# Patient Record
Sex: MALE | Race: WHITE | NOT HISPANIC OR LATINO | Employment: FULL TIME | URBAN - METROPOLITAN AREA
[De-identification: names, ages, dates, MRNs, and addresses within clinical notes are randomized per-mention and may not be internally consistent; named-entity substitution may affect disease eponyms.]

---

## 2017-11-15 ENCOUNTER — GENERIC CONVERSION - ENCOUNTER (OUTPATIENT)
Dept: OTHER | Facility: OTHER | Age: 57
End: 2017-11-15

## 2018-01-12 NOTE — MISCELLANEOUS
Message  REFILL FOR DEPO TESTOS VERBALLY CALLED TO AMAURY Gross [phone], NO REFILL  WILL CONTACT PT RE: PSA LEVEL, NO RECENT RESULTS ON CHART  Active Problems    1  Abdominal pain, left upper quadrant (789 02) (R10 12)   2  Bilateral leg weakness (729 89) (R29 898)   3  Chronic pain syndrome (338 4) (G89 4)   4  Ejaculatory disorder (608 89) (N53 19)   5  Hearing Loss Bilaterally   6  Hypogonadism, male (257 2) (E29 1)   7  Imbalance (781 2) (R26 89)   8  Leg heaviness (729 89) (R29 898)   9  Lumbar radiculopathy (724 4) (M54 16)   10  Lumbar stenosis with neurogenic claudication (724 03) (M48 062)   11  Spinal cord injury (952 9)   12  Status post lumbar spinal fusion (V45 4) (Z98 1)    Current Meds   1  Aspirin 81 MG TABS; Take 1 tablet daily; Therapy: (Recorded:18Mar2016) to Recorded   2  LORazepam 2 MG Oral Tablet (Ativan); TAKE 1 TABLET PRIOR TO MRI PROCEDURE; Therapy: 08Apr2016 to (Evaluate:09Apr2016); Last Rx:08Apr2016 Ordered   3  Testosterone 2 MG/24HR PT24; injection every 2 weeks; Therapy: (Recorded:08Apr2016) to Recorded   4  Testosterone Cypionate 200 MG/ML Intramuscular Solution; INJECT 2 MLS   INTRAMUSCULARLY EVERY 2 WEEKS; Therapy: [id number] to ([phone])  Requested for: [id number]; Last   Rx:13Nov2017 Ordered   5  Viagra TABS; PRN; Therapy: (Recorded:18Mar2016) to Recorded   6  Vicodin ES 7 5-750 MG TABS (Hydrocodone-Acetaminophen); PRN; Therapy: (Recorded:18Mar2016) to Recorded    Allergies    1   No Known Drug Allergies    Signatures   Electronically signed by : Loyda Hull RN; Nov 15 2017  1:58PM EST                       (Author)

## 2018-05-18 ENCOUNTER — OFFICE VISIT (OUTPATIENT)
Dept: UROLOGY | Facility: MEDICAL CENTER | Age: 58
End: 2018-05-18
Payer: COMMERCIAL

## 2018-05-18 VITALS
BODY MASS INDEX: 29.93 KG/M2 | DIASTOLIC BLOOD PRESSURE: 68 MMHG | WEIGHT: 221 LBS | HEIGHT: 72 IN | SYSTOLIC BLOOD PRESSURE: 110 MMHG

## 2018-05-18 DIAGNOSIS — E29.1 TESTICULAR HYPOFUNCTION: ICD-10-CM

## 2018-05-18 DIAGNOSIS — N52.9 ERECTILE DYSFUNCTION, UNSPECIFIED ERECTILE DYSFUNCTION TYPE: ICD-10-CM

## 2018-05-18 DIAGNOSIS — R35.1 BENIGN PROSTATIC HYPERPLASIA WITH NOCTURIA: Primary | ICD-10-CM

## 2018-05-18 DIAGNOSIS — N40.1 BENIGN PROSTATIC HYPERPLASIA WITH NOCTURIA: Primary | ICD-10-CM

## 2018-05-18 LAB
SL AMB  POCT GLUCOSE, UA: NEGATIVE
SL AMB LEUKOCYTE ESTERASE,UA: NEGATIVE
SL AMB POCT BILIRUBIN,UA: NEGATIVE
SL AMB POCT BLOOD,UA: NEGATIVE
SL AMB POCT CLARITY,UA: CLEAR
SL AMB POCT COLOR,UA: NORMAL
SL AMB POCT KETONES,UA: NEGATIVE
SL AMB POCT NITRITE,UA: NEGATIVE
SL AMB POCT PH,UA: 6.5
SL AMB POCT SPECIFIC GRAVITY,UA: 1.01
SL AMB POCT URINE PROTEIN: NEGATIVE
SL AMB POCT UROBILINOGEN: 1

## 2018-05-18 PROCEDURE — 99214 OFFICE O/P EST MOD 30 MIN: CPT | Performed by: UROLOGY

## 2018-05-18 PROCEDURE — 81003 URINALYSIS AUTO W/O SCOPE: CPT | Performed by: UROLOGY

## 2018-05-18 RX ORDER — PREDNISONE 10 MG/1
TABLET ORAL AS NEEDED
Refills: 0 | COMMUNITY
Start: 2018-05-09 | End: 2019-01-23 | Stop reason: ALTCHOICE

## 2018-05-18 RX ORDER — IBUPROFEN 400 MG/1
400 TABLET ORAL AS NEEDED
COMMUNITY
End: 2021-06-10 | Stop reason: HOSPADM

## 2018-05-18 NOTE — LETTER
May 19, 2018     Dmitriy Simeon DO  [address] [facility]    Patient: Erlinda Severin   YOB: 1960   Date of Visit: 5/18/2018       Dear Dr Jasbir David: Thank you for referring Jonny Waters to me for evaluation  Below are my notes for this consultation  If you have questions, please do not hesitate to call me  I look forward to following your patient along with you  Sincerely,        James Bee MD        CC: No Recipients  James Bee MD  5/19/2018 11:08 AM  Sign at close encounter  Assessment/Plan:    Testicular hypofunction  Continue AndroGel    Erectile dysfunction  The patient complains of orgasmic dysfunction  He will seek the advice of an expert in pelvic floor dysfunction at [facility]   One of the urologists there has special training in this regard  Benign prostatic hyperplasia with nocturia  The patient will have a cystoscopy to reassess his voiding complaints  Perhaps his treatment can be improved  Diagnoses and all orders for this visit:    Benign prostatic hyperplasia with nocturia    Testicular hypofunction  -     POCT urine dip auto non-scope  -     sildenafil (REVATIO) 20 mg tablet; 3-5 tablets po q Day prn    Erectile dysfunction, unspecified erectile dysfunction type    Other orders  -     ibuprofen (MOTRIN) 400 mg tablet; Take 400 mg by mouth as needed  -     predniSONE 10 mg tablet; as needed          Subjective:      Patient ID: Erlinda Severin is a 59 y o  male  HPI  BPH:  AUA score remains very high but stable  Quality of life stable, but unfortunately mostly dissatisfied  A cystoscopy in 2011 was normal   A urodynamic evaluation in showed a hyperactive sphincter  The patient has not had a cystoscopy since 2011    He will return in the near future for a cysto to reassess his symptoms and see if the underlying causes may have changed and may now be treatable  Hypogonadism:  The patient has been taking AndroGel since approximately 2012 with good effect  Can get only a month at a time and he is annoyed by this  Will give longer Rx and see if he can get it filled  Erectile dysfunction:  The patient is using sildenafil 100 mg  Has not had an orgasm since August 2017  This distresses pt  Pt reassured that his hydrocele is not the cause  Has numbness in penis, thighs and scrotum since back surgery but had orgasms after surgery  The following portions of the patient's history were reviewed and updated as appropriate: allergies, current medications, past family history, past medical history, past social history, past surgical history and problem list     Review of Systems   Constitutional: Negative for activity change and fatigue  Respiratory: Negative for shortness of breath and wheezing  Cardiovascular: Negative for chest pain  Gastrointestinal: Negative for abdominal pain  Genitourinary: Negative for difficulty urinating, dysuria, frequency, hematuria and urgency  Musculoskeletal: Negative for back pain and gait problem  Skin: Negative  Allergic/Immunologic: Negative  Neurological: Negative  The patient has severe difficulty walking due to extensive back surgery with some residual neuropathy  Psychiatric/Behavioral: Negative  Objective:      /68 (BP Location: Left arm, Patient Position: Sitting, Cuff Size: Standard)   Ht 6' (1 829 m)   Wt 100 kg (221 lb)   BMI 29 97 kg/m²           Physical Exam   Constitutional: He is oriented to person, place, and time  He appears well-developed and well-nourished  HENT:   Head: Normocephalic and atraumatic  Neck: Normal range of motion  Neck supple  Pulmonary/Chest: Effort normal    Genitourinary:   Genitourinary Comments: The prostate is approximately 40 g, firm, smooth, nontender  Musculoskeletal: Normal range of motion     Neurological: He is alert and oriented to person, place, and time  He has normal reflexes  Skin: Skin is warm and dry  Psychiatric: He has a normal mood and affect   His behavior is normal  Judgment and thought content normal

## 2018-05-18 NOTE — PROGRESS NOTES
IPSS Questionnaire (AUA-7): Over the past month    1)  How often have you had a sensation of not emptying your bladder completely after you finish urinating? 0 - Not at all   2)  How often have you had to urinate again less than two hours after you finished urinating? 3 - About half the time   3)  How often have you found you stopped and started again several times when you urinated? 3 - About half the time   4) How difficult have you found it to postpone urination? 5 - Almost always   5) How often have you had a weak urinary stream?  5 - Almost always   6) How often have you had to push or strain to begin urination? 4 - More than half the time   7) How many times did you most typically get up to urinate from the time you went to bed until the time you got up in the morning? 4 - 4 times   Total Score:  24     QOL: Mostly dissatisfied

## 2018-05-18 NOTE — PROGRESS NOTES
Assessment/Plan:    Testicular hypofunction  Continue AndroGel    Erectile dysfunction  The patient complains of orgasmic dysfunction  He will seek the advice of an expert in pelvic floor dysfunction at [facility]   One of the urologists there has special training in this regard  Benign prostatic hyperplasia with nocturia  The patient will have a cystoscopy to reassess his voiding complaints  Perhaps his treatment can be improved  Diagnoses and all orders for this visit:    Benign prostatic hyperplasia with nocturia    Testicular hypofunction  -     POCT urine dip auto non-scope  -     sildenafil (REVATIO) 20 mg tablet; 3-5 tablets po q Day prn    Erectile dysfunction, unspecified erectile dysfunction type    Other orders  -     ibuprofen (MOTRIN) 400 mg tablet; Take 400 mg by mouth as needed  -     predniSONE 10 mg tablet; as needed          Subjective:      Patient ID: Brianna Jimenez is a 59 y o  male  HPI  BPH:  AUA score remains very high but stable  Quality of life stable, but unfortunately mostly dissatisfied  A cystoscopy in 2011 was normal   A urodynamic evaluation in showed a hyperactive sphincter  The patient has not had a cystoscopy since 2011  He will return in the near future for a cysto to reassess his symptoms and see if the underlying causes may have changed and may now be treatable  Hypogonadism:  The patient has been taking AndroGel since approximately 2012 with good effect  Can get only a month at a time and he is annoyed by this  Will give longer Rx and see if he can get it filled  Erectile dysfunction:  The patient is using sildenafil 100 mg  Has not had an orgasm since August 2017  This distresses pt  Pt reassured that his hydrocele is not the cause  Has numbness in penis, thighs and scrotum since back surgery but had orgasms after surgery        The following portions of the patient's history were reviewed and updated as appropriate: allergies, current medications, past family history, past medical history, past social history, past surgical history and problem list     Review of Systems   Constitutional: Negative for activity change and fatigue  Respiratory: Negative for shortness of breath and wheezing  Cardiovascular: Negative for chest pain  Gastrointestinal: Negative for abdominal pain  Genitourinary: Negative for difficulty urinating, dysuria, frequency, hematuria and urgency  Musculoskeletal: Negative for back pain and gait problem  Skin: Negative  Allergic/Immunologic: Negative  Neurological: Negative  The patient has severe difficulty walking due to extensive back surgery with some residual neuropathy  Psychiatric/Behavioral: Negative  Objective:      /68 (BP Location: Left arm, Patient Position: Sitting, Cuff Size: Standard)   Ht 6' (1 829 m)   Wt 100 kg (221 lb)   BMI 29 97 kg/m²          Physical Exam   Constitutional: He is oriented to person, place, and time  He appears well-developed and well-nourished  HENT:   Head: Normocephalic and atraumatic  Neck: Normal range of motion  Neck supple  Pulmonary/Chest: Effort normal    Genitourinary:   Genitourinary Comments: The prostate is approximately 40 g, firm, smooth, nontender  Musculoskeletal: Normal range of motion  Neurological: He is alert and oriented to person, place, and time  He has normal reflexes  Skin: Skin is warm and dry  Psychiatric: He has a normal mood and affect   His behavior is normal  Judgment and thought content normal

## 2018-05-19 PROBLEM — R35.1 BENIGN PROSTATIC HYPERPLASIA WITH NOCTURIA: Status: ACTIVE | Noted: 2018-05-19

## 2018-05-19 PROBLEM — N40.1 BENIGN PROSTATIC HYPERPLASIA WITH NOCTURIA: Status: ACTIVE | Noted: 2018-05-19

## 2018-05-19 PROBLEM — E29.1 TESTICULAR HYPOFUNCTION: Status: ACTIVE | Noted: 2018-05-19

## 2018-05-19 PROBLEM — N52.9 ERECTILE DYSFUNCTION: Status: ACTIVE | Noted: 2018-05-19

## 2018-05-19 RX ORDER — SILDENAFIL CITRATE 20 MG/1
TABLET ORAL
Qty: 90 TABLET | Refills: 3 | Status: SHIPPED | OUTPATIENT
Start: 2018-05-19 | End: 2018-08-21 | Stop reason: SDUPTHER

## 2018-05-19 NOTE — ASSESSMENT & PLAN NOTE
The patient complains of orgasmic dysfunction  He will seek the advice of an expert in pelvic floor dysfunction at [facility]   One of the urologists there has special training in this regard

## 2018-06-01 ENCOUNTER — OFFICE VISIT (OUTPATIENT)
Dept: UROLOGY | Facility: MEDICAL CENTER | Age: 58
End: 2018-06-01
Payer: COMMERCIAL

## 2018-06-01 VITALS
DIASTOLIC BLOOD PRESSURE: 68 MMHG | HEIGHT: 72 IN | SYSTOLIC BLOOD PRESSURE: 114 MMHG | BODY MASS INDEX: 28.99 KG/M2 | WEIGHT: 214 LBS

## 2018-06-01 DIAGNOSIS — R35.1 BPH ASSOCIATED WITH NOCTURIA: Primary | ICD-10-CM

## 2018-06-01 DIAGNOSIS — E29.1 HYPOGONADISM IN MALE: ICD-10-CM

## 2018-06-01 DIAGNOSIS — N40.1 BPH ASSOCIATED WITH NOCTURIA: Primary | ICD-10-CM

## 2018-06-01 LAB
SL AMB  POCT GLUCOSE, UA: NEGATIVE
SL AMB LEUKOCYTE ESTERASE,UA: NEGATIVE
SL AMB POCT BILIRUBIN,UA: NEGATIVE
SL AMB POCT BLOOD,UA: NEGATIVE
SL AMB POCT CLARITY,UA: CLEAR
SL AMB POCT COLOR,UA: YELLOW
SL AMB POCT KETONES,UA: NORMAL
SL AMB POCT NITRITE,UA: NEGATIVE
SL AMB POCT PH,UA: 7
SL AMB POCT SPECIFIC GRAVITY,UA: 1.01
SL AMB POCT URINE PROTEIN: NEGATIVE
SL AMB POCT UROBILINOGEN: 1

## 2018-06-01 PROCEDURE — 52000 CYSTOURETHROSCOPY: CPT | Performed by: UROLOGY

## 2018-06-01 PROCEDURE — 99213 OFFICE O/P EST LOW 20 MIN: CPT | Performed by: UROLOGY

## 2018-06-01 PROCEDURE — 81003 URINALYSIS AUTO W/O SCOPE: CPT | Performed by: UROLOGY

## 2018-06-01 RX ORDER — TESTOSTERONE CYPIONATE 200 MG/ML
200 INJECTION INTRAMUSCULAR
Qty: 6 ML | Refills: 3 | Status: SHIPPED | OUTPATIENT
Start: 2018-06-01 | End: 2019-05-10 | Stop reason: SDUPTHER

## 2018-06-01 NOTE — LETTER
June 1, 2018     Sherry Gerardo,   [address] the [facility]  [address] [facility]    Patient: David Smith   YOB: 1960   Date of Visit: 6/1/2018       Dear Dr Almas Noe: Thank you for referring Sy Gonzalez to me for evaluation  Below are my notes for this consultation  If you have questions, please do not hesitate to call me  I look forward to following your patient along with you  Sincerely,        Alejandra Kaufman MD        CC: No Recipients  Alejandra Kaufman MD  6/1/2018  4:40 PM  Sign at close encounter  Assessment/Plan:    Erectile dysfunction  The patient is using sildenafil successfully  BPH associated with nocturia  The patient is voiding symptoms are not too to enlarged prostate  The patient is going to see a pelvic floor    Hypogonadism in male  The patient is doing well on Depo-Testosterone and will continue  Diagnoses and all orders for this visit:    BPH associated with nocturia  -     POCT urine dip auto non-scope    Hypogonadism in male  -     testosterone cypionate (DEPO-TESTOSTERONE) 200 mg/mL SOLN; Inject 1 mL (200 mg total) into the shoulder, thigh, or buttocks every 14 (fourteen) days for 26 doses 2 mL intramuscular every 2 weeks  Subjective:      Patient ID: David Smith is a 59 y o  male  HPI  Hyperactive sphincter: The cysto findings have not changed since 2011  His bladder shows obstructive changes but his prostate is not significantly enlarged and most men would void normally with this prostate  He has tried tamsulosin without success  Pt will try Kegals  With this will strength in his sphincter and improve his ability to hold urine when he has urgency  The patient thinks Pt will see pelvic  at [address] [place] [address] [address]  Hypogonadism:  The patient continues to feel well on Depakote testosterone 200 milligrams/milliliter, 2 mm intramuscular every 2 weeks    His prescription has been renewed  Between his testosterone and sildenafil, his erectile dysfunction has been managed successfully  The following portions of the patient's history were reviewed and updated as appropriate: allergies, current medications, past family history, past medical history, past social history, past surgical history and problem list     Review of Systems      Objective:      /68 (BP Location: Left arm, Patient Position: Sitting, Cuff Size: Standard)   Ht 6' (1 829 m)   Wt 97 1 kg (214 lb)   BMI 29 02 kg/m²           Physical Exam      Kinza Henderson MD  6/1/2018  1:34 PM  Sign at close encounter  Procedures  MALE FLEXIBLE CYSTOSCOPY    Preoperative diagnosis:  Symptomatic BPH    Postoperative diagnosis:  Trabeculated bladder with minimal BPH    Operation:  Flexible cystoscopy    Surgeon:  Blu Dahl MD, FACS    Anesthesia:  Xylocaine jelly    Procedure:  Patient was brought to the cystoscopy room and positively identified  The patient was prepped and draped in sterile fashion  Ten mL of 1% xylocaine jelly was instilled into the urethra  A penile clamp was applied  The flexible cystoscope was inserted  Findings are as follows:    Penile Urethra:normal  Bulbar Urethra:normal  Prostate:  Minimally enlarged, nonocclusive  Bladder: The bladder neck is normal  Ureteral orifices are in normal  position  The mucosa is normal    There is mild trabeculation  The cystoscope was removed  The patient tolerated the procedure well  Following additional consultation to review the findings with the patient, he was discharged from the office in satisfactory condition  Impression:  As in 2011, the patient's prostate is not particularly large and is not a major cause of his symptoms  A urodynamic evaluation in 2011 showed a hyperactive sphincter and I think this remains his primary problem  That would explain the obstructive signs in his bladder without significant BPH

## 2018-06-01 NOTE — ASSESSMENT & PLAN NOTE
The patient is voiding symptoms are not too to enlarged prostate    The patient is going to see a pelvic floor

## 2018-06-01 NOTE — PROGRESS NOTES
Assessment/Plan:    No problem-specific Assessment & Plan notes found for this encounter  {Assess/PlanSmartLinks:93527}      Subjective:      Patient ID: Simran Dudley is a 59 y o  male      HPI    {Common ambulatory SmartLinks:07088}    Review of Systems      Objective:      /68 (BP Location: Left arm, Patient Position: Sitting, Cuff Size: Standard)   Ht 6' (1 829 m)   Wt 97 1 kg (214 lb)   BMI 29 02 kg/m²          Physical Exam

## 2018-06-01 NOTE — PROGRESS NOTES
Procedures  MALE FLEXIBLE CYSTOSCOPY    Preoperative diagnosis:  Symptomatic BPH    Postoperative diagnosis:  Trabeculated bladder with minimal BPH    Operation:  Flexible cystoscopy    Surgeon:  Willadean Hammans, MD, FACS    Anesthesia:  Xylocaine jelly    Procedure:  Patient was brought to the cystoscopy room and positively identified  The patient was prepped and draped in sterile fashion  Ten mL of 1% xylocaine jelly was instilled into the urethra  A penile clamp was applied  The flexible cystoscope was inserted  Findings are as follows:    Penile Urethra:normal  Bulbar Urethra:normal  Prostate:  Minimally enlarged, nonocclusive  Bladder: The bladder neck is normal  Ureteral orifices are in normal  position  The mucosa is normal    There is mild trabeculation  The cystoscope was removed  The patient tolerated the procedure well  Following additional consultation to review the findings with the patient, he was discharged from the office in satisfactory condition  Impression:  As in 2011, the patient's prostate is not particularly large and is not a major cause of his symptoms  A urodynamic evaluation in 2011 showed a hyperactive sphincter and I think this remains his primary problem  That would explain the obstructive signs in his bladder without significant BPH

## 2018-06-01 NOTE — PROGRESS NOTES
Assessment/Plan:    Erectile dysfunction  The patient is using sildenafil successfully  BPH associated with nocturia  The patient is voiding symptoms are not too to enlarged prostate  The patient is going to see a pelvic floor    Hypogonadism in male  The patient is doing well on Depo-Testosterone and will continue  Diagnoses and all orders for this visit:    BPH associated with nocturia  -     POCT urine dip auto non-scope    Hypogonadism in male  -     testosterone cypionate (DEPO-TESTOSTERONE) 200 mg/mL SOLN; Inject 1 mL (200 mg total) into the shoulder, thigh, or buttocks every 14 (fourteen) days for 26 doses 2 mL intramuscular every 2 weeks  Subjective:      Patient ID: David Smith is a 59 y o  male  HPI  Hyperactive sphincter: The cysto findings have not changed since 2011  His bladder shows obstructive changes but his prostate is not significantly enlarged and most men would void normally with this prostate  He has tried tamsulosin without success  Pt will try Kegals  With this will strength in his sphincter and improve his ability to hold urine when he has urgency  The patient thinks Pt will see pelvic  at [address] [place] [address]  Hypogonadism:  The patient continues to feel well on Depakote testosterone 200 milligrams/milliliter, 2 mm intramuscular every 2 weeks  His prescription has been renewed  Between his testosterone and sildenafil, his erectile dysfunction has been managed successfully        The following portions of the patient's history were reviewed and updated as appropriate: allergies, current medications, past family history, past medical history, past social history, past surgical history and problem list     Review of Systems      Objective:      /68 (BP Location: Left arm, Patient Position: Sitting, Cuff Size: Standard)   Ht 6' (1 829 m)   Wt 97 1 kg (214 lb)   BMI 29 02 kg/m²          Physical Exam

## 2018-08-21 DIAGNOSIS — E29.1 TESTICULAR HYPOFUNCTION: ICD-10-CM

## 2018-08-21 RX ORDER — SILDENAFIL CITRATE 20 MG/1
TABLET ORAL
Qty: 90 TABLET | Refills: 3 | Status: SHIPPED | OUTPATIENT
Start: 2018-08-21 | End: 2019-03-22 | Stop reason: SDUPTHER

## 2018-09-14 ENCOUNTER — TELEPHONE (OUTPATIENT)
Dept: UROLOGY | Facility: MEDICAL CENTER | Age: 58
End: 2018-09-14

## 2018-09-14 DIAGNOSIS — R35.0 FREQUENCY OF URINATION: Primary | ICD-10-CM

## 2018-09-14 NOTE — TELEPHONE ENCOUNTER
Spoke to patient who is experiencing frequency and odor coming from his urine  Pt has no other urinary symptoms  Will send script to have a C&S  Pt to call over the weekend if symptoms get worse

## 2018-09-15 ENCOUNTER — APPOINTMENT (OUTPATIENT)
Dept: LAB | Facility: MEDICAL CENTER | Age: 58
End: 2018-09-15
Attending: UROLOGY
Payer: COMMERCIAL

## 2018-09-15 DIAGNOSIS — R35.0 FREQUENCY OF URINATION: ICD-10-CM

## 2018-09-15 PROCEDURE — 87086 URINE CULTURE/COLONY COUNT: CPT

## 2018-09-15 PROCEDURE — 87077 CULTURE AEROBIC IDENTIFY: CPT

## 2018-09-15 PROCEDURE — 87186 SC STD MICRODIL/AGAR DIL: CPT

## 2018-09-17 LAB — BACTERIA UR CULT: ABNORMAL

## 2018-09-20 DIAGNOSIS — N30.00 ACUTE CYSTITIS WITHOUT HEMATURIA: Primary | ICD-10-CM

## 2018-09-20 RX ORDER — NITROFURANTOIN 25; 75 MG/1; MG/1
100 CAPSULE ORAL 2 TIMES DAILY
Qty: 14 CAPSULE | Refills: 0 | Status: SHIPPED | OUTPATIENT
Start: 2018-09-20 | End: 2019-03-13 | Stop reason: ALTCHOICE

## 2018-12-07 DIAGNOSIS — E29.1 TESTICULAR HYPOGONADISM: Primary | ICD-10-CM

## 2018-12-07 NOTE — TELEPHONE ENCOUNTER
Patient called requesting refill on Testosterone Cypionate 200mg/mL  Request for same, 1 - 10mL vial with 1 refill was queued and forwarded to Dr Fern Escobar for approval   Verbal order called into [facility], spoke with Cristobal Bauer Ph

## 2018-12-10 RX ORDER — TESTOSTERONE CYPIONATE 200 MG/ML
INJECTION INTRAMUSCULAR
Qty: 10 ML | Refills: 1 | OUTPATIENT
Start: 2018-12-10 | End: 2019-01-16 | Stop reason: SDUPTHER

## 2018-12-12 ENCOUNTER — OFFICE VISIT (OUTPATIENT)
Dept: NEUROLOGY | Facility: CLINIC | Age: 58
End: 2018-12-12
Payer: COMMERCIAL

## 2018-12-12 VITALS
SYSTOLIC BLOOD PRESSURE: 130 MMHG | HEIGHT: 72 IN | RESPIRATION RATE: 14 BRPM | DIASTOLIC BLOOD PRESSURE: 90 MMHG | WEIGHT: 218 LBS | BODY MASS INDEX: 29.53 KG/M2

## 2018-12-12 DIAGNOSIS — M48.05 SPINAL STENOSIS OF THORACOLUMBAR REGION: ICD-10-CM

## 2018-12-12 DIAGNOSIS — G83.4 CAUDA EQUINA SYNDROME (HCC): Primary | ICD-10-CM

## 2018-12-12 PROCEDURE — 99215 OFFICE O/P EST HI 40 MIN: CPT | Performed by: PSYCHIATRY & NEUROLOGY

## 2018-12-12 RX ORDER — METHYLPREDNISOLONE 4 MG/1
TABLET ORAL AS NEEDED
Refills: 0 | COMMUNITY
Start: 2018-12-03 | End: 2020-02-13

## 2018-12-12 RX ORDER — ALPRAZOLAM 0.5 MG/1
0.5 TABLET ORAL
Qty: 5 TABLET | Refills: 0 | Status: SHIPPED | OUTPATIENT
Start: 2018-12-12 | End: 2019-03-13 | Stop reason: ALTCHOICE

## 2018-12-12 RX ORDER — BACLOFEN 10 MG/1
10 TABLET ORAL 3 TIMES DAILY
Qty: 90 TABLET | Refills: 1 | Status: SHIPPED | OUTPATIENT
Start: 2018-12-12 | End: 2019-01-16 | Stop reason: ALTCHOICE

## 2018-12-12 NOTE — ASSESSMENT & PLAN NOTE
This is a 13-year-old patient with history of multiple spinal surgeries  He did have surgery in 2016 and has noted progressive numbness in the anterior thighs, numbness in the perineum and saddle anesthesia, urinary urgency and abnormal  sexual dysfunction  I am concerned that he may have cauda equina syndrome  Therefore I have reordered a MRI of the lumbar spine with contrast   In many occasions MRIs of the lumbar spine performed without contrast may not be as informative  We did discuss possible treatments in this could include steroids  I have started him on baclofen 10 mg at bedtime and increase as needed to one tablet 3 times a day  I also provided them with a prescription of that Xanax to be taken for claustrophobia prior to the MRIs

## 2018-12-12 NOTE — ASSESSMENT & PLAN NOTE
His examination today also demonstrated a sensory level at T12 with temperature and pinprick  this is associated with numbness and tingling in the perineum the scrotum, penis as well as perineum  This could suggest either an abnormality in the lower sacral region but given his sensory level I  Am  Concerned about more and more abnormalities more proximal  Spine   This could be a myelopathy, cord compression  He does have a prior history of a bone fragment at L2  He does request to have the MRIs performed at open [facility]  He does have severe claustrophobia and opened his MRIs can be quite helpful  I have asked him to bring a copy of the MRI films to our offices for our review  He will have a B12 folate copper as well as heavy metal screen performed

## 2018-12-12 NOTE — PROGRESS NOTES
Patient ID: Consuelo Correa is a 59 y o  male  Assessment/Plan:    Cauda equina syndrome [facility])  This is a 57-year-old patient with history of multiple spinal surgeries  He did have surgery in 2016 and has noted progressive numbness in the anterior thighs, numbness in the perineum and saddle anesthesia, urinary urgency and abnormal  sexual dysfunction  I am concerned that he may have cauda equina syndrome  Therefore I have reordered a MRI of the lumbar spine with contrast   In many occasions MRIs of the lumbar spine performed without contrast may not be as informative  We did discuss possible treatments in this could include steroids  I have started him on baclofen 10 mg at bedtime and increase as needed to one tablet 3 times a day  I also provided them with a prescription of that Xanax to be taken for claustrophobia prior to the MRIs  Spinal stenosis of thoracolumbar region  His examination today also demonstrated a sensory level at T12 with temperature and pinprick  this is associated with numbness and tingling in the perineum the scrotum, penis as well as perineum  This could suggest either an abnormality in the lower sacral region but given his sensory level I  Am  Concerned about more and more abnormalities more proximal  Spine   This could be a myelopathy, cord compression  He does have a prior history of a bone fragment at L2  He does [facility]  He does have severe claustrophobia and opened his MRIs can be quite helpful  I have asked him to bring a copy of the MRI films to our offices for our review  He will have a B12 folate copper as well as heavy metal screen performed  Diagnoses and all orders for this visit:    Cauda equina syndrome ([facility], [place] [address] )  -     ALPRAZolam (XANAX) 0 5 mg tablet; Take 1 tablet (0 5 mg total) by mouth 30 min pre-procedure  -     baclofen 10 mg tablet;  Take 1 tablet (10 mg total) by mouth 3 (three) times a day  - MRI lumbar spine with and without contrast; Future  -     MRI thoracic spine with and without contrast; Future  -     Comprehensive metabolic panel; Future  -     Vitamin B12; Future  -     Folate; Future  -     Copper Level; Future  -     Heavy metals screen; Future    Spinal stenosis of thoracolumbar region  -     ALPRAZolam (XANAX) 0 5 mg tablet; Take 1 tablet (0 5 mg total) by mouth 30 min pre-procedure  -     baclofen 10 mg tablet; Take 1 tablet (10 mg total) by mouth 3 (three) times a day  -     MRI lumbar spine with and without contrast; Future  -     MRI thoracic spine with and without contrast; Future  -     Comprehensive metabolic panel; Future  -     Vitamin B12; Future  -     Folate; Future  -     Copper Level; Future  -     Heavy metals screen; Future    Other orders  -     Methylprednisolone 4 MG TBPK; as needed       He is to return to our offices in approximately one month after the imaging studies  Subjective: This is a 59  y o rh male who presents to our office  He was last evaluated here in 2016  He did  complainin of stiffness and heaviness in his legswith progressive weakness   MRI did show foraminal stenosis a left L5-S1 as well as impingement on the left L3 L4 region  He was seen by dr Barbara Greenfield who  suggested surgery but he and then sought a 2nd opinion and underwent fusion with placement of  L3-S1 in May of 2016 by Dr Ilana Owens ,  the head of Neurosurgery at [facility]  After the surgery in May of 2016 he noted increasing numbness in his bilateral thighs, in the saddle regions and numbness in the perineum  the numbness also occurs in the lower extremities the calf on the left side He has noted progressive ambulatory dysfunction specially on the right leg  He does report some urinary urgency and abnormalities with sexual dysfunction   The sexual dysfunction has been progressive over the last year    In the past he was on medications for muscle spasms but is no longer taking any  He  Denies  any MRI imaging but he is extremely claustrophobic  He is no longer utilizing narcotic  pain meds and uses prednisone and/or Medrol Dosepak when he has increase in back pain  He did inform the neurosurgeons about these increase in symptoms and was informed this may be due to the surgery itself  But unfortunately the symptoms have been progressive since surgery in May of 2016  He is now continued to be followed by physical therapy  He is followed by   Franc Sarah,  a physical therapist   He continues to exercise with physical therapy , yoga  and aquatic therapy  Her her His last imaging study was in October of 2016 with a ct scan of the lumbar spine the  There was no  evidence suggests disc space infection or arachnoiditis  He had anterior fusion at L4-L5 and debris due to granulation tissue  There was a bony fragment impinging upon the spinal cord at the L2 vertebral body  He has not returned to the neurosurgeons recently  He is a  of an [employer] agency and had had multiple surgeries in the lumbar spine( last one in 2000) and cervical decompression years ago   He does report multiple ankle fusions on the right side      He is now using a cane on a regular basis and does admit that his right leg is weaker  He reports the need to think about moving his legs prior to the move them automatically  He is followed by Urology and a regular basis  He does report urinary urgency but no constipation  Does report abnormalities in ejaculation but no diarrhea  Today I spent 1 hour with the patient  Greater than 50% of the time was spent in obtaining history reviewing her his prior records and formulating a plan  I answered all of his questions to his satisfaction  The following portions of the patient's history were reviewed and updated as appropriate:   He  has a past medical history of BPH (benign prostatic hyperplasia);  Chronic back pain; Erectile dysfunction; Nocturia; OAB (overactive bladder); Testicular hypogonadism; Urinary frequency; and Urinary urgency  He  has a past surgical history that includes Back surgery (05/18/2016)  His family history is not on file  He  reports that he has never smoked  He has never used smokeless tobacco  He reports that he drinks alcohol  He reports that he does not use drugs  Current Outpatient Prescriptions   Medication Sig Dispense Refill    Methylprednisolone 4 MG TBPK as needed  0    sildenafil (REVATIO) 20 mg tablet TAKE 3 TO 5 TABLETS BY MOUTH DAILY AS NEEDED  90 tablet 3    testosterone cypionate (DEPO-TESTOSTERONE) 200 mg/mL SOLN Inject 1 mL (200 mg total) into the shoulder, thigh, or buttocks every 14 (fourteen) days for 26 doses 2 mL intramuscular every 2 weeks  6 mL 3    ALPRAZolam (XANAX) 0 5 mg tablet Take 1 tablet (0 5 mg total) by mouth 30 min pre-procedure 5 tablet 0    baclofen 10 mg tablet Take 1 tablet (10 mg total) by mouth 3 (three) times a day 90 tablet 1    ibuprofen (MOTRIN) 400 mg tablet Take 400 mg by mouth as needed      nitrofurantoin (MACROBID) 100 mg capsule Take 1 capsule (100 mg total) by mouth 2 (two) times a day (Patient not taking: Reported on 12/12/2018 ) 14 capsule 0    predniSONE 10 mg tablet as needed  0    testosterone cypionate (DEPO-TESTOSTERONE) 200 mg/mL SOLN Injection 2 0mL IM once every 2 weeks  10 mL 1     No current facility-administered medications for this visit  He is allergic to morphine and related            Objective:    Blood pressure 130/90, resp  rate 14, height 6' (1 829 m), weight 98 9 kg (218 lb)  Physical Exam   Constitutional: He appears well-developed  Eyes: Pupils are equal, round, and reactive to light  Lids are normal    Neurological:   Reflex Scores:       Tricep reflexes are 1+ on the right side and 1+ on the left side  Bicep reflexes are 1+ on the right side and 1+ on the left side         Patellar reflexes are 3+ on the right side and 2+ on the left side  Achilles reflexes are 2+ on the right side and Tr on the left side  Psychiatric: He has a normal mood and affect  Neurological Exam  Mental Status   Oriented to person, place, time and situation  Recent and remote memory are intact  Language is fluent with no aphasia  Attention and concentration are normal     Cranial Nerves  CN II: Visual acuity is normal  Visual fields full to confrontation  CN III, IV, VI: Extraocular movements intact bilaterally  Normal lids and orbits bilaterally  Pupils equal round and reactive to light bilaterally  CN V: Facial sensation is normal   CN VII: Full and symmetric facial movement  CN VIII: Hearing is normal   CN IX, X: Palate elevates symmetrically  Normal gag reflex  CN XI: Shoulder shrug strength is normal   CN XII: Tongue midline without atrophy or fasciculations  Motor    [place] He has a no evidence of a pronator drift  right df 3/5, left 3/5, right big toe 3/5 , decrease side to sdie movements  He did have weakness in the right iliopsoas and, tensor fascia florencio  Knee flexion and knee extension were 5/5  Adductors  and bulk in the upper extremities  were 5/5  He had normal   Tone  There is no atrophy  He also was noted to have bilateral hammertoes as well as flattened arches  He did attributed to the arches to the prior surgeries  He has had multiple surgeries on his ankles with limited eversion and inversion on the right  ( 1/5) , he had 3/5 strength in eversion and inversion on the left       Sensory  Vibraiton 3 sec in right toe fiber sensation in the left toe was a 3/3 seconds  There was diminished sensation in the anterior thighs bilaterally compared to the medial thighs there is also decreased sensation and temperature and light touch in the at in the perineum as well as to the T12 level both anteriorly and posteriorly to put pinprick  Proprioception normal ,         Reflexes Right                      Left  Biceps                                 1+                         1+  Triceps                                1+                         1+  Patellar                                3+                         2+  Achilles                                2+                         Tr  Plantar                           Downgoing                Downgoing    Right pathological reflexes: Domenica's absent  Crossed adductor present  Left pathological reflexes: Domenica's absent  No ankle clonus was noted on today's  Coordination  Right: Finger-to-nose normal   Left: Finger-to-nose normal   He has limited finger chin and heel-to-shin testing due to weakness in the proximal legs       Gait Unable to rise from chair without using arms  He walks with a cane  He was dragging his right lower extremity with a footdrop on the right  There is no footdrop on left           ROS:    Review of Systems   Constitutional: Negative for appetite change and fever  HENT: Positive for congestion  Negative for hearing loss, tinnitus, trouble swallowing and voice change  Sinus problem     Eyes: Negative  Negative for photophobia and pain  Respiratory: Negative  Negative for shortness of breath  Cardiovascular: Negative  Negative for palpitations  Gastrointestinal: Negative  Negative for nausea and vomiting  Endocrine: Negative  Negative for cold intolerance and heat intolerance  Erection difficulties     Genitourinary: Positive for frequency and urgency  Negative for dysuria  Musculoskeletal: Positive for arthralgias, back pain, gait problem, myalgias and neck pain  Pain while walking  Immobility or loss of function     Skin: Negative  Negative for rash  Neurological: Positive for weakness and numbness  Negative for dizziness, tremors, seizures, syncope, facial asymmetry, speech difficulty, light-headedness and headaches          Snoring  Balance problem  Cramping Hematological: Negative  Does not bruise/bleed easily  Psychiatric/Behavioral: Negative  Negative for confusion, hallucinations and sleep disturbance

## 2018-12-27 ENCOUNTER — TELEPHONE (OUTPATIENT)
Dept: NEUROLOGY | Facility: CLINIC | Age: 58
End: 2018-12-27

## 2018-12-27 NOTE — TELEPHONE ENCOUNTER
Reviewed mri of lumbar spine  And inform him of results     No infection or new disc hernation  or  structural evidence  of cauda equina syndrome     I also ordered mri of the t spine   Geovanna Jimenez Please find out if it was done  And get the report ?      It needs to done prior to his next appointment

## 2018-12-28 NOTE — TELEPHONE ENCOUNTER
AMANDA for pt to return call to make him aware of results  I called [facility] at [phone] and spoke with Glen Flynn  Pt currently having MRI t spine  Results will be faxed

## 2019-01-03 NOTE — TELEPHONE ENCOUNTER
Called and Left a message on pt's answering machine for a call back    I do not see that we did not receive t-spine result  I called [facility] and spoke to [place]  She will fax to alt fax    Will wait fax

## 2019-01-03 NOTE — TELEPHONE ENCOUNTER
Mri t-spine results received and scanned into chart under media dated 1/3  Please review and advise      When pt calls back we can review both results

## 2019-01-04 NOTE — TELEPHONE ENCOUNTER
Pt called and advised of the below  MRI-l/t results reviewed  Pt still c/o numbness in the anterior thighs and in the perineum, started 2 5 years ago  His walking has gotten worse  Can't walk as well as before  Symptoms are not worse but not better  Also issues w/ sexual performance, having trouble getting an orgasm d/t numbness  Pls advice  Follow up appt 1/16/19 @ noon                [phone]

## 2019-01-04 NOTE — TELEPHONE ENCOUNTER
Called and Left a message on pt's answering machine for a call back    Clinical team: when pt calls back, pls let him know that  we're still waiting for Dr Karen Jimenez to review MRI-t results

## 2019-01-04 NOTE — TELEPHONE ENCOUNTER
I spoke to the pt, no need to call him     I reviewed the mri of the t spine with him and explained to him about the herniated  disc      There is herniated disc found at thoracic level six and seven and this could be certainly one of the etiologies of his symptoms  We discussed that he tried baclofen 10 mg at bedtime  and was ineffective  In the past he has tried p o  steroids which have been ineffective  In two weeks he is to return to our offices were we can review the films in detail  If the films or abnormal we may need to refer him to the neurosurgeon and we may need to discuss other options including IVIG and/or the potential of high-dose IV steroids  He can try two tablets the baclofen at bedtime for his symptoms  He continues to have issues with erections  most probably due to numbness in the perineum

## 2019-01-09 ENCOUNTER — APPOINTMENT (OUTPATIENT)
Dept: LAB | Facility: MEDICAL CENTER | Age: 59
End: 2019-01-09
Attending: PSYCHIATRY & NEUROLOGY
Payer: COMMERCIAL

## 2019-01-09 DIAGNOSIS — G83.4 CAUDA EQUINA SYNDROME (HCC): ICD-10-CM

## 2019-01-09 DIAGNOSIS — M48.05 SPINAL STENOSIS OF THORACOLUMBAR REGION: ICD-10-CM

## 2019-01-09 LAB
ALBUMIN SERPL BCP-MCNC: 3.9 G/DL (ref 3.5–5)
ALP SERPL-CCNC: 86 U/L (ref 46–116)
ALT SERPL W P-5'-P-CCNC: 29 U/L (ref 12–78)
ANION GAP SERPL CALCULATED.3IONS-SCNC: 2 MMOL/L (ref 4–13)
AST SERPL W P-5'-P-CCNC: 16 U/L (ref 5–45)
BILIRUB SERPL-MCNC: 1.07 MG/DL (ref 0.2–1)
BUN SERPL-MCNC: 9 MG/DL (ref 5–25)
CALCIUM SERPL-MCNC: 8.9 MG/DL (ref 8.3–10.1)
CHLORIDE SERPL-SCNC: 101 MMOL/L (ref 100–108)
CO2 SERPL-SCNC: 32 MMOL/L (ref 21–32)
CREAT SERPL-MCNC: 0.79 MG/DL (ref 0.6–1.3)
FOLATE SERPL-MCNC: 17.9 NG/ML (ref 3.1–17.5)
GFR SERPL CREATININE-BSD FRML MDRD: 98 ML/MIN/1.73SQ M
GLUCOSE P FAST SERPL-MCNC: 87 MG/DL (ref 65–99)
POTASSIUM SERPL-SCNC: 4.6 MMOL/L (ref 3.5–5.3)
PROT SERPL-MCNC: 7.2 G/DL (ref 6.4–8.2)
SODIUM SERPL-SCNC: 135 MMOL/L (ref 136–145)
VIT B12 SERPL-MCNC: 782 PG/ML (ref 100–900)

## 2019-01-09 PROCEDURE — 82175 ASSAY OF ARSENIC: CPT

## 2019-01-09 PROCEDURE — 82525 ASSAY OF COPPER: CPT

## 2019-01-09 PROCEDURE — 83655 ASSAY OF LEAD: CPT

## 2019-01-09 PROCEDURE — 82746 ASSAY OF FOLIC ACID SERUM: CPT

## 2019-01-09 PROCEDURE — 36415 COLL VENOUS BLD VENIPUNCTURE: CPT

## 2019-01-09 PROCEDURE — 80053 COMPREHEN METABOLIC PANEL: CPT

## 2019-01-09 PROCEDURE — 83825 ASSAY OF MERCURY: CPT

## 2019-01-09 PROCEDURE — 82300 ASSAY OF CADMIUM: CPT

## 2019-01-09 PROCEDURE — 82607 VITAMIN B-12: CPT

## 2019-01-11 LAB — COPPER SERPL-MCNC: 112 UG/DL (ref 72–166)

## 2019-01-12 LAB
ARSENIC BLD-MCNC: 6 UG/L (ref 2–23)
CADMIUM BLD-MCNC: NORMAL UG/L (ref 0–1.2)
LEAD BLD-MCNC: 2 UG/DL (ref 0–4)
MERCURY BLD-MCNC: 2 UG/L (ref 0–14.9)

## 2019-01-16 ENCOUNTER — OFFICE VISIT (OUTPATIENT)
Dept: NEUROLOGY | Facility: CLINIC | Age: 59
End: 2019-01-16
Payer: COMMERCIAL

## 2019-01-16 VITALS
SYSTOLIC BLOOD PRESSURE: 120 MMHG | BODY MASS INDEX: 30.07 KG/M2 | HEART RATE: 78 BPM | HEIGHT: 72 IN | DIASTOLIC BLOOD PRESSURE: 80 MMHG | WEIGHT: 222 LBS | RESPIRATION RATE: 14 BRPM

## 2019-01-16 DIAGNOSIS — M48.05 SPINAL STENOSIS OF THORACOLUMBAR REGION: Primary | ICD-10-CM

## 2019-01-16 DIAGNOSIS — G83.4 CAUDA EQUINA SYNDROME (HCC): ICD-10-CM

## 2019-01-16 PROCEDURE — 99215 OFFICE O/P EST HI 40 MIN: CPT | Performed by: PSYCHIATRY & NEUROLOGY

## 2019-01-16 NOTE — PROGRESS NOTES
Patient ID: Katie Devlin is a 59 y o  male  Assessment/Plan:    Cauda equina syndrome [facility])  This is a 70-year-old patient with history of multiple spinal surgeries  He did have surgery in 2016 and has noted progressive numbness in the anterior thighs, numbness in the perineum and saddle anesthesia, urinary urgency and abnormal  sexual dysfunction  mri of lumbar spine and t psine were perofmred   Mri of  the lumbar spine  failed to reveal any changes   Mri of the t spine  did show  hernaited disc at t6 and 7 but symptoms are not cosnsitent with changes   He tried baclofen and  It was  ineffecitve  He did adry merol dose pack in the past which was ineffective   Mri reviewed personallly  Mri of the lumbar spine  showed inconsistent signal   Therefore the possible of cauda equina syndorme is still a possilbity   will try high dose IV steriods for threee days  We discussed the poetnial common side effects           Spinal stenosis of thoracolumbar region  His examination today is unchanged   No evidence of a sensory level proximally  Will have mri uploaded to [facility]     He will be seeing his  Surgeon who performed his prior surgery in  to discuss other options   Jessica Romano Abnormalities with mri of the t  Spine were reviewed and do not correlate with pts symptoms          f/u in one month      Diagnoses and all orders for this visit:    Spinal stenosis of thoracolumbar region  -     CBC and differential; Future    Cauda equina syndrome (HCC)         Subjective: This is a 59  y o rh male who presented  to our office  6 weeks ago  He was last evaluated here in 2016  He did  complainin of stiffness and heaviness in his legs with progressive weakness   MRI did show foraminal stenosis a left L5-S1 as well as impingement on the left L3 L4 region    He was seen by dr Shahid Damon who  suggested surgery but he and then sought a 2nd opinion and underwent fusion with placement of  L3-S1 in May of 2016 by Dr Roosevelt Apgar , the head of Neurosurgery at [facility]  After the surgery in May of 2016 he noted increasing numbness in his bilateral thighs, in the saddle regions and numbness in the perineum  the numbness also occurs in the lower extremities the calf on the left side He has noted progressive ambulatory dysfunction specially on the right leg  He does report some urinary urgency and abnormalities with sexual dysfunction   The sexual dysfunction has been progressive over the last year  He is unable to have an organsm for the alst year  He did try baclofen and it was ineffective   He is no longer utilizing narcotic  pain meds and uses prednisone and/or Medrol Dosepak when he has increase in back pain  However , recently , there was no change in the effect of steriods   He did inform the neurosurgeons about these increase in symptoms and was informed this may be due to the surgery itself  But unfortunately the symptoms have been progressive since surgery in May of 2016  He is now continued to be followed by physical therapy  He is followed by   Guero Alvarenga,  a physical therapist   He continues to exercise with physical therapy , yoga  and aquatic therapy  Her her His last imaging study was in October of 2016 with a ct scan of the lumbar spine the  There was no  evidence suggests disc space infection or arachnoiditis  He had anterior fusion at L4-L5 and debris due to granulation tissue  There was a bony fragment impinging upon the spinal cord at the L2 vertebral body    He did have mri of the lumbar and thoracic spine after the last visit   Mri of the lumbar spine showed multilevel disc protrusion and DJD , pedical screws were in place   No evidence of arachointits however there was change in signal  Mri of the T spine was abnormal with mid thoracic level disc herniaition at T6 and T7  With multi level shallow disc protrusions    Heavy metal screeen, b12, foalte were normal     He describes continued weakness in the legs and numbness  With urinary retention and bowel urgency     Today , he presents in a follow up visit   He is sometimes using a walker at home     He does have an apointment with his neurosurgeon in February             He is a  of an [employer] agency and had had multiple surgeries in the lumbar spine( last one in 2000) and cervical decompression years ago   He does report multiple ankle fusions on the right side  Daniel Boyer He does have hammer toes        He is now using a cane on a regular basis and does admit that his right leg is weaker  He reports the need to think about moving his legs prior to the move them automatically      He is followed by Urology and a regular basis           Today I spent 40 mins  with the patient  Greater than 50% of the time was spent in obtaining history reviewing her his prior records and formulating a plan  I answered all of his questions to his satisfaction  We reviewed the MRI  Films personnally via the disc he provided                 The following portions of the patient's history were reviewed and updated as appropriate:   He  has a past medical history of BPH (benign prostatic hyperplasia); Chronic back pain; Erectile dysfunction; Nocturia; OAB (overactive bladder); Testicular hypogonadism; Urinary frequency; and Urinary urgency  He  has a past surgical history that includes Back surgery (05/18/2016)  His family history is not on file  He  reports that he has never smoked  He has never used smokeless tobacco  He reports that he drinks alcohol  He reports that he does not use drugs  Current Outpatient Prescriptions   Medication Sig Dispense Refill    ibuprofen (MOTRIN) 400 mg tablet Take 400 mg by mouth as needed      predniSONE 10 mg tablet as needed  0    sildenafil (REVATIO) 20 mg tablet TAKE 3 TO 5 TABLETS BY MOUTH DAILY AS NEEDED   90 tablet 3    testosterone cypionate (DEPO-TESTOSTERONE) 200 mg/mL SOLN Inject 1 mL (200 mg total) into the shoulder, thigh, or buttocks every 14 (fourteen) days for 26 doses 2 mL intramuscular every 2 weeks  6 mL 3    ALPRAZolam (XANAX) 0 5 mg tablet Take 1 tablet (0 5 mg total) by mouth 30 min pre-procedure (Patient not taking: Reported on 1/16/2019 ) 5 tablet 0    Methylprednisolone 4 MG TBPK as needed  0    nitrofurantoin (MACROBID) 100 mg capsule Take 1 capsule (100 mg total) by mouth 2 (two) times a day (Patient not taking: Reported on 12/12/2018 ) 14 capsule 0     No current facility-administered medications for this visit  He is allergic to morphine and related            Objective:    Blood pressure 120/80, pulse 78, resp  rate 14, height 6' (1 829 m), weight 101 kg (222 lb)  Physical Exam   Constitutional: He appears well-developed  HENT:   Head: Normocephalic  Eyes: Pupils are equal, round, and reactive to light  Lids are normal    Cardiovascular: Normal rate  Psychiatric: He has a normal mood and affect  His speech is normal        Neurological Exam  Mental Status   Oriented to person, place, time and situation  Speech is normal  Language is fluent with no aphasia  Cranial Nerves  CN II: Visual acuity is normal  Visual fields full to confrontation  CN III, IV, VI: Extraocular movements intact bilaterally  Normal lids and orbits bilaterally  Pupils equal round and reactive to light bilaterally  CN V: Facial sensation is normal   CN VII: Full and symmetric facial movement  CN VIII: Hearing is normal   CN IX, X: Palate elevates symmetrically  Normal gag reflex  CN XI: Shoulder shrug strength is normal   CN XII: Tongue midline without atrophy or fasciculations  Motor   Strength is 5/5 in all four extremities except as noted  a no evidence of a pronator drift  right df 3/5, left 3/5, right big toe 3/5 , decrease side to sdie movements  He did have weakness in the right iliopsoas and, tensor fascia florencio  Knee flexion and knee extension were 5/5    Adductors  and bulk in the upper extremities  were 5/5  He had normal   Tone  There is no atrophy  He also was noted to have bilateral hammertoes as well as flattened arches  He did attributed to the arches to the prior surgeries  He has had multiple surgeries on his ankles with limited eversion and inversion on the right  ( 1/5) , he had 3/5 strength in eversion and inversion on the left        Sensory  Vibraiton 3 sec in right toe fiber sensation in the left toe was a 3/3 seconds  There was diminished sensation in the anterior thighs bilaterally compared to the medial thighs there is also decreased sensation and temperature and light touch in the at in the perineum as well as to the T12 level both anteriorly and posteriorly to put pinprick  Proprioception normal ,      Sensory  Sensory  Vibraiton 3 sec in the toes  sensation in the left toe was a 3/3 seconds  There was diminished sensation in the anterior thighs bilaterally compared to the medial thighs  Proprioception normal ,   Reflexes    Right pathological reflexes: Domenica's absent  Left pathological reflexes: Domenica's absent  Biceps                                 1+                         1+  Triceps                                1+                         1+  Patellar                                3+                         2+  Achilles                                2+                         Tr  Plantar                           Downgoing                Downgoing          Coordination  Right: Finger-to-nose normal   Left: Finger-to-nose normal   No dysmetria but difficulty in lifting legs due to weakness in legs  Gait Unable to rise from chair without using arms  He walks with a cane  He was dragging his right lower extremity with a footdrop on the right  There is no footdrop on left               ROS:    Review of Systems   Constitutional: Negative for appetite change and fever  HENT: Negative    Negative for hearing loss, tinnitus, trouble swallowing and voice change  Eyes: Negative  Negative for photophobia and pain  Respiratory: Negative  Negative for shortness of breath  Cardiovascular: Negative  Negative for palpitations  Gastrointestinal: Negative  Negative for nausea and vomiting  Endocrine: Negative  Negative for cold intolerance and heat intolerance  Genitourinary: Positive for frequency and urgency  Negative for dysuria  Musculoskeletal: Positive for arthralgias, back pain, gait problem and myalgias  Negative for neck pain  Skin: Negative  Negative for rash  Neurological: Positive for tremors, weakness and numbness  Negative for dizziness, seizures, syncope, facial asymmetry, speech difficulty, light-headedness and headaches  Hematological: Negative  Does not bruise/bleed easily  Psychiatric/Behavioral: Negative  Negative for confusion, hallucinations and sleep disturbance           ROS obtained by the MA was reviewed personally at today's visit

## 2019-01-17 ENCOUNTER — TELEPHONE (OUTPATIENT)
Dept: NEUROLOGY | Facility: CLINIC | Age: 59
End: 2019-01-17

## 2019-01-17 NOTE — ASSESSMENT & PLAN NOTE
This is a 58-year-old patient with history of multiple spinal surgeries  He did have surgery in 2016 and has noted progressive numbness in the anterior thighs, numbness in the perineum and saddle anesthesia, urinary urgency and abnormal  sexual dysfunction  mri of lumbar spine and t psine were perofmred   Mri of  the lumbar spine  failed to reveal any changes   Mri of the t spine  did show  hernaited disc at t6 and 7 but symptoms are not cosnsitent with changes   He tried baclofen and  It was  ineffecitve  He did adry merol dose pack in the past which was ineffective   Mri reviewed personallly  Mri of the lumbar spine  showed inconsistent signal   Therefore the possible of cauda equina syndorme is still a possilbity   will try high dose IV steriods for threee days   We discussed the poetnial common side effects     A cbc was ordered as a baseline

## 2019-01-17 NOTE — ASSESSMENT & PLAN NOTE
His examination today is unchanged   No evidence of a sensory level proximally  Will have mri uploaded to [facility]     He will be seeing his  Surgeon who performed his prior surgery in [place] to discuss other options   Brendon Bloom      Abnormalities with mri of the t  Spine were reviewed and do not correlate with pts symptoms

## 2019-01-21 RX ORDER — SODIUM CHLORIDE 9 MG/ML
20 INJECTION, SOLUTION INTRAVENOUS CONTINUOUS
Status: DISCONTINUED | OUTPATIENT
Start: 2019-01-22 | End: 2019-01-25 | Stop reason: HOSPADM

## 2019-01-22 ENCOUNTER — HOSPITAL ENCOUNTER (OUTPATIENT)
Dept: INFUSION CENTER | Facility: CLINIC | Age: 59
Discharge: HOME | End: 2019-01-22
Payer: COMMERCIAL

## 2019-01-22 VITALS
SYSTOLIC BLOOD PRESSURE: 131 MMHG | RESPIRATION RATE: 18 BRPM | TEMPERATURE: 97.5 F | HEART RATE: 58 BPM | DIASTOLIC BLOOD PRESSURE: 78 MMHG

## 2019-01-22 PROCEDURE — 96365 THER/PROPH/DIAG IV INF INIT: CPT

## 2019-01-22 PROCEDURE — 96366 THER/PROPH/DIAG IV INF ADDON: CPT

## 2019-01-22 RX ORDER — SODIUM CHLORIDE 9 MG/ML
20 INJECTION, SOLUTION INTRAVENOUS CONTINUOUS
Status: DISCONTINUED | OUTPATIENT
Start: 2019-01-23 | End: 2019-01-26 | Stop reason: HOSPADM

## 2019-01-22 RX ADMIN — SODIUM CHLORIDE 1000 MG: 0.9 INJECTION, SOLUTION INTRAVENOUS at 12:13

## 2019-01-22 RX ADMIN — SODIUM CHLORIDE 20 ML/HR: 0.9 INJECTION, SOLUTION INTRAVENOUS at 12:13

## 2019-01-22 NOTE — PROGRESS NOTES
Pt tolerated treatment today without incident  Pt declined AVS but is aware of his appt tomorrow at noon

## 2019-01-22 NOTE — PLAN OF CARE
Problem: Potential for Falls  Goal: Patient will remain free of falls  INTERVENTIONS:  - Assess patient frequently for physical needs  -  Identify cognitive and physical deficits and behaviors that affect risk of falls    -  [place] fall precautions as indicated by assessment   - Educate patient/family on patient safety including physical limitations  - Instruct patient to call for assistance with activity based on assessment  - Modify environment to reduce risk of injury  - Consider OT/PT consult to assist with strengthening/mobility   Outcome: Progressing

## 2019-01-23 ENCOUNTER — HOSPITAL ENCOUNTER (OUTPATIENT)
Dept: INFUSION CENTER | Facility: CLINIC | Age: 59
Discharge: HOME | End: 2019-01-23
Payer: COMMERCIAL

## 2019-01-23 VITALS
SYSTOLIC BLOOD PRESSURE: 120 MMHG | TEMPERATURE: 97.5 F | DIASTOLIC BLOOD PRESSURE: 65 MMHG | HEART RATE: 60 BPM | RESPIRATION RATE: 18 BRPM

## 2019-01-23 PROCEDURE — 96365 THER/PROPH/DIAG IV INF INIT: CPT

## 2019-01-23 RX ORDER — SODIUM CHLORIDE 9 MG/ML
20 INJECTION, SOLUTION INTRAVENOUS CONTINUOUS
Status: DISCONTINUED | OUTPATIENT
Start: 2019-01-24 | End: 2019-01-27 | Stop reason: HOSPADM

## 2019-01-23 RX ADMIN — SODIUM CHLORIDE 20 ML/HR: 0.9 INJECTION, SOLUTION INTRAVENOUS at 12:29

## 2019-01-23 RX ADMIN — SODIUM CHLORIDE 1000 MG: 0.9 INJECTION, SOLUTION INTRAVENOUS at 12:32

## 2019-01-23 NOTE — PLAN OF CARE
Problem: Potential for Falls  Goal: Patient will remain free of falls  INTERVENTIONS:  - Assess patient frequently for physical needs  -  Identify cognitive and physical deficits and behaviors that affect risk of falls    -  [place] fall precautions as indicated by assessment   - Educate patient/family on patient safety including physical limitations  - Instruct patient to call for assistance with activity based on assessment  - Modify environment to reduce risk of injury  - Consider OT/PT consult to assist with strengthening/mobility   Outcome: Progressing

## 2019-01-24 ENCOUNTER — HOSPITAL ENCOUNTER (OUTPATIENT)
Dept: INFUSION CENTER | Facility: CLINIC | Age: 59
Discharge: HOME | End: 2019-01-24
Payer: COMMERCIAL

## 2019-01-24 PROCEDURE — 96365 THER/PROPH/DIAG IV INF INIT: CPT

## 2019-01-24 RX ADMIN — SODIUM CHLORIDE 1000 MG: 0.9 INJECTION, SOLUTION INTRAVENOUS at 12:05

## 2019-01-24 RX ADMIN — SODIUM CHLORIDE 20 ML/HR: 0.9 INJECTION, SOLUTION INTRAVENOUS at 12:05

## 2019-01-24 NOTE — PLAN OF CARE
Problem: Potential for Falls  Goal: Patient will remain free of falls  INTERVENTIONS:  - Assess patient frequently for physical needs  -  Identify cognitive and physical deficits and behaviors that affect risk of falls    -  [place] fall precautions as indicated by assessment   - Educate patient/family on patient safety including physical limitations  - Instruct patient to call for assistance with activity based on assessment  - Modify environment to reduce risk of injury  - Consider OT/PT consult to assist with strengthening/mobility   Outcome: Progressing

## 2019-02-20 ENCOUNTER — TELEPHONE (OUTPATIENT)
Dept: NEUROLOGY | Facility: CLINIC | Age: 59
End: 2019-02-20

## 2019-02-20 ENCOUNTER — OFFICE VISIT (OUTPATIENT)
Dept: NEUROLOGY | Facility: CLINIC | Age: 59
End: 2019-02-20
Payer: COMMERCIAL

## 2019-02-20 VITALS
WEIGHT: 220 LBS | RESPIRATION RATE: 14 BRPM | HEIGHT: 72 IN | SYSTOLIC BLOOD PRESSURE: 126 MMHG | DIASTOLIC BLOOD PRESSURE: 82 MMHG | HEART RATE: 68 BPM | BODY MASS INDEX: 29.8 KG/M2

## 2019-02-20 DIAGNOSIS — M48.05 SPINAL STENOSIS OF THORACOLUMBAR REGION: Primary | ICD-10-CM

## 2019-02-20 PROCEDURE — 99214 OFFICE O/P EST MOD 30 MIN: CPT | Performed by: PSYCHIATRY & NEUROLOGY

## 2019-02-20 NOTE — PROGRESS NOTES
Patient ID: Tanner Madden is a 59 y o  male  Assessment/Plan:    Cauda equina syndrome [facility])  This is a 49-year-old patient who now presents in a follow-up visit  He was last evaluated here approximately one month ago MRI of the thoracic spine and lumbar spine reviewed and he was treated with IV steroids for three days  After the 1st day he had improvement of the numbness but has not noted any further  changes in the numbness  He  denies any noticeable changes his weakness in the interim he was seen by his neurosurgeon performed his operation and he was told that his symptoms are due to scar tissue , a referral to pain management was suggested by his surgeon  1   I have referred him to Dr Home Riley from Pain Management  He wanted to discuss the need for injections and/or stimulator placement  In the past he did try epidural injections which were ineffective  2   We will be trying IV steroids again in  approximately four weeks for three days 1 gram   He may need another taper of oral steriods for three to four weeks depending on the effectiveness  They can also think about of PLL high-dose p  o  steroids with 3 to 4 weeks  We would like to avoid long term use  of  steroids  3 we did have discussions regarding the use of acupuncture inversion tables and/or the graston  technique  This could be pursued after the pain management referral   4   I have not added any additional medications 5  we also discussed the potential side effects of steroids including weight gain irritability insomnia  He did not have any the side effects 6  We may to consider PT eval in future to determine any effectiveness            Diagnoses and all orders for this visit:    Spinal stenosis of thoracolumbar region  -     Ambulatory referral to Pain Management; Future         Subjective: This is a 59  y o rh male who presented  to our office several  weeks ago  He was last evaluated here in 2016   He did  complainin of stiffness and heaviness in his legs with progressive weakness   MRI did show foraminal stenosis a left L5-S1 as well as impingement on the left L3 L4 region  He was seen by dr Lucas Myles who  suggested surgery but he and then sought a 2nd opinion and underwent fusion with placement of  L3-S1 in May of 2016 by Dr Any Saez ,  the head of Neurosurgery at [facility]  After the surgery in May of 2016 he noted increasing numbness in his bilateral thighs, in the saddle regions and numbness in the perineum  This occurred after 1 year   the numbness also occurs in the lower extremities the calf on the left side He has noted progressive ambulatory dysfunction specially on the right leg  He does report some urinary urgency and abnormalities with sexual dysfunction   The sexual dysfunction has been progressive over the last year  He is unable to have an organsm for the last  year  He did try baclofen and it was ineffective   He is no longer utilizing narcotic  pain meds and uses prednisone and/or Medrol Dosepak when he has increase in back pain  Oral steriods for a short period of time was ineffective   He is now continued to be followed by physical therapy  He is followed by   Tamar Btoello,  a physical therapist   He continues to exercise with physical therapy , yoga  and aquatic therapy  His last imaging study was in October of 2016 with a ct scan of the lumbar spine the  There was no  evidence suggests disc space infection or arachnoiditis  He had anterior fusion at L4-L5 and debris due to granulation tissue  There was a bony fragment impinging upon the spinal cord at the L2 vertebral body    He did have mri of the lumbar and thoracic spine after the last visit   Mri of the lumbar spine showed multilevel disc protrusion and DJD , pedical screws were in place    No evidence of arachointits however there was change in signal  Mri of the T spine was abnormal with mid thoracic level disc herniaition at T6 and T7  With multi level shallow disc protrusions   Heavy metal screeen, b12, foalte were normal      He describes continued weakness in the legs and numbness  With urinary retention and bowel urgency   He was treated with three days of iv steriods   After the first infusion, he noted some improvement  In numbness but the numbness returned   He has not noted any change in the strength but is exercising more   Wally Heckler He was evaluated by dr Isadora Servin 2 weeks ago in a follow up visit   He felt that the abnormalities on the mri and symptoms were due to scar tissue fomation   He did recommend pain management referral  For possible injecitons and poosilbity of stimulator placment   He is willing to proceed     Today due to the weather  And risk of falls he was using a walker but is using a cane at home or sometimes using the furniture     Other questions inlcuded accupuncture , graston technique ,and inversion tables     He is currently using an oral steriod pack ( 3 more days )      Today , he presents in a follow up visit           He is a  of an [employer] agency and had had multiple surgeries in the lumbar spine( last one in 2000) and cervical decompression years ago   He does report multiple ankle fusions on the right side                                 The following portions of the patient's history were reviewed and updated as appropriate:   He  has a past medical history of BPH (benign prostatic hyperplasia), Chronic back pain, Erectile dysfunction, Nocturia, OAB (overactive bladder), Testicular hypogonadism, Urinary frequency, and Urinary urgency  He  has a past surgical history that includes Back surgery (05/18/2016)  His family history is not on file  He  reports that he has never smoked  He has never used smokeless tobacco  He reports that he drinks alcohol  He reports that he does not use drugs    Current Outpatient Medications   Medication Sig Dispense Refill    ibuprofen (MOTRIN) 400 mg tablet Take 400 mg by mouth as needed      Methylprednisolone 4 MG TBPK as needed  0    sildenafil (REVATIO) 20 mg tablet TAKE 3 TO 5 TABLETS BY MOUTH DAILY AS NEEDED  90 tablet 3    testosterone cypionate (DEPO-TESTOSTERONE) 200 mg/mL SOLN Inject 1 mL (200 mg total) into the shoulder, thigh, or buttocks every 14 (fourteen) days for 26 doses 2 mL intramuscular every 2 weeks  6 mL 3    ALPRAZolam (XANAX) 0 5 mg tablet Take 1 tablet (0 5 mg total) by mouth 30 min pre-procedure (Patient not taking: Reported on 2/20/2019) 5 tablet 0    nitrofurantoin (MACROBID) 100 mg capsule Take 1 capsule (100 mg total) by mouth 2 (two) times a day (Patient not taking: Reported on 2/20/2019) 14 capsule 0     No current facility-administered medications for this visit  He is allergic to morphine and related            Objective:    Blood pressure 126/82, pulse 68, resp  rate 14, height 6' (1 829 m), weight 99 8 kg (220 lb)  Physical Exam   Neurological:   Reflex Scores:       Tricep reflexes are 1+ on the right side and 1+ on the left side  Bicep reflexes are 1+ on the right side and 1+ on the left side  Brachioradialis reflexes are 1+ on the right side and 1+ on the left side  Patellar reflexes are 3+ on the right side and 2+ on the left side  Achilles reflexes are 2+ on the right side and Tr on the left side  Neurological Exam    Motor    Strength is 5/5 in all four extremities except as noted  a no evidence of a pronator drift  right df 4/5, left 4/5, right big toe 4/5 , decrease side to side  movements on right   He did have weakness in the right iliopsoas and, tensor fascia florencio  Knee flexion and knee extension were 5/5  Adductors  and bulk in the upper extremities  were 5/5  He had normal   Tone/ bulk   There is no atrophy  He also was noted to have bilateral hammertoes as well as flattened arches  He did attributed to the arches to the prior surgeries    He has had multiple surgeries on his ankles with limited eversion and inversion on the right  ( 1/5) , he had 3/5 strength in eversion and inversion on the left             Sensory  Vibraiton 3 sec in the toes   On left , 5 sec on the right ,  sensation in the left toe was a 3/3 seconds  jps was normal , no sensory level         Reflexes                                           Right                      Left  Brachioradialis                    1+                         1+  Biceps                                 1+                         1+  Triceps                                1+                         1+  Patellar                                3+                         2+  Achilles                                2+                         Tr  Plantar                           Downgoing                Downgoing    Coordination  Right: Finger-to-nose normal   Left: Finger-to-nose normal     Gait  He walks with a cane  He was dragging his right lower extremity with a footdrop on the right  There is no footdrop on left              Omar obtained by the medical assistance was reviewed personally at today's appointment     ROS:    Review of Systems   Constitutional: Negative  Negative for appetite change and fever  HENT: Negative  Negative for hearing loss, tinnitus, trouble swallowing and voice change  Eyes: Negative  Negative for photophobia and pain  Dry eyes     Respiratory: Negative  Negative for shortness of breath  Cardiovascular: Negative  Negative for palpitations  Gastrointestinal: Negative  Negative for nausea and vomiting  Endocrine: Negative  Negative for cold intolerance and heat intolerance  Erection difficulty     Genitourinary: Positive for urgency  Negative for dysuria and frequency  Musculoskeletal: Positive for arthralgias, back pain, gait problem and myalgias  Negative for neck pain  Pain while walking  Immobility or loss of function     Skin: Negative  Negative for rash     Neurological: Positive for numbness  Negative for dizziness, tremors, seizures, syncope, facial asymmetry, speech difficulty, weakness, light-headedness and headaches  Difficulty walking  Falls     Hematological: Negative  Does not bruise/bleed easily  Psychiatric/Behavioral: Negative  Negative for confusion, hallucinations and sleep disturbance

## 2019-02-20 NOTE — TELEPHONE ENCOUNTER
Please schedule IV steriods again  For pt, for three days over 90 minutes  At [facility] ,  Week of march 11 or 18 , Dx weakness and  Numbness , cauda equina syndrome     Please contact him date and time

## 2019-02-20 NOTE — ASSESSMENT & PLAN NOTE
This is a 56-year-old patient who now presents in a follow-up visit  He was last evaluated here approximately one month ago MRI of the thoracic spine and lumbar spine reviewed and he was treated with IV steroids for three days  After the 1st day he had improvement of the numbness but has not noted any further  changes in the numbness  He  denies any noticeable changes his weakness in the interim he was seen by his neurosurgeon performed his operation and he was told that his symptoms are due to scar tissue , a referral to pain management was suggested by his surgeon  1   I have referred him to Dr Aminata Krueger from Pain Management  He wanted to discuss the need for injections and/or stimulator placement  In the past he did try epidural injections which were ineffective  2   We will be trying IV steroids again in  approximately four weeks for three days 1 gram   He may need another taper of oral steriods for three to four weeks depending on the effectiveness  They can also think about of PLL high-dose p  o  steroids with 3 to 4 weeks  We would like to avoid long term use  of  steroids  3 we did have discussions regarding the use of acupuncture inversion tables and/or the graston  technique  This could be pursued after the pain management referral   4   I have not added any additional medications 5  we also discussed the potential side effects of steroids including weight gain irritability insomnia  He did not have any the side effects 6   We may to consider PT eval in future to determine any effectiveness

## 2019-02-22 ENCOUNTER — CONSULT (OUTPATIENT)
Dept: PAIN MEDICINE | Facility: MEDICAL CENTER | Age: 59
End: 2019-02-22
Payer: COMMERCIAL

## 2019-02-22 VITALS
WEIGHT: 220 LBS | BODY MASS INDEX: 29.8 KG/M2 | HEART RATE: 59 BPM | HEIGHT: 72 IN | DIASTOLIC BLOOD PRESSURE: 84 MMHG | SYSTOLIC BLOOD PRESSURE: 123 MMHG

## 2019-02-22 DIAGNOSIS — M48.05 SPINAL STENOSIS OF THORACOLUMBAR REGION: ICD-10-CM

## 2019-02-22 DIAGNOSIS — F52.32 ANORGASMIA OF MALE: ICD-10-CM

## 2019-02-22 DIAGNOSIS — G83.4 CAUDA EQUINA SYNDROME (HCC): Primary | ICD-10-CM

## 2019-02-22 PROCEDURE — 99203 OFFICE O/P NEW LOW 30 MIN: CPT | Performed by: PHYSICAL MEDICINE & REHABILITATION

## 2019-02-22 NOTE — PROGRESS NOTES
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  He is currently complaining of numbness in the lower extremities and saddle region as well as an orgasm media  He is able to obtain erection but cannot reach orgasm  I reviewed with him that spinal cord stimulation really will not help with these issues  I did discuss with him other options including further review with a spinal cord injury specialist   He has seen doctors at [facility] in the past and was happy with his experience there  I did also educate him on the physiologic process of an orgasm including the sympathetic and parasympathetic nervous system functions and contributions to this  If his pain worsens in the future and is unable to be managed with conservative measures then a spinal cord stimulator trial could be again considered  He seemed to appreciate my open and darcy discussion and honest appraisal of what I can and cannot offer related to his condition  He is welcome to return at any time  My impressions and treatment recommendations were discussed in detail with the patient who verbalized understanding and had no further questions  Discharge instructions were provided  I personally saw and examined the patient and I agree with the above discussed plan of care  No orders of the defined types were placed in this encounter  No orders of the defined types were placed in this encounter        History of Present Illness:    Karis Patient is a 59 y o  male sent from Dr Jose Colvin for consultation and discussion regarding the possibility of a spinal cord stimulator trial   The patient is experiencing numbness and weakness of the lower extremities as well as soreness in his thighs as a result of spinal surgery  He did undergo L2 through L5 spinal fusion at an outside institution  Unfortunately after this he developed progressive symptoms consistent with cauda equina syndrome  He believes this is related to epidural scarring  He has significant concerns regarding inability to achieve orgasm as well  He states he is able to achieve an erection however with or without the use of pharmacologic assistance  He currently describes only moderate intensity pain rated as a 4/10 which is intermittent in nature and worse in the morning  He characterizes the symptoms as dull, aching, throbbing, numbness, pins and needle sensation as well as weakness in the lower extremities  Aggravating factors include standing, bending, walking  He is unable to determine any significant alleviating factors  Diagnostic studies include MRI of the thoracic and lumbar spine  I was able to review those images today  I have personally reviewed and/or updated the patient's past medical history, past surgical history, family history, social history, current medications, allergies, and vital signs today  Review of Systems:    Review of Systems   Genitourinary: Positive for difficulty urinating  Musculoskeletal: Positive for gait problem and myalgias  Patient Active Problem List   Diagnosis    Erectile dysfunction    Hypogonadism in male    BPH associated with nocturia    Acute cystitis without hematuria    Cauda equina syndrome ([address] )    Spinal stenosis of thoracolumbar region       Past Medical History:   Diagnosis Date    BPH (benign prostatic hyperplasia)     Chronic back pain     Erectile dysfunction     Nocturia     OAB (overactive bladder)     Testicular hypogonadism     Urinary frequency     Urinary urgency        Past Surgical History:   Procedure Laterality Date    BACK SURGERY  05/18/2016       History reviewed  No pertinent family history      Social History     Occupational History    Not on file   Tobacco Use    Smoking status: Never Smoker    Smokeless tobacco: Never Used   Substance and Sexual Activity    Alcohol use: Yes     Comment: Drinks socially   Drug use: No    Sexual activity: Not on file       Current Outpatient Medications on File Prior to Visit   Medication Sig    Methylprednisolone 4 MG TBPK as needed    sildenafil (REVATIO) 20 mg tablet TAKE 3 TO 5 TABLETS BY MOUTH DAILY AS NEEDED   testosterone cypionate (DEPO-TESTOSTERONE) 200 mg/mL SOLN Inject 1 mL (200 mg total) into the shoulder, thigh, or buttocks every 14 (fourteen) days for 26 doses 2 mL intramuscular every 2 weeks   ALPRAZolam (XANAX) 0 5 mg tablet Take 1 tablet (0 5 mg total) by mouth 30 min pre-procedure (Patient not taking: Reported on 2/20/2019)    ibuprofen (MOTRIN) 400 mg tablet Take 400 mg by mouth as needed    nitrofurantoin (MACROBID) 100 mg capsule Take 1 capsule (100 mg total) by mouth 2 (two) times a day (Patient not taking: Reported on 2/20/2019)     No current facility-administered medications on file prior to visit  Allergies   Allergen Reactions    Morphine Other (See Comments)     Addiction hx  Pt wants no narcotics    Morphine And Related Other (See Comments)     Addiction hx  Pt wants no narcotics       Physical Exam:    /84   Pulse 59   Ht 6' (1 829 m)   Wt 99 8 kg (220 lb)   BMI 29 84 kg/m²     Constitutional: normal, well developed, well nourished, alert, in no distress and non-toxic and no overt pain behavior    Eyes: anicteric  HEENT: grossly intact  Neck: trachea midline  Pulmonary:even and unlabored  Cardiovascular:No edema or pitting edema present  Psychiatric:Mood and affect appropriate  Neurologic:Cranial Nerves II-XII grossly intact  Musculoskeletal:normal, except the patient does utilize a can for assistance with ambulation    Imaging

## 2019-02-22 NOTE — LETTER
February 22, 2019     Sanjay Chance DO  [address], [address] [place] [address],[address] [facility]    Patient: Geo Martinez   YOB: 1960   Date of Visit: 2/22/2019       Dear Dr Bruce Torres:    Thank you for referring Alonzo Adame to me for evaluation  Below are my notes for this consultation  If you have questions, please do not hesitate to call me  I look forward to following your patient along with you  Sincerely,        Sandoval Padilla DO        CC: No Recipients  Sandoval Padilla DO  2/22/2019 10:34 AM  Sign at close encounter  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  He is currently complaining of numbness in the lower extremities and saddle region as well as an orgasm media  He is able to obtain erection but cannot reach orgasm  I reviewed with him that spinal cord stimulation really will not help with these issues  I did discuss with him other options including further review with a spinal cord injury specialist   He has seen doctors at [facility] in the past and was happy with his experience there  I did also educate him on the physiologic process of an orgasm including the sympathetic and parasympathetic nervous system functions and contributions to this  If his pain worsens in the future and is unable to be managed with conservative measures then a spinal cord stimulator trial could be again considered  He seemed to appreciate my open and darcy discussion and honest appraisal of what I can and cannot offer related to his condition  He is welcome to return at any time        My impressions and treatment recommendations were discussed in detail with the patient who verbalized understanding and had no further questions  Discharge instructions were provided  I personally saw and examined the patient and I agree with the above discussed plan of care  No orders of the defined types were placed in this encounter  No orders of the defined types were placed in this encounter  History of Present Illness:    Jose Enrique Baird is a 59 y o  male sent from Dr Indu Christy for consultation and discussion regarding the possibility of a spinal cord stimulator trial   The patient is experiencing numbness and weakness of the lower extremities as well as soreness in his thighs as a result of spinal surgery  He did undergo L2 through L5 spinal fusion at an outside institution  Unfortunately after this he developed progressive symptoms consistent with cauda equina syndrome  He believes this is related to epidural scarring  He has significant concerns regarding inability to achieve orgasm as well  He states he is able to achieve an erection however with or without the use of pharmacologic assistance  He currently describes only moderate intensity pain rated as a 4/10 which is intermittent in nature and worse in the morning  He characterizes the symptoms as dull, aching, throbbing, numbness, pins and needle sensation as well as weakness in the lower extremities  Aggravating factors include standing, bending, walking  He is unable to determine any significant alleviating factors  Diagnostic studies include MRI of the thoracic and lumbar spine  I was able to review those images today  I have personally reviewed and/or updated the patient's past medical history, past surgical history, family history, social history, current medications, allergies, and vital signs today  Review of Systems:    Review of Systems   Genitourinary: Positive for difficulty urinating  Musculoskeletal: Positive for gait problem and myalgias         Patient Active Problem List   Diagnosis    Erectile dysfunction    Hypogonadism in male    BPH associated with nocturia    Acute cystitis without hematuria    Cauda equina syndrome (HCC)    Spinal stenosis of thoracolumbar region       Past Medical History:   Diagnosis Date    BPH (benign prostatic hyperplasia)     Chronic back pain     Erectile dysfunction     Nocturia     OAB (overactive bladder)     Testicular hypogonadism     Urinary frequency     Urinary urgency        Past Surgical History:   Procedure Laterality Date    BACK SURGERY  05/18/2016       History reviewed  No pertinent family history  Social History     Occupational History    Not on file   Tobacco Use    Smoking status: Never Smoker    Smokeless tobacco: Never Used   Substance and Sexual Activity    Alcohol use: Yes     Comment: Drinks socially   Drug use: No    Sexual activity: Not on file       Current Outpatient Medications on File Prior to Visit   Medication Sig    Methylprednisolone 4 MG TBPK as needed    sildenafil (REVATIO) 20 mg tablet TAKE 3 TO 5 TABLETS BY MOUTH DAILY AS NEEDED   testosterone cypionate (DEPO-TESTOSTERONE) 200 mg/mL SOLN Inject 1 mL (200 mg total) into the shoulder, thigh, or buttocks every 14 (fourteen) days for 26 doses 2 mL intramuscular every 2 weeks   ALPRAZolam (XANAX) 0 5 mg tablet Take 1 tablet (0 5 mg total) by mouth 30 min pre-procedure (Patient not taking: Reported on 2/20/2019)    ibuprofen (MOTRIN) 400 mg tablet Take 400 mg by mouth as needed    nitrofurantoin (MACROBID) 100 mg capsule Take 1 capsule (100 mg total) by mouth 2 (two) times a day (Patient not taking: Reported on 2/20/2019)     No current facility-administered medications on file prior to visit  Allergies   Allergen Reactions    Morphine Other (See Comments)     Addiction hx  Pt wants no narcotics    Morphine And Related Other (See Comments)     Addiction hx   Pt wants no narcotics       Physical Exam:    /84   Pulse 59   Ht 6' (1 829 m)   Wt 99 8 kg (220 lb) BMI 29 84 kg/m²      Constitutional: normal, well developed, well nourished, alert, in no distress and non-toxic and no overt pain behavior    Eyes: anicteric  HEENT: grossly intact  Neck: trachea midline  Pulmonary:even and unlabored  Cardiovascular:No edema or pitting edema present  Psychiatric:Mood and affect appropriate  Neurologic:Cranial Nerves II-XII grossly intact  Musculoskeletal:normal, except the patient does utilize a can for assistance with ambulation    Imaging

## 2019-02-22 NOTE — TELEPHONE ENCOUNTER
I called pt and he states that he would prefer noon or first thing in the am, 7 or 8 am and wed, thurs, fri at [place]  Ok to leave detailed message with infusion dates if unable to reach pt  I called infusion center and scheduled pt for 3/13, 3/14 and 3/15 at 12pm at [place]  Pt made aware of dates

## 2019-03-12 RX ORDER — SODIUM CHLORIDE 9 MG/ML
20 INJECTION, SOLUTION INTRAVENOUS CONTINUOUS
Status: DISCONTINUED | OUTPATIENT
Start: 2019-03-13 | End: 2019-03-16 | Stop reason: HOSPADM

## 2019-03-13 ENCOUNTER — HOSPITAL ENCOUNTER (OUTPATIENT)
Dept: INFUSION CENTER | Facility: CLINIC | Age: 59
Discharge: HOME | End: 2019-03-13
Payer: COMMERCIAL

## 2019-03-13 VITALS
SYSTOLIC BLOOD PRESSURE: 134 MMHG | RESPIRATION RATE: 18 BRPM | TEMPERATURE: 95.3 F | HEART RATE: 51 BPM | DIASTOLIC BLOOD PRESSURE: 85 MMHG

## 2019-03-13 PROCEDURE — 96365 THER/PROPH/DIAG IV INF INIT: CPT

## 2019-03-13 PROCEDURE — 96366 THER/PROPH/DIAG IV INF ADDON: CPT

## 2019-03-13 RX ORDER — SODIUM CHLORIDE 9 MG/ML
20 INJECTION, SOLUTION INTRAVENOUS CONTINUOUS
Status: DISCONTINUED | OUTPATIENT
Start: 2019-03-14 | End: 2019-03-17 | Stop reason: HOSPADM

## 2019-03-13 RX ADMIN — SODIUM CHLORIDE 20 ML/HR: 0.9 INJECTION, SOLUTION INTRAVENOUS at 12:30

## 2019-03-13 RX ADMIN — SODIUM CHLORIDE 1000 MG: 0.9 INJECTION, SOLUTION INTRAVENOUS at 12:33

## 2019-03-13 NOTE — PLAN OF CARE
Problem: Potential for Falls  Goal: Patient will remain free of falls  Description  INTERVENTIONS:  - Assess patient frequently for physical needs  -  Identify cognitive and physical deficits and behaviors that affect risk of falls    -  [place] fall precautions as indicated by assessment   - Educate patient/family on patient safety including physical limitations  - Instruct patient to call for assistance with activity based on assessment  - Modify environment to reduce risk of injury  - Consider OT/PT consult to assist with strengthening/mobility  Outcome: Progressing

## 2019-03-13 NOTE — PROGRESS NOTES
Pt tolerated IV Methyl infusion today without any adverse reaction, left unit in stable condition without question or concern, pt declines AVS today

## 2019-03-14 ENCOUNTER — HOSPITAL ENCOUNTER (OUTPATIENT)
Dept: INFUSION CENTER | Facility: CLINIC | Age: 59
Discharge: HOME | End: 2019-03-14
Payer: COMMERCIAL

## 2019-03-14 PROCEDURE — 96365 THER/PROPH/DIAG IV INF INIT: CPT

## 2019-03-14 RX ORDER — SODIUM CHLORIDE 9 MG/ML
20 INJECTION, SOLUTION INTRAVENOUS CONTINUOUS
Status: DISCONTINUED | OUTPATIENT
Start: 2019-03-15 | End: 2019-03-18 | Stop reason: HOSPADM

## 2019-03-14 RX ADMIN — SODIUM CHLORIDE 20 ML/HR: 0.9 INJECTION, SOLUTION INTRAVENOUS at 12:00

## 2019-03-14 RX ADMIN — SODIUM CHLORIDE 1000 MG: 0.9 INJECTION, SOLUTION INTRAVENOUS at 12:00

## 2019-03-14 NOTE — PROGRESS NOTES
Patient tolerated infusion well  Denies any discomfort  Pt is aware to return tomorrow for day 3 of treatment   Refused AVS

## 2019-03-14 NOTE — PLAN OF CARE
Problem: Potential for Falls  Goal: Patient will remain free of falls  Description  INTERVENTIONS:  - Assess patient frequently for physical needs  -  Identify cognitive and physical deficits and behaviors that affect risk of falls    -  [place] fall precautions as indicated by assessment   - Educate patient/family on patient safety including physical limitations  - Instruct patient to call for assistance with activity based on assessment  - Modify environment to reduce risk of injury  - Consider OT/PT consult to assist with strengthening/mobility  Outcome: Progressing

## 2019-03-15 ENCOUNTER — HOSPITAL ENCOUNTER (OUTPATIENT)
Dept: INFUSION CENTER | Facility: CLINIC | Age: 59
Discharge: HOME | End: 2019-03-15
Payer: COMMERCIAL

## 2019-03-15 VITALS
DIASTOLIC BLOOD PRESSURE: 80 MMHG | RESPIRATION RATE: 18 BRPM | SYSTOLIC BLOOD PRESSURE: 163 MMHG | TEMPERATURE: 96.8 F | HEART RATE: 53 BPM

## 2019-03-15 PROCEDURE — 96365 THER/PROPH/DIAG IV INF INIT: CPT

## 2019-03-15 RX ADMIN — SODIUM CHLORIDE 20 ML/HR: 0.9 INJECTION, SOLUTION INTRAVENOUS at 11:30

## 2019-03-15 RX ADMIN — SODIUM CHLORIDE 1000 MG: 0.9 INJECTION, SOLUTION INTRAVENOUS at 11:30

## 2019-03-15 NOTE — PLAN OF CARE
Problem: Potential for Falls  Goal: Patient will remain free of falls  Description  INTERVENTIONS:  - Assess patient frequently for physical needs  -  Identify cognitive and physical deficits and behaviors that affect risk of falls    -  [place] fall precautions as indicated by assessment   - Educate patient/family on patient safety including physical limitations  - Instruct patient to call for assistance with activity based on assessment  - Modify environment to reduce risk of injury  - Consider OT/PT consult to assist with strengthening/mobility  Outcome: Progressing

## 2019-03-22 DIAGNOSIS — E29.1 TESTICULAR HYPOFUNCTION: ICD-10-CM

## 2019-03-22 RX ORDER — SILDENAFIL CITRATE 20 MG/1
TABLET ORAL
Qty: 180 TABLET | Refills: 1 | Status: SHIPPED | OUTPATIENT
Start: 2019-03-22 | End: 2019-06-10 | Stop reason: SDUPTHER

## 2019-03-22 NOTE — TELEPHONE ENCOUNTER
An Auto-fax Refill Request for Sildenafil 20mg was received from [facility] mail order pharmacy  Patient was last seen in June, 2018; continuation of the medication was approved at that time  Patient next office visit is scheduled for 6/10/19 with Dr Walker Lunch    Script for same, 180 count supply with 1 refill was queued and forwarded to STACEY Cruz for approval

## 2019-03-27 ENCOUNTER — OFFICE VISIT (OUTPATIENT)
Dept: NEUROLOGY | Facility: CLINIC | Age: 59
End: 2019-03-27
Payer: COMMERCIAL

## 2019-03-27 VITALS
BODY MASS INDEX: 29.93 KG/M2 | WEIGHT: 221 LBS | HEART RATE: 60 BPM | SYSTOLIC BLOOD PRESSURE: 140 MMHG | DIASTOLIC BLOOD PRESSURE: 92 MMHG | HEIGHT: 72 IN | RESPIRATION RATE: 16 BRPM

## 2019-03-27 DIAGNOSIS — G83.4 CAUDA EQUINA SYNDROME (HCC): Primary | ICD-10-CM

## 2019-03-27 PROCEDURE — 99214 OFFICE O/P EST MOD 30 MIN: CPT | Performed by: PSYCHIATRY & NEUROLOGY

## 2019-03-27 NOTE — ASSESSMENT & PLAN NOTE
This is a 55-year-old gentleman status post numerous surgeries  He continues with numbness the anterior thigh as well as difficulty with orgasm and numbness in the scrotal region  MRI imaging was performed and was noted to have a scarring of the  more distal nerves  He has been treated with high-dose steroids in the last two months without any significant and persistent change  He recently was evaluated by pain management who felt that he was not a candidate for a spinal cord stimulator given his symptoms   Now has an appointment with Dr Angel Bah for  a second opinion  to discuss the presence of a spinal cord stimulator as treatment for back pain and numbness     1  He does not require any further steroids  2   He is currently off of all pain medications  He informs me that the use of narcotic  medications in the past did result in a sense of dependency  3   He has continued with exercise and recently  restarted physical therapy  4  today he presented with me with a for, from  [facility]   I   Informed this is a functional capacity form and as neurologist this is not part of my expertise   I have asked him to seek advice of his physical therapist 5     He is to return to our offices in approximately 3 to 4 weeks

## 2019-03-27 NOTE — PROGRESS NOTES
Patient ID: Phil Durant is a 59 y o  male  Assessment/Plan:    Cauda equina syndrome [facility])  This is a 23-year-old gentleman status post numerous surgeries  He continues with numbness the anterior thigh as well as difficulty with orgasm and numbness in the scrotal region  MRI imaging was performed and was noted to have a scarring of the  more distal nerves  He has been treated with high-dose steroids in the last two months without any significant and persistent change  He recently was evaluated by pain management who felt that he was not a candidate for a spinal cord stimulator given his symptoms   Now has an appointment with Dr Snow Ba for  a second opinion  to discuss the presence of a spinal cord stimulator as treatment for back pain and numbness     1  He does not require any further steroids  2   He is currently off of all pain medications  He informs me that the use of narcotic  medications in the past did result in a sense of dependency  3   He has continued with exercise and recently  restarted physical therapy  4  today he presented with me with a for, from  [facility] [place]   I   Informed this is a functional capacity form and as neurologist this is not part of my expertise   I have asked him to seek advice of his physical therapist 5  He is to return to our offices in approximately 3 to 4 weeks       Diagnoses and all orders for this visit:    Cauda equina syndrome ([facility] [address] )         Subjective: This is a 59  y o rh male who presented  to our office   in a follow-up visit  He was last evaluated he had been complaining of urinary retention bowel urgency numbness in his anterior thigh is thighs and difficulty with maintaining orgasm  He had undergone MRI of the thoracic and lumbar spine which demonstrated no evidence of arachnoiditis however there was change in the signal in the lower nerves  He was seen by his neurosurgeon who describes scarring of the distal nerves    He was then evaluated by pain management felt that he was not a candidate for any injections  He continues to be evaluated and treated by his urologist   In the interim he spoke to his friend had while at the gym and he is now interested in potential spinal cord stimulator  He will be evaluated at neurosurgery in [place]  in approximately two weeks  Tiffanie Guevara He describes continued weakness in the legs and numbness  With urinary retention and bowel urgency   He had recently was restarted with physical therapy and he  notes weakness in his lower extremities right greater than left  He was treated with three days of iv steriods   After the first infusion, he noted some improvement  In numbness but the numbness returned   He was recently treated with IV steroids in March  Noted no change in the numbness but felt there is improvement regarding his pain arthritic aches for approximately one week  He continues to utilize p r n  doses of Medrol Dosepak as needed  He is scheduled to see an acupuncturist the next two weeks  He was last evaluated here in 2016  He did  complainin of stiffness and heaviness in his legs with progressive weakness   MRI did show foraminal stenosis a left L5-S1 as well as impingement on the left L3 L4 region  He was seen by dr Marie Lama who  suggested surgery but he and then sought a 2nd opinion and underwent fusion with placement of  L3-S1 in May of 2016 by Dr Holley Healy ,  the head of Neurosurgery at [facility]  After the surgery in May of 2016 he noted increasing numbness in his bilateral thighs, in the saddle regions and numbness in the perineum  This occurred after 1 year   the numbness also occurs in the lower extremities the calf on the left side He has noted progressive ambulatory dysfunction specially on the right leg  He does report some urinary urgency and abnormalities with sexual dysfunction   The sexual dysfunction has been progressive over the last year    He is unable to have an organsm for the last  year  He did try baclofen and it was ineffective   He is no longer utilizing narcotic  pain meds and uses prednisone and/or Medrol Dosepak when he has increase in back pain  Oral steriods for a short period of time was ineffective   He is now continued to be followed by physical therapy  He is followed by   Mindy Dwyer,  a physical therapist   He continues to exercise with physical therapy , yoga  and aquatic therapy  His last imaging study was in October of 2016 with a ct scan of the lumbar spine the  There was no  evidence suggests disc space infection or arachnoiditis  He had anterior fusion at L4-L5 and debris due to granulation tissue  There was a bony fragment impinging upon the spinal cord at the L2 vertebral body    He did have mri of the lumbar and thoracic spine after the last visit   Mri of the lumbar spine showed multilevel disc protrusion and DJD , pedical screws were in place   No evidence of arachointits however there was change in signal  Mri of the T spine was abnormal with mid thoracic level disc herniaition at T6 and T7  With multi level shallow disc protrusions   Heavy metal screeen, b12, foalte were normal              He is a  of an [employer] and had had multiple surgeries in the lumbar spine( last one in 2000) and cervical decompression years ago   He does report multiple ankle fusions on the right side     He had a fall yesterday evening when he was holding things in his hand and attempting to move quickly  He does report some tenderness in his face where he had fallen but no weakness or hematomas    Today he presented with a form to be filled the for [facility]    I reviewed the form this is a functional capacity form                                     The following portions of the patient's history were reviewed and updated as appropriate:   He  has a past medical history of BPH (benign prostatic hyperplasia), Chronic back pain, Erectile dysfunction, Nocturia, OAB (overactive bladder), Testicular hypogonadism, Urinary frequency, and Urinary urgency  He  has a past surgical history that includes Back surgery (05/18/2016)  His family history is not on file  He  reports that he has never smoked  He has never used smokeless tobacco  He reports that he drinks alcohol  He reports that he does not use drugs  Current Outpatient Medications   Medication Sig Dispense Refill    ibuprofen (MOTRIN) 400 mg tablet Take 400 mg by mouth as needed      Methylprednisolone 4 MG TBPK as needed  0    sildenafil (REVATIO) 20 mg tablet Take 3 to 5 tablets by mouth one (1) hour prior to intercourse  180 tablet 1    testosterone cypionate (DEPO-TESTOSTERONE) 200 mg/mL SOLN Inject 1 mL (200 mg total) into the shoulder, thigh, or buttocks every 14 (fourteen) days for 26 doses 2 mL intramuscular every 2 weeks  6 mL 3     No current facility-administered medications for this visit  He is allergic to morphine and morphine and related            Objective:    Blood pressure 140/92, pulse 60, resp  rate 16, height 6' (1 829 m), weight 100 kg (221 lb)  Physical Exam   Constitutional: He appears well-developed  HENT:   Head: Normocephalic  Eyes: Pupils are equal, round, and reactive to light  Lids are normal    Cardiovascular: Normal rate  Psychiatric: His speech is normal    Vitals reviewed  Neurological Exam  Mental Status   Oriented to person, place, time and situation  Speech is normal  Language is fluent with no aphasia  Cranial Nerves  CN II: Visual acuity is normal  Visual fields full to confrontation  CN III, IV, VI: Extraocular movements intact bilaterally  Normal lids and orbits bilaterally  Pupils equal round and reactive to light bilaterally  CN V: Facial sensation is normal   CN VII: Full and symmetric facial movement  CN VIII: Hearing is normal   CN IX, X: Palate elevates symmetrically  Normal gag reflex    CN XI: Shoulder shrug strength is normal   CN XII: Tongue midline without atrophy or fasciculations  Motor    Strength is 5/5 in all four extremities except as noted  no evidence of a pronator drift  right df 4/5, left 4/5, right big toe 4/5 , decrease side to side  movements on right   He did have weakness in the right iliopsoas and, tensor fascia florencio  4/5   Knee flexion and knee extension were 5/5  Adductors  and bulk in the upper extremities  were 5/5  He had normal   Tone/ bulk   There is no atrophy  He has had multiple surgeries on his ankles with limited eversion and inversion on the right  ( 1/5) , he had 3/5 strength in eversion and inversion on the left              Sensory  Vibraiton 3 sec in the toes   On left , 5 sec on the right ,  sensation in the left toe was a 3/3 seconds  jps was normal , no sensory level          Reflexes                                           Right                      Left  Brachioradialis                    1+                         1+  Biceps                                 1+                         1+  Triceps                                1+                         1+  Patellar                                3+                         2+  Achilles                                2+                         Tr  Plantar                           Downgoing                Downgoing     Coordination  Right: Finger-to-nose normal   Left: Finger-to-nose normal      Gait  He walks with a cane  He was dragging his right lower extremity with a footdrop on the right  There is no footdrop on left                 Sensory    Vibraiton 3 sec in the toes   On left , 5 sec on the right ,  sensation in the left toe was a 3/3 seconds   jps was normal , no sensory level         Reflexes  Brachioradialis                    1+                         1+  Biceps                                 1+                         1+  Triceps                                1+                         1+  Patellar 3+                         2+  Achilles                                2+                         Tr  Plantar                           Downgoing                Downgoing    Coordination  Right: Finger-to-nose normal   Left: Finger-to-nose normal     Gait Unable to rise from chair without using arms  He requires assistance of a cane  He has difficulty standing from a sitting position  He has a footdrop on the right  Review of systems was obtained other medical assistant as below reviewed with the patient at today's appointment    ROS:    Review of Systems   Constitutional: Negative  Negative for appetite change and fever  HENT: Negative  Negative for hearing loss, tinnitus, trouble swallowing and voice change  Eyes: Negative  Negative for photophobia and pain  Respiratory: Negative  Negative for shortness of breath  Cardiovascular: Negative  Negative for palpitations  Gastrointestinal: Negative  Negative for nausea and vomiting  Endocrine: Negative  Negative for cold intolerance and heat intolerance  Genitourinary: Positive for frequency and urgency  Negative for dysuria  Musculoskeletal: Positive for arthralgias, back pain, gait problem, myalgias and neck pain  Skin: Negative  Negative for rash  Neurological: Negative for dizziness, tremors, seizures, syncope, facial asymmetry, speech difficulty, weakness, light-headedness, numbness and headaches  Fall    Hematological: Negative  Does not bruise/bleed easily  Psychiatric/Behavioral: Negative  Negative for confusion, hallucinations and sleep disturbance

## 2019-04-17 ENCOUNTER — OFFICE VISIT (OUTPATIENT)
Dept: NEUROLOGY | Facility: CLINIC | Age: 59
End: 2019-04-17
Payer: COMMERCIAL

## 2019-04-17 VITALS
BODY MASS INDEX: 30.34 KG/M2 | RESPIRATION RATE: 12 BRPM | SYSTOLIC BLOOD PRESSURE: 120 MMHG | HEART RATE: 58 BPM | DIASTOLIC BLOOD PRESSURE: 88 MMHG | WEIGHT: 224 LBS | HEIGHT: 72 IN

## 2019-04-17 DIAGNOSIS — M48.05 SPINAL STENOSIS OF THORACOLUMBAR REGION: Primary | ICD-10-CM

## 2019-04-17 PROCEDURE — 99214 OFFICE O/P EST MOD 30 MIN: CPT | Performed by: PSYCHIATRY & NEUROLOGY

## 2019-05-06 ENCOUNTER — TRANSCRIBE ORDERS (OUTPATIENT)
Dept: ADMINISTRATIVE | Facility: HOSPITAL | Age: 59
End: 2019-05-06

## 2019-05-06 DIAGNOSIS — Z01.812 ENCOUNTER FOR PRE-OPERATIVE LABORATORY TESTING: Primary | ICD-10-CM

## 2019-05-10 DIAGNOSIS — E29.1 HYPOGONADISM IN MALE: ICD-10-CM

## 2019-05-10 RX ORDER — TESTOSTERONE CYPIONATE 200 MG/ML
400 INJECTION INTRAMUSCULAR
Qty: 4 ML | Refills: 5 | OUTPATIENT
Start: 2019-05-10 | End: 2019-09-09 | Stop reason: SDUPTHER

## 2019-06-10 ENCOUNTER — OFFICE VISIT (OUTPATIENT)
Dept: UROLOGY | Facility: MEDICAL CENTER | Age: 59
End: 2019-06-10
Payer: COMMERCIAL

## 2019-06-10 VITALS
HEART RATE: 70 BPM | DIASTOLIC BLOOD PRESSURE: 72 MMHG | HEIGHT: 72 IN | BODY MASS INDEX: 29.39 KG/M2 | SYSTOLIC BLOOD PRESSURE: 108 MMHG | WEIGHT: 217 LBS

## 2019-06-10 DIAGNOSIS — E29.1 TESTICULAR HYPOFUNCTION: ICD-10-CM

## 2019-06-10 DIAGNOSIS — R35.1 BPH ASSOCIATED WITH NOCTURIA: Primary | ICD-10-CM

## 2019-06-10 DIAGNOSIS — E29.1 HYPOGONADISM IN MALE: ICD-10-CM

## 2019-06-10 DIAGNOSIS — N40.1 BPH ASSOCIATED WITH NOCTURIA: Primary | ICD-10-CM

## 2019-06-10 DIAGNOSIS — N52.9 ERECTILE DYSFUNCTION, UNSPECIFIED ERECTILE DYSFUNCTION TYPE: ICD-10-CM

## 2019-06-10 LAB
POST-VOID RESIDUAL VOLUME, ML POC: 6 ML
SL AMB  POCT GLUCOSE, UA: NEGATIVE
SL AMB LEUKOCYTE ESTERASE,UA: NEGATIVE
SL AMB POCT BILIRUBIN,UA: NEGATIVE
SL AMB POCT BLOOD,UA: ABNORMAL
SL AMB POCT CLARITY,UA: CLEAR
SL AMB POCT COLOR,UA: YELLOW
SL AMB POCT KETONES,UA: NEGATIVE
SL AMB POCT NITRITE,UA: NEGATIVE
SL AMB POCT PH,UA: 5.5
SL AMB POCT SPECIFIC GRAVITY,UA: 1.02
SL AMB POCT URINE PROTEIN: NEGATIVE
SL AMB POCT UROBILINOGEN: 0.2

## 2019-06-10 PROCEDURE — 99214 OFFICE O/P EST MOD 30 MIN: CPT | Performed by: UROLOGY

## 2019-06-10 PROCEDURE — 51798 US URINE CAPACITY MEASURE: CPT | Performed by: UROLOGY

## 2019-06-10 PROCEDURE — 81003 URINALYSIS AUTO W/O SCOPE: CPT | Performed by: UROLOGY

## 2019-06-10 RX ORDER — OXYBUTYNIN CHLORIDE 5 MG/1
5 TABLET ORAL 3 TIMES DAILY PRN
Qty: 60 TABLET | Refills: 1 | Status: SHIPPED | OUTPATIENT
Start: 2019-06-10 | End: 2019-09-09

## 2019-06-10 RX ORDER — DICYCLOMINE HYDROCHLORIDE 10 MG/1
CAPSULE ORAL 2 TIMES DAILY
Refills: 0 | COMMUNITY
Start: 2019-06-06 | End: 2020-02-13

## 2019-06-10 RX ORDER — LOTEPREDNOL ETABONATE 5 MG/ML
SUSPENSION/ DROPS OPHTHALMIC
Refills: 0 | COMMUNITY
Start: 2019-05-15 | End: 2019-09-09

## 2019-06-10 RX ORDER — DOXYCYCLINE 100 MG/1
100 TABLET ORAL 2 TIMES DAILY
Refills: 0 | COMMUNITY
Start: 2019-06-06 | End: 2019-09-09

## 2019-06-10 RX ORDER — SILDENAFIL CITRATE 20 MG/1
TABLET ORAL
Qty: 180 TABLET | Refills: 1 | Status: SHIPPED | OUTPATIENT
Start: 2019-06-10 | End: 2020-03-23

## 2019-06-10 RX ORDER — GABAPENTIN 300 MG/1
CAPSULE ORAL
Refills: 0 | COMMUNITY
Start: 2019-06-03 | End: 2019-09-09

## 2019-06-10 RX ORDER — TESTOSTERONE CYPIONATE 200 MG/ML
400 INJECTION INTRAMUSCULAR
Qty: 10 ML | Refills: 1 | Status: SHIPPED | OUTPATIENT
Start: 2019-06-10 | End: 2019-10-25 | Stop reason: SDUPTHER

## 2019-06-10 RX ORDER — SODIUM, POTASSIUM,MAG SULFATES 17.5-3.13G
SOLUTION, RECONSTITUTED, ORAL ORAL
Refills: 0 | COMMUNITY
Start: 2019-06-06 | End: 2019-09-09

## 2019-06-12 ENCOUNTER — OFFICE VISIT (OUTPATIENT)
Dept: NEUROLOGY | Facility: CLINIC | Age: 59
End: 2019-06-12
Payer: COMMERCIAL

## 2019-06-12 VITALS
RESPIRATION RATE: 14 BRPM | WEIGHT: 223.6 LBS | HEART RATE: 70 BPM | SYSTOLIC BLOOD PRESSURE: 110 MMHG | HEIGHT: 72 IN | BODY MASS INDEX: 30.28 KG/M2 | DIASTOLIC BLOOD PRESSURE: 86 MMHG

## 2019-06-12 DIAGNOSIS — M48.05 SPINAL STENOSIS OF THORACOLUMBAR REGION: Primary | ICD-10-CM

## 2019-06-12 PROCEDURE — 99214 OFFICE O/P EST MOD 30 MIN: CPT | Performed by: PSYCHIATRY & NEUROLOGY

## 2019-06-12 RX ORDER — METHYLPREDNISOLONE 4 MG/1
TABLET ORAL
Qty: 1 EACH | Refills: 3 | Status: SHIPPED | OUTPATIENT
Start: 2019-06-12 | End: 2020-02-12 | Stop reason: SDUPTHER

## 2019-06-13 ENCOUNTER — TELEPHONE (OUTPATIENT)
Dept: UROLOGY | Facility: MEDICAL CENTER | Age: 59
End: 2019-06-13

## 2019-07-09 ENCOUNTER — HOSPITAL ENCOUNTER (OUTPATIENT)
Dept: NEUROLOGY | Facility: AMBULATORY SURGERY CENTER | Age: 59
Discharge: HOME | End: 2019-07-09
Payer: COMMERCIAL

## 2019-07-09 ENCOUNTER — DOCUMENTATION (OUTPATIENT)
Dept: NEUROLOGY | Facility: CLINIC | Age: 59
End: 2019-07-09

## 2019-07-09 DIAGNOSIS — M48.05 SPINAL STENOSIS OF THORACOLUMBAR REGION: ICD-10-CM

## 2019-07-09 PROCEDURE — 95910 NRV CNDJ TEST 7-8 STUDIES: CPT | Performed by: PSYCHIATRY & NEUROLOGY

## 2019-07-09 PROCEDURE — 95886 MUSC TEST DONE W/N TEST COMP: CPT | Performed by: PSYCHIATRY & NEUROLOGY

## 2019-07-09 NOTE — PROGRESS NOTES
This is a 68-year-old patient with history of prior lumbar surgeries cauda, arachnoiditis treated previously with IV steroids last presented to our offices in June with continued complaints of weakness  He was noted to have reflex asymmetry  He did have a stimulator trial for five days for the 1st day he had significant improvement but then had 50% relief for five days  This  was removed after five days  Due to insurance restrictions   He was placed on gabapentin and the dose has been increased to 300 mg 3 times a day approximately six weeks ago  In the interim he does report some slight increase in weakness in the left leg, some cognitive slowing and some and some difficulty finding words  Today's examination did demonstrate 4/5 strength in the left hip flexors hip extensors he has ongoing weakness on the right side with limited eversion and inversion on the right due to prior foot surgeries    Today's EMG study was performed and it was abnormal   It did demonstrate the presence of bilateral chronic L5-S1 radiculopathies  there is also the presence of a subacute S2-S3 radiculopathies bilaterally due to the presence of denervation changes in the paraspinals this could be associated with a more of a distal etiology such or as arachnoiditis    Plan 1  He should continue on aggressive physical therapy and exercise 2  We did have a long discussion regarding the use of gabapentin  We decided to slowly taper himself off  Cognitively he may improve and this may also affect his sense of weakness / heaviness  on the left leg  3   Given these new abnormalities on today's EMG and if  the left leg weakness does not improve we may consider a repeat MRI imaging study of the lumbar spine with gadolinium this determine if there is any other new  structural etiologies 4  We also discussed in potential additional IV steroids depending on the MRI findings for three days 1 g as an outpatient    He will  call us and notify us regarding proceeding with this  5  He will return to our offices in a August 6  IV steroids should be  prior to the stimulator placement       He is to keep us informed regarding his symptoms    Please schedule follow-up for Mr Seema Fermin  on August 21, 2019 at noon at [facility]

## 2019-08-20 ENCOUNTER — TELEPHONE (OUTPATIENT)
Dept: NEUROLOGY | Facility: CLINIC | Age: 59
End: 2019-08-20

## 2019-08-20 NOTE — TELEPHONE ENCOUNTER
Please see if he can come tomorrow at 2pm at [facility]   Theresa Singh  Or if that is difficult at 12;30     We can discuss these issue at the visit

## 2019-08-20 NOTE — TELEPHONE ENCOUNTER
pt called and states that he received a call to schedule appt on 8/21 but he was on vacation and when he called back this appt was taken and was told that he would get a call back and never received a call     he has not had mri done yet as he is not having stimulator procedure until oct  He though that you wanted him to have steroids regardless of mri results and before he saw you for the follow up  Please advise if you want him to have mri and/or steroids prior to seeing you again and when you would like to see him    He will call and schedule mri today    [phone]

## 2019-08-21 ENCOUNTER — OFFICE VISIT (OUTPATIENT)
Dept: NEUROLOGY | Facility: CLINIC | Age: 59
End: 2019-08-21
Payer: COMMERCIAL

## 2019-08-21 ENCOUNTER — TELEPHONE (OUTPATIENT)
Dept: NEUROLOGY | Facility: CLINIC | Age: 59
End: 2019-08-21

## 2019-08-21 VITALS
SYSTOLIC BLOOD PRESSURE: 118 MMHG | BODY MASS INDEX: 30.75 KG/M2 | DIASTOLIC BLOOD PRESSURE: 70 MMHG | HEART RATE: 70 BPM | WEIGHT: 227 LBS | RESPIRATION RATE: 14 BRPM | HEIGHT: 72 IN

## 2019-08-21 DIAGNOSIS — M48.05 SPINAL STENOSIS OF THORACOLUMBAR REGION: Primary | ICD-10-CM

## 2019-08-21 PROCEDURE — 99214 OFFICE O/P EST MOD 30 MIN: CPT | Performed by: PSYCHIATRY & NEUROLOGY

## 2019-08-21 NOTE — TELEPHONE ENCOUNTER
Clinical team    Please schedule IV steroids for the patient for three days in the [facility] over 90 minutes  This will be scheduled the end of September    diagnosis is cauda equina syndrome

## 2019-08-21 NOTE — PATIENT INSTRUCTIONS
Think about cymbalta after stimulator       I reviewed the two  emg and compared   The studies   They Have showed progression /  deterioration of the involvement of various lumbar and sacral innervated muscles  His examination today fails to reveal any evidence in the upper extremities and therefore is not consistent with motor neuron disease

## 2019-08-21 NOTE — PROGRESS NOTES
Patient ID: Rj Tijerina is a 62 y o  male  Assessment/Plan:    Spinal stenosis of thoracolumbar region  This is a 26-year-old patient with chronic back pain and cauda equina syndrome he has had numerous surgeries and has post-laminectomy syndrome he is for a spinal cord stimulator to be placed in October  His EMG did symptoms did demonstrate presence of chronic L5-S1 radiculopathy as well as chronic abnormalities in the sacral regions  There is no evidence on his exam abnormalities in the arms to suggest a upper motor neuron etiology  Today I did compare his prior EMG study in 2016 that  was performed recently  There is more involvement of the muscles the abnormalities in muscle testing seem to more chronic than subacute  There is also changes in the amplitude of the motor studies  This does suggest a deterioration  or progression of his disease       He is for a repeat MRI of the lumbar spine next week  We discussed the need for IV IV steroids  I have asked him to check with the neurosurgeon's office regarding the timing  however I will order the IV steroids the end of September  This is usually helpful for your several weeks  If this needs to be change we will inform the infusion center according    We also discussed the possibility of Cymbalta  This could be considered after the stimulator this could help some of the neuropathic pain  He is to return to is after the surgery in November     Diagnoses and all orders for this visit:    Spinal stenosis of thoracolumbar region         Subjective: This is a 59  y o rh male who presented  to our office   in a follow-up visit  He has chronic pain, numbness in his legs difficulty ambulating  He was recently evaluated by Dr Sanjuanita Lujan in [place] and had a trial of the spinal cord stimulator placed  He had significant improvement the 1st day of but it was not sustained  He had under than 50% relief    He recently was placed on gabapentin and is slowly increasing the dose to 300 mg 3 times a day  He did undergo an EMG study in July  This demonstrated chronic L5-S1 radiculopathy  We did have a discussion regarding the ongoing use of gabapentin  He felt that this may be causing some of his cognitive issues  He eventually tapered himself off  He has a painful neuropathy and numbness  from post laminectomy syndrome  He continues to have weakness of his lower extremities of which is slight but greater on the right  More predominantly he does have difficulty with ambulating  Does utilize a cane to ambulate and he would like to avoid the use of a walker  He was utilizing a walker at home but now has been attempting utilize a cane                        he had been complaining of urinary retention bowel urgency numbness in his anterior thigh is thighs and difficulty with maintaining orgasm  He had undergone MRI of the thoracic and lumbar spine which demonstrated no evidence of arachnoiditis however there was change in the signal in the lower nerves  It was consistent with arachnoiditis    He was treated with three days of IV steroids twice   He noticed improvement in his whole body but did not notice any  persistent benefit in the neuropathic pain and numbness  He would like to proceed with IV steroids again he is for repeat MRI of the lumbar spine later next week         EMG study was performed and it was abnormal   It did demonstrate the presence of bilateral chronic L5-S1 radiculopathies  there is also the presence of a subacute S2-S3 radiculopathies bilaterally due to the presence of denervation changes in the paraspinals this could be associated with a more of a distal etiology such or as arachnoiditis      Today he brought a copy of the prior EMG performed in 2016    This did demonstrate multiple abnormalities in muscle testing the sensory is were normal F-waves are normal  There is a question of upper motor neuron etiology  He was also longstanding opioids to in the past   This  did this did result in less neuropathic pain,  less burning must numbness  However he had significant side effects including cognition and constipation                                   He continues to proceed with physical therapy, yoga and exercises on a regular basis                 Prior history: He was last evaluated here in 2016  He did  complainin of stiffness and heaviness in his legs with progressive weakness   MRI did show foraminal stenosis a left L5-S1 as well as impingement on the left L3 L4 region  He was seen by dr Jb Connelly who  suggested surgery but he and then sought a 2nd opinion and underwent fusion with placement of  L3-S1 in May of 2016 by Dr Kip Woods ,  the head of Neurosurgery at [facility]  After the surgery in May of 2016 he noted increasing numbness in his bilateral thighs, in the saddle regions and numbness in the perineum  This occurred after 1 year   the numbness also occurs in the lower extremities the calf on the left side He has noted progressive ambulatory dysfunction specially on the right leg  He does report some urinary urgency and abnormalities with sexual dysfunction   The sexual dysfunction has been progressive over the last year  He is unable to have an organsm for the last  year  He did try baclofen and it was ineffective   He is no longer utilizing narcotic  pain meds and uses prednisone and/or Medrol Dosepak when he has increase in back pain  Oral steriods for a short period of time was ineffective   He is now continued to be followed by physical therapy  He is followed by   Alirio Diaz,  a physical therapist   He continues to exercise with physical therapy , yoga  and aquatic therapy  His last imaging study was in October of 2016 with a ct scan of the lumbar spine the  There was no  evidence suggests disc space infection or arachnoiditis    He had anterior fusion at L4-L5 and debris due to granulation tissue  There was a bony fragment impinging upon the spinal cord at the L2 vertebral body    He did have mri of the lumbar and thoracic spine after the last visit   Mri of the lumbar spine showed multilevel disc protrusion and DJD , pedical screws were in place   No evidence of arachointits however there was change in signal  Mri of the T spine was abnormal with mid thoracic level disc herniaition at T6 and T7  With multi level shallow disc protrusions   Heavy metal screeen, b12, foalte were normal              He is a  of an LUBB-TEX and had had multiple surgeries in the lumbar spine( last one in 2000) and cervical decompression years ago   A MRI of the cervical spine was consistent with postop changes as per Dr Roberta Trent                                           The following portions of the patient's history were reviewed and updated as appropriate: He  has a past medical history of BPH (benign prostatic hyperplasia), Chronic back pain, Erectile dysfunction, Nocturia, OAB (overactive bladder), Testicular hypogonadism, Urinary frequency, and Urinary urgency  He  has a past surgical history that includes Back surgery (05/18/2016)  His family history is not on file  He  reports that he has never smoked  He has never used smokeless tobacco  He reports that he drinks alcohol  He reports that he does not use drugs  Current Outpatient Medications   Medication Sig Dispense Refill    dicyclomine (BENTYL) 10 mg capsule 2 (two) times a day  0    doxycycline (ADOXA) 100 MG tablet Take 100 mg by mouth 2 (two) times a day  0    gabapentin (NEURONTIN) 300 mg capsule take 1 capsule by mouth at bedtime for 7 days then tablet 1 capsu   (REFER TO PRESCRIPTION NOTES)    0    ibuprofen (MOTRIN) 400 mg tablet Take 400 mg by mouth as needed      LOTEMAX 0 5 % ophthalmic suspension Administer to the right eye  0    methylPREDNISolone 4 MG tablet therapy pack Use as directed on package 1 each 3    Methylprednisolone 4 MG TBPK as needed  0    oxybutynin (DITROPAN) 5 mg tablet Take 1 tablet (5 mg total) by mouth 3 (three) times a day as needed (urinary frequency and nocturia) 60 tablet 1    sildenafil (REVATIO) 20 mg tablet Take 3 to 5 tablets by mouth one (1) hour prior to intercourse  180 tablet 1    SUPREP BOWEL PREP KIT 17 5-3 13-1 6 GM/177ML SOLN Take as directed by prescriber  0    testosterone cypionate (DEPO-TESTOSTERONE) 200 mg/mL SOLN Inject 2 mL (400 mg total) into a muscle every 14 (fourteen) days 4 mL 5    testosterone cypionate (DEPO-TESTOSTERONE) 200 mg/mL SOLN Inject 2 mL (400 mg total) into a muscle every 14 (fourteen) days 10 mL 1     No current facility-administered medications for this visit  He is allergic to morphine and morphine and related            Objective:    Blood pressure 118/70, pulse 70, resp  rate 14, height 6' (1 829 m), weight 103 kg (227 lb)  Physical Exam   Constitutional: He appears well-developed  HENT:   Head: Normocephalic  Eyes: Pupils are equal, round, and reactive to light  Lids are normal    Neurological:   Reflex Scores:       Tricep reflexes are 1+ on the right side and 1+ on the left side  Bicep reflexes are 1+ on the right side and 1+ on the left side  Brachioradialis reflexes are 1+ on the right side and 1+ on the left side  Patellar reflexes are 1+ on the right side and 1+ on the left side  Achilles reflexes are 2+ on the right side and Tr on the left side  Vitals reviewed  Neurological Exam    Cranial Nerves  CN II: Visual acuity is normal  Visual fields full to confrontation  CN III, IV, VI: Extraocular movements intact bilaterally  Normal lids and orbits bilaterally  Pupils equal round and reactive to light bilaterally  CN V: Facial sensation is normal   CN VII: Full and symmetric facial movement  CN VIII: Hearing is normal   CN IX, X: Palate elevates symmetrically   Normal gag reflex  CN XI: Shoulder shrug strength is normal   CN XII: Tongue midline without atrophy or fasciculations  Motor    Normal muscle bulk throughout  No fasciculations present  No evidence of a pronator drift  right df 4/5, left 4/5, right big toe 4/5 , decrease side to side  movements on right   He did have weakness in the right iliopsoas and, tensor fascia florencio  4/5   Knee flexion and knee extension were 5/5  Adductors  and bulk in the upper extremities  were 5/5  He had normal   Tone/ bulk   There is no atrophy  He has had multiple surgeries on his ankles with limited eversion and inversion on the right  ( 1/5) , he had 3/5 strength in eversion and inversion on the left        He had no evidence of a Tinel sign at the ankle  Sensory  Vibraiton 3 sec in the toes   On left , 5 sec on the right ,  sensation in the left toe was a 3/3 seconds  jps was normal , no sensory level      vibraiton in 5 sec       Reflexes                                           Right                      Left  Brachioradialis                    1+                         1+  Biceps                                 1+                         1+  Triceps                                1+                         1+  Patellar                                1+                         1+  Achilles                                2+                         Tr  Plantar                           Downgoing                Downgoing    Right pathological reflexes: Domenica's absent  Left pathological reflexes: Domenica's absent  Gait  Unable to rise from chair without using arms  He is unable to stand without using his arms he had a antitlagic  gait and required a cane assistance to walk        Review of systems obtained by the medical assistant as below was reviewed with the patient at today's appointment    ROS:    Review of Systems   Constitutional: Negative  Negative for appetite change and fever  HENT: Negative    Negative for hearing loss, tinnitus, trouble swallowing and voice change  Eyes: Negative  Negative for photophobia and pain  Respiratory: Negative  Negative for shortness of breath  Cardiovascular: Negative  Negative for palpitations  Gastrointestinal: Negative  Negative for nausea and vomiting  Endocrine: Negative  Negative for cold intolerance and heat intolerance  Genitourinary: Positive for frequency and urgency  Negative for dysuria  Musculoskeletal: Positive for arthralgias and myalgias  Negative for neck pain  Skin: Negative  Negative for rash  Neurological: Positive for numbness  Negative for dizziness, tremors, seizures, syncope, facial asymmetry, speech difficulty, weakness, light-headedness and headaches  Tingling  Snoring  Difficulty walking  Falls     Hematological: Negative  Does not bruise/bleed easily  Psychiatric/Behavioral: Negative  Negative for confusion, hallucinations and sleep disturbance

## 2019-09-04 RX ORDER — SODIUM CHLORIDE 9 MG/ML
20 INJECTION, SOLUTION INTRAVENOUS ONCE
Status: CANCELLED | OUTPATIENT
Start: 2019-09-09

## 2019-09-04 NOTE — TELEPHONE ENCOUNTER
i called [facility] at [phone] and spoke to selam Banegas [id number], [id number], [id number]   90% covered after Deductible of $1000 is met, this has been met  oop $6500, [id number] has been meet  All codes valid an billable  PA not required      Ref#[id number]

## 2019-09-04 NOTE — TELEPHONE ENCOUNTER
Patient is scheduled for IV steroids @ [facility]:    9/9 @ 1 pm  9/10 @ 12:30 pm  9/11 @ 1 pm      Left detailed message informing patient of infusion dates/times

## 2019-09-04 NOTE — TELEPHONE ENCOUNTER
Patient stated his neurosurgeon advised he have IV steroids 3 weeks prior to surgery  "Ideally would be next week"  Steroid infusion order sent to Dr Tho Ott  Will send to offline nurse also

## 2019-09-09 ENCOUNTER — HOSPITAL ENCOUNTER (OUTPATIENT)
Dept: INFUSION CENTER | Facility: CLINIC | Age: 59
Discharge: HOME | End: 2019-09-09
Payer: COMMERCIAL

## 2019-09-09 VITALS
HEART RATE: 71 BPM | SYSTOLIC BLOOD PRESSURE: 112 MMHG | DIASTOLIC BLOOD PRESSURE: 74 MMHG | TEMPERATURE: 97.6 F | RESPIRATION RATE: 18 BRPM

## 2019-09-09 DIAGNOSIS — G83.4 CAUDA EQUINA SYNDROME (HCC): Primary | ICD-10-CM

## 2019-09-09 PROCEDURE — 96365 THER/PROPH/DIAG IV INF INIT: CPT

## 2019-09-09 RX ORDER — SODIUM CHLORIDE 9 MG/ML
20 INJECTION, SOLUTION INTRAVENOUS ONCE
Status: COMPLETED | OUTPATIENT
Start: 2019-09-09 | End: 2019-09-09

## 2019-09-09 RX ORDER — SODIUM CHLORIDE 9 MG/ML
20 INJECTION, SOLUTION INTRAVENOUS ONCE
Status: CANCELLED | OUTPATIENT
Start: 2019-09-10

## 2019-09-09 RX ADMIN — SODIUM CHLORIDE 1000 MG: 0.9 INJECTION, SOLUTION INTRAVENOUS at 13:15

## 2019-09-09 RX ADMIN — SODIUM CHLORIDE 20 ML/HR: 0.9 INJECTION, SOLUTION INTRAVENOUS at 13:01

## 2019-09-09 NOTE — PROGRESS NOTES
Patient tolerated treatment without complication  AVS declined, aware to return tomorrow, patient left unit in stable condition

## 2019-09-09 NOTE — PLAN OF CARE
Problem: Potential for Falls  Goal: Patient will remain free of falls  Description  INTERVENTIONS:  - Assess patient frequently for physical needs  -  Identify cognitive and physical deficits and behaviors that affect risk of falls    -  [place] fall precautions as indicated by assessment   - Educate patient/family on patient safety including physical limitations  - Instruct patient to call for assistance with activity based on assessment  - Modify environment to reduce risk of injury  - Consider OT/PT consult to assist with strengthening/mobility  Outcome: Progressing

## 2019-09-10 ENCOUNTER — HOSPITAL ENCOUNTER (OUTPATIENT)
Dept: INFUSION CENTER | Facility: CLINIC | Age: 59
Discharge: HOME | End: 2019-09-10
Payer: COMMERCIAL

## 2019-09-10 VITALS
HEART RATE: 74 BPM | DIASTOLIC BLOOD PRESSURE: 82 MMHG | TEMPERATURE: 98.5 F | SYSTOLIC BLOOD PRESSURE: 110 MMHG | RESPIRATION RATE: 18 BRPM

## 2019-09-10 DIAGNOSIS — G83.4 CAUDA EQUINA SYNDROME (HCC): Primary | ICD-10-CM

## 2019-09-10 PROCEDURE — 96365 THER/PROPH/DIAG IV INF INIT: CPT

## 2019-09-10 RX ORDER — SODIUM CHLORIDE 9 MG/ML
20 INJECTION, SOLUTION INTRAVENOUS ONCE
Status: CANCELLED | OUTPATIENT
Start: 2019-09-11

## 2019-09-10 RX ORDER — SODIUM CHLORIDE 9 MG/ML
20 INJECTION, SOLUTION INTRAVENOUS ONCE
Status: COMPLETED | OUTPATIENT
Start: 2019-09-10 | End: 2019-09-10

## 2019-09-10 RX ADMIN — SODIUM CHLORIDE 1000 MG: 0.9 INJECTION, SOLUTION INTRAVENOUS at 12:58

## 2019-09-10 RX ADMIN — SODIUM CHLORIDE 20 ML/HR: 0.9 INJECTION, SOLUTION INTRAVENOUS at 12:50

## 2019-09-10 NOTE — PLAN OF CARE
Problem: Potential for Falls  Goal: Patient will remain free of falls  Description  INTERVENTIONS:  - Assess patient frequently for physical needs  -  Identify cognitive and physical deficits and behaviors that affect risk of falls    -  [place] fall precautions as indicated by assessment   - Educate patient/family on patient safety including physical limitations  - Instruct patient to call for assistance with activity based on assessment  - Modify environment to reduce risk of injury  - Consider OT/PT consult to assist with strengthening/mobility  Outcome: Progressing

## 2019-09-10 NOTE — PROGRESS NOTES
Patient to the unit for Solu-medrol infusion  Tolerated procedure without adverse reactiont  Declined AVS   Aware of his treatment tomorrow  Voices no questions or concerns

## 2019-09-11 ENCOUNTER — HOSPITAL ENCOUNTER (OUTPATIENT)
Dept: INFUSION CENTER | Facility: CLINIC | Age: 59
Discharge: HOME | End: 2019-09-11
Payer: COMMERCIAL

## 2019-09-11 VITALS
HEART RATE: 78 BPM | TEMPERATURE: 97.8 F | DIASTOLIC BLOOD PRESSURE: 85 MMHG | RESPIRATION RATE: 18 BRPM | SYSTOLIC BLOOD PRESSURE: 132 MMHG

## 2019-09-11 DIAGNOSIS — G83.4 CAUDA EQUINA SYNDROME (HCC): Primary | ICD-10-CM

## 2019-09-11 PROCEDURE — 96365 THER/PROPH/DIAG IV INF INIT: CPT

## 2019-09-11 RX ORDER — SODIUM CHLORIDE 9 MG/ML
20 INJECTION, SOLUTION INTRAVENOUS ONCE
Status: CANCELLED | OUTPATIENT
Start: 2019-09-11

## 2019-09-11 RX ORDER — SODIUM CHLORIDE 9 MG/ML
20 INJECTION, SOLUTION INTRAVENOUS ONCE
Status: COMPLETED | OUTPATIENT
Start: 2019-09-11 | End: 2019-09-11

## 2019-09-11 RX ADMIN — SODIUM CHLORIDE 20 ML/HR: 0.9 INJECTION, SOLUTION INTRAVENOUS at 12:45

## 2019-09-11 RX ADMIN — SODIUM CHLORIDE 1000 MG: 0.9 INJECTION, SOLUTION INTRAVENOUS at 12:45

## 2019-09-11 NOTE — PROGRESS NOTES
Patient tolerated day 3 of SoluMedrol infusion well  Offers no complaints  Pt has no future appointments at this time   Refused AVS

## 2019-09-11 NOTE — PLAN OF CARE
Problem: Potential for Falls  Goal: Patient will remain free of falls  Description  INTERVENTIONS:  - Assess patient frequently for physical needs  -  Identify cognitive and physical deficits and behaviors that affect risk of falls    -  [place] fall precautions as indicated by assessment   - Educate patient/family on patient safety including physical limitations  - Instruct patient to call for assistance with activity based on assessment  - Modify environment to reduce risk of injury  - Consider OT/PT consult to assist with strengthening/mobility  9/11/2019 1547 by Willie Ramirez RN  Outcome: Progressing  9/11/2019 1343 by Willie Ramirez, RN  Outcome: Progressing

## 2019-09-11 NOTE — PLAN OF CARE
Problem: Potential for Falls  Goal: Patient will remain free of falls  Description  INTERVENTIONS:  - Assess patient frequently for physical needs  -  Identify cognitive and physical deficits and behaviors that affect risk of falls    -  [place] fall precautions as indicated by assessment   - Educate patient/family on patient safety including physical limitations  - Instruct patient to call for assistance with activity based on assessment  - Modify environment to reduce risk of injury  - Consider OT/PT consult to assist with strengthening/mobility  Outcome: Progressing

## 2019-10-24 DIAGNOSIS — E29.1 HYPOGONADISM IN MALE: ICD-10-CM

## 2019-10-24 NOTE — TELEPHONE ENCOUNTER
Patient left a message on the Medication Refill voice mail line requesting a new prescription for Testosterone Cypionate 200mg/mL, 1 - 10mL multi-dose vial   He does not prefer the individual 1mL vials  Please send to [facility]

## 2019-10-25 RX ORDER — TESTOSTERONE CYPIONATE 200 MG/ML
400 INJECTION INTRAMUSCULAR
Qty: 10 ML | Refills: 1 | Status: SHIPPED | OUTPATIENT
Start: 2019-10-25 | End: 2020-05-04

## 2019-10-25 NOTE — TELEPHONE ENCOUNTER
The patient was last seen on 6/10/19 by Dr Terseo Hartmann in the [facility] location; continuation of the medication was authorized at that time    Request for same, 1 - 10mL multi-dose vial with 1 refill was queued and forwarded to the Advanced Practitioner covering the [facility] location for approval

## 2019-11-20 ENCOUNTER — OFFICE VISIT (OUTPATIENT)
Dept: NEUROLOGY | Facility: CLINIC | Age: 59
End: 2019-11-20
Payer: COMMERCIAL

## 2019-11-20 VITALS
DIASTOLIC BLOOD PRESSURE: 82 MMHG | HEART RATE: 78 BPM | WEIGHT: 222.8 LBS | SYSTOLIC BLOOD PRESSURE: 126 MMHG | BODY MASS INDEX: 30.22 KG/M2

## 2019-11-20 DIAGNOSIS — M48.05 SPINAL STENOSIS OF THORACOLUMBAR REGION: Primary | ICD-10-CM

## 2019-11-20 PROCEDURE — 99214 OFFICE O/P EST MOD 30 MIN: CPT | Performed by: PSYCHIATRY & NEUROLOGY

## 2019-11-20 RX ORDER — MELOXICAM 7.5 MG/1
7.5 TABLET ORAL DAILY
Qty: 30 TABLET | Refills: 1 | Status: SHIPPED | OUTPATIENT
Start: 2019-11-20 | End: 2020-02-12

## 2019-11-20 NOTE — PROGRESS NOTES
Patient ID: Freddie Matos is a 62 y o  male  Assessment/Plan:    Spinal stenosis of thoracolumbar region  Todd Abarca is a 78-year-old gentleman a history of spinal stenosis  He is status post spinal cord stimulator placement approximately one month ago  Since that time he describes no change in the paresthesias but he does describe some slight increase in am  erection  He has not been able to exercise in the last few weeks and has noted some weakness thought to be due to deconditioning he was noted to have two falls over the weekend   They were attributed to and instability of the strength probably secondary to deconditioning  Hopefully with improvement of his strength and core muscles these falls will decrease in his ambulation will improved  He has been utilizing a walker due to his unsteadiness             I we discussed the use of steroids  I also asked him to try to try meloxicam 7 5 for as an anti-inflammatory  This can be increased to two tablets if the one does not work  If this is ineffective this can be discontinued  This can be used for short periods of time if necessary  He has a follow-up appointment for Dr Earnest Pimentel     He is to return to our offices in February if he has any other new questions or problems in the interim he is to call       Diagnoses and all orders for this visit:    Spinal stenosis of thoracolumbar region  -     meloxicam (MOBIC) 7 5 mg tablet; Take 1 tablet (7 5 mg total) by mouth daily     He is to follow up in approximately two  Months     Subjective: This is a 62   y o rh male who presented  to our office   in a follow-up visit  He has chronic pain, numbness in his legs difficulty ambulating  He recently had a permanent spinal cord stimulator placed  This was placed in October    In the past he did a trial spinal cord stimulator placed and he had greater than 50% low-dose of gabapentin was also attempted but this provided no benefit even up to 900 mg a day and he is no longer utilizing it  He is treated with intermittent high-dose steroids for component of cauda equina syndrome  This does provide benefit for several weeks  Since his surgery in October he has had limited exercise  He fell over the weekend twice one time he fell in his own bathroom when attempting to stand up he had buckling of the leg and the 2nd time occurred close to his car when he tripped  Fortunately he had no sustained damage  He does report diffuse weakness in his legs since he has reduced his exercise                                           He has a painful neuropathy and numbness  from post laminectomy syndrome  He continues to have weakness of his lower extremities of which is slight but greater on the right  More predominantly he does have difficulty with ambulating  Due to the weakness in his legs he has been utilizing a walker  There has been adjustments in the stimulator  He denies any pain but and also reports slight improvement of a early morning erection  he had been complaining of urinary retention bowel urgency numbness in his anterior thigh is thighs and difficulty with maintaining orgasm  He had undergone MRI of the thoracic and lumbar spine which demonstrated no evidence of arachnoiditis however there was change in the signal in the lower nerves  It was consistent with arachnoiditis    In the past He was treated with three days of IV steroids twice   He noticed improvement in his whole body but did not notice any  persistent benefit in the neuropathic pain and numbness                     EMG study was performed and it was abnormal   It did demonstrate the presence of bilateral chronic L5-S1 radiculopathies   there is also the presence of a subacute S2-S3 radiculopathies bilaterally due to the presence of denervation changes in the paraspinals this could be associated with a more of a distal etiology such or as arachnoiditis           He was also longstanding opioids to in the past   This  did this did result in less neuropathic pain,  less burning must numbness  He was given  post surgery Tramadolol but is reluctant to take any items did have opiate Derivatives                Prior history: He was last evaluated here in 2016  He did  complainin of stiffness and heaviness in his legs with progressive weakness   MRI did show foraminal stenosis a left L5-S1 as well as impingement on the left L3 L4 region  He was seen by dr Betzy Feng who  suggested surgery but he and then sought a 2nd opinion and underwent fusion with placement of  L3-S1 in May of 2016 by Dr Mitul Rodrigues ,  the head of Neurosurgery at [facility]  After the surgery in May of 2016 he noted increasing numbness in his bilateral thighs, in the saddle regions and numbness in the perineum  This occurred after 1 year   the numbness also occurs in the lower extremities the calf on the left side He has noted progressive ambulatory dysfunction specially on the right leg  He does report some urinary urgency and abnormalities with sexual dysfunction   The sexual dysfunction has been progressive over the last year  He is unable to have an organsm for the last  year  He did try baclofen and it was ineffective   He is no longer utilizing narcotic  pain meds and uses prednisone and/or Medrol Dosepak when he has increase in back pain  Oral steriods for a short period of time was ineffective   He is now continued to be followed by physical therapy  He is followed by   Akosua Vicente,  a physical therapist   He continues to exercise with physical therapy , yoga  and aquatic therapy  His last imaging study was in October of 2016 with a ct scan of the lumbar spine the  There was no  evidence suggests disc space infection or arachnoiditis  He had anterior fusion at L4-L5 and debris due to granulation tissue    There was a bony fragment impinging upon the spinal cord at the L2 vertebral body    He did have mri of the lumbar and thoracic spine after the last visit   Mri of the lumbar spine showed multilevel disc protrusion and DJD , pedical screws were in place   No evidence of arachointits however there was change in signal  Mri of the T spine was abnormal with mid thoracic level disc herniaition at T6 and T7  With multi level shallow disc protrusions   Heavy metal screeen, b12, foalte were normal              He is a  of an [employer] and had had multiple surgeries in the lumbar spine( last one in 2000) and cervical decompression years ago   A MRI of the cervical spine was consistent with postop changes as per Dr Varma Pulling                                                      The following portions of the patient's history were reviewed and updated as appropriate:   He  has a past medical history of BPH (benign prostatic hyperplasia), Chronic back pain, Erectile dysfunction, Nocturia, OAB (overactive bladder), Testicular hypogonadism, Urinary frequency, and Urinary urgency  He  has a past surgical history that includes Back surgery (05/18/2016) and Spinal cord stimulator implant (1010/19)  His family history is not on file  He  reports that he has never smoked  He has never used smokeless tobacco  He reports that he drinks alcohol  He reports that he does not use drugs  Current Outpatient Medications   Medication Sig Dispense Refill    dicyclomine (BENTYL) 10 mg capsule 2 (two) times a day  0    ibuprofen (MOTRIN) 400 mg tablet Take 400 mg by mouth as needed      methylPREDNISolone 4 MG tablet therapy pack Use as directed on package 1 each 3    Methylprednisolone 4 MG TBPK as needed  0    sildenafil (REVATIO) 20 mg tablet Take 3 to 5 tablets by mouth one (1) hour prior to intercourse   180 tablet 1    testosterone cypionate (DEPO-TESTOSTERONE) 200 mg/mL SOLN Inject 2 mL (400 mg total) into a muscle every 14 (fourteen) days 10 mL 1    meloxicam (MOBIC) 7 5 mg tablet Take 1 tablet (7 5 mg total) by mouth daily 30 tablet 1     No current facility-administered medications for this visit  He is allergic to morphine and morphine and related            Objective:    Blood pressure 126/82, pulse 78, weight 101 kg (222 lb 12 8 oz)  Physical Exam   Constitutional: He appears well-developed  Eyes: Pupils are equal, round, and reactive to light  Lids are normal    Neurological:   Reflex Scores:       Tricep reflexes are 2+ on the right side and 1+ on the left side  Bicep reflexes are 2+ on the right side and 1+ on the left side  Patellar reflexes are 2+ on the right side and 2+ on the left side  Achilles reflexes are 1+ on the right side and 3+ on the left side  Psychiatric: His speech is normal    Vitals reviewed  Neurological Exam  Mental Status   Oriented to person, place, time and situation  Speech is normal  Language is fluent with no aphasia  Cranial Nerves  CN II: Visual acuity is normal  Visual fields full to confrontation  CN III, IV, VI: Extraocular movements intact bilaterally  Normal lids and orbits bilaterally  Pupils equal round and reactive to light bilaterally  CN V: Facial sensation is normal   CN VII: Full and symmetric facial movement  CN VIII: Hearing is normal   CN IX, X: Palate elevates symmetrically  Normal gag reflex  CN XI: Shoulder shrug strength is normal   CN XII: Tongue midline without atrophy or fasciculations  Motor    Normal muscle bulk throughout  No fasciculations present  No evidence of a pronator drift  right df 5-5 5, left 4/5, right big toe 4/5 , decrease side to side  movements on right   He did have weakness in the right iliopsoas and, tensor fascia florencio  4-/5   Left /45    Knee flexion and knee extension were 5/5  Adductors  and bulk in the upper extremities  were 5/5  ,He had normal   Tone/ bulk   There is no atrophy    He has had multiple surgeries on his ankles with limited eversion and inversion on the right  ( 1/5) , he had 3/5 strength in eversion and inversion on the left          He had no evidence of a Tinel sign at the ankle       Sensory  Light touch is normal in upper and lower extremities  He had a diminution of temp of light touch in the anterior thighs and in the right foot       Reflexes                                           Right                      Left  Biceps                                 2+                         1+  Triceps                                2+                         1+  Patellar                                2+                         2+  Achilles                                1+                         3+  Plantar                           Downgoing                Downgoing    Coordination  Right: Finger-to-nose normal   Left: Finger-to-nose normal   Limited heel to shin due to  weakness in the proximal legs  Gait  Unable to rise from chair without using arms  He is unable to stand without using his arms   He a walker to ambulate  Review review of systems obtained from the medical assistant as below was reviewed with the patient at today's visit    ROS:    Review of Systems   Constitutional: Negative  Negative for appetite change and fever  HENT: Negative  Negative for hearing loss, tinnitus, trouble swallowing and voice change  Eyes: Negative  Negative for photophobia and pain  Respiratory: Negative  Negative for shortness of breath  Cardiovascular: Negative  Negative for palpitations  Gastrointestinal: Negative  Negative for nausea and vomiting  Endocrine: Negative  Negative for cold intolerance and heat intolerance  Genitourinary: Positive for frequency and urgency  Negative for dysuria  Musculoskeletal: Positive for arthralgias, back pain, gait problem and myalgias  Negative for neck pain  Pain while walking   Skin: Negative  Negative for rash  Neurological: Positive for numbness   Negative for dizziness, tremors, seizures, syncope, facial asymmetry, speech difficulty, weakness, light-headedness and headaches  Tingling  Twitching     Hematological: Negative  Does not bruise/bleed easily  Psychiatric/Behavioral: Negative  Negative for confusion, hallucinations and sleep disturbance

## 2019-11-20 NOTE — ASSESSMENT & PLAN NOTE
Terry Young is a 68-year-old gentleman a history of spinal stenosis  He is status post spinal cord stimulator placement approximately one month ago  Since that time he describes no change in the paresthesias but he does describe some slight increase in am  erection  He has not been able to exercise in the last few weeks and has noted some weakness thought to be due to deconditioning he was noted to have two falls over the weekend   They were attributed to and instability of the strength probably secondary to deconditioning  Hopefully with improvement of his strength and core muscles these falls will decrease in his ambulation will improved  He has been utilizing a walker due to his unsteadiness             I we discussed the use of steroids  I also asked him to try to try meloxicam 7 5 for as an anti-inflammatory  This can be increased to two tablets if the one does not work  If this is ineffective this can be discontinued  This can be used for short periods of time if necessary      He has a follow-up appointment for Dr Dea Esteban     He is to return to our offices in February if he has any other new questions or problems in the interim he is to call

## 2020-02-05 ENCOUNTER — TELEPHONE (OUTPATIENT)
Dept: UROLOGY | Facility: MEDICAL CENTER | Age: 60
End: 2020-02-05

## 2020-02-05 NOTE — TELEPHONE ENCOUNTER
Called patient regarding insurance coverage  States he now has [employer] and even if we are non-par he will then pay

## 2020-02-10 ENCOUNTER — TELEPHONE (OUTPATIENT)
Dept: NEUROLOGY | Facility: CLINIC | Age: 60
End: 2020-02-10

## 2020-02-10 NOTE — TELEPHONE ENCOUNTER
2/10/2020 AT 12:53PM LMOM FOR PT TO CALL BACK- NO INS LISTED IN EPIC-CALLED TO SEE IF HE HAS NEW INS

## 2020-02-12 ENCOUNTER — OFFICE VISIT (OUTPATIENT)
Dept: NEUROLOGY | Facility: CLINIC | Age: 60
End: 2020-02-12
Payer: COMMERCIAL

## 2020-02-12 VITALS
BODY MASS INDEX: 29.16 KG/M2 | SYSTOLIC BLOOD PRESSURE: 120 MMHG | HEART RATE: 80 BPM | DIASTOLIC BLOOD PRESSURE: 90 MMHG | RESPIRATION RATE: 16 BRPM | WEIGHT: 215 LBS

## 2020-02-12 DIAGNOSIS — M48.05 SPINAL STENOSIS OF THORACOLUMBAR REGION: ICD-10-CM

## 2020-02-12 PROBLEM — R07.89 CHEST DISCOMFORT: Status: ACTIVE | Noted: 2020-02-12

## 2020-02-12 PROCEDURE — 99214 OFFICE O/P EST MOD 30 MIN: CPT | Performed by: PSYCHIATRY & NEUROLOGY

## 2020-02-12 RX ORDER — KETOCONAZOLE 20 MG/ML
SHAMPOO TOPICAL
COMMUNITY
Start: 2020-01-06 | End: 2021-06-07 | Stop reason: SDUPTHER

## 2020-02-12 RX ORDER — FLUOCINOLONE ACETONIDE 0.25 MG/G
1 CREAM TOPICAL 2 TIMES DAILY PRN
COMMUNITY
Start: 2019-12-23

## 2020-02-12 RX ORDER — METHYLPREDNISOLONE 4 MG/1
TABLET ORAL
Qty: 1 EACH | Refills: 3 | Status: SHIPPED | OUTPATIENT
Start: 2020-02-12 | End: 2020-02-13

## 2020-02-12 NOTE — ASSESSMENT & PLAN NOTE
He did complain of some chest discomfort which  attributes with periods of panic  I have informed him that this can be due to a panic attack but I have encouraged him to contact his family physician to rule of other cardiac issues  Today's initial blood pressure was 120/90 however when I took it again it was 115/80

## 2020-02-12 NOTE — PROGRESS NOTES
Patient ID: Freddie Matos is a 62 y o  male  Assessment/Plan:    Spinal stenosis of thoracolumbar region  Todd Abarca is a 67-year-old patient with a history of severe spinal stenosis and is status post spinal cord stimulator 3 to 4 months ago  He has had alterations in the stimulation  He is now exercising  Today he does complain of some intermittent muscle spasms  I have asked him to utilize heating pad  I have also encouraged him to inform Dr Earnest Pimentel     He questioned the use of alternative medications without the addictive features of narcotic agents  I suggested that medical marijuana could also provide him some benefit  I have informed him to look  into this process    I did provide him with a prescription for the Medrol Dosepak  I have encouraged him to maintain off of Mobic and I have discontinued the medication      Chest discomfort  He did complain of some chest discomfort which  attributes with periods of panic  I have informed him that this can be due to a panic attack but I have encouraged him to contact his family physician to rule of other cardiac issues  Today's initial blood pressure was 120/90 however when I took it again it was 115/80  He is to return to our offices in several months but call us if he has any other new questions or problems in the interim   Diagnoses and all orders for this visit:    Spinal stenosis of thoracolumbar region  -     methylPREDNISolone 4 MG tablet therapy pack; Use as directed on package    Other orders  -     fluocinolone (SYNALAR) 0 025 % cream  -     ketoconazole (NIZORAL) 2 % shampoo         Subjective: This is a 62   y o rh male who presented  to our office   in a follow-up visit  He has chronic pain, numbness in his legs difficulty ambulating  He recently had a permanent spinal cord stimulator placed  This was placed in October    He has continued to follow-up with Dr Earnest Pimentel  office and has had adjustments in the stimulator  He has not noticed any change in his symptomatology  He does have access to intermittent Medrol Dosepak which does help  In the past high-dose IVsteroids did help for short period of time  At that last visit I did provide him with a prescription for meloxicam   It provided no benefit any stop it after several weeks  Today he reports intermittent muscle spasms which occurred in the low thoracic regions  This occurs when he is sitting in the bed and attempt to stand  This last for two or 3 seconds and then resolves  This began after the stimulator has been placed  It does not occur in the lower back as well in the buttock region  He has now returned to exercise but is still not at his baseline exercise level  He utilizes a cane he is now going to pool therapy at [facility]  This does provide benefit however he reports he is quite tired after each treatment    Other complaints include an episode of a panic followed by chest discomfort and shortness of breath while at rest   This lasted approximately 45 minutes and then self-resolved  He had is smaller episode earlier today                                                                     He has a painful neuropathy and numbness  from post laminectomy syndrome  He continues to have weakness of his lower extremities of which is slight but greater on the right  He has symptoms emptying to use a cane more often                        Prior symptoms include  urinary retention bowel urgency numbness in his anterior thigh is thighs and difficulty with maintaining orgasm  He had undergone MRI of the thoracic and lumbar spine which demonstrated no evidence of arachnoiditis however there was change in the signal in the lower nerves  It was consistent with arachnoiditis    In the past He was treated with three days of IV steroids twice     He noticed improvement in his whole body but did not notice any  persistent benefit in the neuropathic pain and numbness                     EMG study was performed and it was abnormal   It did demonstrate the presence of bilateral chronic L5-S1 radiculopathies  there is also the presence of a subacute S2-S3 radiculopathies bilaterally due to the presence of denervation changes in the paraspinals this could be associated with a more of a distal etiology such or as arachnoiditis           He was also longstanding opioids to in the past   This  did this did result in less neuropathic pain,  less burning must numbness  He was given  post surgery Tramadolol but is reluctant to take any items did have opiate Derivatives   The last time he utilizes opiates was three years ago  He does not is not willing to return to wait but would like to try medication that could help with some of the other benefits in (i e  well-being)            Prior history: He was last evaluated here in 2016  He did  complainin of stiffness and heaviness in his legs with progressive weakness   MRI did show foraminal stenosis a left L5-S1 as well as impingement on the left L3 L4 region  He was seen by dr Jeff Hickey who  suggested surgery but he and then sought a 2nd opinion and underwent fusion with placement of  L3-S1 in May of 2016 by Dr Liz Sellers ,  the head of Neurosurgery at [facility]  After the surgery in May of 2016 he noted increasing numbness in his bilateral thighs, in the saddle regions and numbness in the perineum  This occurred after 1 year   the numbness also occurs in the lower extremities the calf on the left side He has noted progressive ambulatory dysfunction specially on the right leg  He does report some urinary urgency and abnormalities with sexual dysfunction   The sexual dysfunction has been progressive over the last year  He is unable to have an organsm for the last  year  He did try baclofen and it was ineffective      He is no longer utilizing narcotic  pain meds and uses prednisone and/or Medrol Dosepak when he has increase in back pain  Oral steriods for a short period of time was ineffective   He is now continued to be followed by physical therapy  He is followed by   Donovan Quinteros,  a physical therapist   He continues to exercise with physical therapy , yoga  and aquatic therapy  His last imaging study was in October of 2016 with a ct scan of the lumbar spine the  There was no  evidence suggests disc space infection or arachnoiditis  He had anterior fusion at L4-L5 and debris due to granulation tissue  There was a bony fragment impinging upon the spinal cord at the L2 vertebral body    He did have mri of the lumbar and thoracic spine after the last visit   Mri of the lumbar spine showed multilevel disc protrusion and DJD , pedical screws were in place   No evidence of arachointits however there was change in signal  Mri of the T spine was abnormal with mid thoracic level disc herniaition at T6 and T7  With multi level shallow disc protrusions   Heavy metal screeen, b12, foalte were normal              He is a  of an [employer] agency and had had multiple surgeries in the lumbar spine( last one in 2000) and cervical decompression years ago   A MRI of the cervical spine was consistent with postop changes as per Dr Aaron Gardiner                                    ,          The following portions of the patient's history were reviewed and updated as appropriate: He  has a past medical history of BPH (benign prostatic hyperplasia), Chronic back pain, Erectile dysfunction, Nocturia, OAB (overactive bladder), Testicular hypogonadism, Urinary frequency, and Urinary urgency  He  has a past surgical history that includes Back surgery (05/18/2016) and Spinal cord stimulator implant (1010/19)  His family history is not on file  He  reports that he has never smoked  He has never used smokeless tobacco  He reports that he drinks alcohol  He reports that he does not use drugs    Current Outpatient Medications   Medication Sig Dispense Refill    fluocinolone (SYNALAR) 0 025 % cream       ibuprofen (MOTRIN) 400 mg tablet Take 400 mg by mouth as needed      ketoconazole (NIZORAL) 2 % shampoo       methylPREDNISolone 4 MG tablet therapy pack Use as directed on package 1 each 3    Methylprednisolone 4 MG TBPK as needed  0    sildenafil (REVATIO) 20 mg tablet Take 3 to 5 tablets by mouth one (1) hour prior to intercourse  180 tablet 1    testosterone cypionate (DEPO-TESTOSTERONE) 200 mg/mL SOLN Inject 2 mL (400 mg total) into a muscle every 14 (fourteen) days 10 mL 1    dicyclomine (BENTYL) 10 mg capsule 2 (two) times a day  0     No current facility-administered medications for this visit  He is allergic to morphine and morphine and related            Objective:    Blood pressure 120/90, pulse 80, resp  rate 16, weight 97 5 kg (215 lb)  Physical Exam   Constitutional: He appears well-developed  HENT:   Head: Normocephalic  Eyes: Pupils are equal, round, and reactive to light  Lids are normal    Neurological:   Reflex Scores:       Tricep reflexes are 1+ on the right side and 1+ on the left side  Bicep reflexes are 2+ on the right side and 2+ on the left side  Patellar reflexes are 2+ on the right side and 2+ on the left side  Achilles reflexes are 1+ on the right side and 3+ on the left side  Psychiatric: His speech is normal    Vitals reviewed  Neurological Exam  Mental Status   Oriented to person, place, time and situation  Speech is normal  Language is fluent with no aphasia  Cranial Nerves  CN II: Visual acuity is normal  Visual fields full to confrontation  CN III, IV, VI: Extraocular movements intact bilaterally  Normal lids and orbits bilaterally  Pupils equal round and reactive to light bilaterally  CN V: Facial sensation is normal   CN VII: Full and symmetric facial movement  CN VIII: Hearing is normal   CN IX, X: Palate elevates symmetrically   Normal gag reflex  CN XI: Shoulder shrug strength is normal   CN XII: Tongue midline without atrophy or fasciculations  Motor    Normal muscle bulk throughout  No fasciculations present  No evidence of a pronator drift  right df 5-5 5, left 4/5, right big toe 4/5 , decrease side to side  movements on right   He did have weakness in the right iliopsoas and, tensor fascia florencio  4-/5   Left /45    Knee flexion and knee extension were 5/5  Adductors  and bulk in the upper extremities  were 5/5  ,     Sensory  Light touch is normal in upper and lower extremities  He had a diminution of temp of light touch in the anterior thighs and in the right foot       Reflexes                                           Right                      Left  Biceps                                 2+                         2+  Triceps                                1+                         1+  Patellar                                2+                         2+  Achilles                                1+                         3+  Plantar                           Downgoing                Downgoing    Right pathological reflexes: Domenica's absent  Left pathological reflexes: Domenica's absent  Coordination  Right: Finger-to-nose normal   Left: Finger-to-nose normal   Limited heel-to-shin testing due to weakness  Gait  Unable to rise from chair without using arms  He is unable to stand without using his arms   He a walker to ambulate       Review of systems obtained from the medical assistant as below was reviewed with the patient at today's visit      ROS:    Review of Systems   Constitutional: Positive for fatigue  Negative for appetite change and fever  HENT: Positive for congestion  Negative for hearing loss, tinnitus, trouble swallowing and voice change  Sinus problems     Eyes: Negative  Negative for photophobia and pain  Dry eyes     Respiratory: Positive for shortness of breath      Cardiovascular: Positive for chest pain (or pressure)  Negative for palpitations  Gastrointestinal: Negative  Negative for nausea and vomiting  Endocrine: Negative  Negative for cold intolerance and heat intolerance  Erection difficulties     Genitourinary: Positive for frequency and urgency  Negative for dysuria  Musculoskeletal: Positive for arthralgias, back pain, gait problem and myalgias  Negative for neck pain  Immobility or loss of function  Pain while walking     Skin: Negative  Negative for rash  Neurological: Negative for dizziness, tremors, seizures, syncope, facial asymmetry, speech difficulty, weakness, light-headedness, numbness and headaches  Falls   Snoring     Hematological: Negative  Does not bruise/bleed easily  Psychiatric/Behavioral: Negative for confusion, hallucinations and sleep disturbance  The patient is nervous/anxious

## 2020-02-12 NOTE — ASSESSMENT & PLAN NOTE
Donna Hernandez is a 55-year-old patient with a history of severe spinal stenosis and is status post spinal cord stimulator 3 to 4 months ago  He has had alterations in the stimulation  He is now exercising  Today he does complain of some intermittent muscle spasms  I have asked him to utilize heating pad  I have also encouraged him to inform Dr Ziggy Durán     He questioned the use of alternative medications without the addictive features of narcotic agents  I suggested that medical marijuana could also provide him some benefit  I have informed him to look  into this process    I did provide him with a prescription for the Medrol Dosepak    I have encouraged him to maintain off of Mobic and I have discontinued the medication

## 2020-02-13 ENCOUNTER — OFFICE VISIT (OUTPATIENT)
Dept: UROLOGY | Facility: MEDICAL CENTER | Age: 60
End: 2020-02-13
Payer: COMMERCIAL

## 2020-02-13 VITALS
SYSTOLIC BLOOD PRESSURE: 132 MMHG | HEART RATE: 71 BPM | HEIGHT: 72 IN | WEIGHT: 210 LBS | BODY MASS INDEX: 28.44 KG/M2 | DIASTOLIC BLOOD PRESSURE: 84 MMHG

## 2020-02-13 DIAGNOSIS — N40.1 BPH ASSOCIATED WITH NOCTURIA: ICD-10-CM

## 2020-02-13 DIAGNOSIS — R35.1 BPH ASSOCIATED WITH NOCTURIA: ICD-10-CM

## 2020-02-13 DIAGNOSIS — Z71.89 OTHER SPECIFIED COUNSELING: ICD-10-CM

## 2020-02-13 DIAGNOSIS — N43.3 HYDROCELE OF TESTIS: Primary | ICD-10-CM

## 2020-02-13 LAB
SL AMB  POCT GLUCOSE, UA: NEGATIVE
SL AMB LEUKOCYTE ESTERASE,UA: NEGATIVE
SL AMB POCT BILIRUBIN,UA: NEGATIVE
SL AMB POCT BLOOD,UA: NEGATIVE
SL AMB POCT CLARITY,UA: CLEAR
SL AMB POCT COLOR,UA: YELLOW
SL AMB POCT KETONES,UA: NEGATIVE
SL AMB POCT NITRITE,UA: NEGATIVE
SL AMB POCT PH,UA: 6.5
SL AMB POCT SPECIFIC GRAVITY,UA: 1.02
SL AMB POCT URINE PROTEIN: NEGATIVE
SL AMB POCT UROBILINOGEN: 0.2

## 2020-02-13 PROCEDURE — 99215 OFFICE O/P EST HI 40 MIN: CPT | Performed by: UROLOGY

## 2020-02-13 PROCEDURE — 81003 URINALYSIS AUTO W/O SCOPE: CPT | Performed by: UROLOGY

## 2020-02-13 NOTE — PROGRESS NOTES
Assessment/Plan:    Hydrocele of testis  Physical exam is not very impressive  Current level of symptomatology comparable to Dr Alia Garcia note from 11 years ago  Ultrasound ordered  Ultimately, I do not think repair of the small hydrocele is going to help the patient's symptoms at all  I just want to be sure that there is not some source of chronic inflammation or granuloma within the testis itself  The patient is insisting on a hydrocelectomy  I no longer perform surgery  Personally, I would not operate on him  I do not think it is going to help  When the ultrasound report is available, I will confer with my colleagues  BPH associated with nocturia  Symptoms worse  Discussed this briefly  This was not the focus of today's visit, however  Diagnoses and all orders for this visit:    Hydrocele of testis  -     US scrotum and testicles; Future    BPH associated with nocturia  -     POCT urine dip auto non-scope    Other specified counseling          Subjective:      Patient ID: Jazmine Taylor is a 62 y o  male  HPI    Right hydrocele: The patient returns today to discuss his right hydrocele  We have records on this patient going back to 2011 when he saw Dr Solo Mancia  The patient's complaints today are virtually the same as they were in 2011 and have persisted unchanged since then, according to the medical record  The patient is of the opinion that his chronic testicular pain is due to this hydrocele  In addition, he attributes some of his voiding symptoms and ejaculatory problems to the hydrocele  I reassured him that I was unable to link his urinary and sexual symptoms to this hydrocele  On ultrasound many years ago, it was quite small  On physical exam today it remains very small, almost nonpalpable  See details in the physical exam   Scope      BPH:  He notes urinary frequency, nocturia x 5 and interrupted urinary stream   He denies other significant urinary symptoms    He denies gross hematuria, urinary tract infections or incontinence  He is taking neither medications nor supplements for his symptoms  PSA:  2 12 on June 12, 2019  PVR:  43 cc  AUA score up from 29 to 20 during his last visit  Quality of life is stable at [facility]  AUA SYMPTOM SCORE      Most Recent Value   AUA SYMPTOM SCORE   How often have you had a sensation of not emptying your bladder completely after you finished urinating? 3   How often have you had to urinate again less than two hours after you finished urinating? 5   How often have you found you stopped and started again several times when you urinate? 4   How often have you found it difficult to postpone urination? 3   How often have you had a weak urinary stream?  5   How often have you had to push or strain to begin urination? 4   How many times did you most typically get up to urinate from the time you went to bed at night until the time you got up in the morning? 5   Quality of Life: If you were to spend the rest of your life with your urinary condition just the way it is now, how would you feel about that?  4   AUA SYMPTOM SCORE  29          The following portions of the patient's history were reviewed and updated as appropriate: allergies, current medications, past family history, past medical history, past social history, past surgical history and problem list     Review of Systems    Constitutional: Negative for activity change and fatigue  Respiratory: Negative for shortness of breath and wheezing  Cardiovascular: Negative for chest pain  Gastrointestinal: Negative for abdominal pain  Genitourinary: Negative for difficulty urinating, dysuria, frequency, hematuria and urgency  Musculoskeletal: Negative for back pain and gait problem  Skin: Negative  Allergic/Immunologic: Negative  Neurological: Negative           History of extensive back surgery history with motor and sensory neuropathy in his legs and perineum  Psychiatric/Behavioral: Negative  Objective:      /84 (BP Location: Left arm, Patient Position: Sitting, Cuff Size: Standard)   Pulse 71   Ht 6' (1 829 m)   Wt 95 3 kg (210 lb)   BMI 28 48 kg/m²          Physical Exam   Constitutional: He is oriented to person, place, and time  He appears well-developed and well-nourished  HENT:   Head: Normocephalic and atraumatic  Eyes: EOM are normal    Neck: Normal range of motion  Neck supple  Pulmonary/Chest: Effort normal    Genitourinary: Rectum normal    Genitourinary Comments: Not examined  On examination, the scrotum is normal   The left testis and epididymis is palpably normal   Spermatic cord structures are normal   On the right, there is a small amount of fluid around the right testis which the patient considers to be his hydrocele  The testis itself is not tender  The epididymis is not tender  The amount of fluid is rather scan  The total volume of the right scrotal compartment including the testis and hydrocele is approximately 1 5 times larger than the left scrotal compartment with testis bone without hydrocele  This hydrocele really should not be symptomatic at all  If the patient did not already carry the diagnosis of a right hydrocele  demonstrated years ago on ultrasound and complain about it, I would  have considered the right testis, epididymis and right-sided scrotal contents as normal    Musculoskeletal: Normal range of motion  Neurological: He is alert and oriented to person, place, and time  Skin: Skin is warm and dry  Psychiatric: He has a normal mood and affect  His behavior is normal  Judgment and thought content normal          I have spent 45 minutes with Patient and wife today in which greater than 50% of this time was spent in counseling/coordination of care regarding Diagnostic results and Patient and family education

## 2020-02-13 NOTE — ASSESSMENT & PLAN NOTE
Physical exam is not very impressive  Current level of symptomatology comparable to Dr Barak Stephens note from 11 years ago  Ultrasound ordered  Ultimately, I do not think repair of the small hydrocele is going to help the patient's symptoms at all  I just want to be sure that there is not some source of chronic inflammation or granuloma within the testis itself  The patient is insisting on a hydrocelectomy  I no longer perform surgery  Personally, I would not operate on him  I do not think it is going to help  When the ultrasound report is available, I will confer with my colleagues

## 2020-02-25 ENCOUNTER — HOSPITAL ENCOUNTER (OUTPATIENT)
Dept: ULTRASOUND IMAGING | Facility: MEDICAL CENTER | Age: 60
Discharge: HOME | End: 2020-02-25
Attending: UROLOGY
Payer: COMMERCIAL

## 2020-02-25 DIAGNOSIS — N43.3 HYDROCELE OF TESTIS: ICD-10-CM

## 2020-02-25 PROCEDURE — 76870 US EXAM SCROTUM: CPT

## 2020-03-05 ENCOUNTER — OFFICE VISIT (OUTPATIENT)
Dept: UROLOGY | Facility: MEDICAL CENTER | Age: 60
End: 2020-03-05
Payer: COMMERCIAL

## 2020-03-05 VITALS
SYSTOLIC BLOOD PRESSURE: 128 MMHG | HEIGHT: 72 IN | BODY MASS INDEX: 28.71 KG/M2 | DIASTOLIC BLOOD PRESSURE: 84 MMHG | WEIGHT: 212 LBS | HEART RATE: 88 BPM

## 2020-03-05 DIAGNOSIS — N43.3 HYDROCELE OF TESTIS: Primary | ICD-10-CM

## 2020-03-05 PROCEDURE — 99214 OFFICE O/P EST MOD 30 MIN: CPT | Performed by: UROLOGY

## 2020-03-05 NOTE — PROGRESS NOTES
Assessment/Plan:    Hydrocele of testis  Review of my previous notes indicates that the patient's symptoms and physical exam have been stable for more than a decade  I do not think the patient's pain is coming from the small fluid collection and I do not think a hydrocelectomy will give him any relief at all  If I were still doing surgery, I would not operate on this patient  If he wants to pursue surgery, I can have him obtain a 2nd opinion from 1 of my colleagues  Alternatively, he can seek urologic opinion outside the Network  Diagnoses and all orders for this visit:    Hydrocele of testis          Subjective:      Patient ID: Ariana Roth is a 62 y o  male  HPI  Right hydrocele: The patient returns to discuss is to scrotal ultrasound  Although the ultrasound report mentions a large hydrocele, actual examination of the images themselves show fluid collection not much bigger than the testis itself  This is certainly not large enough to account for any of his symptoms  Once again, I recommended no treatment because I think it will be ineffective  The patient is accompanied by his wife  The following portions of the patient's history were reviewed and updated as appropriate: allergies, current medications, past family history, past medical history, past social history, past surgical history and problem list     Review of Systems    Constitutional: Negative for activity change and fatigue  Respiratory: Negative for shortness of breath and wheezing     Cardiovascular: Negative for chest pain  Gastrointestinal: Negative for abdominal pain  Genitourinary: Negative for difficulty urinating, dysuria, frequency, hematuria and urgency  Musculoskeletal: Negative for back pain and gait problem     Skin: Negative     Allergic/Immunologic: Negative     Neurological: Negative          History of extensive back surgery history with motor and sensory neuropathy in his legs and perineum    Psychiatric/Behavioral: Negative     Objective:      /84 (BP Location: Left arm, Patient Position: Sitting, Cuff Size: Standard)   Pulse 88   Ht 6' (1 829 m)   Wt 96 2 kg (212 lb)   BMI 28 75 kg/m²          Physical Exam   Constitutional: He is oriented to person, place, and time  He appears well-developed and well-nourished  HENT:   Head: Normocephalic and atraumatic  Eyes: EOM are normal    Neck: Normal range of motion  Pulmonary/Chest: Effort normal    Musculoskeletal: Normal range of motion  Neurological: He is alert and oriented to person, place, and time  Skin: Skin is warm and dry  Psychiatric: He has a normal mood and affect  His behavior is normal  Judgment and thought content normal          I have spent more than 30 minutes with Patient and wife today in which greater than 50% of this time was spent in counseling/coordination of care regarding Diagnostic results and Risks and benefits of tx options

## 2020-03-18 NOTE — ASSESSMENT & PLAN NOTE
Review of my previous notes indicates that the patient's symptoms and physical exam have been stable for more than a decade  I do not think the patient's pain is coming from the small fluid collection and I do not think a hydrocelectomy will give him any relief at all  If I were still doing surgery, I would not operate on this patient  If he wants to pursue surgery, I can have him obtain a 2nd opinion from 1 of my colleagues  Alternatively, he can seek urologic opinion outside the Network

## 2020-03-19 DIAGNOSIS — E29.1 TESTICULAR HYPOFUNCTION: ICD-10-CM

## 2020-03-23 RX ORDER — SILDENAFIL CITRATE 20 MG/1
TABLET ORAL
Qty: 90 TABLET | Refills: 0 | Status: SHIPPED | OUTPATIENT
Start: 2020-03-23 | End: 2020-05-05 | Stop reason: SDUPTHER

## 2020-03-25 ENCOUNTER — TELEPHONE (OUTPATIENT)
Dept: CARDIOLOGY CLINIC | Facility: CLINIC | Age: 60
End: 2020-03-25

## 2020-03-25 NOTE — TELEPHONE ENCOUNTER
prescreen for travel and made patient aware that no children are allowed he will be bringing one other person with him and they were prescreened as well

## 2020-03-26 ENCOUNTER — OFFICE VISIT (OUTPATIENT)
Dept: CARDIOLOGY CLINIC | Facility: CLINIC | Age: 60
End: 2020-03-26
Payer: COMMERCIAL

## 2020-03-26 VITALS
DIASTOLIC BLOOD PRESSURE: 70 MMHG | WEIGHT: 218.4 LBS | SYSTOLIC BLOOD PRESSURE: 115 MMHG | BODY MASS INDEX: 29.62 KG/M2 | HEART RATE: 80 BPM

## 2020-03-26 DIAGNOSIS — E03.9 ACQUIRED HYPOTHYROIDISM: ICD-10-CM

## 2020-03-26 DIAGNOSIS — G83.4 CAUDA EQUINA SYNDROME (HCC): ICD-10-CM

## 2020-03-26 DIAGNOSIS — E29.1 HYPOGONADISM IN MALE: ICD-10-CM

## 2020-03-26 DIAGNOSIS — R07.89 CHEST TIGHTNESS: Primary | ICD-10-CM

## 2020-03-26 PROCEDURE — 93000 ELECTROCARDIOGRAM COMPLETE: CPT | Performed by: INTERNAL MEDICINE

## 2020-03-26 PROCEDURE — 99243 OFF/OP CNSLTJ NEW/EST LOW 30: CPT | Performed by: INTERNAL MEDICINE

## 2020-03-26 RX ORDER — LEVOTHYROXINE SODIUM 0.03 MG/1
25 TABLET ORAL DAILY
Qty: 30 TABLET | Refills: 5 | Status: SHIPPED | OUTPATIENT
Start: 2020-03-26 | End: 2020-07-30

## 2020-03-26 NOTE — PROGRESS NOTES
Cardiology Consultation     Ever Mg  [id number]  1960  1997 [address] [address]      HPI:  Patient is a 25-year-old male who presents with symptoms of chest tightness which has occurred on 3 occasions  One episode happened while he was driving a car  It lasted for 20 minutes without associated symptoms  He was severely fatigued and went home and laid down  The other 2 episodes occurred when he was going to bed  They occurred just after he laid down  There were no associated symptoms  The episodes lasted approximately 15-20 minutes  He has not had any episodes with physical exertion but his activity level is very limited  He has cauda equina syndrome since back surgery with numbness and weakness in his legs  He ambulates with difficulty using a walker  He complains of feeling cold particularly his hands and feet  He has no energy and severe fatigue  His TSH level at 4 45 suggest mild hypothyroidism  His lipid values are unknown  He has them checked at the [facility]  1  Chest tightness  POCT ECG    NM myocardial perfusion spect (rx stress and/or rest)    Echo complete with contrast if indicated   2  Acquired hypothyroidism  levothyroxine 25 mcg tablet    TSH, 3rd generation   3  Hypogonadism in male     4   Cauda equina syndrome [facility])       Patient Active Problem List   Diagnosis    Erectile dysfunction    Hypogonadism in male    BPH associated with nocturia    Acute cystitis without hematuria    Cauda equina syndrome ([address] )    Spinal stenosis of thoracolumbar region    Chest discomfort    Hydrocele of testis     Past Medical History:   Diagnosis Date    BPH (benign prostatic hyperplasia)     Chronic back pain     Erectile dysfunction     Nocturia     OAB (overactive bladder)     Testicular hypogonadism     Urinary frequency     Urinary urgency      Social History Socioeconomic History    Marital status: /Civil Union     Spouse name: Not on file    Number of children: Not on file    Years of education: Not on file    Highest education level: Not on file   Occupational History    Not on file   Social Needs    Financial resource strain: Not on file    Food insecurity:     Worry: Not on file     Inability: Not on file    Transportation needs:     Medical: Not on file     Non-medical: Not on file   Tobacco Use    Smoking status: Never Smoker    Smokeless tobacco: Never Used   Substance and Sexual Activity    Alcohol use: Yes     Comment: Drinks socially   Drug use: No    Sexual activity: Not on file   Lifestyle    Physical activity:     Days per week: Not on file     Minutes per session: Not on file    Stress: Not on file   Relationships    Social connections:     Talks on phone: Not on file     Gets together: Not on file     Attends Anab[employer]ist service: Not on file     Active member of club or organization: Not on file     Attends meetings of clubs or organizations: Not on file     Relationship status: Not on file    Intimate partner violence:     Fear of current or ex partner: Not on file     Emotionally abused: Not on file     Physically abused: Not on file     Forced sexual activity: Not on file   Other Topics Concern    Not on file   Social History Narrative    Not on file      History reviewed  No pertinent family history    Past Surgical History:   Procedure Laterality Date    BACK SURGERY  05/18/2016    SPINAL CORD STIMULATOR IMPLANT  10/10/2019       Current Outpatient Medications:     fluocinolone (SYNALAR) 0 025 % cream, , Disp: , Rfl:     ibuprofen (MOTRIN) 400 mg tablet, Take 400 mg by mouth as needed, Disp: , Rfl:     ketoconazole (NIZORAL) 2 % shampoo, , Disp: , Rfl:     sildenafil (REVATIO) 20 mg tablet, TAKE 3 TO 5 TABLETS BY MOUTH ONE (1) HOUR PRIOR TO INTERCOURSE , Disp: 90 tablet, Rfl: 0    testosterone cypionate (DEPO-TESTOSTERONE) 200 mg/mL SOLN, Inject 2 mL (400 mg total) into a muscle every 14 (fourteen) days, Disp: 10 mL, Rfl: 1    levothyroxine 25 mcg tablet, Take 1 tablet (25 mcg total) by mouth daily, Disp: 30 tablet, Rfl: 5  Allergies   Allergen Reactions    Morphine Other (See Comments)     Addiction hx  Pt wants no narcotics    Morphine And Related Other (See Comments)     Addiction hx  Pt wants no narcotics     Vitals:    03/26/20 0841   BP: 115/70   BP Location: Right arm   Patient Position: Sitting   Cuff Size: Large   Pulse: 80   Weight: 99 1 kg (218 lb 6 4 oz)           Review of Systems:  Review of Systems   Constitutional: Positive for fatigue  HENT: Negative  Eyes: Negative  Respiratory: Negative for chest tightness and shortness of breath  Cardiovascular: Negative for chest pain, palpitations and leg swelling  Gastrointestinal: Negative  Endocrine: Negative  Musculoskeletal: Positive for gait problem  Skin: Negative  Allergic/Immunologic: Negative  Neurological:        Weakness and numbness in both legs   Hematological: Negative  Psychiatric/Behavioral: Negative  Physical Exam:  Physical Exam   Constitutional: He is oriented to person, place, and time  He appears well-developed and well-nourished  HENT:   Head: Normocephalic and atraumatic  Neck: Normal range of motion  Neck supple  No JVD present  No tracheal deviation present  Cardiovascular: Normal rate, regular rhythm, normal heart sounds and intact distal pulses  Pulmonary/Chest: Effort normal and breath sounds normal  No respiratory distress  He has no wheezes  He has no rales  Abdominal: Soft  Bowel sounds are normal    Musculoskeletal: He exhibits no edema  Right lower leg: He exhibits no edema  Left lower leg: He exhibits no edema  Weakness in both legs  Patient ambulates with difficulty using a walker  Neurological: He is alert and oriented to person, place, and time     Skin: Skin is warm and dry  Psychiatric: He has a normal mood and affect  His behavior is normal        Discussion/Summary:  1  Begin levothyroxine 25 mcg daily  2  Obtain a TSH level in 3 months  3  Echocardiogram  4  Pharmacologic nuclear stress test   Patient ambulates with great difficulty using a walker due to his cauda equina syndrome  A treadmill test would be impossible  5  Return in 3 months, patient to bring along his [place] labs with lipid profile results  6   Call patient with the results of the stress test and echocardiogram

## 2020-03-31 ENCOUNTER — HOSPITAL ENCOUNTER (OUTPATIENT)
Dept: NON INVASIVE DIAGNOSTICS | Facility: CLINIC | Age: 60
Discharge: HOME | End: 2020-03-31
Payer: COMMERCIAL

## 2020-03-31 DIAGNOSIS — R07.89 CHEST TIGHTNESS: ICD-10-CM

## 2020-03-31 PROCEDURE — 78452 HT MUSCLE IMAGE SPECT MULT: CPT | Performed by: INTERNAL MEDICINE

## 2020-03-31 PROCEDURE — A9502 TC99M TETROFOSMIN: HCPCS

## 2020-03-31 PROCEDURE — 93016 CV STRESS TEST SUPVJ ONLY: CPT | Performed by: INTERNAL MEDICINE

## 2020-03-31 PROCEDURE — 93018 CV STRESS TEST I&R ONLY: CPT | Performed by: INTERNAL MEDICINE

## 2020-03-31 PROCEDURE — 93017 CV STRESS TEST TRACING ONLY: CPT

## 2020-03-31 PROCEDURE — 78452 HT MUSCLE IMAGE SPECT MULT: CPT

## 2020-03-31 RX ADMIN — REGADENOSON 0.4 MG: 0.08 INJECTION, SOLUTION INTRAVENOUS at 10:20

## 2020-04-06 ENCOUNTER — TELEPHONE (OUTPATIENT)
Dept: CARDIOLOGY CLINIC | Facility: CLINIC | Age: 60
End: 2020-04-06

## 2020-04-09 ENCOUNTER — TELEMEDICINE (OUTPATIENT)
Dept: CARDIOLOGY CLINIC | Facility: CLINIC | Age: 60
End: 2020-04-09
Payer: COMMERCIAL

## 2020-04-09 ENCOUNTER — TELEPHONE (OUTPATIENT)
Dept: CARDIOLOGY CLINIC | Facility: CLINIC | Age: 60
End: 2020-04-09

## 2020-04-09 VITALS — HEIGHT: 72 IN | WEIGHT: 221 LBS | BODY MASS INDEX: 29.93 KG/M2

## 2020-04-09 DIAGNOSIS — I73.9 CLAUDICATION IN PERIPHERAL VASCULAR DISEASE (HCC): ICD-10-CM

## 2020-04-09 DIAGNOSIS — R07.89 CHEST DISCOMFORT: Primary | ICD-10-CM

## 2020-04-09 PROCEDURE — 99214 OFFICE O/P EST MOD 30 MIN: CPT

## 2020-04-17 ENCOUNTER — HOSPITAL ENCOUNTER (OUTPATIENT)
Dept: NON INVASIVE DIAGNOSTICS | Facility: HOSPITAL | Age: 60
Discharge: HOME | End: 2020-04-17
Payer: COMMERCIAL

## 2020-04-17 DIAGNOSIS — R07.89 CHEST TIGHTNESS: ICD-10-CM

## 2020-04-17 PROCEDURE — 93306 TTE W/DOPPLER COMPLETE: CPT

## 2020-04-17 PROCEDURE — 93306 TTE W/DOPPLER COMPLETE: CPT | Performed by: INTERNAL MEDICINE

## 2020-04-27 ENCOUNTER — TELEMEDICINE (OUTPATIENT)
Dept: CARDIOLOGY CLINIC | Facility: CLINIC | Age: 60
End: 2020-04-27
Payer: COMMERCIAL

## 2020-04-27 VITALS — BODY MASS INDEX: 30.07 KG/M2 | HEIGHT: 72 IN | WEIGHT: 222 LBS

## 2020-04-27 DIAGNOSIS — R07.89 CHEST DISCOMFORT: Primary | ICD-10-CM

## 2020-04-27 PROCEDURE — 99213 OFFICE O/P EST LOW 20 MIN: CPT

## 2020-04-27 RX ORDER — OXYMETAZOLINE HYDROCHLORIDE 0.05 G/100ML
2 SPRAY NASAL 2 TIMES DAILY
COMMUNITY

## 2020-04-27 RX ORDER — ASPIRIN 81 MG/1
81 TABLET ORAL DAILY
COMMUNITY
End: 2021-06-10 | Stop reason: HOSPADM

## 2020-05-02 DIAGNOSIS — E29.1 HYPOGONADISM IN MALE: ICD-10-CM

## 2020-05-04 RX ORDER — TESTOSTERONE CYPIONATE 200 MG/ML
INJECTION INTRAMUSCULAR
Qty: 10 ML | Refills: 3 | Status: SHIPPED | OUTPATIENT
Start: 2020-05-04 | End: 2020-12-07

## 2020-05-05 DIAGNOSIS — E29.1 TESTICULAR HYPOFUNCTION: ICD-10-CM

## 2020-05-05 RX ORDER — SILDENAFIL CITRATE 20 MG/1
TABLET ORAL
Qty: 90 TABLET | Refills: 5 | Status: SHIPPED | OUTPATIENT
Start: 2020-05-05

## 2020-05-06 ENCOUNTER — HOSPITAL ENCOUNTER (OUTPATIENT)
Dept: NON INVASIVE DIAGNOSTICS | Facility: HOSPITAL | Age: 60
Discharge: HOME | End: 2020-05-06
Payer: COMMERCIAL

## 2020-05-06 DIAGNOSIS — I73.9 CLAUDICATION IN PERIPHERAL VASCULAR DISEASE (HCC): ICD-10-CM

## 2020-05-06 PROCEDURE — 93922 UPR/L XTREMITY ART 2 LEVELS: CPT | Performed by: SURGERY

## 2020-05-06 PROCEDURE — 93925 LOWER EXTREMITY STUDY: CPT | Performed by: SURGERY

## 2020-05-06 PROCEDURE — 93923 UPR/LXTR ART STDY 3+ LVLS: CPT

## 2020-05-06 PROCEDURE — 93925 LOWER EXTREMITY STUDY: CPT

## 2020-06-16 ENCOUNTER — TELEPHONE (OUTPATIENT)
Dept: NEUROLOGY | Facility: CLINIC | Age: 60
End: 2020-06-16

## 2020-06-17 ENCOUNTER — TELEPHONE (OUTPATIENT)
Dept: NEUROLOGY | Facility: CLINIC | Age: 60
End: 2020-06-17

## 2020-06-17 ENCOUNTER — OFFICE VISIT (OUTPATIENT)
Dept: NEUROLOGY | Facility: CLINIC | Age: 60
End: 2020-06-17
Payer: COMMERCIAL

## 2020-06-17 VITALS
WEIGHT: 230.4 LBS | SYSTOLIC BLOOD PRESSURE: 126 MMHG | DIASTOLIC BLOOD PRESSURE: 74 MMHG | BODY MASS INDEX: 31.25 KG/M2 | TEMPERATURE: 98.1 F

## 2020-06-17 DIAGNOSIS — G83.4 CAUDA EQUINA SYNDROME (HCC): ICD-10-CM

## 2020-06-17 DIAGNOSIS — M48.05 SPINAL STENOSIS OF THORACOLUMBAR REGION: Primary | ICD-10-CM

## 2020-06-17 PROCEDURE — 99214 OFFICE O/P EST MOD 30 MIN: CPT | Performed by: PSYCHIATRY & NEUROLOGY

## 2020-06-17 RX ORDER — METHYLPREDNISOLONE 4 MG/1
TABLET ORAL
Qty: 1 TABLET | Refills: 2 | Status: SHIPPED | OUTPATIENT
Start: 2020-06-17 | End: 2020-10-21

## 2020-06-18 RX ORDER — SODIUM CHLORIDE 9 MG/ML
20 INJECTION, SOLUTION INTRAVENOUS ONCE
Status: CANCELLED | OUTPATIENT
Start: 2020-06-30

## 2020-06-30 ENCOUNTER — HOSPITAL ENCOUNTER (OUTPATIENT)
Dept: INFUSION CENTER | Facility: CLINIC | Age: 60
Discharge: HOME | End: 2020-06-30
Payer: COMMERCIAL

## 2020-06-30 VITALS
DIASTOLIC BLOOD PRESSURE: 83 MMHG | RESPIRATION RATE: 18 BRPM | HEART RATE: 64 BPM | SYSTOLIC BLOOD PRESSURE: 126 MMHG | TEMPERATURE: 97.4 F

## 2020-06-30 DIAGNOSIS — G83.4 CAUDA EQUINA SYNDROME (HCC): Primary | ICD-10-CM

## 2020-06-30 PROCEDURE — 96365 THER/PROPH/DIAG IV INF INIT: CPT

## 2020-06-30 RX ORDER — SODIUM CHLORIDE 9 MG/ML
20 INJECTION, SOLUTION INTRAVENOUS ONCE
Status: COMPLETED | OUTPATIENT
Start: 2020-06-30 | End: 2020-06-30

## 2020-06-30 RX ORDER — SODIUM CHLORIDE 9 MG/ML
20 INJECTION, SOLUTION INTRAVENOUS ONCE
Status: CANCELLED | OUTPATIENT
Start: 2020-07-01

## 2020-06-30 RX ADMIN — SODIUM CHLORIDE 20 ML/HR: 0.9 INJECTION, SOLUTION INTRAVENOUS at 12:50

## 2020-06-30 RX ADMIN — SODIUM CHLORIDE 1000 MG: 0.9 INJECTION, SOLUTION INTRAVENOUS at 12:50

## 2020-06-30 NOTE — PROGRESS NOTES
Patient tolerated infusion well  Pt is aware to return tomorrow for day 2 of treatment   Refused AVS

## 2020-06-30 NOTE — PLAN OF CARE
Problem: Potential for Falls  Goal: Patient will remain free of falls  Description  INTERVENTIONS:  - Assess patient frequently for physical needs  -  Identify cognitive and physical deficits and behaviors that affect risk of falls    -  [place] fall precautions as indicated by assessment   - Educate patient/family on patient safety including physical limitations  - Instruct patient to call for assistance with activity based on assessment  - Modify environment to reduce risk of injury  - Consider OT/PT consult to assist with strengthening/mobility  Outcome: Progressing

## 2020-07-01 ENCOUNTER — HOSPITAL ENCOUNTER (OUTPATIENT)
Dept: INFUSION CENTER | Facility: CLINIC | Age: 60
Discharge: HOME | End: 2020-07-01
Payer: COMMERCIAL

## 2020-07-01 VITALS
SYSTOLIC BLOOD PRESSURE: 124 MMHG | TEMPERATURE: 96.6 F | RESPIRATION RATE: 18 BRPM | HEART RATE: 93 BPM | DIASTOLIC BLOOD PRESSURE: 88 MMHG

## 2020-07-01 DIAGNOSIS — G83.4 CAUDA EQUINA SYNDROME (HCC): Primary | ICD-10-CM

## 2020-07-01 PROCEDURE — 96365 THER/PROPH/DIAG IV INF INIT: CPT

## 2020-07-01 RX ORDER — SODIUM CHLORIDE 9 MG/ML
20 INJECTION, SOLUTION INTRAVENOUS ONCE
Status: COMPLETED | OUTPATIENT
Start: 2020-07-01 | End: 2020-07-01

## 2020-07-01 RX ORDER — SODIUM CHLORIDE 9 MG/ML
20 INJECTION, SOLUTION INTRAVENOUS ONCE
Status: CANCELLED | OUTPATIENT
Start: 2020-07-02

## 2020-07-01 RX ADMIN — SODIUM CHLORIDE 1000 MG: 0.9 INJECTION, SOLUTION INTRAVENOUS at 12:50

## 2020-07-01 RX ADMIN — SODIUM CHLORIDE 20 ML/HR: 0.9 INJECTION, SOLUTION INTRAVENOUS at 12:50

## 2020-07-01 NOTE — PLAN OF CARE
Problem: Potential for Falls  Goal: Patient will remain free of falls  Description  INTERVENTIONS:  - Assess patient frequently for physical needs  -  Identify cognitive and physical deficits and behaviors that affect risk of falls    -  [place] fall precautions as indicated by assessment   - Educate patient/family on patient safety including physical limitations  - Instruct patient to call for assistance with activity based on assessment  - Modify environment to reduce risk of injury  - Consider OT/PT consult to assist with strengthening/mobility  Outcome: Progressing

## 2020-07-01 NOTE — PROGRESS NOTES
Patient tolerated infusion well  Offers no complaints  Pt is aware to return tomorrow for day 3 of treatment

## 2020-07-02 ENCOUNTER — HOSPITAL ENCOUNTER (OUTPATIENT)
Dept: INFUSION CENTER | Facility: CLINIC | Age: 60
Discharge: HOME | End: 2020-07-02
Payer: COMMERCIAL

## 2020-07-02 VITALS
HEART RATE: 62 BPM | TEMPERATURE: 96 F | SYSTOLIC BLOOD PRESSURE: 129 MMHG | DIASTOLIC BLOOD PRESSURE: 88 MMHG | RESPIRATION RATE: 18 BRPM

## 2020-07-02 DIAGNOSIS — G83.4 CAUDA EQUINA SYNDROME (HCC): Primary | ICD-10-CM

## 2020-07-02 PROCEDURE — 96365 THER/PROPH/DIAG IV INF INIT: CPT

## 2020-07-02 RX ORDER — SODIUM CHLORIDE 9 MG/ML
20 INJECTION, SOLUTION INTRAVENOUS ONCE
Status: COMPLETED | OUTPATIENT
Start: 2020-07-02 | End: 2020-07-02

## 2020-07-02 RX ORDER — SODIUM CHLORIDE 9 MG/ML
20 INJECTION, SOLUTION INTRAVENOUS ONCE
Status: CANCELLED | OUTPATIENT
Start: 2020-07-02

## 2020-07-02 RX ADMIN — SODIUM CHLORIDE 1000 MG: 0.9 INJECTION, SOLUTION INTRAVENOUS at 11:55

## 2020-07-02 RX ADMIN — SODIUM CHLORIDE 20 ML/HR: 0.9 INJECTION, SOLUTION INTRAVENOUS at 11:55

## 2020-07-02 NOTE — PROGRESS NOTES
Patient tolerated infusion well  Offers no complaints  Pt discharged ambulatory accompanied by his caregiver

## 2020-07-02 NOTE — PLAN OF CARE
Problem: Potential for Falls  Goal: Patient will remain free of falls  Description  INTERVENTIONS:  - Assess patient frequently for physical needs  -  Identify cognitive and physical deficits and behaviors that affect risk of falls    -  [place] fall precautions as indicated by assessment   - Educate patient/family on patient safety including physical limitations  - Instruct patient to call for assistance with activity based on assessment  - Modify environment to reduce risk of injury  - Consider OT/PT consult to assist with strengthening/mobility  Outcome: Progressing

## 2020-07-30 ENCOUNTER — OFFICE VISIT (OUTPATIENT)
Dept: CARDIOLOGY CLINIC | Facility: CLINIC | Age: 60
End: 2020-07-30
Payer: COMMERCIAL

## 2020-07-30 VITALS
HEART RATE: 80 BPM | TEMPERATURE: 98.7 F | HEIGHT: 72 IN | WEIGHT: 229.2 LBS | BODY MASS INDEX: 31.04 KG/M2 | DIASTOLIC BLOOD PRESSURE: 70 MMHG | SYSTOLIC BLOOD PRESSURE: 110 MMHG

## 2020-07-30 DIAGNOSIS — R07.89 CHEST DISCOMFORT: Primary | ICD-10-CM

## 2020-07-30 DIAGNOSIS — M48.05 SPINAL STENOSIS OF THORACOLUMBAR REGION: ICD-10-CM

## 2020-07-30 DIAGNOSIS — G83.4 CAUDA EQUINA SYNDROME (HCC): ICD-10-CM

## 2020-07-30 PROCEDURE — 99214 OFFICE O/P EST MOD 30 MIN: CPT

## 2020-07-30 NOTE — PROGRESS NOTES
Cardiology   MD Ruthie Tang MD Kelsey Churches, DO, [facility]  MD Kevyn Craig DO, Lula Sanders DO, [facility]  -------------------------------------------------------------------  [facility]  [address], [place] [address]  [phone]  [phone]  07/30/20  Elina Resendiz  YOB: 1960   MRN: [id number]      Referring Physian: No referring provider defined for this encounter  HPI: Elina Resendiz is a 62 y o  male With cauda equina syndrome who was recently evaluated by my partner Dr Elaine Carrasco for symptoms of chest pain and tightness that were new and have started about 2 weeks ago  [facility] that time he stated 1 episode happened while he was driving a car and it lasted for 20 minutes   He also noted some severe fatigue associated with that   The other 2 episodes of chest pain occurred when he was going to bed and a lasted about 15-20 minutes  [place] has not had any episodes with physical exertion but is activity level is very limited secondary to his cauda equina syndrome   He has cauda equina syndrome and has had back surgery in the implantation of a spinal cord stimulator in the past   He ambulates with difficulty using a walker   He was ordered for a pharmacologic nuclear stress test as well as a echocardiogram   The stress test was positive for a reduced ejection fraction 48% with global hypokinesis and an area of decreased photon uptake in the inferior wall, cannot rule out ischemia      I subsequently sent him for an echocardiogram which showed an ejection fraction of 55% with no regional wall motion abnormalities  Left atrium was mildly dilated there was trace mitral regurgitation, trace aortic regurgitation, trace tricuspid regurgitation and the aortic root exhibited mild dilatation  He states he is doing well  Denies any further chest pain shortness of breath dyspnea on exertion      Review of Systems Constitutional: Negative for chills and fever  HENT: Negative for facial swelling and sore throat  Eyes: Negative for visual disturbance  Respiratory: Negative for cough, chest tightness, shortness of breath and wheezing  Cardiovascular: Negative for chest pain, palpitations and leg swelling  Gastrointestinal: Negative for abdominal pain, blood in stool, constipation, diarrhea, nausea and vomiting  Endocrine: Negative for cold intolerance and heat intolerance  Genitourinary: Negative for decreased urine volume, difficulty urinating, dysuria and hematuria  Musculoskeletal: Negative for arthralgias, back pain and myalgias  Skin: Negative for rash  Neurological: Negative for dizziness, syncope, weakness and numbness  Psychiatric/Behavioral: Negative for agitation, behavioral problems and confusion  The patient is not nervous/anxious  OBJECTIVE  Vitals:    07/30/20 0807   BP: 110/70   Pulse:    Temp:        Physical Exam   General appearance: alert and oriented, in no acute distress  Head: Normocephalic, without obvious abnormality, atraumatic  Eyes: conjunctivae/corneas clear  Anicteric  Neck: no adenopathy, no carotid bruit, no JVD, supple, symmetrical, trachea midline and thyroid not enlarged, no tenderness  Lungs: clear to auscultation bilaterally  Heart: regular rate and rhythm, S1, S2 normal, no murmur, no click, rub or gallop  Abdomen: soft, non-tender; bowel sounds normal; no masses,  no organomegaly  Extremities:  Bilateral lower extremity weakness  He walks with a walker  Skin: Skin color, texture, turgor normal  No rashes or lesions     EKG:  No results found for this visit on 07/30/20  IMPRESSION:  1  Fatigue  2  Abnormal nuclear stress test with moderate size perfusion defect in the inferior wall with reduced ejection fraction 48% and mild global left ventricular hypokinesis  3  Normal echocardiogram   EF 55%, no regional wall motion abnormalities    No significant valvular disease  4  Ambulatory dysfunction  5  Symptoms concerning for lower extremity claudication, Dopplers were ordered however not performed yet  6  Cauda equina syndrome  7  Erectile dysfunction    DISCUSSION/RECOMMENDATIONS:   From a cardiac standpoint he is stable   No further episodes chest pain   He is on aspirin 81 mg    Needs to get lipid panel in the future, can be done through primary care doctor   Lower extremity Dopplers were negative for significant occlusive disease   Echo with normal wall motion, EF 55%   Would continue current medications   May follow up with me in the next 6-12 months      Chuy Do DO, [facility]  --------------------------------------------------------------------------------  TREADMILL STRESS  No results found for this or any previous visit    ----------------------------------------------------------------------------------------------  NUCLEAR STRESS TEST: No results found for this or any previous visit  Results for orders placed during the hospital encounter of 20   NM myocardial perfusion spect (rx stress and/or rest)    Narrative [facility]  [facility], [address] [facility]  [phone]    Stress Gated SPECT Myocardial Perfusion Imaging after Regadenoson    Patient: Felipe Rogel  MR number: [id number]  Account number: [de-identified]  : 1960  Age: 62 years  Gender: Male  Status: Outpatient  Location: [address] [place] [address] [facility] [place]  Height: 72 in  Weight: 218 lb  BP: 112/ 80 mmHg    Allergies: MORPHINE, MORPHINE AND RELATED    Diagnosis: R07 9 - Chest pain, unspecified    Interpreting Physician:  Joey Armas MD  Referring Physician:  Karyle Lime, MD  Primary Physician:  Chuy Montalvo DO  RN:  Mayra Neely RN  Group:  [facility]  Report Prepared by[de-identified]  Mayra Neely RN  RN:  Rebecca Chapa RN    INDICATIONS: Detection of Coronary artery disease      HISTORY: The patient is a 62year old  male  Chest pain status: recent onset chest pain  Coronary artery disease risk factors: family history of premature coronary artery disease  Cardiovascular history: none significant  Medications: thyroid medications  PHYSICAL EXAM: Baseline physical exam screening: normal and no wheezes audible  REST ECG: Normal baseline ECG  PROCEDURE: The study was performed in the the [address] [facility]  A regadenoson infusion pharmacologic stress test was performed  Gated SPECT myocardial perfusion imaging was performed during stress  Systolic blood pressure  was 112 mmHg, at the start of the study  Diastolic blood pressure was 80 mmHg, at the start of the study  The heart rate was 82 bpm, at the start of the study  IV double checked  Regadenoson protocol:  HR bpm SBP mmHg DBP mmHg Symptoms  Baseline 82 112 80 none  Immediate 100 102 62 mild dyspnea, flushing  1 min 87 116 70 none  No medications or fluids given  STRESS SUMMARY: Duration of pharmacologic stress was 3 min and 0 sec  Maximal heart rate during stress was 100 bpm  The rate-pressure product for the peak heart rate and blood pressure was 12428  There was no chest pain during stress  The  stress test was terminated due to protocol completion  Pre oxygen saturation: 98 %  Peak oxygen saturation: 100 %  There were no stress arrhythmias or conduction abnormalities  ISOTOPE ADMINISTRATION:  Resting isotope administration Stress isotope administration  Agent Tetrofosmin Tetrofosmin  Dose 10 49 mCi 30 6 mCi  Date 03/31/2020 03/31/2020  Injection time 08:20 10:20  Imaging time 09:09 11:50  Injection-image interval 49 min 50 min    MYOCARDIAL PERFUSION IMAGING:  The image quality was poor  PERFUSION DEFECTS:  -  There was a moderate-sized, moderately severe myocardial perfusion defect of the inferior wall  GATED SPECT:  The calculated left ventricular ejection fraction was 48 %   Left ventricular ejection fraction was mildly decreased by visual estimate  There was mild global left ventricular hypokinesis  There was no left ventricular regional abnormality  RESTING BLOOD POOL IMAGING RESULTS:  Left ventricular ejection fraction was 48 % at rest     BLOOD POOL IMAGING CONCLUSIONS  Moderate global left ventricular dysfunction was noted, consistent with a cardiomyopathic process  SUMMARY:  -  Stress results: There was no chest pain during stress  -  Perfusion imaging: There was a moderate-sized, moderately severe myocardial perfusion defect of the inferior wall  -  Gated SPECT: The calculated left ventricular ejection fraction was 48 %  Left ventricular ejection fraction was mildly decreased by visual estimate  There was mild global left ventricular hypokinesis  There was no left ventricular  regional abnormality   -  Blood pool imaging: Moderate global left ventricular dysfunction was noted, consistent with a cardiomyopathic process  -  Summary: inferio defect of 11%compatable with ischemia or diaphragmatic attenuation  unable to place patient in prone position  lv fucntion depressed,but not segmentally  sum_ unable to rule out ischemia  requiers clinical correlation    IMPRESSIONS: Abnormal study      Prepared and signed by    Graham Lynch MD  Signed 2020 15:25:13         --------------------------------------------------------------------------------  CATH:  No results found for this or any previous visit   --------------------------------------------------------------------------------  ECHO:   Results for orders placed during the hospital encounter of 20   Echo complete with contrast if indicated    Narrative [address]    Transthoracic Echocardiogram  2D, M-mode, Doppler, and Color Doppler    Study date:  2020    Patient: Shavonne Alfaro  MR number: [id number]  Account number: [de-identified]  : 1960  Age: 62 years  Gender: Male  Status: Outpatient  Location: [facility]  Height: 72 in  Weight: 221 lb  BP: 115/ 70 mmHg    Indications: Chest pain  Diagnoses: R07 9 - Chest pain, unspecified    Sonographer:  Lydia Dowd RDCS  Referring Physician:  Itzel Olmstead MD  Group:  [facility]  Interpreting Physician:  Mouna Wagner MD    SUMMARY    LEFT VENTRICLE:  Size was at the upper limits of normal   Systolic function was normal  Ejection fraction was estimated to be 55 %  There were no regional wall motion abnormalities  LEFT ATRIUM:  The atrium was mildly dilated  MITRAL VALVE:  There was trace regurgitation  AORTIC VALVE:  There was trace regurgitation  TRICUSPID VALVE:  There was trace regurgitation  AORTA:  The root exhibited mild dilatation  HISTORY: PRIOR HISTORY: Chest pain  PROCEDURE: The study was performed in the [facility]  This was a routine study  The transthoracic approach was used  The study included complete 2D imaging, M-mode, complete spectral Doppler, and color Doppler  The heart rate was 64  bpm, at the start of the study  Images were obtained from the parasternal, apical, subcostal, and suprasternal notch acoustic windows  Image quality was adequate  LEFT VENTRICLE: Size was at the upper limits of normal  Systolic function was normal  Ejection fraction was estimated to be 55 %  There were no regional wall motion abnormalities  Wall thickness was normal  DOPPLER: The ratio of early  ventricular filling to atrial contraction velocities was within the normal range  The deceleration time of the early transmitral flow velocity was increased  RIGHT VENTRICLE: The size was normal  Systolic function was normal  Wall thickness was normal     LEFT ATRIUM: The atrium was mildly dilated  RIGHT ATRIUM: Size was at the upper limits of normal     MITRAL VALVE: Valve structure was normal  There was normal leaflet separation   DOPPLER: The transmitral velocity was within the normal range  There was no evidence for stenosis  There was trace regurgitation  AORTIC VALVE: The valve was trileaflet  Leaflets exhibited normal thickness and normal cuspal separation  DOPPLER: Transaortic velocity was within the normal range  There was no evidence for stenosis  There was trace regurgitation  TRICUSPID VALVE: The valve structure was normal  There was normal leaflet separation  DOPPLER: The transtricuspid velocity was within the normal range  There was no evidence for stenosis  There was trace regurgitation  The tricuspid jet  envelope definition was inadequate for estimation of RV systolic pressure  There are no indirect findings (abnormal RV volume or geometry, altered pulmonary flow velocity profile, or leftward septal displacement) which would suggest  moderate or severe pulmonary hypertension  PULMONIC VALVE: Leaflets exhibited normal thickness, no calcification, and normal cuspal separation  DOPPLER: The transpulmonic velocity was within the normal range  There was no regurgitation  PERICARDIUM: There was no pericardial effusion  The pericardium was normal in appearance  AORTA: The root exhibited mild dilatation  SYSTEMIC VEINS: IVC: The inferior vena cava was normal in size and course  Respirophasic changes were normal     SYSTEM MEASUREMENT TABLES    2D  Ao Diam: 3 8 cm  IVSd: 0 9 cm  LA Diam: 4 2 cm  LVIDd: 5 7 cm  LVIDs: 3 8 cm  LVPWd: 0 9 cm    PW  E': 0 08 m/s  E/E': 9 17  MV A Dae: 0 56 m/s  MV Dec [place]: 3 45 m/s2  MV DecT: 208 72 ms  MV E Dae: 0 72 m/s  MV E/A Ratio: 1 29    [facility] Accredited Echocardiography Laboratory    Prepared and electronically signed by    Makenna Flores MD  Signed 17-Apr-2020 12:15:56       No results found for this or any previous visit   --------------------------------------------------------------------------------  HOLTER  No results found for this or any previous visit    No results found for this or any previous visit   --------------------------------------------------------------------------------  CAROTIDS  No results found for this or any previous visit    --------------------------------------------------------------------------------  Diagnoses and all orders for this visit:    Chest discomfort    Spinal stenosis of thoracolumbar region    Cauda equina syndrome (HCC)       ======================================================    Past Medical History:   Diagnosis Date    BPH (benign prostatic hyperplasia)     Chronic back pain     Erectile dysfunction     Nocturia     OAB (overactive bladder)     Testicular hypogonadism     Urinary frequency     Urinary urgency      Past Surgical History:   Procedure Laterality Date    BACK SURGERY  05/18/2016    SPINAL CORD STIMULATOR IMPLANT  10/10/2019         Medications  Current Outpatient Medications   Medication Sig Dispense Refill    aspirin (ECOTRIN LOW STRENGTH) 81 mg EC tablet Take 81 mg by mouth daily      fluocinolone (SYNALAR) 0 025 % cream       ibuprofen (MOTRIN) 400 mg tablet Take 400 mg by mouth as needed      ketoconazole (NIZORAL) 2 % shampoo       methylPREDNISolone 4 MG tablet therapy pack Use as directed on package 1 tablet 2    sildenafil (REVATIO) 20 mg tablet Take 3 to 5 tablets by mouth one (1) hour prior to intercourse on an empty stomach  Limit to 3 encounters per week  90 tablet 5    testosterone cypionate (DEPO-TESTOSTERONE) 200 mg/mL SOLN inject 2 milliliters intramuscularly every 14 days 10 mL 3    oxymetazoline (AFRIN) 0 05 % nasal spray 2 sprays by Each Nare route 2 (two) times a day       No current facility-administered medications for this visit  Allergies   Allergen Reactions    Morphine Other (See Comments)     Addiction hx  Pt wants no narcotics    Morphine And Related Other (See Comments)     Addiction hx   Pt wants no narcotics       Social History     Socioeconomic History    Marital status: /Civil [employer]     Spouse name: Not on file    Number of children: Not on file    Years of education: Not on file    Highest education level: Not on file   Occupational History    Not on file   Social Needs    Financial resource strain: Not on file    Food insecurity:     Worry: Not on file     Inability: Not on file    Transportation needs:     Medical: Not on file     Non-medical: Not on file   Tobacco Use    Smoking status: Never Smoker    Smokeless tobacco: Never Used   Substance and Sexual Activity    Alcohol use: Yes     Comment: Drinks socially   Drug use: No    Sexual activity: Not on file   Lifestyle    Physical activity:     Days per week: Not on file     Minutes per session: Not on file    Stress: Not on file   Relationships    Social connections:     Talks on phone: Not on file     Gets together: Not on file     Attends [employer] service: Not on file     Active member of club or organization: Not on file     Attends meetings of clubs or organizations: Not on file     Relationship status: Not on file    Intimate partner violence:     Fear of current or ex partner: Not on file     Emotionally abused: Not on file     Physically abused: Not on file     Forced sexual activity: Not on file   Other Topics Concern    Not on file   Social History Narrative    Not on file        No family history on file      No results found for: WBC, HGB, HCT, MCV, PLT   Lab Results   Component Value Date    SODIUM 135 (L) 01/09/2019    K 4 6 01/09/2019     01/09/2019    CO2 32 01/09/2019    BUN 9 01/09/2019    CREATININE 0 79 01/09/2019    CALCIUM 8 9 01/09/2019      No results found for: HGBA1C   No results found for: CHOL  No results found for: HDL  No results found for: LDLCALC  No results found for: TRIG  No results found for: CHOLHDL   No results found for: INR, PROTIME       Patient Active Problem List    Diagnosis Date Noted    [facility] 12/12/2018     Priority: Low    Hydrocele of testis 02/13/2020    Chest discomfort 02/12/2020    Spinal stenosis of thoracolumbar region 12/12/2018    Acute cystitis without hematuria 09/20/2018    Erectile dysfunction 05/19/2018    Hypogonadism in male 05/19/2018    BPH associated with nocturia 05/19/2018       Portions of the record may have been created with voice recognition software  Occasional wrong word or "sound a like" substitutions may have occurred due to the inherent limitations of voice recognition software  Read the chart carefully and recognize, using context, where substitutions have occurred          Justin Mckinney DO, [facility]  7/30/2020 8:36 AM

## 2020-10-12 ENCOUNTER — TELEPHONE (OUTPATIENT)
Dept: NEUROLOGY | Facility: CLINIC | Age: 60
End: 2020-10-12

## 2020-10-13 ENCOUNTER — TELEPHONE (OUTPATIENT)
Dept: NEUROLOGY | Facility: CLINIC | Age: 60
End: 2020-10-13

## 2020-10-14 ENCOUNTER — TELEPHONE (OUTPATIENT)
Dept: NEUROLOGY | Facility: CLINIC | Age: 60
End: 2020-10-14

## 2020-10-14 ENCOUNTER — OFFICE VISIT (OUTPATIENT)
Dept: NEUROLOGY | Facility: CLINIC | Age: 60
End: 2020-10-14
Payer: COMMERCIAL

## 2020-10-14 VITALS
HEART RATE: 81 BPM | SYSTOLIC BLOOD PRESSURE: 112 MMHG | HEIGHT: 72 IN | BODY MASS INDEX: 31.09 KG/M2 | DIASTOLIC BLOOD PRESSURE: 74 MMHG

## 2020-10-14 DIAGNOSIS — M48.05 SPINAL STENOSIS OF THORACOLUMBAR REGION: Primary | ICD-10-CM

## 2020-10-14 PROCEDURE — 99214 OFFICE O/P EST MOD 30 MIN: CPT | Performed by: PSYCHIATRY & NEUROLOGY

## 2020-10-14 RX ORDER — SILDENAFIL 100 MG/1
100 TABLET, FILM COATED ORAL
COMMUNITY
Start: 2020-09-11

## 2020-10-14 RX ORDER — KETOCONAZOLE 20 MG/G
1 CREAM TOPICAL 2 TIMES DAILY PRN
COMMUNITY
Start: 2020-08-31

## 2020-10-15 RX ORDER — SODIUM CHLORIDE 9 MG/ML
20 INJECTION, SOLUTION INTRAVENOUS ONCE
Status: CANCELLED | OUTPATIENT
Start: 2020-10-19

## 2020-10-16 RX ORDER — SODIUM CHLORIDE 9 MG/ML
20 INJECTION, SOLUTION INTRAVENOUS ONCE
Status: CANCELLED | OUTPATIENT
Start: 2020-10-21

## 2020-10-20 ENCOUNTER — TELEPHONE (OUTPATIENT)
Dept: NEUROLOGY | Facility: CLINIC | Age: 60
End: 2020-10-20

## 2020-10-20 ENCOUNTER — TELEPHONE (OUTPATIENT)
Dept: INFUSION CENTER | Facility: CLINIC | Age: 60
End: 2020-10-20

## 2020-10-21 ENCOUNTER — HOSPITAL ENCOUNTER (OUTPATIENT)
Dept: INFUSION CENTER | Facility: CLINIC | Age: 60
Discharge: HOME | End: 2020-10-21
Payer: COMMERCIAL

## 2020-10-21 VITALS
DIASTOLIC BLOOD PRESSURE: 92 MMHG | TEMPERATURE: 97.2 F | RESPIRATION RATE: 18 BRPM | SYSTOLIC BLOOD PRESSURE: 121 MMHG | HEART RATE: 59 BPM

## 2020-10-21 DIAGNOSIS — M48.05 SPINAL STENOSIS OF THORACOLUMBAR REGION: ICD-10-CM

## 2020-10-21 DIAGNOSIS — G83.4 CAUDA EQUINA SYNDROME (HCC): Primary | ICD-10-CM

## 2020-10-21 PROCEDURE — 96365 THER/PROPH/DIAG IV INF INIT: CPT

## 2020-10-21 RX ORDER — SODIUM CHLORIDE 9 MG/ML
20 INJECTION, SOLUTION INTRAVENOUS ONCE
Status: CANCELLED | OUTPATIENT
Start: 2020-10-22

## 2020-10-21 RX ORDER — METHYLPREDNISOLONE 4 MG/1
TABLET ORAL
Qty: 21 TABLET | Refills: 1 | Status: SHIPPED | OUTPATIENT
Start: 2020-10-21 | End: 2021-10-13 | Stop reason: ALTCHOICE

## 2020-10-21 RX ORDER — SODIUM CHLORIDE 9 MG/ML
20 INJECTION, SOLUTION INTRAVENOUS ONCE
Status: COMPLETED | OUTPATIENT
Start: 2020-10-21 | End: 2020-10-21

## 2020-10-21 RX ADMIN — SODIUM CHLORIDE 1000 MG: 0.9 INJECTION, SOLUTION INTRAVENOUS at 13:50

## 2020-10-21 RX ADMIN — SODIUM CHLORIDE 20 ML/HR: 0.9 INJECTION, SOLUTION INTRAVENOUS at 13:50

## 2020-10-22 ENCOUNTER — HOSPITAL ENCOUNTER (OUTPATIENT)
Dept: INFUSION CENTER | Facility: CLINIC | Age: 60
Discharge: HOME | End: 2020-10-22
Payer: COMMERCIAL

## 2020-10-22 VITALS
SYSTOLIC BLOOD PRESSURE: 115 MMHG | TEMPERATURE: 98 F | RESPIRATION RATE: 18 BRPM | DIASTOLIC BLOOD PRESSURE: 82 MMHG | HEART RATE: 97 BPM

## 2020-10-22 DIAGNOSIS — G83.4 CAUDA EQUINA SYNDROME (HCC): Primary | ICD-10-CM

## 2020-10-22 PROCEDURE — 96365 THER/PROPH/DIAG IV INF INIT: CPT

## 2020-10-22 RX ORDER — SODIUM CHLORIDE 9 MG/ML
20 INJECTION, SOLUTION INTRAVENOUS ONCE
Status: CANCELLED | OUTPATIENT
Start: 2020-10-23

## 2020-10-22 RX ORDER — SODIUM CHLORIDE 9 MG/ML
20 INJECTION, SOLUTION INTRAVENOUS ONCE
Status: COMPLETED | OUTPATIENT
Start: 2020-10-22 | End: 2020-10-22

## 2020-10-22 RX ADMIN — SODIUM CHLORIDE 20 ML/HR: 0.9 INJECTION, SOLUTION INTRAVENOUS at 11:50

## 2020-10-22 RX ADMIN — SODIUM CHLORIDE 1000 MG: 0.9 INJECTION, SOLUTION INTRAVENOUS at 11:50

## 2020-10-23 ENCOUNTER — HOSPITAL ENCOUNTER (OUTPATIENT)
Dept: INFUSION CENTER | Facility: CLINIC | Age: 60
Discharge: HOME | End: 2020-10-23
Payer: COMMERCIAL

## 2020-10-23 VITALS
HEART RATE: 52 BPM | DIASTOLIC BLOOD PRESSURE: 80 MMHG | TEMPERATURE: 96.7 F | RESPIRATION RATE: 18 BRPM | SYSTOLIC BLOOD PRESSURE: 134 MMHG

## 2020-10-23 DIAGNOSIS — G83.4 CAUDA EQUINA SYNDROME (HCC): Primary | ICD-10-CM

## 2020-10-23 PROCEDURE — 96365 THER/PROPH/DIAG IV INF INIT: CPT

## 2020-10-23 RX ORDER — SODIUM CHLORIDE 9 MG/ML
20 INJECTION, SOLUTION INTRAVENOUS ONCE
Status: COMPLETED | OUTPATIENT
Start: 2020-10-23 | End: 2020-10-23

## 2020-10-23 RX ORDER — SODIUM CHLORIDE 9 MG/ML
20 INJECTION, SOLUTION INTRAVENOUS ONCE
Status: CANCELLED | OUTPATIENT
Start: 2020-10-23

## 2020-10-23 RX ADMIN — SODIUM CHLORIDE 20 ML/HR: 0.9 INJECTION, SOLUTION INTRAVENOUS at 11:00

## 2020-10-23 RX ADMIN — SODIUM CHLORIDE 1000 MG: 0.9 INJECTION, SOLUTION INTRAVENOUS at 11:00

## 2020-12-05 DIAGNOSIS — E29.1 HYPOGONADISM IN MALE: ICD-10-CM

## 2020-12-07 RX ORDER — TESTOSTERONE CYPIONATE 200 MG/ML
INJECTION INTRAMUSCULAR
Qty: 10 ML | Refills: 0 | Status: SHIPPED | OUTPATIENT
Start: 2020-12-07 | End: 2021-06-20

## 2021-01-22 DIAGNOSIS — M48.05 SPINAL STENOSIS OF THORACOLUMBAR REGION: ICD-10-CM

## 2021-01-22 NOTE — TELEPHONE ENCOUNTER
I received a request for a Medrol Dosepak  Please call the patient and see if he does require one     if not we can discuss more the next appointment

## 2021-01-25 RX ORDER — METHYLPREDNISOLONE 4 MG/1
TABLET ORAL
Qty: 21 TABLET | Refills: 1 | OUTPATIENT
Start: 2021-01-25

## 2021-01-26 ENCOUNTER — TELEPHONE (OUTPATIENT)
Dept: NEUROLOGY | Facility: CLINIC | Age: 61
End: 2021-01-26

## 2021-02-11 ENCOUNTER — TELEPHONE (OUTPATIENT)
Dept: NEUROLOGY | Facility: CLINIC | Age: 61
End: 2021-02-11

## 2021-02-11 NOTE — TELEPHONE ENCOUNTER
Lmom re: appt date and time, as per patient  If appt doesn't work for him he'll call us back to reschedule

## 2021-02-11 NOTE — TELEPHONE ENCOUNTER
Pt can't make appt for tomorrow and wanted to apologize to you  He asks that we reschedule this appt to after 2/21/21  I see you have holds on some slots coming up, can you let me know when I can reschedule

## 2021-02-23 ENCOUNTER — TELEPHONE (OUTPATIENT)
Dept: NEUROLOGY | Facility: CLINIC | Age: 61
End: 2021-02-23

## 2021-02-23 NOTE — TELEPHONE ENCOUNTER
Spoke with patient and Registration completed 2/24/2021 12PM Dr Leyla Rdz at [facility]  Covid-screening questions completed  Aware of all policies - mask, visitor and no show fee

## 2021-02-24 ENCOUNTER — OFFICE VISIT (OUTPATIENT)
Dept: NEUROLOGY | Facility: CLINIC | Age: 61
End: 2021-02-24
Payer: COMMERCIAL

## 2021-02-24 VITALS
SYSTOLIC BLOOD PRESSURE: 108 MMHG | HEART RATE: 63 BPM | HEIGHT: 72 IN | BODY MASS INDEX: 30 KG/M2 | WEIGHT: 221.5 LBS | DIASTOLIC BLOOD PRESSURE: 78 MMHG

## 2021-02-24 DIAGNOSIS — M48.05 SPINAL STENOSIS OF THORACOLUMBAR REGION: ICD-10-CM

## 2021-02-24 DIAGNOSIS — G83.4 CAUDA EQUINA SYNDROME (HCC): Primary | ICD-10-CM

## 2021-02-24 PROCEDURE — 99214 OFFICE O/P EST MOD 30 MIN: CPT | Performed by: PSYCHIATRY & NEUROLOGY

## 2021-02-24 RX ORDER — PREDNISONE 1 MG/1
10 TABLET ORAL DAILY
Qty: 60 TABLET | Refills: 0 | Status: SHIPPED | OUTPATIENT
Start: 2021-02-24 | End: 2021-04-14 | Stop reason: SDUPTHER

## 2021-02-24 NOTE — ASSESSMENT & PLAN NOTE
This is a  68-year-old gentleman with a long history of spinal, ambulatory dysfunction,  weakness  He has undergone multiple spinal fusions and more recently spinal cord stimulator  He did notice weakness in his lower extremities as he was not exercising  He has now returned to exercise but I have informed him that this benefit may take months  We did we did discuss regarding the long-term prognosis  I have informed him that despite exercise his prognosis for full recovery is poor  He will require assistive devices in approximately 3 to 4 years and may also require 24 hour assistance/ caregiver   for his ADLs  This will require a changes in his home environment with structural and mobility changes to maintain his current activities of daily living  In the interim we did start him on prednisone  He has been taking a Medrol Dosepak which will be finished soon  Ten days afterwards he will start prednisone 10 mg a day for seven days and then to decrease to 5 mg  If this is ineffective needs to call our offices  in the past IV steroids did provide him benefit for cauda equina syndrome    This may be also considered in the future

## 2021-02-24 NOTE — PROGRESS NOTES
Patient ID: Betsey Young is a 56 y o  male  Assessment/Plan:    Spinal stenosis of thoracolumbar region  This is a  78-year-old gentleman with a long history of spinal, ambulatory dysfunction,  weakness  He has undergone multiple spinal fusions and more recently spinal cord stimulator  He did notice weakness in his lower extremities as he was not exercising  He has now returned to exercise but I have informed him that this benefit may take months  We did we did discuss regarding the long-term prognosis  I have informed him that despite exercise his prognosis for full recovery is poor  He will require assistive devices in approximately 3 to 4 years and may also require 24 hour assistance/ caregiver   for his ADLs  This will require a changes in his home environment with structural and mobility changes to maintain his current activities of daily living  In the interim we did start him on prednisone  He has been taking a Medrol Dosepak which will be finished soon  Ten days afterwards he will start prednisone 10 mg a day for seven days and then to decrease to 5 mg  If this is ineffective needs to call our offices  in the past IV steroids did provide him benefit for cauda equina syndrome  This may be also considered in the future       he is to return to our offices in 3 to 4 months but call us if there is any other new questions or problems in the interim    We did discuss the potential side effects of steroids   Diagnoses and all orders for this visit:    Cauda equina syndrome (HCC)  -     predniSONE 5 mg tablet; Take 2 tablets (10 mg total) by mouth daily    Spinal stenosis of thoracolumbar region         Subjective: This is a 56  y o rh male who presented  to our office   in a follow-up visit  He has chronic pain, numbness in his legs difficulty ambulating  He had a permanent spinal cord stimulator      He is still having adjustments of the stimulator but he admits to having no significant change in his symptomatology he does utilize a stimulator at times with intermittent tonic stimulation with benefit  This was placed in October Of 2019   He was placed on Lyrica and the seen by Dr Lucila Mortimer a neurologist at [facility] who suggested physical therapy  Lyrica proved to be ineffective and caused some cognitive issues and subsequently this was discontinued In the interim the patient has started physical therapy  He also of has therapy through [employer] with water therapy  He has noted weakness of his hip flexion right greater the left  Since last visit he has lost some weight  He did receive IV steroids in October which provided about two weeks of benefit  He began a Medrol Dosepak several days ago and he will be taking the last few days doses soon  He reports benefit in his strengthening and exercise program when he is utilizing steroids         He was treated with IV steroids in July which only helped for approximately two weeks               He has a painful neuropathy and numbness  from post laminectomy syndrome  He continues to have weakness of his lower extremities of which is slight but greater on the right                              Prior symptoms include  urinary retention bowel urgency numbness in his anterior thigh is thighs and difficulty with maintaining orgasm  He had undergone MRI of the thoracic and lumbar spine which demonstrated no evidence of arachnoiditis however there was change in the signal in the lower nerves  It was consistent with arachnoiditis           EMG study was performed and it was abnormal   It did demonstrate the presence of bilateral chronic L5-S1 radiculopathies   there is also the presence of a subacute S2-S3 radiculopathies bilaterally due to the presence of denervation changes in the paraspinals this could be associated with a more of a distal etiology such or as arachnoiditis           He was also longstanding opioids to in the past   This  did this did result in less neuropathic pain,  less burning must numbness             Prior history:  He did  complainin of stiffness and heaviness in his legs with progressive weakness   MRI did show foraminal stenosis a left L5-S1 as well as impingement on the left L3 L4 region  He was seen by dr Jennifer Meredith who  suggested surgery but he and then sought a 2nd opinion and underwent fusion with placement of  L3-S1 in May of 2016 by Dr Yvon Mena ,  the head of Neurosurgery at [facility]  After the surgery in May of 2016 he noted increasing numbness in his bilateral thighs, in the saddle regions and numbness in the perineum  This occurred after 1 year   the numbness also occurs in the lower extremities the calf on the left side He has noted progressive ambulatory dysfunction specially on the right leg  He does report some urinary urgency and abnormalities with sexual dysfunction   The sexual dysfunction has been progressive over the last year  He is unable to have an organsm for the last  year  He did try baclofen and it was ineffective   He is no longer utilizing narcotic  pain meds and uses prednisone and/or Medrol Dosepak when he has increase in back pain  Oral steriods for a short period of time was ineffective   He is now continued to be followed by physical therapy  He had anterior fusion at L4-L5 and debris due to granulation tissue  There was a bony fragment impinging upon the spinal cord at the L2 vertebral body    He did have mri of the lumbar and thoracic spine after the last visit   Mri of the lumbar spine showed multilevel disc protrusion and DJD , pedical screws were in place   No evidence of arachointits however there was change in signal  Mri of the T spine was abnormal with mid thoracic level disc herniaition at T6 and T7  With multi level shallow disc protrusions    Heavy metal screeen, b12, foalte were normal                       CT scan of the lumbar spine performed on 10/02           1  Postsurgical changes  2  Lucency about the fixation screws at L2 and S1  Loosening at one or more  sites cannot be excluded  3  Spondylosis  4  Minimal central stenosis at L1-L2   5  Foraminal stenosis bilaterally at multiple levels  See above      CT scan this scan of the cervical spine  IMPRESSION:  1  Postsurgical changes  2  Spondylosis  3  Spinal cord atrophy  4  No central stenosis  5  Bilateral foraminal stenosis at multiple levels  See above         CT scan of the thoracic spine     IMPRESSION:  1  Spondylosis  2  Spinal electrode as above  3  No spinal cord compression                             The following portions of the patient's history were reviewed and updated as appropriate:   He  has a past medical history of BPH (benign prostatic hyperplasia), Chronic back pain, Erectile dysfunction, Nocturia, OAB (overactive bladder), Testicular hypogonadism, Urinary frequency, and Urinary urgency  He  has a past surgical history that includes Back surgery (05/18/2016); Spinal cord stimulator implant (10/10/2019); Colonoscopy (2004); Colonoscopy (2007); Colonoscopy (2013); and Colonoscopy (06/2019)  His family history is not on file  He  reports that he has never smoked  He has never used smokeless tobacco  He reports current alcohol use  He reports that he does not use drugs  Current Outpatient Medications   Medication Sig Dispense Refill    aspirin (ECOTRIN LOW STRENGTH) 81 mg EC tablet Take 81 mg by mouth daily      ibuprofen (MOTRIN) 400 mg tablet Take 400 mg by mouth as needed      ketoconazole (NIZORAL) 2 % cream apply A THIN LAYER to affected area twice a day      ketoconazole (NIZORAL) 2 % shampoo       methylPREDNISolone 4 MG tablet therapy pack use as directed FOLLOW DIRECTIONS ON BACK OF FOIL PACK 21 tablet 1    sildenafil (REVATIO) 20 mg tablet Take 3 to 5 tablets by mouth one (1) hour prior to intercourse on an empty stomach  Limit to 3 encounters per week  90 tablet 5    testosterone cypionate (DEPO-TESTOSTERONE) 200 mg/mL SOLN inject 2 milliliters intramuscularly every 14 days 10 mL 0    fluocinolone (SYNALAR) 0 025 % cream       oxymetazoline (AFRIN) 0 05 % nasal spray 2 sprays by Each Nare route 2 (two) times a day      predniSONE 5 mg tablet Take 2 tablets (10 mg total) by mouth daily 60 tablet 0    sildenafil (VIAGRA) 100 mg tablet Take 100 mg by mouth As directed       No current facility-administered medications for this visit  He is allergic to morphine and morphine and related            Objective:    Blood pressure 108/78, pulse 63, height 6' (1 829 m), weight 100 kg (221 lb 8 oz)  Physical Exam  Eyes:      General: Lids are normal       Extraocular Movements: Extraocular movements intact  Pupils: Pupils are equal, round, and reactive to light  Neurological:      Deep Tendon Reflexes:      Reflex Scores:       Tricep reflexes are 1+ on the right side and 1+ on the left side  Bicep reflexes are 1+ on the right side and 1+ on the left side  Patellar reflexes are 2+ on the right side and 2+ on the left side  Achilles reflexes are 1+ on the right side and 1+ on the left side  Neurological Exam  Mental Status  Awake, alert and oriented to person, place and time  Language is fluent with no aphasia  Cranial Nerves  CN II: Visual acuity is normal  Visual fields full to confrontation  CN III, IV, VI: Extraocular movements intact bilaterally  Normal lids and orbits bilaterally  Pupils equal round and reactive to light bilaterally  CN V: Facial sensation is normal   CN VII: Full and symmetric facial movement  CN VIII: Hearing is normal   CN IX, X: Palate elevates symmetrically  Normal gag reflex  CN XI: Shoulder shrug strength is normal   CN XII: Tongue midline without atrophy or fasciculations      Motor    The patient had normal strength in the upper extremities in the lower extremity the right hip flexion was a two on the left hip flexion it was a 3+ knee flexion was a 3+ knee extension was a three on the left and two on the right  Dorsiflexion was a 5-  There is atrophy of the left iliopsoas the adduction bilaterally was a 5- but they Abduction on the right was  4/5         Sensory  Light touch is normal in upper and lower extremities  He had a diminution of temp of light touch in the anterior thighs and in the right foot           Reflexes                                           Right                      Left  Biceps                                 1+                         1+  Triceps                                1+                         1+  Patellar                                2+                         2+  Achilles                                1+                         1+  Plantar                           Downgoing                Downgoing    Coordination  Right: Finger-to-nose normal   Left: Finger-to-nose normal     Gait  Unable to rise from chair without using arms  He was utilizing a walker  He was unable to tandem gait  Thedann Morales He has an antilagic gait   review of systems obtained from the medical assistant as below was reviewed with the patient at today's visit    ROS:    Review of Systems   Constitutional: Negative  Negative for appetite change and fever  HENT: Negative  Negative for hearing loss, tinnitus, trouble swallowing and voice change  Eyes: Negative  Negative for photophobia and pain  Respiratory: Negative  Negative for shortness of breath  Cardiovascular: Negative  Negative for palpitations  Gastrointestinal: Negative  Negative for nausea and vomiting  Endocrine: Negative  Negative for cold intolerance  Genitourinary: Negative  Negative for dysuria, frequency and urgency  Musculoskeletal: Positive for gait problem (difficulty walking, pain when walking ), myalgias and neck pain  Pain waist down    Skin: Negative  Negative for rash     Neurological: Positive for tremors (left leg more then right / when stretching his arms , but controllable ), weakness (waist down ) and numbness (waist down)  Negative for dizziness, seizures, syncope, facial asymmetry, speech difficulty, light-headedness and headaches  Has noticed his right leg has become much less responsive, is dragging it    Hematological: Negative  Does not bruise/bleed easily  Psychiatric/Behavioral: Negative  Negative for confusion, hallucinations and sleep disturbance

## 2021-03-10 ENCOUNTER — TELEPHONE (OUTPATIENT)
Dept: UROLOGY | Facility: CLINIC | Age: 61
End: 2021-03-10

## 2021-03-10 NOTE — TELEPHONE ENCOUNTER
Patient was scheduled to see Dr Glory Davis today 03/10/2021 but due to patient testing positive for COVID last Tuesday he cancelled the appointment  Please call patient to get rescheduled  Thank you!

## 2021-04-08 ENCOUNTER — TELEPHONE (OUTPATIENT)
Dept: UROLOGY | Facility: AMBULATORY SURGERY CENTER | Age: 61
End: 2021-04-08

## 2021-04-08 NOTE — TELEPHONE ENCOUNTER
Patient managed by Dr Lorene Cuevas  Patient is looking to switch to Dr Rommel Knight  He said that he has hydrocele on his scrotum that is getting bigger and bigger  He said Dr Reginald Sacks do anything about it  Patient said he is a disabled  and its becoming harder and harder because he said he now getting "numb down there"  Patient is willing to go to [facility] or [facility]  I said the next follow up spot with Dr Rommel Knight is in July  Patient said he please he can't wait that long  Please advise patient

## 2021-04-08 NOTE — TELEPHONE ENCOUNTER
Call placed to patient  LMOM for him to contact the office to schedule appointment  Number provided for call back

## 2021-04-12 ENCOUNTER — TRANSCRIBE ORDERS (OUTPATIENT)
Dept: ADMINISTRATIVE | Facility: HOSPITAL | Age: 61
End: 2021-04-12

## 2021-04-12 DIAGNOSIS — N43.0 ENCYSTED HYDROCELE: Primary | ICD-10-CM

## 2021-04-14 DIAGNOSIS — G83.4 CAUDA EQUINA SYNDROME (HCC): ICD-10-CM

## 2021-04-14 RX ORDER — PREDNISONE 1 MG/1
10 TABLET ORAL DAILY
Qty: 180 TABLET | Refills: 0 | Status: SHIPPED | OUTPATIENT
Start: 2021-04-14 | End: 2021-06-09 | Stop reason: SDUPTHER

## 2021-04-14 NOTE — TELEPHONE ENCOUNTER
I called and spoke to Cedric Acosta regarding his appointment with Dr Bruce Torres on May 19th at 12 noon to let him know that this appointment has been changed to 06/09/21 due to provider being out of the office  Cedric Acosta voiced understanding and confirmed that he will make this appointment  He also wanted me to let Dr Bruce Torres know that he needed a refill on his Prednisone

## 2021-04-15 NOTE — TELEPHONE ENCOUNTER
Called and spoke with patient and he only wants [place]ervin  He is aware nothing till July  Scheduled patient for July   And added wait list as well

## 2021-04-16 ENCOUNTER — HOSPITAL ENCOUNTER (OUTPATIENT)
Dept: ULTRASOUND IMAGING | Facility: MEDICAL CENTER | Age: 61
Discharge: HOME | End: 2021-04-16
Payer: COMMERCIAL

## 2021-04-16 DIAGNOSIS — N43.0 ENCYSTED HYDROCELE: ICD-10-CM

## 2021-04-16 PROCEDURE — 76870 US EXAM SCROTUM: CPT

## 2021-04-22 NOTE — TELEPHONE ENCOUNTER
Please call Patient and offer follow up with Dr Rommel Knight on 5/3 at 3:30 pm in [address] [facility]  Patient normally goes to [facility] but no time available at this time  Originally scheduled for July but Patient requested earlier appointment  Thank you

## 2021-04-22 NOTE — TELEPHONE ENCOUNTER
Called and spoke to patient  Patient unable to do 5/3 because he will be out of town from May 1st to May 5th  Patient very appreciative of the call and would like a call again if there is anything else available

## 2021-04-26 ENCOUNTER — TELEPHONE (OUTPATIENT)
Dept: CARDIOLOGY CLINIC | Facility: CLINIC | Age: 61
End: 2021-04-26

## 2021-04-26 ENCOUNTER — TELEPHONE (OUTPATIENT)
Dept: OTHER | Facility: OTHER | Age: 61
End: 2021-04-26

## 2021-04-26 NOTE — TELEPHONE ENCOUNTER
Patient called to cancel appointment schedule today 4/26/2021  8:00 AM Elba Muse, DO in [place] [address] due he has a family conflict to attend  Patient would like a call back to reschedule

## 2021-05-10 NOTE — TELEPHONE ENCOUNTER
LMOM, offering Patient appointment for tomorrow,  At 11:15 am with Dr Rommel Knight in [place]  Encouraged to call back and confirm if would like/can make appointment  If Patient returns call, can offer above appointment

## 2021-05-10 NOTE — TELEPHONE ENCOUNTER
LMOM, offering Patient appointment for tomorrow,  At 11:15 am with Dr Star Mejia in [facility]  Encouraged to call back and confirm if would like/can make appointment  Advised that appointment is to be opened to all and could possibly be gone when he calls       If Patient returns call, can offer above appointment

## 2021-05-14 NOTE — TELEPHONE ENCOUNTER
LMOM to offer 5/19 /  Chantal Mejia as per [facility]  Explained to Patient that that date is available now but may not be depending when he returns the call  Please schedule any spot May 19 in PM if still available

## 2021-05-19 NOTE — TELEPHONE ENCOUNTER
Please call and offer Patient earlier appointment on 5/27/2021 at 7:30 am [place] [facility]  Thank you

## 2021-06-03 ENCOUNTER — TELEPHONE (OUTPATIENT)
Dept: NEUROLOGY | Facility: CLINIC | Age: 61
End: 2021-06-03

## 2021-06-03 NOTE — TELEPHONE ENCOUNTER
I called and left a voicemail message about patients upcoming appointment with Tyler Caballero on 6/9/21 @ 12 noon  I requested a call back to our office if the patient has any issues or concerns or cannot keep this appointment

## 2021-06-07 ENCOUNTER — ANESTHESIA EVENT (OUTPATIENT)
Dept: PERIOP | Facility: HOSPITAL | Age: 61
End: 2021-06-07
Payer: COMMERCIAL

## 2021-06-07 NOTE — PRE-PROCEDURE INSTRUCTIONS
Pre-Surgery Instructions:   Medication Instructions    predniSONE 5 mg tablet Instructed patient per Anesthesia Guidelines  take am of sx    testosterone cypionate (DEPO-TESTOSTERONE) 200 mg/mL SOLN Instructed patient per Anesthesia Guidelines  NA    You will receive a phone call from hospital for arrival time  Please call surgeons office if any changes in your condition  Wear easy on/off clothing; consider type of surgery;  Valuables, jewelry, piercing's please keep at home  **COVID-19  education/surgical guidelines      Please: No contact lenses or eye make up, artificial eyelashes    Please secure transportation     Follow pre surgery showering or cleaning instructions as  Reviewed by nurse or surgeons office      Questions answered and concerns addressed

## 2021-06-09 ENCOUNTER — OFFICE VISIT (OUTPATIENT)
Dept: NEUROLOGY | Facility: CLINIC | Age: 61
End: 2021-06-09
Payer: COMMERCIAL

## 2021-06-09 VITALS
HEIGHT: 72 IN | HEART RATE: 91 BPM | SYSTOLIC BLOOD PRESSURE: 120 MMHG | DIASTOLIC BLOOD PRESSURE: 80 MMHG | BODY MASS INDEX: 30.04 KG/M2

## 2021-06-09 DIAGNOSIS — G82.20 PARAPARESIS (HCC): ICD-10-CM

## 2021-06-09 DIAGNOSIS — M48.05 SPINAL STENOSIS OF THORACOLUMBAR REGION: Primary | ICD-10-CM

## 2021-06-09 DIAGNOSIS — G83.4 CAUDA EQUINA SYNDROME (HCC): ICD-10-CM

## 2021-06-09 PROCEDURE — 99214 OFFICE O/P EST MOD 30 MIN: CPT | Performed by: PSYCHIATRY & NEUROLOGY

## 2021-06-09 RX ORDER — CEPHALEXIN 500 MG/1
500 CAPSULE ORAL 4 TIMES DAILY
COMMUNITY
Start: 2021-06-04 | End: 2021-09-03 | Stop reason: ALTCHOICE

## 2021-06-09 RX ORDER — PREDNISONE 1 MG/1
5 TABLET ORAL DAILY
Qty: 90 TABLET | Refills: 1 | Status: SHIPPED | OUTPATIENT
Start: 2021-06-09 | End: 2022-01-10 | Stop reason: SDUPTHER

## 2021-06-09 RX ORDER — OXYCODONE AND ACETAMINOPHEN 10; 325 MG/1; MG/1
TABLET ORAL
COMMUNITY
Start: 2021-06-04

## 2021-06-09 NOTE — PROGRESS NOTES
Patient ID: Mitul Buckley is a 56 y o  male  Assessment/Plan:    Spinal stenosis of thoracolumbar region  Amee Monae is a 60-y patient who was a progressive decline in his ambulatory status  The symptoms have increased after two years after her spinal surgery  He did undergo a spinal cord stimulator but is notice no significant persist long-term relief  He has had some slight improvement of his strength in the legs as he  Is now exercising more diligently    He had a question if the hardware may be contributing to his symptomatology  I have informed her that this is outside my realm of expertise and he does need nerve a referral to a neurosurgeon to discuss this more in detail  I have informed him med as that he does need a referral to a neurosurgeon to discuss this more in detail  We will check the current [facility]surgery if they would be able to provide him any additional information  We discussed that many things can contribute to his gait dysfunction including of lack of exercise and his potential upcoming surgery  I informed him that he should continue to exercise on a regular basis    He admits that the prednisone 5 mg has helped and this will be continued  We discussed potential long-term side effects are prednisone  This has helped his joint pain  he is scheduled a formal follow-up in several months but he is certainly call if he has any other questions or problems  Diagnoses and all orders for this visit:    Spinal stenosis of thoracolumbar region    Cauda equina syndrome (HCC)  -     predniSONE 5 mg tablet; Take 1 tablet (5 mg total) by mouth daily    Paraparesis (HCC)    Other orders  -     cephalexin (KEFLEX) 500 mg capsule; Take 500 mg by mouth 4 (four) times a day  -     oxyCODONE-acetaminophen (PERCOCET)  mg per tablet; take 1 tablet by mouth every 4 hours if needed for SEVERE pain         Subjective:     This is a 56  y o rh male who presented  to our office   in a follow-up visit  He has chronic pain, numbness in his legs difficulty ambulating  He had a permanent spinal cord stimulator   He is still having adjustments of the stimulator but he admits to having no significant change in his symptomatology  he does utilize a stimulator at times with intermittent tonic stimulation with benefit  This was placed in October Of 2019   He was placed on Lyrica and the seen by Dr Graeme Lugo a neurologist at [facility] who suggested physical therapy  Lyrica proved to be ineffective and caused some cognitive issues and subsequently this was discontinued  At the last evaluation he was noted to have weakness in his lower extremities which was stemming from a lack of activity  Due to COVID he had been unable to participate in an exercise program or water therapy  He in the interim he has increase his exercise  He has admits  to me that more recently this is been intermittent and he has noted some  Decrease in strength  He is for a urological surgery tomorrow  He does report episodes of muscle spasms active occur from the supine to the sitting this position  This occurs on a regular basis  He is now utilizing prednisone 5 mg a day and has noted some slight benefit  His question includes if the removal of the hardware would be helpful  He did have surgery with placement of hardware in 2016  Approximately two years later he noted increasing weakness and difficulty his and sexual dysfunction  He has had progressive decline in his ambulation is well as muscle spasms  Prior history:  He did  complainin of stiffness and heaviness in his legs with progressive weakness   MRI did show foraminal stenosis a left L5-S1 as well as impingement on the left L3 L4 region    He was seen by dr Vanita Sellers who  suggested surgery but he and then sought a 2nd opinion and underwent fusion with placement of  L3-S1 in May of 2016 by Dr Isadora Servin ,  the head of Neurosurgery at [facility]  After the surgery in May of 2016 he noted increasing numbness in his bilateral thighs, in the saddle regions and numbness in the perineum  This occurred after 1 year   the numbness also occurs in the lower extremities the calf on the left side He has noted progressive ambulatory dysfunction specially on the right leg  He does report some urinary urgency and abnormalities with sexual dysfunction   The sexual dysfunction has been progressive over the last year  He is unable to have an organsm for the last  year  He did try baclofen and it was ineffective   He is no longer utilizing narcotic  pain meds and uses prednisone and/or Medrol Dosepak when he has increase in back pain  Oral steriods for a short period of time was ineffective   He is now continued to be followed by physical therapy  He had anterior fusion at L4-L5 and debris due to granulation tissue  There was a bony fragment impinging upon the spinal cord at the L2 vertebral body    He did have mri of the lumbar and thoracic spine after the last visit   Mri of the lumbar spine showed multilevel disc protrusion and DJD , pedical screws were in place   No evidence of arachointits however there was change in signal  Mri of the T spine was abnormal with mid thoracic level disc herniaition at T6 and T7  With multi level shallow disc protrusions   Heavy metal screeen, b12, foalte were normal                   He has a painful neuropathy and numbness  from post laminectomy syndrome  He continues to have weakness of his lower extremities of which is slight but greater on the right                              Prior symptoms include  urinary retention bowel urgency numbness in his anterior thigh is thighs and difficulty with maintaining orgasm  He had undergone MRI of the thoracic and lumbar spine which demonstrated no evidence of arachnoiditis however there was change in the signal in the lower nerves    It was consistent with arachnoiditis           EMG study was performed and it was abnormal   It did demonstrate the presence of bilateral chronic L5-S1 radiculopathies  there is also the presence of a subacute S2-S3 radiculopathies bilaterally due to the presence of denervation changes in the paraspinals this could be associated with a more of a distal etiology such or as arachnoiditis           He was also longstanding opioids to in the past   This  did this did result in less neuropathic pain,  less burning must numbness  Did tried muscle relaxants in the past which does which only resulted in excessive drowsiness             He also reports some problems involving his right foot  He did fall on his right foot and recently received a cortisone injection        The following portions of the patient's history were reviewed and updated as appropriate:   He  has a past medical history of Balance problems, BPH (benign prostatic hyperplasia), Chronic back pain, Chronic pain disorder, COVID-19, Erectile dysfunction, Nocturia, OAB (overactive bladder), Testicular hypogonadism, Urinary frequency, and Urinary urgency  He  has a past surgical history that includes Spinal cord stimulator implant (10/10/2019); Colonoscopy (2004); Colonoscopy (2007); Colonoscopy (2013); Colonoscopy (06/2019); and Back surgery (05/18/2016)  His family history is not on file  He  reports that he has never smoked  He has never used smokeless tobacco  He reports current alcohol use  He reports that he does not use drugs    Current Outpatient Medications   Medication Sig Dispense Refill    aspirin (ECOTRIN LOW STRENGTH) 81 mg EC tablet Take 81 mg by mouth daily      cephalexin (KEFLEX) 500 mg capsule Take 500 mg by mouth 4 (four) times a day      fluocinolone (SYNALAR) 0 025 % cream       ibuprofen (MOTRIN) 400 mg tablet Take 400 mg by mouth as needed      ketoconazole (NIZORAL) 2 % cream apply A THIN LAYER to affected area twice a day      methylPREDNISolone 4 MG tablet therapy pack use as directed FOLLOW DIRECTIONS ON BACK OF FOIL PACK 21 tablet 1    oxyCODONE-acetaminophen (PERCOCET)  mg per tablet take 1 tablet by mouth every 4 hours if needed for SEVERE pain      oxymetazoline (AFRIN) 0 05 % nasal spray 2 sprays by Each Nare route 2 (two) times a day      predniSONE 5 mg tablet Take 1 tablet (5 mg total) by mouth daily 90 tablet 1    sildenafil (REVATIO) 20 mg tablet Take 3 to 5 tablets by mouth one (1) hour prior to intercourse on an empty stomach  Limit to 3 encounters per week  90 tablet 5    sildenafil (VIAGRA) 100 mg tablet Take 100 mg by mouth As directed      testosterone cypionate (DEPO-TESTOSTERONE) 200 mg/mL SOLN inject 2 milliliters intramuscularly every 14 days 10 mL 0     No current facility-administered medications for this visit  He is allergic to morphine            Objective:    Blood pressure 120/80, pulse 91, height 6' (1 829 m)  Physical Exam  Neurological:      Deep Tendon Reflexes:      Reflex Scores:       Tricep reflexes are 1+ on the right side and 1+ on the left side  Bicep reflexes are 1+ on the right side and 1+ on the left side  Patellar reflexes are 2+ on the right side and 2+ on the left side  Achilles reflexes are 1+ on the right side and 1+ on the left side  Neurological Exam    Motor    The patient had normal strength in the upper extremities in the lower extremity the right hip flexion was a two on the left hip flexion it was a 3+ knee flexion was a 3+ knee extension was a three on the left and two on the right  Dorsiflexion was a 5-  There is atrophy of the left iliopsoas the adduction bilaterally was a 5- but they Abduction on the right was  4/5   Hip flexion on the left was  4/5, on right 3/5   Sensory  Light touch is normal in upper and lower extremities  He had a diminution of temp of light touch in the anterior thighs and in the right foot  [place]       Reflexes                                           Right                      Left  Biceps                                 1+                         1+  Triceps                                1+                         1+  Patellar                                2+                         2+  Achilles                                1+                         1+  Plantar                           Downgoing                Downgoing    Right pathological reflexes: Domenica's absent  Left pathological reflexes: Domenica's absent  Coordination  Right: Finger-to-nose normal   Left: Finger-to-nose normal     Gait Unable to rise from chair without using arms  A presented in a wheelchair  He does have an antilagic  gait  He requires assistance to stand and ambulate  review of systems obtained from the medical assistant as below was reviewed with the patient at today's visit    ROS:    Review of Systems   Constitutional: Negative  Negative for appetite change and fever  HENT: Negative  Negative for hearing loss, tinnitus, trouble swallowing and voice change  Eyes: Negative  Negative for photophobia and pain  Respiratory: Negative  Negative for shortness of breath  Cardiovascular: Negative  Negative for palpitations  Gastrointestinal: Negative  Negative for nausea and vomiting  Endocrine: Negative  Negative for cold intolerance  Genitourinary: Positive for frequency and urgency  Negative for dysuria  Musculoskeletal: Negative  Negative for myalgias and neck pain  Skin: Negative  Negative for rash  Neurological: Positive for numbness  Negative for dizziness, tremors, seizures, syncope, facial asymmetry, speech difficulty, weakness, light-headedness and headaches  Hematological: Negative  Does not bruise/bleed easily  Psychiatric/Behavioral: Negative  Negative for confusion, hallucinations and sleep disturbance

## 2021-06-09 NOTE — ASSESSMENT & PLAN NOTE
Maria A Macedo is a 60-y patient who was a progressive decline in his ambulatory status  The symptoms have increased after two years after her spinal surgery  He did undergo a spinal cord stimulator but is notice no significant persist long-term relief  He has had some slight improvement of his strength in the legs as he  Is now exercising more diligently    He had a question if the hardware may be contributing to his symptomatology  I have informed her that this is outside my realm of expertise and he does need nerve a referral to a neurosurgeon to discuss this more in detail  I have informed him med as that he does need a referral to a neurosurgeon to discuss this more in detail  We will check the current [facility] Neurosurgery if they would be able to provide him any additional information  We discussed that many things can contribute to his gait dysfunction including of lack of exercise and his potential upcoming surgery  I informed him that he should continue to exercise on a regular basis    He admits that the prednisone 5 mg has helped and this will be continued  We discussed potential long-term side effects are prednisone  This has helped his joint pain

## 2021-06-10 ENCOUNTER — HOSPITAL ENCOUNTER (OUTPATIENT)
Facility: HOSPITAL | Age: 61
Setting detail: OUTPATIENT SURGERY
Discharge: HOME | End: 2021-06-10
Attending: UROLOGY | Admitting: UROLOGY
Payer: COMMERCIAL

## 2021-06-10 ENCOUNTER — ANESTHESIA (OUTPATIENT)
Dept: PERIOP | Facility: HOSPITAL | Age: 61
End: 2021-06-10
Payer: COMMERCIAL

## 2021-06-10 ENCOUNTER — TELEPHONE (OUTPATIENT)
Dept: NEUROLOGY | Facility: CLINIC | Age: 61
End: 2021-06-10

## 2021-06-10 VITALS
HEART RATE: 74 BPM | DIASTOLIC BLOOD PRESSURE: 53 MMHG | BODY MASS INDEX: 29.93 KG/M2 | OXYGEN SATURATION: 95 % | TEMPERATURE: 97.4 F | RESPIRATION RATE: 15 BRPM | SYSTOLIC BLOOD PRESSURE: 115 MMHG | WEIGHT: 221 LBS | HEIGHT: 72 IN

## 2021-06-10 DIAGNOSIS — N43.0 ENCYSTED HYDROCELE: ICD-10-CM

## 2021-06-10 DIAGNOSIS — M48.05 SPINAL STENOSIS OF THORACOLUMBAR REGION: Primary | ICD-10-CM

## 2021-06-10 PROBLEM — Z96.89 S/P INSERTION OF SPINAL CORD STIMULATOR: Status: ACTIVE | Noted: 2021-06-10

## 2021-06-10 LAB
ATRIAL RATE: 83 BPM
P AXIS: 6 DEGREES
PR INTERVAL: 176 MS
QRS AXIS: -9 DEGREES
QRSD INTERVAL: 96 MS
QT INTERVAL: 372 MS
QTC INTERVAL: 437 MS
T WAVE AXIS: -9 DEGREES
VENTRICULAR RATE: 83 BPM

## 2021-06-10 PROCEDURE — 93005 ELECTROCARDIOGRAM TRACING: CPT

## 2021-06-10 PROCEDURE — 88302 TISSUE EXAM BY PATHOLOGIST: CPT | Performed by: PATHOLOGY

## 2021-06-10 PROCEDURE — 93010 ELECTROCARDIOGRAM REPORT: CPT | Performed by: INTERNAL MEDICINE

## 2021-06-10 RX ORDER — EPHEDRINE SULFATE 50 MG/ML
INJECTION INTRAVENOUS AS NEEDED
Status: DISCONTINUED | OUTPATIENT
Start: 2021-06-10 | End: 2021-06-10

## 2021-06-10 RX ORDER — SODIUM CHLORIDE 9 MG/ML
125 INJECTION, SOLUTION INTRAVENOUS CONTINUOUS
Status: DISCONTINUED | OUTPATIENT
Start: 2021-06-10 | End: 2021-06-10 | Stop reason: HOSPADM

## 2021-06-10 RX ORDER — SODIUM CHLORIDE, SODIUM LACTATE, POTASSIUM CHLORIDE, CALCIUM CHLORIDE 600; 310; 30; 20 MG/100ML; MG/100ML; MG/100ML; MG/100ML
50 INJECTION, SOLUTION INTRAVENOUS CONTINUOUS
Status: DISCONTINUED | OUTPATIENT
Start: 2021-06-10 | End: 2021-06-10 | Stop reason: HOSPADM

## 2021-06-10 RX ORDER — MIDAZOLAM HYDROCHLORIDE 2 MG/2ML
INJECTION, SOLUTION INTRAMUSCULAR; INTRAVENOUS AS NEEDED
Status: DISCONTINUED | OUTPATIENT
Start: 2021-06-10 | End: 2021-06-10

## 2021-06-10 RX ORDER — ONDANSETRON 2 MG/ML
INJECTION INTRAMUSCULAR; INTRAVENOUS AS NEEDED
Status: DISCONTINUED | OUTPATIENT
Start: 2021-06-10 | End: 2021-06-10

## 2021-06-10 RX ORDER — PROPOFOL 10 MG/ML
INJECTION, EMULSION INTRAVENOUS AS NEEDED
Status: DISCONTINUED | OUTPATIENT
Start: 2021-06-10 | End: 2021-06-10

## 2021-06-10 RX ORDER — LIDOCAINE HYDROCHLORIDE 10 MG/ML
0.5 INJECTION, SOLUTION EPIDURAL; INFILTRATION; INTRACAUDAL; PERINEURAL ONCE AS NEEDED
Status: DISCONTINUED | OUTPATIENT
Start: 2021-06-10 | End: 2021-06-10 | Stop reason: HOSPADM

## 2021-06-10 RX ORDER — FENTANYL CITRATE/PF 50 MCG/ML
25 SYRINGE (ML) INJECTION
Status: DISCONTINUED | OUTPATIENT
Start: 2021-06-10 | End: 2021-06-10 | Stop reason: HOSPADM

## 2021-06-10 RX ORDER — ACETAMINOPHEN 325 MG/1
650 TABLET ORAL EVERY 6 HOURS PRN
Status: DISCONTINUED | OUTPATIENT
Start: 2021-06-10 | End: 2021-06-10

## 2021-06-10 RX ORDER — CEFAZOLIN SODIUM 2 G/50ML
2000 SOLUTION INTRAVENOUS ONCE
Status: COMPLETED | OUTPATIENT
Start: 2021-06-10 | End: 2021-06-10

## 2021-06-10 RX ORDER — GINSENG 100 MG
CAPSULE ORAL AS NEEDED
Status: DISCONTINUED | OUTPATIENT
Start: 2021-06-10 | End: 2021-06-10 | Stop reason: HOSPADM

## 2021-06-10 RX ORDER — FENTANYL CITRATE 50 UG/ML
INJECTION, SOLUTION INTRAMUSCULAR; INTRAVENOUS AS NEEDED
Status: DISCONTINUED | OUTPATIENT
Start: 2021-06-10 | End: 2021-06-10

## 2021-06-10 RX ORDER — OXYCODONE HYDROCHLORIDE AND ACETAMINOPHEN 5; 325 MG/1; MG/1
1 TABLET ORAL EVERY 4 HOURS PRN
Status: DISCONTINUED | OUTPATIENT
Start: 2021-06-10 | End: 2021-06-10 | Stop reason: HOSPADM

## 2021-06-10 RX ORDER — ALBUTEROL SULFATE 2.5 MG/3ML
2.5 SOLUTION RESPIRATORY (INHALATION) ONCE AS NEEDED
Status: DISCONTINUED | OUTPATIENT
Start: 2021-06-10 | End: 2021-06-10 | Stop reason: HOSPADM

## 2021-06-10 RX ORDER — SODIUM CHLORIDE 9 MG/ML
125 INJECTION, SOLUTION INTRAVENOUS ONCE
Status: DISCONTINUED | OUTPATIENT
Start: 2021-06-10 | End: 2021-06-10 | Stop reason: HOSPADM

## 2021-06-10 RX ORDER — TAMSULOSIN HYDROCHLORIDE 0.4 MG/1
0.4 CAPSULE ORAL ONCE
Status: COMPLETED | OUTPATIENT
Start: 2021-06-10 | End: 2021-06-10

## 2021-06-10 RX ORDER — ONDANSETRON 2 MG/ML
4 INJECTION INTRAMUSCULAR; INTRAVENOUS EVERY 6 HOURS PRN
Status: DISCONTINUED | OUTPATIENT
Start: 2021-06-10 | End: 2021-06-10 | Stop reason: HOSPADM

## 2021-06-10 RX ADMIN — MIDAZOLAM 2 MG: 1 INJECTION INTRAMUSCULAR; INTRAVENOUS at 13:04

## 2021-06-10 RX ADMIN — LIDOCAINE HYDROCHLORIDE 100 MG: 20 INJECTION, SOLUTION INTRAVENOUS at 13:06

## 2021-06-10 RX ADMIN — SODIUM CHLORIDE: 0.9 INJECTION, SOLUTION INTRAVENOUS at 13:30

## 2021-06-10 RX ADMIN — SODIUM CHLORIDE 125 ML/HR: 0.9 INJECTION, SOLUTION INTRAVENOUS at 15:48

## 2021-06-10 RX ADMIN — TAMSULOSIN HYDROCHLORIDE 0.4 MG: 0.4 CAPSULE ORAL at 15:58

## 2021-06-10 RX ADMIN — OXYCODONE HYDROCHLORIDE AND ACETAMINOPHEN 1 TABLET: 5; 325 TABLET ORAL at 15:57

## 2021-06-10 RX ADMIN — PROPOFOL 200 MG: 10 INJECTION, EMULSION INTRAVENOUS at 13:06

## 2021-06-10 RX ADMIN — ONDANSETRON 4 MG: 2 INJECTION INTRAMUSCULAR; INTRAVENOUS at 13:06

## 2021-06-10 RX ADMIN — EPHEDRINE SULFATE 5 MG: 50 INJECTION, SOLUTION INTRAVENOUS at 13:12

## 2021-06-10 RX ADMIN — FENTANYL CITRATE 50 MCG: 50 INJECTION INTRAMUSCULAR; INTRAVENOUS at 13:18

## 2021-06-10 RX ADMIN — SODIUM CHLORIDE 125 ML/HR: 0.9 INJECTION, SOLUTION INTRAVENOUS at 12:20

## 2021-06-10 RX ADMIN — CEFAZOLIN SODIUM 2000 MG: 2 SOLUTION INTRAVENOUS at 12:59

## 2021-06-10 NOTE — TELEPHONE ENCOUNTER
Darrick Tang  is a 56 year patient who I saw yesterday in the office  He was wondering if removal of the hardware would help and for his  Longstanding back pain and persistent weakness  I did send a message to  Neurosurgery  And  the neurosurgical office informed me that Dr Hamida Rankin   Would be willing to evaluate him  They did   Suggest obtaining a more current imaging study of his lumbar spine   Today he is scheduled for did undergo urological surgery  The recovery may take several weeks  Please inform  Darrick Tang that  imaging study of a lumbar spine is being suggested prior to the visit      will place an order for an MRI of the lumbar spine as well as a consult by Neurosurgery  Please let her know that the office will call him to schedule the MRI  will be at least 2 to 3 weeks   I was suggest recovery surgery today for several weeks prior to the MRI  being performed    Subsequently a neurologic neurosurgical could then be  scheduled

## 2021-06-10 NOTE — DISCHARGE INSTRUCTIONS
Hydrocele   WHAT YOU NEED TO KNOW:   A hydrocele is a collection of fluid inside the scrotum  The scrotum holds the testicles  Hydroceles are simple or communicating  A simple hydrocele stays the same size  A communicating hydrocele gets bigger and smaller as fluid flows into and out of the scrotum  DISCHARGE INSTRUCTIONS:   Medicines:   · Pain medicine: You may need medicine to take away or decrease pain  ? Learn how to take your medicine  Ask what medicine and how much you should take  Be sure you know how, when, and how often to take it  ? Do not wait until the pain is severe before you take your medicine  Tell your healthcare provider if your pain does not decrease  ? Pain medicine can make you dizzy or sleepy  Prevent falls by calling someone when you get out of bed or if you need help  Take your medicine as directed  Contact your healthcare provider if you think your medicine is not helping or if you have side effects  Tell him or her if you are allergic to any medicine  Keep a list of the medicines, vitamins, and herbs you take  Include the amounts, and when and why you take them  Bring the list or the pill bottles to follow-up visits  Carry your medicine list with you in case of an emergency  Follow up with your healthcare provider or urologist as directed:  Write down your questions so you remember to ask them during your visits  Support: You may need to wear a fabric support device similar to a jock strap to decrease swelling  Wound care:  Ask your healthcare provider how to care for your incision after surgery  Contact your healthcare provider or urologist if:   · The swelling gets worse or does not go away  · Your scrotum is swollen and you have a fever  · Your surgery wound is swollen, red, or it is leaking green or yellow fluid  · You have questions or concerns about your condition or care      Seek care immediately or call 911 if:   · You have severe pain in your scrotum  · You see blood on your bandages and the amount is increasing  © Copyright [address] Information is for End User's use only and may not be sold, redistributed or otherwise used for commercial purposes  All illustrations and images included in CareNotes® are the copyrighted property of A D A M , Inc  or Garima Saez   The above information is an  only  It is not intended as medical advice for individual conditions or treatments  Talk to your doctor, nurse or pharmacist before following any medical regimen to see if it is safe and effective for you  Hydrocele   WHAT YOU NEED TO KNOW:   What is a hydrocele? A hydrocele is a collection of fluid inside the scrotum  The scrotum holds the testicles  Hydroceles can occur in one or both sides of the scrotum and usually grow slowly  They are common in newborns  They also occur in children and adults  There are 2 kinds of hydroceles:  · Simple hydrocele:  A simple hydrocele can form at any age  This kind of hydrocele does not get bigger or smaller  · Communicating hydrocele:  A communicating hydrocele can form at any age but is more common in infants and children  This kind of hydrocele gets bigger and smaller  Size changes are caused by fluid flowing through a tube from the abdomen into the scrotum  This occurs when the tube does not close as it should  The hydrocele may grow larger when you are active  It may shrink when you are at rest  A hydrocele may occur with a hernia  A hernia is when part of the intestine goes through a hole in the lining of the abdomen  What causes a hydrocele? Hydroceles usually do not have a specific cause  An injury to the scrotum may cause a hydrocele to form  The injury may be a blow to the scrotum during sports activity or a car crash  Hydroceles may also form after surgery or infection in the scrotum  What are the signs and symptoms of a hydrocele?   A painless, swollen scrotum is the most common sign of a hydrocele  Your scrotum may feel sore and heavy from the swelling  How is a hydrocele diagnosed? Your healthcare provider will ask about your swollen scrotum and examine you  Tell your healthcare provider about any injury to your scrotum  He will apply gentle pressure to your scrotum to check whether the hydrocele shrinks  Your healthcare provider may order these or other tests:  · Transillumination:  Transillumination is when your healthcare provider shines a bright light on your scrotum  This helps him see the fluid inside your scrotum  Transillumination can help identify other problems, such as a hernia  · Ultrasound: An ultrasound uses sound waves to show pictures of your scrotum on a monitor  An ultrasound can help confirm the presence of a hydrocele and identify any other problems with the scrotum  How is a hydrocele treated? Your hydrocele will usually go away on its own  Your child's hydrocele will usually go away by the time he is 3years old  The hydrocele will need to be removed if it does not go away, or gets very large     · Support of scrotum:  You may need to wear a fabric support device similar to a jock strap to decrease swelling  · Hydrocelectomy:  Hydrocelectomy is surgery to remove your hydrocele  Healthcare providers make an incision in your scrotum or groin  During surgery for a simple hydrocele, a small incision is made, and the fluid is removed  During surgery for a communicating hydrocele, healthcare providers use stitches to close the tube  The stitches stop the flow of fluid from the hydrocele to the abdomen  · Needle aspiration:  Healthcare providers put a needle through your scrotum and into your hydrocele  The fluid is drained from your scrotum through the needle  What are the risks of a hydrocele? · Your hydrocele may not go away on its own  It may get bigger and cause pain or a heavy feeling   You may also have a hernia if you have a communicating hydrocele  If a hernia is not treated, it may cause pain and damage to your organs  · You may get an infection after surgery  You may have fertility problems after surgery  Surgery to remove the hydrocele may cause another simple hydrocele to form  After surgery or aspiration, the hydrocele may return  When should I contact my healthcare provider? · Your scrotum is swollen  · The swelling gets bigger or does not go away  · You have questions or concerns about your condition or care  When should I seek immediate care? · You have severe pain and swelling after an injury to the scrotum  CARE AGREEMENT:   You have the right to help plan your care  Learn about your health condition and how it may be treated  Discuss treatment options with your healthcare providers to decide what care you want to receive  You always have the right to refuse treatment  The above information is an  only  It is not intended as medical advice for individual conditions or treatments  Talk to your doctor, nurse or pharmacist before following any medical regimen to see if it is safe and effective for you  © Copyright [address] Information is for End User's use only and may not be sold, redistributed or otherwise used for commercial purposes   All illustrations and images included in CareNotes® are the copyrighted property of [email or web address]  or [address] [place] [address]

## 2021-06-10 NOTE — PROGRESS NOTES
Pt reporting pain 5/10  Pt with no order for pain medication  Questioned pt about listed allergy to morphine  Pt reports is not allergy, but will take narcotic after surgery  Dr Ivonne Benito contacted and made aware and gave verbal order for percocet

## 2021-06-10 NOTE — INTERVAL H&P NOTE
H&P reviewed  After examining the patient I find no changes in the patients condition since the H&P had been written      Vitals:    06/10/21 1211   BP: 115/66   Pulse: 78   Resp: 16   Temp: 98 °F (36 7 °C)   SpO2: 96%

## 2021-06-10 NOTE — ANESTHESIA PREPROCEDURE EVALUATION
Procedure:  HYDROCELECTOMY (Right Scrotum)    Relevant Problems   Other   (+) BPH associated with nocturia   (+) Cauda equina syndrome (HCC)   (+) Hydrocele of testis   (+) S/P insertion of spinal cord stimulator   (+) Spinal stenosis of thoracolumbar region        Physical Exam    Airway    Mallampati score: III  TM Distance: >3 FB  Neck ROM: full     Dental   No notable dental hx     Cardiovascular  Rhythm: regular, Rate: normal, Cardiovascular exam normal    Pulmonary  Pulmonary exam normal Breath sounds clear to auscultation,     Other Findings        Anesthesia Plan  ASA Score- 2     Anesthesia Type- general with ASA Monitors  Additional Monitors:   Airway Plan:     Comment: [facility]  Many orthopedic procedures  No cardiac or pulmonary history          Plan Factors-    Chart reviewed  EKG reviewed  Existing labs reviewed  Patient summary reviewed  Patient is not a current smoker  Patient instructed to abstain from smoking on day of procedure  Patient did not smoke on day of surgery  Induction- intravenous  Postoperative Plan-     Informed Consent- Anesthetic plan and risks discussed with patient and spouse

## 2021-06-10 NOTE — TELEPHONE ENCOUNTER
Spoke with Lai Callejas, he states that it will not take long for his recovery from the hydrocele  He will schedule MRI,  But states he will need valium or something because he is claustrophobic  I advised to call us back once he has the test scheduled so we know when the date is so we can provide him with something for the claustrophobia       Dr Elizabeth Major, [facility]

## 2021-06-10 NOTE — PROGRESS NOTES
Upon arrival to [facility], pt made aware of criteria to be d/c home and he would need to void  PT initially verbalized understanding  Shortly after, pt states that if he can't void, he is going to leave  "I'll sign the paper or whatever but I will leave " Pt stating he will still stay in [facility] at this time and would not give a time frame on how long he would want to stay  Pt also refusing to attempt to ambulate to bathroom or even sit on the edge of the bed to attempt to void reporting he has difficulty ambulating  1640, at this time pt stating he is no longer going to wait to void to be d/c home and wants to leave at this time  Explained to pt reason why it is required to void to be d/c, and pt continuing to refused to stay, stating "I can pee at home"  Dr Octavio Antonio called and made aware pt refusing to stay and refusing to void prior to d/c  Per Dr Octavio Antonio, inform pt to return to ER if unable to void from [id number]  Pt made aware and states, "I'll be able to pee"  Pt initally refusing to attempt to ambulate but then agreeable to ambulate a few steps with the walker (that he uses at baseline) to the wheelchair  Pt requesting d/c home

## 2021-06-19 DIAGNOSIS — E29.1 HYPOGONADISM IN MALE: ICD-10-CM

## 2021-06-20 RX ORDER — TESTOSTERONE CYPIONATE 200 MG/ML
INJECTION INTRAMUSCULAR
Qty: 10 ML | Refills: 1 | Status: SHIPPED | OUTPATIENT
Start: 2021-06-20 | End: 2022-01-03

## 2021-07-22 ENCOUNTER — HOSPITAL ENCOUNTER (OUTPATIENT)
Dept: RADIOLOGY | Facility: HOSPITAL | Age: 61
Discharge: HOME | End: 2021-07-22
Payer: COMMERCIAL

## 2021-07-22 DIAGNOSIS — M48.05 SPINAL STENOSIS OF THORACOLUMBAR REGION: ICD-10-CM

## 2021-07-22 PROCEDURE — 72148 MRI LUMBAR SPINE W/O DYE: CPT

## 2021-07-22 PROCEDURE — G1004 CDSM NDSC: HCPCS

## 2021-07-30 ENCOUNTER — TELEPHONE (OUTPATIENT)
Dept: NEUROLOGY | Facility: CLINIC | Age: 61
End: 2021-07-30

## 2021-07-30 NOTE — TELEPHONE ENCOUNTER
----- Message from Zoë Rasheed DO sent at 7/29/2021  4:33 PM EDT -----   The patient know that the MRI showed chronic stable prior fusions  There is no critical stenosis noted in any levels    He can now schedule a appointment with Neurosurgery

## 2021-09-03 ENCOUNTER — OFFICE VISIT (OUTPATIENT)
Dept: NEUROSURGERY | Facility: CLINIC | Age: 61
End: 2021-09-03
Payer: COMMERCIAL

## 2021-09-03 VITALS
BODY MASS INDEX: 29.93 KG/M2 | HEART RATE: 81 BPM | TEMPERATURE: 97.2 F | HEIGHT: 72 IN | DIASTOLIC BLOOD PRESSURE: 84 MMHG | WEIGHT: 221 LBS | RESPIRATION RATE: 16 BRPM | SYSTOLIC BLOOD PRESSURE: 122 MMHG

## 2021-09-03 DIAGNOSIS — Z96.89 S/P INSERTION OF SPINAL CORD STIMULATOR: ICD-10-CM

## 2021-09-03 DIAGNOSIS — Z98.1 STATUS POST LUMBAR SPINAL FUSION: Primary | ICD-10-CM

## 2021-09-03 DIAGNOSIS — M48.05 SPINAL STENOSIS OF THORACOLUMBAR REGION: ICD-10-CM

## 2021-09-03 PROCEDURE — 99243 OFF/OP CNSLTJ NEW/EST LOW 30: CPT | Performed by: NEUROLOGICAL SURGERY

## 2021-09-03 NOTE — PROGRESS NOTES
Neurosurgery Office Note  David Clamp 59 y o  male MRN: [id number]      Assessment/Plan     Status post lumbar spinal fusion  · Presents for consultation for increased ambulatory dysfunction  · Hx of Lumbar stenosis s/p Lumbar fusion L2-S1 in 2016 by Dr Lilia Sharpe  · Hx of Thoracic Abott SCS insertion in 2019  · Imaging reviewed personally and by attending  Final results below discussed with the patient  MRI Lumbar spine wo 7/22/21: Markedly limited exam due to hardware  There is no critical stenosis at any level  Posterior fusion L2-S1 noted  Plan  · Pain control with OTC as needed  · Recommend continuation of conservative measures including physical therapy as tolerated  · Recommend consideration of cutting down on your alcohol drinking  · Recommend weight management  · Consider trial of vitamin B complex supplementation  · Dr Pedro Luis Yo discussed imaging findings with patient in detail and discussed with pt that at this time, no neurosurgical intervention is anticipated  · Further follow up with neurosurgery will be on a PRN basis  · Discussed plan of care with patient who showed understanding  · Patient made aware to contact neurosurgery with any questions or concerns  Diagnoses and all orders for this visit:    Status post lumbar spinal fusion    Spinal stenosis of thoracolumbar region  -     Ambulatory referral to Neurosurgery    S/P insertion of spinal cord stimulator              CHIEF COMPLAINT    Chief Complaint   Patient presents with    Consult       HISTORY    History of Present Illness     59y o  year old male     Patient is a 59year old male with PMHx of Lumbar stenosis s/p Lumbar fusion L2-S1 in April 2016 by Dr Lilia Sharpe, hx of Thoracic Abott SCS insertion in 2019 by Dr Roldan Haas, chronic numbness in BLE, Erectile dysfunction, BPH, Hydrocele of testis presents to the neurosurgery office for consultation for increased ambulatory dysfunction   Pt reports since the fusion in his back, he had numbness from his abdomen and below as well as muscle spasms  At this time, pt would to find out if he has any hardware malfunction or if there is any additional intervention he can have  Pt reports that he had had decreased mobility within the last 2 years  He reports that 2 years ago he was walking with a cane but now uses a walker  He reports his balance has worsened  He also reports the Covid pandemic had an effect on his mobility since the gyms were closed  Pt reports that he does therapy at [facility] doing pool exercises  He reports it's difficulty to go to the gym now and does not go as much as he did before  He also reports he gets massages  At this time pt reports back pain that he rates as 3/10 on the pain scale  Pt reports the SCS initially helped but recently it has not been helping  Pt denies radiation of pain down his legs but reports stiffness in his legs  Pt reports he has numbness from his abdomen and below  Pt reports weakness in his legs right greater than left  Pt denies bowel or bladder incontinence  He reports urinary urgency  Pt reports he lives at home alone  He reports that he has been drinking a lot more since the Covid pandemic  Pt accompanied by his sister to this visit  REVIEW OF SYSTEMS    Review of Systems   Constitutional: Negative  HENT: Negative  Eyes: Negative  Respiratory: Negative  Cardiovascular: Negative  Gastrointestinal: Positive for constipation  Endocrine: Negative  Genitourinary: Positive for urgency  Musculoskeletal: Positive for back pain (slightly above the waste, non-radiating  bi/legs pain) and myalgias (lower back spasms)  Hx of lumbar fusion,  Abbott SCS implant   Skin: Negative  Allergic/Immunologic: Negative  Neurological: Positive for weakness (bi/leg, right is worse) and numbness (numbness from bellly button after lumbar fusion)  Hematological: Negative      Psychiatric/Behavioral: Negative  Meds/Allergies     Current Outpatient Medications   Medication Sig Dispense Refill    fluocinolone (SYNALAR) 0 025 % cream Apply 1 application topically 2 (two) times a day as needed       ketoconazole (NIZORAL) 2 % cream Apply 1 application topically 2 (two) times a day as needed       methylPREDNISolone 4 MG tablet therapy pack use as directed FOLLOW DIRECTIONS ON BACK OF FOIL PACK (Patient taking differently: Take 5 mg by mouth daily use as directed FOLLOW DIRECTIONS ON BACK OF FOIL PACK) 21 tablet 1    oxymetazoline (AFRIN) 0 05 % nasal spray 2 sprays by Each Nare route 2 (two) times a day      predniSONE 5 mg tablet Take 1 tablet (5 mg total) by mouth daily 90 tablet 1    testosterone cypionate (DEPO-TESTOSTERONE) 200 mg/mL SOLN inject 2 milliliters intramuscularly every 14 days 10 mL 1    ALPRAZolam (XANAX) 0 5 mg tablet Take 1 tablet (0 5 mg total) by mouth 30 min pre-procedure Can repeat in 2 hours if needed (Patient not taking: Reported on 9/3/2021) 5 tablet 0    oxyCODONE-acetaminophen (PERCOCET)  mg per tablet take 1 tablet by mouth every 4 hours if needed for SEVERE pain (Patient not taking: Reported on 9/3/2021)      sildenafil (REVATIO) 20 mg tablet Take 3 to 5 tablets by mouth one (1) hour prior to intercourse on an empty stomach  Limit to 3 encounters per week  90 tablet 5    sildenafil (VIAGRA) 100 mg tablet Take 100 mg by mouth As directed       No current facility-administered medications for this visit  Allergies   Allergen Reactions    Morphine Other (See Comments)     Addiction hx   Pt wants no narcotics  Except surgical interventions       PAST HISTORY    Past Medical History:   Diagnosis Date    Balance problems     BPH (benign prostatic hyperplasia)     Chronic back pain     Chronic pain disorder     COVID-19     2/2021-asymptomatic     Erectile dysfunction     Nocturia     OAB (overactive bladder)     Testicular hypogonadism     Urinary frequency     Urinary urgency        Past Surgical History:   Procedure Laterality Date    BACK SURGERY  05/18/2016    with hardware    COLONOSCOPY  2004    Dr Geovanni Garsia  tubular adenoma polyp removed    COLONOSCOPY  2007    Dr Demetria Armenta  Normal    COLONOSCOPY  2013    Dr Demetria Armenta  Normal      COLONOSCOPY  06/2019    Dr Demetria Armenta  Hemorrhoids, normal      NECK SURGERY      decompression of neck     RI REMOVAL OF HYDROCELE,TUNICA,UNILAT Right 6/10/2021    Procedure: HYDROCELECTOMY;  Surgeon: Ramos Guzman DO;  Location: [facility];  Service: Urology   [address] IMPLANT  10/10/2019       Social History     Tobacco Use    Smoking status: Never Smoker    Smokeless tobacco: Never Used   Vaping Use    Vaping Use: Never used   Substance Use Topics    Alcohol use: Yes     Comment: Drinks socially   Drug use: No       No family history on file  Above history personally reviewed  EXAM    Vitals:Blood pressure 122/84, pulse 81, temperature (!) 97 2 °F (36 2 °C), temperature source Tympanic, resp  rate 16, height 6' (1 829 m), weight 100 kg (221 lb)  ,Body mass index is 29 97 kg/m²  Physical Exam  Constitutional:       Appearance: He is well-developed  Comments: Pt in wheelchair  HENT:      Head: Normocephalic and atraumatic  Eyes:      General: No scleral icterus  Conjunctiva/sclera: Conjunctivae normal       Pupils: Pupils are equal, round, and reactive to light  Neck:      Trachea: No tracheal deviation  Cardiovascular:      Rate and Rhythm: Normal rate  Pulmonary:      Effort: Pulmonary effort is normal    Abdominal:      Palpations: Abdomen is soft  Tenderness: There is no abdominal tenderness  There is no guarding  Musculoskeletal:      Cervical back: Neck supple  Skin:     General: Skin is warm and dry  Coloration: Skin is not pale  Findings: No rash  Comments: Thoracic and lumbar incision are well healed      Neurological:      Mental Status: He is alert and oriented to person, place, and time  Comments: GCS 15, Awake, Alert, Oriented x 3    Motor: WAITE, strength 5/5 BUE except IO: 4+/5  BLE: HF, KF 4/5, KE: 4-/5, DF/PF: 4+/5  Sensation:  intact to LT/PP X 4     Reflexes: 2+ RUE/BLE, 1+ LUE, no michaels's or clonus     Coordination: no drift bilateral upper extremities  Psychiatric:         Behavior: Behavior normal          Neurologic Exam     Mental Status   Oriented to person, place, and time  Cranial Nerves     CN III, IV, VI   Pupils are equal, round, and reactive to light  MEDICAL DECISION MAKING    Imaging Studies:     I have personally reviewed pertinent reports     and I have personally reviewed pertinent films in PACS

## 2021-09-03 NOTE — PATIENT INSTRUCTIONS
Neurosurgery Instructions  · At this time, we do not anticipate any neurosurgical intervention  Further follow up with us will be on a PRN basis  · We recommend that you continue conservative management with pain management and continue physical therapy  · Consider trial of Vitamin B complex supplements  · Continue follow up with your primary care provider for general health and we recommend healthy choices  · Please contact us with any questions or concerns

## 2021-09-03 NOTE — ASSESSMENT & PLAN NOTE
· Presents for consultation for increased ambulatory dysfunction  · Hx of Lumbar stenosis s/p Lumbar fusion L2-S1 in 2016 by Dr Patricia Pichardo  · Hx of Thoracic Abott SCS insertion in 2019  · Imaging reviewed personally and by attending  Final results below discussed with the patient  MRI Lumbar spine wo 7/22/21: Markedly limited exam due to hardware  There is no critical stenosis at any level  Posterior fusion L2-S1 noted  Plan  · Pain control with OTC as needed  · Recommend continuation of conservative measures including physical therapy as tolerated  · Recommend consideration of cutting down on your alcohol drinking  · Recommend weight management  · Consider trial of vitamin B complex supplementation  · Dr Arden Monzon discussed imaging findings with patient in detail and discussed with pt that at this time, no neurosurgical intervention is anticipated  · Further follow up with neurosurgery will be on a PRN basis  · Discussed plan of care with patient who showed understanding  · Patient made aware to contact neurosurgery with any questions or concerns

## 2021-09-21 ENCOUNTER — TELEPHONE (OUTPATIENT)
Dept: CARDIOLOGY CLINIC | Facility: CLINIC | Age: 61
End: 2021-09-21

## 2021-09-21 NOTE — TELEPHONE ENCOUNTER
Phone call from patient  Patient of Dr Devon Amador  Last ov 7/30/20  Complaints   of having no energy (more than usual)  Legs feel heavy progressively worse since June/July  Chest Tightness  Light headedness sitting x4 since July  Feet a little blue    Saw family doctor week ago with same complaints  EKG normal   Blood work ordered which he had done today [facility]  BP avg 110/70-80    Appt sched with Dr Devon Amador 10/15/21    Patient anxious, hoping to see somone sooner  Or ED, what would they do there  Please advise

## 2021-09-21 NOTE — TELEPHONE ENCOUNTER
Phone call to patient per Dr Thane Griffes, DO Vernetta Runner: Unspecified (Today, 10:34 AM)  In May 2020, patient only had minimal atherosclerosis of the lower extremities   It is unlikely for a to a progress substantially in the past year  [facility] does have cauda equina syndrome   If he is having significant chest discomfort, he does have abnormal stress test   In that case, reasonable to be seen in the emergency department   ED Evaluation is the proper answer if he remains symptomatic   Can move up appointment with Dr Deborah Ivy if possible as well  Patient informs me he is in the ED at this moment  Told patient to keep 10/15/21 appt with Dr Deborah Ivy

## 2021-10-13 ENCOUNTER — OFFICE VISIT (OUTPATIENT)
Dept: NEUROLOGY | Facility: CLINIC | Age: 61
End: 2021-10-13
Payer: COMMERCIAL

## 2021-10-13 VITALS
HEIGHT: 72 IN | DIASTOLIC BLOOD PRESSURE: 75 MMHG | SYSTOLIC BLOOD PRESSURE: 101 MMHG | BODY MASS INDEX: 31.29 KG/M2 | WEIGHT: 231 LBS | HEART RATE: 80 BPM | TEMPERATURE: 96.8 F | RESPIRATION RATE: 18 BRPM

## 2021-10-13 DIAGNOSIS — M48.05 SPINAL STENOSIS OF THORACOLUMBAR REGION: Primary | ICD-10-CM

## 2021-10-13 DIAGNOSIS — G83.4 CAUDA EQUINA SYNDROME (HCC): ICD-10-CM

## 2021-10-13 PROCEDURE — 99214 OFFICE O/P EST MOD 30 MIN: CPT | Performed by: PSYCHIATRY & NEUROLOGY

## 2021-10-13 RX ORDER — ALPRAZOLAM 0.25 MG
0.25 TABLET ORAL DAILY PRN
COMMUNITY
Start: 2021-09-30

## 2021-10-13 RX ORDER — PREDNISONE 1 MG/1
TABLET ORAL
Qty: 120 TABLET | Refills: 2 | Status: SHIPPED | OUTPATIENT
Start: 2021-10-13

## 2021-10-15 ENCOUNTER — OFFICE VISIT (OUTPATIENT)
Dept: CARDIOLOGY CLINIC | Facility: CLINIC | Age: 61
End: 2021-10-15
Payer: COMMERCIAL

## 2021-10-15 VITALS
HEART RATE: 96 BPM | HEIGHT: 72 IN | SYSTOLIC BLOOD PRESSURE: 108 MMHG | RESPIRATION RATE: 16 BRPM | BODY MASS INDEX: 31.29 KG/M2 | WEIGHT: 231 LBS | DIASTOLIC BLOOD PRESSURE: 78 MMHG

## 2021-10-15 DIAGNOSIS — E29.1 HYPOGONADISM IN MALE: ICD-10-CM

## 2021-10-15 DIAGNOSIS — M48.05 SPINAL STENOSIS OF THORACOLUMBAR REGION: ICD-10-CM

## 2021-10-15 DIAGNOSIS — N52.9 ERECTILE DYSFUNCTION, UNSPECIFIED ERECTILE DYSFUNCTION TYPE: ICD-10-CM

## 2021-10-15 DIAGNOSIS — Z96.89 S/P INSERTION OF SPINAL CORD STIMULATOR: ICD-10-CM

## 2021-10-15 DIAGNOSIS — R07.89 CHEST DISCOMFORT: Primary | ICD-10-CM

## 2021-10-15 DIAGNOSIS — Z98.1 STATUS POST LUMBAR SPINAL FUSION: ICD-10-CM

## 2021-10-15 DIAGNOSIS — G83.4 CAUDA EQUINA SYNDROME (HCC): ICD-10-CM

## 2021-10-15 PROCEDURE — 99214 OFFICE O/P EST MOD 30 MIN: CPT

## 2021-12-28 ENCOUNTER — TELEPHONE (OUTPATIENT)
Dept: NEUROLOGY | Facility: CLINIC | Age: 61
End: 2021-12-28

## 2022-01-03 DIAGNOSIS — E29.1 HYPOGONADISM IN MALE: ICD-10-CM

## 2022-01-03 RX ORDER — TESTOSTERONE CYPIONATE 200 MG/ML
INJECTION INTRAMUSCULAR
Qty: 10 ML | Refills: 0 | Status: SHIPPED | OUTPATIENT
Start: 2022-01-03 | End: 2022-03-17

## 2022-01-10 ENCOUNTER — TELEMEDICINE (OUTPATIENT)
Dept: NEUROLOGY | Facility: CLINIC | Age: 62
End: 2022-01-10
Payer: COMMERCIAL

## 2022-01-10 VITALS — HEIGHT: 72 IN | BODY MASS INDEX: 30.48 KG/M2 | WEIGHT: 225 LBS

## 2022-01-10 DIAGNOSIS — M48.05 SPINAL STENOSIS OF THORACOLUMBAR REGION: ICD-10-CM

## 2022-01-10 DIAGNOSIS — G83.4 CAUDA EQUINA SYNDROME (HCC): ICD-10-CM

## 2022-01-10 PROCEDURE — 99214 OFFICE O/P EST MOD 30 MIN: CPT | Performed by: PSYCHIATRY & NEUROLOGY

## 2022-01-10 RX ORDER — TRIAMCINOLONE ACETONIDE 1 MG/G
CREAM TOPICAL
COMMUNITY
Start: 2021-12-01

## 2022-01-10 RX ORDER — PREDNISONE 1 MG/1
5 TABLET ORAL DAILY
Qty: 90 TABLET | Refills: 1 | Status: SHIPPED | OUTPATIENT
Start: 2022-01-10 | End: 2022-06-22 | Stop reason: SDUPTHER

## 2022-01-10 NOTE — ASSESSMENT & PLAN NOTE
This is a 42-year-old patient has a known history of severe lumbar stenosis  He has had multiple surgeries in even a spinal cord stimulator   During that time he received aggressive physical therapy,  water therapy,  stretching techniques  He lost weight as the meals were limited  Since that time he has been able to improve his ambulation  he does utilize crutches and  cane  over the last two weeks he has been less active due to numerous reasons  He will be restarting his physical therapy today  He did undergo extensive laboratory studies which are stabilized  During this time he did taper himself off the prednisone but over the last two weeks he did notice return the symptoms and he has been taking 5 mg a day  He  has been utilizing the diclofenac cream with benefit as well as with stretching  For the paraspinal pain     I did inform him that a lack of exercise causes more atrophy and  can subsequently his balance  And therefore he will then return to exercising on a regular basis  He is to return to our offices in a few months  We did discuss that the long-term use of the NSAID cream as well as prednisone can increase the risk of high blood pressure as well a GI side which he does understand    He will attempt to taper himself off the prednisone as he improves

## 2022-01-10 NOTE — PROGRESS NOTES
Virtual Regular Visit    Verification of patient location:    Patient is located in the following state in which I hold an active license [place]      Assessment/Plan:    Problem List Items Addressed This Visit        Nervous and Auditory    Cauda equina syndrome (HCC)    Relevant Medications    predniSONE 5 mg tablet       Other    Spinal stenosis of thoracolumbar region     This is a 68-year-old patient has a known history of severe lumbar stenosis  He has had multiple surgeries in even a spinal cord stimulator   During that time he received aggressive physical therapy,  water therapy,  stretching techniques  He lost weight as the meals were limited  Since that time he has been able to improve his ambulation  he does utilize crutches and  cane  over the last two weeks he has been less active due to numerous reasons  He will be restarting his physical therapy today  He did undergo extensive laboratory studies which are stabilized  During this time he did taper himself off the prednisone but over the last two weeks he did notice return the symptoms and he has been taking 5 mg a day  He  has been utilizing the diclofenac cream with benefit as well as with stretching  For the paraspinal pain     I did inform him that a lack of exercise causes more atrophy and  can subsequently his balance  And therefore he will then return to exercising on a regular basis  He is to return to our offices in a few months  We did discuss that the long-term use of the NSAID cream as well as prednisone can increase the risk of high blood pressure as well a GI side which he does understand    He will attempt to taper himself off the prednisone as he improves         Relevant Medications    Diclofenac Sodium (Voltaren) 1 %               Reason for visit is   Chief Complaint   Patient presents with    Virtual Regular Visit        Encounter provider Victor M Moody DO    Provider located at [facility] [address] [place] [id number]      Recent Visits  No visits were found meeting these conditions  Showing recent visits within past 7 days and meeting all other requirements  Today's Visits  Date Type Provider Dept   01/10/22 Telemedicine Felicity Alarcon DO [facility] [place]   Showing today's visits and meeting all other requirements  Future Appointments  No visits were found meeting these conditions  Showing future appointments within next 150 days and meeting all other requirements       The patient was identified by name and date of birth  Alvaro Wallace was informed that this is a telemedicine visit and that the visit is being conducted through [facility] and patient was informed this is a secure, HIPAA-complaint platform  He agrees to proceed     My office door was closed  No one else was in the room  He acknowledged consent and understanding of privacy and security of the video platform  The patient has agreed to participate and understands they can discontinue the visit at any time  Patient is aware this is a billable service  Subjective  Alvaro Wallace is a 59 y o  male       HPI       This is a 59  y o rh male who presented  to our office   in a follow-up visit  [facility] [place] [facility] he underwent aggressive physical therapy, aqua therapy and informs me that it was quite regimen today  Lost weight with the change in diet  He was able to crutches and a cane  He then returned to [place] he has noted in two more achiness  Does admit to being less  He will be restarting his physical today  During that time he did taper himself the prednisone but was using the cream with some benefit  He reports the most beneficial of his paraspinals was stretching techniques  At the last visit he had questions regarding the hardware  He was thinking that this may be causing and contributing to his weakness  Subsequently he underwent MRI imaging study after outpatient surgery which demonstrated chronic lumbar  degenerativeHe then was evaluated by Dr Collette Edwards who informed him that there was no other type  Surgical intervention above this weakness may have been due to deconditioning and the chronic sequelae of injuries  He did suggest water therapy as well as [facility] therapy         Other complaints include episodes of dizziness lightheadedness  This occurred  In the context of excessive heat  This was then followed by anxiety  This occurred after eating an outside restaurant and eating in in a warm environment at a hockey game  After lying down supine and in air-conditioned environment his symptoms resolved  he has had no further     He is currently on prednisone 5 milligrams a day  he did decrease his dose and tapered himself off prednisone  Then restarted approximately one week ago  He was given diclofenac cream   This did help in addition to stretching exercises in the paraspinals     He has chronic pain, numbness in his legs difficulty ambulating  He had a permanent spinal cord stimulator   He is still having adjustments of the stimulator but he admits to having no significant change in his symptomatology  he does utilize a stimulator at times with intermittent tonic stimulation with benefit  This was placed in October Of 2019   He was placed on Lyrica and the seen by Dr Bijan Lu a neurologist at [facility] who suggested physical therapy  Lyrica proved to be ineffective and caused some cognitive issues and subsequently this was discontinued          He has received a new crutches as well as a nerve stimulator unit        Prior history:  He did  complainin of stiffness and heaviness in his legs with progressive weakness   MRI did show foraminal stenosis a left L5-S1 as well as impingement on the left L3 L4 region    He was seen by dr Collette Edwards who  suggested surgery but he and then sought a 2nd opinion and underwent fusion with placement of  L3-S1 in May of 2016 by Dr Veronica Champagne ,  the head of Neurosurgery at [facility]  After the surgery in May of 2016 he noted increasing numbness in his bilateral thighs, in the saddle regions and numbness in the perineum  This occurred after 1 year   the numbness also occurs in the lower extremities the calf on the left side He has noted progressive ambulatory dysfunction specially on the right leg  He does report some urinary urgency and abnormalities with sexual dysfunction   The sexual dysfunction has been progressive over the last year  He is unable to have an organsm for the last  year  He did try baclofen and it was ineffective   He is no longer utilizing narcotic  pain meds and uses prednisone and/or Medrol Dosepak when he has increase in back pain  Oral steriods for a short period of time was ineffective   He is now continued to be followed by physical therapy  He had anterior fusion at L4-L5 and debris due to granulation tissue  There was a bony fragment impinging upon the spinal cord at the L2 vertebral body    He did have mri of the lumbar and thoracic spine after the last visit   Mri of the lumbar spine showed multilevel disc protrusion and DJD , pedical screws were in place   No evidence of arachointits however there was change in signal  Mri of the T spine was abnormal with mid thoracic level disc herniaition at T6 and T7  With multi level shallow disc protrusions   Heavy metal screeen, b12, foalte were normal                                             Prior symptoms include  urinary retention bowel urgency numbness in his anterior thigh is thighs and difficulty with maintaining orgasm  He had undergone MRI of the thoracic and lumbar spine which demonstrated no evidence of arachnoiditis however there was change in the signal in the lower nerves    It was consistent with arachnoiditis           EMG study was performed and it was abnormal   It did demonstrate the presence of bilateral chronic L5-S1 radiculopathies  there is also the presence of a subacute S2-S3 radiculopathies bilaterally due to the presence of denervation changes in the paraspinals this could be associated with a more of a distal etiology such or as arachnoiditis           He was also  On  longstanding opioids to in the past   This  did this did result in less neuropathic pain,  less burning must numbness  No longer utilizes opioates   Did tried muscle relaxants in the past which does which only resulted in excessive drowsiness  Lyrica was ineffective                Past Medical History:   Diagnosis Date    Balance problems     BPH (benign prostatic hyperplasia)     Chronic back pain     Chronic pain disorder     COVID-19     2/2021-asymptomatic     Erectile dysfunction     Nocturia     OAB (overactive bladder)     Testicular hypogonadism     Urinary frequency     Urinary urgency        Past Surgical History:   Procedure Laterality Date    BACK SURGERY  05/18/2016    with hardware    COLONOSCOPY  2004    Dr Sammy Shafer  tubular adenoma polyp removed    COLONOSCOPY  2007    Dr Shanda Cummings  Normal    COLONOSCOPY  2013    Dr Shanda Cummings  Normal      COLONOSCOPY  06/2019    Dr Shanda Cummings   Hemorrhoids, normal      NECK SURGERY      decompression of neck     NM REMOVAL OF HYDROCELE,TUNICA,UNILAT Right 6/10/2021    Procedure: HYDROCELECTOMY;  Surgeon: Bev Mccurdy DO;  Location: [facility];  Service: Urology    SPINAL CORD STIMULATOR IMPLANT  10/10/2019       Current Outpatient Medications   Medication Sig Dispense Refill    Diclofenac Sodium (Voltaren) 1 % Apply 2 g topically 2 (two) times a day 100 g 5    fluocinolone (SYNALAR) 0 025 % cream Apply 1 application topically 2 (two) times a day as needed       oxymetazoline (AFRIN) 0 05 % nasal spray 2 sprays by Each Nare route 2 (two) times a day      predniSONE 1 mg tablet Take 4  Tablets daily 120 tablet 2    sildenafil (REVATIO) 20 mg tablet Take 3 to 5 tablets by mouth one (1) hour prior to intercourse on an empty stomach  Limit to 3 encounters per week  90 tablet 5    sildenafil (VIAGRA) 100 mg tablet Take 100 mg by mouth As directed      testosterone cypionate (DEPO-TESTOSTERONE) 200 mg/mL SOLN inject 2 milliliters intramuscularly every 14 days 10 mL 0    Xanax 0 25 MG tablet Take 0 25 mg by mouth daily as needed      ALPRAZolam (XANAX) 0 5 mg tablet Take 1 tablet (0 5 mg total) by mouth 30 min pre-procedure Can repeat in 2 hours if needed (Patient not taking: Reported on 9/3/2021) 5 tablet 0    ketoconazole (NIZORAL) 2 % cream Apply 1 application topically 2 (two) times a day as needed  (Patient not taking: Reported on 10/13/2021)      mupirocin (BACTROBAN) 2 % ointment       oxyCODONE-acetaminophen (PERCOCET)  mg per tablet take 1 tablet by mouth every 4 hours if needed for SEVERE pain (Patient not taking: Reported on 9/3/2021)      predniSONE 5 mg tablet Take 1 tablet (5 mg total) by mouth daily 90 tablet 1    triamcinolone (KENALOG) 0 1 % cream        No current facility-administered medications for this visit  Allergies   Allergen Reactions    Morphine Other (See Comments)     Addiction hx  Pt wants no narcotics  Except surgical interventions       Review of Systems   Constitutional: Positive for fatigue  Negative for appetite change and fever  HENT: Negative  Negative for hearing loss, tinnitus, trouble swallowing and voice change  Eyes: Negative  Negative for photophobia and pain  Respiratory: Negative  Negative for shortness of breath  Cardiovascular: Negative  Negative for palpitations  Gastrointestinal: Positive for constipation (at times)  Negative for nausea and vomiting  Endocrine: Negative  Negative for cold intolerance  Genitourinary: Positive for frequency and urgency  Negative for dysuria     Musculoskeletal: Positive for back pain, gait problem (balance issues), joint swelling (joint pain) and neck stiffness  Negative for myalgias and neck pain  Skin: Negative  Negative for rash  Allergic/Immunologic: Negative  Neurological: Positive for numbness (from the waist down to toes)  Negative for dizziness, tremors, seizures, syncope, facial asymmetry, speech difficulty, weakness, light-headedness and headaches  Hematological: Negative  Does not bruise/bleed easily  Psychiatric/Behavioral: Negative  Negative for confusion, hallucinations and sleep disturbance  Video Exam    Vitals:    01/10/22 1144   Weight: 102 kg (225 lb)   Height: 6' (1 829 m)       Physical Exam     He was alert and talkative  He is able to provide an accurate history action are can muscles were intact  He was sitting in chair required assistance to the table to stand  He was able to lift up his arms  Had a antilagic  type gait  I spent 20 minutes directly with the patient during this visit    [address] [place] [address] verbally agrees to participate in [employer]  Pt is aware that [employer] could be limited without vital signs or the ability to perform a full hands-on physical exam  Jos Hawk understands he or the provider may request at any time to terminate the video visit and request the patient to seek care or treatment in person

## 2022-03-16 ENCOUNTER — TELEPHONE (OUTPATIENT)
Dept: NEUROLOGY | Facility: CLINIC | Age: 62
End: 2022-03-16

## 2022-03-16 NOTE — TELEPHONE ENCOUNTER
Called and spoke to patient  Patient confirmed upcoming apt with Dr Jose Eduardo Zhou on 03/30/22 @ 12 noon  Patient has no issues or concerns at this time

## 2022-03-17 DIAGNOSIS — E29.1 HYPOGONADISM IN MALE: ICD-10-CM

## 2022-03-17 RX ORDER — TESTOSTERONE CYPIONATE 200 MG/ML
INJECTION INTRAMUSCULAR
Qty: 10 ML | Refills: 2 | Status: SHIPPED | OUTPATIENT
Start: 2022-03-17

## 2022-03-30 ENCOUNTER — TELEPHONE (OUTPATIENT)
Dept: NEUROLOGY | Facility: CLINIC | Age: 62
End: 2022-03-30

## 2022-03-30 NOTE — TELEPHONE ENCOUNTER
Patient cancelled appointment for 3/30/2022 due to having an emergency  Patient would like to reschedule    Please assist

## 2022-03-31 NOTE — TELEPHONE ENCOUNTER
I called patient and left a v/m message about the appointment  For 4/4/22 and awaiting a call back  I left my name and number

## 2022-04-14 ENCOUNTER — TELEPHONE (OUTPATIENT)
Dept: OTHER | Facility: OTHER | Age: 62
End: 2022-04-14

## 2022-04-14 NOTE — TELEPHONE ENCOUNTER
Patient called to cancel his 0800 appointment for today due to last minute lack of transportation  He would like a call back to reschedule

## 2022-04-16 NOTE — ASSESSMENT & PLAN NOTE
The patient will have a cystoscopy to reassess his voiding complaints  Perhaps his treatment can be improved  Ambulatory

## 2022-06-08 ENCOUNTER — TELEPHONE (OUTPATIENT)
Dept: NEUROLOGY | Facility: CLINIC | Age: 62
End: 2022-06-08

## 2022-06-08 NOTE — TELEPHONE ENCOUNTER
I called and left a voicemail message about patients upcoming appointment with Dr Jaylene Munson on 6/22/22 @ 10 am  I requested a call back to our office to confirm the appointment or if the patient has any issues or concerns or cannot keep this appointment

## 2022-06-22 ENCOUNTER — OFFICE VISIT (OUTPATIENT)
Dept: NEUROLOGY | Facility: CLINIC | Age: 62
End: 2022-06-22
Payer: COMMERCIAL

## 2022-06-22 ENCOUNTER — TELEPHONE (OUTPATIENT)
Dept: NEUROLOGY | Facility: CLINIC | Age: 62
End: 2022-06-22

## 2022-06-22 VITALS
SYSTOLIC BLOOD PRESSURE: 101 MMHG | RESPIRATION RATE: 18 BRPM | TEMPERATURE: 97.9 F | WEIGHT: 219 LBS | BODY MASS INDEX: 29.66 KG/M2 | HEIGHT: 72 IN | DIASTOLIC BLOOD PRESSURE: 66 MMHG | HEART RATE: 84 BPM | OXYGEN SATURATION: 98 %

## 2022-06-22 DIAGNOSIS — G83.4 CAUDA EQUINA SYNDROME (HCC): ICD-10-CM

## 2022-06-22 DIAGNOSIS — M48.05 SPINAL STENOSIS OF THORACOLUMBAR REGION: ICD-10-CM

## 2022-06-22 PROCEDURE — 99214 OFFICE O/P EST MOD 30 MIN: CPT | Performed by: PSYCHIATRY & NEUROLOGY

## 2022-06-22 RX ORDER — PREDNISONE 1 MG/1
5 TABLET ORAL DAILY
Qty: 90 TABLET | Refills: 1 | Status: SHIPPED | OUTPATIENT
Start: 2022-06-22

## 2022-06-22 RX ORDER — TRAMADOL HYDROCHLORIDE 50 MG/1
TABLET ORAL
COMMUNITY
Start: 2022-06-15

## 2022-06-22 NOTE — ASSESSMENT & PLAN NOTE
Diallo Rodríguez is a 77-year-old patient with spinal stenosis  He recently underwent surgery with removal of scar tissue on the  Right side of his neck as well as in his lower back  This is performed at the clinic in [place]  He does report improvement of his neck on the right better range of motion  He is questioning if there are other Orthopedics physician 2nd potentially removed scar tissue and or the hardware  I informed Diallo Rodríguez that I  am not familiar with the Orthopedics Department but we will attempt to check for him    He has been utilizing a Medrol Dosepak recently and has two days available  I have informed him that that steroids do affect the body to recover as quickly and I have recommended that he avoid steroids immediately after surgery  After he finishes dose pack in two days he should avoid any further oral  oral prednisone for approximately one to two weeks     We Had a discussion that long-term use of  steroids even the low dose can result in a higher risk of osteoporosis  He is at risk of falling due to his ongoing lower back iss have suggested that he only use steroids for shorter periods of time with periods of severe inflammation  And he does understand this and were proceed accordingly     However the strength in his legs has significantly improved    He is now exercising on a regular basis except for the recent recovery from his spinal surgery    The records from his hospitalization we will request

## 2022-06-22 NOTE — PROGRESS NOTES
Patient ID: Elke Callahan is a 59 y o  male  Assessment/Plan:    Spinal stenosis of thoracolumbar region  Terry Young is a 75-year-old patient with spinal stenosis  He recently underwent surgery with removal of scar tissue on the  Right side of his neck as well as in his lower back  This is performed at the clinic in [place]  He does report improvement of his neck on the right better range of motion  He is questioning if there are other Orthopedics physician 2nd potentially removed scar tissue and or the hardware  I informed Terry Young that I  am not familiar with the Orthopedics Department but we will attempt to check for him    He has been utilizing a Medrol Dosepak recently and has two days available  I have informed him that that steroids do affect the body to recover as quickly and I have recommended that he avoid steroids immediately after surgery  After he finishes dose pack in two days he should avoid any further oral  oral prednisone for approximately one to two weeks     We Had a discussion that long-term use of  steroids even the low dose can result in a higher risk of osteoporosis  He is at risk of falling due to his ongoing lower back iss have suggested that he only use steroids for shorter periods of time with periods of severe inflammation  And he does understand this and were proceed accordingly     However the strength in his legs has significantly improved  He is now exercising on a regular basis except for the recent recovery from his spinal surgery    The records from his hospitalization we will request       Diagnoses and all orders for this visit:    Spinal stenosis of thoracolumbar region  -     Diclofenac Sodium (Voltaren) 1 %; Apply 2 g topically 2 (two) times a day    Cauda equina syndrome (HCC)  -     predniSONE 5 mg tablet;  Take 1 tablet (5 mg total) by mouth daily    Other orders  -     traMADol (ULTRAM) 50 mg tablet; TAKE 1 TABLET BY MOUTH EVERY 4-6 HOURS AS NEEDED FOR PAIN     He can follow-up in our offices in several months  Subjective: This is a 59  y o rh male who presented  to our office   in a follow-up visit  He is undergo a telemedicine visit in January  Since this time he was seen in [place] and underwent two surgeries to remove hardware in his lower back and remove scar tissue  The scar tissue removal was performed in the neck and this was pr successful  Unfortunately there were only able to move one port of hardware in his lower back  The surgery did occur last week and he has now returned  He did start on a Medrol Dosepak or earlier this week and he has two more days left  He reports that this does provide some benefit of inflammation         he spent approximately on month at the [address] hour he underwent aggressive physical therapy, aqua therapy and informs me that it was quite extensive which change  He did lose weight and did have improvement of his strength  Since his recent return from [place] he has spent time recovering and is eating much better  He does request another prescription for prednisone      He does utilize a diclofenac cream and a over-the-counter tiger can cream with benefit     He did have questions regarding hardware  This is last visit he was evaluated in [place] but no records were available today  He did have spinal surgery on the right which removal of scar tissue and removal of one bolt in his back   He was seen by Dr Juan Watts several months ago , neuro surgery was suggested more exercise and avoiding alcohol                He has chronic pain, numbness in his legs difficulty ambulating   He had a permanent spinal cord stimulator   Erik Benitez is still having adjustments of the stimulator but he admits to having no significant change in his symptomatology  Alia Kirk does utilize a stimulator at times with intermittent tonic stimulation with benefit   This was placed in October Of 2019   Erik Benitez was placed on Lyrica and the seen by Dr Alisha Hall a neurologist at [facility] who suggested physical therapy  Jarochoa Stanley proved to be ineffective and caused some cognitive issues and subsequently this was discontinued      Reports increase in numbness involving the right lower extremity after his recent surgery      Exercises at least 3 times a week and water therapy twice a week he had recently stopped after his trip to [place] for spinal surgery     Prior history:  He did  complainin of stiffness and heaviness in his legs with progressive weakness    MRI did show foraminal stenosis a left L5-S1 as well as impingement on the left L3 L4 region   He was seen by dr Robb Armstrong who  suggested surgery but he and then sought a 2nd opinion and underwent fusion with placement of  L3-S1 in May of 2016 by Dr Cecilia Ingram ,  the head of Neurosurgery at [address] [place] [address] the surgery in May of 2016 he noted increasing numbness in his bilateral thighs, in the saddle regions and numbness in the perineum  This occurred after 1 year     the numbness also occurs in the lower extremities the calf on the left side He has noted progressive ambulatory dysfunction specially on the right leg   He does report some urinary urgency and abnormalities with sexual dysfunction    The sexual dysfunction has been progressive over the last year  David Posadas is unable to have an organsm for the last  year  He did try baclofen and it was ineffective     He is no longer utilizing narcotic  pain meds and uses prednisone and/or Medrol Dosepak when he has increase in back pain  Oral steriods for a short period of time was ineffective   He is now continued to be followed by physical therapy  David Posadas had anterior fusion at L4-L5 and debris due to granulation tissue   There was a bony fragment impinging upon the spinal cord at the L2 vertebral body    He did have mri of the lumbar and thoracic spine after the last visit    Mri of the lumbar spine showed multilevel disc protrusion and DJD , pedical screws were in place   No evidence of arachointits however there was change in signal  Mri of the T spine was abnormal with mid thoracic level disc herniaition at T6 and T7  With multi level shallow disc protrusions   Heavy metal screeen, b12, foalte were normal           The following portions of the patient's history were reviewed and updated as appropriate:   He  has a past medical history of Balance problems, BPH (benign prostatic hyperplasia), Chronic back pain, Chronic pain disorder, COVID-19, Erectile dysfunction, Nocturia, OAB (overactive bladder), Testicular hypogonadism, Urinary frequency, and Urinary urgency  He  has a past surgical history that includes Spinal cord stimulator implant (10/10/2019); Colonoscopy (2004); Colonoscopy (2007); Colonoscopy (2013); Colonoscopy (06/2019); Back surgery (05/18/2016); Neck surgery; pr removal of hydrocele,tunica,unilat (Right, 06/10/2021); Neck surgery (Right); and Back surgery  His family history is not on file  He  reports that he has never smoked  He has never used smokeless tobacco  He reports current alcohol use  He reports that he does not use drugs  Current Outpatient Medications   Medication Sig Dispense Refill    Diclofenac Sodium (Voltaren) 1 % Apply 2 g topically 2 (two) times a day 100 g 5    fluocinolone (SYNALAR) 0 025 % cream Apply 1 application topically 2 (two) times a day as needed       mupirocin (BACTROBAN) 2 % ointment as needed      oxymetazoline (AFRIN) 0 05 % nasal spray 2 sprays by Each Nare route 2 (two) times a day      predniSONE 5 mg tablet Take 1 tablet (5 mg total) by mouth daily 90 tablet 1    sildenafil (REVATIO) 20 mg tablet Take 3 to 5 tablets by mouth one (1) hour prior to intercourse on an empty stomach  Limit to 3 encounters per week   90 tablet 5    sildenafil (VIAGRA) 100 mg tablet Take 100 mg by mouth As directed      testosterone cypionate (DEPO-TESTOSTERONE) 200 mg/mL SOLN inject 2 milliliters intramuscularly every 14 days 10 mL 2    traMADol (ULTRAM) 50 mg tablet TAKE 1 TABLET BY MOUTH EVERY 4-6 HOURS AS NEEDED FOR PAIN      Xanax 0 25 MG tablet Take 0 25 mg by mouth daily as needed      ALPRAZolam (XANAX) 0 5 mg tablet Take 1 tablet (0 5 mg total) by mouth 30 min pre-procedure Can repeat in 2 hours if needed (Patient not taking: No sig reported) 5 tablet 0    ketoconazole (NIZORAL) 2 % cream Apply 1 application topically 2 (two) times a day as needed  (Patient not taking: No sig reported)      oxyCODONE-acetaminophen (PERCOCET)  mg per tablet take 1 tablet by mouth every 4 hours if needed for SEVERE pain (Patient not taking: No sig reported)      predniSONE 1 mg tablet Take 4  Tablets daily (Patient not taking: Reported on 6/22/2022) 120 tablet 2    triamcinolone (KENALOG) 0 1 % cream        No current facility-administered medications for this visit  He is allergic to morphine            Objective:    Blood pressure 101/66, pulse 84, temperature 97 9 °F (36 6 °C), temperature source Tympanic, resp  rate 18, height 6' (1 829 m), weight 99 3 kg (219 lb), SpO2 98 %  Physical Exam  Eyes:      General: Lids are normal       Extraocular Movements: Extraocular movements intact  Pupils: Pupils are equal, round, and reactive to light  Neurological:      Deep Tendon Reflexes:      Reflex Scores:       Tricep reflexes are 1+ on the right side and 1+ on the left side  Bicep reflexes are 2+ on the right side and 2+ on the left side  Patellar reflexes are 2+ on the right side and 2+ on the left side  Achilles reflexes are 1+ on the right side and 1+ on the left side  Neurological Exam  Mental Status  Awake, alert and oriented to person, place and time  Oriented to person, place and time  Language is fluent with no aphasia  Cranial Nerves  CN II: Visual acuity is normal  Visual fields full to confrontation    CN III, IV, VI: Extraocular movements intact bilaterally  Normal lids and orbits bilaterally  Pupils equal round and reactive to light bilaterally  CN V: Facial sensation is normal   CN VII: Full and symmetric facial movement  CN VIII: Hearing is normal   CN IX, X: Palate elevates symmetrically  Normal gag reflex  CN XI: Shoulder shrug strength is normal   CN XII: Tongue midline without atrophy or fasciculations  Motor    The patient had normal strength in the upper extremities in the lower extremity the right hip flexion was a 4 on the left hip flexion it was a 4+ knee flexion was a 4+ knee extension was a 4+ on the left and 4  on the right  Dorsiflexion was a 5-  There is no atrophy of the left iliopsoas the adduction bilaterally was a 5- but they Abduction on the right was  4/5         Sensory  Diminution of sensation involving the right leg proximally  Reflexes                                            Right                      Left  Biceps                                 2+                         2+  Triceps                                1+                         1+  Patellar                                2+                         2+  Achilles                                1+                         1+  Plantar                           Downgoing                Downgoing    Right pathological reflexes: Domenica's absent  Left pathological reflexes: Domenica's absent  Upgogina dadnad    Coordination  Right: Finger-to-nose normal Left: Finger-to-nose normal     Gait   Unable to rise from chair without using arms  Presented in a wheelchair  Review of systems obtained from the medical assistant as below was reviewed with the patient at today's visit    ROS:    Review of Systems   Constitutional: Positive for fatigue (due to procedures)  Negative for appetite change and fever  HENT: Negative  Negative for hearing loss, tinnitus, trouble swallowing and voice change  Eyes: Negative  Negative for photophobia and pain  Respiratory: Negative  Negative for shortness of breath  Cardiovascular: Negative  Negative for palpitations  Gastrointestinal: Negative  Negative for nausea and vomiting  Endocrine: Negative  Negative for cold intolerance  Genitourinary: Positive for frequency and urgency  Negative for dysuria  Musculoskeletal: Positive for back pain (due to surgery) and gait problem (uses walker to ambulate)  Negative for myalgias and neck pain  Skin: Negative  Negative for rash  Allergic/Immunologic: Negative  Neurological: Positive for tremors (right arm at times) and numbness (waist down-2016)  Negative for dizziness, seizures, syncope, facial asymmetry, speech difficulty, weakness, light-headedness and headaches  Hematological: Negative  Does not bruise/bleed easily  Psychiatric/Behavioral: Negative  Negative for confusion, hallucinations and sleep disturbance

## 2022-06-30 ENCOUNTER — TELEPHONE (OUTPATIENT)
Dept: OTHER | Facility: HOSPITAL | Age: 62
End: 2022-06-30

## 2022-06-30 DIAGNOSIS — M48.05 SPINAL STENOSIS OF THORACOLUMBAR REGION: Primary | ICD-10-CM

## 2022-06-30 NOTE — TELEPHONE ENCOUNTER
Please let the patient know that we did contact ortho and they would like to review the chart and subsequently decide if they would be able to accept Carolynn Hurst as a patient for possible hardware removal   I will place an order for the spinal specialist     Let him know that I do not have access to [facility]  the best way if he has questions regarding Orthopedics at [facility] to call himself

## 2022-07-06 ENCOUNTER — TELEPHONE (OUTPATIENT)
Dept: OBGYN CLINIC | Facility: HOSPITAL | Age: 62
End: 2022-07-06

## 2022-07-06 NOTE — TELEPHONE ENCOUNTER
I called patient from referral  Patient would like to know if Dr Cecilia Correa does hardware removal & information is in [address] from Dr Milagro Multani  Please call patient to let him know at [phone]

## 2022-07-07 NOTE — TELEPHONE ENCOUNTER
call to this patient no answer left a message to relay Tatyana PITT's note and gave call back # to Schedule consult with Dr Kailey Loza

## 2022-07-15 NOTE — TELEPHONE ENCOUNTER
Second attempt to reach patient   Left message for return call to office     Letter mailed to address on file

## 2022-08-18 PROBLEM — Z98.890 HX OF COLONOSCOPY: Status: ACTIVE | Noted: 2019-06-18

## 2022-08-22 ENCOUNTER — OFFICE VISIT (OUTPATIENT)
Dept: OBGYN CLINIC | Facility: CLINIC | Age: 62
End: 2022-08-22
Payer: COMMERCIAL

## 2022-08-22 ENCOUNTER — APPOINTMENT (OUTPATIENT)
Dept: RADIOLOGY | Facility: CLINIC | Age: 62
End: 2022-08-22
Payer: COMMERCIAL

## 2022-08-22 VITALS — HEIGHT: 72 IN | BODY MASS INDEX: 28.48 KG/M2

## 2022-08-22 DIAGNOSIS — M48.05 SPINAL STENOSIS OF THORACOLUMBAR REGION: ICD-10-CM

## 2022-08-22 DIAGNOSIS — M54.16 LUMBAR RADICULOPATHY: Primary | ICD-10-CM

## 2022-08-22 DIAGNOSIS — M54.6 THORACIC SPINE PAIN: ICD-10-CM

## 2022-08-22 DIAGNOSIS — R29.898 LEG WEAKNESS, BILATERAL: ICD-10-CM

## 2022-08-22 DIAGNOSIS — M54.16 LUMBAR RADICULOPATHY: ICD-10-CM

## 2022-08-22 PROCEDURE — 72110 X-RAY EXAM L-2 SPINE 4/>VWS: CPT

## 2022-08-22 PROCEDURE — 99204 OFFICE O/P NEW MOD 45 MIN: CPT | Performed by: ORTHOPAEDIC SURGERY

## 2022-08-22 NOTE — PROGRESS NOTES
[address] MD  [address] [address] [facility] [address]  [phone]    HISTORY OF PRESENT ILLNESS:    Katie Devlin is a 57 y o   male who presents with low back pain  Patient has a history of spinal fusion at L2-L3 L4-5 L5-S1 with pain stimulator  Patient reports today in a wheelchair, he utilizes a walker to ambulate  Patients caregiver is present today  Patient would like to have all of the hardware and scar tissue removed  4/2016 the hardware was put in by Dr Ree Gonzalez Neurosugeon in [place], [place]  Patient is constnatley in pain, has numbness from the waist down, urinary incontinence, sexual issues, trouble using his legs  [address] in [place] attempted to remove the hardware in June of 2022 and they were only able to remove 1 of the screws, which he has with him here today  Patient goes to [place] twice a week and goes to the gym with a  3 times per week  Pain is managed with ibuprofen PRN  Patient has a spine stimulator  Retired paratrooper  ALLERGIES:   Allergies   Allergen Reactions    Morphine Other (See Comments)     Addiction hx   Pt wants no narcotics  Except surgical interventions       MEDICATIONS:    Current Outpatient Medications:     predniSONE 5 mg tablet, Take 1 tablet (5 mg total) by mouth daily, Disp: 90 tablet, Rfl: 1    sildenafil (VIAGRA) 100 mg tablet, Take 100 mg by mouth As directed, Disp: , Rfl:     testosterone cypionate (DEPO-TESTOSTERONE) 200 mg/mL SOLN, inject 2 milliliters intramuscularly every 14 days, Disp: 10 mL, Rfl: 2    ALPRAZolam (XANAX) 0 5 mg tablet, Take 1 tablet (0 5 mg total) by mouth 30 min pre-procedure Can repeat in 2 hours if needed (Patient not taking: No sig reported), Disp: 5 tablet, Rfl: 0    Diclofenac Sodium (Voltaren) 1 %, Apply 2 g topically 2 (two) times a day (Patient not taking: No sig reported), Disp: 100 g, Rfl: 5    fluocinolone (SYNALAR) 0 025 % cream, Apply 1 application topically 2 (two) times a day as needed  (Patient not taking: No sig reported), Disp: , Rfl:     ketoconazole (NIZORAL) 2 % cream, Apply 1 application topically 2 (two) times a day as needed  (Patient not taking: No sig reported), Disp: , Rfl:     mupirocin (BACTROBAN) 2 % ointment, as needed (Patient not taking: No sig reported), Disp: , Rfl:     oxyCODONE-acetaminophen (PERCOCET)  mg per tablet, take 1 tablet by mouth every 4 hours if needed for SEVERE pain (Patient not taking: No sig reported), Disp: , Rfl:     oxymetazoline (AFRIN) 0 05 % nasal spray, 2 sprays by Each Nare route 2 (two) times a day (Patient not taking: Reported on 8/22/2022), Disp: , Rfl:     predniSONE 1 mg tablet, Take 4  Tablets daily (Patient not taking: No sig reported), Disp: 120 tablet, Rfl: 2    sildenafil (REVATIO) 20 mg tablet, Take 3 to 5 tablets by mouth one (1) hour prior to intercourse on an empty stomach  Limit to 3 encounters per week  (Patient not taking: No sig reported), Disp: 90 tablet, Rfl: 5    traMADol (ULTRAM) 50 mg tablet, TAKE 1 TABLET BY MOUTH EVERY 4-6 HOURS AS NEEDED FOR PAIN (Patient not taking: No sig reported), Disp: , Rfl:     triamcinolone (KENALOG) 0 1 % cream, , Disp: , Rfl:     Xanax 0 25 MG tablet, Take 0 25 mg by mouth daily as needed (Patient not taking: No sig reported), Disp: , Rfl:      PAST MEDICAL HISTORY:   Past Medical History:   Diagnosis Date    Balance problems     BPH (benign prostatic hyperplasia)     Chronic back pain     Chronic pain disorder     COVID-19     2/2021-asymptomatic     Erectile dysfunction     Nocturia     OAB (overactive bladder)     Testicular hypogonadism     Urinary frequency     Urinary urgency        PAST SURGICAL HISTORY:  Past Surgical History:   Procedure Laterality Date    BACK SURGERY  05/18/2016    with hardware    BACK SURGERY      2 back surgery unable to remove hardware    COLONOSCOPY  2004    Dr Nathan Tate   tubular adenoma polyp removed    COLONOSCOPY  2007    Dr Elvi Townsend  Normal    COLONOSCOPY  2013    Dr Elvi Townsend  Normal      COLONOSCOPY  06/2019    Dr Elvi Townsend  Hemorrhoids, normal      NECK SURGERY      decompression of neck     NECK SURGERY Right     scar tissues removed    FL REMOVAL OF HYDROCELE,TUNICA,UNILAT Right 06/10/2021    Procedure: HYDROCELECTOMY;  Surgeon: Yony Grijalva DO;  Location: [facility];  Service: Urology   [facility] IMPLANT  10/10/2019       SOCIAL HISTORY:  Social History     Tobacco Use    Smoking status: Never Smoker    Smokeless tobacco: Never Used   Vaping Use    Vaping Use: Never used   Substance Use Topics    Alcohol use: Yes     Comment: Drinks socially   Drug use: No       ROS:  Review of Systems   Constitutional: Negative for appetite change and unexpected weight change  HENT: Negative for congestion and trouble swallowing  Eyes: Negative for visual disturbance  Respiratory: Negative for cough and shortness of breath  Cardiovascular: Negative for chest pain and palpitations  Gastrointestinal: Negative for nausea and vomiting  Endocrine: Negative for cold intolerance and heat intolerance  Musculoskeletal: Positive for back pain  Negative for joint swelling and myalgias  Skin: Negative for rash  Neurological: Negative for numbness  PHYSICAL EXAM:  57 y o  male sitting comfortably on exam chair in no acute distress  No TTP over cervical, thoracic, or lumbar spine  Symmetric deep tendon reflexes bilateral upper extremities   Symmetric deep tendon reflexes bilateral lower extremities    John's sign negative         RADIOGRAPHIC STUDIES:  1  MRI, T-spine, 12/26/2018:   2  MRI, L-spine, 12/28/2018:    3  CT scan, abdomen, 10/2/2022:    4  MRI, L-spine, 7/22/21:    5  X-ray, C-spine, 6/06/2022:    6  X-ray, L-spine, 6/6/2022:    7  MRI, C-spine, 6/6/2022:    8  MRI, L-spine, 6/6/2022:   9  X-ray, L-spine, 8/22/2022:    10   EMG study, lower limbs, 7/9/2019:         ASSESSMENT:  Problem List Items Addressed This Visit        Other    Spinal stenosis of thoracolumbar region      Other Visit Diagnoses     Lumbar radiculopathy    -  Primary    Thoracic spine pain        Leg weakness, bilateral                PLAN:  Karis Patient is a 57 y o   male who presents with lumbar radiculopathy, bilateral leg weakness  Many image studies were reviewed including MRIs, x-ray, EMG study, CT scan  Image studies were reviewed at length with the patient  The studies revealed there is no significant stenosis to cause his symptoms  Patient was advised that hardware removal would not help his symptoms  His neck is the issue due to spinal chord damage which is impacting his lower extremity  Patient was informed there is no cleaning out of scar tissue as other providers have informed him  I advise against surgical intervention and anesthesia due to spinal chord condition  Pain stimulator should help his nerve sensations  The last time his stimulator was programmed was over a year ago  It was advised he call to have his stimulator re-programmed  EMG nerve conduction should be ordered by neurologist   The last EMG study was performed in 2019, a repeat study should be performed to see if bilateral leg weakness is coming from the lower extremities  Patient was advised to continue with aqua therapy twice per week  I will see the patient back on an as-needed basis  I did review numerous diagnostic studies and discussed the findings with the patient  He has had prior surgery for spinal cord compression and myelopathy  Most recent MRI study does show significant spinal cord changes which is most likely the cause of his ambulatory dysfunction  Also reviewed diagnostic studies lumbar spine where there was only mild to moderate stenosis adjacent to prior fusion  Clear indication than that at the has been attempt at removal hardware  Where the rods were actually cut  One of the screws were removed and that screw was brought in by the patient  I did review this findings on the screw  The screw was clearly stripped  Based on review of the x-rays, it appears that similar procedure was performed on the right side at L3 and L4  The right screw at L2 was removed and appears to be the 1 brought in by the patient  Given the prior attempt at surgical removed, it is most likely nearing the end possible to remove the stripped screws  Removal of the hardware will require very long incision and most likely significant operative time and blood loss and given the claimant's history including significant myelopathy, I would recommend against the procedure         Scribe Attestation    I,:  Nanci Flynn am acting as a scribe while in the presence of the attending physician :       I,:  Crystal Paul MD personally performed the services described in this documentation    as scribed in my presence :

## 2022-09-21 ENCOUNTER — HOSPITAL ENCOUNTER (INPATIENT)
Facility: HOSPITAL | Age: 62
LOS: 2 days | DRG: 093 | End: 2022-09-23
Attending: EMERGENCY MEDICINE
Payer: COMMERCIAL

## 2022-09-21 ENCOUNTER — APPOINTMENT (EMERGENCY)
Dept: CT IMAGING | Facility: HOSPITAL | Age: 62
DRG: 093 | End: 2022-09-21
Payer: COMMERCIAL

## 2022-09-21 DIAGNOSIS — R26.2 AMBULATORY DYSFUNCTION: Primary | ICD-10-CM

## 2022-09-21 DIAGNOSIS — R53.1 WEAKNESS: ICD-10-CM

## 2022-09-21 DIAGNOSIS — K59.01 SLOW TRANSIT CONSTIPATION: ICD-10-CM

## 2022-09-21 DIAGNOSIS — Z98.1 STATUS POST LUMBAR SPINAL FUSION: ICD-10-CM

## 2022-09-21 PROBLEM — R29.898 WEAKNESS OF RIGHT LOWER EXTREMITY: Status: ACTIVE | Noted: 2022-09-21
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
## 2022-09-21 PROCEDURE — 97167 OT EVAL HIGH COMPLEX 60 MIN: CPT

## 2022-09-21 PROCEDURE — 99223 1ST HOSP IP/OBS HIGH 75: CPT | Performed by: STUDENT IN AN ORGANIZED HEALTH CARE EDUCATION/TRAINING PROGRAM

## 2022-09-21 PROCEDURE — 72125 CT NECK SPINE W/O DYE: CPT

## 2022-09-21 PROCEDURE — 97163 PT EVAL HIGH COMPLEX 45 MIN: CPT

## 2022-09-21 PROCEDURE — G1004 CDSM NDSC: HCPCS

## 2022-09-21 PROCEDURE — 99284 EMERGENCY DEPT VISIT MOD MDM: CPT | Performed by: EMERGENCY MEDICINE

## 2022-09-21 PROCEDURE — 99285 EMERGENCY DEPT VISIT HI MDM: CPT

## 2022-09-21 PROCEDURE — 70450 CT HEAD/BRAIN W/O DYE: CPT

## 2022-09-21 RX ORDER — ACETAMINOPHEN 325 MG/1
650 TABLET ORAL EVERY 6 HOURS PRN
Status: DISCONTINUED | OUTPATIENT
Start: 2022-09-21 | End: 2022-09-23 | Stop reason: HOSPADM

## 2022-09-21 RX ORDER — LANOLIN ALCOHOL/MO/W.PET/CERES
3 CREAM (GRAM) TOPICAL
Status: DISCONTINUED | OUTPATIENT
Start: 2022-09-21 | End: 2022-09-23 | Stop reason: HOSPADM

## 2022-09-21 RX ORDER — HEPARIN SODIUM 5000 [USP'U]/ML
5000 INJECTION, SOLUTION INTRAVENOUS; SUBCUTANEOUS EVERY 8 HOURS SCHEDULED
Status: DISCONTINUED | OUTPATIENT
Start: 2022-09-21 | End: 2022-09-23 | Stop reason: HOSPADM

## 2022-09-21 RX ADMIN — Medication 3 MG: at 20:00

## 2022-09-21 RX ADMIN — HEPARIN SODIUM 5000 UNITS: 5000 INJECTION INTRAVENOUS; SUBCUTANEOUS at 18:09

## 2022-09-21 RX ADMIN — ACETAMINOPHEN 650 MG: 325 TABLET ORAL at 20:00

## 2022-09-21 NOTE — ED PROVIDER NOTES
History  Chief Complaint   Patient presents with    Fall     Fall 1 week ago, fell backward & hit head on marble wall on Vacation in [facility] (did not get treatment there), NO LOC but couldn't move x 1 5 minutes, No blood thinners, hx of falls  Would like to be admitted to [facility] for PT if no new problem  Increased numbness from waste down, but getting better  58-year-old male with a past medical history of spinal stenosis status post multiple surgeries including a fusion and spinal stimulator placement presents with weakness  Patient was on vacation in [facility] [facility] and fell 1 week ago  He states that he sometimes gets sudden pains in his right foot secondary to all the back problems that he has had  Patient got 1 of these pains while he was walking and he lost his balance and fell  Patient reports no dizziness, lightheadedness, chest pain, or palpitations prior to the fall  Patient fell backwards and hit his head against a marble wall  He then fell to the ground and landed on his back  Patient did not lose consciousness  However, he notes that he had a full body paralysis for a minute after this  Patient then states that he gained some strength back, but still felt very weak  He was able to crawl to the bed where he stayed for the next few days until he was able to fly home  Patient states that he has not been able to walk since this fall  Every day, he would do physical therapy exercises that he learned in PT in his bed  He had help getting to a standing position a few times and was able to do some exercises standing  He states that the weakness is improving, but it is still worse than his baseline  Patient notes that he normally only has right lower extremity weakness, but now he feels weak in both legs, right worse than the left  He states some weakness in his upper extremity, but it is not as bad as the lower extremity  Patient also notes numbness worse than his baseline    He states that the numbness is worse in his left upper extremity compared to the right  Prior to Admission Medications   Prescriptions Last Dose Informant Patient Reported? Taking? ALPRAZolam (XANAX) 0 5 mg tablet   No No   Sig: Take 1 tablet (0 5 mg total) by mouth 30 min pre-procedure Can repeat in 2 hours if needed   Patient not taking: No sig reported   Diclofenac Sodium (Voltaren) 1 %   No No   Sig: Apply 2 g topically 2 (two) times a day   Patient not taking: No sig reported   Xanax 0 25 MG tablet   Yes No   Sig: Take 0 25 mg by mouth daily as needed   Patient not taking: No sig reported   fluocinolone (SYNALAR) 0 025 % cream   Yes No   Sig: Apply 1 application topically 2 (two) times a day as needed    Patient not taking: No sig reported   ketoconazole (NIZORAL) 2 % cream   Yes No   Sig: Apply 1 application topically 2 (two) times a day as needed    Patient not taking: No sig reported   mupirocin (BACTROBAN) 2 % ointment   Yes No   Sig: as needed   Patient not taking: No sig reported   oxyCODONE-acetaminophen (PERCOCET)  mg per tablet   Yes No   Sig: take 1 tablet by mouth every 4 hours if needed for SEVERE pain   Patient not taking: No sig reported   oxymetazoline (AFRIN) 0 05 % nasal spray  Self Yes No   Si sprays by Each Nare route 2 (two) times a day   Patient not taking: Reported on 2022   predniSONE 1 mg tablet   No No   Sig: Take 4  Tablets daily   Patient not taking: No sig reported   predniSONE 5 mg tablet Not Taking at Unknown time  No No   Sig: Take 1 tablet (5 mg total) by mouth daily   Patient not taking: Reported on 2022   sildenafil (REVATIO) 20 mg tablet   No No   Sig: Take 3 to 5 tablets by mouth one (1) hour prior to intercourse on an empty stomach  Limit to 3 encounters per week     Patient not taking: No sig reported   sildenafil (VIAGRA) 100 mg tablet  Self Yes No   Sig: Take 100 mg by mouth As directed   testosterone cypionate (DEPO-TESTOSTERONE) 200 mg/mL SOLN   No No   Sig: inject 2 milliliters intramuscularly every 14 days   traMADol (ULTRAM) 50 mg tablet   Yes No   Sig: TAKE 1 TABLET BY MOUTH EVERY 4-6 HOURS AS NEEDED FOR PAIN   Patient not taking: No sig reported   triamcinolone (KENALOG) 0 1 % cream   Yes No   Patient not taking: No sig reported      Facility-Administered Medications: None       Past Medical History:   Diagnosis Date    Balance problems     BPH (benign prostatic hyperplasia)     Chronic back pain     Chronic pain disorder     COVID-19     2/2021-asymptomatic     Erectile dysfunction     Nocturia     OAB (overactive bladder)     Testicular hypogonadism     Urinary frequency     Urinary urgency        Past Surgical History:   Procedure Laterality Date    BACK SURGERY  05/18/2016    with hardware    BACK SURGERY      2 back surgery unable to remove hardware    COLONOSCOPY  2004    Dr Josh Rose  tubular adenoma polyp removed    COLONOSCOPY  2007    Dr Pura Cano  Normal    COLONOSCOPY  2013    Dr Pura Cano  Normal      COLONOSCOPY  06/2019    Dr Pura Cano  Hemorrhoids, normal      NECK SURGERY      decompression of neck     NECK SURGERY Right     scar tissues removed    [place] REMOVAL OF HYDROCELE,TUNICA,UNILAT Right 06/10/2021    Procedure: HYDROCELECTOMY;  Surgeon: Trista Anguiano DO;  Location: [facility];  Service: Urology   [address] IMPLANT  10/10/2019       History reviewed  No pertinent family history  I have reviewed and agree with the history as documented  E-Cigarette/Vaping    E-Cigarette Use Never User      E-Cigarette/Vaping Substances    Nicotine No     THC No     CBD No     Flavoring No     Other No     Unknown No      Social History     Tobacco Use    Smoking status: Never Smoker    Smokeless tobacco: Never Used   Vaping Use    Vaping Use: Never used   Substance Use Topics    Alcohol use: Yes     Comment: Drinks socially      Drug use: No        Review of Systems   Constitutional: Negative for appetite change, chills, fatigue and fever  HENT: Negative  Eyes: Negative  Respiratory: Negative for cough, chest tightness and shortness of breath  Cardiovascular: Negative for chest pain and palpitations  Gastrointestinal: Negative for abdominal pain, diarrhea, nausea and vomiting  Endocrine: Negative  Genitourinary: Negative for difficulty urinating and hematuria  Musculoskeletal: Negative for arthralgias and myalgias  Skin: Negative for pallor and rash  Allergic/Immunologic: Negative  Neurological: Positive for weakness and numbness  Negative for dizziness, light-headedness and headaches  Hematological: Negative  Physical Exam  ED Triage Vitals [09/21/22 1016]   Temperature Pulse Respirations Blood Pressure SpO2   98 °F (36 7 °C) 67 16 127/74 97 %      Temp Source Heart Rate Source Patient Position - Orthostatic VS BP Location FiO2 (%)   Oral Monitor Lying Left arm --      Pain Score       7             Orthostatic Vital Signs  Vitals:    09/22/22 0711 09/22/22 1553 09/22/22 2331 09/23/22 0735   BP: 120/73 129/70 109/59 133/70   Pulse:       Patient Position - Orthostatic VS:           Physical Exam  Vitals and nursing note reviewed  Constitutional:       General: He is not in acute distress  Appearance: Normal appearance  He is not ill-appearing  HENT:      Head: Normocephalic and atraumatic  Nose: Nose normal    Eyes:      Conjunctiva/sclera: Conjunctivae normal    Cardiovascular:      Rate and Rhythm: Normal rate and regular rhythm  Heart sounds: No murmur heard  Pulmonary:      Effort: Pulmonary effort is normal  No respiratory distress  Breath sounds: Normal breath sounds  No wheezing, rhonchi or rales  Abdominal:      General: Abdomen is flat  Palpations: Abdomen is soft  Musculoskeletal:         General: Normal range of motion  Cervical back: Normal range of motion and neck supple        Comments: No midline tenderness to palpation of the cervical, thoracic, or lumbar spine   Skin:     General: Skin is warm and dry  Neurological:      General: No focal deficit present  Mental Status: He is alert and oriented to person, place, and time  Comments: Distal right lower extremity weakness  Patient has some proximal lower extremity weakness in both legs  Patient has mild weakness in the bilateral upper extremities  Patient reports decreased sensation to palpation in the left upper extremity  ED Medications  Medications   acetaminophen (TYLENOL) tablet 650 mg (650 mg Oral Given 9/23/22 0541)   heparin (porcine) subcutaneous injection 5,000 Units (5,000 Units Subcutaneous Given 9/23/22 0538)   melatonin tablet 3 mg (3 mg Oral Given 9/22/22 1927)   ibuprofen (MOTRIN) tablet 400 mg (400 mg Oral Given 9/23/22 0541)   bisacodyl (DULCOLAX) rectal suppository 10 mg (10 mg Rectal Given 9/23/22 1056)       Diagnostic Studies  Results Reviewed     Procedure Component Value Units Date/Time    Platelet count [[id number]]     Lab Status: No result Specimen: Blood                  CT head without contrast   Final Result by Galdino Hinson MD (09/21 1155)      No acute intracranial hemorrhage seen   No mass effect or midline shift seen                  Workstation performed: [id number]         CT spine cervical without contrast   Final Result by Galdino Hinson MD (09/21 1203)      No acute compression collapse of the vertebra  Multilevel degenerative disc diseasewith foraminal narrowing             Workstation performed: [id number]               Procedures  Procedures      ED Course  ED Course as of 09/23/22 1401   Wed Sep 21, 2022   1215 Reached out to case management for guidance   Pt wants inpatient rehab at [facility] and claims they have a bed for him tomorrow, but he would like to go home and then show up there tomorrow   1226 PT/OT consult added                             SBIRT 20yo+    Flowsheet Row Most Recent Value   SBIRT (25 yo +) In order to provide better care to our patients, we are screening all of our patients for alcohol and drug use  Would it be okay to ask you these screening questions? Yes Filed at: 09/21/2022 1045   Initial Alcohol Screen: US AUDIT-C     1  How often do you have a drink containing alcohol? 4 Filed at: 09/21/2022 1045   2  How many drinks containing alcohol do you have on a typical day you are drinking? 0 Filed at: 09/21/2022 1045   3a  Male UNDER 65: How often do you have five or more drinks on one occasion? 0 Filed at: 09/21/2022 1045   3b  FEMALE Any Age, or MALE 65+: How often do you have 4 or more drinks on one occassion? 0 Filed at: 09/21/2022 1045   Audit-C Score 4 Filed at: 09/21/2022 1045   RACHEL: How many times in the past year have you    Used an illegal drug or used a prescription medication for non-medical reasons? Never Filed at: 09/21/2022 1045                MDM  Number of Diagnoses or Management Options  Ambulatory dysfunction: established and worsening  Weakness: established and improving  Diagnosis management comments: 68-year-old male with baseline weakness and numbness presents with worsening of his symptoms after a fall where he hit his head  Will get a CT of the head to evaluate for trauma  Will get a CT of the C-spine to evaluate for trauma  If there is new trauma, patient will likely need an MRI  His symptoms have been improving over the past week, making new spinal cord lesion unlikely  Patient reports to speaking tube in [place] about going there for acute rehab  He claims that they have an open bed for him tomorrow; he just needs medical clearance         Amount and/or Complexity of Data Reviewed  Tests in the radiology section of CPT®: ordered and reviewed  Review and summarize past medical records: yes  Discuss the patient with other providers: yes    Risk of Complications, Morbidity, and/or Mortality  Presenting problems: moderate  Diagnostic procedures: moderate  Management options: moderate    Patient Progress  Patient progress: stable    Patient was admitted to [facility] for placement in rehab    Disposition  Final diagnoses:   Ambulatory dysfunction   Weakness     Time reflects when diagnosis was documented in both MDM as applicable and the Disposition within this note     Time User Action Codes Description Comment    9/21/2022  4:29 PM Aleksey Shay Add [R26 2] Ambulatory dysfunction     9/21/2022  4:29 PM Jose Anderson Add [R53 1] Weakness     9/22/2022 10:14 AM Sabas Amador Add [Z98 1] Status post lumbar spinal fusion     9/23/2022 10:52 AM Sabas Amador Add [K59 01] Slow transit constipation       ED Disposition     ED Disposition   Admit    Condition   Stable    Date/Time   Wed Sep 21, 2022  4:29 PM    Comment   Case was discussed with Dr Roderick Guerrero and the patient's admission status was agreed to be Admission Status: inpatient status to the service of Dr Roderick Guerrero MD Documentation    [address], [address] [facility]    (Name & Tel number) Karine Clink [phone]   Transported by (Company and Unit #) [facility]      RN Documentation    [address] Name, [address] [facility]   Bed Assignment 266O    (Name & Tel number) Karine Clink [phone]   Transport Mode Ambulance   Transported by (Company and Unit #) [facility]   Level of Care Basic life support   Transfer Date 09/23/22   Transfer Time 1500      Follow-up Information     Follow up With Specialties Details Why Contact Dariela Smith DO Internal Medicine Schedule an appointment as soon as possible for a visit in 1 week(s)  [address] [place] [address]  [phone]            Current Discharge Medication List      START taking these medications    Details   acetaminophen (TYLENOL) 325 mg tablet Take 2 tablets (650 mg total) by mouth every 6 (six) hours as needed for mild pain, headaches or fever  Refills: 0    Associated Diagnoses: Status post lumbar spinal fusion      docusate sodium (COLACE) 100 mg capsule Take 1 capsule (100 mg total) by mouth 2 (two) times a day  Refills: 0    Associated Diagnoses: Slow transit constipation      ibuprofen (MOTRIN) 400 mg tablet Take 1 tablet (400 mg total) by mouth every 6 (six) hours as needed for mild pain  Refills: 0    Associated Diagnoses: Status post lumbar spinal fusion      polyethylene glycol (MIRALAX) 17 g packet Take 17 g by mouth daily as needed (constipation)  Refills: 0    Associated Diagnoses: Slow transit constipation         CONTINUE these medications which have NOT CHANGED    Details   sildenafil (VIAGRA) 100 mg tablet Take 100 mg by mouth As directed      testosterone cypionate (DEPO-TESTOSTERONE) 200 mg/mL SOLN inject 2 milliliters intramuscularly every 14 days  Qty: 10 mL, Refills: 2    Comments: CORRECTED SCRIPT as requested  Please process accordingly  Thank you!   Associated Diagnoses: Hypogonadism in male         STOP taking these medications       ALPRAZolam (XANAX) 0 5 mg tablet Comments:   Reason for Stopping:         Diclofenac Sodium (Voltaren) 1 % Comments:   Reason for Stopping:         fluocinolone (SYNALAR) 0 025 % cream Comments:   Reason for Stopping:         ketoconazole (NIZORAL) 2 % cream Comments:   Reason for Stopping:         mupirocin (BACTROBAN) 2 % ointment Comments:   Reason for Stopping:         oxyCODONE-acetaminophen (PERCOCET)  mg per tablet Comments:   Reason for Stopping:         oxymetazoline (AFRIN) 0 05 % nasal spray Comments:   Reason for Stopping:         predniSONE 1 mg tablet Comments:   Reason for Stopping:         predniSONE 5 mg tablet Comments:   Reason for Stopping:         sildenafil (REVATIO) 20 mg tablet Comments:   Reason for Stopping:         traMADol (ULTRAM) 50 mg tablet Comments:   Reason for Stopping:         triamcinolone (KENALOG) 0 1 % cream Comments: Reason for Stopping:         Xanax 0 25 MG tablet Comments:   Reason for Stopping:             Outpatient Discharge Orders   Discharge Diet     Discharge Condition:  Improving     Patient Aware of Diagnosis: Yes     Free of Communicable Disease:   Yes     Physical Therapy Eval And Treat     Occupational Therapy Eval and Treat     Activity:  Per Rehab Recommendations     Future Lab Orders at SNF       PDMP Review       Value Time User    PDMP Reviewed  Yes 7/2/2021  7:57 AM Judith Trammell DO           ED Provider  Attending physically available and evaluated Consuelo Correa  I managed the patient along with the ED Attending      Electronically Signed by         Santana Ayala DO  09/23/22 7787

## 2022-09-21 NOTE — ASSESSMENT & PLAN NOTE
Patient presents with worsening weakness for the past week since having a fall while on vacation in [facility] 1 week ago  Patient reports that at the time he fell backwards and hit his head on marble wall, no LOC, no prior history of falls, not on blood thinners  Patient did not seek care after fall    · CT head, C-spine without acute findings  · VSS in ED, no underlying medical concerns  · PT/OT evaluated in ED, recommending acute rehab placement, hoping for placement to [facility] tomorrow  · Case management consult for discharge planning  · Fall precautions

## 2022-09-21 NOTE — OCCUPATIONAL THERAPY NOTE
Occupational Therapy Evaluation(time=6798-1011)     Patient Name: Phil Durant  TXSCG'I Date: 9/21/2022  Problem List  Active Problems:    * No active hospital problems  *    Past Medical History  Past Medical History:   Diagnosis Date    Balance problems     BPH (benign prostatic hyperplasia)     Chronic back pain     Chronic pain disorder     COVID-19     2/2021-asymptomatic     Erectile dysfunction     Nocturia     OAB (overactive bladder)     Testicular hypogonadism     Urinary frequency     Urinary urgency      Past Surgical History  Past Surgical History:   Procedure Laterality Date    BACK SURGERY  05/18/2016    with hardware    BACK SURGERY      2 back surgery unable to remove hardware    COLONOSCOPY  2004    Dr Marina Marquez  tubular adenoma polyp removed    COLONOSCOPY  2007    Dr Scott Carranza  Normal    COLONOSCOPY  2013    Dr Scott Carranza  Normal      COLONOSCOPY  06/2019    Dr Scott Carranza  Hemorrhoids, normal      NECK SURGERY      decompression of neck     NECK SURGERY Right     scar tissues removed    CT REMOVAL OF HYDROCELE,TUNICA,UNILAT Right 06/10/2021    Procedure: HYDROCELECTOMY;  Surgeon: Ryne Stover DO;  Location: [facility];  Service: Urology    SPINAL CORD STIMULATOR IMPLANT  10/10/2019         09/21/22 8118   Note Type   Note type Evaluation   Restrictions/Precautions   Weight Bearing Precautions Per Order No   Pain Assessment   Pain Assessment Tool 0-10   Pain Score 7   Pain Location/Orientation Orientation: Lower; Location: Back; Location: Leg;Orientation: Bilateral   Home Living   Type of [address] [place] [address]; Able to live on main level with bedroom/bathroom   Bathroom Shower/Tub Walk-in shower   Bathroom Toilet Raised   Bathroom Equipment Grab bars in shower;Grab bars around toilet; Shower chair   Home Equipment Cane;Walker   Prior Function   Lives With [address] [place] [address] in the last 6 months 1 to 4   Comments PTA pt states independence with his ADLs, transfers, ambulation--with RW; recent limited OOB since fall 1 wk ago; +, +falls=4   Lifestyle   Autonomy PTA pt states independence with his ADLs, transfers, ambulation--with RW; recent limited OOB since fall 1 wk ago; +, +falls=4   Reciprocal Relationships 3 children   Service to Others states  of multiple [employer]--currently employed   Intrinsic Gratification "working out"   Psychosocial   Psychosocial (WDL) X   Patient Behaviors/Mood Cooperative   Subjective   Subjective "I usually can do better than this "   ADL   Where Assessed Edge of bed   Eating Assistance 6  Modified independent   UB Bathing Assistance 5  Supervision/Setup   LB Bathing Assistance 2  Maximal Assistance   UB Dressing Assistance 4  Minimal Assistance   LB Dressing Assistance 2  Maximal Assistance   Bed Mobility   Rolling R 4  Minimal assistance   Additional items Assist x 1; Increased time required;Verbal cues;LE management   Rolling L 4  Minimal assistance   Additional items Assist x 1; Increased time required;Verbal cues   Supine to Sit 3  Moderate assistance   Additional items Assist x 1; Increased time required;Verbal cues; Other   Sit to Supine 3  Moderate assistance   Additional items Assist x 1; Increased time required;Verbal cues;LE management   Transfers   Sit to Stand 2  Maximal assistance   Additional items Assist x 2; Increased time required;Verbal cues   Stand to Sit 3  Moderate assistance   Additional items Assist x 2; Increased time required;Bed elevated   Additional Comments bp's=111/68   Functional Mobility   Functional Mobility 2  Maximal assistance   Additional Comments x2   Additional items Rolling walker   Balance   Static Sitting Fair   Dynamic Sitting Poor   Static Standing Poor   Dynamic Standing Poor   Activity Tolerance   Activity Tolerance Treatment limited secondary to medical complications (Comment)   Medical Staff Made Aware APPLE taylor    RUE Assessment   RUE Assessment X  (limited shr AROM(i e flex=*);elbow-distal=WFLs)   RUE Strength   RUE Overall Strength Within Functional Limits - able to perform ADL tasks with strength  (shr=3+/5, elbow-distal=4/5)   LUE Assessment   LUE Assessment X  (limited shr AROM(i e flex=80-90**);elbow-distal=WFLs)   LUE Strength   LUE Overall Strength Within Functional Limits - able to perform ADL tasks with strength  (shr=3+/5, elbow-distal=4/5)   Hand Function   Gross Motor Coordination Functional   Fine Motor Coordination Functional   Sensation   Light Touch No apparent deficits   Proprioception   Proprioception No apparent deficits   Vision-Basic Assessment   Current Vision   (glasses)   Vision - Complex Assessment   Acuity Able to read clock/calendar on wall without difficulty   Perception   Inattention/Neglect Appears intact   Cognition   Overall Cognitive Status WFL   Arousal/Participation Alert   Attention Within functional limits   Orientation Level Oriented X4   Memory Within functional limits   Following Commands Follows one step commands with increased time or repetition   Assessment   Limitation Decreased ADL status; Decreased UE ROM; Decreased UE strength;Decreased Safe judgement during ADL;Decreased endurance;Decreased high-level ADLs   Prognosis Fair   Assessment Pt is a 61y/o male admitted to the hospital 2* symptoms of weakness, decreased ambulation since a fall 1 wk ago while on vacation in [facility]; CT(C-spine)=neg acute compression, DDD  Pt with PMH spinal stenosis status post multiple surgeries including a fusion and spinal stimulator placement, balance problems, chronic pain, BPH  PTA pt states independence with his ADLs, transfers, ambulation--with RW; recent limited OOB since fall 1 wk ago; +, +falls=4  During initial eval, pt demonstrated deficits with his functional balance, functional mobility, ADL status, transfer safety, b/l UE strength, and activity tolerance(currently fair=15-20mins)   Pt would benefit from continued OT tx for the above deficits  3-5xwk/1-2wks  Goals   Patient Goals "to get better"   STG Time Frame   (1-7 days)   Short Term Goal #1 Pt will demonstrate improved activity tolerance to good(20-30mins) and standing tolerance to 3-5mins to assist with ADLs  Short Term Goal #2 Pt will demonstrate min A with his bed mobility and sit-stand transfer to assist with ADLs  Short Term Goal  Pt will independently demonstrate knowledge and application of proper body mechanics/back safety 100% of the time  LTG Time Frame   (7-14 days)   Long Term Goal #1 Pt will demonstrate proper walker/transfer safety 100% of the time  Long Term Goal #2 Pt will demonstrate g/g- balance with all functional activities  Long Term Goal Pt will demonstrate mod I with their UE and LE bathing/dresssing  Plan   Treatment Interventions ADL retraining;Functional transfer training;UE strengthening/ROM; Endurance training;Patient/family training;Equipment evaluation/education; Compensatory technique education   Goal Expiration Date 10/05/22   OT Treatment Day 0   OT Frequency 3-5x/wk   Recommendation   OT Discharge Recommendation Post acute rehabilitation services   AM-PAC Daily Activity Inpatient   Lower Body Dressing 2   Bathing 2   Toileting 2   Upper Body Dressing 3   Grooming 4   Eating 4   Daily Activity Raw Score 17   Daily Activity Standardized Score (Calc for Raw Score >=11) 37 26   AM-PAC Applied Cognition Inpatient   Following a Speech/Presentation 4   Understanding Ordinary Conversation 4   Taking Medications 4   Remembering Where Things Are Placed or Put Away 4   Remembering List of 4-5 Errands 4   Taking Care of Complicated Tasks 4   Applied Cognition Raw Score 24   Applied Cognition Standardized Score 62 21   Roman Lowery OT

## 2022-09-21 NOTE — ASSESSMENT & PLAN NOTE
· History of multiple spinal fusions, follows with Dr Hadley Skiff  · Recently had chest procedure in June for removal of scar tissue and hardware

## 2022-09-21 NOTE — PLAN OF CARE
Problem: OCCUPATIONAL THERAPY ADULT  Goal: Performs self-care activities at highest level of function for planned discharge setting  See evaluation for individualized goals  Description: Treatment Interventions: ADL retraining, Functional transfer training, UE strengthening/ROM, Endurance training, Patient/family training, Equipment evaluation/education, Compensatory technique education          See flowsheet documentation for full assessment, interventions and recommendations  Note: Limitation: Decreased ADL status, Decreased UE ROM, Decreased UE strength, Decreased Safe judgement during ADL, Decreased endurance, Decreased high-level ADLs  Prognosis: Fair  Assessment: Pt is a 61y/o male admitted to the hospital 2* symptoms of weakness, decreased ambulation since a fall 1 wk ago while on vacation in [facility]; CT(C-spine)=neg acute compression, DDD  Pt with PMH spinal stenosis status post multiple surgeries including a fusion and spinal stimulator placement, balance problems, chronic pain, BPH  PTA pt states independence with his ADLs, transfers, ambulation--with RW; recent limited OOB since fall 1 wk ago; +, +falls=4  During initial eval, pt demonstrated deficits with his functional balance, functional mobility, ADL status, transfer safety, b/l UE strength, and activity tolerance(currently fair=15-20mins)  Pt would benefit from continued OT tx for the above deficits  3-5xwk/1-2wks       OT Discharge Recommendation: Post acute rehabilitation services

## 2022-09-21 NOTE — CASE MANAGEMENT
Case Management ED Progress Note    Patient name Karis Patient  Location ED 14/ED 14 MRN [id number]  : 1960 Date 2022        OBJECTIVE:  Predictive Model Details         1% Factor Value    Risk of Hospital Admission or ED Visit Model Is in Relationship Yes     Has PCP Yes            Chief Complaint: Ambulatory dysfunction   Patient Class: Inpatient  Preferred Pharmacy:   [address] [place] [address] #[id number] - Ranjan Caraballo, [address] [place] [id number]  Phone: [phone] Fax: [phone]    [address] [facility] [address]  Phone: [phone] Fax: [phone]    [place], [address]   [address] [facility]  Phone: [phone] Fax: [phone]    Primary Care Provider: Haider Smith DO    Primary Insurance: BLUE CROSS  Secondary Insurance:     ED Progress Note:    Patient referred by attending   Patient experiencing difficulties ambulating and back pain  Met patient in order to discussed discharge/ and transfer process to a rehab facility  Patient is requesting admission to [facility] only and agrees to cooperate with process  Referral to [facility] completed via [place] was contact and follow up with this case

## 2022-09-21 NOTE — ED NOTES
Brayan Haji RN  09/21/22 [address] [facility] Arian Hubbard RN  09/21/22 5915

## 2022-09-21 NOTE — PHYSICAL THERAPY NOTE
PHYSICAL THERAPY EVALUATION          Patient Name: Tanner Madden  IGVQQ'W Date: 9/21/2022  PT EVALUATION    57 y o     [id number]    Head pain [R51 9]    Past Medical History:   Diagnosis Date    Balance problems     BPH (benign prostatic hyperplasia)     Chronic back pain     Chronic pain disorder     COVID-19     2/2021-asymptomatic     Erectile dysfunction     Nocturia     OAB (overactive bladder)     Testicular hypogonadism     Urinary frequency     Urinary urgency          Past Surgical History:   Procedure Laterality Date    BACK SURGERY  05/18/2016    with hardware    BACK SURGERY      2 back surgery unable to remove hardware    COLONOSCOPY  2004    Dr Sherrie Clement  tubular adenoma polyp removed    COLONOSCOPY  2007    Dr Rodger Whitlock  Normal    COLONOSCOPY  2013    Dr Rodger Whitlock  Normal      COLONOSCOPY  06/2019    Dr Rodger Whitlock  Hemorrhoids, normal      NECK SURGERY      decompression of neck     NECK SURGERY Right     scar tissues removed    KS REMOVAL OF HYDROCELE,TUNICA,UNILAT Right 06/10/2021    Procedure: HYDROCELECTOMY;  Surgeon: Elma Zamora DO;  [facility];  Service: Urology    SPINAL CORD STIMULATOR IMPLANT  10/10/2019        09/21/22 1515   PT Last Visit   PT Visit Date 09/21/22   Note Type   Note type Evaluation   Pain Assessment   Pain Assessment Tool 0-10   Pain Score No Pain   Restrictions/Precautions   Other Precautions Chair Alarm; Bed Alarm; Fall Risk   Home Living   Type of [place] [address] to live on main level with bedroom/bathroom; Performs ADLs on one level   Bathroom Shower/Tub Walk-in shower   Bathroom Toilet Raised   Bathroom Equipment Grab bars in shower; Shower chair;Grab bars around toilet  (bed pan)   Home Equipment Walker;Cane;Wheelchair-manual   Additional Comments 0 steps   Prior Function   Level of [place] Independent with ADLs and functional mobility; Needs assistance with IADLs   Lives With Alone   Receives Help From Personal care attendant  (Mon-Fri, a few hours per day  has been getting help on the weekends since most recent fall)   ADL Assistance Independent   IADLs Needs assistance   Falls in the last 6 months 1 to 4   Vocational Self employed   Comments per patient, indep at baseline for ADLs and ambulation w RW  states being able to walk bouts of 30-40yds  goes to [address] [place] [address] x2/wk and the gym x3/wk  states being bed bound/ non ambulatory since [place], only standing for 15 min bouts   General   Additional Pertinent History pt admitted 9/21/22  PMHx significant for multiple L/s fusions w pain stimulator, balance problems, chronic pain, covid   Family/Caregiver Present Yes  (caregiver)   Cognition   Overall Cognitive Status WFL   Arousal/Participation Cooperative   Orientation Level Oriented X4   Memory Within functional limits   Following Commands Follows one step commands with increased time or repetition   Comments particular  limited insight/ judgement noted   Subjective   Subjective pt states he would like to go to  rehab   RLE Assessment   RLE Assessment X  (foot drop, does not wear brace at baseline  distal weakness > proximal)   LLE Assessment   LLE Assessment X  (distal weakness>proximal)   Coordination   Sensation X  (inc in severity since fall  reports baseline numbness ble)   Bed Mobility   Supine to Sit 3  Moderate assistance   Additional items Assist x 1; Increased time required;Verbal cues; Other  (trunk support)   Sit to Supine 3  Moderate assistance   Additional items Increased time required;Verbal cues;LE management   Additional Comments bed flat   Transfers   Sit to Stand 2  Maximal assistance   Additional items Assist x 2; Increased time required;Verbal cues; Other  (RW)   Stand to Sit 3  Moderate assistance   Additional items Assist x 2; Increased time required;Verbal cues; Other  (RW)   Additional Comments require multiple attempts   Ambulation/Elevation   Gait pattern R Hemiparesis;L Hemiparesis; Short stride;Decreased foot clearance; Improper Weight shift;R Foot drag   Gait Assistance 4  Minimal assist   Additional items Assist x 2;Verbal cues   Assistive Device Rolling walker   Distance backward steps  unable to drag RLE to take side steps   Balance   Static Sitting Fair -   Dynamic Sitting Poor +   Static Standing Poor +   Dynamic Standing Poor   Ambulatory Poor   Endurance Deficit   Endurance Deficit No  (BP /68)   Activity Tolerance   Activity Tolerance Treatment limited secondary to medical complications (Comment)  (weakness)   Medical Staff Made Aware OT; CM   Nurse Made Aware cleared for therapy   Assessment   Prognosis Fair   Problem List Decreased strength; Impaired balance;Decreased mobility; Impaired judgement;Decreased safety awareness; Impaired sensation   Assessment Yuval Desai is a 57 y o  male admitted to [facility] on 9/21/2022 for <principal problem not specified>   PT was consulted and pt was seen on 9/21/2022 for mobility assessment and d/c planning  Pt presents w high fall risk  At baseline is indep for ADLs and ambulation w RW  Reports since fall x1 wk ago he has been primarily bed bound/ non ambulatory, getting increased assistance for ADLs due to progressive weakness  Pt is currently functioning at a moderate assistance x1 level for bed mobility, mod-max Ax2 level for transfers and min Ax2 for attempted ambulation  Pt require cuing for safety and technique  Require multiple attempts to stand fully upright and maintain balance  Once standing pt able to clear LLE to take steps in place however unable to clear RLE (due to baseline foot drop)  Unable to side step but was able to take steps backward to reposition in preparation for stand>sit transf  Does not demonstrate ability to transf with Ax1 caregiver only, ambulate household distances or sustain necessary dynamic balance for mobility and ADLs   Pt will benefit from continued skilled IP PT to address the above mentioned impairments  in order to maximize recovery and increase functional independence when completing mobility and ADLs  Currently PT recommendations for DME include quick move OOB  At this time PT recommendations for d/c are STR given social barriers and risk for recurrent falls  Barriers to Discharge Decreased caregiver support; Other (Comment)  (fall risk)   Goals   Patient Goals return to PLOF   STG Expiration Date 10/05/22   Short Term Goal #1 1)  Pt will perform bed mobility with S demonstrating appropriate technique 100% of the time in order to improve function  2)  Perform all transfers with S demonstrating safe and appropriate technique 100% of the time in order to improve ability to negotiate safely in home environment  3) Amb with least restrictive AD > 50'x1 with min A in order to demonstrate ability to negotiate in home environment  4)  Improve overall strength and balance 1/2 grade in order to optimize ability to perform functional tasks and reduce fall risk  5) Increase activity tolerance to 45 minutes in order to improve endurance to functional tasks  6)  Wc mobility >50' at S level to negotiate household distances  7) PT for ongoing patient and family/caregiver education, DME needs and d/c planning in order to promote highest level of function in least restrictive environment  Plan   Treatment/Interventions Functional transfer training;LE strengthening/ROM; Therapeutic exercise;Equipment eval/education;Patient/family training;Bed mobility;Gait training; Compensatory technique education;Continued evaluation;Spoke to nursing;Spoke to case management;OT   PT Frequency 3-5x/wk   Recommendation   PT Discharge Recommendation Post acute rehabilitation services   Equipment Recommended   (quick move OOB)   Additional Comments The patient's AM-PAC Basic Mobility Inpatient Short Form Raw Score is 8  A Raw score of less than or equal to 16 suggests the patient may benefit from discharge to post-acute rehabilitation services   Please also refer to the recommendation of the Physical Therapist for safe discharge planning     AM-PAC Basic Mobility Inpatient   Turning in Bed Without Bedrails 2   Lying on Back to Sitting on Edge of Flat Bed 2   Moving Bed to Chair 1   Standing Up From Chair 1   Walk in Room 1   Climb 3-5 Stairs 1   Basic Mobility Inpatient Raw Score 8   Turning Head Towards Sound 4   Follow Simple Instructions 4   Low Function Basic Mobility Raw Score 16   Low Function Basic Mobility Standardized Score 25 72   Highest Level Of Mobility   -HL Goal 3: Sit at edge of bed   -HLM Achieved 5: Stand (1 or more minutes)   History: co - morbidities, social background, all risk, use of assistive device, assist for iadl's, judgement  Exam: impairments in systems including musculoskeletal (strength), neuromuscular (balance, gait, transfers, motor function and sensation), am-pac, cardiopulmonary, cognition  Clinical: unstable/unpredictable  Complexity:high      Barbara Riddle, PT

## 2022-09-21 NOTE — PLAN OF CARE
Problem: PHYSICAL THERAPY ADULT  Goal: Performs mobility at highest level of function for planned discharge setting  See evaluation for individualized goals  Description: Treatment/Interventions: Functional transfer training, LE strengthening/ROM, Therapeutic exercise, Equipment eval/education, Patient/family training, Bed mobility, Gait training, Compensatory technique education, Continued evaluation, Spoke to nursing, Spoke to case management, OT  Equipment Recommended:  (quick move OOB)       See flowsheet documentation for full assessment, interventions and recommendations  9/21/2022 1539 by Cuca Shirley PT  Note: Prognosis: Fair  Problem List: Decreased strength, Impaired balance, Decreased mobility, Impaired judgement, Decreased safety awareness, Impaired sensation  Assessment: Nicholas Santillan is a 57 y o  male admitted to [email or web address] on 9/21/2022 for <principal problem not specified>   PT was consulted and pt was seen on 9/21/2022 for mobility assessment and d/c planning  Pt presents w high fall risk  At baseline is indep for ADLs and ambulation w RW  Reports since fall x1 wk ago he has been primarily bed bound/ non ambulatory, getting increased assistance for ADLs due to progressive weakness  Pt is currently functioning at a moderate assistance x1 level for bed mobility, mod-max Ax2 level for transfers and min Ax2 for attempted ambulation  Pt require cuing for safety and technique  Require multiple attempts to stand fully upright and maintain balance  Once standing pt able to clear LLE to take steps in place however unable to clear RLE (due to baseline foot drop)  Unable to side step but was able to take steps backward to reposition in preparation for stand>sit transf  Does not demonstrate ability to transf with Ax1 caregiver only, ambulate household distances or sustain necessary dynamic balance for mobility and ADLs   Pt will benefit from continued skilled IP PT to address the above mentioned impairments  in order to maximize recovery and increase functional independence when completing mobility and ADLs  Currently PT recommendations for DME include quick move OOB  At this time PT recommendations for d/c are STR given social barriers and risk for recurrent falls  Barriers to Discharge: Decreased caregiver support, Other (Comment) (fall risk)     PT Discharge Recommendation: Post acute rehabilitation services    See flowsheet documentation for full assessment  9/21/2022 1538 by Salvador Bennett, PT  Note: Prognosis: Fair  Problem List: Decreased strength, Impaired balance, Decreased mobility, Impaired judgement, Decreased safety awareness, Impaired sensation  Assessment: Sherrell  is a 57 y o  male admitted to [facility] on 9/21/2022 for <principal problem not specified>   PT was consulted and pt was seen on 9/21/2022 for mobility assessment and d/c planning  Pt presents w high fall risk  At baseline is indep for ADLs and ambulation w RW  Reports since fall x1 wk ago he has been primarily bed bound/ non ambulatory, getting increased assistance for ADLs due to progressive weakness  Pt is currently functioning at a moderate assistance x1 level for bed mobility, mod-max Ax2 level for transfers and min Ax2 for attempted ambulation  Pt require cuing for safety and technique  Require multiple attempts to stand fully upright and maintain balance  Once standing pt able to clear LLE to take steps in place however unable to clear RLE (due to baseline foot drop)  Unable to side step but was able to take steps backward to reposition in preparation for stand>sit transf  Does not demonstrate ability to transf with Ax1 caregiver only, ambulate household distances or sustain necessary dynamic balance for mobility and ADLs  Pt will benefit from continued skilled IP PT to address the above mentioned impairments  in order to maximize recovery and increase functional independence when completing mobility and ADLs  Currently PT recommendations for DME include quick move OOB  At this time PT recommendations for d/c are STR given social barriers and risk for recurrent falls  Barriers to Discharge: Decreased caregiver support, Other (Comment) (fall risk)     PT Discharge Recommendation: Post acute rehabilitation services    See flowsheet documentation for full assessment

## 2022-09-21 NOTE — H&P
[address] 1960, 57 y o  male MRN: [id number]  Unit/Bed#: ED 14 Encounter: [id number]  Primary Care Provider: Abraham Villela DO   Date and time admitted to hospital: 9/21/2022 10:14 AM    * Ambulatory dysfunction  Assessment & Plan  Patient presents with worsening weakness for the past week since having a fall while on vacation in [facility] 1 week ago  Patient reports that at the time he fell backwards and hit his head on marble wall, no LOC, no prior history of falls, not on blood thinners  Patient did not seek care after fall  · CT head, C-spine without acute findings  · VSS in ED, no underlying medical concerns  · PT/OT evaluated in ED, recommending acute rehab placement, hoping for placement to [facility] tomorrow  · Case management consult for discharge planning  · Fall precautions    Weakness of right lower extremity  Assessment & Plan  · Chronic RLE weakness/numbness due to multiple back surgeries    Status post lumbar spinal fusion  Assessment & Plan  · History of multiple spinal fusions, follows with Dr Pedro Luis Chin  · Recently had chest procedure in June for removal of scar tissue and hardware    S/P insertion of spinal cord stimulator  Assessment & Plan  · History of spinal cord stimulator placement years ago, patient reports MRI compatible    VTE Pharmacologic Prophylaxis: VTE Score: 3 Moderate Risk (Score 3-4) - Pharmacological DVT Prophylaxis Ordered: heparin  Code Status: Level 1 - Full Code   Discussion with family: Updated  (caretaker) at bedside  Anticipated Length of Stay: Patient will be admitted on an inpatient basis with an anticipated length of stay of greater than 2 midnights secondary to Acute rehab placement  Total Time for Visit, including Counseling / Coordination of Care: 45 minutes Greater than 50% of this total time spent on direct patient counseling and coordination of care      Chief Complaint:  Persistent weakness status post fall    History of Present Illness:  Karis Patient is a 57 y o  male with a PMH of multiple spinal fusion procedures, chronic RLE weakness/numbness, s/p spinal cord stimulator, who presents with persistent bilateral lower extremity weakness after fall 1 week ago  Patient reports that he while on vacation in [facility] contrast 1 week ago, he had low BG oncology episode where his right leg had became weak, resulting in fall backwards onto marble wall with head strike, denies LOC although did report feeling paralyzed from neck down for 1 minute that had resolved  Patient not on blood thinners, did not seek care after fall  Patient reports that he had similar fall 1 month prior, although weakness in lower extremities resolved on its own  Patient denies any major surgeries, most recent surgery in June for removing scar tissue and hardware in his back, follows with Dr Melissa Nix  Review of Systems:  Review of Systems   Constitutional: Negative for activity change, appetite change, chills, diaphoresis, fatigue and unexpected weight change  HENT: Negative for trouble swallowing  Eyes: Negative for visual disturbance  Respiratory: Negative for cough, chest tightness and shortness of breath  Cardiovascular: Negative for chest pain, palpitations and leg swelling  Gastrointestinal: Negative for abdominal pain, constipation, diarrhea, nausea and vomiting  Genitourinary: Negative for decreased urine volume, difficulty urinating, dysuria, frequency, hematuria and urgency  Musculoskeletal: Positive for back pain, gait problem and myalgias  Negative for arthralgias, joint swelling, neck pain and neck stiffness  Skin: Negative for wound  Neurological: Positive for weakness and numbness  Negative for dizziness, syncope, light-headedness and headaches  Psychiatric/Behavioral: Negative for confusion         Past Medical and Surgical History:   Past Medical History:   Diagnosis Date    Balance problems     BPH (benign prostatic hyperplasia)     Chronic back pain     Chronic pain disorder     COVID-19     2/2021-asymptomatic     Erectile dysfunction     Nocturia     OAB (overactive bladder)     Testicular hypogonadism     Urinary frequency     Urinary urgency        Past Surgical History:   Procedure Laterality Date    BACK SURGERY  05/18/2016    with hardware    BACK SURGERY      2 back surgery unable to remove hardware    COLONOSCOPY  2004    Dr Addison Matthew  tubular adenoma polyp removed    COLONOSCOPY  2007    Dr Marci Marina  Normal    COLONOSCOPY  2013    Dr Marci Marina  Normal      COLONOSCOPY  06/2019    Dr Marci Marina  Hemorrhoids, normal      NECK SURGERY      decompression of neck     NECK SURGERY Right     scar tissues removed    CO REMOVAL OF HYDROCELE,TUNICA,UNILAT Right 06/10/2021    Procedure: HYDROCELECTOMY;  Surgeon: Ebbie Boxer, DO;  Location: [facility];  Service: Urology    SPINAL CORD STIMULATOR IMPLANT  10/10/2019       Meds/Allergies:  Prior to Admission medications    Medication Sig Start Date End Date Taking?  Authorizing Provider   ALPRAZolam Robert Faulkner) 0 5 mg tablet Take 1 tablet (0 5 mg total) by mouth 30 min pre-procedure Can repeat in 2 hours if needed  Patient not taking: No sig reported 7/2/21   Debra Leyva DO   Diclofenac Sodium (Voltaren) 1 % Apply 2 g topically 2 (two) times a day  Patient not taking: No sig reported 6/22/22   Debra Leyva DO   fluocinolone (SYNALAR) 0 025 % cream Apply 1 application topically 2 (two) times a day as needed   Patient not taking: No sig reported 12/23/19   Historical Provider, MD   ketoconazole (NIZORAL) 2 % cream Apply 1 application topically 2 (two) times a day as needed   Patient not taking: No sig reported 8/31/20   Historical Provider, MD   mupirocin (BACTROBAN) 2 % ointment as needed  Patient not taking: No sig reported 12/1/21   Historical Provider, MD   oxyCODONE-acetaminophen (PERCOCET)  mg per tablet take 1 tablet by mouth every 4 hours if needed for SEVERE pain  Patient not taking: No sig reported 6/4/21   Historical Provider, MD   oxymetazoline (AFRIN) 0 05 % nasal spray 2 sprays by Each Nare route 2 (two) times a day  Patient not taking: Reported on 8/22/2022    Historical Provider, MD   predniSONE 1 mg tablet Take 4  Tablets daily  Patient not taking: No sig reported 10/13/21   Debra Leyva DO   predniSONE 5 mg tablet Take 1 tablet (5 mg total) by mouth daily  Patient not taking: Reported on 9/21/2022 6/22/22   Debra Leyva DO   sildenafil (REVATIO) 20 mg tablet Take 3 to 5 tablets by mouth one (1) hour prior to intercourse on an empty stomach  Limit to 3 encounters per week  Patient not taking: No sig reported 5/5/20   STACEY Vang   sildenafil (VIAGRA) 100 mg tablet Take 100 mg by mouth As directed 9/11/20   Historical Provider, MD   testosterone cypionate (DEPO-TESTOSTERONE) 200 mg/mL SOLN inject 2 milliliters intramuscularly every 14 days 3/17/22   STACEY Vang   traMADol (ULTRAM) 50 mg tablet TAKE 1 TABLET BY MOUTH EVERY 4-6 HOURS AS NEEDED FOR PAIN  Patient not taking: No sig reported 6/15/22   Historical Provider, MD   triamcinolone (KENALOG) 0 1 % cream  12/1/21   Historical Provider, MD   Xanax 0 25 MG tablet Take 0 25 mg by mouth daily as needed  Patient not taking: No sig reported 9/30/21   Historical Provider, MD     I have reviewed home medications with patient personally  Allergies: Allergies   Allergen Reactions    Morphine Other (See Comments)     Addiction hx   Pt wants no narcotics  Except surgical interventions       Social History:  Marital Status: /Civil Union   Occupation:  multiple companies  Patient Pre-hospital Living Situation: Home, Alone  Patient Pre-hospital Level of Mobility: walks with walker  Patient Pre-hospital Diet Restrictions:  None  Substance Use History:   Social History     Substance and Sexual Activity   Alcohol Use Yes    Comment: Drinks socially  Social History     Tobacco Use   Smoking Status Never Smoker   Smokeless Tobacco Never Used     Social History     Substance and Sexual Activity   Drug Use No       Family History:  History reviewed  No pertinent family history  Physical Exam:     Vitals:   Blood Pressure: 111/61 (09/21/22 1715)  Pulse: 97 (09/21/22 1715)  Temperature: 98 °F (36 7 °C) (09/21/22 1016)  Temp Source: Oral (09/21/22 1715)  Respirations: 16 (09/21/22 1330)  Weight - Scale: 96 kg (211 lb 10 3 oz) (09/21/22 1016)  SpO2: 97 % (09/21/22 1715)    Physical Exam  Constitutional:       General: He is not in acute distress  Appearance: He is not ill-appearing, toxic-appearing or diaphoretic  Cardiovascular:      Rate and Rhythm: Normal rate and regular rhythm  Pulses: Normal pulses  Heart sounds: Normal heart sounds  Pulmonary:      Effort: Pulmonary effort is normal  No respiratory distress  Breath sounds: Normal breath sounds  Abdominal:      General: Bowel sounds are normal  There is no distension  Palpations: Abdomen is soft  Tenderness: There is no abdominal tenderness  Musculoskeletal:         General: No swelling or tenderness  Comments: Multiple scars present on thoracic and lumbar spine  No bony or paraspinal tenderness to palpation  Skin:     General: Skin is warm  Neurological:      General: No focal deficit present  Mental Status: He is alert and oriented to person, place, and time  Cranial Nerves: No cranial nerve deficit  Sensory: Sensory deficit present  Motor: Weakness present  Comments: 4-5 strength on RLE, slightly decreased sensation on RLE compared to LLE     Psychiatric:         Mood and Affect: Mood normal          Behavior: Behavior normal          Additional Data:     Lab Results:                            Imaging: Reviewed radiology reports from this admission including: CT head and CT C-spine  CT head without contrast   Final Result by Rodger Whitlock MD (09/21 1155)      No acute intracranial hemorrhage seen   No mass effect or midline shift seen                  Workstation performed: [id number]         CT spine cervical without contrast   Final Result by Rodger Whitlock MD (09/21 1203)      No acute compression collapse of the vertebra  Multilevel degenerative disc diseasewith foraminal narrowing             Workstation performed: [id number]             EKG and Other Studies Reviewed on Admission:   · EKG: No EKG obtained  ** Please Note: This note has been constructed using a voice recognition system   **

## 2022-09-22 LAB
FLUAV RNA RESP QL NAA+PROBE: NEGATIVE
FLUBV RNA RESP QL NAA+PROBE: NEGATIVE
RSV RNA RESP QL NAA+PROBE: NEGATIVE
SARS-COV-2 RNA RESP QL NAA+PROBE: NEGATIVE

## 2022-09-22 PROCEDURE — 99232 SBSQ HOSP IP/OBS MODERATE 35: CPT | Performed by: NURSE PRACTITIONER

## 2022-09-22 PROCEDURE — 0241U HB NFCT DS VIR RESP RNA 4 TRGT: CPT | Performed by: NURSE PRACTITIONER

## 2022-09-22 RX ORDER — IBUPROFEN 400 MG/1
400 TABLET ORAL EVERY 6 HOURS PRN
Status: DISCONTINUED | OUTPATIENT
Start: 2022-09-22 | End: 2022-09-23 | Stop reason: HOSPADM

## 2022-09-22 RX ORDER — IBUPROFEN 400 MG/1
400 TABLET ORAL EVERY 6 HOURS PRN
Refills: 0 | Status: SHIPPED
Start: 2022-09-22

## 2022-09-22 RX ORDER — ACETAMINOPHEN 325 MG/1
650 TABLET ORAL EVERY 6 HOURS PRN
Refills: 0 | Status: SHIPPED
Start: 2022-09-22

## 2022-09-22 RX ADMIN — IBUPROFEN 400 MG: 400 TABLET, FILM COATED ORAL at 12:11

## 2022-09-22 RX ADMIN — IBUPROFEN 400 MG: 400 TABLET, FILM COATED ORAL at 19:27

## 2022-09-22 RX ADMIN — HEPARIN SODIUM 5000 UNITS: 5000 INJECTION INTRAVENOUS; SUBCUTANEOUS at 06:40

## 2022-09-22 RX ADMIN — ACETAMINOPHEN 650 MG: 325 TABLET ORAL at 19:27

## 2022-09-22 RX ADMIN — ACETAMINOPHEN 650 MG: 325 TABLET ORAL at 10:05

## 2022-09-22 RX ADMIN — Medication 3 MG: at 19:27

## 2022-09-22 RX ADMIN — HEPARIN SODIUM 5000 UNITS: 5000 INJECTION INTRAVENOUS; SUBCUTANEOUS at 13:49

## 2022-09-22 RX ADMIN — HEPARIN SODIUM 5000 UNITS: 5000 INJECTION INTRAVENOUS; SUBCUTANEOUS at 21:16

## 2022-09-22 NOTE — PLAN OF CARE
Problem: Potential for Falls  Goal: Patient will remain free of falls  Description: INTERVENTIONS:  - Educate patient/family on patient safety including physical limitations  - Instruct patient to call for assistance with activity   - Consult OT/PT to assist with strengthening/mobility   - Keep Call bell within reach  - Keep bed low and locked with side rails adjusted as appropriate  - Keep care items and personal belongings within reach  - Initiate and maintain comfort rounds  - Make Fall Risk Sign visible to staff  - Offer Toileting every 2 Hours, in advance of need  - Initiate/Maintain bed alarm  - Obtain necessary fall risk management equipment: call bell   - Apply yellow socks and bracelet for high fall risk patients  - Consider moving patient to room near nurses station  Outcome: Progressing     Problem: MOBILITY - ADULT  Goal: Maintain or return to baseline ADL function  Description: INTERVENTIONS:  -  Assess patient's ability to carry out ADLs; assess patient's baseline for ADL function and identify physical deficits which impact ability to perform ADLs (bathing, care of mouth/teeth, toileting, grooming, dressing, etc )  - Assess/evaluate cause of self-care deficits   - Assess range of motion  - Assess patient's mobility; develop plan if impaired  - Assess patient's need for assistive devices and provide as appropriate  - Encourage maximum independence but intervene and supervise when necessary  - Involve family in performance of ADLs  - Assess for home care needs following discharge   - Consider OT consult to assist with ADL evaluation and planning for discharge  - Provide patient education as appropriate  Outcome: Progressing  Goal: Maintains/Returns to pre admission functional level  Description: INTERVENTIONS:  - Perform BMAT or MOVE assessment daily    - Set and communicate daily mobility goal to care team and patient/family/caregiver     - Collaborate with rehabilitation services on mobility goals if consulted  - Perform Range of Motion 4 times a day  - Reposition patient every 2 hours    - Dangle patient 3 times a day  - Stand patient 3 times a day  - Ambulate patient 3 times a day  - Out of bed to chair 3 times a day   - Out of bed for meals 3 times a day  - Out of bed for toileting  - Record patient progress and toleration of activity level   Outcome: Progressing     Problem: Prexisting or High Potential for Compromised Skin Integrity  Goal: Skin integrity is maintained or improved  Description: INTERVENTIONS:  - Identify patients at risk for skin breakdown  - Assess and monitor skin integrity  - Assess and monitor nutrition and hydration status  - Monitor labs   - Assess for incontinence   - Turn and reposition patient  - Assist with mobility/ambulation  - Relieve pressure over bony prominences  - Avoid friction and shearing  - Provide appropriate hygiene as needed including keeping skin clean and dry  - Evaluate need for skin moisturizer/barrier cream  - Collaborate with interdisciplinary team   - Patient/family teaching  - Consider wound care consult   Outcome: Progressing

## 2022-09-22 NOTE — UTILIZATION REVIEW
Initial Clinical Review    Admission: Date/Time/Statement:   Admission Orders (From admission, onward)     Ordered        09/21/22 1629  INPATIENT ADMISSION  Once                      Orders Placed This Encounter   Procedures    INPATIENT ADMISSION     Standing Status:   Standing     Number of Occurrences:   1     Order Specific Question:   Level of Care     Answer:   Med Surg [16]     Order Specific Question:   Estimated length of stay     Answer:   More than 2 Midnights     Order Specific Question:   Certification     Answer:   I certify that inpatient services are medically necessary for this patient for a duration of greater than two midnights  See H&P and MD Progress Notes for additional information about the patient's course of treatment  ED Arrival Information     Expected   -    Arrival   9/21/2022 10:14    Acuity   Urgent            Means of arrival   Ambulance    Escorted by   [facility]    Admission type   Urgent            Arrival complaint   Fall           Chief Complaint   Patient presents with   Wash Caller Fall     Fall 1 week ago, fell backward & hit head on marble wall on Vacation in [facility] (did not get treatment there), NO LOC but couldn't move x 1 5 minutes, No blood thinners, hx of falls  [facility] if no new problem  Increased numbness from waste down, but getting better  Initial Presentation: 57 y o  male to ED presents with persistent bilateral lower extremity weakness s/p Fall 1 wk ago  He was vacationing in [facility] 1 wk ago, has low bld sugar episode with weak right leg and fell onto marble wall with + head strike  Denies LOC  Pt reports feeling paralyzed from neck down for 1 minute that had resolved but did not seek medical attention  He has similar fall 1 month prior, although weakness in lower extremities resolved on its own   PMH for Multiple spinal fusion procedures, chronic RLE weakness/numbness, S/p spinal cord stimulator  Admit Inpatient level of care for Ambulatory dysfunction, Weakness of RLE  Chronic RLE weakness/ numbness due to multiple back surgeries  PT/OT eval      Date: 9/22 Day 2:  [facility]  Awaiting insurance auth and Covid swab  ED Triage Vitals [09/21/22 1016]   Temperature Pulse Respirations Blood Pressure SpO2   98 °F (36 7 °C) 67 16 127/74 97 %      Temp Source Heart Rate Source Patient Position - Orthostatic VS BP Location FiO2 (%)   Oral Monitor Lying Left arm --      Pain Score       7          Wt Readings from Last 1 Encounters:   09/21/22 96 kg (211 lb 10 3 oz)     Additional Vital Signs:   09/22/22 07:11:51 -- -- -- 120/73 89 -- -- --   09/22/22 0017 98 6 °F (37 °C) 58 12 102/56 78 96 % None (Room air) Lying   09/21/22 17:44:53 98 4 °F (36 9 °C) -- -- 124/74 91 -- -- --   09/21/22 1715 -- 97 -- 111/61 -- 97 % -- Lying   09/21/22 1330 -- 62 16 111/55 -- 98 %       Pertinent Labs/Diagnostic Test Results:   CT head without contrast   Final Result by Kathrine Hutchins MD (09/21 1155)      No acute intracranial hemorrhage seen   No mass effect or midline shift seen            CT spine cervical without contrast   Final Result by Kathrine Hutchins MD (09/21 1203)      No acute compression collapse of the vertebra       Multilevel degenerative disc diseasewith foraminal narrowing                ED Treatment:   Medication Administration from 09/21/2022 1014 to 09/21/2022 1742     None        Past Medical History:   Diagnosis Date    Balance problems     BPH (benign prostatic hyperplasia)     Chronic back pain     Chronic pain disorder     COVID-19     2/2021-asymptomatic     Erectile dysfunction     Nocturia     OAB (overactive bladder)     Testicular hypogonadism     Urinary frequency     Urinary urgency      Present on Admission:  **None**      Admitting Diagnosis: Head pain [R51 9]  Weakness [R53 1]  Ambulatory dysfunction [R26 2]  Age/Sex: 57 y o  male Admission Orders:  Scheduled Medications:  heparin (porcine), 5,000 Units, Subcutaneous, Q8H [facility]  melatonin, 3 mg, Oral, HS      Continuous IV Infusions: None     PRN Meds:  acetaminophen, 650 mg, Oral, Q6H PRN 9/21 x1, 9/22 x1  ibuprofen, 400 mg, Oral, Q6H PRN        IP CONSULT TO CASE MANAGEMENT    Network Utilization Review Department  ATTENTION: Please call with any questions or concerns to [phone] and carefully listen to the prompts so that you are directed to the right person  All voicemails are confidential   Lynette Kussmaul all requests for admission clinical reviews, approved or denied determinations and any other requests to dedicated fax number below belonging to the campus where the patient is receiving treatment   List of dedicated fax numbers for the Facilities:  [address] DENIALS (Administrative/Medical Necessity) [phone]   [address] (Maternity/NICU/Pediatrics) [phone]   [address] [place] [address]  [address] [facility] [place] [address] [address] [place] [address] [address] [facility] [facility] [phone]

## 2022-09-22 NOTE — ASSESSMENT & PLAN NOTE
Patient presents with worsening weakness for the past week since having a fall while on vacation in [facility] 1 week ago  Patient reports that at the time he fell backwards and hit his head on marble wall, no LOC, no prior history of falls, not on blood thinners  Patient did not seek care after fall    · CT head, C-spine without acute findings  · VSS in ED, no underlying medical concerns  · PT/OT evaluated in ED, recommending acute rehab placement, d/c to [facility] acute rehab today  · Fall precautions

## 2022-09-22 NOTE — UTILIZATION REVIEW
Inpatient Admission Authorization Request   NOTIFICATION OF INPATIENT ADMISSION/INPATIENT AUTHORIZATION REQUEST   SERVICING FACILITY:   [address] [place] [address], [facility], [facility] [address] [facility]  Tax ID: [id number]  NPI: [id number]  Place of Service: Inpatient [address] [facility]  [address]  Place of Service Code: 24     ATTENDING PROVIDER:  Attending Name and NPI#: Phoenix Ansari Md [[id number]]  Address: [address] [place] [address], [facility], [facility] [address] [facility]  Phone: [phone]     UTILIZATION REVIEW CONTACT:  Shawn Stahl Utilization   Network Utilization Review Department  Phone: [phone]  Fax: [phone]  Email: [email or web address]     PHYSICIAN ADVISORY SERVICES:  FOR [id number] REVIEW - MEDICAL NECESSITY DENIAL  Phone: [phone]  Fax: [phone]  Email: [email or web address]     TYPE OF REQUEST:  Inpatient Status     ADMISSION INFORMATION:  ADMISSION DATE/TIME: 9/21/22  4:30 PM  PATIENT DIAGNOSIS CODE/DESCRIPTION:  Head pain [R51 9]  Weakness [R53 1]  Ambulatory dysfunction [R26 2]  DISCHARGE DATE/TIME: No discharge date for patient encounter  IMPORTANT INFORMATION:  Please contact Shawn Stahl directly with any questions or concerns regarding this request  Department voicemails are confidential     Send requests for admission clinical reviews, concurrent reviews, approvals, and administrative denials due to lack of clinical to fax [phone]

## 2022-09-22 NOTE — DISCHARGE SUMMARY
[address] [facility]  Discharge- Lorine Skiff 1960, 57 y o  male MRN: [id number]  Unit/Bed#: E5 -01 Encounter: [id number]  Primary Care Provider: Hamlet Santoyo DO   Date and time admitted to hospital: 9/21/2022 10:14 AM    * Ambulatory dysfunction  Assessment & Plan  Patient presents with worsening weakness for the past week since having a fall while on vacation in [facility] 1 week ago  Patient reports that at the time he fell backwards and hit his head on marble wall, no LOC, no prior history of falls, not on blood thinners  Patient did not seek care after fall  · CT head, C-spine without acute findings  · VSS in ED, no underlying medical concerns  · PT/OT evaluated in ED, recommending acute rehab placement, d/c to [facility] acute rehab today  · Fall precautions    Weakness of right lower extremity  Assessment & Plan  · Chronic RLE weakness/numbness due to multiple back surgeries    Status post lumbar spinal fusion  Assessment & Plan  · History of multiple spinal fusions, follows with Dr Remberto Gillespie  · Recently had chest procedure in June for removal of scar tissue and hardware    S/P insertion of spinal cord stimulator  Assessment & Plan  · History of spinal cord stimulator placement years ago, patient reports MRI compatible        Medical Problems             Resolved Problems  Date Reviewed: 9/22/2022   None               Discharging Physician / Practitioner: STACEY Dailey  PCP: Hamlet Santoyo DO  Admission Date:   Admission Orders (From admission, onward)     Ordered        09/21/22 [address]  Once                      Discharge Date: 09/22/22    Consultations During Hospital Stay:  · PT/OT     Procedures Performed:   · CT cervical spine 09/21/2022-no acute compression collapse of the vertebrae  Multilevel degenerative disc disease but the foraminal narrowing  · CT head 09/21/2022-no acute intracranial hemorrhage seen    No mass effect or midline shift seen     Significant Findings / Test Results:   · See above    Incidental Findings:   · None     Test Results Pending at Discharge (will require follow up): · None     Outpatient Tests Requested:  · BMP and CBC    Complications:  None    Reason for Admission: Persistent weakness status post fall    Hospital Course: Cr Macias is a 57 y o  male patient with a PMH of multiple spinal fusion procedures, chronic RLE weakness/numbness, s/p spinal cord stimulator who originally presented to the hospital on 9/21/2022 due to persistent weakness after a fall  The patient was on vacation in [facility] a week ago when his right leg became weak resulting in a fall backwards onto a marble wall with a head strike  The patient reported that he felt paralyzed from his neck down for 1 minute but this then resolved  Patient did not seek care after the fall and returned home on an airplane and came to emergency room due to persistent weakness  The patient had a CT cervical spine and head with no acute abnormalities and chronic changes noted  The patient was seen by PT OT and recommended for acute rehab  [facility] accepted the patient he will be discharged today  Please see above list of diagnoses and related plan for additional information  Condition at Discharge: good    Discharge Day Visit / Exam:   Subjective:  Pt reports he is still feeling weak  Requesting motrin for pain, pt reports chronic back pain  Denies any other complaints, no loss of bowel or bladder control  Vitals: Blood Pressure: 120/73 (09/22/22 0711)  Pulse: 58 (09/22/22 0017)  Temperature: 98 6 °F (37 °C) (09/22/22 0017)  Temp Source: Oral (09/22/22 0017)  Respirations: 12 (09/22/22 0017)  Weight - Scale: 96 kg (211 lb 10 3 oz) (09/21/22 1016)  SpO2: 96 % (09/22/22 0017)  Exam:   Physical Exam  Constitutional:       General: He is not in acute distress  Appearance: He is not ill-appearing     Cardiovascular:      Rate and Rhythm: Normal rate and regular rhythm  Pulses: Normal pulses  Heart sounds: Normal heart sounds  No murmur heard  Pulmonary:      Effort: No respiratory distress  Breath sounds: Normal breath sounds  No wheezing or rales  Abdominal:      General: Bowel sounds are normal  There is no distension  Palpations: Abdomen is soft  Tenderness: There is no abdominal tenderness  Musculoskeletal:         General: No swelling or tenderness  Skin:     General: Skin is warm and dry  Findings: No erythema or rash  Neurological:      General: No focal deficit present  Mental Status: He is alert  Mental status is at baseline  Psychiatric:         Attention and Perception: Attention normal          Mood and Affect: Mood normal           Discussion with Family: Patient declined call to   Discharge instructions/Information to patient and family:   See after visit summary for information provided to patient and family  Provisions for Follow-Up Care:  See after visit summary for information related to follow-up care and any pertinent home health orders  Disposition:   Acute Rehab at [facility]    Planned Readmission: no     Discharge Statement:  I spent 45 minutes discharging the patient  This time was spent on the day of discharge  I had direct contact with the patient on the day of discharge  Greater than 50% of the total time was spent examining patient, answering all patient questions, arranging and discussing plan of care with patient as well as directly providing post-discharge instructions  Additional time then spent on discharge activities  Discharge Medications:  See after visit summary for reconciled discharge medications provided to patient and/or family        **Please Note: This note may have been constructed using a voice recognition system**

## 2022-09-22 NOTE — PROGRESS NOTES
[facility]  Progress Note - Shelley Patel 1960, 57 y o  male MRN: [id number]  Unit/Bed#: E5 -01 Encounter: [id number]  Primary Care Provider: Erlinda Chiang DO   Date and time admitted to hospital: 9/21/2022 10:14 AM    * Ambulatory dysfunction  Assessment & Plan  Patient presents with worsening weakness for the past week since having a fall while on vacation in [facility] 1 week ago  Patient reports that at the time he fell backwards and hit his head on marble wall, no LOC, no prior history of falls, not on blood thinners  Patient did not seek care after fall  · CT head, C-spine without acute findings  · VSS in ED, no underlying medical concerns  · PT/OT evaluated in ED, recommending acute rehab placement, d/c to [facility] acute rehab when auth obtained   · Fall precautions    Assessment & Plan  Patient presents with worsening weakness for the past week since having a fall while on vacation in [facility] 1 week ago  Patient reports that at the time he fell backwards and hit his head on marble wall, no LOC, no prior history of falls, not on blood thinners  Patient did not seek care after fall    · CT head, C-spine without acute findings  · VSS in ED, no underlying medical concerns  · PT/OT evaluated in ED, recommending acute rehab placement, d/c to [facility] acute rehab today  · Fall precautions    Weakness of right lower extremity  Assessment & Plan  · Chronic RLE weakness/numbness due to multiple back surgeries    Assessment & Plan  · Chronic RLE weakness/numbness due to multiple back surgeries    Status post lumbar spinal fusion  Assessment & Plan  · History of multiple spinal fusions, follows with Dr Kalie Shelley  · Recently had chest procedure in June for removal of scar tissue and hardware    Assessment & Plan  · History of multiple spinal fusions, follows with Dr Kalie Shelley  · Recently had chest procedure in June for removal of scar tissue and hardware    S/P insertion of spinal cord stimulator  Assessment & Plan  · History of spinal cord stimulator placement years ago, patient reports MRI compatible    Assessment & Plan  · History of spinal cord stimulator placement years ago, patient reports MRI compatible        VTE Pharmacologic Prophylaxis: VTE Score: 3 Moderate Risk (Score 3-4) - Pharmacological DVT Prophylaxis Ordered: heparin  Patient Centered Rounds: I performed bedside rounds with nursing staff today  Discussions with Specialists or Other Care Team Provider: d/w RN     Education and Discussions with Family / Patient: Patient declined call to   Time Spent for Care: 30 minutes  More than 50% of total time spent on counseling and coordination of care as described above  Current Length of Stay: 1 day(s)  Current Patient Status: Inpatient   Certification Statement: The patient will continue to require additional inpatient hospital stay due to pending insurance auth   Discharge Plan: Anticipate discharge tomorrow to rehab facility  Code Status: Level 1 - Full Code    Subjective:   Pt reports he is still feeling weak  Requesting motrin for pain, pt reports chronic back pain  Denies any other complaints, no loss of bowel or bladder control  Objective:     Vitals:   Temp (24hrs), Av 5 °F (36 9 °C), Min:98 4 °F (36 9 °C), Max:98 6 °F (37 °C)    Temp:  [98 4 °F (36 9 °C)-98 6 °F (37 °C)] 98 6 °F (37 °C)  HR:  [58-97] 58  Resp:  [12] 12  BP: (102-124)/(56-74) 120/73  SpO2:  [96 %-97 %] 96 %  Body mass index is 28 7 kg/m²  Input and Output Summary (last 24 hours): Intake/Output Summary (Last 24 hours) at 2022 1552  Last data filed at 2022 0901  Gross per 24 hour   Intake --   Output 700 ml   Net -700 ml       Physical Exam:   Physical Exam  Vitals and nursing note reviewed  Constitutional:       General: He is not in acute distress  Appearance: He is well-developed     Cardiovascular:      Rate and Rhythm: Normal rate and regular rhythm  Heart sounds: No murmur heard  Pulmonary:      Effort: Pulmonary effort is normal  No respiratory distress  Breath sounds: Normal breath sounds  Abdominal:      General: Bowel sounds are normal  There is no distension  Palpations: Abdomen is soft  Tenderness: There is no abdominal tenderness  Musculoskeletal:         General: No swelling  Skin:     General: Skin is warm and dry  Neurological:      Mental Status: He is alert  Mental status is at baseline  Motor: Weakness (lower extremities b/l) present  Psychiatric:         Mood and Affect: Mood normal           Additional Data:     Labs:                            Lines/Drains:  Invasive Devices  Report    None                       Imaging: Reviewed radiology reports from this admission including: CT head and CT cervical spine    Recent Cultures (last 7 days):         Last 24 Hours Medication List:   Current Facility-Administered Medications   Medication Dose Route Frequency Provider Last Rate    acetaminophen  650 mg Oral Q6H PRN Chasidy Swain PA-C      heparin (porcine)  5,000 Units Subcutaneous Q8H [address] Chasidy Swain PA-C      ibuprofen  400 mg Oral Q6H PRN STACEY Gordillo      melatonin  3 mg Oral HS Chasidy Swain PA-C          Today, Patient Was Seen By: STACEY Gordillo    **Please Note: This note may have been constructed using a voice recognition system  **

## 2022-09-22 NOTE — ASSESSMENT & PLAN NOTE
· History of multiple spinal fusions, follows with Dr Kb Luna  · Recently had chest procedure in June for removal of scar tissue and hardware

## 2022-09-22 NOTE — CASE MANAGEMENT
Case Management Discharge Planning Note    Patient name Juliano Larkin  [place] [address] [id number] [place] [address] Rd-* MRN [id number]  : 1960 Date 2022       Current Admission Date: 2022  Current Admission Diagnosis:Ambulatory dysfunction   Patient Active Problem List    Diagnosis Date Noted    Weakness of right lower extremity 2022    Ambulatory dysfunction 2022    Status post lumbar spinal fusion 2021    S/P insertion of spinal cord stimulator 06/10/2021    Hydrocele of testis 2020    Chest discomfort 2020    Hx of colonoscopy 2019    Cauda equina syndrome ([facility] [address] ) 2018    Spinal stenosis of thoracolumbar region 2018    Acute cystitis without hematuria 2018    Erectile dysfunction 2018    Hypogonadism in male 2018    BPH associated with nocturia 2018      LOS (days): 1  Geometric Mean LOS (GMLOS) (days):   Days to GMLOS:     OBJECTIVE:  Risk of Unplanned Readmission Score: 7 33         Current admission status: Inpatient   Preferred Pharmacy:   [address] [place] [address] [id number] Lillie Ganser, [address] [place] [address]  Phone: [phone] Fax: [phone]    [address]  [address] [place] [address]  Phone: [phone] Fax: [phone]    [place], [address] [facility]  Phone: [phone] Fax: [phone]    Primary Care Provider: Eduardo Valdovinos DO    Primary Insurance: BLUE CROSS  Secondary Insurance:     DISCHARGE DETAILS:    Patient is accepted to [facility] acute rehab  The liaison Obdulia Fagan has submitted for insurance authorization and is asking for a covid swab  CM made SLIM aware  Update:    Patient's covid swab is negative  Patient's insurance Tanika Resendez is still pending   Patient states he can pay out of pocket in case insurance denies  Patient is working with [employer] [facility] [employer] [facility] currently  [facility] states financials would not be finalized in time for discharge today and Crys Riley is still pending  Rm Ramey wants discharge tomorrow

## 2022-09-22 NOTE — ASSESSMENT & PLAN NOTE
Patient presents with worsening weakness for the past week since having a fall while on vacation in [facility] 1 week ago  Patient reports that at the time he fell backwards and hit his head on marble wall, no LOC, no prior history of falls, not on blood thinners  Patient did not seek care after fall    · CT head, C-spine without acute findings  · VSS in ED, no underlying medical concerns  · PT/OT evaluated in ED, recommending acute rehab placement, d/c to [facility] acute rehab when auth obtained   · Fall precautions

## 2022-09-22 NOTE — ASSESSMENT & PLAN NOTE
· History of multiple spinal fusions, follows with Dr Nadira Carter  · Recently had chest procedure in June for removal of scar tissue and hardware

## 2022-09-23 VITALS
DIASTOLIC BLOOD PRESSURE: 70 MMHG | RESPIRATION RATE: 18 BRPM | OXYGEN SATURATION: 96 % | BODY MASS INDEX: 28.7 KG/M2 | WEIGHT: 211.64 LBS | HEART RATE: 58 BPM | TEMPERATURE: 97.6 F | SYSTOLIC BLOOD PRESSURE: 133 MMHG

## 2022-09-23 PROBLEM — K59.01 SLOW TRANSIT CONSTIPATION: Status: ACTIVE | Noted: 2022-09-23

## 2022-09-23 PROCEDURE — 99239 HOSP IP/OBS DSCHRG MGMT >30: CPT | Performed by: NURSE PRACTITIONER

## 2022-09-23 RX ORDER — DOCUSATE SODIUM 100 MG/1
100 CAPSULE, LIQUID FILLED ORAL 2 TIMES DAILY
Refills: 0 | Status: SHIPPED
Start: 2022-09-23

## 2022-09-23 RX ORDER — BISACODYL 10 MG
10 SUPPOSITORY, RECTAL RECTAL DAILY
Status: DISCONTINUED | OUTPATIENT
Start: 2022-09-23 | End: 2022-09-23 | Stop reason: HOSPADM

## 2022-09-23 RX ORDER — POLYETHYLENE GLYCOL 3350 17 G/17G
17 POWDER, FOR SOLUTION ORAL DAILY PRN
Refills: 0 | Status: SHIPPED
Start: 2022-09-23

## 2022-09-23 RX ADMIN — ACETAMINOPHEN 650 MG: 325 TABLET ORAL at 05:41

## 2022-09-23 RX ADMIN — IBUPROFEN 400 MG: 400 TABLET, FILM COATED ORAL at 05:41

## 2022-09-23 RX ADMIN — HEPARIN SODIUM 5000 UNITS: 5000 INJECTION INTRAVENOUS; SUBCUTANEOUS at 05:38

## 2022-09-23 RX ADMIN — BISACODYL 10 MG: 10 SUPPOSITORY RECTAL at 10:56

## 2022-09-23 NOTE — DISCHARGE SUMMARY
[address] [facility]  Discharge- Jose Enrique Baird 1960, 57 y o  male MRN: [id number]  Unit/Bed#: E5 -01 Encounter: [id number]  Primary Care Provider: Lee Ann Lemon DO   Date and time admitted to hospital: 9/21/2022 10:14 AM    * Ambulatory dysfunction  Assessment & Plan  Patient presents with worsening weakness for the past week since having a fall while on vacation in [facility] 1 week ago  Patient reports that at the time he fell backwards and hit his head on marble wall, no LOC, no prior history of falls, not on blood thinners  Patient did not seek care after fall    · CT head, C-spine without acute findings  · VSS in ED, no underlying medical concerns  · PT/OT evaluated in ED, recommending acute rehab placement, d/c to [facility] acute rehab today   · Fall precautions    Slow transit constipation  Assessment & Plan  Pt requesting suppository- will order     Weakness of right lower extremity  Assessment & Plan  · Chronic RLE weakness/numbness due to multiple back surgeries    Status post lumbar spinal fusion  Assessment & Plan  · History of multiple spinal fusions, follows with Dr Meredith Chaparro  · Recently had chest procedure in June for removal of scar tissue and hardware    S/P insertion of spinal cord stimulator  Assessment & Plan  · History of spinal cord stimulator placement years ago, patient reports MRI compatible      Medical Problems             Resolved Problems  Date Reviewed: 9/23/2022   None               Discharging Physician / Practitioner: STACEY Bagley  PCP: Lee Ann Lemon DO  Admission Date:   Admission Orders (From admission, onward)     Ordered        09/21/22 [facility]  Once                      Discharge Date: 09/23/22    Discharge Date: 09/22/22     Consultations During Hospital Stay:  · PT/OT      Procedures Performed:   · CT cervical spine 09/21/2022-no acute compression collapse of the vertebrae   Multilevel degenerative disc disease but the foraminal narrowing  · CT head 09/21/2022-no acute intracranial hemorrhage seen   No mass effect or midline shift seen      Significant Findings / Test Results:   · See above     Incidental Findings:   · None      Test Results Pending at Discharge (will require follow up):   · None     Outpatient Tests Requested:  · BMP and CBC     Complications:  None     Reason for Admission: Persistent weakness status post fall  [facility] a 57 y  o  male patient with a PMH of multiple spinal fusion procedures, chronic RLE weakness/numbness, s/p spinal cord stimulator who originally presented to the hospital on 9/21/2022 due to persistent weakness after a fall   The patient was on vacation in [facility] a week ago when his right leg became weak resulting in a fall backwards onto a marble wall with a head strike   The patient reported that he felt paralyzed from his neck down for 1 minute but this then resolved   Patient did not seek care after the fall and returned home on an airplane and came to emergency room due to persistent weakness   The patient had a CT cervical spine and head with no acute abnormalities and chronic changes noted   The patient was seen by PT OT and recommended for acute rehab  [facility] the patient he will be discharged today      Please see above list of diagnoses and related plan for additional information       Condition at Discharge: good    Discharge Day Visit / Exam:   Subjective:  Pt reports he feels he is getting weaker not being at rehab  He reports constipation  Denies any other complaints     Vitals: Blood Pressure: 133/70 (09/23/22 0735)  Pulse: 58 (09/22/22 0017)  Temperature: 97 6 °F (36 4 °C) (09/23/22 0735)  Temp Source: Oral (09/22/22 0017)  Respirations: 18 (09/22/22 1553)  Weight - Scale: 96 kg (211 lb 10 3 oz) (09/21/22 1016)  SpO2: 96 % (09/22/22 0017)  Exam:   Physical Exam  Constitutional:       General: He is not in acute distress  Appearance: He is not ill-appearing  Cardiovascular:      Rate and Rhythm: Normal rate and regular rhythm  Pulses: Normal pulses  Heart sounds: Normal heart sounds  No murmur heard  Pulmonary:      Effort: No respiratory distress  Breath sounds: Normal breath sounds  No wheezing or rales  Abdominal:      General: Bowel sounds are normal  There is no distension  Palpations: Abdomen is soft  Tenderness: There is no abdominal tenderness  Musculoskeletal:         General: No swelling or tenderness  Skin:     General: Skin is warm and dry  Findings: No erythema or rash  Neurological:      Mental Status: He is alert and oriented to person, place, and time  Motor: Weakness (lower extremities ) present  Psychiatric:         Attention and Perception: Attention normal          Mood and Affect: Mood normal           Discussion with Family: Patient declined call to   Discharge instructions/Information to patient and family:   See after visit summary for information provided to patient and family  Provisions for Follow-Up Care:  See after visit summary for information related to follow-up care and any pertinent home health orders  Disposition:   Acute Rehab at [facility]    Planned Readmission: no     Discharge Statement:  I spent 45 minutes discharging the patient  This time was spent on the day of discharge  I had direct contact with the patient on the day of discharge  Greater than 50% of the total time was spent examining patient, answering all patient questions, arranging and discussing plan of care with patient as well as directly providing post-discharge instructions  Additional time then spent on discharge activities  Discharge Medications:  See after visit summary for reconciled discharge medications provided to patient and/or family        **Please Note: This note may have been constructed using a voice recognition system**

## 2022-09-23 NOTE — ASSESSMENT & PLAN NOTE
Patient presents with worsening weakness for the past week since having a fall while on vacation in [facility] 1 week ago  Patient reports that at the time he fell backwards and hit his head on marble wall, no LOC, no prior history of falls, not on blood thinners  Patient did not seek care after fall    · CT head, C-spine without acute findings  · VSS in ED, no underlying medical concerns  · PT/OT evaluated in ED, recommending acute rehab placement, d/c to [facility] acute rehab today   · Fall precautions

## 2022-09-23 NOTE — PLAN OF CARE
Problem: Potential for Falls  Goal: Patient will remain free of falls  Description: INTERVENTIONS:  - Educate patient/family on patient safety including physical limitations  - Instruct patient to call for assistance with activity   - Consult OT/PT to assist with strengthening/mobility   - Keep Call bell within reach  - Keep bed low and locked with side rails adjusted as appropriate  - Keep care items and personal belongings within reach  - Initiate and maintain comfort rounds  - Make Fall Risk Sign visible to staff  - Offer Toileting every 2 Hours, in advance of need  - Initiate/Maintain BED alarm  - Obtain necessary fall risk management equipment: ALARM  - Apply yellow socks and bracelet for high fall risk patients  - Consider moving patient to room near nurses station  Outcome: Progressing     Problem: MOBILITY - ADULT  Goal: Maintain or return to baseline ADL function  Description: INTERVENTIONS:  -  Assess patient's ability to carry out ADLs; assess patient's baseline for ADL function and identify physical deficits which impact ability to perform ADLs (bathing, care of mouth/teeth, toileting, grooming, dressing, etc )  - Assess/evaluate cause of self-care deficits   - Assess range of motion  - Assess patient's mobility; develop plan if impaired  - Assess patient's need for assistive devices and provide as appropriate  - Encourage maximum independence but intervene and supervise when necessary  - Involve family in performance of ADLs  - Assess for home care needs following discharge   - Consider OT consult to assist with ADL evaluation and planning for discharge  - Provide patient education as appropriate  Outcome: Progressing  Goal: Maintains/Returns to pre admission functional level  Description: INTERVENTIONS:  - Perform BMAT or MOVE assessment daily    - Set and communicate daily mobility goal to care team and patient/family/caregiver     - Collaborate with rehabilitation services on mobility goals if consulted  - Perform Range of Motion 3 times a day  - Reposition patient every 2 hours    - Dangle patient 3 times a day  - Stand patient 3 times a day  - Ambulate patient 3 times a day  - Out of bed to chair 3 times a day   - Out of bed for meals 3 times a day  - Out of bed for toileting  - Record patient progress and toleration of activity level   Outcome: Progressing     Problem: Prexisting or High Potential for Compromised Skin Integrity  Goal: Skin integrity is maintained or improved  Description: INTERVENTIONS:  - Identify patients at risk for skin breakdown  - Assess and monitor skin integrity  - Assess and monitor nutrition and hydration status  - Monitor labs   - Assess for incontinence   - Turn and reposition patient  - Assist with mobility/ambulation  - Relieve pressure over bony prominences  - Avoid friction and shearing  - Provide appropriate hygiene as needed including keeping skin clean and dry  - Evaluate need for skin moisturizer/barrier cream  - Collaborate with interdisciplinary team   - Patient/family teaching  - Consider wound care consult   Outcome: Progressing

## 2022-09-23 NOTE — NURSING NOTE
Report called to receiving facility  Pt left unit with transport team, he has no questions or concerns  Belongings sent with friend who was at the bedside

## 2022-09-23 NOTE — CASE MANAGEMENT
Case Management Discharge Planning Note    Patient name Yuval Desai  Location East 5 [address] [facility] [id number] [place] [address] Rd-* MRN [id number]  : 1960 Date 2022       Current Admission Date: 2022  Current Admission Diagnosis:Ambulatory dysfunction   Patient Active Problem List    Diagnosis Date Noted    Weakness of right lower extremity 2022    Ambulatory dysfunction 2022    Status post lumbar spinal fusion 2021    S/P insertion of spinal cord stimulator 06/10/2021    Hydrocele of testis 2020    Chest discomfort 2020    Hx of colonoscopy 2019    Cauda equina syndrome ([address] ) 2018    Spinal stenosis of thoracolumbar region 2018    Acute cystitis without hematuria 2018    Erectile dysfunction 2018    Hypogonadism in male 2018    BPH associated with nocturia 2018      LOS (days): 2  Geometric Mean LOS (GMLOS) (days): 2 20  Days to GMLOS:0 5     OBJECTIVE:  Risk of Unplanned Readmission Score: 5 54         Current admission status: Inpatient   Preferred Pharmacy:   [address] [place] [address] [id number] [place] [address] [address] [place] [address]  Phone: [phone] Fax: [phone]    [address] [facility] [address] Kiki Garza  [address] [address]  Phone: [phone] Fax: [phone]    Ezequiel Corbett   [address] [facility]  Phone: [phone] Fax: [phone]    Primary Care Provider: Shabbir Beckwith DO    Primary Insurance: BLUE CROSS  Secondary Insurance:     DISCHARGE DETAILS:    Discharge planning discussed with[de-identified] patient  Freedom of Choice: Yes  Comments - Freedom of Choice: Patient wants [facility]  CM contacted family/caregiver?: No- see comments  Were Treatment Team discharge recommendations reviewed with patient/caregiver?: Yes  Did patient/caregiver verbalize understanding of patient care needs?: N/A- going to facility  Were patient/caregiver advised of the risks associated with not following Treatment Team discharge recommendations?: Yes    Other Referral/Resources/Interventions Provided:  Interventions: Acute Rehab    Treatment Team Recommendation: Acute Rehab  Discharge Destination Plan[de-identified] Acute Rehab  Transport at Discharge : [facility]  Dispatcher Contacted: Yes  Number/Name of Dispatcher: Abbe Barry [id number]  Transported by Christiano Perez and Unit #): [facility]  ETA of Transport (Date): 09/23/22  ETA of Transport (Time): 1500     Transfer Mode: [facility] Name, [address] : [facility]  Receiving Facility/Agency Phone Number: [phone] and the fax # is [phone]  Facility/Agency Fax Number: [phone] and the fax # is [phone]

## 2022-09-23 NOTE — ASSESSMENT & PLAN NOTE
· History of multiple spinal fusions, follows with Dr Cloud Call  · Recently had chest procedure in June for removal of scar tissue and hardware

## 2022-09-29 NOTE — UTILIZATION REVIEW
Notification of Discharge   This is a Notification of Discharge from our facility [address]  Please be advised that this patient has been discharge from our facility  Below you will find the admission and discharge date and time including the patients disposition  UTILIZATION REVIEW CONTACT:  Gwenette Ormond  Utilization   Network Utilization Review Department  Phone: [phone] x carefully listen to the prompts  All voicemails are confidential   Email: [email or web address]     PHYSICIAN ADVISORY SERVICES:  FOR CHXV-YK-FJEU REVIEW - MEDICAL NECESSITY DENIAL  Phone: [phone]  Fax: [phone]  Email: [email or web address]     PRESENTATION DATE: 9/21/2022 10:14 AM    INPATIENT ADMISSION DATE: 9/21/22  4:30 PM   DISCHARGE DATE: 9/23/2022  3:45 PM  DISPOSITION: Non [facility] Non [facility]      IMPORTANT INFORMATION:  Send all requests for admission clinical reviews, approved or denied determinations and any other requests to dedicated fax number below belonging to the campus where the patient is receiving treatment   List of dedicated fax numbers:  [address] DENIALS (Administrative/Medical Necessity) [phone]   [address] (Maternity/NICU/Pediatrics) [phone]   [facility] [phone]   130 [facility] [phone]   [address] [place] [address] [phone]   [address] [phone]   [facility]  [phone]   2205 [facility]  2401 [facility] 1000 [facility] [phone]

## 2022-10-03 NOTE — ED ATTENDING ATTESTATION
9/21/2022  INav MD, saw and evaluated the patient  I have discussed the patient with the resident/non-physician practitioner and agree with the resident's/non-physician practitioner's findings, Plan of Care, and MDM as documented in the resident's/non-physician practitioner's note, except where noted  All available labs and Radiology studies were reviewed  I was present for key portions of any procedure(s) performed by the resident/non-physician practitioner and I was immediately available to provide assistance  At this point I agree with the current assessment done in the Emergency Department  I have conducted an independent evaluation of this patient a history and physical is as follows:    ED Course     40-year-old male with multiple medical problems including chronic ambulatory dysfunction presents for evaluation of back pain and trouble ambulating after a fall while on vacation in [facility]  Patient does report that overall his symptoms have been improving over the past week  He is interested in rehab in order to improve his strength and mobility  On exam patient has normal range of motion and relatively normal strength in all four extremities  No focal deficits are noted        Critical Care Time  Procedures

## 2022-10-13 ENCOUNTER — TELEPHONE (OUTPATIENT)
Dept: NEUROLOGY | Facility: CLINIC | Age: 62
End: 2022-10-13

## 2022-10-14 DIAGNOSIS — M79.2 NEURALGIA AND NEURITIS: Primary | ICD-10-CM

## 2022-10-14 RX ORDER — GABAPENTIN 300 MG/1
CAPSULE ORAL
Qty: 90 CAPSULE | Refills: 1 | Status: SHIPPED | OUTPATIENT
Start: 2022-10-14

## 2022-10-14 NOTE — PROGRESS NOTES
Spoke to [place] on his cell  [phone]     58 y/o with known spinal disease , fell backwards and hit his head  on marble wall   Admitted to [address] 09/21/22 to 09/23? 22  Ct head , ct spine no acute findings   Developed numbness and tingling in arms and legs which is slowly improving   He had diffuse weakness and is now able to use a walker at home but with increase in weakness on right leg Admitted to [address] [place] [address]  For 2 weeks and now receiving PT / OT outpatient      Dx with spinal cord bruising     He was previously on Lyrica  But it resulted in cognitive issues     We discussed potential mri of lumbar spine , cervical spine  and EMG studies   Will discuss this more in detail next week     He would like to try gabapentin   Anjelica Cuadra will start 300mg qhs and increase to tid    D/w side effects of lethargy

## 2022-10-18 NOTE — TELEPHONE ENCOUNTER
----- Message from Victor M Moody DO sent at 1/16/2019  1:00 PM EST -----  Please schedule iv steriods 1 gram for three days over 90 mins in [facility] , pt will pay if insurance does not pay    asap
I called pt's insurance at [phone] and spoke to a representative, clau  Pt is active and effective as of 12/1/17  I provided codes 51 812 89 45, 47208 and [id number] for IV steroid infusion  Codes are eligible and covered at 90% after deductible has been met  Pt has a 1,000 deductible/ $409 70 of deductible met and a 6,500 OOP max/ $479 57 of OOP met  Prior authorization is not required  [facility][id number]  I called and spoke to pt and he would like to go to [facility] either first thing in the morning, or over lunch or starting at 2pm  I called [facility] and scheduled pt for 1/22, 1/23, and 1/24 @12pm each day  Called and left a message for pt to call the office back to make him aware of appt details  Orders written, do you want me to e-mail them to you? Or will you be coming to [place] before Monday 12pm to sign?
Order signed, located in media as forms 1/18/19
Pt aware/agreeable  I will scan orders to [facility] now
Thank you for such effiecent  service      please send to Doy Fine    I can sign them tomorrow when I am in [place]
show

## 2022-10-19 ENCOUNTER — OFFICE VISIT (OUTPATIENT)
Dept: NEUROLOGY | Facility: CLINIC | Age: 62
End: 2022-10-19
Payer: COMMERCIAL

## 2022-10-19 VITALS
OXYGEN SATURATION: 98 % | HEIGHT: 74 IN | HEART RATE: 74 BPM | BODY MASS INDEX: 27.08 KG/M2 | DIASTOLIC BLOOD PRESSURE: 72 MMHG | WEIGHT: 211 LBS | TEMPERATURE: 96.4 F | RESPIRATION RATE: 18 BRPM | SYSTOLIC BLOOD PRESSURE: 114 MMHG

## 2022-10-19 DIAGNOSIS — G82.50 QUADRIPLEGIA (HCC): Primary | ICD-10-CM

## 2022-10-19 PROCEDURE — 99214 OFFICE O/P EST MOD 30 MIN: CPT | Performed by: PSYCHIATRY & NEUROLOGY

## 2022-10-19 RX ORDER — PHENOL 1.4 %
10 AEROSOL, SPRAY (ML) MUCOUS MEMBRANE AS NEEDED
COMMUNITY

## 2022-10-19 RX ORDER — ALPRAZOLAM 0.5 MG/1
0.5 TABLET ORAL
Qty: 3 TABLET | Refills: 0 | Status: SHIPPED | OUTPATIENT
Start: 2022-10-19

## 2022-10-19 NOTE — ASSESSMENT & PLAN NOTE
Rivas Hayward is a 64-year-old patient with a known history of chronic spinal disease status post spinal cord stimulator who was in [facility] is several months ago and had a fall  During that time he developed numbness and tingling and weakness in his upper and lower extremities  He then returned to [place] where he was admitted to [facility] for several days  CT scan of head was normal and CT scan of the cervical spine showed no significant change  He then was transferred to [facility] where he has been receiving now outpatient physical therapy  He has noted improvement slightly of his upper extremities but does continue to report weakness of his distal upper arms  He also has noted increasing weakness in his lower extremities  Recently he was started on gabapentin which he utilizes 300 mg at bedtime without any side effects  Today's examination did demonstrate weakness in the distal upper extremities weakness in the lower extremities and a sensory level at T8 to pinprick  Given his sudden fall  I am concerned that he had myelopathic features causing the sudden onset of quadaparesis  I would like him to have an urgent MRI imaging study of the cervical spine and thoracic spine    At this point he does not require nerve conduction studies as this would more assess more his peripheral function Depending on what these imaging studies do show further recommendations can be pursued

## 2022-10-19 NOTE — PROGRESS NOTES
Patient ID: Mary Kate Jones is a 57 y o  male  Assessment/Plan:    Quadriparesis [facility])  Kieran Harvey is a 66-year-old patient with a known history of chronic spinal disease status post spinal cord stimulator who was in [facility] is several months ago and had a fall  During that time he developed numbness and tingling and weakness in his upper and lower extremities  He then returned to [place] where he was admitted to [facility] for several days  CT scan of head was normal and CT scan of the cervical spine showed no significant change  He then was transferred to [facility] where he has been receiving now outpatient physical therapy  He has noted improvement slightly of his upper extremities but does continue to report weakness of his distal upper arms  He also has noted increasing weakness in his lower extremities  Recently he was started on gabapentin which he utilizes 300 mg at bedtime without any side effects  Today's examination did demonstrate weakness in the distal upper extremities weakness in the lower extremities and a sensory level at T8 to pinprick  Given his sudden fall  I am concerned that he had myelopathic features causing the sudden onset of quadaparesis  I would like him to have an urgent MRI imaging study of the cervical spine and thoracic spine  At this point he does not require nerve conduction studies as this would more assess more his peripheral function Depending on what these imaging studies do show further recommendations can be pursued        Diagnoses and all orders for this visit:    Quadriplegia [facility])  -     MRI cervical spine wo contrast; Future  -     MRI thoracic spine without contrast; Future  -     ALPRAZolam (XANAX) 0 5 mg tablet; Take 1 tablet (0 5 mg total) by mouth 30 min pre-procedure Can repeat if needed    Other orders  -     Melatonin 10 MG TABS; Take 10 mg by mouth if needed         Subjective:       This is a 57  y o rh male who presented  to our office   in a follow-up visit  He was last evaluated here several months ago  Since his last appointment he was seen by Orthopedics for potential removal of his stimulator  It was felt that his lower extremity weakness may be due to cervical spinal issue  A EMG study was suggested  In the interim he did travel to [facility]  While in [facility] he fell and hit the back of his head the by tingling and numbness in his arms and leg and weakness  Tahir Manifold He then returned to [place] quickly and was admitted as a trauma to [facility]  CT scan of head was normal the CT scan of the cervical spine showed no significant changes  He was then transferred to [facility] where he has been undergoing physical therapy  Initially he underwent inpatient physical therapy but now he is receiving physical therapy twice a week  He has noted some improvement  He does have a movement of his upper proximal muscles in his arms but still continues to have weakness distally in his hands  In his lower extremities he has increasing weakness in his legs but today did have some slight movement in his feet  He has no bladder bowel incontinence but does report some urgency  He does have a diminution of sensation in his abdomen and spine  In the past he did try Lyrica which caused some cognitive issues            Prior history:  He did  complainin of stiffness and heaviness in his legs with progressive weakness    MRI did show foraminal stenosis a left L5-S1 as well as impingement on the left L3 L4 region   He was seen by dr Miller President who  suggested surgery but he and then sought a 2nd opinion and underwent fusion with placement of  L3-S1 in May of 2016 by Dr Israel Foss ,  the head of Neurosurgery at [address] [place] [address] the surgery in May of 2016 he noted increasing numbness in his bilateral thighs, in the saddle regions and numbness in the perineum   This occurred after 1 year     the numbness also occurs in the lower extremities the calf on the left side He has noted progressive ambulatory dysfunction specially on the right leg   He does report some urinary urgency and abnormalities with sexual dysfunction    The sexual dysfunction has been progressive over the last year  [facility] is unable to have an organsm for the last  year  He did try baclofen and it was ineffective     He is no longer utilizing narcotic  pain meds and uses prednisone and/or Medrol Dosepak when he has increase in back pain  Oral steriods for a short period of time was ineffective   He is now continued to be followed by physical therapy  [facility] had anterior fusion at L4-L5 and debris due to granulation tissue   There was a bony fragment impinging upon the spinal cord at the L2 vertebral body    He did have mri of the lumbar and thoracic spine after the last visit   Mri of the lumbar spine showed multilevel disc protrusion and DJD , pedical screws were in place   No evidence of arachointits however there was change in signal  Mri of the T spine was abnormal with mid thoracic level disc herniaition at T6 and T7  With multi level shallow disc protrusions   Heavy metal screeen, b12, foalte were normal          Does report more numbness in the left upper extremity but were weakness on the right                     The following portions of the patient's history were reviewed and updated as appropriate:   He  has a past medical history of Balance problems, BPH (benign prostatic hyperplasia), Chronic back pain, Chronic pain disorder, COVID-19, Erectile dysfunction, Nocturia, OAB (overactive bladder), Testicular hypogonadism, Urinary frequency, and Urinary urgency  He  has a past surgical history that includes Spinal cord stimulator implant (10/10/2019); Colonoscopy (2004); Colonoscopy (2007); Colonoscopy (2013); Colonoscopy (06/2019); Back surgery (05/18/2016); Neck surgery; pr removal of hydrocele,tunica,unilat (Right, 06/10/2021); Neck surgery (Right); and Back surgery    His family history is not on file  He  reports that he has never smoked  He has never used smokeless tobacco  He reports current alcohol use  He reports that he does not use drugs  Current Outpatient Medications   Medication Sig Dispense Refill   • acetaminophen (TYLENOL) 325 mg tablet Take 2 tablets (650 mg total) by mouth every 6 (six) hours as needed for mild pain, headaches or fever  0   • ALPRAZolam (XANAX) 0 5 mg tablet Take 1 tablet (0 5 mg total) by mouth 30 min pre-procedure Can repeat if needed 3 tablet 0   • docusate sodium (COLACE) 100 mg capsule Take 1 capsule (100 mg total) by mouth 2 (two) times a day (Patient taking differently: Take 100 mg by mouth if needed)  0   • gabapentin (Neurontin) 300 mg capsule 1 po qhs for 1 week then 1 po tid (Patient taking differently: Take 300 mg by mouth daily 1 po qhs for 1 week then 1 po tid) 90 capsule 1   • ibuprofen (MOTRIN) 400 mg tablet Take 1 tablet (400 mg total) by mouth every 6 (six) hours as needed for mild pain  0   • Melatonin 10 MG TABS Take 10 mg by mouth if needed     • polyethylene glycol (MIRALAX) 17 g packet Take 17 g by mouth daily as needed (constipation)  0   • sildenafil (VIAGRA) 100 mg tablet Take 100 mg by mouth As directed     • testosterone cypionate (DEPO-TESTOSTERONE) 200 mg/mL SOLN inject 2 milliliters intramuscularly every 14 days 10 mL 2     No current facility-administered medications for this visit  He is allergic to morphine            Objective:    Blood pressure 114/72, pulse 74, temperature (!) 96 4 °F (35 8 °C), temperature source Tympanic, resp  rate 18, height 6' 2" (1 88 m), weight 95 7 kg (211 lb), SpO2 98 %  Physical Exam  Eyes:      General: Lids are normal       Extraocular Movements: Extraocular movements intact  Pupils: Pupils are equal, round, and reactive to light  Neurological:      Deep Tendon Reflexes:      Reflex Scores:       Tricep reflexes are 2+ on the right side and 1+ on the left side         Bicep reflexes are 2+ on the right side and 1+ on the left side  Patellar reflexes are 2+ on the right side and 1+ on the left side  Achilles reflexes are 2+ on the right side and 1+ on the left side  Neurological Exam  Mental Status  Awake, alert and oriented to person, place and time  Oriented to person, place and time  Language is fluent with no aphasia  Cranial Nerves  CN II: Visual acuity is normal  Visual fields full to confrontation  CN III, IV, VI: Extraocular movements intact bilaterally  Normal lids and orbits bilaterally  Pupils equal round and reactive to light bilaterally  CN V: Facial sensation is normal   CN VII: Full and symmetric facial movement  CN VIII: Hearing is normal   CN IX, X: Palate elevates symmetrically  Normal gag reflex  CN XI: Shoulder shrug strength is normal   CN XII: Tongue midline without atrophy or fasciculations  Motor   Strength is 5/5 in all four extremities except as noted  His upper extremities he had 5- out of five strength proximally  In his lower extremities he had intrinsic atrophy with atrophy of the FDI  A strength was a 3/5  Wrist flexion and wrist extension were 3/5  In his lower extremities the right hip flexion was a 2/5 left hip flexion was a 3/5  Knee flexion and knee extension were 3/5  Ankle dorsiflexion was a three plantar flexion was 3+       Sensory  A diminution sensation on the right lower extremity but he also had a sensory level at T8  Anjelica Cruel Reflexes                                            Right                      Left  Biceps                                 2+                         1+  Triceps                                2+                         1+  Patellar                                2+                         1+  Achilles                                2+                         1+  Plantar                           Upgoing                Upgoing    Right pathological reflexes: Domenica's absent    Left pathological reflexes: Domenica's absent  Coordination  Right: Finger-to-nose normal Left: Finger-to-nose normal     Gait   Unable to rise from chair without using arms  He presented in a wheelchair  He required two to stand       Review of systems obtained from the medical assistant as below was reviewed with the patient at today's visit    ROS:    Review of Systems   Constitutional: Positive for fatigue  Negative for appetite change and fever  HENT: Negative  Negative for hearing loss, tinnitus, trouble swallowing and voice change  Eyes: Negative  Negative for photophobia, pain and visual disturbance  Respiratory: Negative  Negative for shortness of breath  Cardiovascular: Negative  Negative for palpitations  Gastrointestinal: Negative  Negative for nausea and vomiting  Endocrine: Negative  Negative for cold intolerance  Genitourinary: Positive for frequency and urgency  Negative for dysuria  Musculoskeletal: Positive for back pain (low back pain ) and gait problem (walks short distances with a walker- balance issues- fell while on vacation and hit back of his head and was takent to the local ER)  Negative for myalgias and neck pain  Skin: Negative  Negative for rash  Allergic/Immunologic: Negative  Neurological: Positive for numbness (bilateral arms and bilateral legs and pelvic area)  Negative for dizziness, tremors, seizures, syncope, facial asymmetry, speech difficulty, weakness, light-headedness and headaches  Hematological: Bruises/bleeds easily  Psychiatric/Behavioral: Positive for sleep disturbance  Negative for confusion and hallucinations  The patient is nervous/anxious           Anxiety

## 2022-10-27 ENCOUNTER — HOSPITAL ENCOUNTER (OUTPATIENT)
Dept: RADIOLOGY | Facility: HOSPITAL | Age: 62
Discharge: HOME | End: 2022-10-27
Payer: COMMERCIAL

## 2022-10-27 DIAGNOSIS — G82.50 QUADRIPLEGIA (HCC): ICD-10-CM

## 2022-10-27 PROCEDURE — G1004 CDSM NDSC: HCPCS

## 2022-10-27 PROCEDURE — 72141 MRI NECK SPINE W/O DYE: CPT

## 2022-10-27 PROCEDURE — 72146 MRI CHEST SPINE W/O DYE: CPT

## 2022-10-28 ENCOUNTER — TELEPHONE (OUTPATIENT)
Dept: NEUROLOGY | Facility: CLINIC | Age: 62
End: 2022-10-28

## 2022-10-28 NOTE — TELEPHONE ENCOUNTER
Spoke to [facility]   Informed him about mri results , no acute inflammation so this indicates that the prognosis is good for recovery but may take several weeks to months   He does report lhermitte sign which can be consistent with myelomalacia at 4 ( which is known )   We can also consider steroids if symptoms are not improving   Asked him to try Tid dosing of Gabapentin and to decrease to qhs is there is cognitive side effects    F/u in 2 weeks , as scheduled ,     No need to call him , just [facility]

## 2022-10-28 NOTE — TELEPHONE ENCOUNTER
Left patient a detailed VM ( okay per VM message) with MRI results and that results will be further discussed at visit in 2 weeks with Dr Concepcion Langston    Left # for call back for any further questions or concerns

## 2022-10-28 NOTE — TELEPHONE ENCOUNTER
Received  transcription:    Calling in re: the MRI I got done yesterday  I believe it's all good news  The one question I have if Dr Ilene Mancia could find out and answer me today please, is does the MRI show whether it's a bruise or not? Well, whatever it is that's causing the numbness and the weakness  Please call me back [phone]  I am literally on pins and needles waiting to hear back  Thank you

## 2022-10-28 NOTE — TELEPHONE ENCOUNTER
----- Message from Marty Bryan DO sent at 10/28/2022 11:58 AM EDT -----  Please let Bill know   Omar Chakraborty of t spine , no significant abnormalites, Mri of c spine  no change c/w June of 2022  Still with the changes noted after prior surgery  Keep on progressing with PT  Will discuss more at the next visit  in 2 weeks

## 2022-11-04 ENCOUNTER — TELEPHONE (OUTPATIENT)
Dept: NEUROLOGY | Facility: CLINIC | Age: 62
End: 2022-11-04

## 2022-11-04 NOTE — TELEPHONE ENCOUNTER
I called and left a voicemail message about patients upcoming appointment with Preet Barfield on 11/9/22 @ 2:30 pm  I requested a call back to our office to confirm the appointment or if the patient has any issues or concerns or cannot keep this appointment

## 2022-11-09 ENCOUNTER — TELEPHONE (OUTPATIENT)
Dept: NEUROLOGY | Facility: CLINIC | Age: 62
End: 2022-11-09

## 2022-11-09 ENCOUNTER — OFFICE VISIT (OUTPATIENT)
Dept: NEUROLOGY | Facility: CLINIC | Age: 62
End: 2022-11-09

## 2022-11-09 VITALS
RESPIRATION RATE: 18 BRPM | SYSTOLIC BLOOD PRESSURE: 108 MMHG | OXYGEN SATURATION: 97 % | BODY MASS INDEX: 28.44 KG/M2 | HEIGHT: 72 IN | HEART RATE: 82 BPM | DIASTOLIC BLOOD PRESSURE: 59 MMHG | WEIGHT: 210 LBS | TEMPERATURE: 97.2 F

## 2022-11-09 DIAGNOSIS — G82.50 QUADRIPARESIS (HCC): Primary | ICD-10-CM

## 2022-11-09 DIAGNOSIS — R29.898 WEAKNESS OF RIGHT LOWER EXTREMITY: ICD-10-CM

## 2022-11-09 PROBLEM — G95.89 MYELOMALACIA OF CERVICAL CORD (HCC): Status: ACTIVE | Noted: 2022-11-09

## 2022-11-09 RX ORDER — PREDNISONE 1 MG/1
5 TABLET ORAL DAILY
Qty: 90 TABLET | Refills: 1 | Status: SHIPPED | OUTPATIENT
Start: 2022-11-09

## 2022-11-09 NOTE — TELEPHONE ENCOUNTER
Please schedule IV steroids 1 g over 90 minutes for three days ,  diagnosis is demyelination, myelomalacia    Please schedule it at [facility]

## 2022-11-09 NOTE — PROGRESS NOTES
Patient ID: Yasir Dunaway is a 57 y o  male  Assessment/Plan:    Myelomalacia of cervical cord [facility])  This is a 69-year-old patient with a known history of chronic spinal disease who had a cervical spinal injury in September of 2022  Since then he has undergone aggressive physical therapy  This occurred on September 13, 2022  He recently had MRI imaging of the cervical spine which showed ongoing myelomalacia at C3-C4  He was prescribed gabapentin 300 and he did take this at the same time that the trazodone but this resulted in increase in stiffness  I have informed him that the stiffness may be secondary to increased tone from thethe lesion occurring acutely superimposed on a chronic demyelination    Plan I would like him to hold off on utilizing trazodone and gabapentin at the same time  He can restart the gabapentin at 300 mg at bedtime  I have asked him to utilize stretching techniques would avoid spasms  We discussed the use of baclofen but he would like to avoid additional medications  He has been taking prednisone 5 mg a day  He does have a Lhermitte sign on his examination  I have ordered IV steroids for approximately three days  He has utilize prednisone 5 mg a day prior to the IV steroids and then after the IV steroids 10 mg for one week and then 5 mg  Daily for 1 week  This may cause irritability, difficulty sleeping at night  There is a form today that he presented to the office for Disability   I informed him that this is a functional capacity form which we do not fill out  Diagnoses and all orders for this visit:    Quadriparesis ([address] )  -     predniSONE 5 mg tablet; Take 1 tablet (5 mg total) by mouth daily    Weakness of right lower extremity  -     predniSONE 5 mg tablet; Take 1 tablet (5 mg total) by mouth daily         Subjective: This is a 57  y o rh male who presented  to our office   in a follow-up visit  He was last evaluated here  2 weeks ago   he was seen by Orthopedics for potential removal of his stimulator  It was felt that his lower extremity weakness may be due to cervical spinal issue  A EMG study was suggested  In the interim he did travel to [facility]  While in [facility] [place] [facility] he fell and hit the back of his head the by tingling and numbness in his arms and leg and weakness  Vuri Mariya He then returned to [address] [facility] and was admitted as a trauma to [facility]  CT scan of head was normal the CT scan of the cervical spine showed no significant changes  He was then transferred to [facility] where he underwent inpatient physical and occupational therapy he then was discharged home and has been undergoing aggressive physical therapy  He also has a dysesthesias in his arms and legs  At the last visit he had difficulty even lifting up his wrist   In the interim he has continued to have aggressive therapy any does report some slight improvement  He did undergo MRI imaging of the thoracic spine and cervical spine  The thoracic spine showed all removal of a spinal cord stimulator  Cervical spine did show mild malacia at C3-C4 and residual of prior  laminectomies of C3 through C6           In the past he did try Lyrica which caused some cognitive issues  He was given gabapentin 300 mg at bedtime and he did take this with trazodone  This resulted in some cognitive slowing he also noted some stiffness in the right leg which he contributed to the gabapentin any stopped both medications  This does continue to occur  He reports difficulty and relaxing the muscles            Prior history:   He did  complainin of stiffness and heaviness in his legs with progressive weakness    MRI did show foraminal stenosis a left L5-S1 as well as impingement on the left L3 L4 region   He was seen by dr Robert Fernandez who  suggested surgery but he and then sought a 2nd opinion and underwent fusion with placement of  L3-S1 in May of 2016 by Dr Minerva Connelly ,  the head of Neurosurgery at [facility] the surgery in May of 2016 he noted increasing numbness in his bilateral thighs, in the saddle regions and numbness in the perineum  This occurred after 1 year     the numbness also occurs in the lower extremities the calf on the left side He has noted progressive ambulatory dysfunction specially on the right leg   He does report some urinary urgency and abnormalities with sexual dysfunction    The sexual dysfunction has been progressive over the last year  Dominique Greer is unable to have an organsm for the last  year  He did try baclofen and it was ineffective                                         The following portions of the patient's history were reviewed and updated as appropriate:   He  has a past medical history of Balance problems, BPH (benign prostatic hyperplasia), Chronic back pain, Chronic pain disorder, COVID-19, Erectile dysfunction, Nocturia, OAB (overactive bladder), Testicular hypogonadism, Urinary frequency, and Urinary urgency  He  has a past surgical history that includes Spinal cord stimulator implant (10/10/2019); Colonoscopy (2004); Colonoscopy (2007); Colonoscopy (2013); Colonoscopy (06/2019); Back surgery (05/18/2016); Neck surgery; pr removal of hydrocele,tunica,unilat (Right, 06/10/2021); Neck surgery (Right); and Back surgery  His family history is not on file  He  reports that he has never smoked  He has never used smokeless tobacco  He reports current alcohol use  He reports that he does not use drugs    Current Outpatient Medications   Medication Sig Dispense Refill   • acetaminophen (TYLENOL) 325 mg tablet Take 2 tablets (650 mg total) by mouth every 6 (six) hours as needed for mild pain, headaches or fever  0   • ibuprofen (MOTRIN) 400 mg tablet Take 1 tablet (400 mg total) by mouth every 6 (six) hours as needed for mild pain  0   • Melatonin 10 MG TABS Take 10 mg by mouth if needed     • predniSONE 5 mg tablet Take 1 tablet (5 mg total) by mouth daily 90 tablet 1   • sildenafil (VIAGRA) 100 mg tablet Take 100 mg by mouth As directed     • testosterone cypionate (DEPO-TESTOSTERONE) 200 mg/mL SOLN inject 2 milliliters intramuscularly every 14 days 10 mL 2   • ALPRAZolam (XANAX) 0 5 mg tablet Take 1 tablet (0 5 mg total) by mouth 30 min pre-procedure Can repeat if needed (Patient not taking: Reported on 11/9/2022) 3 tablet 0   • docusate sodium (COLACE) 100 mg capsule Take 1 capsule (100 mg total) by mouth 2 (two) times a day (Patient taking differently: Take 100 mg by mouth if needed)  0   • gabapentin (Neurontin) 300 mg capsule 1 po qhs for 1 week then 1 po tid (Patient taking differently: Take 300 mg by mouth daily 1 po qhs for 1 week then 1 po tid) 90 capsule 1   • polyethylene glycol (MIRALAX) 17 g packet Take 17 g by mouth daily as needed (constipation)  0     No current facility-administered medications for this visit  He is allergic to morphine            Objective:    Blood pressure 108/59, pulse 82, temperature (!) 97 2 °F (36 2 °C), temperature source Tympanic, resp  rate 18, height 6' (1 829 m), weight 95 3 kg (210 lb), SpO2 97 %  Physical Exam  Eyes:      General: Lids are normal       Extraocular Movements: Extraocular movements intact  Pupils: Pupils are equal, round, and reactive to light  Neurological:      Deep Tendon Reflexes:      Reflex Scores:       Tricep reflexes are 2+ on the right side and 1+ on the left side  Bicep reflexes are 2+ on the right side and 1+ on the left side  Patellar reflexes are 2+ on the right side and 2+ on the left side  Achilles reflexes are 2+ on the right side and 1+ on the left side  Neurological Exam  Mental Status  Awake, alert and oriented to person, place and time  Oriented to person, place and time  Language is fluent with no aphasia  Cranial Nerves  CN II: Visual acuity is normal  Visual fields full to confrontation    CN III, IV, VI: Extraocular movements intact bilaterally  Normal lids and orbits bilaterally  Pupils equal round and reactive to light bilaterally  CN V: Facial sensation is normal   CN VII: Full and symmetric facial movement  CN VIII: Hearing is normal   CN IX, X: Palate elevates symmetrically  Normal gag reflex  CN XI: Shoulder shrug strength is normal   CN XII: Tongue midline without atrophy or fasciculations  Motor    Strength is 5/5 in all four extremities except as noted  His upper extremities he had 5- out of five strength proximally  Extension on the right was a 4+ out of five on the left was 5- out of five  A atrophy in the intrinsics and FDI had improved  The lower extremity hip flexion was a three left hip flexion was a 3+  Ankle dorsiflexion and plantar flexion were four  Knee flexion and knee extension were 4+   Sensory  Light touch is normal in upper and lower extremities  Temperature is normal in upper and lower extremities  Normal sensation in the upper extremities but did he he did have dysesthesias in the legs  Reflexes                                            Right                      Left  Biceps                                 2+                         1+  Triceps                                2+                         1+  Patellar                                2+                         2+  Achilles                                2+                         1+    Left pathological reflexes: Domenica's absent  A positive Lhermitte sign   Coordination  Right: Finger-to-nose normal Left: Finger-to-nose normal     Gait   Unable to rise from chair without using arms  He is  unable to stand without a walker       Review of systems obtained the medical assistant as below was reviewed with the patient at today's visit    ROS:    Review of Systems   Constitutional: Negative  Negative for appetite change and fever  HENT: Negative  Negative for hearing loss, tinnitus, trouble swallowing and voice change      Eyes: Negative  Negative for photophobia, pain and visual disturbance  Respiratory: Negative  Negative for shortness of breath  Cardiovascular: Negative  Negative for palpitations  Gastrointestinal: Negative  Negative for nausea and vomiting  Endocrine: Negative  Negative for cold intolerance  Genitourinary: Positive for urgency  Negative for dysuria and frequency  Musculoskeletal: Positive for gait problem (non weightbearing)  Negative for myalgias and neck pain  Skin: Negative  Negative for rash  Allergic/Immunologic: Negative  Neurological: Positive for weakness (bilateral legs and arms), light-headedness and numbness (bilateral legs and arm)  Negative for dizziness, tremors, seizures, syncope, facial asymmetry, speech difficulty and headaches  Hematological: Negative  Does not bruise/bleed easily  Psychiatric/Behavioral: Positive for sleep disturbance  Negative for confusion and hallucinations           He is to return to our offices in 6 to 8 weeks

## 2022-11-09 NOTE — ASSESSMENT & PLAN NOTE
This is a 68-year-old patient with a known history of chronic spinal disease who had a cervical spinal injury in September of 2022  Since then he has undergone aggressive physical therapy  This occurred on September 13, 2022  He recently had MRI imaging of the cervical spine which showed ongoing myelomalacia at C3-C4  He was prescribed gabapentin 300 and he did take this at the same time that the trazodone but this resulted in increase in stiffness  I have informed him that the stiffness may be secondary to increased tone from thethe lesion occurring acutely superimposed on a chronic demyelination    Plan I would like him to hold off on utilizing trazodone and gabapentin at the same time  He can restart the gabapentin at 300 mg at bedtime  I have asked him to utilize stretching techniques would avoid spasms  We discussed the use of baclofen but he would like to avoid additional medications  He has been taking prednisone 5 mg a day  He does have a Lhermitte sign on his examination  I have ordered IV steroids for approximately three days  He has utilize prednisone 5 mg a day prior to the IV steroids and then after the IV steroids 10 mg for one week and then 5 mg  Daily for 1 week  This may cause irritability, difficulty sleeping at night  There is a form today that he presented to the office for Disability   I informed him that this is a functional capacity form which we do not fill out    I have asked him to check with his physical therapist

## 2022-11-10 DIAGNOSIS — G95.89 MYELOMALACIA OF CERVICAL CORD (HCC): Primary | ICD-10-CM

## 2022-11-10 RX ORDER — SODIUM CHLORIDE 9 MG/ML
20 INJECTION, SOLUTION INTRAVENOUS ONCE
Status: CANCELLED | OUTPATIENT
Start: 2022-11-14

## 2022-11-10 NOTE — TELEPHONE ENCOUNTER
[facility]-    Per [employer] does not require preauthorization for the service requested based on Product, Group, Benefit, Diagnosis, Place of Service or Prior Auth requirements "     No PA required for solumedrol infusion    Entered therapy plan for solumedrol  Please review and sign if agreeable   Once signed, I will contact infusion center to see when their next availability is

## 2022-11-10 NOTE — TELEPHONE ENCOUNTER
Called infusion center, can offer patient: 11/14 @ 9:30 AM  11/15 @ 8:30 AM  11/16 @ 8:30 AM    Called patient, he is agreeable to these times     Infusion center to enter appointment times

## 2022-11-13 DIAGNOSIS — E29.1 HYPOGONADISM IN MALE: ICD-10-CM

## 2022-11-14 ENCOUNTER — HOSPITAL ENCOUNTER (OUTPATIENT)
Dept: INFUSION CENTER | Facility: CLINIC | Age: 62
Discharge: HOME | End: 2022-11-14

## 2022-11-14 VITALS
DIASTOLIC BLOOD PRESSURE: 79 MMHG | RESPIRATION RATE: 18 BRPM | SYSTOLIC BLOOD PRESSURE: 116 MMHG | TEMPERATURE: 97.6 F | HEART RATE: 58 BPM

## 2022-11-14 DIAGNOSIS — G95.89 MYELOMALACIA OF CERVICAL CORD (HCC): Primary | ICD-10-CM

## 2022-11-14 RX ORDER — SODIUM CHLORIDE 9 MG/ML
20 INJECTION, SOLUTION INTRAVENOUS ONCE
Status: COMPLETED | OUTPATIENT
Start: 2022-11-14 | End: 2022-11-14

## 2022-11-14 RX ORDER — SODIUM CHLORIDE 9 MG/ML
20 INJECTION, SOLUTION INTRAVENOUS ONCE
Status: CANCELLED | OUTPATIENT
Start: 2022-11-15

## 2022-11-14 RX ORDER — TESTOSTERONE CYPIONATE 200 MG/ML
INJECTION INTRAMUSCULAR
Qty: 10 ML | Refills: 1 | Status: SHIPPED | OUTPATIENT
Start: 2022-11-14

## 2022-11-14 RX ADMIN — SODIUM CHLORIDE 1000 MG: 0.9 INJECTION, SOLUTION INTRAVENOUS at 09:57

## 2022-11-14 RX ADMIN — SODIUM CHLORIDE 20 ML/HR: 0.9 INJECTION, SOLUTION INTRAVENOUS at 09:54

## 2022-11-14 NOTE — PROGRESS NOTES
Patient tolerated his solumedrol infusion well without adverse reaction  His first iv did infiltrate and was replaced  Patient is aware of his infusion tomorrow

## 2022-11-15 ENCOUNTER — HOSPITAL ENCOUNTER (OUTPATIENT)
Dept: INFUSION CENTER | Facility: CLINIC | Age: 62
Discharge: HOME | End: 2022-11-15

## 2022-11-15 VITALS
DIASTOLIC BLOOD PRESSURE: 74 MMHG | HEART RATE: 74 BPM | TEMPERATURE: 97.8 F | RESPIRATION RATE: 18 BRPM | SYSTOLIC BLOOD PRESSURE: 110 MMHG

## 2022-11-15 DIAGNOSIS — G95.89 MYELOMALACIA OF CERVICAL CORD (HCC): Primary | ICD-10-CM

## 2022-11-15 RX ORDER — SODIUM CHLORIDE 9 MG/ML
20 INJECTION, SOLUTION INTRAVENOUS ONCE
Status: COMPLETED | OUTPATIENT
Start: 2022-11-15 | End: 2022-11-15

## 2022-11-15 RX ORDER — SODIUM CHLORIDE 9 MG/ML
20 INJECTION, SOLUTION INTRAVENOUS ONCE
Status: CANCELLED | OUTPATIENT
Start: 2022-11-16

## 2022-11-15 RX ADMIN — SODIUM CHLORIDE 20 ML/HR: 0.9 INJECTION, SOLUTION INTRAVENOUS at 09:15

## 2022-11-15 RX ADMIN — SODIUM CHLORIDE 1000 MG: 0.9 INJECTION, SOLUTION INTRAVENOUS at 09:14

## 2022-11-15 NOTE — PROGRESS NOTES
Patient arrived to unit without complaint  Patient tolerated Solumedrol infusion without incident  AVS declined and patient aware of appointment tomorrow  Patient left in stable condition

## 2022-11-16 ENCOUNTER — HOSPITAL ENCOUNTER (OUTPATIENT)
Dept: INFUSION CENTER | Facility: CLINIC | Age: 62
Discharge: HOME | End: 2022-11-16

## 2022-11-16 VITALS
SYSTOLIC BLOOD PRESSURE: 110 MMHG | HEART RATE: 56 BPM | RESPIRATION RATE: 18 BRPM | TEMPERATURE: 98.3 F | DIASTOLIC BLOOD PRESSURE: 71 MMHG

## 2022-11-16 DIAGNOSIS — G95.89 MYELOMALACIA OF CERVICAL CORD (HCC): Primary | ICD-10-CM

## 2022-11-16 RX ORDER — SODIUM CHLORIDE 9 MG/ML
20 INJECTION, SOLUTION INTRAVENOUS ONCE
Status: COMPLETED | OUTPATIENT
Start: 2022-11-16 | End: 2022-11-16

## 2022-11-16 RX ORDER — SODIUM CHLORIDE 9 MG/ML
20 INJECTION, SOLUTION INTRAVENOUS ONCE
Status: CANCELLED | OUTPATIENT
Start: 2022-11-16

## 2022-11-16 RX ADMIN — SODIUM CHLORIDE 20 ML/HR: 0.9 INJECTION, SOLUTION INTRAVENOUS at 08:39

## 2022-11-16 RX ADMIN — SODIUM CHLORIDE 1000 MG: 0.9 INJECTION, SOLUTION INTRAVENOUS at 08:39

## 2022-11-16 NOTE — PROGRESS NOTES
Patient arrived to appointment without concerns  Tolerated treatment without any incidents  Denies need for AVS, aware of upcoming appointments

## 2022-11-21 ENCOUNTER — TELEPHONE (OUTPATIENT)
Dept: NEUROLOGY | Facility: CLINIC | Age: 62
End: 2022-11-21

## 2022-11-21 NOTE — TELEPHONE ENCOUNTER
Received VM transcription from [phone]:    Yes, this is Dona Borges  I'm Dr Jocelyne Turner patient  I did a steroid infusion last Monday, Tuesday and Wednesday  I felt great on Wednesday and everything but since then, I've had this feeling of fatigue and lethargy  And I'm wondering if there's a problem  They did have to stab me like 6 times or 7 times to do the injection  So I'm wondering if they might've caused an infection  Please have Dr Pradeep Kraft call me or someone from the staff ASAP  The last time I left a message for you, no one ever called me back  So please adhere to this and please take care of it  This is very, very, very important and urgent

## 2022-11-21 NOTE — TELEPHONE ENCOUNTER
Spoke with patient  He is s/p x 3 days of solumedrol  Infusion therapy  11/14, 11/15, 11/16/22    Patient states her felt great last Wednesday on 11/16 and then started feeling a little more fatigued on Thursday (11/17)  By Friday had even more fatigue and no energy, continued through the weekend  Today he is feeling a little better  Patient notes that he also was doing more than usual in Physical Therapy and walked more than usual on Thursday ( the day the fatigue set in)    Patient states he was also stuck several times last week due to staff not being able to find a vein    Has a cut on his wrist  Denies any fever, or inflammation, redness, warmth at IV sites or site on his wrist     Please advise or patient said feel free to call him  # [phone]

## 2023-01-18 ENCOUNTER — OFFICE VISIT (OUTPATIENT)
Dept: NEUROLOGY | Facility: CLINIC | Age: 63
End: 2023-01-18

## 2023-01-18 ENCOUNTER — TELEPHONE (OUTPATIENT)
Dept: NEUROLOGY | Facility: CLINIC | Age: 63
End: 2023-01-18

## 2023-01-18 VITALS
WEIGHT: 210 LBS | RESPIRATION RATE: 18 BRPM | HEIGHT: 72 IN | BODY MASS INDEX: 28.44 KG/M2 | TEMPERATURE: 97.5 F | HEART RATE: 80 BPM | SYSTOLIC BLOOD PRESSURE: 109 MMHG | DIASTOLIC BLOOD PRESSURE: 64 MMHG

## 2023-01-18 DIAGNOSIS — G95.89 MYELOMALACIA OF CERVICAL CORD (HCC): Primary | ICD-10-CM

## 2023-01-18 DIAGNOSIS — G82.50 QUADRIPARESIS (HCC): ICD-10-CM

## 2023-01-18 DIAGNOSIS — R29.898 WEAKNESS OF RIGHT LOWER EXTREMITY: ICD-10-CM

## 2023-01-18 RX ORDER — PREDNISONE 1 MG/1
TABLET ORAL
Qty: 100 TABLET | Refills: 1 | Status: SHIPPED | OUTPATIENT
Start: 2023-01-18

## 2023-01-18 NOTE — PROGRESS NOTES
Patient ID: Celeste Rg is a 57 y o  male  Assessment/Plan:    Myelomalacia of cervical cord [facility])  This is a 77-year-old patient with chronic lumbar degenerative disease and a sudden cervical spinal injury in September 2022  He was noted to have myomalacia on MRI imaging and did undergo IV steroids with some relief  The symptoms did improve for approximately 10 days and he has been utilizing prednisone 5 mg a day  On occasion he will utilize extra doses  Overall he does report improvement but he is concerned about the ongoing inflammation  I have reordered IV steroids for approximately 3 days  He should also have a repeat MRI imaging study of the cervical spine  After the IV steroids we have started him on a taper of steroids again for 20 mg for 3 days then 15 for 3 days and 10 for 3 days then 5 mg  We discussed the potential side effects of oral long-term steroids  He is aware and would like to repeat the IV steroid infusion  He should continue with aggressive physical therapy and water therapy  We he does have intermittent spasms of his legs and he would like to avoid muscle relaxants  I have started him on calcium and magnesium as well as encouraged hydration  Diagnoses and all orders for this visit:    Myelomalacia of cervical cord ([facility] [address] )  -     MRI cervical spine with and without contrast; Future  -     predniSONE 5 mg tablet; Take 20 mg for 3 days then 15 mg for 3 days then 10 mg for 3 days then 5 mg daily  -     Comprehensive metabolic panel; Future    Quadriparesis ([facility] [address] )  -     MRI cervical spine with and without contrast; Future  -     predniSONE 5 mg tablet; Take 20 mg for 3 days then 15 mg for 3 days then 10 mg for 3 days then 5 mg daily  -     Comprehensive metabolic panel; Future    Weakness of right lower extremity  -     MRI cervical spine with and without contrast; Future  -     predniSONE 5 mg tablet;  Take 20 mg for 3 days then 15 mg for 3 days then 10 mg for 3 days then 5 mg daily  -     Comprehensive metabolic panel; Future         Subjective: This is a 57  y o rh male who presented  to our office   in a follow-up visit  Last evaluated here in November  Since that time he has undergone IV steroid infusion for 3 days followed by steroid taper  He does utilize prednisone 5 mg a day and sometimes will increase the dose  He has now continued with aggressive physical therapy after his weakness of his arms and legs  His MRI imaging study did demonstrate myomalacia at C3-C4          In the interim he did travel to [facility]  While in [facility]  In sept 2022 he fell and hit the back of his head the by tingling and numbness in his arms and leg and weakness  Ana Dunn He then returned to [place] quickly and was admitted as a trauma to [facility]  CT scan of head was normal the CT scan of the cervical spine showed no significant changes  He was then transferred to [facility] where he underwent inpatient physical and occupational therapy he then was discharged home and has been undergoing aggressive physical therapy  He also has a dysesthesias in his arms and legs  At the last visit he had difficulty even lifting up his wrist   In the interim he has continued to have aggressive therapy any does report some slight improvement        He did undergo MRI imaging of the thoracic spine and cervical spine  The thoracic spine showed all removal of a spinal cord stimulator  Cervical spine did show mild malacia at C3-C4 and residual of prior  laminectomies of C3 through C6            In the past he did try Lyrica which caused some cognitive issues  He was given gabapentin but has no longer taking it  He is no longer taking trazodone  Both are resulting in stiffness in his legs  He does describe spasms in his legs  This can occur at night              Prior history:   He did  complainin of stiffness and heaviness in his legs with progressive weakness    MRI did show foraminal stenosis a left L5-S1 as well as impingement on the left L3 L4 region  Riki Rhodes was seen by dr Christophe Barr who  suggested surgery but he and then sought a 2nd opinion and underwent fusion with placement of  L3-S1 in May of 2016 by Dr Brock Lopez ,  the head of Neurosurgery at [address] [place] [address] the surgery in May of 2016 he noted increasing numbness in his bilateral thighs, in the saddle regions and numbness in the perineum  This occurred after 1 year     the numbness also occurs in the lower extremities the calf on the left side He has noted progressive ambulatory dysfunction specially on the right leg   He does report some urinary urgency and abnormalities with sexual dysfunction    The sexual dysfunction has been progressive over the last year  Riki Rhodes is unable to have an organsm for the last  year  He did try baclofen and it was ineffective             MRI  of the cervical spine on 10/27/2022    1  Advanced cord atrophy with myelomalacia at the C4 level is similar to the prior study, correlating to the history of quadriplegia  2   Decompressive laminectomies C3-C6 redemonstrated  4   Scattered spondylotic changes are stable                         The following portions of the patient's history were reviewed and updated as appropriate:   He  has a past medical history of Balance problems, BPH (benign prostatic hyperplasia), Chronic back pain, Chronic pain disorder, COVID-19, Erectile dysfunction, Nocturia, OAB (overactive bladder), Testicular hypogonadism, Urinary frequency, and Urinary urgency  He  has a past surgical history that includes Spinal cord stimulator implant (10/10/2019); Colonoscopy (2004); Colonoscopy (2007); Colonoscopy (2013); Colonoscopy (06/2019); Back surgery (05/18/2016); Neck surgery; pr excision hydrocele unilateral (Right, 06/10/2021); Neck surgery (Right); and Back surgery  His family history is not on file  He  reports that he has never smoked   He has never used smokeless tobacco  He reports current alcohol use  He reports that he does not use drugs  Current Outpatient Medications   Medication Sig Dispense Refill   • acetaminophen (TYLENOL) 325 mg tablet Take 2 tablets (650 mg total) by mouth every 6 (six) hours as needed for mild pain, headaches or fever  0   • ALPRAZolam (XANAX) 0 5 mg tablet Take 1 tablet (0 5 mg total) by mouth 30 min pre-procedure Can repeat if needed 3 tablet 0   • ibuprofen (MOTRIN) 400 mg tablet Take 1 tablet (400 mg total) by mouth every 6 (six) hours as needed for mild pain  0   • predniSONE 5 mg tablet Take 20 mg for 3 days then 15 mg for 3 days then 10 mg for 3 days then 5 mg daily 100 tablet 1   • sildenafil (VIAGRA) 100 mg tablet Take 100 mg by mouth As directed     • testosterone cypionate (DEPO-TESTOSTERONE) 200 mg/mL SOLN inject 2 milliliters intramuscularly every 14 days 10 mL 1   • docusate sodium (COLACE) 100 mg capsule Take 1 capsule (100 mg total) by mouth 2 (two) times a day (Patient taking differently: Take 100 mg by mouth if needed)  0   • gabapentin (NEURONTIN) 300 mg capsule Take 1 CAPSULE BY MOUTH 3 TIMES A DAY (Patient not taking: Reported on 1/18/2023) 90 capsule 1   • Melatonin 10 MG TABS Take 10 mg by mouth if needed (Patient not taking: Reported on 1/18/2023)     • polyethylene glycol (MIRALAX) 17 g packet Take 17 g by mouth daily as needed (constipation)  0     No current facility-administered medications for this visit  He is allergic to morphine            Objective:    Blood pressure 109/64, pulse 80, temperature 97 5 °F (36 4 °C), temperature source Tympanic, resp  rate 18, height 6' (1 829 m), weight 95 3 kg (210 lb)  Physical Exam  Eyes:      General: Lids are normal       Extraocular Movements: Extraocular movements intact  Pupils: Pupils are equal, round, and reactive to light  Neurological:      Deep Tendon Reflexes:      Reflex Scores:       Tricep reflexes are 2+ on the right side         Bicep reflexes are 2+ on the right side and 2+ on the left side  Patellar reflexes are 2+ on the right side and 2+ on the left side  Achilles reflexes are 1+ on the right side and 1+ on the left side  Neurological Exam  Mental Status  Awake, alert and oriented to person, place and time  Oriented to person, place and time  Language is fluent with no aphasia  Cranial Nerves  CN II: Visual acuity is normal  Visual fields full to confrontation  CN III, IV, VI: Extraocular movements intact bilaterally  Normal lids and orbits bilaterally  Pupils equal round and reactive to light bilaterally  CN V: Facial sensation is normal   CN VII: Full and symmetric facial movement  CN VIII: Hearing is normal   CN IX, X: Palate elevates symmetrically  Normal gag reflex  CN XI: Shoulder shrug strength is normal   CN XII: Tongue midline without atrophy or fasciculations  Motor    Strength is 5/5 in all four extremities except as noted  His upper extremities he had 5- out of five strength proximally  In his lower extremities he had intrinsic atrophy with atrophy of the FDI  A strength was a 3/5  Wrist flexion and wrist extension were 3/5  In his lower extremities the right hip flexion was a 3/5 left hip flexion was a 4 /5  Knee flexion and knee extension were 5-/5   Ankle dorsiflexion was a three plantar flexion was 3+  Atrophy of the of the intrinsics in his hands        Sensory  Had a sensory level to temperature at T12       Reflexes                                            Right                      Left  Biceps                                 2+                         2+  Triceps                               2+                          Patellar                                2+                         2+  Achilles                                1+                         1+    Right pathological reflexes: Domenica's absent  Left pathological reflexes: Domenica's absent  There was a Lhermtite sign today       Coordination  Right: Finger-to-nose normal Left: Finger-to-nose normal     Gait   Unable to rise from chair without using arms  Presented in a wheelchair  He did require any walker to ambulate  Review of systems obtained from the medical assistant as below was reviewed with the patient at today's visit    ROS:    Review of Systems   Constitutional: Negative for chills and fever  HENT: Negative for ear pain and sore throat  Eyes: Negative for pain and visual disturbance  Respiratory: Negative for cough and shortness of breath  Cardiovascular: Negative for chest pain and palpitations  Gastrointestinal: Negative for abdominal pain and vomiting  Genitourinary: Negative for dysuria and hematuria  Musculoskeletal: Negative for arthralgias and back pain  Skin: Negative for color change and rash  Neurological: Positive for tremors (right hand at times), weakness (bilateral arms and legs) and numbness  Negative for seizures and syncope  Psychiatric/Behavioral: Positive for sleep disturbance (does not sleep well)  All other systems reviewed and are negative

## 2023-01-18 NOTE — PATIENT INSTRUCTIONS
No gabapentin     No trazadone     Calcium  500 or 600  / mag  400 tne  a day drink more water    Will order iv steriods   after a taper of 20 mg  for 3 days then 15 mg for 3 days then 10 gm for 3 days then 5 mg     Will repeat mri of c spine  2 to 3 weeks alter

## 2023-01-18 NOTE — ASSESSMENT & PLAN NOTE
This is a 25-year-old patient with chronic lumbar degenerative disease and a sudden cervical spinal injury in September 2022  He was noted to have myomalacia on MRI imaging and did undergo IV steroids with some relief  The symptoms did improve for approximately 10 days and he has been utilizing prednisone 5 mg a day  On occasion he will utilize extra doses  Overall he does report improvement but he is concerned about the ongoing inflammation  I have reordered IV steroids for approximately 3 days  He should also have a repeat MRI imaging study of the cervical spine  After the IV steroids we have started him on a taper of steroids again for 20 mg for 3 days then 15 for 3 days and 10 for 3 days then 5 mg  We discussed the potential side effects of oral long-term steroids  He is aware and would like to repeat the IV steroid infusion  He should continue with aggressive physical therapy and water therapy  We he does have intermittent spasms of his legs and he would like to avoid muscle relaxants  I have started him on calcium and magnesium as well as encouraged hydration

## 2023-01-18 NOTE — TELEPHONE ENCOUNTER
Clinical Team     Please schedule this patient for IV steroids over 1-1/2 hours 1 g for 3 days as soon as possible    Dx  is cervical myelomalacia

## 2023-01-20 DIAGNOSIS — G95.89 MYELOMALACIA OF CERVICAL CORD (HCC): Primary | ICD-10-CM

## 2023-01-20 RX ORDER — SODIUM CHLORIDE 9 MG/ML
20 INJECTION, SOLUTION INTRAVENOUS ONCE
Status: CANCELLED | OUTPATIENT
Start: 2023-01-23

## 2023-01-20 NOTE — TELEPHONE ENCOUNTER
Called infusion center, they can start patient as soon as Monday 1/23 - 1/25    Called patient, left detailed message to confirm if those dates are okay  Patient is to return call to office with that information     Forwarded plan to provider for signature   Once signed please let me know so I can schedule when/if patient returns the call

## 2023-01-20 NOTE — TELEPHONE ENCOUNTER
[employer] Primary -     Per Availity: [email or web address] does not require preauthorization for the service requested based on Product, Group, Benefit, Diagnosis, Place of Service or Prior Auth requirements "    Called patient to confirm which infusion center location he prefers so I can contact them and schedule his steroids   Left message for return call to office

## 2023-01-23 DIAGNOSIS — G95.89 MYELOMALACIA OF CERVICAL CORD (HCC): Primary | ICD-10-CM

## 2023-01-23 RX ORDER — SODIUM CHLORIDE 9 MG/ML
20 INJECTION, SOLUTION INTRAVENOUS ONCE
Status: CANCELLED | OUTPATIENT
Start: 2023-01-31

## 2023-01-23 NOTE — TELEPHONE ENCOUNTER
Called patient  Patient needs early AM appointments since he has PT almost daily     Unable to accommodate patient for this week  Patient scheduled for 1/31 - 2/2 8 AM    Called patient, made aware of appointment dates above     Updated therapy plan dates, please sign  Thank you!

## 2023-01-24 DIAGNOSIS — G95.89 MYELOMALACIA OF CERVICAL CORD (HCC): Primary | ICD-10-CM

## 2023-01-26 ENCOUNTER — TELEPHONE (OUTPATIENT)
Dept: NEUROLOGY | Facility: CLINIC | Age: 63
End: 2023-01-26

## 2023-01-26 DIAGNOSIS — G82.50 QUADRIPLEGIA (HCC): ICD-10-CM

## 2023-01-26 RX ORDER — ALPRAZOLAM 0.5 MG/1
0.5 TABLET ORAL
Qty: 3 TABLET | Refills: 0 | Status: SHIPPED | OUTPATIENT
Start: 2023-01-26 | End: 2023-08-04 | Stop reason: CLARIF

## 2023-01-26 NOTE — TELEPHONE ENCOUNTER
Pt left vm stating that he is having an MRI the week after next and that dr Eloisa Ivy usually orders a few valium because he is claustrophobic  [phone]    [phone]    I do not see that we have ever prescribed valium for pt but I do see that xanax has been prescribed for procedures       Please enter order as appropraite    Left message for pt making him aware that I received vm and would forward to dr Eloisa Ivy

## 2023-01-31 ENCOUNTER — HOSPITAL ENCOUNTER (OUTPATIENT)
Dept: INFUSION CENTER | Facility: CLINIC | Age: 63
Discharge: HOME | End: 2023-01-31

## 2023-01-31 VITALS
HEART RATE: 57 BPM | SYSTOLIC BLOOD PRESSURE: 114 MMHG | TEMPERATURE: 97.9 F | RESPIRATION RATE: 18 BRPM | DIASTOLIC BLOOD PRESSURE: 74 MMHG

## 2023-01-31 DIAGNOSIS — G95.89 MYELOMALACIA OF CERVICAL CORD (HCC): Primary | ICD-10-CM

## 2023-01-31 RX ORDER — SODIUM CHLORIDE 9 MG/ML
20 INJECTION, SOLUTION INTRAVENOUS ONCE
Status: COMPLETED | OUTPATIENT
Start: 2023-01-31 | End: 2023-01-31

## 2023-01-31 RX ORDER — SODIUM CHLORIDE 9 MG/ML
20 INJECTION, SOLUTION INTRAVENOUS ONCE
Status: CANCELLED | OUTPATIENT
Start: 2023-02-01

## 2023-01-31 RX ADMIN — SODIUM CHLORIDE 20 ML/HR: 0.9 INJECTION, SOLUTION INTRAVENOUS at 08:30

## 2023-01-31 RX ADMIN — SODIUM CHLORIDE 1000 MG: 0.9 INJECTION, SOLUTION INTRAVENOUS at 08:32

## 2023-02-01 ENCOUNTER — HOSPITAL ENCOUNTER (OUTPATIENT)
Dept: INFUSION CENTER | Facility: CLINIC | Age: 63
Discharge: HOME | End: 2023-02-01

## 2023-02-01 DIAGNOSIS — G95.89 MYELOMALACIA OF CERVICAL CORD (HCC): Primary | ICD-10-CM

## 2023-02-01 RX ORDER — SODIUM CHLORIDE 9 MG/ML
20 INJECTION, SOLUTION INTRAVENOUS ONCE
Status: CANCELLED | OUTPATIENT
Start: 2023-02-02

## 2023-02-01 RX ORDER — BACLOFEN 10 MG/1
10 TABLET ORAL
COMMUNITY
Start: 2023-01-23

## 2023-02-01 RX ORDER — SODIUM CHLORIDE 9 MG/ML
20 INJECTION, SOLUTION INTRAVENOUS ONCE
Status: COMPLETED | OUTPATIENT
Start: 2023-02-01 | End: 2023-02-01

## 2023-02-01 RX ADMIN — SODIUM CHLORIDE 20 ML/HR: 0.9 INJECTION, SOLUTION INTRAVENOUS at 08:22

## 2023-02-01 RX ADMIN — SODIUM CHLORIDE 1000 MG: 0.9 INJECTION, SOLUTION INTRAVENOUS at 08:23

## 2023-02-01 NOTE — PROGRESS NOTES
Patient arrived to unit without complaint  Patient tolerated Solumedrol infusion without incident  AVS declined and patient aware of next appointment  Patient left in stable condition

## 2023-02-02 ENCOUNTER — HOSPITAL ENCOUNTER (OUTPATIENT)
Dept: INFUSION CENTER | Facility: CLINIC | Age: 63
Discharge: HOME | End: 2023-02-02

## 2023-02-02 VITALS
SYSTOLIC BLOOD PRESSURE: 146 MMHG | RESPIRATION RATE: 18 BRPM | HEART RATE: 67 BPM | DIASTOLIC BLOOD PRESSURE: 78 MMHG | TEMPERATURE: 97.3 F

## 2023-02-02 DIAGNOSIS — G95.89 MYELOMALACIA OF CERVICAL CORD (HCC): Primary | ICD-10-CM

## 2023-02-02 RX ORDER — SODIUM CHLORIDE 9 MG/ML
20 INJECTION, SOLUTION INTRAVENOUS ONCE
Status: COMPLETED | OUTPATIENT
Start: 2023-02-02 | End: 2023-02-02

## 2023-02-02 RX ORDER — SODIUM CHLORIDE 9 MG/ML
20 INJECTION, SOLUTION INTRAVENOUS ONCE
Status: CANCELLED | OUTPATIENT
Start: 2023-02-02

## 2023-02-02 RX ADMIN — SODIUM CHLORIDE 20 ML/HR: 0.9 INJECTION, SOLUTION INTRAVENOUS at 08:30

## 2023-02-02 RX ADMIN — SODIUM CHLORIDE 1000 MG: 0.9 INJECTION, SOLUTION INTRAVENOUS at 08:30

## 2023-02-02 NOTE — PROGRESS NOTES
Patient tolerated IV Methylprednisolone without incident  Pt is done with infusions at this time    Declined AVS

## 2023-02-07 ENCOUNTER — HOSPITAL ENCOUNTER (OUTPATIENT)
Dept: RADIOLOGY | Facility: HOSPITAL | Age: 63
Discharge: HOME | End: 2023-02-07

## 2023-02-07 DIAGNOSIS — G82.50 QUADRIPARESIS (HCC): ICD-10-CM

## 2023-02-07 DIAGNOSIS — G95.89 MYELOMALACIA OF CERVICAL CORD (HCC): ICD-10-CM

## 2023-02-07 DIAGNOSIS — R29.898 WEAKNESS OF RIGHT LOWER EXTREMITY: ICD-10-CM

## 2023-02-07 RX ADMIN — GADOBUTROL 10 ML: 604.72 INJECTION INTRAVENOUS at 12:44

## 2023-02-14 ENCOUNTER — TELEPHONE (OUTPATIENT)
Dept: NEUROLOGY | Facility: CLINIC | Age: 63
End: 2023-02-14

## 2023-02-14 NOTE — TELEPHONE ENCOUNTER
Neurology     Talk to Elva Lee He discussed a let down from the steroids   He is now on 5 mg daily     He is exercising and doing better     We discussed his recent MRI results    Will d/w more in detail at his upcoming visit     He should continue to exercise

## 2023-02-23 NOTE — TELEPHONE ENCOUNTER
Called and spoke to patient   Patient confirmed upcoming apt with Dr Saumya Romo on 3/8/23 @ 2:30 pm

## 2023-02-27 ENCOUNTER — APPOINTMENT (OUTPATIENT)
Dept: LAB | Facility: CLINIC | Age: 63
End: 2023-02-27

## 2023-02-27 DIAGNOSIS — R29.898 WEAKNESS OF RIGHT LOWER EXTREMITY: ICD-10-CM

## 2023-02-27 DIAGNOSIS — G82.50 QUADRIPARESIS (HCC): ICD-10-CM

## 2023-02-27 DIAGNOSIS — G95.89 MYELOMALACIA OF CERVICAL CORD (HCC): ICD-10-CM

## 2023-02-27 LAB
ALBUMIN SERPL BCP-MCNC: 3.5 G/DL (ref 3.5–5)
ALP SERPL-CCNC: 74 U/L (ref 46–116)
ALT SERPL W P-5'-P-CCNC: 28 U/L (ref 12–78)
ANION GAP SERPL CALCULATED.3IONS-SCNC: 4 MMOL/L (ref 4–13)
AST SERPL W P-5'-P-CCNC: 14 U/L (ref 5–45)
BILIRUB SERPL-MCNC: 0.97 MG/DL (ref 0.2–1)
BUN SERPL-MCNC: 16 MG/DL (ref 5–25)
CALCIUM SERPL-MCNC: 9.3 MG/DL (ref 8.3–10.1)
CHLORIDE SERPL-SCNC: 101 MMOL/L (ref 96–108)
CO2 SERPL-SCNC: 32 MMOL/L (ref 21–32)
CREAT SERPL-MCNC: 0.76 MG/DL (ref 0.6–1.3)
GFR SERPL CREATININE-BSD FRML MDRD: 96 ML/MIN/1.73SQ M
GLUCOSE SERPL-MCNC: 96 MG/DL (ref 65–140)
POTASSIUM SERPL-SCNC: 4.1 MMOL/L (ref 3.5–5.3)
PROT SERPL-MCNC: 6.3 G/DL (ref 6.4–8.4)
SODIUM SERPL-SCNC: 137 MMOL/L (ref 135–147)

## 2023-03-06 ENCOUNTER — TELEPHONE (OUTPATIENT)
Dept: NEUROLOGY | Facility: CLINIC | Age: 63
End: 2023-03-06

## 2023-03-06 NOTE — TELEPHONE ENCOUNTER
Spoke with [employer]  He feels what his therapist has concerned about isn't consistent in regard to color change and temperature changes  States his B/L feet are always cold  When he is sitting his legs get purple in color but then as soon as he stands up and moves around-- color returns to normal pink color and warm  As far as how he is feeling states he feels like he has plateaued in PT  Since end of December he feels like he has not made strides in his progress wit his walking  However has made progress in his ADLS such as getting OOB, in and out of the shower  Patient was just returning from [employer] and stated today and did 35 feet sit to stand without the use of a walker  Yesterday he walked 550 feet    Patient just feels like there should be more progress  Patient did confirm Wednesday 3/8 appointment with Dr Sahil Guido  Would like for you to review his [place] reports MRI scans  They can be found under 8/22/22 visit with Dr Debbie Lizarraga ( Ortho)    Patient will bring Disks on Wednesday as they were not properly scanned into Patient's chart

## 2023-03-06 NOTE — TELEPHONE ENCOUNTER
Hello [facility] patients  Physical Therapist has called to inform you that in the past month or so patient has declined, also has noticed coldness and color change on his legs  And would like to know if you have any further recommendations for him? Call Back # [phone]        Thank you,     Douglas Mead

## 2023-03-08 ENCOUNTER — OFFICE VISIT (OUTPATIENT)
Dept: NEUROLOGY | Facility: CLINIC | Age: 63
End: 2023-03-08

## 2023-03-08 VITALS
TEMPERATURE: 97 F | BODY MASS INDEX: 29.53 KG/M2 | OXYGEN SATURATION: 98 % | SYSTOLIC BLOOD PRESSURE: 92 MMHG | WEIGHT: 218 LBS | HEART RATE: 84 BPM | RESPIRATION RATE: 18 BRPM | HEIGHT: 72 IN | DIASTOLIC BLOOD PRESSURE: 60 MMHG

## 2023-03-08 DIAGNOSIS — G95.89 MYELOMALACIA OF CERVICAL CORD (HCC): ICD-10-CM

## 2023-03-08 DIAGNOSIS — R29.898 WEAKNESS OF RIGHT LOWER EXTREMITY: ICD-10-CM

## 2023-03-08 DIAGNOSIS — L81.9 DISCOLORATION OF SKIN OF LOWER LEG: Primary | ICD-10-CM

## 2023-03-08 DIAGNOSIS — G82.50 QUADRIPARESIS (HCC): ICD-10-CM

## 2023-03-08 RX ORDER — PREDNISONE 10 MG/1
10 TABLET ORAL DAILY
Qty: 90 TABLET | Refills: 1 | Status: SHIPPED | OUTPATIENT
Start: 2023-03-08

## 2023-03-08 NOTE — PROGRESS NOTES
Patient ID: Vidhi Ceballos is a 57 y o  male  Assessment/Plan:    Myelomalacia of cervical cord ([facility] )  This  a 78-year-old patient with a known spinal disease who had a fall and developed weakness and numbness in his arms and legs  He did undergo MRI imaging of the the cervical spine which showed myelomalacia  In the last 6 months he has had significant improvement with improvement of the strength in the upper and lower extremities  He is now able to ambulate with assistance and able to eat out of bed himself  He does require some assistance with dressing  He has noted improvement of the numbness and tingling but continues to have difficulty with fine movements in his hands     he did have improvement in January he has noted some slight decline in his strength over the last few weeks  He does have discoloration of his legs when sitting but less in the standing or supine position  He does admit to decrease in fluid intake and low blood pressures  Encouraged him to continue to be as active, increase his fluid intake utilize compression stockings head of bed upper greater than 30 degrees  We discussed the use of steroids  He did receive IV steroids which only provide some benefit from 1 week  He has been utilizing 10 mg tablets of prednisone and informs me that this does help his joint pain  We discussed the side effects of long-term use of prednisone and I would like him to reduce the dose to 5 mg when he is able to in the next few weeks  He did seem to have discoloration of his feet and decreased pulses  I have ordered an arterial Doppler study to determine any type of arterial disease       Diagnoses and all orders for this visit:    Discoloration of skin of lower leg  -     VAS lower limb arterial duplex, complete bilateral; Future    Quadriparesis (HCC)  -     predniSONE 10 mg tablet;  Take 1 tablet (10 mg total) by mouth daily    Weakness of right lower extremity  -     predniSONE 10 mg tablet; Take 1 tablet (10 mg total) by mouth daily    Myelomalacia of cervical cord (HCC)  -     predniSONE 10 mg tablet; Take 1 tablet (10 mg total) by mouth daily         Subjective: This is a 57  y o rh male who presented  to our office   in a follow-up visit  He was last evaluated here several months ago  He has been treated with 2 course of IV steroids followed by steroid taper  This only provides brief benefit  He is now utilizing steroids 10 mg a day  We discussed the potential side effects of long-term use of steroids  Overall he noticed improvement in January  Unfortunately in the last few weeks he has noted increasing fatigue and discoloration of his feet when sitting and inability to walk longer distance  He did have a repeat MRI of the cervical spine which showed myelomalacia consistent with his quadriparesis  However clinically he has significant improvement of his weakness and dysesthesias and numbness  He was given a prescription for baclofen 10 mg but this resulted in excessive drowsiness decreased tone  He had difficulty moving his legs  This did help his tone at night  He will be receiving ankle braces to help with ambulation      While in [facility]  In sept 2022 he fell and hit the back of his head the by tingling and numbness in his arms and leg and weakness  [place] [place] He then returned to [place] quickly and was admitted as a trauma to [facility]  CT scan of head was normal the CT scan of the cervical spine showed no significant changes  He was then transferred to [facility] where he underwent inpatient physical and occupational therapy he then was discharged home and has been undergoing aggressive physical therapy  He also has a dysesthesias in his arms and legs  He had difficulty lifting up his wrist but recently has noted improvement of this    He does describe some difficulty with his fine movements more predominantly on the left than the right       He did undergo MRI imaging of the thoracic spine and cervical spine  The thoracic spine showed all removal of a spinal cord stimulator  Cervical spine did show mild malacia at C3-C4 and residual of prior  laminectomies of C3 through C6              In the past he did try Lyrica which caused some cognitive issues      He was given gabapentin but has no longer taking it  He is no longer taking trazodone  Both are resulting in stiffness in his legs  He does describe spasms in his legs  This can occur at night                  Prior history:  He did  complainin of stiffness and heaviness in his legs with progressive weakness    MRI did show foraminal stenosis a left L5-S1 as well as impingement on the left L3 L4 region   He was seen by dr Dom Kent who  suggested surgery but he and then sought a 2nd opinion and underwent fusion with placement of  L3-S1 in May of 2016 by Dr Klarissa Cantor ,  the head of Neurosurgery at [address] [place] [address] the surgery in May of 2016 he noted increasing numbness in his bilateral thighs, in the saddle regions and numbness in the perineum  This occurred after 1 year   Mri of c spine 02/07/2023     Redemonstrated moderate cord volume loss and myelomalacia at the C3-4 level, consistent with the patient's history of quadriplegia      Decompressive laminectomies from C3 to C6      Multilevel cervical spondylosis with variable degrees of foraminal stenosis  Endplate degenerative changes have progressed in the lower cervical spine       MRI  of the cervical spine on 10/27/2022     1  Advanced cord atrophy with myelomalacia at the C4 level is similar to the prior study, correlating to the history of quadriplegia  2   Decompressive laminectomies C3-C6 redemonstrated    4   Scattered spondylotic changes are stable                           The following portions of the patient's history were reviewed and updated as appropriate:   He  has a past medical history of Balance problems, BPH (benign prostatic hyperplasia), Chronic back pain, Chronic pain disorder, COVID-19, Erectile dysfunction, Nocturia, OAB (overactive bladder), Testicular hypogonadism, Urinary frequency, and Urinary urgency  He  has a past surgical history that includes Spinal cord stimulator implant (10/10/2019); Colonoscopy (2004); Colonoscopy (2007); Colonoscopy (2013); Colonoscopy (06/2019); Back surgery (05/18/2016); Neck surgery; pr excision hydrocele unilateral (Right, 06/10/2021); Neck surgery (Right); and Back surgery  His family history is not on file  He  reports that he has never smoked  He has never used smokeless tobacco  He reports current alcohol use  He reports that he does not use drugs    Current Outpatient Medications   Medication Sig Dispense Refill   • ibuprofen (MOTRIN) 400 mg tablet Take 1 tablet (400 mg total) by mouth every 6 (six) hours as needed for mild pain  0   • predniSONE 10 mg tablet Take 1 tablet (10 mg total) by mouth daily 90 tablet 1   • sildenafil (VIAGRA) 100 mg tablet Take 100 mg by mouth As directed     • testosterone cypionate (DEPO-TESTOSTERONE) 200 mg/mL SOLN inject 2 milliliters intramuscularly every 14 days 10 mL 1   • acetaminophen (TYLENOL) 325 mg tablet Take 2 tablets (650 mg total) by mouth every 6 (six) hours as needed for mild pain, headaches or fever  0   • ALPRAZolam (XANAX) 0 5 mg tablet Take 1 tablet (0 5 mg total) by mouth 30 min pre-procedure Can repeat if needed (Patient not taking: Reported on 3/8/2023) 3 tablet 0   • baclofen 10 mg tablet Take 10 mg by mouth daily at bedtime (Patient not taking: Reported on 3/8/2023)     • docusate sodium (COLACE) 100 mg capsule Take 1 capsule (100 mg total) by mouth 2 (two) times a day (Patient taking differently: Take 100 mg by mouth if needed)  0   • gabapentin (NEURONTIN) 300 mg capsule Take 1 CAPSULE BY MOUTH 3 TIMES A DAY (Patient not taking: Reported on 1/18/2023) 90 capsule 1   • Melatonin 10 MG TABS Take 10 mg by mouth if needed (Patient not taking: Reported on 1/18/2023)     • polyethylene glycol (MIRALAX) 17 g packet Take 17 g by mouth daily as needed (constipation)  0     No current facility-administered medications for this visit  He is allergic to morphine            Objective:    Blood pressure 92/60, pulse 84, temperature (!) 97 °F (36 1 °C), temperature source Tympanic, resp  rate 18, height 6' (1 829 m), weight 98 9 kg (218 lb), SpO2 98 %  Physical Exam  Eyes:      General: Lids are normal       Extraocular Movements: Extraocular movements intact  Pupils: Pupils are equal, round, and reactive to light  Neurological:      Deep Tendon Reflexes:      Reflex Scores:       Tricep reflexes are 1+ on the right side and 1+ on the left side  Bicep reflexes are 2+ on the right side and 2+ on the left side  Patellar reflexes are 2+ on the right side and 2+ on the left side  Achilles reflexes are 1+ on the right side and 1+ on the left side  Neurological Exam  Mental Status  Awake, alert and oriented to person, place and time  Oriented to person, place and time  Language is fluent with no aphasia  Cranial Nerves  CN II: Visual acuity is normal  Visual fields full to confrontation  CN III, IV, VI: Extraocular movements intact bilaterally  Normal lids and orbits bilaterally  Pupils equal round and reactive to light bilaterally  CN V: Facial sensation is normal   CN VII: Full and symmetric facial movement  CN VIII: Hearing is normal   CN IX, X: Palate elevates symmetrically  Normal gag reflex  CN XI: Shoulder shrug strength is normal   CN XII: Tongue midline without atrophy or fasciculations  Motor             His upper extremities he had 5- out of five strength proximally  In his lower extremities he had intrinsic atrophy with atrophy of the FDI  Hand   strength was a 4/5  Wrist flexion and wrist extension were   4/5   In his lower extremities the right hip flexion was a 4 /5 left hip flexion was a 4 +/5  Knee flexion and knee extension were 5-/5   Ankle dorsiflexion was a three plantar flexion was 3+  Atrophy of the of the intrinsics in his hands, foot  drop 3/5 and   Sensory  And the minimization of sensation from the knees down and decreased temperature in the toes  There is decreased pulses and discoloration  In the upper extremity he had a stimulation sensation in the hands  This was more predominantly on the left   Reflexes                                            Right                      Left  Biceps                                 2+                         2+  Triceps                                1+                         1+  Patellar                                2+                         2+  Achilles                                1+                         1+    Coordination  Right: Finger-to-nose normal Left: Finger-to-nose normal     Gait   Unable to rise from chair without using arms  He presented in a wheel chair   Review of systems obtained from the medical assistant because was reviewed with the patient at today's visit  ROS:    Review of Systems   Constitutional: Negative  Negative for appetite change and fever  HENT: Negative  Negative for hearing loss, tinnitus, trouble swallowing and voice change  Eyes: Negative  Negative for photophobia, pain and visual disturbance  Respiratory: Negative  Negative for shortness of breath  Cardiovascular: Negative  Negative for palpitations  Gastrointestinal: Negative  Negative for nausea and vomiting  Endocrine: Negative  Negative for cold intolerance  Genitourinary: Positive for frequency and urgency  Negative for dysuria  Musculoskeletal: Positive for back pain (whole back ) and gait problem (gait dysfunction- balance issues)  Negative for myalgias and neck pain  Skin: Negative  Negative for rash  Allergic/Immunologic: Negative      Neurological: Positive for tremors (when he turns on his right side), weakness (bilateral legs and arms), light-headedness (getting out of bed then goes away ) and numbness (bilateral legs and bilaqteral hands- left is worse)  Negative for dizziness, seizures, syncope, facial asymmetry, speech difficulty and headaches  Hematological: Negative  Does not bruise/bleed easily  Psychiatric/Behavioral: Positive for sleep disturbance  Negative for confusion and hallucinations  The patient is nervous/anxious  Depression, anxiety and mood swings         I spent 45 minutes with the patient discussing his symptoms reviewing his MRI imaging study discussing his impression and plan  I answered all of his questions to his satisfaction

## 2023-03-08 NOTE — ASSESSMENT & PLAN NOTE
This  a 77-year-old patient with a known spinal disease who had a fall and developed weakness and numbness in his arms and legs  He did undergo MRI imaging of the the cervical spine which showed myelomalacia  In the last 6 months he has had significant improvement with improvement of the strength in the upper and lower extremities  He is now able to ambulate with assistance and able to eat out of bed himself  He does require some assistance with dressing  He has noted improvement of the numbness and tingling but continues to have difficulty with fine movements in his hands     he did have improvement in January he has noted some slight decline in his strength over the last few weeks  He does have discoloration of his legs when sitting but less in the standing or supine position  He does admit to decrease in fluid intake and low blood pressures  Encouraged him to continue to be as active, increase his fluid intake utilize compression stockings head of bed upper greater than 30 degrees  We discussed the use of steroids  He did receive IV steroids which only provide some benefit from 1 week  He has been utilizing 10 mg tablets of prednisone and informs me that this does help his joint pain  We discussed the side effects of long-term use of prednisone and I would like him to reduce the dose to 5 mg when he is able to in the next few weeks  He did seem to have discoloration of his feet and decreased pulses    I have ordered an arterial Doppler study to determine any type of arterial disease

## 2023-03-10 ENCOUNTER — TELEPHONE (OUTPATIENT)
Dept: NEUROLOGY | Facility: CLINIC | Age: 63
End: 2023-03-10

## 2023-03-10 NOTE — TELEPHONE ENCOUNTER
Team    Please call Elva Ritchie 44 and let him know that I did review his last 3 MRIs of the cervical spine  (Reports and films)  there are degenerative changes that are occurring in all 3 MRIs   I personally looked at the films today and compared all 3  There is some  progression of the degenerative and arthritic changes from June 2022 to the most recent MRI in February however the changes are  not dramatic  It does not explain his current symptoms      If if he has further questions regarding degenerative changes and arthritis arthritic abnormalities I would suggest reevaluation by orthopedic spinal physician

## 2023-04-22 DIAGNOSIS — E29.1 HYPOGONADISM IN MALE: ICD-10-CM

## 2023-04-24 RX ORDER — TESTOSTERONE CYPIONATE 200 MG/ML
INJECTION, SOLUTION INTRAMUSCULAR
Qty: 10 ML | Refills: 0 | Status: SHIPPED | OUTPATIENT
Start: 2023-04-24

## 2023-05-05 ENCOUNTER — HOSPITAL ENCOUNTER (OUTPATIENT)
Dept: NON INVASIVE DIAGNOSTICS | Facility: CLINIC | Age: 63
Discharge: HOME | End: 2023-05-05

## 2023-05-05 DIAGNOSIS — L81.9 DISCOLORATION OF SKIN OF LOWER LEG: ICD-10-CM

## 2023-05-11 ENCOUNTER — TELEPHONE (OUTPATIENT)
Dept: NEUROLOGY | Facility: CLINIC | Age: 63
End: 2023-05-11

## 2023-05-11 NOTE — TELEPHONE ENCOUNTER
Called and spoke to patient   Patient confirmed upcoming apt with Dr Maria Luz Cunningham on 5/24/23 @ 2:30 pm

## 2023-05-24 ENCOUNTER — OFFICE VISIT (OUTPATIENT)
Dept: NEUROLOGY | Facility: CLINIC | Age: 63
End: 2023-05-24

## 2023-05-24 VITALS
WEIGHT: 218 LBS | DIASTOLIC BLOOD PRESSURE: 61 MMHG | TEMPERATURE: 97.7 F | HEART RATE: 86 BPM | BODY MASS INDEX: 29.53 KG/M2 | SYSTOLIC BLOOD PRESSURE: 98 MMHG | OXYGEN SATURATION: 96 % | RESPIRATION RATE: 18 BRPM | HEIGHT: 72 IN

## 2023-05-24 DIAGNOSIS — G95.89 MYELOMALACIA OF CERVICAL CORD (HCC): Primary | ICD-10-CM

## 2023-05-24 RX ORDER — CLOBETASOL PROPIONATE 0.46 MG/ML
SOLUTION TOPICAL
COMMUNITY
Start: 2023-05-10

## 2023-05-24 RX ORDER — NYSTATIN 100000 [USP'U]/G
POWDER TOPICAL
COMMUNITY
Start: 2023-05-10

## 2023-05-24 RX ORDER — KETOCONAZOLE 20 MG/ML
SHAMPOO TOPICAL
COMMUNITY
Start: 2023-05-10

## 2023-05-24 RX ORDER — BETAMETHASONE DIPROPIONATE 0.05 %
OINTMENT (GRAM) TOPICAL
COMMUNITY
Start: 2023-05-11

## 2023-05-24 NOTE — ASSESSMENT & PLAN NOTE
Is a 27-year-old patient with known spinal disease who had a fall and developed numbness and weakness in his arms and legs in September 2022  He did undergo MRI imaging which showed myelomalacia  In the last few months he has undergone an extensive work-up as well as physical therapy program   Initially was noted to have atrophy of the hand muscles with weakness in all 4 muscle groups  Since his last visit he has had improvement  He is now walking with assistance and with physical therapy  He has had improvement in strength in the upper extremities and today's examination  does demonstrate improvement of the bulk in his hand muscles  He will be receiving off of braces in the next few weeks  We had a long discussion regarding his spinal disease  Given the length of time for regeneration,  regeneration may take several years  He is had a remarkable improvement in just several months  He is able to transfer himself and perform all of his ADLs  We had a discussion regarding his use of prednisone he is utilizing prednisone 10 mg a day and I discussed with him my concerns regarding long-term steroids  He informs me that his knee pain does increase after he reduces the dose and this can certainly happen   I have asked him to slowly reduce the dose by 1 or 2 pills every week after the awful braces are being utilized  We also had a discussion regarding potential TENS unit  This may cause increasing weakness in his legs and this can be used in his back  He does have a extensive exercise program     Does develop arthritic symptoms in his knee and orthopedic evaluation could be helpful

## 2023-05-24 NOTE — PATIENT INSTRUCTIONS
Start decreasing the prednisone to by 1 to 2 1/2 tablets every week to 5 mg daily , start 1 to 2 week after using AFO braces     Continue with exercise routine     Use the AFO braces    Heat can make the symptoms worse

## 2023-05-24 NOTE — PROGRESS NOTES
Patient ID: Kvng Huerta is a 57 y o  male  Assessment/Plan:    Myelomalacia of cervical cord [facility])  Is a 68-year-old patient with known spinal disease who had a fall and developed numbness and weakness in his arms and legs in September 2022  He did undergo MRI imaging which showed myelomalacia  In the last few months he has undergone an extensive work-up as well as physical therapy program   Initially was noted to have atrophy of the hand muscles with weakness in all 4 muscle groups  Since his last visit he has had improvement  He is now walking with assistance and with physical therapy  He has had improvement in strength in the upper extremities and today's examination  does demonstrate improvement of the bulk in his hand muscles  He will be receiving off of braces in the next few weeks  We had a long discussion regarding his spinal disease  Given the length of time for regeneration,  regeneration may take several years  He is had a remarkable improvement in just several months  He is able to transfer himself and perform all of his ADLs  We had a discussion regarding his use of prednisone he is utilizing prednisone 10 mg a day and I discussed with him my concerns regarding long-term steroids  He informs me that his knee pain does increase after he reduces the dose and this can certainly happen   I have asked him to slowly reduce the dose by 1 or 2 pills every week after the awful braces are being utilized  We also had a discussion regarding potential TENS unit  This may cause increasing weakness in his legs and this can be used in his back  He does have a extensive exercise program     Does develop arthritic symptoms in his knee and orthopedic evaluation could be helpful  There are no diagnoses linked to this encounter  Subjective: This is a 57  y o rh male who presented  to our office   in a follow-up visit  He was last evaluated here several months ago    He has known spinal disease but in September 2022 he fell and developed superimposed myelomalacia  He has undergone extensive work-up  He was given to courses of IV steroids followed by steroid taper and has been utilizing steroids prednisone 10 mg a day  We did try lower dose of the 5 mg but he had increasing symptomatology now utilizing 10  He reports lower doses does result in pain involving his knee  He continues to pursue a extensive exercise program   He exercises in the pool on Monday Wednesdays and Fridays  On Tuesdays and Thursdays he is at [facility] for physical therapy and on Monday Wednesdays and Fridays he also is followed by his own  at home  He is now ambulating with assistance but does have difficulty with ankle movement  Recently he showed me a picture of ambulating up steps  This was possible but he did have weakness of the hand and his foot muscles  He is now utilizing a cane at home with assistance    Last visit he noted discoloration in his feet  He did undergo arterial Doppler studies that were normal           He was given a prescription for baclofen 10 mg but this resulted in excessive drowsiness decreased tone  He had difficulty moving his legs  This did help his tone at night  He will be receiving ankle braces to help with ambulation  We also discussed gabapentin  Today he denies any nerve dysesthesia but does complain of numbness worse on the left than the right arm  Lyrica did cause cognitive  side effects       prior history: While in [facility]  In sept 2022 he fell and hit the back of his head the by tingling and numbness in his arms and leg and weakness  [place] He then returned to [place] quickly and was admitted as a trauma to [facility]  CT scan of head was normal the CT scan of the cervical spine showed no significant changes    He was then transferred to [facility] where he underwent inpatient physical and occupational therapy he then was discharged home and has been undergoing aggressive physical therapy  He also has a dysesthesias in his arms and legs  He had difficulty lifting up his wrist but recently has noted improvement of this  He does describe some difficulty with his fine movements more predominantly on the left than the right             He was given gabapentin but has no longer taking it  He is no longer taking trazodone  Both are resulting in stiffness in his legs  He does describe spasms in his legs  This can occur at night    He does report 18 out of the 30 days in the month he has good days with improved strength , and energy  However several days in the month he does have fatigability which worsens when he is sitting  Today is one of the less active days                   The following portions of the patient's history were reviewed and updated as appropriate:   He  has a past medical history of Balance problems, BPH (benign prostatic hyperplasia), Chronic back pain, Chronic pain disorder, COVID-19, Erectile dysfunction, Nocturia, OAB (overactive bladder), Testicular hypogonadism, Urinary frequency, and Urinary urgency  He  has a past surgical history that includes Spinal cord stimulator implant (10/10/2019); Colonoscopy (2004); Colonoscopy (2007); Colonoscopy (2013); Colonoscopy (06/2019); Back surgery (05/18/2016); Neck surgery; pr excision hydrocele unilateral (Right, 06/10/2021); Neck surgery (Right); and Back surgery  His family history is not on file  He  reports that he has never smoked  He has never used smokeless tobacco  He reports current alcohol use  He reports that he does not use drugs    Current Outpatient Medications   Medication Sig Dispense Refill   • betamethasone dipropionate (DIPROSONE) 0 05 % ointment      • clobetasol (TEMOVATE) 0 05 % external solution      • fluocinonide (LIDEX) 0 05 % cream      • ibuprofen (MOTRIN) 400 mg tablet Take 1 tablet (400 mg total) by mouth every 6 (six) hours as needed for mild pain  0   • ketoconazole (NIZORAL) 2 % shampoo      • nystatin powder      • predniSONE 10 mg tablet Take 1 tablet (10 mg total) by mouth daily 90 tablet 1   • sildenafil (VIAGRA) 100 mg tablet Take 100 mg by mouth As directed     • testosterone cypionate (DEPO-TESTOSTERONE) 200 mg/mL SOLN inject 2 milliliters intramuscularly every 14 days 10 mL 0   • acetaminophen (TYLENOL) 325 mg tablet Take 2 tablets (650 mg total) by mouth every 6 (six) hours as needed for mild pain, headaches or fever  0   • ALPRAZolam (XANAX) 0 5 mg tablet Take 1 tablet (0 5 mg total) by mouth 30 min pre-procedure Can repeat if needed (Patient not taking: Reported on 3/8/2023) 3 tablet 0   • baclofen 10 mg tablet Take 10 mg by mouth daily at bedtime (Patient not taking: Reported on 3/8/2023)     • docusate sodium (COLACE) 100 mg capsule Take 1 capsule (100 mg total) by mouth 2 (two) times a day (Patient taking differently: Take 100 mg by mouth if needed)  0   • gabapentin (NEURONTIN) 300 mg capsule Take 1 CAPSULE BY MOUTH 3 TIMES A DAY (Patient not taking: Reported on 1/18/2023) 90 capsule 1   • Melatonin 10 MG TABS Take 10 mg by mouth if needed (Patient not taking: Reported on 1/18/2023)     • polyethylene glycol (MIRALAX) 17 g packet Take 17 g by mouth daily as needed (constipation)  0     No current facility-administered medications for this visit  He is allergic to morphine            Objective:    Blood pressure 98/61, pulse 86, temperature 97 7 °F (36 5 °C), temperature source Tympanic, resp  rate 18, height 6' (1 829 m), weight 98 9 kg (218 lb), SpO2 96 %  Physical Exam  Eyes:      General: Lids are normal       Extraocular Movements: Extraocular movements intact  Pupils: Pupils are equal, round, and reactive to light  Neurological:      Deep Tendon Reflexes:      Reflex Scores:       Tricep reflexes are 1+ on the right side and 1+ on the left side  Bicep reflexes are 2+ on the right side and 2+ on the left side         Patellar reflexes are 2+ on the right side and 2+ on the left side  Achilles reflexes are 1+ on the right side and 1+ on the left side  Neurological Exam  Mental Status   Oriented to person, place and time  Language is fluent with no aphasia  Cranial Nerves  CN II: Visual acuity is normal  Visual fields full to confrontation  CN III, IV, VI: Extraocular movements intact bilaterally  Normal lids and orbits bilaterally  Pupils equal round and reactive to light bilaterally  CN V: Facial sensation is normal   CN VII: Full and symmetric facial movement  CN VIII: Hearing is normal   CN IX, X: Palate elevates symmetrically  Normal gag reflex  CN XI: Shoulder shrug strength is normal   CN XII: Tongue midline without atrophy or fasciculations  Motor    His upper extremities he had 5- out of five strength proximally  he had intrinsic mild atrophy of the FDI  Hand   strength was a 4/5  Wrist flexion and wrist extension were  5-/5   In his lower extremities the right hip flexion was a 3+ /5 left hip flexion was a 4 +/5  Knee flexion and knee extension were 5-/5   Ankle dorsiflexion was a three plantar flexion was 4               Sensory  Attenuation sensation in the left arm  He also had attenuation sensation in his feet  Reflexes                                            Right                      Left  Biceps                                 2+                         2+  Triceps                                1+                         1+  Patellar                                2+                         2+  Achilles                                1+                         1+  Right Plantar: downgoing  Left Plantar: downgoing    Coordination  Right: Finger-to-nose normal Left: Finger-to-nose normal     Gait   Unable to rise from chair without using arms  Presented in a wheelchair and did right require some assistance to stand       Review of systems obtained from the medical assistant as below was reviewed with the patient at today's visit  ROS:    Review of Systems   Constitutional: Positive for fatigue  Negative for appetite change and fever  HENT: Negative  Negative for hearing loss, tinnitus, trouble swallowing and voice change  Eyes: Negative  Negative for photophobia, pain and visual disturbance  Respiratory: Negative  Negative for shortness of breath  Cardiovascular: Negative  Negative for palpitations  Gastrointestinal: Negative  Negative for nausea and vomiting  Endocrine: Negative  Negative for cold intolerance  Genitourinary: Positive for frequency and urgency  Negative for dysuria  Musculoskeletal: Positive for back pain and gait problem (walks with a walker- gait dysturbance- balance issues)  Negative for myalgias and neck pain  Skin: Negative  Negative for rash  Allergic/Immunologic: Negative  Neurological: Positive for tremors (hands when straining at times), light-headedness (sitting up in bed in the morning- lasts for 15-20 secons) and numbness (left arm  right hand, belly button to toes)  Negative for dizziness, seizures, syncope, facial asymmetry, speech difficulty, weakness and headaches  Hematological: Bruises/bleeds easily  Psychiatric/Behavioral: Positive for sleep disturbance  Negative for confusion and hallucinations  The patient is nervous/anxious (anxious)          Can return to our offices in several months

## 2023-07-10 ENCOUNTER — TELEPHONE (OUTPATIENT)
Dept: NEUROLOGY | Facility: CLINIC | Age: 63
End: 2023-07-10

## 2023-07-10 NOTE — TELEPHONE ENCOUNTER
Received VM Transcription: This is Elton Juan, [birth year]. I'd like to have Dr. Coles don't order some MRIs of my cervical. I seem to be having more problems last month or 2. And I feel like something else could be wrong and I want to have them compared to the previous. Last OV 5-24-23  Next OV 9-20-23      Dr Marquez Anthony, please advise. Thank you!

## 2023-07-11 NOTE — TELEPHONE ENCOUNTER
Left Bill a message to CB and further triage his symptoms. Also advised he can send a My chart message with a description of his symptoms.

## 2023-07-12 DIAGNOSIS — G82.50 QUADRIPARESIS (HCC): ICD-10-CM

## 2023-07-12 DIAGNOSIS — G95.89 MYELOMALACIA OF CERVICAL CORD (HCC): Primary | ICD-10-CM

## 2023-07-12 RX ORDER — ALPRAZOLAM 0.5 MG/1
0.5 TABLET ORAL
Qty: 3 TABLET | Refills: 0 | Status: SHIPPED | OUTPATIENT
Start: 2023-07-12

## 2023-07-12 NOTE — TELEPHONE ENCOUNTER
Recd vm 7/11 taken off 7/12  This is Joey fieldmody august 10th. 1960. I left the call yesterday is for Dr. Gabe Bailey. I want her to order another MRI for my cervical. I'm having some issues and I am very concerned. [phone]. I left the phone message yesterday and no one has returned the call. this is really unusual for [facility].

## 2023-07-12 NOTE — TELEPHONE ENCOUNTER
Recd    alina anna marie august 10th. 1960. Returning your call from [place] returning your call from me. The symptoms that I'm having now is I'm having trouble walking and I'm getting stiffness in my right leg. And in my back. I did take a fall on father's day, but I did not get hurt at all. I didn't land on my back or anything like that. So I don't know if I did any more damage. I don't believe I did. But I feel like I have been declining in the last 3 months or so. And I just want to see if anything's changed by getting an MRI. . [phone]. Please give me a call back and see if Dr. Washington Bullard can schedule me for an MRI. I will need her to call in a xanax or  whatever it is that I take for the MRI.

## 2023-07-12 NOTE — PROGRESS NOTES
Team    Please schedule the patient for an MRI of the cervical spine with and without gadolinium. I also ordered a CMP which he  needs to be performed approximately 3 to 4 weeks prior to the MRI. I will be sending a prescription to his pharmacy forXanax.

## 2023-07-12 NOTE — TELEPHONE ENCOUNTER
Spoke with [employer]. States since September 2022 he felt like he was making real progress up until April of this year. Since April he feels like he has declined with his walking and energy levels. Patient had a fall on Father's Day, but states he did not hit anything like his head or back. But felt like he had a period of numbness in his legs. Has been having ongoing Rib cage stiffness and B/L leg stiffness. He was prescribed Muscle relaxers by another MD at [facility]. But he feels like relaxers aren't doing much at all for him. Patient said it has been discussed about possible Botox for his stiffness in his back with his MD at [facility]. Patient denies any bowel or bladder issues. Other than urinary frequency. But patient states he is aware as he drinks a lot of fluids daily. But has never lost control of bladder or bowel. Patient is asking if Dr. Bhavesh Rodríguez would be willing to order another MRI? If so, patient states he would need a calming medication for imaging like Xanax.    Last MRI C/S was 2/07/2023  Last MRI Thoracic was 10/27/22    # [phone]

## 2023-07-12 NOTE — TELEPHONE ENCOUNTER
The Mary, I reached his VM. I left a detailed VM letting him know MRI C/S w and wo Contrast is ordered  And left ph# to Central Scheduling on VM. I also let him know that Xanax is being called into his Pharmacy to get him through his imaging study. I asked for a CB from [address] to confirm receipt of our messages to him.

## 2023-07-13 ENCOUNTER — TELEPHONE (OUTPATIENT)
Dept: OTHER | Facility: HOSPITAL | Age: 63
End: 2023-07-13

## 2023-07-13 NOTE — TELEPHONE ENCOUNTER
Neurology    Devaughn Hernadez is a 58-year-old patient who had a sudden cervical spinal injury in September 2022. He was noted to have myelomalacia on MRI imaging. He was last evaluated in our offices in May. He was doing well and we had discussed gradually reducing the dose of prednisone. In the interim he does admit to increased tone in the lower back and legs. He was treated with muscle relaxants baclofen and TENS Inadine. Unfortunately both at the  did result in excessive drowsiness and weakness. He has noted in the last few weeks worsening of his ability to ambulate. During the week of 4 July he was on vacation and does admit to minimally exercising. He did have an episode of severe spasms on July 4 which was preceded by several days of dehydration. The spasm lasted approximately 2 hours improved improving his hydration. He has had minimal symptoms since. He did fall on June 17. He is also noted weakness of the right leg. He is currently utilizing prednisone 10 mg every other day. 1.  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 Sudden cervical spinal injury in September 2022 with myelomalacia. 10 mg every other day. I have asked him to increase the dose to 10 mg a day  3. I asked him to consider the use of IV steroids 1 g daily for 3 days which he did agree. After the IV steroids we will start him on a taper of steroids for , 40 mg for three days , 20 mg for 3 days and 15 for 3 days then 10 mg.    4. I have asked him to avoid the use of muscle relaxants as it does affect his tone and his ability ambulate. 5.  I encouraged him to keep hydrated as the heat does bother him.     After the EMG I will then contact him regarding further treatment or options

## 2023-07-14 NOTE — TELEPHONE ENCOUNTER
[facility] [phone] to inquire about PA for Steroid Infusion   , [id number], [id number]    On hold with [facility] for extended period of time. Chin brady will try again. Per [employer] no PA required for [id number], [id number], [id number]    Per Dr. Laura Meadows note:  3. I asked him to consider the use of IV steroids 1 g daily for 3 days which he did agree. After the IV steroids we will start him on a taper of steroids for , 40 mg for three days , 20 mg for 3 days and 15 for 3 days then 10 mg. Therapy plan entered and sent for signature to Dr. Rukhsana Morales. Will call Alonso [facility] on Monday to schedule infusions.

## 2023-07-14 NOTE — PROGRESS NOTES
Spoke with patient, tried scheduling for MRI C-Spine. Since patient has a stimulator, procedure scheduling will reach out to him to schedule MRI.

## 2023-07-17 DIAGNOSIS — G82.50 QUADRIPARESIS (HCC): ICD-10-CM

## 2023-07-17 DIAGNOSIS — G95.89 MYELOMALACIA OF CERVICAL CORD (HCC): ICD-10-CM

## 2023-07-17 DIAGNOSIS — G95.89 MYELOMALACIA OF CERVICAL CORD (HCC): Primary | ICD-10-CM

## 2023-07-17 DIAGNOSIS — R29.898 WEAKNESS OF RIGHT LOWER EXTREMITY: ICD-10-CM

## 2023-07-17 RX ORDER — SODIUM CHLORIDE 9 MG/ML
20 INJECTION, SOLUTION INTRAVENOUS ONCE
Status: CANCELLED | OUTPATIENT
Start: 2023-07-25

## 2023-07-17 RX ORDER — PREDNISONE 10 MG/1
TABLET ORAL
Qty: 50 TABLET | Refills: 0 | Status: SHIPPED | OUTPATIENT
Start: 2023-07-17 | End: 2023-07-31 | Stop reason: SDUPTHER

## 2023-07-17 NOTE — TELEPHONE ENCOUNTER
Pt called in returning a call from [facility]. I gave the Pt CC number to make appt for MRI. Please call back to relay any other info that pt needs.

## 2023-07-17 NOTE — TELEPHONE ENCOUNTER
"Louise Aldrich 21 minutes ago (10:47 AM)     Pt called in returning a call from [facility]. I gave the Pt CC number to make appt for MRI. Please call back to relay any other info that pt needs."        Attempted to call patient back, went to . Left message for return call.  Please see previous message with information to relay to patient

## 2023-07-17 NOTE — TELEPHONE ENCOUNTER
Confirmed through availity that no prior authorization is required for solumedrol     Attempted to contact infusion center, was advised that they will forward my name to infusion center and will have someone reach out     Awaiting to hear from infusion center to schedule

## 2023-07-17 NOTE — TELEPHONE ENCOUNTER
Infusion center can accommodate patient for:     7/19 @ 12:30 PM  7/20 @ 9 AM  7/21 @ 2 PM    Attempted to contact patient, left message for return call to office. If patient returns call, please let him know of appointment times/dates above for Solu-medrol and see if he is agreeable.  If he is agreeable, patient is to follow Dr. Barbi Colin taper instructions for prednisone AFTER solumedrol infusion:     "After the IV steroids we will start him on a taper of steroids for 40 mg for three days , 20 mg for 3 days and 15 for 3 days then 10 mg."    Dr. Avelino Henson will send taper to  Pharmacy

## 2023-07-18 DIAGNOSIS — G95.89 MYELOMALACIA OF CERVICAL CORD (HCC): Primary | ICD-10-CM

## 2023-07-18 NOTE — TELEPHONE ENCOUNTER
7/17 4:19    Pt left a VM returning Debby's call. Transcribed VM:   Hi. This is Génesis Tk. August 10th, 1960. Returning [address] [place] [address] phone call. [phone].

## 2023-07-18 NOTE — TELEPHONE ENCOUNTER
Called patient, he is unable to do this week. Prefers next week, available any days Monday - Thursday, also prefers late morning if possible     Called infusion center, they can schedule patient for:   7/25 @ 12 PM  7/26 @ 11 AM  7/27 @ 12 PM    Called patient, made him aware of above times/dates. Patient verbalized understanding.  Also reviewed prednisone taper with patient, he verbalized understanding and will begin taper AFTER IV steroids     Gayle Brewer

## 2023-07-19 ENCOUNTER — HOSPITAL ENCOUNTER (OUTPATIENT)
Dept: INFUSION CENTER | Facility: CLINIC | Age: 63
End: 2023-07-19

## 2023-07-25 ENCOUNTER — HOSPITAL ENCOUNTER (OUTPATIENT)
Dept: INFUSION CENTER | Facility: CLINIC | Age: 63
Discharge: HOME | End: 2023-07-25
Payer: COMMERCIAL

## 2023-07-25 VITALS
DIASTOLIC BLOOD PRESSURE: 71 MMHG | TEMPERATURE: 96.5 F | HEART RATE: 49 BPM | SYSTOLIC BLOOD PRESSURE: 136 MMHG | RESPIRATION RATE: 18 BRPM

## 2023-07-25 DIAGNOSIS — G95.89 MYELOMALACIA OF CERVICAL CORD (HCC): Primary | ICD-10-CM

## 2023-07-25 PROCEDURE — 96365 THER/PROPH/DIAG IV INF INIT: CPT

## 2023-07-25 RX ORDER — SODIUM CHLORIDE 9 MG/ML
20 INJECTION, SOLUTION INTRAVENOUS ONCE
Status: CANCELLED | OUTPATIENT
Start: 2023-07-26

## 2023-07-25 RX ORDER — SODIUM CHLORIDE 9 MG/ML
20 INJECTION, SOLUTION INTRAVENOUS ONCE
Status: COMPLETED | OUTPATIENT
Start: 2023-07-25 | End: 2023-07-25

## 2023-07-25 RX ADMIN — SODIUM CHLORIDE 20 ML/HR: 0.9 INJECTION, SOLUTION INTRAVENOUS at 12:20

## 2023-07-25 RX ADMIN — SODIUM CHLORIDE 1000 MG: 0.9 INJECTION, SOLUTION INTRAVENOUS at 12:18

## 2023-07-26 ENCOUNTER — HOSPITAL ENCOUNTER (OUTPATIENT)
Dept: INFUSION CENTER | Facility: CLINIC | Age: 63
Discharge: HOME | End: 2023-07-26
Payer: COMMERCIAL

## 2023-07-26 DIAGNOSIS — G95.89 MYELOMALACIA OF CERVICAL CORD (HCC): Primary | ICD-10-CM

## 2023-07-26 PROCEDURE — 96365 THER/PROPH/DIAG IV INF INIT: CPT

## 2023-07-26 RX ORDER — SODIUM CHLORIDE 9 MG/ML
20 INJECTION, SOLUTION INTRAVENOUS ONCE
Status: CANCELLED | OUTPATIENT
Start: 2023-07-27

## 2023-07-26 RX ORDER — SODIUM CHLORIDE 9 MG/ML
20 INJECTION, SOLUTION INTRAVENOUS ONCE
Status: COMPLETED | OUTPATIENT
Start: 2023-07-26 | End: 2023-07-26

## 2023-07-26 RX ADMIN — SODIUM CHLORIDE 20 ML/HR: 0.9 INJECTION, SOLUTION INTRAVENOUS at 11:13

## 2023-07-26 RX ADMIN — SODIUM CHLORIDE 1000 MG: 0.9 INJECTION, SOLUTION INTRAVENOUS at 11:13

## 2023-07-27 ENCOUNTER — HOSPITAL ENCOUNTER (OUTPATIENT)
Dept: INFUSION CENTER | Facility: CLINIC | Age: 63
Discharge: HOME | End: 2023-07-27
Payer: COMMERCIAL

## 2023-07-27 VITALS
HEART RATE: 51 BPM | DIASTOLIC BLOOD PRESSURE: 76 MMHG | SYSTOLIC BLOOD PRESSURE: 143 MMHG | TEMPERATURE: 97.4 F | RESPIRATION RATE: 16 BRPM

## 2023-07-27 DIAGNOSIS — G95.89 MYELOMALACIA OF CERVICAL CORD (HCC): Primary | ICD-10-CM

## 2023-07-27 PROCEDURE — 96365 THER/PROPH/DIAG IV INF INIT: CPT

## 2023-07-27 RX ORDER — SODIUM CHLORIDE 9 MG/ML
20 INJECTION, SOLUTION INTRAVENOUS ONCE
Status: CANCELLED | OUTPATIENT
Start: 2023-07-27

## 2023-07-27 RX ORDER — SODIUM CHLORIDE 9 MG/ML
20 INJECTION, SOLUTION INTRAVENOUS ONCE
Status: COMPLETED | OUTPATIENT
Start: 2023-07-27 | End: 2023-07-27

## 2023-07-27 RX ADMIN — SODIUM CHLORIDE 20 ML/HR: 0.9 INJECTION, SOLUTION INTRAVENOUS at 11:53

## 2023-07-27 RX ADMIN — SODIUM CHLORIDE 1000 MG: 0.9 INJECTION, SOLUTION INTRAVENOUS at 12:06

## 2023-07-31 ENCOUNTER — DOCUMENTATION (OUTPATIENT)
Dept: NEUROLOGY | Facility: CLINIC | Age: 63
End: 2023-07-31

## 2023-07-31 DIAGNOSIS — N39.0 UTI (URINARY TRACT INFECTION): Primary | ICD-10-CM

## 2023-07-31 DIAGNOSIS — G82.50 QUADRIPARESIS (HCC): ICD-10-CM

## 2023-07-31 DIAGNOSIS — G95.89 MYELOMALACIA OF CERVICAL CORD (HCC): ICD-10-CM

## 2023-07-31 DIAGNOSIS — R29.898 WEAKNESS OF RIGHT LOWER EXTREMITY: ICD-10-CM

## 2023-07-31 RX ORDER — PREDNISONE 10 MG/1
TABLET ORAL
Qty: 90 TABLET | Refills: 0 | Status: SHIPPED | OUTPATIENT
Start: 2023-07-31 | End: 2023-08-15

## 2023-07-31 NOTE — PROGRESS NOTES
Santos Jimenez received IV steroids. 1 gram July 25, 26, and 27th. The symptoms worsened on July 26. .. The taper will begin today at 50 mg and every 5 days the dose will be decreased by 10 mg tablets.   The new prescription was sent to the pharmacy

## 2023-08-01 ENCOUNTER — APPOINTMENT (OUTPATIENT)
Dept: LAB | Facility: MEDICAL CENTER | Age: 63
End: 2023-08-01
Attending: PSYCHIATRY & NEUROLOGY
Payer: COMMERCIAL

## 2023-08-01 DIAGNOSIS — N39.0 UTI (URINARY TRACT INFECTION): ICD-10-CM

## 2023-08-01 DIAGNOSIS — G95.89 MYELOMALACIA OF CERVICAL CORD (HCC): ICD-10-CM

## 2023-08-01 DIAGNOSIS — N39.0 UTI (URINARY TRACT INFECTION): Primary | ICD-10-CM

## 2023-08-01 LAB
ALBUMIN SERPL BCP-MCNC: 3 G/DL (ref 3.5–5)
ALP SERPL-CCNC: 84 U/L (ref 46–116)
ALT SERPL W P-5'-P-CCNC: 44 U/L (ref 12–78)
ANION GAP SERPL CALCULATED.3IONS-SCNC: 6 MMOL/L
AST SERPL W P-5'-P-CCNC: 15 U/L (ref 5–45)
BACTERIA UR QL AUTO: ABNORMAL /HPF
BILIRUB SERPL-MCNC: 1.15 MG/DL (ref 0.2–1)
BILIRUB UR QL STRIP: NEGATIVE
BUDDING YEAST: PRESENT
BUN SERPL-MCNC: 22 MG/DL (ref 5–25)
CALCIUM ALBUM COR SERPL-MCNC: 9.9 MG/DL (ref 8.3–10.1)
CALCIUM SERPL-MCNC: 9.1 MG/DL (ref 8.3–10.1)
CHLORIDE SERPL-SCNC: 99 MMOL/L (ref 96–108)
CLARITY UR: ABNORMAL
CO2 SERPL-SCNC: 26 MMOL/L (ref 21–32)
COLOR UR: YELLOW
CREAT SERPL-MCNC: 0.66 MG/DL (ref 0.6–1.3)
CRP SERPL QL: 23.2 MG/L
ERYTHROCYTE [DISTWIDTH] IN BLOOD BY AUTOMATED COUNT: 13.2 % (ref 11.6–15.1)
GFR SERPL CREATININE-BSD FRML MDRD: 102 ML/MIN/1.73SQ M
GLUCOSE P FAST SERPL-MCNC: 107 MG/DL (ref 65–99)
GLUCOSE UR STRIP-MCNC: NEGATIVE MG/DL
HCT VFR BLD AUTO: 54.1 % (ref 36.5–49.3)
HGB BLD-MCNC: 18.5 G/DL (ref 12–17)
HGB UR QL STRIP.AUTO: NEGATIVE
KETONES UR STRIP-MCNC: NEGATIVE MG/DL
LEUKOCYTE ESTERASE UR QL STRIP: ABNORMAL
MCH RBC QN AUTO: 31.7 PG (ref 26.8–34.3)
MCHC RBC AUTO-ENTMCNC: 34.2 G/DL (ref 31.4–37.4)
MCV RBC AUTO: 93 FL (ref 82–98)
NITRITE UR QL STRIP: NEGATIVE
NON-SQ EPI CELLS URNS QL MICRO: ABNORMAL /HPF
PH UR STRIP.AUTO: 7 [PH]
PLATELET # BLD AUTO: 161 THOUSANDS/UL (ref 149–390)
PMV BLD AUTO: 10.9 FL (ref 8.9–12.7)
POTASSIUM SERPL-SCNC: 4.6 MMOL/L (ref 3.5–5.3)
PROT SERPL-MCNC: 6.5 G/DL (ref 6.4–8.4)
PROT UR STRIP-MCNC: ABNORMAL MG/DL
RBC # BLD AUTO: 5.83 MILLION/UL (ref 3.88–5.62)
RBC #/AREA URNS AUTO: ABNORMAL /HPF
SODIUM SERPL-SCNC: 131 MMOL/L (ref 135–147)
SP GR UR STRIP.AUTO: 1.01 (ref 1–1.03)
UROBILINOGEN UR STRIP-ACNC: 2 MG/DL
WBC # BLD AUTO: 18.01 THOUSAND/UL (ref 4.31–10.16)
WBC #/AREA URNS AUTO: ABNORMAL /HPF
WBC CLUMPS # UR AUTO: PRESENT /UL

## 2023-08-01 PROCEDURE — 87086 URINE CULTURE/COLONY COUNT: CPT | Performed by: PSYCHIATRY & NEUROLOGY

## 2023-08-01 PROCEDURE — 36415 COLL VENOUS BLD VENIPUNCTURE: CPT

## 2023-08-01 PROCEDURE — 87186 SC STD MICRODIL/AGAR DIL: CPT | Performed by: PSYCHIATRY & NEUROLOGY

## 2023-08-01 PROCEDURE — 81001 URINALYSIS AUTO W/SCOPE: CPT | Performed by: PSYCHIATRY & NEUROLOGY

## 2023-08-01 PROCEDURE — 80053 COMPREHEN METABOLIC PANEL: CPT

## 2023-08-01 PROCEDURE — 86140 C-REACTIVE PROTEIN: CPT

## 2023-08-01 PROCEDURE — 87077 CULTURE AEROBIC IDENTIFY: CPT | Performed by: PSYCHIATRY & NEUROLOGY

## 2023-08-01 PROCEDURE — 85027 COMPLETE CBC AUTOMATED: CPT

## 2023-08-01 RX ORDER — CIPROFLOXACIN 500 MG/1
500 TABLET, FILM COATED ORAL EVERY 12 HOURS SCHEDULED
Qty: 20 TABLET | Refills: 0 | Status: SHIPPED | OUTPATIENT
Start: 2023-08-01 | End: 2023-08-04 | Stop reason: CLARIF

## 2023-08-01 NOTE — PROGRESS NOTES
Neurology    This is a 75-year-old patient with known history of  cervical myelomalacia. He reports worsening of his symptomatology since February 2023. He did develop a rash in his inner thigh and has had urinary frequency since that time. He does admit to urinary frequency dysuria which is been increased over the last few weeks. He also admits to lower extremity weakness and numbness and tingling in the right arm. A MRI of the cervical spine is scheduled for 8/  3. In the interim he did have a urinalysis performed today. Was abnormal and did demonstrate leukocytes, bacteria, and WBCs. The CBC showed an elevated WBC of 18 however he is on high-dose steroids. The CMP did demonstrate a sodium of 131 and a low albumin    I have asked him to continue with the steroid taper. In the interim we will start him on Cipro 500 mg twice a day for 10 days. I have encouraged him to seek an appointment by urology as soon as possible due to these urinary symptoms and the potential causes     Informed him that as neurologist we do not normally treat infectious etiologies. He will call his urologist  and/or his PCP as soon as possible. Depending on the urine culture we may need to alter the antibiotic regimen.   He does understand this

## 2023-08-02 ENCOUNTER — TELEPHONE (OUTPATIENT)
Dept: NEUROLOGY | Facility: CLINIC | Age: 63
End: 2023-08-02

## 2023-08-02 NOTE — TELEPHONE ENCOUNTER
Sudhakar Huerta, DO  to Delbert Milton RN        8/2/23  9:09 AM   Please see documentation  from aug 1 , already done   ---------------------------------------------------    Nothing further at this time.

## 2023-08-02 NOTE — TELEPHONE ENCOUNTER
08-1-2023, 1:56:47 pm EDT   Taken off 8/2/2023, 6:54am    Patient left voicemail requesting call back regarding labs recently resulted in [facility] Barton,    [phone]

## 2023-08-02 NOTE — TELEPHONE ENCOUNTER
Yaneth Parada  to Me        8/2/23 10:37 AM  Good morning Purnima,     I called patient [phone] and left a very detailed message in regards to the denial and let him know that we have exhausted all options for this to be reconsidered so he will have to appeal it on his own unfortunately. Because it was denied twice by [employer], the third party for his insurance, they would not even allow a physician to physician review to be done by the provider. I left him my direct number to call me back if he has any questions. I also left him the number to call to start the appeals process.     Thank you so much.   Yaneth Parada

## 2023-08-02 NOTE — TELEPHONE ENCOUNTER
Yolanda  8/1 taken off 8/2   it's alina thrasher august 10th, 1960. waiting to hear back from Dr. Rony Cerrato or one of the clinical staff, as per my test results, which I saw on my chart. So he looks, it appears i have an infection. I am not doing well I need a call back.  [phone]        Dr. Damaso Kearney resulted 8/1 (urine culutre in process) please review and provide input, thank you.

## 2023-08-02 NOTE — TELEPHONE ENCOUNTER
Team     Please call  the lab and see if they can do sensitivities on the urinalysis that was performed yesterday on 8 1    I did see the culture report but no sensitivitiies

## 2023-08-02 NOTE — TELEPHONE ENCOUNTER
Referral Notes  Number of Notes: 8  .  Type Date User Summary Attachment   PEC Review Complete 08/02/2023  8:20 AM Gela Pelaez PATIENT WAS DENIED, SPOKE WITH PATIENT HE WAS ALREADY AWARE. PATIENT STATES HE WAS STILL COMING IN FOR THE MRI, I ADVISED I CAN MAKE HIM SELF PAY. HE WAS UPSET THAT THE AUTH WAS DENIED AND ASKED ME TO REACH OUT TO NEURO. I WILL REACH OUT TO NEURO TO SEE I -   Note:    PATIENT WAS DENIED, SPOKE WITH PATIENT HE WAS ALREADY AWARE. PATIENT STATES HE WAS STILL COMING IN FOR THE MRI, I ADVISED I CAN MAKE HIM SELF PAY. HE WAS UPSET THAT THE AUTH WAS DENIED AND ASKED ME TO REACH OUT TO NEURO. I WILL REACH OUT TO NEURO TO SEE IF THEY CAN REACH OUT TO THIS PATIENT. SENT TO [employer] FOR REVIEW   .  Type Date User Summary Attachment   General 08/01/2023  3:56 PM Mela Gale - -   Note:    Called and detailed LMOM for Bill with all the details of the denial.     I advised him to contact the office if he had any questions.     Sulema Gale  Pre Cert Dept.  .  Type Date User Summary Attachment   General 08/01/2023  3:51 PM STACEY Hopkins -   Note:    STACEY Bautista   I tried to call for a peer to peer but I was told by the representative that the case was already reconsidered and denied and therefore would need to go through appeal process- [phone].   Thanks,   Darnell

## 2023-08-02 NOTE — TELEPHONE ENCOUNTER
Received VM transcription:    Yes, Hi. It's Jos Hawk. August 10, 1960. Just got a call from [facility] saying that my MR. I was denied because they didn't get enough information from Dr Leyva. I also received a letter yesterday saying that. I told them I would self pay, but obviously I don't want to. So could you please have Dr. Leyva fill out the appropriate information so I don't have to pay for this MRI that I'm getting tomorrow? Thank you very much. [phone]. Very important. Thank you.

## 2023-08-03 ENCOUNTER — TELEPHONE (OUTPATIENT)
Dept: NEUROLOGY | Facility: CLINIC | Age: 63
End: 2023-08-03

## 2023-08-03 ENCOUNTER — HOSPITAL ENCOUNTER (OUTPATIENT)
Dept: RADIOLOGY | Facility: HOSPITAL | Age: 63
Discharge: HOME | End: 2023-08-03

## 2023-08-03 ENCOUNTER — HOSPITAL ENCOUNTER (OUTPATIENT)
Dept: RADIOLOGY | Facility: HOSPITAL | Age: 63
Discharge: HOME | End: 2023-08-03
Attending: PSYCHIATRY & NEUROLOGY

## 2023-08-03 DIAGNOSIS — G95.89 MYELOMALACIA OF CERVICAL CORD (HCC): ICD-10-CM

## 2023-08-03 DIAGNOSIS — G82.50 QUADRIPARESIS (HCC): ICD-10-CM

## 2023-08-03 DIAGNOSIS — Z96.82 S/P PLACEMENT OF NERVE STIMULATOR: ICD-10-CM

## 2023-08-03 DIAGNOSIS — N39.0 UTI (URINARY TRACT INFECTION): Primary | ICD-10-CM

## 2023-08-03 LAB — BACTERIA UR CULT: ABNORMAL

## 2023-08-03 PROCEDURE — A9585 GADOBUTROL INJECTION: HCPCS | Performed by: PSYCHIATRY & NEUROLOGY

## 2023-08-03 PROCEDURE — 72156 MRI NECK SPINE W/O & W/DYE: CPT

## 2023-08-03 PROCEDURE — G1004 CDSM NDSC: HCPCS

## 2023-08-03 RX ORDER — SULFAMETHOXAZOLE AND TRIMETHOPRIM 800; 160 MG/1; MG/1
1 TABLET ORAL EVERY 12 HOURS SCHEDULED
Qty: 14 TABLET | Refills: 0 | Status: ON HOLD | OUTPATIENT
Start: 2023-08-03 | End: 2023-08-10

## 2023-08-03 RX ADMIN — GADOBUTROL 10 ML: 604.72 INJECTION INTRAVENOUS at 11:57

## 2023-08-03 NOTE — TELEPHONE ENCOUNTER
Neurology    I did speak to Uma today. He has been utilizing Cipro 500 twice a day. The urinalysis did return with abnormal  culture that was positive for Staph aureus that is susceptible to Bactrim. I have placed a new prescription for Bactrim DS 1 tablet twice a day for 7 days. He will discontinue the Cipro upon starting the Bactrim. He has symptoms of lower urinary tract infection. We also discussed the MRI of the cervical spine which was read today and it showed no new abnormalities.   We will discuss more at his upcoming visit tomorrow on 8 4

## 2023-08-03 NOTE — TELEPHONE ENCOUNTER
Is this what you needed, Dr. Marquez Anthony? Urine Culture >100,000 cfu/ml Staphylococcus aureus Abnormal             Susceptibility     Staphylococcus aureus     NIKOLE (Preliminary)     Ampicillin ($$) >8.00 ug/ml Resistant     Cefazolin ($) <=4.00 ug/ml Susceptible     Gentamicin ($$) <=1 ug/ml Susceptible     Nitrofurantoin <=32 ug/ml Susceptible     Oxacillin 0.50 ug/ml Susceptible     Tetracycline <=2 ug/ml Susceptible     Trimethoprim + Sulfamethoxazole ($$$) <=0.5/9.5 u...  Susceptible     Vancomycin ($) 1.00 ug/ml Susceptible

## 2023-08-04 ENCOUNTER — APPOINTMENT (EMERGENCY)
Dept: CT IMAGING | Facility: HOSPITAL | Age: 63
DRG: 500 | End: 2023-08-04
Payer: COMMERCIAL

## 2023-08-04 ENCOUNTER — OFFICE VISIT (OUTPATIENT)
Dept: NEUROLOGY | Facility: CLINIC | Age: 63
End: 2023-08-04
Payer: COMMERCIAL

## 2023-08-04 ENCOUNTER — HOSPITAL ENCOUNTER (INPATIENT)
Facility: HOSPITAL | Age: 63
LOS: 11 days | DRG: 500 | End: 2023-08-15
Attending: EMERGENCY MEDICINE | Admitting: INTERNAL MEDICINE
Payer: COMMERCIAL

## 2023-08-04 VITALS
OXYGEN SATURATION: 96 % | BODY MASS INDEX: 29.8 KG/M2 | TEMPERATURE: 97.2 F | SYSTOLIC BLOOD PRESSURE: 116 MMHG | WEIGHT: 220 LBS | DIASTOLIC BLOOD PRESSURE: 58 MMHG | HEIGHT: 72 IN | RESPIRATION RATE: 18 BRPM | HEART RATE: 88 BPM

## 2023-08-04 DIAGNOSIS — R33.9 URINARY RETENTION: ICD-10-CM

## 2023-08-04 DIAGNOSIS — L03.818 CELLULITIS OF OTHER SPECIFIED SITE: ICD-10-CM

## 2023-08-04 DIAGNOSIS — L03.113 CELLULITIS OF RIGHT ARM: ICD-10-CM

## 2023-08-04 DIAGNOSIS — N30.00 ACUTE CYSTITIS WITHOUT HEMATURIA: Primary | ICD-10-CM

## 2023-08-04 DIAGNOSIS — M71.9 BURSITIS: Primary | ICD-10-CM

## 2023-08-04 DIAGNOSIS — N30.00 ACUTE CYSTITIS WITHOUT HEMATURIA: ICD-10-CM

## 2023-08-04 DIAGNOSIS — K59.01 SLOW TRANSIT CONSTIPATION: ICD-10-CM

## 2023-08-04 DIAGNOSIS — N39.0 UTI (URINARY TRACT INFECTION): ICD-10-CM

## 2023-08-04 PROBLEM — M79.89 ARM SWELLING: Status: ACTIVE | Noted: 2023-08-04

## 2023-08-04 PROBLEM — L03.90 CELLULITIS: Status: ACTIVE | Noted: 2023-08-04

## 2023-08-04 PROBLEM — E87.1 HYPONATREMIA: Status: ACTIVE | Noted: 2023-08-04

## 2023-08-04 LAB
ALBUMIN SERPL BCP-MCNC: 3.4 G/DL (ref 3.5–5)
ALP SERPL-CCNC: 67 U/L (ref 34–104)
ALT SERPL W P-5'-P-CCNC: 21 U/L (ref 7–52)
ANION GAP SERPL CALCULATED.3IONS-SCNC: 7 MMOL/L
AST SERPL W P-5'-P-CCNC: 16 U/L (ref 13–39)
BACTERIA UR QL AUTO: ABNORMAL /HPF
BASOPHILS # BLD MANUAL: 0 THOUSAND/UL (ref 0–0.1)
BASOPHILS NFR MAR MANUAL: 0 % (ref 0–1)
BILIRUB SERPL-MCNC: 0.76 MG/DL (ref 0.2–1)
BILIRUB UR QL STRIP: NEGATIVE
BUN SERPL-MCNC: 19 MG/DL (ref 5–25)
CALCIUM ALBUM COR SERPL-MCNC: 9.1 MG/DL (ref 8.3–10.1)
CALCIUM SERPL-MCNC: 8.6 MG/DL (ref 8.4–10.2)
CHLORIDE SERPL-SCNC: 95 MMOL/L (ref 96–108)
CLARITY UR: CLEAR
CO2 SERPL-SCNC: 27 MMOL/L (ref 21–32)
COLOR UR: YELLOW
CREAT SERPL-MCNC: 0.69 MG/DL (ref 0.6–1.3)
EOSINOPHIL # BLD MANUAL: 0.15 THOUSAND/UL (ref 0–0.4)
EOSINOPHIL NFR BLD MANUAL: 1 % (ref 0–6)
ERYTHROCYTE [DISTWIDTH] IN BLOOD BY AUTOMATED COUNT: 13.4 % (ref 11.6–15.1)
GFR SERPL CREATININE-BSD FRML MDRD: 100 ML/MIN/1.73SQ M
GLUCOSE SERPL-MCNC: 117 MG/DL (ref 65–140)
GLUCOSE UR STRIP-MCNC: NEGATIVE MG/DL
HCT VFR BLD AUTO: 57.8 % (ref 36.5–49.3)
HGB BLD-MCNC: 18.5 G/DL (ref 12–17)
HGB UR QL STRIP.AUTO: NEGATIVE
HIV 1+2 AB+HIV1 P24 AG SERPL QL IA: NORMAL
HIV1 P24 AG SER QL: NORMAL
KETONES UR STRIP-MCNC: NEGATIVE MG/DL
LEUKOCYTE ESTERASE UR QL STRIP: ABNORMAL
LYMPHOCYTES # BLD AUTO: 0.44 THOUSAND/UL (ref 0.6–4.47)
LYMPHOCYTES # BLD AUTO: 3 % (ref 14–44)
MCH RBC QN AUTO: 31.6 PG (ref 26.8–34.3)
MCHC RBC AUTO-ENTMCNC: 32 G/DL (ref 31.4–37.4)
MCV RBC AUTO: 99 FL (ref 82–98)
MONOCYTES # BLD AUTO: 0.15 THOUSAND/UL (ref 0–1.22)
MONOCYTES NFR BLD: 1 % (ref 4–12)
MUCOUS THREADS UR QL AUTO: ABNORMAL
MYELOCYTES NFR BLD MANUAL: 1 % (ref 0–1)
NEUTROPHILS # BLD MANUAL: 13.64 THOUSAND/UL (ref 1.85–7.62)
NEUTS BAND NFR BLD MANUAL: 1 % (ref 0–8)
NEUTS SEG NFR BLD AUTO: 93 % (ref 43–75)
NITRITE UR QL STRIP: NEGATIVE
NON-SQ EPI CELLS URNS QL MICRO: ABNORMAL /HPF
OSMOLALITY UR/SERPL-RTO: 294 MMOL/KG (ref 282–298)
OSMOLALITY UR: 763 MMOL/KG
PH UR STRIP.AUTO: 6.5 [PH]
PLATELET # BLD AUTO: 158 THOUSANDS/UL (ref 149–390)
PLATELET BLD QL SMEAR: ADEQUATE
PMV BLD AUTO: 10.7 FL (ref 8.9–12.7)
POTASSIUM SERPL-SCNC: 4.2 MMOL/L (ref 3.5–5.3)
PROT SERPL-MCNC: 6.3 G/DL (ref 6.4–8.4)
PROT UR STRIP-MCNC: ABNORMAL MG/DL
RBC # BLD AUTO: 5.86 MILLION/UL (ref 3.88–5.62)
RBC #/AREA URNS AUTO: ABNORMAL /HPF
RBC MORPH BLD: NORMAL
SODIUM 24H UR-SCNC: 124 MOL/L
SODIUM SERPL-SCNC: 129 MMOL/L (ref 135–147)
SP GR UR STRIP.AUTO: 1.02 (ref 1–1.03)
URATE SERPL-MCNC: 3.6 MG/DL (ref 3.5–8.5)
UROBILINOGEN UR STRIP-ACNC: 2 MG/DL
WBC # BLD AUTO: 14.51 THOUSAND/UL (ref 4.31–10.16)
WBC #/AREA URNS AUTO: ABNORMAL /HPF

## 2023-08-04 PROCEDURE — 83930 ASSAY OF BLOOD OSMOLALITY: CPT | Performed by: INTERNAL MEDICINE

## 2023-08-04 PROCEDURE — 99284 EMERGENCY DEPT VISIT MOD MDM: CPT

## 2023-08-04 PROCEDURE — 87591 N.GONORRHOEAE DNA AMP PROB: CPT

## 2023-08-04 PROCEDURE — 80053 COMPREHEN METABOLIC PANEL: CPT

## 2023-08-04 PROCEDURE — 99223 1ST HOSP IP/OBS HIGH 75: CPT | Performed by: INTERNAL MEDICINE

## 2023-08-04 PROCEDURE — 36415 COLL VENOUS BLD VENIPUNCTURE: CPT

## 2023-08-04 PROCEDURE — 99285 EMERGENCY DEPT VISIT HI MDM: CPT | Performed by: EMERGENCY MEDICINE

## 2023-08-04 PROCEDURE — 85027 COMPLETE CBC AUTOMATED: CPT

## 2023-08-04 PROCEDURE — 81001 URINALYSIS AUTO W/SCOPE: CPT

## 2023-08-04 PROCEDURE — 0T9B70Z DRAINAGE OF BLADDER WITH DRAINAGE DEVICE, VIA NATURAL OR ARTIFICIAL OPENING: ICD-10-PCS | Performed by: EMERGENCY MEDICINE

## 2023-08-04 PROCEDURE — 83935 ASSAY OF URINE OSMOLALITY: CPT | Performed by: INTERNAL MEDICINE

## 2023-08-04 PROCEDURE — 99214 OFFICE O/P EST MOD 30 MIN: CPT | Performed by: PSYCHIATRY & NEUROLOGY

## 2023-08-04 PROCEDURE — 87806 HIV AG W/HIV1&2 ANTB W/OPTIC: CPT

## 2023-08-04 PROCEDURE — G1004 CDSM NDSC: HCPCS

## 2023-08-04 PROCEDURE — 87040 BLOOD CULTURE FOR BACTERIA: CPT

## 2023-08-04 PROCEDURE — 87147 CULTURE TYPE IMMUNOLOGIC: CPT

## 2023-08-04 PROCEDURE — 85007 BL SMEAR W/DIFF WBC COUNT: CPT

## 2023-08-04 PROCEDURE — 96374 THER/PROPH/DIAG INJ IV PUSH: CPT

## 2023-08-04 PROCEDURE — 84550 ASSAY OF BLOOD/URIC ACID: CPT | Performed by: INTERNAL MEDICINE

## 2023-08-04 PROCEDURE — 73201 CT UPPER EXTREMITY W/DYE: CPT

## 2023-08-04 PROCEDURE — 87077 CULTURE AEROBIC IDENTIFY: CPT

## 2023-08-04 PROCEDURE — 96376 TX/PRO/DX INJ SAME DRUG ADON: CPT

## 2023-08-04 PROCEDURE — 87086 URINE CULTURE/COLONY COUNT: CPT

## 2023-08-04 PROCEDURE — 87491 CHLMYD TRACH DNA AMP PROBE: CPT

## 2023-08-04 PROCEDURE — 84300 ASSAY OF URINE SODIUM: CPT | Performed by: INTERNAL MEDICINE

## 2023-08-04 PROCEDURE — 87186 SC STD MICRODIL/AGAR DIL: CPT

## 2023-08-04 RX ORDER — CEFTRIAXONE 2 G/50ML
2000 INJECTION, SOLUTION INTRAVENOUS EVERY 24 HOURS
Status: DISCONTINUED | OUTPATIENT
Start: 2023-08-04 | End: 2023-08-06

## 2023-08-04 RX ORDER — ENOXAPARIN SODIUM 100 MG/ML
40 INJECTION SUBCUTANEOUS DAILY
Status: DISCONTINUED | OUTPATIENT
Start: 2023-08-05 | End: 2023-08-15 | Stop reason: HOSPADM

## 2023-08-04 RX ORDER — PREDNISONE 5 MG/1
5 TABLET ORAL DAILY
Status: DISPENSED | OUTPATIENT
Start: 2023-08-10 | End: 2023-08-12

## 2023-08-04 RX ORDER — ACETAMINOPHEN 325 MG/1
650 TABLET ORAL EVERY 6 HOURS PRN
Status: DISCONTINUED | OUTPATIENT
Start: 2023-08-04 | End: 2023-08-15 | Stop reason: HOSPADM

## 2023-08-04 RX ORDER — OXYCODONE HYDROCHLORIDE 5 MG/1
5 TABLET ORAL EVERY 6 HOURS PRN
Status: DISCONTINUED | OUTPATIENT
Start: 2023-08-04 | End: 2023-08-15 | Stop reason: HOSPADM

## 2023-08-04 RX ORDER — FENTANYL CITRATE 50 UG/ML
50 INJECTION, SOLUTION INTRAMUSCULAR; INTRAVENOUS ONCE
Status: COMPLETED | OUTPATIENT
Start: 2023-08-04 | End: 2023-08-04

## 2023-08-04 RX ORDER — LANOLIN ALCOHOL/MO/W.PET/CERES
3 CREAM (GRAM) TOPICAL
Status: DISCONTINUED | OUTPATIENT
Start: 2023-08-04 | End: 2023-08-15 | Stop reason: HOSPADM

## 2023-08-04 RX ORDER — SODIUM CHLORIDE 9 MG/ML
75 INJECTION, SOLUTION INTRAVENOUS CONTINUOUS
Status: DISCONTINUED | OUTPATIENT
Start: 2023-08-04 | End: 2023-08-06

## 2023-08-04 RX ORDER — PREDNISONE 20 MG/1
20 TABLET ORAL DAILY
Status: COMPLETED | OUTPATIENT
Start: 2023-08-06 | End: 2023-08-07

## 2023-08-04 RX ORDER — OXYCODONE HYDROCHLORIDE 10 MG/1
10 TABLET ORAL EVERY 6 HOURS PRN
Status: DISCONTINUED | OUTPATIENT
Start: 2023-08-04 | End: 2023-08-15 | Stop reason: HOSPADM

## 2023-08-04 RX ORDER — MAGNESIUM HYDROXIDE/ALUMINUM HYDROXICE/SIMETHICONE 120; 1200; 1200 MG/30ML; MG/30ML; MG/30ML
30 SUSPENSION ORAL EVERY 6 HOURS PRN
Status: DISCONTINUED | OUTPATIENT
Start: 2023-08-04 | End: 2023-08-15 | Stop reason: HOSPADM

## 2023-08-04 RX ORDER — CEFAZOLIN SODIUM 2 G/50ML
2000 SOLUTION INTRAVENOUS EVERY 8 HOURS
Status: DISCONTINUED | OUTPATIENT
Start: 2023-08-04 | End: 2023-08-04

## 2023-08-04 RX ORDER — ONDANSETRON 2 MG/ML
4 INJECTION INTRAMUSCULAR; INTRAVENOUS EVERY 6 HOURS PRN
Status: DISCONTINUED | OUTPATIENT
Start: 2023-08-04 | End: 2023-08-15 | Stop reason: HOSPADM

## 2023-08-04 RX ORDER — CEFTRIAXONE 1 G/50ML
1000 INJECTION, SOLUTION INTRAVENOUS EVERY 24 HOURS
Status: DISCONTINUED | OUTPATIENT
Start: 2023-08-04 | End: 2023-08-04

## 2023-08-04 RX ORDER — PREDNISONE 10 MG/1
10 TABLET ORAL DAILY
Status: COMPLETED | OUTPATIENT
Start: 2023-08-08 | End: 2023-08-09

## 2023-08-04 RX ADMIN — CEFTRIAXONE 2000 MG: 2 INJECTION, SOLUTION INTRAVENOUS at 20:23

## 2023-08-04 RX ADMIN — VANCOMYCIN HYDROCHLORIDE 2000 MG: 1 INJECTION, POWDER, LYOPHILIZED, FOR SOLUTION INTRAVENOUS at 17:18

## 2023-08-04 RX ADMIN — Medication 3 MG: at 20:22

## 2023-08-04 RX ADMIN — IOHEXOL 100 ML: 350 INJECTION, SOLUTION INTRAVENOUS at 14:55

## 2023-08-04 RX ADMIN — FENTANYL CITRATE 50 MCG: 50 INJECTION INTRAMUSCULAR; INTRAVENOUS at 16:00

## 2023-08-04 RX ADMIN — SODIUM CHLORIDE 75 ML/HR: 0.9 INJECTION, SOLUTION INTRAVENOUS at 19:48

## 2023-08-04 RX ADMIN — OXYCODONE HYDROCHLORIDE 10 MG: 10 TABLET ORAL at 19:33

## 2023-08-04 RX ADMIN — FENTANYL CITRATE 50 MCG: 50 INJECTION INTRAMUSCULAR; INTRAVENOUS at 13:55

## 2023-08-04 NOTE — ASSESSMENT & PLAN NOTE
[place] to chronic neurologic issue  Patient reports progressive weakness is now likely worsened in setting of acute infection  PT/OT

## 2023-08-04 NOTE — ED ATTENDING ATTESTATION
8/4/2023  I, Mansi Tamayo DO, saw and evaluated the patient. I have discussed the patient with the resident/non-physician practitioner and agree with the resident's/non-physician practitioner's findings, Plan of Care, and MDM as documented in the resident's/non-physician practitioner's note, except where noted. All available labs and Radiology studies were reviewed. I was present for key portions of any procedure(s) performed by the resident/non-physician practitioner and I was immediately available to provide assistance. At this point I agree with the current assessment done in the Emergency Department. I have conducted an independent evaluation of this patient a history and physical is as follows: 54y M w/ R arm pain, redness and swelling x3 days, gradually worsening. No f/c/s, +fatigue. No hx of recurrent infection, dvt, no dm. Was on cipro for UTI and switched to bactrim due to culture result. Has had two doses of oral bactrim. Reports pain over the elbow w/ swelling, redness, increased warmth, no active drainage. No  Exam WNWD nad, mmm, neck supple, resp non-labored, cv regular rate, abd nd, ext RUE swelling noted to elbow/forearm w/ erythema over volar surface of elbow and faint erythema to the forearm. Swelling over the bursa appreciated w/ tenderness and increased warmth. FPROM at elbow. No streaking. skin dry, neuro at baseline, normal mood. A/P RUE pain/swelling, elbow pain / swelling. No evidence of septic arthritis, but may have infected bursitis / cellulitis at this time. DVT less likely given no risk factors / no hx .   Will get labs, CT w/ IV contrast of UE to r/o deeper infection/abscess      ED Course     Labs Reviewed   CBC AND DIFFERENTIAL - Abnormal       Result Value Ref Range Status    WBC 14.51 (*) 4.31 - 10.16 Thousand/uL Final    RBC 5.86 (*) 3.88 - 5.62 Million/uL Final    Hemoglobin 18.5 (*) 12.0 - 17.0 g/dL Final    Hematocrit 57.8 (*) 36.5 - 49.3 % Final    MCV 99 (*) 82 - 98 fL Final    MCH 31.6  26.8 - 34.3 pg Final    MCHC 32.0  31.4 - 37.4 g/dL Final    RDW 13.4  11.6 - 15.1 % Final    MPV 10.7  8.9 - 12.7 fL Final    Platelets 344  041 - 390 Thousands/uL Final   COMPREHENSIVE METABOLIC PANEL - Abnormal    Sodium 129 (*) 135 - 147 mmol/L Final    Potassium 4.2  3.5 - 5.3 mmol/L Final    Chloride 95 (*) 96 - 108 mmol/L Final    CO2 27  21 - 32 mmol/L Final    ANION GAP 7  mmol/L Final    BUN 19  5 - 25 mg/dL Final    Creatinine 0.69  0.60 - 1.30 mg/dL Final    Comment: Standardized to IDMS reference method    Glucose 117  65 - 140 mg/dL Final    Comment: If the patient is fasting, the ADA then defines impaired fasting glucose as > 100 mg/dL and diabetes as > or equal to 123 mg/dL. Calcium 8.6  8.4 - 10.2 mg/dL Final    Corrected Calcium 9.1  8.3 - 10.1 mg/dL Final    AST 16  13 - 39 U/L Final    ALT 21  7 - 52 U/L Final    Comment: Specimen collection should occur prior to Sulfasalazine administration due to the potential for falsely depressed results. Alkaline Phosphatase 67  34 - 104 U/L Final    Total Protein 6.3 (*) 6.4 - 8.4 g/dL Final    Albumin 3.4 (*) 3.5 - 5.0 g/dL Final    Total Bilirubin 0.76  0.20 - 1.00 mg/dL Final    Comment: Use of this assay is not recommended for patients undergoing treatment with eltrombopag due to the potential for falsely elevated results. N-acetyl-p-benzoquinone imine (metabolite of Acetaminophen) will generate erroneously low results in samples for patients that have taken an overdose of Acetaminophen.     eGFR 100  ml/min/1.73sq m Final    Narrative:     [place] guidelines for Chronic Kidney Disease (CKD):   •  Stage 1 with normal or high GFR (GFR > 90 mL/min/1.73 square meters)  •  Stage 2 Mild CKD (GFR = 60-89 mL/min/1.73 square meters)  •  Stage 3A Moderate CKD (GFR = 45-59 mL/min/1.73 square meters)  •  Stage 3B Moderate CKD (GFR = 30-44 mL/min/1.73 square meters)  •  Stage 4 Severe CKD (GFR = 15-29 mL/min/1.73 square meters)  •  Stage 5 End Stage CKD (GFR <15 mL/min/1.73 square meters)  Note: GFR calculation is accurate only with a steady state creatinine   UA W REFLEX TO MICROSCOPIC WITH REFLEX TO CULTURE - Abnormal    Color, UA Yellow   Final    Clarity, UA Clear   Final    Specific Gravity, UA 1.023  1.003 - 1.030 Final    pH, UA 6.5  4.5, 5.0, 5.5, 6.0, 6.5, 7.0, 7.5, 8.0 Final    Leukocytes, UA Moderate (*) Negative Final    Nitrite, UA Negative  Negative Final    Protein, UA Trace (*) Negative mg/dl Final    Glucose, UA Negative  Negative mg/dl Final    Ketones, UA Negative  Negative mg/dl Final    Urobilinogen, UA 2.0 (*) <2.0 mg/dl mg/dl Final    Bilirubin, UA Negative  Negative Final    Occult Blood, UA Negative  Negative Final   URINE MICROSCOPIC - Abnormal    RBC, UA 4-10 (*) None Seen, 1-2 /hpf Final    WBC, UA Innumerable (*) None Seen, 1-2 /hpf Final    Epithelial Cells None Seen  None Seen, Occasional /hpf Final    Bacteria, UA Occasional  None Seen, Occasional /hpf Final    MUCUS THREADS Moderate (*) None Seen Final   MANUAL DIFFERENTIAL(PHLEBS DO NOT ORDER) - Abnormal    Segmented % 93 (*) 43 - 75 % Final    Bands % 1  0 - 8 % Final    Lymphocytes % 3 (*) 14 - 44 % Final    Monocytes % 1 (*) 4 - 12 % Final    Eosinophils, % 1  0 - 6 % Final    Basophils % 0  0 - 1 % Final    Myelocytes % 1  0 - 1 % Final    Absolute Neutrophils 13.64 (*) 1.85 - 7.62 Thousand/uL Final    Lymphocytes Absolute 0.44 (*) 0.60 - 4.47 Thousand/uL Final    Monocytes Absolute 0.15  0.00 - 1.22 Thousand/uL Final    Eosinophils Absolute 0.15  0.00 - 0.40 Thousand/uL Final    Basophils Absolute 0.00  0.00 - 0.10 Thousand/uL Final    Total Counted     Final    RBC Morphology Normal   Final    Platelet Estimate Adequate  Adequate Final   RAPID HIV 1/2 AB-AG COMBO FOR 12 YR OLD AND ABOVE - Normal    Rapid HIV 1 AND 2 Non-Reactive  Non-Reactive Final    HIV-1 P24 Ag Screen Non-Reactive  Non-Reactive Final    Narrative: Negative for HIV-1 p24 Antigen. Negative for HIV-1 and/or HIV-2 Antibody. CHLAMYDIA /GC AMPLIFIED DNA   URINE CULTURE   BLOOD CULTURE   BLOOD CULTURE     CT upper extremity w contrast right   Final Result      Olecranon bursitis and adjacent edema/cellulitis, possibly infectious or inflammatory (consider gout). No acute osseous abnormality. Workstation performed: [id number]               Critical Care Time  Procedures    1. Bursitis  Consult to orthopedics    Consult to orthopedics      2. Cellulitis of other specified site      elbow        Time reflects when diagnosis was documented in both MDM as applicable and the Disposition within this note     Time User Action Codes Description Comment    8/4/2023  4:50 PM Genevive Barker Add [M71.9] Bursitis     8/4/2023  4:55 PM Genevive Barker Add [F95.690] Cellulitis of other specified site     8/4/2023  4:55 PM Edwin [address] [T76.896] Cellulitis of other specified site elbow      ED Disposition     ED Disposition   Admit    Condition   Stable    Date/Time   Fri Aug 4, 2023  4:55 PM    Comment   Case was discussed with Dr. Layla Vitale and the patient's admission status was agreed to be Admission Status: inpatient status to the service of Dr. Layla Vitale .            Follow-up Information    None

## 2023-08-04 NOTE — ASSESSMENT & PLAN NOTE
Patient presents with sodium 129  Patient does use NSAIDs at home which could be a component  May be poor solute intake or SIADH in setting of acute illness  We will provide gentle IV fluids overnight  We will check urine studies

## 2023-08-04 NOTE — ASSESSMENT & PLAN NOTE
Erythema and swelling of right upper extremity involving elbow  CT shows bursitis  Concern for septic arthritis given warm erythematous and swollen elbow joint  History of immunosuppression  It is odd that patient would have Staph aureus UTI, frequency could have just been related to known BPH  We will culture results return we will treat with IV vancomycin and Rocephin

## 2023-08-04 NOTE — ASSESSMENT & PLAN NOTE
He describes a 3 to 4-day history of right arm swelling redness and pain. The size of his arm is increased by 2 or 3 sizes. He has difficulty moving his arm. I explained to him that this is not from cervical myelomalacia but more from a peripheral etiology such as cellulitis and or a DVT. I have recommended that he seek immediate medical attention. He did agree. He did agree to be seen at [facility]

## 2023-08-04 NOTE — PROGRESS NOTES
Patient ID: Sonia Flores is a 57 y.o. male. Assessment/Plan:    Myelomalacia of cervical cord [facility])  57year-old patient with known cervical myelomalacia. He describes in the last month worsening of his gait. More recently in the last week he has noted swelling of the left arm with redness. He has difficulty moving his arm. He did have MRI imaging of the cervical spine which showed no change. He has been on steroids and we are quickly tapering him off. He does not utilize gabapentin. He is currently on prednisone 30 mg a day. This can be quickly decreased every 2 days until off. Cellulitis  He describes a 3 to 4-day history of right arm swelling redness and pain. The size of his arm is increased by 2 or 3 sizes. He has difficulty moving his arm. I explained to him that this is not from cervical myelomalacia but more from a peripheral etiology such as cellulitis and or a DVT. I have recommended that he seek immediate medical attention. He did agree. He did agree to be seen at [address] [place] [address] Acute cystitis without hematuria  Is had urinary frequency for 1 month. This is much more severe over the last few weeks. He did have a UA and C&S which showed Staph aureus. Initially he was placed on Cipro before the sensitivities returned. The Staph aureus was diagnosed and this was changed to Bactrim on 8 3. He denies any significant changes but he just started the dose as of yesterday. The Staph aureus is resistant to ampicillin. This is probably resulting in overall weakness and exacerbating his underlying status. He was seen by urology Dr. Yohannes Lindsay who agreed with the antibiotics but the sensitivities did return yesterday. We will continue on Bactrim DS for now unless there is significant changes. I have suggested that he follow-up with his urologist or PCP soon as possible.        Diagnoses and all orders for this visit:    Acute cystitis without hematuria  -     Transfer to other facility    Cellulitis of right arm  -     Transfer to other facility         Subjective: This is a 70-year-old gentleman with a history of lumbar disc disease. In September 2022 while on vacation in [place] after a fall he developed weakness in his upper and lower extremities and was noted to have quadriparesis. His MRI imaging did demonstrate cervical myelomalacia at C4 and he has been undergoing aggressive physical therapy. He was doing well but has noticed worsening of his symptomatology in 3 to 4 months more predominantly in the last month. He has noted weakness in his lower extremities numbness and tingling in the upper extremities. He also describes urinary frequency that has increased in frequency with urgency. UA and CNS were performed which was positive for Staph aureus. He was placed on Cipro 500 twice a day prophylactically but this was changed to  to Bactrim DS with the sensitivities. He was seen by urology, dr Nya Lemus  who did agree with the use of Bactrim. I also contacted urology CRNP who also agreed with the use of Bactrim. He has noted no significant change in the last 12 hours . Other symptoms include rash in the right groin. In the last week he has noted right arm swelling and redness. He has some swelling in the right elbow and has noticed his right arm is 3-4 side time size of the left arm with redness including the hands. He has difficulty moving the right arm. Due to his weakness in his lower extremities he was treated with IV steroids last week. He noted increasing weakness in his legs and urinary frequency. Subsequently he was given a steroid taper and he is now utilizing 30 mg of prednisone. MRI of the c psine that was performed on 8/  3 demonstrated no significant changes of cervical myelomalacia.           The following portions of the patient's history were reviewed and updated as appropriate:   He  has a past medical history of Balance problems, BPH (benign prostatic hyperplasia), Chronic back pain, Chronic pain disorder, COVID-19, Erectile dysfunction, Nocturia, OAB (overactive bladder), Testicular hypogonadism, Urinary frequency, and Urinary urgency. He  has a past surgical history that includes Spinal cord stimulator implant (10/10/2019); Colonoscopy (2004); Colonoscopy (2007); Colonoscopy (2013); Colonoscopy (06/2019); Back surgery (05/18/2016); Neck surgery; pr excision hydrocele unilateral (Right, 06/10/2021); Neck surgery (Right); and Back surgery. His family history is not on file. He  reports that he has never smoked. He has never used smokeless tobacco. He reports current alcohol use. He reports that he does not use drugs.   Current Outpatient Medications   Medication Sig Dispense Refill   • clobetasol (TEMOVATE) 0.05 % external solution      • fluocinonide (LIDEX) 0.05 % cream      • ibuprofen (MOTRIN) 400 mg tablet Take 1 tablet (400 mg total) by mouth every 6 (six) hours as needed for mild pain  0   • ketoconazole (NIZORAL) 2 % shampoo      • nystatin powder      • predniSONE 10 mg tablet Take 50 mg 5 pills daily for 5 days then 40 g or 4 pills daily for 5 days then 30 mg or 3 pills daily for 5 days then 20 mg or 2 pills daily for 5 days then 10 mg (Patient taking differently: Take 40 mg by mouth daily  40 g or 4 pills daily for 5 days then 30 mg or 3 pills daily for 5 days then 20 mg or 2 pills daily for 5 days then 10 mg) 90 tablet 0   • sildenafil (VIAGRA) 100 mg tablet Take 100 mg by mouth As directed     • sulfamethoxazole-trimethoprim (BACTRIM DS) 800-160 mg per tablet Take 1 tablet by mouth every 12 (twelve) hours for 14 doses 14 tablet 0   • testosterone cypionate (DEPO-TESTOSTERONE) 200 mg/mL SOLN inject 2 milliliters intramuscularly every 14 days 10 mL 0   • acetaminophen (TYLENOL) 325 mg tablet Take 2 tablets (650 mg total) by mouth every 6 (six) hours as needed for mild pain, headaches or fever  0   • ALPRAZolam (XANAX) 0.5 mg tablet Take 1 tablet (0.5 mg total) by mouth 30 min pre-procedure Can repeat if needed (Patient not taking: Reported on 3/8/2023) 3 tablet 0   • ALPRAZolam (XANAX) 0.5 mg tablet Take 1 tablet (0.5 mg total) by mouth 30 min pre-procedure May repeat in 2 hours if necessary (Patient not taking: Reported on 8/4/2023) 3 tablet 0   • baclofen 10 mg tablet Take 10 mg by mouth daily at bedtime (Patient not taking: Reported on 3/8/2023)     • betamethasone dipropionate (DIPROSONE) 0.05 % ointment  (Patient not taking: Reported on 8/4/2023)     • ciprofloxacin (CIPRO) 500 mg tablet Take 1 tablet (500 mg total) by mouth every 12 (twelve) hours for 10 days (Patient not taking: Reported on 8/4/2023) 20 tablet 0   • docusate sodium (COLACE) 100 mg capsule Take 1 capsule (100 mg total) by mouth 2 (two) times a day (Patient taking differently: Take 100 mg by mouth if needed)  0   • gabapentin (NEURONTIN) 300 mg capsule Take 1 CAPSULE BY MOUTH 3 TIMES A DAY (Patient not taking: Reported on 1/18/2023) 90 capsule 1   • Melatonin 10 MG TABS Take 10 mg by mouth if needed (Patient not taking: Reported on 1/18/2023)     • polyethylene glycol (MIRALAX) 17 g packet Take 17 g by mouth daily as needed (constipation)  0     No current facility-administered medications for this visit. He is allergic to morphine. .         Objective:    Blood pressure 116/58, pulse 88, temperature (!) 97.2 °F (36.2 °C), temperature source Tympanic, resp. rate 18, height 6' (1.829 m), weight 99.8 kg (220 lb), SpO2 96 %. Physical Exam  Constitutional:       Appearance: He is ill-appearing. Eyes:      Pupils: Pupils are equal, round, and reactive to light. Cardiovascular:      Rate and Rhythm: Normal rate. Pulses: Normal pulses. Musculoskeletal:         General: Swelling present. Cervical back: Neck supple. Skin:     General: Skin is warm. Findings: Rash present. Neurological:      Mental Status: He is alert.          Neurological Exam  Mental Status  Alert. Oriented to person, place and time. Cranial Nerves  CN III, IV, VI: Pupils equal round and reactive to light bilaterally. Motor  Normal muscle bulk throughout. No fasciculations present. The left upper extremity was a 4+. Handgrip was a 4. The right upper extremity was a 2 due to the swelling. Proximally was a 3. The lower extremity on the right it was a 3 in the hip flexors on the left it was a 2. Knee flexion and knee extension was a 3. He was unable to stand independently. .    Sensory  He had attenuation of sensation in the left upper extremity to the forearm. In the lower extremities he had attenuation sensation in the lower extremities to the knees. .    Coordination  Left: Finger-to-nose normal.  He was unable to perform finger-to-nose testing on the right due to the swelling. .    Gait    Presented in a wheelchair. He is unable to stand up with assistance due to pain and weakness. The right upper extremity is swollen and red. There is swelling surrounding his right elbow with a Band-Aid. He has difficulty moving his right upper extremity due to the swelling. Pulses were intact but it is warm to touch. The lower extremities are red and warm to touch. ROS:    Review of Systems   Constitutional: Positive for appetite change (increase) and fatigue. Negative for fever. HENT: Negative. Negative for hearing loss, tinnitus, trouble swallowing and voice change. Eyes: Negative. Negative for photophobia, pain and visual disturbance. Respiratory: Negative. Negative for shortness of breath. Cardiovascular: Negative. Negative for palpitations. Gastrointestinal: Negative. Negative for nausea and vomiting. Endocrine: Negative. Negative for cold intolerance. Genitourinary: Positive for frequency and urgency. Negative for dysuria. Musculoskeletal: Positive for back pain (whole back) and gait problem (gait dysturbance - balance issues).  Negative for myalgias and neck pain. Skin: Negative. Negative for rash. Allergic/Immunologic: Negative. Neurological: Positive for tremors (right hand), weakness (bilateral legs), light-headedness (at times with standing) and numbness (bilateral legs and left arm). Negative for dizziness, seizures, syncope, facial asymmetry, speech difficulty and headaches. Hematological: Bruises/bleeds easily. Psychiatric/Behavioral: Positive for sleep disturbance. Negative for confusion and hallucinations. The patient is nervous/anxious. I explained to Hussein Nam that his neurological status may be worsened due to underlying  infections.    his neurological status may return to his baseline with the treatment of infections or otherwise medical issues

## 2023-08-04 NOTE — ASSESSMENT & PLAN NOTE
52-year-old patient with known cervical myelomalacia. He describes in the last month worsening of his gait. More recently in the last week he has noted swelling of the left arm with redness. He has difficulty moving his arm. He did have MRI imaging of the cervical spine which showed no change. He has been on steroids and we are quickly tapering him off. He does not utilize gabapentin. He is currently on prednisone 30 mg a day. This can be quickly decreased every 2 days until off.

## 2023-08-04 NOTE — ED PROVIDER NOTES
History  Chief Complaint   Patient presents with   • Arm Swelling     Rt arm swelling/redness & pain since Wednesday, had problem with Rt elbow the past weekend. Also dx with UTI this week & started antibiotic Cipro changed to Bactrim DS last night. Patient is a 77-year-old male, extensive neurological history with complications of quadriplegia and recent diagnosis of UTI presenting to the ED for evaluation of arm swelling. Patient states that he has a chronic sore on his arm from leaning on his right arm for support, due to his neurological condition. Patient states that he always has some pain associated with the sore, however over the past 3 days-pain has become much worse, and patient has had associated swelling and redness of his right forearm. Pain is described as a sharp, stabbing, cramping pain in his right elbow primarily, but with radiation to his forearm. Patient denies any changes of sensation, or motor strength in his extremity. Patient denies any fever, systemic symptoms, or prior history of soft tissue infections. Patient denies history of diabetes, or prediabetes. Patient does endorse some increased shortness of breath with mild exertion, but denies any hemoptysis, cough, recent surgery, increased immobility, cancer, or prior history of blood clots. Patient has diagnosis of UTI on Tuesday in the setting of increased urinary frequency, urgency. Patient started on ciprofloxacin for UTI on Tuesday, was switched to Bactrim yesterday when sensitivities returned. Patient has been rehabilitating following spine injury last September, was making excellent progress until he plateaued 4-5 months ago which was later followed by increased weakness gradually over the past 2-3 months. Patient followed closely by neurologist for this and had a repeat MRI yesterday. Patient has no new neurological changes this week.            Prior to Admission Medications   Prescriptions Last Dose Informant Patient Reported? Taking? clobetasol (TEMOVATE) 0.05 % external solution   Yes Yes   fluocinonide (LIDEX) 0.05 % cream   Yes Yes   ibuprofen (MOTRIN) 400 mg tablet   No Yes   Sig: Take 1 tablet (400 mg total) by mouth every 6 (six) hours as needed for mild pain   ketoconazole (NIZORAL) 2 % shampoo   Yes Yes   nystatin powder   Yes Yes   predniSONE 10 mg tablet   No Yes   Sig: Take 50 mg 5 pills daily for 5 days then 40 g or 4 pills daily for 5 days then 30 mg or 3 pills daily for 5 days then 20 mg or 2 pills daily for 5 days then 10 mg   Patient taking differently: Take 40 mg by mouth daily  40 g or 4 pills daily for 5 days then 30 mg or 3 pills daily for 5 days then 20 mg or 2 pills daily for 5 days then 10 mg. P   sildenafil (VIAGRA) 100 mg tablet  Self Yes Yes   Sig: Take 100 mg by mouth As directed   sulfamethoxazole-trimethoprim (BACTRIM DS) 800-160 mg per tablet   No Yes   Sig: Take 1 tablet by mouth every 12 (twelve) hours for 14 doses      Facility-Administered Medications: None       Past Medical History:   Diagnosis Date   • Balance problems    • BPH (benign prostatic hyperplasia)    • Chronic back pain    • Chronic pain disorder    • COVID-19     2/2021-asymptomatic    • Erectile dysfunction    • Nocturia    • OAB (overactive bladder)    • Testicular hypogonadism    • Urinary frequency    • Urinary urgency        Past Surgical History:   Procedure Laterality Date   • BACK SURGERY  05/18/2016    with hardware   • BACK SURGERY      2 back surgery unable to remove hardware   • COLONOSCOPY  2004    Dr Brayden Nicholson. tubular adenoma polyp removed   • COLONOSCOPY  2007    Dr Win Mcbride. Normal   • COLONOSCOPY  2013    Dr Win Mcbride. Normal.    • COLONOSCOPY  06/2019    Dr Win Mcbride.  Hemorrhoids, normal.    • NECK SURGERY      decompression of neck    • NECK SURGERY Right     scar tissues removed   • MI EXCISION HYDROCELE UNILATERAL Right 06/10/2021    Procedure: HYDROCELECTOMY;  Surgeon: Stasia Gaucher, DO;  Location: [facility]; Service: Urology   • SPINAL CORD STIMULATOR IMPLANT  10/10/2019       History reviewed. No pertinent family history. I have reviewed and agree with the history as documented. E-Cigarette/Vaping   • E-Cigarette Use Never User      E-Cigarette/Vaping Substances   • Nicotine No    • THC No    • CBD No    • Flavoring No    • Other No    • Unknown No      Social History     Tobacco Use   • Smoking status: Never   • Smokeless tobacco: Never   Vaping Use   • Vaping Use: Never used   Substance Use Topics   • Alcohol use: Yes     Comment: Drinks socially. • Drug use: No        Review of Systems   Constitutional: Negative for chills, fatigue and fever. HENT: Negative for congestion. Respiratory: Positive for shortness of breath. Negative for cough and chest tightness. Cardiovascular: Negative for chest pain and leg swelling. R. Arm swelling   Gastrointestinal: Negative for abdominal pain, diarrhea, nausea and vomiting. Genitourinary: Positive for frequency and urgency. Negative for decreased urine volume, difficulty urinating, dysuria, flank pain, hematuria, penile discharge and penile swelling. Musculoskeletal: Negative for arthralgias and back pain. Skin: Positive for color change and wound. Neurological: Negative for dizziness, syncope, weakness, numbness and headaches.        Physical Exam  ED Triage Vitals   Temperature Pulse Respirations Blood Pressure SpO2   08/04/23 1240 08/04/23 1240 08/04/23 1240 08/04/23 1240 08/04/23 1240   98 °F (36.7 °C) 69 18 112/95 99 %      Temp Source Heart Rate Source Patient Position - Orthostatic VS BP Location FiO2 (%)   08/04/23 1812 08/04/23 1534 08/04/23 1534 08/04/23 1534 --   Temporal Monitor Lying Left arm       Pain Score       08/04/23 1240       8             Orthostatic Vital Signs  Vitals:    08/04/23 1240 08/04/23 1534 08/04/23 1755 08/04/23 1812   BP: 112/95 113/60 117/64 136/80   Pulse: 69 71 77 63   Patient Position - Orthostatic VS:  Lying Lying Lying       Physical Exam  Vitals and nursing note reviewed. Constitutional:       General: He is not in acute distress. Appearance: Normal appearance. He is not ill-appearing or toxic-appearing. HENT:      Head: Normocephalic and atraumatic. Eyes:      General: No scleral icterus. Extraocular Movements: Extraocular movements intact. Cardiovascular:      Rate and Rhythm: Normal rate and regular rhythm. Pulses: Normal pulses. Heart sounds: Normal heart sounds. No murmur heard. Pulmonary:      Effort: Pulmonary effort is normal. No respiratory distress. Breath sounds: Normal breath sounds. No wheezing or rhonchi. Abdominal:      General: Abdomen is flat. There is no distension. Palpations: Abdomen is soft. Tenderness: There is no abdominal tenderness. Musculoskeletal:         General: Normal range of motion. Right elbow: Swelling and effusion present. No deformity or lacerations. Normal range of motion. Tenderness present in olecranon process. Left elbow: Normal.      Right forearm: Swelling (cool to touch, comparable to temperature of the rest of patient's skin, but with erythema) and edema (Non-pitting) present. No deformity, lacerations, tenderness or bony tenderness. Cervical back: Normal range of motion. Skin:     General: Skin is cool. Capillary Refill: Capillary refill takes less than 2 seconds. Coloration: Skin is not cyanotic, mottled or pale. Findings: Lesion (10cm patch of skin over the right elbow with blistering and underlying fluctuance, erythema, tenderness, warm to touch) present. Neurological:      General: No focal deficit present. Mental Status: He is alert. Cranial Nerves: No cranial nerve deficit. Sensory: No sensory deficit. Motor: No weakness.       Gait: Gait normal.   Psychiatric:         Mood and Affect: Mood normal.         Behavior: Behavior normal.         ED Medications  Medications vancomycin (VANCOCIN) 2,000 mg in sodium chloride 0.9 % 500 mL IVPB (2,000 mg Intravenous New Bag 8/4/23 1718)   predniSONE tablet 30 mg (has no administration in time range)     Followed by   predniSONE tablet 20 mg (has no administration in time range)     Followed by   predniSONE tablet 10 mg (has no administration in time range)     Followed by   predniSONE tablet 5 mg (has no administration in time range)   fentanyl citrate (PF) 100 MCG/2ML 50 mcg (50 mcg Intravenous Given 8/4/23 1355)   iohexol (OMNIPAQUE) 350 MG/ML injection (SINGLE-DOSE) 100 mL (100 mL Intravenous Given 8/4/23 1455)   fentanyl citrate (PF) 100 MCG/2ML 50 mcg (50 mcg Intravenous Given 8/4/23 1600)       Diagnostic Studies  Results Reviewed     Procedure Component Value Units Date/Time    Blood culture #1 [[id number]] Collected: 08/04/23 1406    Lab Status: In process Specimen: Blood from Arm, Left Updated: 08/04/23 1648    Blood culture #2 [[id number]] Collected: 08/04/23 1645    Lab Status:  In process Specimen: Blood from Hand, Left Updated: 08/04/23 1648    Manual Differential(PHLEBS Do Not Order) [[id number]]  (Abnormal) Collected: 08/04/23 1406    Lab Status: Final result Specimen: Blood from Arm, Left Updated: 08/04/23 1619     Segmented % 93 %      Bands % 1 %      Lymphocytes % 3 %      Monocytes % 1 %      Eosinophils, % 1 %      Basophils % 0 %      Myelocytes % 1 %      Absolute Neutrophils 13.64 Thousand/uL      Lymphocytes Absolute 0.44 Thousand/uL      Monocytes Absolute 0.15 Thousand/uL      Eosinophils Absolute 0.15 Thousand/uL      Basophils Absolute 0.00 Thousand/uL      Total Counted --     RBC Morphology Normal     Platelet Estimate Adequate    RAPID HIV 1/2 AB-AG COMBO for 15years old and above [[id number]]  (Normal) Collected: 08/04/23 1406    Lab Status: Final result Specimen: Blood from Arm, Left Updated: 08/04/23 1525     Rapid HIV 1 AND 2 Non-Reactive     HIV-1 P24 Ag Screen Non-Reactive    Narrative:      Negative for HIV-1 p24 Antigen. Negative for HIV-1 and/or HIV-2 Antibody. Urine Microscopic [[id number]]  (Abnormal) Collected: 08/04/23 1406    Lab Status: Final result Specimen: Urine, Clean Catch Updated: 08/04/23 1510     RBC, UA 4-10 /hpf      WBC, UA Innumerable /hpf      Epithelial Cells None Seen /hpf      Bacteria, UA Occasional /hpf      MUCUS THREADS Moderate    Urine culture [[id number]] Collected: 08/04/23 1406    Lab Status:  In process Specimen: Urine, Clean Catch Updated: 08/04/23 1510    UA w Reflex to Microscopic w Reflex to Culture [[id number]]  (Abnormal) Collected: 08/04/23 1406    Lab Status: Final result Specimen: Urine, Clean Catch Updated: 08/04/23 1503     Color, UA Yellow     Clarity, UA Clear     Specific Gravity, UA 1.023     pH, UA 6.5     Leukocytes, UA Moderate     Nitrite, UA Negative     Protein, UA Trace mg/dl      Glucose, UA Negative mg/dl      Ketones, UA Negative mg/dl      Urobilinogen, UA 2.0 mg/dl      Bilirubin, UA Negative     Occult Blood, UA Negative    Comprehensive metabolic panel [[id number]]  (Abnormal) Collected: 08/04/23 1406    Lab Status: Final result Specimen: Blood from Arm, Left Updated: 08/04/23 1436     Sodium 129 mmol/L      Potassium 4.2 mmol/L      Chloride 95 mmol/L      CO2 27 mmol/L      ANION GAP 7 mmol/L      BUN 19 mg/dL      Creatinine 0.69 mg/dL      Glucose 117 mg/dL      Calcium 8.6 mg/dL      Corrected Calcium 9.1 mg/dL      AST 16 U/L      ALT 21 U/L      Alkaline Phosphatase 67 U/L      Total Protein 6.3 g/dL      Albumin 3.4 g/dL      Total Bilirubin 0.76 mg/dL      eGFR 100 ml/min/1.73sq m     Narrative:      [place] guidelines for Chronic Kidney Disease (CKD):   •  Stage 1 with normal or high GFR (GFR > 90 mL/min/1.73 square meters)  •  Stage 2 Mild CKD (GFR = 60-89 mL/min/1.73 square meters)  •  Stage 3A Moderate CKD (GFR = 45-59 mL/min/1.73 square meters)  •  Stage 3B Moderate CKD (GFR = 30-44 mL/min/1.73 square meters)  • evaluate for both with a CT of the right upper extremity. Given patient's UTI and neurological history, concern for urinary retention, will assess with a post-void residual bladder scan. If elevated, benefits of indwelling urinary catheter outweigh risks. DDx: Cellulitis, infectious bursitis, necrotizing fasciitis, upper extremity DVT, cystitis, neurogenic urinary retention    Update: PVR 241mL, rodriguez ordered. CT scan showing evidence of bursitis +/- infection and overlying cellulitis. Admitted for IV abx as failed 2 PO medications and urinary retention. Ortho consulted. Amount and/or Complexity of Data Reviewed  Labs: ordered. Decision-making details documented in ED Course. Radiology: ordered. Decision-making details documented in ED Course. Risk  Prescription drug management. Decision regarding hospitalization. Disposition  Final diagnoses:   Bursitis   Cellulitis of other specified site - elbow   Urinary retention   UTI (urinary tract infection)     Time reflects when diagnosis was documented in both MDM as applicable and the Disposition within this note     Time User Action Codes Description Comment    8/4/2023  4:50 PM Sable Julia Add [M71.9] Bursitis     8/4/2023  4:55 PM Sable Julia Add [C90.529] Cellulitis of other specified site     8/4/2023  4:55 PM Sable Julia Modify [X80.787] Cellulitis of other specified site elbow    8/4/2023  6:14 PM Ksenia Saldañas [R33.9] Urinary retention     8/4/2023  6:14 PM Sable Julia Add [N39.0] UTI (urinary tract infection)       ED Disposition     ED Disposition   Admit    Condition   Stable    Date/Time   Fri Aug 4, 2023  4:55 PM    Comment   Case was discussed with Dr. Dayne Robins and the patient's admission status was agreed to be Admission Status: inpatient status to the service of Dr. Dayne Robins .            Follow-up Information    None         Current Discharge Medication List      CONTINUE these medications which have NOT CHANGED    Details   clobetasol (TEMOVATE) 0.05 % external solution       fluocinonide (LIDEX) 0.05 % cream       ibuprofen (MOTRIN) 400 mg tablet Take 1 tablet (400 mg total) by mouth every 6 (six) hours as needed for mild pain  Refills: 0    Associated Diagnoses: Status post lumbar spinal fusion      ketoconazole (NIZORAL) 2 % shampoo       nystatin powder       predniSONE 10 mg tablet Take 50 mg 5 pills daily for 5 days then 40 g or 4 pills daily for 5 days then 30 mg or 3 pills daily for 5 days then 20 mg or 2 pills daily for 5 days then 10 mg  Qty: 90 tablet, Refills: 0    Associated Diagnoses: Myelomalacia of cervical cord ([address]); Quadriparesis ([address]); Weakness of right lower extremity      sildenafil (VIAGRA) 100 mg tablet Take 100 mg by mouth As directed      sulfamethoxazole-trimethoprim (BACTRIM DS) 800-160 mg per tablet Take 1 tablet by mouth every 12 (twelve) hours for 14 doses  Qty: 14 tablet, Refills: 0    Associated Diagnoses: UTI (urinary tract infection)           No discharge procedures on file. PDMP Review       Value Time User    PDMP Reviewed  Yes 7/12/2023  1:14 PM [facility],            ED Provider  Attending physically available and evaluated Jessica Fuentes. I managed the patient along with the ED Attending.     Electronically Signed by         Tabatha Orona MD  08/04/23 2823

## 2023-08-04 NOTE — ASSESSMENT & PLAN NOTE
Beard catheter placed on admission due to urinary retention  Consider removal in the next 24 to 48 hours

## 2023-08-04 NOTE — ASSESSMENT & PLAN NOTE
Is had urinary frequency for 1 month. This is much more severe over the last few weeks. He did have a UA and C&S which showed Staph aureus. Initially he was placed on Cipro before the sensitivities returned. The Staph aureus was diagnosed and this was changed to Bactrim on 8 3. He denies any significant changes but he just started the dose as of yesterday. The Staph aureus is resistant to ampicillin. This is probably resulting in overall weakness and exacerbating his underlying status. He was seen by urology Dr. Harshad Whitman who agreed with the antibiotics but the sensitivities did return yesterday. We will continue on Bactrim DS for now unless there is significant changes. I have suggested that he follow-up with his urologist or PCP soon as possible.

## 2023-08-04 NOTE — H&P
[facility]  H&P  Name: Heber Walker 57 y.o. male I MRN: [id number]  Unit/Bed#: ED-25 I Date of Admission: 8/4/2023   Date of Service: 8/4/2023 I Hospital Day: 0      Assessment/Plan   * Cellulitis  Assessment & Plan  Erythema and swelling of right upper extremity involving elbow  CT shows bursitis  Concern for septic arthritis given warm erythematous and swollen elbow joint  History of immunosuppression  It is odd that patient would have Staph aureus UTI, frequency could have just been related to known BPH  We will culture results return we will treat with IV vancomycin and Rocephin    Hyponatremia  Assessment & Plan  Patient presents with sodium 129  Patient does use NSAIDs at home which could be a component  May be poor solute intake or SIADH in setting of acute illness  We will provide gentle IV fluids overnight  We will check urine studies    Myelomalacia of cervical cord [facility])  Assessment & Plan  Follows with neurology as an outpatient  Uses wheelchair mostly for ambulation  PT/OT eval  Outpatient follow-up with neurology  Continue steroid taper as documented in outpatient neurology notes    Ambulatory dysfunction  [address] to chronic neurologic issue  Patient reports progressive weakness is now likely worsened in setting of acute infection  PT/OT    Acute cystitis without hematuria  Assessment & Plan  Treatment with IV vancomycin and Rocephin      BPH associated with nocturia  Assessment & Plan  Beard catheter placed on admission due to urinary retention  Consider removal in the next 24 to 48 hours       VTE Prophylaxis: Lovenox  Code Status: Level 1 full code     Anticipated Length of Stay:  Patient will be admitted on an Inpatient basis with an anticipated length of stay of greater than 2 midnights.    Justification for Hospital Stay: Need for IV antibiotics and possible inpatient procedure     Total Time for Visit, including Counseling / Coordination of Care: I have spent a total time of 50 minutes on 08/04/23 in caring for this patient including Diagnostic results, Instructions for management, Counseling / Coordination of care, Documenting in the medical record, Reviewing / ordering tests, medicine, procedures  , Obtaining or reviewing history   and Communicating with other healthcare professionals . Chief Complaint:  Right elbow Swelling    History of Present Illness:    Glen Walters is a 57 y.o. male With past medical history of myelomalacia of cervical cord, ambulatory dysfunction who presents of swelling and pain of right elbow with erythema. Patient was recently diagnosed with urinary tract infection after expressing urinary frequency. Culture and sensitivity showed Staph aureus and patient was started on Cipro then changed to Bactrim once sensitivities returned. Patient is currently following with neurology and is on chronic steroids prednisone 30 mg a day. He uses that elbow a lot for supporting himself and to lean on to urinate. The skin has been thin and worn for some time. Review of Systems:    Review of Systems   Constitutional: Negative for chills and fever. HENT: Negative for trouble swallowing. Eyes: Negative for visual disturbance. Respiratory: Negative for shortness of breath and wheezing. Cardiovascular: Negative for chest pain and palpitations. Gastrointestinal: Positive for constipation. Negative for diarrhea, nausea and vomiting. Genitourinary: Positive for difficulty urinating and frequency. Negative for dysuria. Musculoskeletal: Positive for gait problem. Negative for arthralgias and myalgias. Skin: Positive for rash. Negative for wound. Neurological: Negative for dizziness, light-headedness and headaches.        Past Medical and Surgical History:     Past Medical History:   Diagnosis Date   • Balance problems    • BPH (benign prostatic hyperplasia)    • Chronic back pain    • Chronic pain disorder    • COVID-19 2/2021-asymptomatic    • Erectile dysfunction    • Nocturia    • OAB (overactive bladder)    • Testicular hypogonadism    • Urinary frequency    • Urinary urgency        Past Surgical History:   Procedure Laterality Date   • BACK SURGERY  05/18/2016    with hardware   • BACK SURGERY      2 back surgery unable to remove hardware   • COLONOSCOPY  2004    Dr Ed Calderon. tubular adenoma polyp removed   • COLONOSCOPY  2007    Dr Jerardo Nicholson. Normal   • COLONOSCOPY  2013    Dr Jerardo Nicholson. Normal.    • COLONOSCOPY  06/2019    Dr Jerardo Nicholson. Hemorrhoids, normal.    • NECK SURGERY      decompression of neck    • NECK SURGERY Right     scar tissues removed   • KS EXCISION HYDROCELE UNILATERAL Right 06/10/2021    Procedure: HYDROCELECTOMY;  Surgeon: Cristopher Mason DO;  Location: [facility];  Service: Urology   • SPINAL CORD STIMULATOR IMPLANT  10/10/2019       Meds/Allergies:    Prior to Admission medications    Medication Sig Start Date End Date Taking?  Authorizing Provider   clobetasol (TEMOVATE) 0.05 % external solution  5/10/23  Yes Historical Provider, MD   fluocinonide (LIDEX) 0.05 % cream  5/10/23  Yes Historical Provider, MD   ibuprofen (MOTRIN) 400 mg tablet Take 1 tablet (400 mg total) by mouth every 6 (six) hours as needed for mild pain 9/22/22  Yes STACEY Garnett   ketoconazole (NIZORAL) 2 % shampoo  5/10/23  Yes Historical Provider, MD   nystatin powder  5/10/23  Yes Historical Provider, MD   predniSONE 10 mg tablet Take 50 mg 5 pills daily for 5 days then 40 g or 4 pills daily for 5 days then 30 mg or 3 pills daily for 5 days then 20 mg or 2 pills daily for 5 days then 10 mg  Patient taking differently: Take 40 mg by mouth daily  40 g or 4 pills daily for 5 days then 30 mg or 3 pills daily for 5 days then 20 mg or 2 pills daily for 5 days then 10 mg. P 7/31/23  Yes Debra Ramos DO   sildenafil (VIAGRA) 100 mg tablet Take 100 mg by mouth As directed 9/11/20  Yes Historical Provider, MD sulfamethoxazole-trimethoprim (BACTRIM DS) 800-160 mg per tablet Take 1 tablet by mouth every 12 (twelve) hours for 14 doses 8/3/23 8/10/23 Yes Debra Ramos DO   acetaminophen (TYLENOL) 325 mg tablet Take 2 tablets (650 mg total) by mouth every 6 (six) hours as needed for mild pain, headaches or fever 9/22/22 8/4/23  STACEY Hermosillo   ALPRAZolam Mildred Sella) 0.5 mg tablet Take 1 tablet (0.5 mg total) by mouth 30 min pre-procedure Can repeat if needed  Patient not taking: Reported on 3/8/2023 1/26/23 8/4/23  Keegan Leyva DO   ALPRAZolam Mildred Sella) 0.5 mg tablet Take 1 tablet (0.5 mg total) by mouth 30 min pre-procedure May repeat in 2 hours if necessary  Patient not taking: Reported on 8/4/2023 7/12/23 8/4/23  Keegan Leyva DO   baclofen 10 mg tablet Take 10 mg by mouth daily at bedtime  Patient not taking: Reported on 3/8/2023 1/23/23 8/4/23  Historical Provider, MD   betamethasone dipropionate (DIPROSONE) 0.05 % ointment  5/11/23 8/4/23  Historical Provider, MD   ciprofloxacin (CIPRO) 500 mg tablet Take 1 tablet (500 mg total) by mouth every 12 (twelve) hours for 10 days  Patient not taking: Reported on 8/4/2023 8/1/23 8/4/23  Keegan Leyva DO   docusate sodium (COLACE) 100 mg capsule Take 1 capsule (100 mg total) by mouth 2 (two) times a day  Patient taking differently: Take 100 mg by mouth if needed 9/23/22 8/4/23  STACEY Hermosillo   gabapentin (NEURONTIN) 300 mg capsule Take 1 CAPSULE BY MOUTH 3 TIMES A DAY  Patient not taking: Reported on 1/18/2023 12/8/22 8/4/23  Keegan Leyva DO   Melatonin 10 MG TABS Take 10 mg by mouth if needed  Patient not taking: Reported on 1/18/2023 8/4/23  Historical Provider, MD   polyethylene glycol (MIRALAX) 17 g packet Take 17 g by mouth daily as needed (constipation) 9/23/22 8/4/23  STACEY Hermosillo   testosterone cypionate (DEPO-TESTOSTERONE) 200 mg/mL SOLN inject 2 milliliters intramuscularly every 14 days  Patient not taking: Reported on 8/4/2023 4/24/23 8/4/23  STACEY Donald       Allergies: Allergies   Allergen Reactions   • Morphine Other (See Comments)     Addiction hx. Pt wants no narcotics  Except surgical interventions       Social History:     Marital Status: /Civil Union   Substance Use History:   Social History     Substance and Sexual Activity   Alcohol Use Yes    Comment: Drinks socially. Social History     Tobacco Use   Smoking Status Never   Smokeless Tobacco Never     Social History     Substance and Sexual Activity   Drug Use No       Family History:    Pertinent family history reviewed    Physical Exam:     Vitals:   Blood Pressure: 113/60 (08/04/23 1534)  Pulse: 71 (08/04/23 1534)  Temperature: 98 °F (36.7 °C) (08/04/23 1240)  Respirations: 18 (08/04/23 1534)  Height: 6' (182.9 cm) (08/04/23 1240)  Weight - Scale: 99.8 kg (220 lb 0.3 oz) (08/04/23 1240)  SpO2: 98 % (08/04/23 1534)    Physical Exam  Vitals reviewed. Constitutional:       General: He is not in acute distress. Appearance: He is well-developed. He is not ill-appearing, toxic-appearing or diaphoretic. HENT:      Head: Normocephalic and atraumatic. Mouth/Throat:      Mouth: Mucous membranes are moist.   Eyes:      Extraocular Movements: Extraocular movements intact. Conjunctiva/sclera: Conjunctivae normal.   Cardiovascular:      Rate and Rhythm: Normal rate and regular rhythm. Heart sounds: Normal heart sounds. Pulmonary:      Effort: Pulmonary effort is normal. No respiratory distress. Breath sounds: Normal breath sounds. No wheezing or rales. Abdominal:      General: There is no distension. Palpations: Abdomen is soft. Tenderness: There is no abdominal tenderness. There is no guarding or rebound. Musculoskeletal:         General: No swelling, tenderness or deformity.       Comments: Erythema and swelling of right upper extremity and elbow    Trace lower extremity edema   Skin: General: Skin is warm and dry. Neurological:      Mental Status: He is alert and oriented to person, place, and time. Psychiatric:         Mood and Affect: Mood normal.         Behavior: Behavior normal.         Thought Content: Thought content normal.         Judgment: Judgment normal.          Additional Data:     Lab Results: I have reviewed pertinent results     Results from last 7 days   Lab Units 08/04/23  1406   WBC Thousand/uL 14.51*   HEMOGLOBIN g/dL 18.5*   HEMATOCRIT % 57.8*   PLATELETS Thousands/uL 158   BANDS PCT % 1   LYMPHO PCT % 3*   MONO PCT % 1*   EOS PCT % 1     Results from last 7 days   Lab Units 08/04/23  1406   SODIUM mmol/L 129*   POTASSIUM mmol/L 4.2   CHLORIDE mmol/L 95*   CO2 mmol/L 27   BUN mg/dL 19   CREATININE mg/dL 0.69   ANION GAP mmol/L 7   CALCIUM mg/dL 8.6   ALBUMIN g/dL 3.4*   TOTAL BILIRUBIN mg/dL 0.76   ALK PHOS U/L 67   ALT U/L 21   AST U/L 16   GLUCOSE RANDOM mg/dL 117                       Imaging: I have reviewed pertinent imaging     CT upper extremity w contrast right   Final Result by Hardeep Patel MD (08/04 1298)      Olecranon bursitis and adjacent edema/cellulitis, possibly infectious or inflammatory (consider gout). No acute osseous abnormality. Workstation performed: [id number]             [facility] / [facility] Records Reviewed    ** Please Note: This note has been constructed using a voice recognition system.  **

## 2023-08-04 NOTE — PROGRESS NOTES
Celi Connelly is a 57 y.o. male who is currently ordered Vancomycin IV with management by the Pharmacy Consult service. Relevant clinical data and objective / subjective history reviewed. Vancomycin Assessment:  Indication and Goal AUC/Trough: Bone/joint infection (goal -600, trough >10)  Clinical Status: New  Micro:     Renal Function:  SCr: 0.69 mg/dL  CrCl: 132.3 mL/min  Renal replacement: Not on dialysis  Days of Therapy: 1  Current Dose: 2000mg IV once  Vancomycin Plan:  New Dosinmg IV q12h  Estimated AUC: 461 mcg*hr/mL  Estimated Trough: 14.1 mcg/mL  Next Level: check level 8/5 with am labs  Renal Function Monitoring: Daily BMP and [place] will continue to follow closely for s/sx of nephrotoxicity, infusion reactions and appropriateness of therapy. BMP and CBC will be ordered per protocol. We will continue to follow the patient’s culture results and clinical progress daily.     Tahira Jang, Pharmacist

## 2023-08-05 PROBLEM — E87.1 HYPONATREMIA: Status: RESOLVED | Noted: 2023-08-04 | Resolved: 2023-08-05

## 2023-08-05 LAB
ANION GAP SERPL CALCULATED.3IONS-SCNC: 4 MMOL/L
BASOPHILS # BLD AUTO: 0.06 THOUSANDS/ÂΜL (ref 0–0.1)
BASOPHILS NFR BLD AUTO: 1 % (ref 0–1)
BUN SERPL-MCNC: 21 MG/DL (ref 5–25)
CALCIUM SERPL-MCNC: 8.3 MG/DL (ref 8.4–10.2)
CHLORIDE SERPL-SCNC: 101 MMOL/L (ref 96–108)
CO2 SERPL-SCNC: 31 MMOL/L (ref 21–32)
CREAT SERPL-MCNC: 0.65 MG/DL (ref 0.6–1.3)
EOSINOPHIL # BLD AUTO: 0.02 THOUSAND/ÂΜL (ref 0–0.61)
EOSINOPHIL NFR BLD AUTO: 0 % (ref 0–6)
ERYTHROCYTE [DISTWIDTH] IN BLOOD BY AUTOMATED COUNT: 13.4 % (ref 11.6–15.1)
GFR SERPL CREATININE-BSD FRML MDRD: 102 ML/MIN/1.73SQ M
GLUCOSE SERPL-MCNC: 79 MG/DL (ref 65–140)
HCT VFR BLD AUTO: 49.9 % (ref 36.5–49.3)
HGB BLD-MCNC: 15.8 G/DL (ref 12–17)
IMM GRANULOCYTES # BLD AUTO: 0.49 THOUSAND/UL (ref 0–0.2)
IMM GRANULOCYTES NFR BLD AUTO: 4 % (ref 0–2)
LYMPHOCYTES # BLD AUTO: 1.12 THOUSANDS/ÂΜL (ref 0.6–4.47)
LYMPHOCYTES NFR BLD AUTO: 10 % (ref 14–44)
MCH RBC QN AUTO: 31 PG (ref 26.8–34.3)
MCHC RBC AUTO-ENTMCNC: 31.7 G/DL (ref 31.4–37.4)
MCV RBC AUTO: 98 FL (ref 82–98)
MONOCYTES # BLD AUTO: 0.98 THOUSAND/ÂΜL (ref 0.17–1.22)
MONOCYTES NFR BLD AUTO: 8 % (ref 4–12)
NEUTROPHILS # BLD AUTO: 8.97 THOUSANDS/ÂΜL (ref 1.85–7.62)
NEUTS SEG NFR BLD AUTO: 77 % (ref 43–75)
NRBC BLD AUTO-RTO: 0 /100 WBCS
PLATELET # BLD AUTO: 145 THOUSANDS/UL (ref 149–390)
PMV BLD AUTO: 10.1 FL (ref 8.9–12.7)
POTASSIUM SERPL-SCNC: 4.6 MMOL/L (ref 3.5–5.3)
RBC # BLD AUTO: 5.1 MILLION/UL (ref 3.88–5.62)
SODIUM SERPL-SCNC: 136 MMOL/L (ref 135–147)
TSH SERPL DL<=0.05 MIU/L-ACNC: 3.15 UIU/ML (ref 0.45–4.5)
WBC # BLD AUTO: 11.64 THOUSAND/UL (ref 4.31–10.16)

## 2023-08-05 PROCEDURE — 80048 BASIC METABOLIC PNL TOTAL CA: CPT | Performed by: INTERNAL MEDICINE

## 2023-08-05 PROCEDURE — 99232 SBSQ HOSP IP/OBS MODERATE 35: CPT | Performed by: PHYSICIAN ASSISTANT

## 2023-08-05 PROCEDURE — 84443 ASSAY THYROID STIM HORMONE: CPT | Performed by: INTERNAL MEDICINE

## 2023-08-05 PROCEDURE — 99254 IP/OBS CNSLTJ NEW/EST MOD 60: CPT | Performed by: ORTHOPAEDIC SURGERY

## 2023-08-05 PROCEDURE — 85027 COMPLETE CBC AUTOMATED: CPT | Performed by: INTERNAL MEDICINE

## 2023-08-05 RX ORDER — BISACODYL 10 MG
10 SUPPOSITORY, RECTAL RECTAL DAILY PRN
Status: DISCONTINUED | OUTPATIENT
Start: 2023-08-05 | End: 2023-08-15 | Stop reason: HOSPADM

## 2023-08-05 RX ADMIN — BISACODYL 10 MG: 10 SUPPOSITORY RECTAL at 14:38

## 2023-08-05 RX ADMIN — CEFTRIAXONE 2000 MG: 2 INJECTION, SOLUTION INTRAVENOUS at 21:55

## 2023-08-05 RX ADMIN — PREDNISONE 30 MG: 20 TABLET ORAL at 08:37

## 2023-08-05 RX ADMIN — VANCOMYCIN HYDROCHLORIDE 1250 MG: 5 INJECTION, POWDER, LYOPHILIZED, FOR SOLUTION INTRAVENOUS at 19:59

## 2023-08-05 RX ADMIN — OXYCODONE HYDROCHLORIDE 10 MG: 10 TABLET ORAL at 23:24

## 2023-08-05 RX ADMIN — VANCOMYCIN HYDROCHLORIDE 1250 MG: 5 INJECTION, POWDER, LYOPHILIZED, FOR SOLUTION INTRAVENOUS at 08:35

## 2023-08-05 RX ADMIN — Medication 3 MG: at 19:59

## 2023-08-05 RX ADMIN — ENOXAPARIN SODIUM 40 MG: 100 INJECTION SUBCUTANEOUS at 08:37

## 2023-08-05 RX ADMIN — OXYCODONE HYDROCHLORIDE 10 MG: 10 TABLET ORAL at 01:45

## 2023-08-05 RX ADMIN — OXYCODONE HYDROCHLORIDE 10 MG: 10 TABLET ORAL at 17:16

## 2023-08-05 NOTE — PLAN OF CARE
Problem: Potential for Falls  Goal: Patient will remain free of falls  Description: INTERVENTIONS:  - Educate patient/family on patient safety including physical limitations  - Instruct patient to call for assistance with activity   - Consult OT/PT to assist with strengthening/mobility   - Keep Call bell within reach  - Keep bed low and locked with side rails adjusted as appropriate  - Keep care items and personal belongings within reach  - Initiate and maintain comfort rounds  - Make Fall Risk Sign visible to staff  - Offer Toileting every 2 Hours, in advance of need  - Initiate/Maintain bed alarm  - Obtain necessary fall risk management equipment:   - Apply yellow socks and bracelet for high fall risk patients  - Consider moving patient to room near nurses station  Outcome: Progressing     Problem: MOBILITY - ADULT  Goal: Maintain or return to baseline ADL function  Description: INTERVENTIONS:  -  Assess patient's ability to carry out ADLs; assess patient's baseline for ADL function and identify physical deficits which impact ability to perform ADLs (bathing, care of mouth/teeth, toileting, grooming, dressing, etc.)  - Assess/evaluate cause of self-care deficits   - Assess range of motion  - Assess patient's mobility; develop plan if impaired  - Assess patient's need for assistive devices and provide as appropriate  - Encourage maximum independence but intervene and supervise when necessary  - Involve family in performance of ADLs  - Assess for home care needs following discharge   - Consider OT consult to assist with ADL evaluation and planning for discharge  - Provide patient education as appropriate  Outcome: Progressing  Goal: Maintains/Returns to pre admission functional level  Description: INTERVENTIONS:  - Perform BMAT or MOVE assessment daily.   - Set and communicate daily mobility goal to care team and patient/family/caregiver.    - Collaborate with rehabilitation services on mobility goals if consulted  - Perform Range of Motion 3 times a day. - Reposition patient every 2 hours.   - Dangle patient 3 times a day  - Stand patient 3 times a day  - Ambulate patient 3 times a day  - Out of bed to chair 3 times a day   - Out of bed for meals 3 times a day  - Out of bed for toileting  - Record patient progress and toleration of activity level   Outcome: Progressing

## 2023-08-05 NOTE — ASSESSMENT & PLAN NOTE
Secondary to chronic neurologic issue  · Patient reports progressive weakness is now likely worsened in setting of acute infection  · PT/OT

## 2023-08-05 NOTE — PROGRESS NOTES
[facility]  Progress Note  Name: Sammi Barbosa  MRN: [id number]  Unit/Bed#: E2 -01 I Date of Admission: 8/4/2023   Date of Service: 8/5/2023 I Hospital Day: 1    Assessment/Plan   * Cellulitis  Assessment & Plan  Erythema and swelling of right upper extremity involving elbow  · CT shows bursitis  · Concern for septic arthritis given warm erythematous and swollen elbow joint  · History of immunosuppression  · It is odd that patient would have Staph aureus UTI, frequency could have just been related to known BPH  · Await blood culture results    Myelomalacia of cervical cord [facility])  Assessment & Plan  Follows with neurology as an outpatient  · Uses wheelchair mostly for ambulation  · PT/OT eval  · Outpatient follow-up with neurology  · Continue steroid taper as documented in outpatient neurology notes    Ambulatory dysfunction  Assessment & Plan  Secondary to chronic neurologic issue  · Patient reports progressive weakness is now likely worsened in setting of acute infection  · PT/OT    Acute cystitis without hematuria  Assessment & Plan  Treatment with IV vancomycin and Rocephin      BPH associated with nocturia  Assessment & Plan  Beard catheter placed on admission due to urinary retention  · Consider removal in the next 24 to 48 hours    Hyponatremia-resolved as of 8/5/2023  Assessment & Plan  Patient presents with sodium 129  · Patient does use NSAIDs at home which could be a component  · May be poor solute intake or SIADH in setting of acute illness  · We will provide gentle IV fluids overnight  · We will check urine studies  · Resolved           VTE Pharmacologic Prophylaxis: VTE Score: 4 Moderate Risk (Score 3-4) - Pharmacological DVT Prophylaxis Ordered: enoxaparin (Lovenox). Patient Centered Rounds: I performed bedside rounds with nursing staff today.   Discussions with Specialists or Other Care Team Provider: none    Education and Discussions with Family / Patient: Patient declined call to . Total Time Spent on Date of Encounter in care of patient: 35 minutes This time was spent on one or more of the following: performing physical exam; counseling and coordination of care; obtaining or reviewing history; documenting in the medical record; reviewing/ordering tests, medications or procedures; communicating with other healthcare professionals and discussing with patient's family/caregivers. Current Length of Stay: 1 day(s)  Current Patient Status: Inpatient   Certification Statement: The patient will continue to require additional inpatient hospital stay due to IV antibiotics, blood cultures, possible bedside I&D  Discharge Plan: Anticipate discharge in 48 hrs to discharge location to be determined pending rehab evaluations. Code Status: Level 1 - Full Code    Subjective:   Patient seen resting comfortably in bed. He states he is feeling better today than he was yesterday however reports continued right elbow pain. Denies any fevers or chills overnight, is feeling well overall, currently has a Beard catheter in place which was placed in the emergency department yesterday secondary to retention. Objective:     Vitals:   Temp (24hrs), Av.8 °F (36.6 °C), Min:97 °F (36.1 °C), Max:98.3 °F (36.8 °C)    Temp:  [97 °F (36.1 °C)-98.3 °F (36.8 °C)] 97 °F (36.1 °C)  HR:  [57-77] 57  Resp:  [18-20] 20  BP: (108-136)/(57-95) 117/57  SpO2:  [95 %-99 %] 95 %  Body mass index is 29.84 kg/m². Input and Output Summary (last 24 hours): Intake/Output Summary (Last 24 hours) at 2023 1122  Last data filed at 2023 0451  Gross per 24 hour   Intake 1240 ml   Output 1475 ml   Net -235 ml       Physical Exam:   Physical Exam  Vitals and nursing note reviewed. Constitutional:       General: He is not in acute distress. Appearance: Normal appearance. He is obese. He is not ill-appearing, toxic-appearing or diaphoretic.    HENT:      Head: Normocephalic and atraumatic. Cardiovascular:      Rate and Rhythm: Normal rate and regular rhythm. Heart sounds: No murmur heard. No friction rub. No gallop. Pulmonary:      Effort: Pulmonary effort is normal. No respiratory distress. Breath sounds: Normal breath sounds. No wheezing, rhonchi or rales. Abdominal:      General: Abdomen is flat. Bowel sounds are normal. There is no distension. Palpations: Abdomen is soft. Tenderness: There is no abdominal tenderness. Genitourinary:     Comments: Beard catheter in place, urine is clear and yellow  Musculoskeletal:      Right lower leg: No edema. Left lower leg: No edema. Comments: Right olecranon bursitis, warmth and erythema noted to entire right arm. Associated swelling into right hand. Skin:     General: Skin is warm and dry. Coloration: Skin is not jaundiced or pale. Neurological:      General: No focal deficit present. Mental Status: He is alert. Mental status is at baseline.           Additional Data:     Labs:  Results from last 7 days   Lab Units 08/05/23  0447 08/04/23  1406   WBC Thousand/uL 11.64* 14.51*   HEMOGLOBIN g/dL 15.8 18.5*   HEMATOCRIT % 49.9* 57.8*   PLATELETS Thousands/uL 145* 158   BANDS PCT %  --  1   NEUTROS PCT % 77*  --    LYMPHS PCT % 10*  --    LYMPHO PCT %  --  3*   MONOS PCT % 8  --    MONO PCT %  --  1*   EOS PCT % 0 1     Results from last 7 days   Lab Units 08/05/23  0447 08/04/23  1406   SODIUM mmol/L 136 129*   POTASSIUM mmol/L 4.6 4.2   CHLORIDE mmol/L 101 95*   CO2 mmol/L 31 27   BUN mg/dL 21 19   CREATININE mg/dL 0.65 0.69   ANION GAP mmol/L 4 7   CALCIUM mg/dL 8.3* 8.6   ALBUMIN g/dL  --  3.4*   TOTAL BILIRUBIN mg/dL  --  0.76   ALK PHOS U/L  --  67   ALT U/L  --  21   AST U/L  --  16   GLUCOSE RANDOM mg/dL 79 117                       Lines/Drains:  Invasive Devices     Peripheral Intravenous Line  Duration           Peripheral IV 08/04/23 Left;Ventral (anterior) Forearm <1 day Drain  Duration           Urethral Catheter Latex 16 Fr. <1 day              Urinary Catheter:  Goal for removal: Remove after 48 hrs of I/O monitoring               Imaging: No pertinent imaging reviewed. Recent Cultures (last 7 days):   Results from last 7 days   Lab Units 08/04/23  1645 08/04/23  1406 08/01/23  0938   BLOOD CULTURE  Received in Microbiology Lab. Culture in Progress. Received in Microbiology Lab. Culture in Progress. --    URINE CULTURE   --   --  >100,000 cfu/ml Staphylococcus aureus*       Last 24 Hours Medication List:   Current Facility-Administered Medications   Medication Dose Route Frequency Provider Last Rate   • acetaminophen  650 mg Oral Q6H PRN Amye Conradi, DO     • aluminum-magnesium hydroxide-simethicone  30 mL Oral Q6H PRN Amye Conradi, DO     • cefTRIAXone  2,000 mg Intravenous Q24H Amye Conradi, DO 2,000 mg (08/04/23 2023)   • enoxaparin  40 mg Subcutaneous Daily Amye Conradi, DO     • melatonin  3 mg Oral HS Orquidea Evans PA-C     • ondansetron  4 mg Intravenous Q6H PRN Amye Conradi, DO     • oxyCODONE  5 mg Oral Q6H PRN Amye Conradi, DO      Or   • oxyCODONE  10 mg Oral Q6H PRN Amye Conradi, DO     • [START ON 8/6/2023] predniSONE  20 mg Oral Daily Amye Conradi, DO      Followed by   • [START ON 8/8/2023] predniSONE  10 mg Oral Daily Amye Conradi, DO      Followed by   • [START ON 8/10/2023] predniSONE  5 mg Oral Daily Amye Conradi, DO     • sodium chloride  75 mL/hr Intravenous Continuous Amye Conradi, DO 75 mL/hr (08/04/23 1948)   • vancomycin  1,250 mg Intravenous Q12H Amye Conradi, DO 1,250 mg (08/05/23 0810)        Today, Patient Was Seen By: Margot Pinedo PA-C    **Please Note: This note may have been constructed using a voice recognition system. **

## 2023-08-05 NOTE — PROGRESS NOTES
Sonia Flores is a 57 y.o. male who is currently ordered Vancomycin IV with management by the Pharmacy Consult service. Relevant clinical data and objective / subjective history reviewed. Vancomycin Assessment:  Indication and Goal AUC/Trough: Bone/joint infection (goal -600, trough >10)  Clinical Status: stable  Micro:     Renal Function:  SCr: 0.65 mg/dL  CrCl: 140.5 mL/min  Renal replacement: Not on dialysis  Days of Therapy: 2  Current Dose: 1250 mg IV q12h  Vancomycin Plan:  New Dosinmg IV q12h  Estimated AUC: 461 mcg*hr/mL  Estimated Trough: 14.1 mcg/mL  Next Level: 8/6 with AM labs  Renal Function Monitoring: Daily BMP and [place] will continue to follow closely for s/sx of nephrotoxicity, infusion reactions and appropriateness of therapy. BMP and CBC will be ordered per protocol. We will continue to follow the patient’s culture results and clinical progress daily.     Inga Monroy, Pharmacist

## 2023-08-05 NOTE — CONSULTS
Consultation - Almas Hawk 57 y.o. male MRN: [id number]  Unit/Bed#: E2 -01 Encounter: [id number]      Assessment/Plan     Assessment:  57year old Male with Right septic olecranon bursitis with right upper extremity cellulitis, no concern for septic elbow at this time     Plan:  -No acute orthopedic surgical intervention at this time, we will continue to monitor right septic olecranon bursitis on IV Abx. Patient made NPO at midnight, if no significant improvement or worsening on AM exam tomorrow, we will determine if bedside I&D or formal I&D and washout is appropriate. No concern for septic elbow at this time, patient is able to tolerate active ROM of the elbow without pain within the elbow joint. -WBAT RUE  -Pain control PRN  -Medical management per SLIM  -Continue IV Abx per SLIM, patient is currently receiving IV Vanco and Rocephin   - Leukocytosis is trending down from 14.51 to 11.64, we will continue to monitor   -Ortho will continue to follow     History of Present Illness   Physician Requesting Consult: Sofi Abbasi *  Reason for Consult / Principal Problem: Right elbow bursitis   HPI: Juan Mcgill is a 57y.o. year old male who presented to [facility] ER with right arm swelling and redness. Orthopedics was consulted regarding right elbow septic olecranon bursitis and cellulitis of the right upper extremity. Patient was seen and examined at bedside. Patient reports that he has been experiencing extensive ambulatory dysfunction over the past year and has been performing aggressive physical therapy in order to help improve his dysfunction. Patient reports that due to his ambulatory dysfunction he does place a lot of pressure and weight on his right elbow. Patient states about 3 to 4 days ago he did hit the tip of the right elbow on his marble countertop 3-4 times and did develop pain and some swelling to that area.   Patient also notes that he did have a small white dot to this area he believed was due to trauma. Patient denies the skin becoming open or draining. Patient states that this swelling became so significant as well as his pain that he came to the ER. Patient is being treated for a UTI in which she was initially placed on Cipro that has now been changed to Bactrim antibiotics. Patient denies any significant change in the area of swelling or redness to the right elbow and upper extremity overnight. He does report that previously he was unable to tolerate even a sheet resting on the right elbow and now he is able to tolerate pressure to the area. Patient states that the pain is only concentrated over the lateral aspect of the elbow. Patient states that he is able to perform range of motion of the arm without having pain within the actual elbow joint. Patient denies have any numbness or tingling in the right upper extremity. Inpatient consult to Orthopedic Surgery  Consult performed by: Raz Corrales PA-C  Consult ordered by: Lorene Small DO          Review of Systems   10 point review of systems was reviewed with the patient and all were found to be negative except those stated above. Historical Information   Past Medical History:   Diagnosis Date   • Balance problems    • BPH (benign prostatic hyperplasia)    • Chronic back pain    • Chronic pain disorder    • COVID-19     2/2021-asymptomatic    • Erectile dysfunction    • Nocturia    • OAB (overactive bladder)    • Testicular hypogonadism    • Urinary frequency    • Urinary urgency      Past Surgical History:   Procedure Laterality Date   • BACK SURGERY  05/18/2016    with hardware   • BACK SURGERY      2 back surgery unable to remove hardware   • COLONOSCOPY  2004    Dr Nimisha Mercedes. tubular adenoma polyp removed   • COLONOSCOPY  2007    Dr Barbara Blevins. Normal   • COLONOSCOPY  2013    Dr Barbara Blevins. Normal.    • COLONOSCOPY  06/2019    Dr Barbara Blevins.  Hemorrhoids, normal.    • NECK SURGERY      decompression of neck    • NECK SURGERY Right     scar tissues removed   • LA EXCISION HYDROCELE UNILATERAL Right 06/10/2021    Procedure: HYDROCELECTOMY;  Surgeon: Stasia Gaucher, DO;  Location: [facility];  Service: Urology   • SPINAL CORD STIMULATOR IMPLANT  10/10/2019     Social History   Social History     Substance and Sexual Activity   Alcohol Use Yes    Comment: Drinks socially. Social History     Substance and Sexual Activity   Drug Use No     Social History     Tobacco Use   Smoking Status Never   Smokeless Tobacco Never     Family History: non-contributory    Meds/Allergies   all current active meds have been reviewed  Allergies   Allergen Reactions   • Morphine Other (See Comments)     Addiction hx. Pt wants no narcotics  Except surgical interventions       Objective   Vitals: Blood pressure 117/57, pulse 57, temperature (!) 97 °F (36.1 °C), temperature source Temporal, resp. rate 20, height 6' (1.829 m), weight 99.8 kg (220 lb 0.3 oz), SpO2 95 %. ,Body mass index is 29.84 kg/m². Intake/Output Summary (Last 24 hours) at 8/5/2023 1039  Last data filed at 8/5/2023 0451  Gross per 24 hour   Intake 1240 ml   Output 1475 ml   Net -235 ml     I/O last 24 hours: In: 1240 [P.O.:240; I.V.:1000]  Out: 1475 [Urine:1475]    Invasive Devices     Peripheral Intravenous Line  Duration           Peripheral IV 08/04/23 Left;Ventral (anterior) Forearm <1 day          Drain  Duration           Urethral Catheter Latex 16 Fr. <1 day                Physical Exam  Vitals reviewed. Constitutional:       Appearance: Normal appearance. HENT:      Head: Normocephalic and atraumatic. Right Ear: External ear normal.      Left Ear: External ear normal.      Nose: Nose normal.      Mouth/Throat:      Mouth: Mucous membranes are moist.   Eyes:      Extraocular Movements: Extraocular movements intact. Conjunctiva/sclera: Conjunctivae normal.      Pupils: Pupils are equal, round, and reactive to light. Cardiovascular:      Comments: No apparent distress  Pulmonary:      Effort: Pulmonary effort is normal.   Musculoskeletal:      Cervical back: Normal range of motion and neck supple. Skin:     General: Skin is warm and dry. Capillary Refill: Capillary refill takes less than 2 seconds. Neurological:      General: No focal deficit present. Mental Status: He is alert. Psychiatric:         Mood and Affect: Mood normal.         Behavior: Behavior normal.       Ortho Exam   Right Upper Extremity Exam  Inspection: Significant swelling present from the digits and dorsum of the hand extending up to the shoulder. There is erythema consistent with cellulitis present circumferentially about the forearm that extends proximally up to the posterior aspect of the upper arm. Olecranon bursa is swollen with erythema. Skin is intact  Palpation: Tenderness to palpation about the olecranon bursa, bursa is boggy with fluid collection, compartments of the upper and lower arm are soft and compressible  ROM:   active without pain in the elbow joint, pain over the bursa with moiton  Strength:  Able to actively flex & extend elbow. Able to actively dorsiflex & volarflex wrist. Able to actively flex, extend, abduct, & adduct fingers. Neurovascular:  Sensation intact in Ax/R/M/U nerve distributions. Palpable radial pulse. Lab Results:   I have personally reviewed pertinent lab results.   CBC:   Lab Results   Component Value Date    WBC 11.64 (H) 08/05/2023    HGB 15.8 08/05/2023    HCT 49.9 (H) 08/05/2023    MCV 98 08/05/2023     (L) 08/05/2023    RBC 5.10 08/05/2023    MCH 31.0 08/05/2023    MCHC 31.7 08/05/2023    RDW 13.4 08/05/2023    MPV 10.1 08/05/2023    NRBC 0 08/05/2023     CMP:   Lab Results   Component Value Date    SODIUM 136 08/05/2023     08/05/2023    CO2 31 08/05/2023    BUN 21 08/05/2023    CREATININE 0.65 08/05/2023    CALCIUM 8.3 (L) 08/05/2023    AST 16 08/04/2023    ALT 21 08/04/2023    ALKPHOS 67 08/04/2023    EGFR 102 08/05/2023     Imaging Studies: I have personally reviewed pertinent films in PACS and my interpretation is as follows CT scan of the right upper extremity with contrast was reviewed and demonstrates no acute fracture or osseous abnormality. There is fluid present posteriorly to the olecranon consistent with olecranon bursitis. I have reviewed the radiologist report and agree with their impression.     Carrol Castañeda

## 2023-08-05 NOTE — PLAN OF CARE
Problem: Potential for Falls  Goal: Patient will remain free of falls  Description: INTERVENTIONS:  - Educate patient/family on patient safety including physical limitations  - Instruct patient to call for assistance with activity   - Consult OT/PT to assist with strengthening/mobility   - Keep Call bell within reach  - Keep bed low and locked with side rails adjusted as appropriate  - Keep care items and personal belongings within reach  - Initiate and maintain comfort rounds  - Make Fall Risk Sign visible to staff  - Apply yellow socks and bracelet for high fall risk patients  - Consider moving patient to room near nurses station  Outcome: Progressing     Problem: MOBILITY - ADULT  Goal: Maintain or return to baseline ADL function  Description: INTERVENTIONS:  -  Assess patient's ability to carry out ADLs; assess patient's baseline for ADL function and identify physical deficits which impact ability to perform ADLs (bathing, care of mouth/teeth, toileting, grooming, dressing, etc.)  - Assess/evaluate cause of self-care deficits   - Assess range of motion  - Assess patient's mobility; develop plan if impaired  - Assess patient's need for assistive devices and provide as appropriate  - Encourage maximum independence but intervene and supervise when necessary  - Involve family in performance of ADLs  - Assess for home care needs following discharge   - Consider OT consult to assist with ADL evaluation and planning for discharge  - Provide patient education as appropriate  Outcome: Progressing  Goal: Maintains/Returns to pre admission functional level  Description: INTERVENTIONS:  - Perform BMAT or MOVE assessment daily.   - Set and communicate daily mobility goal to care team and patient/family/caregiver.    - Collaborate with rehabilitation services on mobility goals if consulted  - Out of bed for toileting  - Record patient progress and toleration of activity level   Outcome: Progressing     Problem: PAIN - ADULT  Goal: Verbalizes/displays adequate comfort level or baseline comfort level  Description: Interventions:  - Encourage patient to monitor pain and request assistance  - Assess pain using appropriate pain scale  - Administer analgesics based on type and severity of pain and evaluate response  - Implement non-pharmacological measures as appropriate and evaluate response  - Consider cultural and social influences on pain and pain management  - Notify physician/advanced practitioner if interventions unsuccessful or patient reports new pain  Outcome: Progressing     Problem: INFECTION - ADULT  Goal: Absence or prevention of progression during hospitalization  Description: INTERVENTIONS:  - Assess and monitor for signs and symptoms of infection  - Monitor lab/diagnostic results  - Monitor all insertion sites, i.e. indwelling lines, tubes, and drains  - Monitor endotracheal if appropriate and nasal secretions for changes in amount and color  - [place] appropriate cooling/warming therapies per order  - Administer medications as ordered  - Instruct and encourage patient and family to use good hand hygiene technique  - Identify and instruct in appropriate isolation precautions for identified infection/condition  Outcome: Progressing  Goal: Absence of fever/infection during neutropenic period  Description: INTERVENTIONS:  - Monitor WBC    Outcome: Progressing     Problem: SAFETY ADULT  Goal: Patient will remain free of falls  Description: INTERVENTIONS:  - Educate patient/family on patient safety including physical limitations  - Instruct patient to call for assistance with activity   - Consult OT/PT to assist with strengthening/mobility   - Keep Call bell within reach  - Keep bed low and locked with side rails adjusted as appropriate  - Keep care items and personal belongings within reach  - Initiate and maintain comfort rounds  - Make Fall Risk Sign visible to staff  - Apply yellow socks and bracelet for high fall risk patients  - Consider moving patient to room near nurses station  Outcome: Progressing  Goal: Maintain or return to baseline ADL function  Description: INTERVENTIONS:  -  Assess patient's ability to carry out ADLs; assess patient's baseline for ADL function and identify physical deficits which impact ability to perform ADLs (bathing, care of mouth/teeth, toileting, grooming, dressing, etc.)  - Assess/evaluate cause of self-care deficits   - Assess range of motion  - Assess patient's mobility; develop plan if impaired  - Assess patient's need for assistive devices and provide as appropriate  - Encourage maximum independence but intervene and supervise when necessary  - Involve family in performance of ADLs  - Assess for home care needs following discharge   - Consider OT consult to assist with ADL evaluation and planning for discharge  - Provide patient education as appropriate  Outcome: Progressing  Goal: Maintains/Returns to pre admission functional level  Description: INTERVENTIONS:  - Perform BMAT or MOVE assessment daily.   - Set and communicate daily mobility goal to care team and patient/family/caregiver.    - Collaborate with rehabilitation services on mobility goals if consulted  - Out of bed for toileting  - Record patient progress and toleration of activity level   Outcome: Progressing     Problem: DISCHARGE PLANNING  Goal: Discharge to home or other facility with appropriate resources  Description: INTERVENTIONS:  - Identify barriers to discharge w/patient and caregiver  - Arrange for needed discharge resources and transportation as appropriate  - Identify discharge learning needs (meds, wound care, etc.)  - Arrange for interpretive services to assist at discharge as needed  - Refer to Case Management Department for coordinating discharge planning if the patient needs post-hospital services based on physician/advanced practitioner order or complex needs related to functional status, cognitive ability, or social support system  Outcome: Progressing     Problem: Knowledge Deficit  Goal: Patient/family/caregiver demonstrates understanding of disease process, treatment plan, medications, and discharge instructions  Description: Complete learning assessment and assess knowledge base.   Interventions:  - Provide teaching at level of understanding  - Provide teaching via preferred learning methods  Outcome: Progressing

## 2023-08-05 NOTE — ASSESSMENT & PLAN NOTE
Erythema and swelling of right upper extremity involving elbow  · CT shows bursitis  · Concern for septic arthritis given warm erythematous and swollen elbow joint  · History of immunosuppression  · It is odd that patient would have Staph aureus UTI, frequency could have just been related to known BPH  · Await blood culture results

## 2023-08-05 NOTE — ASSESSMENT & PLAN NOTE
Follows with neurology as an outpatient  · Uses wheelchair mostly for ambulation  · PT/OT eval  · Outpatient follow-up with neurology  · Continue steroid taper as documented in outpatient neurology notes

## 2023-08-05 NOTE — ASSESSMENT & PLAN NOTE
Beard catheter placed on admission due to urinary retention  · Consider removal in the next 24 to 48 hours

## 2023-08-05 NOTE — ASSESSMENT & PLAN NOTE
Patient presents with sodium 129  · Patient does use NSAIDs at home which could be a component  · May be poor solute intake or SIADH in setting of acute illness  · We will provide gentle IV fluids overnight  · We will check urine studies  · Resolved

## 2023-08-06 LAB
ANION GAP SERPL CALCULATED.3IONS-SCNC: 3 MMOL/L
BACTERIA UR CULT: ABNORMAL
BUN SERPL-MCNC: 15 MG/DL (ref 5–25)
CALCIUM SERPL-MCNC: 8.3 MG/DL (ref 8.4–10.2)
CHLORIDE SERPL-SCNC: 100 MMOL/L (ref 96–108)
CO2 SERPL-SCNC: 32 MMOL/L (ref 21–32)
CREAT SERPL-MCNC: 0.69 MG/DL (ref 0.6–1.3)
ERYTHROCYTE [DISTWIDTH] IN BLOOD BY AUTOMATED COUNT: 13.2 % (ref 11.6–15.1)
GFR SERPL CREATININE-BSD FRML MDRD: 100 ML/MIN/1.73SQ M
GLUCOSE SERPL-MCNC: 80 MG/DL (ref 65–140)
HCT VFR BLD AUTO: 48.6 % (ref 36.5–49.3)
HGB BLD-MCNC: 15.9 G/DL (ref 12–17)
MCH RBC QN AUTO: 31.7 PG (ref 26.8–34.3)
MCHC RBC AUTO-ENTMCNC: 32.7 G/DL (ref 31.4–37.4)
MCV RBC AUTO: 97 FL (ref 82–98)
PLATELET # BLD AUTO: 150 THOUSANDS/UL (ref 149–390)
PMV BLD AUTO: 10 FL (ref 8.9–12.7)
POTASSIUM SERPL-SCNC: 4.4 MMOL/L (ref 3.5–5.3)
RBC # BLD AUTO: 5.02 MILLION/UL (ref 3.88–5.62)
SODIUM SERPL-SCNC: 135 MMOL/L (ref 135–147)
VANCOMYCIN SERPL-MCNC: 8.8 UG/ML (ref 10–20)
WBC # BLD AUTO: 9.58 THOUSAND/UL (ref 4.31–10.16)

## 2023-08-06 PROCEDURE — 99232 SBSQ HOSP IP/OBS MODERATE 35: CPT | Performed by: PHYSICIAN ASSISTANT

## 2023-08-06 PROCEDURE — 80048 BASIC METABOLIC PNL TOTAL CA: CPT | Performed by: PHYSICIAN ASSISTANT

## 2023-08-06 PROCEDURE — 97163 PT EVAL HIGH COMPLEX 45 MIN: CPT | Performed by: PHYSICAL THERAPIST

## 2023-08-06 PROCEDURE — 85027 COMPLETE CBC AUTOMATED: CPT | Performed by: PHYSICIAN ASSISTANT

## 2023-08-06 PROCEDURE — 97167 OT EVAL HIGH COMPLEX 60 MIN: CPT

## 2023-08-06 PROCEDURE — 99255 IP/OBS CONSLTJ NEW/EST HI 80: CPT | Performed by: STUDENT IN AN ORGANIZED HEALTH CARE EDUCATION/TRAINING PROGRAM

## 2023-08-06 PROCEDURE — NC001 PR NO CHARGE: Performed by: PHYSICIAN ASSISTANT

## 2023-08-06 PROCEDURE — 80202 ASSAY OF VANCOMYCIN: CPT | Performed by: INTERNAL MEDICINE

## 2023-08-06 RX ORDER — CEFAZOLIN SODIUM 2 G/50ML
2000 SOLUTION INTRAVENOUS EVERY 8 HOURS
Status: DISCONTINUED | OUTPATIENT
Start: 2023-08-06 | End: 2023-08-15 | Stop reason: HOSPADM

## 2023-08-06 RX ADMIN — Medication 3 MG: at 20:37

## 2023-08-06 RX ADMIN — OXYCODONE HYDROCHLORIDE 10 MG: 10 TABLET ORAL at 22:21

## 2023-08-06 RX ADMIN — OXYCODONE HYDROCHLORIDE 10 MG: 10 TABLET ORAL at 05:38

## 2023-08-06 RX ADMIN — CEFAZOLIN SODIUM 2000 MG: 2 SOLUTION INTRAVENOUS at 20:37

## 2023-08-06 RX ADMIN — VANCOMYCIN HYDROCHLORIDE 1500 MG: 1 INJECTION, POWDER, LYOPHILIZED, FOR SOLUTION INTRAVENOUS at 08:58

## 2023-08-06 RX ADMIN — PREDNISONE 20 MG: 20 TABLET ORAL at 08:58

## 2023-08-06 RX ADMIN — BISACODYL 10 MG: 10 SUPPOSITORY RECTAL at 19:30

## 2023-08-06 NOTE — CASE MANAGEMENT
Case Management Assessment & Discharge Planning Note    Patient name Jennifer Kaur  Location [facility] /E2 -* MRN [id number]  : 1960 Date 2023       Current Admission Date: 2023  Current Admission Diagnosis:Cellulitis   Patient Active Problem List    Diagnosis Date Noted   • Cellulitis 2023   • Arm swelling 2023   • Myelomalacia of cervical cord ([address]) 2022   • Quadriparesis ([address]) 10/19/2022   • Slow transit constipation 2022   • Weakness of right lower extremity 2022   • Ambulatory dysfunction 2022   • Status post lumbar spinal fusion 2021   • S/P insertion of spinal cord stimulator 06/10/2021   • Hydrocele of testis 2020   • Chest discomfort 2020   • Hx of colonoscopy 2019   • Cauda equina syndrome ([address]) 2018   • Spinal stenosis of thoracolumbar region 2018   • Acute cystitis without hematuria 2018   • Erectile dysfunction 2018   • Hypogonadism in male 2018   • BPH associated with nocturia 2018      LOS (days): 2  Geometric Mean LOS (GMLOS) (days):   Days to GMLOS:     OBJECTIVE:    Risk of Unplanned Readmission Score: 6.65         Current admission status: Inpatient       Preferred Pharmacy:   [address] [place] [address] [id number] Cephus Fothergill, [facility] [address]  [address] [place] [address]  Phone: [phone] Fax: [phone]    [place], [place] -  [address] Claudeen Friendly. [address] [facility] [address]  Phone: [phone] Fax: [phone]    [address] [place] [address]  Phone: [phone] Fax: [phone]    Primary Care Provider: Consuelo Landis DO    Primary Insurance: BLUE CROSS  Secondary Insurance:     ASSESSMENT:  [place]kshat Proxies    There are no active Health Care Proxies on file.        Readmission Root Cause  30 Day Readmission: No    Patient Information  Admitted from[de-identified] Home  Mental Status: Alert  During Assessment patient was accompanied by:  (significant other)  Assessment information provided by[de-identified] Patient  Primary Caregiver: Other (Comment) (Significant Other)  Caregiver's Relationship to Patient[de-identified] Significant Other  County of Residence: [address] [place] [address] do you live in?: [address] entry access options. Select all that apply.: No steps to enter home  Type of Current Residence: 2 story home  Upon entering residence, is there a bedroom on the main floor (no further steps)?: Yes  Upon entering residence, is there a bathroom on the main floor (no further steps)?: Yes  In the last 12 months, was there a time when you were not able to pay the mortgage or rent on time?: No  In the last 12 months, how many places have you lived?: 1  In the last 12 months, was there a time when you did not have a steady place to sleep or slept in a shelter (including now)?: No  Homeless/housing insecurity resource given?: N/A  Living Arrangements: Lives w/ Spouse/significant other  Is patient a ?: Yes ([id number])  Is patient active with [facility] (Ant Oliva)?: Yes Sonny Roach)  Is patient service connected?: Yes (100% disabled)    Activities of Daily Living Prior to Admission  Functional Status: Assistance  Completes ADLs independently?: No  Level of ADL dependence: Assistance  Ambulates independently?: No  Level of ambulatory dependence: Assistance  Does patient use assisted devices?: Yes  Assisted Devices (DME) used: Whitney Cottrell  Does patient currently own DME?: Yes  What DME does the patient currently own?: Chanda Marti  Does patient have a history of Outpatient Therapy (PT/OT)?: Yes ([facility])  Does the patient have a history of Short-Term Rehab?: No  Does patient have a history of HHC?: No  Does patient currently have [facility]?: No    Patient Information Continued  Income Source:  Other (Comment) (100% disabled [employer])  Does patient have prescription coverage?: Yes  Within the past 12 months, you worried that your food would run out before you got the money to buy more.: Never true  Within the past 12 months, the food you bought just didn't last and you didn't have money to get more.: Never true  Food insecurity resource given?: N/A  Does patient receive dialysis treatments?: No     Means of Transportation  Means of Transport to Appts[de-identified] Other (Comment) (Private )  In the past 12 months, has lack of transportation kept you from medical appointments or from getting medications?: No  In the past 12 months, has lack of transportation kept you from meetings, work, or from getting things needed for daily living?: No  Was application for public transport provided?: N/A        DISCHARGE DETAILS:    Discharge planning discussed with[de-identified] Patient  Freedom of Choice: No  Comments - Freedom of Choice: Pt prefers to discahrge to [facility] for acute rehab. Were Treatment Team discharge recommendations reviewed with patient/caregiver?: Yes  Did patient/caregiver verbalize understanding of patient care needs?: Yes  Were patient/caregiver advised of the risks associated with not following Treatment Team discharge recommendations?: Yes    Contacts  Patient Contacts: Rosalina Barker (Son) [phone] (M)  Relationship to Patient[de-identified] Family  Contact Method: Phone  Reason/Outcome: Emergency [address]         Is the patient interested in [address] [facility] at discharge?: No  DME Referral Provided  Referral made for DME?: No    Treatment Team Recommendation: Acute Rehab  Transport at Discharge : [address], [facility] Ambulance    Additional Comments: CM Consult for Post Acute Placement. CM met w/ Pt to discuss dcp. As per Pt, therapy is recommending acute rehab. Pt states he has received rehab at [facility] before and prefers their program to others. Pt may or may not discharge w/ IV Abx. CM sent referral to [facility].  Pt states his [employer] will cover the cost of his rehab. Pt is a 100% disabled , however he is not renrolled in their medical insurance. Pt not yet stable to discharge- CM continues to follow. CM Consult closed.

## 2023-08-06 NOTE — PLAN OF CARE
Problem: OCCUPATIONAL THERAPY ADULT  Goal: Performs self-care activities at highest level of function for planned discharge setting. See evaluation for individualized goals. Description:   Problem: OCCUPATIONAL THERAPY ADULT  Goal: Performs self-care activities at highest level of function for planned discharge setting. See evaluation for individualized goals. Description: Treatment Interventions: ADL retraining, Functional transfer training, UE strengthening/ROM, Endurance training, Patient/family training, Equipment evaluation/education, Compensatory technique education          See flowsheet documentation for full assessment, interventions and recommendations. 8/6/2023 1211 by Ann Ravi, OT  Note: Limitation: Decreased ADL status, Decreased UE ROM, Decreased UE strength, Decreased Safe judgement during ADL, Decreased endurance, Decreased high-level ADLs  Prognosis: Fair  Assessment: Pt is a 63y/o male admitted to the hospital 2* R elbow pain, swelling. Pt noted with cellulitis, acute cystitis; MRI(C-spine)=severe cord athrophy. Pt with PMH myelomalacia of C-spine, BPH, balance, problems, chronic back pain, spinal cord stimulator implant, back/neck sx. PTA pt states that he has assistance with his ADLs; states recent need for assistance with transfers; ambulates with RW, assist x 1--states able to walk 300-600ft; neg home alone--has caregivers, +fall=1; uses w/c. During initial eval, pt demonstrated deficits with his functional balance, functional mobility, ADL status, transfer safety, b/l UE strength, and activity tolerance(currently fair=15-20mins). Functional mobility assessment limited 2* pt's trunk control and poor LE strength. Pt would benefit from continued OT assessment and tx for the above deficits. 3-5xwk/1-2wks. The patient's raw score on the AM-PAC Daily Activity Inpatient Short Form is 11.  A raw score of less than 19 suggests the patient may benefit from discharge to post-acute rehabilitation services. Please refer to the recommendation of the Occupational Therapist for safe discharge planning. OT Discharge Recommendation: Post acute rehabilitation services       8/6/2023 1210 by Ev Bunn OT  Note: Limitation: Decreased ADL status, Decreased UE ROM, Decreased UE strength, Decreased Safe judgement during ADL, Decreased endurance, Decreased high-level ADLs  Prognosis: Fair  Assessment: Pt is a 61y/o male admitted to the hospital 2* R elbow pain, swelling. Pt noted with cellulitis, acute cystitis. Pt with PMH myelomalacia of C-spine, BPH, balance, problems, chronic back pain, spinal cord stimulator implant, back/neck sx. PTA pt states that he has assistance with his ADLs; states recent need for assistance with transfers; ambulates with RW, assist x 1--states able to walk 300-600ft; neg home alone--has caregivers, +fall=1; uses w/c. During initial eval, pt demonstrated deficits with his functional balance, functional mobility, ADL status, transfer safety, b/l UE strength, and activity tolerance(currently fair=15-20mins). Functional mobility assessment limited 2* pt's trunk control and poor LE strength. Pt would benefit from continued OT assessment and tx for the above deficits. 3-5xwk/1-2wks. The patient's raw score on the AM-PAC Daily Activity Inpatient Short Form is 11. A raw score of less than 19 suggests the patient may benefit from discharge to post-acute rehabilitation services. Please refer to the recommendation of the Occupational Therapist for safe discharge planning. OT Discharge Recommendation: Post acute rehabilitation services        Treatment Interventions: ADL retraining, Functional transfer training, UE strengthening/ROM, Endurance training, Patient/family training, Equipment evaluation/education, Compensatory technique education          See flowsheet documentation for full assessment, interventions and recommendations.    Note: Limitation: Decreased ADL status, Decreased UE ROM, Decreased UE strength, Decreased Safe judgement during ADL, Decreased endurance, Decreased high-level ADLs  Prognosis: Fair  Assessment: Pt is a 63y/o male admitted to the hospital 2* R elbow pain, swelling. Pt noted with cellulitis, acute cystitis. Pt with PMH myelomalacia of C-spine, BPH, balance, problems, chronic back pain, spinal cord stimulator implant, back/neck sx. PTA pt states that he has assistance with his ADLs; states recent need for assistance with transfers; ambulates with RW, assist x 1--states able to walk 300-600ft; neg home alone--has caregivers, +fall=1; uses w/c. During initial eval, pt demonstrated deficits with his functional balance, functional mobility, ADL status, transfer safety, b/l UE strength, and activity tolerance(currently fair=15-20mins). Functional mobility assessment limited 2* pt's trunk control and poor LE strength. Pt would benefit from continued OT assessment and tx for the above deficits. 3-5xwk/1-2wks. The patient's raw score on the AM-PAC Daily Activity Inpatient Short Form is 11. A raw score of less than 19 suggests the patient may benefit from discharge to post-acute rehabilitation services. Please refer to the recommendation of the Occupational Therapist for safe discharge planning.      OT Discharge Recommendation: Post acute rehabilitation services

## 2023-08-06 NOTE — CONSULTS
Consultation - Infectious Disease   Della Crews 57 y.o. male MRN: [id number]  Unit/Bed#: E2 -01 Encounter: [id number]      IMPRESSION & RECOMMENDATIONS:     1. Right elbow septic olecranon bursitis and cellulitis. There are no obvious wounds on the right elbow, but the patient frequently has irritation of the elbow since he supports his weight with it, so suspect infection due to repeated microtrauma causing translocation of skin bacteria into the bursa. No evidence of septic arthritis at the time. The patient has no history of MRSA and recent urine cultures show growth of MSSA but interestingly blood cultures show no growth   -Stop IV ceftriaxone and vancomycin and start IV cefazolin 2 g every 8 hours   -Follow-up blood cultures   -Appreciate orthopedic surgery follow-up   -Please send wound cultures if I&D is performed   -Monitor clinical exam    2. Leukocytosis, POA. White blood cell count 14.51 on admission. The patient is afebrile and hemodynamically stable. Due to #1 above. Blood cultures show no growth to date despite growth of Staph aureus in urine culture.   -Antibiotics as above   -Monitor exam   -Monitor CBC with differential, fever curve   -Follow-up blood cultures    3. BPH, urinary retention. Beard catheter placed on admission. Recommend urology follow-up    4. Pyuria, bacteriuria. The patient reports urinary frequency and weak urinary stream.  I suspect his symptoms are more due to BPH than infection. Urine culture recently grew MSSA; when staph aureus is seen in the urine this is often related to bacteremia, but blood cultures thus far are negative   -Urinary retention protocol   -Outpatient urology follow-up    5. Quadriparesis, ambulatory dysfunction due to cervical myelomalacia.   Follows with neurology outpatient and is on a steroid taper   -Steroid taper per primary team   -Recommend PT/OT evaluation to help the patient reduce trauma to the right elbow in the future    I have discussed the above management plan in detail with the primary service, patient and his family. ID will follow. I have performed an extensive review of the medical records in Epic including review of the notes, radiographs, and laboratory results     HISTORY OF PRESENT ILLNESS:  Reason for Consult: Right elbow olecranon bursitis and cellulitis  HPI: Emiliana Jack is a 57y.o. year old male with quadriparesis and ambulatory dysfunction due to cervical myelomalacia. The patient reports increasing weakness and decline over the last several weeks; he is on chronic steroids which are currently being tapered by his neurologist.  The patient reports that he has also been experiencing urinary frequency and symptoms of BPH; his neurologist checked a urinalysis and culture on 8/1/2023 and he was prescribed ciprofloxacin. Urine culture then grew over 100,000 MSSA so he was started on Bactrim on 8/3/2023. However the patient then presented to the hospital on 8/4/2023 with several days of worsening right elbow pain, swelling, erythema. The patient frequently puts weight on the elbow to support himself and often has skin tears due to thin skin, but it has never been this red or swollen before. On presentation the patient was afebrile with a white blood cell count of 14.51. Blood cultures were collected which show no growth to date, urine culture is again showing growth of Staph aureus. CT of the right upper extremity showed olecranon bursitis with adjacent cellulitis, no osseous abnormality. He was started on IV vancomycin and ceftriaxone. Orthopedic surgery is following, monitoring on antibiotics in case I&D is needed. REVIEW OF SYSTEMS:  A complete review of systems is negative other than that noted in the HPI.     PAST MEDICAL HISTORY:  Past Medical History:   Diagnosis Date   • Balance problems    • BPH (benign prostatic hyperplasia)    • Chronic back pain    • Chronic pain disorder    • COVID-19     2021-asymptomatic    • Erectile dysfunction    • Nocturia    • OAB (overactive bladder)    • Testicular hypogonadism    • Urinary frequency    • Urinary urgency      Past Surgical History:   Procedure Laterality Date   • BACK SURGERY  2016    with hardware   • BACK SURGERY      2 back surgery unable to remove hardware   • COLONOSCOPY      Dr Solange Charlton. tubular adenoma polyp removed   • COLONOSCOPY      Dr Vidhi Carpenter. Normal   • COLONOSCOPY      Dr Vidhi Carpenter. Normal.    • COLONOSCOPY  2019    Dr Vidhi Carpenter. Hemorrhoids, normal.    • NECK SURGERY      decompression of neck    • NECK SURGERY Right     scar tissues removed   • [place] EXCISION HYDROCELE UNILATERAL Right 06/10/2021    Procedure: HYDROCELECTOMY;  Surgeon: Ana Maria Coffey DO;  Location: [facility];  Service: Urology   • SPINAL CORD STIMULATOR IMPLANT  10/10/2019       FAMILY HISTORY:  Non-contributory    SOCIAL HISTORY:  Social History   Social History     Substance and Sexual Activity   Alcohol Use Yes    Comment: Drinks socially. Social History     Substance and Sexual Activity   Drug Use No     Social History     Tobacco Use   Smoking Status Never   Smokeless Tobacco Never       ALLERGIES:  Allergies   Allergen Reactions   • Morphine Other (See Comments)     Addiction hx. Pt wants no narcotics  Except surgical interventions       MEDICATIONS:  All current active medications have been reviewed.     PHYSICAL EXAM:  Temp:  [97.4 °F (36.3 °C)-98 °F (36.7 °C)] 97.4 °F (36.3 °C)  HR:  [50-68] 50  Resp:  [18] 18  BP: (123-133)/(59-71) 125/71  SpO2:  [93 %-100 %] 100 %  Temp (24hrs), Av.6 °F (36.4 °C), Min:97.4 °F (36.3 °C), Max:98 °F (36.7 °C)  Current: Temperature: (!) 97.4 °F (36.3 °C)    Intake/Output Summary (Last 24 hours) at 2023 1150  Last data filed at 2023 2330  Gross per 24 hour   Intake --   Output 4550 ml   Net -4550 ml       General Appearance:  Appearing well, nontoxic, and in no distress   Head:  Normocephalic, without obvious abnormality, atraumatic   Eyes:  Conjunctiva pink and sclera anicteric, both eyes   Nose: Nares normal, mucosa normal, no drainage   Throat: Oropharynx moist without lesions   Neck: Supple, symmetrical, no adenopathy, no tenderness/mass/nodules   Back:   Symmetric, no curvature, ROM normal, no CVA tenderness   Lungs:   Clear to auscultation bilaterally, respirations unlabored   Chest Wall:  No tenderness or deformity   Heart:  RRR; no murmur, rub or gallop   Abdomen:   Soft, non-tender, non-distended, positive bowel sounds    Extremities:  Right elbow olecranon bursa erythema, fluctuance. No swelling of the elbow joint itself. Mild swelling of the arm   Skin:  Right elbow erythema and swelling   Lymph nodes: Cervical, supraclavicular nodes normal   Neurologic: Alert and oriented times 3, lower extremity weakness       LABS, IMAGING, & OTHER STUDIES:  Lab Results:  I have personally reviewed pertinent labs. Results from last 7 days   Lab Units 08/06/23  0540 08/05/23  0447 08/04/23  1406   WBC Thousand/uL 9.58 11.64* 14.51*   HEMOGLOBIN g/dL 15.9 15.8 18.5*   PLATELETS Thousands/uL 150 145* 158     Results from last 7 days   Lab Units 08/06/23  0540 08/05/23  0447 08/04/23  1406 08/01/23  0938   SODIUM mmol/L 135 136 129* 131*   POTASSIUM mmol/L 4.4 4.6 4.2 4.6   CHLORIDE mmol/L 100 101 95* 99   CO2 mmol/L 32 31 27 26   BUN mg/dL 15 21 19 22   CREATININE mg/dL 0.69 0.65 0.69 0.66   EGFR ml/min/1.73sq m 100 102 100 102   CALCIUM mg/dL 8.3* 8.3* 8.6 9.1   AST U/L  --   --  16 15   ALT U/L  --   --  21 44   ALK PHOS U/L  --   --  67 84     Results from last 7 days   Lab Units 08/04/23  1645 08/04/23  1406 08/01/23  0938   BLOOD CULTURE  No Growth at 24 hrs.  No Growth at 24 hrs.  --    URINE CULTURE   --  60,000-69,000 cfu/ml Staphylococcus aureus* >100,000 cfu/ml Staphylococcus aureus*         Results from last 7 days   Lab Units 08/01/23  0938   CRP mg/L 23.2*               Imaging Studies:   I have personally reviewed pertinent imaging study reports and images in PACS.     CT right arm: Olecranon bursitis and adjacent edema/cellulitis

## 2023-08-06 NOTE — PHYSICAL THERAPY NOTE
Physical Therapy Evaluation    Performed at least 2 patient identifiers during session:  Patient Active Problem List   Diagnosis    Erectile dysfunction    Hypogonadism in male    BPH associated with nocturia    Acute cystitis without hematuria    Cauda equina syndrome ([address])    Spinal stenosis of thoracolumbar region    Chest discomfort    Hydrocele of testis    S/P insertion of spinal cord stimulator    Status post lumbar spinal fusion    Hx of colonoscopy    Weakness of right lower extremity    Ambulatory dysfunction    Slow transit constipation    Quadriparesis ([address])    Myelomalacia of cervical cord (HCC)    Cellulitis    Arm swelling       Past Medical History:   Diagnosis Date    Balance problems     BPH (benign prostatic hyperplasia)     Chronic back pain     Chronic pain disorder     COVID-19     2/2021-asymptomatic     Erectile dysfunction     Nocturia     OAB (overactive bladder)     Testicular hypogonadism     Urinary frequency     Urinary urgency        Past Surgical History:   Procedure Laterality Date    BACK SURGERY  05/18/2016    with hardware    BACK SURGERY      2 back surgery unable to remove hardware    COLONOSCOPY  2004    Dr Cindi Wallis. tubular adenoma polyp removed    COLONOSCOPY  2007    Dr Johnnie Perez. Normal    COLONOSCOPY  2013    Dr Johnnie Perez. Normal.     COLONOSCOPY  06/2019    Dr Johnnie Perez. Hemorrhoids, normal.     NECK SURGERY      decompression of neck     NECK SURGERY Right     scar tissues removed    UT EXCISION HYDROCELE UNILATERAL Right 06/10/2021    Procedure: HYDROCELECTOMY;  Surgeon: Melo Nunez DO;  Location: [facility];  Service: Urology    SPINAL CORD STIMULATOR IMPLANT  10/10/2019          08/06/23 1115   PT Last Visit   PT Visit Date 08/06/23   Note Type   Note type Evaluation   Pain Assessment   Pain Assessment Tool 0-10   Pain Score 8   Pain Location/Orientation Location: Generalized   Hospital Pain Intervention(s) Repositioned; Ambulation/increased activity; Elevated; Emotional support  (resistent to assist for repositioning)   Restrictions/Precautions   Weight Bearing Precautions Per Order No   Other Precautions Chair Alarm; Bed Alarm;Multiple lines; Fall Risk;Pain   Home Living   Type of Home House   Home Layout Multi-level; Able to live on main level with bedroom/bathroom  (no stairs to enter)   [address] chair   Home Equipment Walker;Cane  (wheel chair)   Prior Function   Level of [place] Needs assistance with ADLs; Needs assistance with functional mobility; Needs assistance with IADLS   Lives With [address] From Home health;Personal care attendant; Outpatient therapy  ([facility] aquatic, land PT, OT, masseuse, home )   IADLs Family/Friend/Other provides transportation; Family/Friend/Other provides meals; Family/Friend/Other provides medication management   Falls in the last 6 months 1 to 4  (1)   Comments pt lives alone but has daily caregivers, getting OPPT, home exercies and massage. pt uses a rw for 300-600' distance until recently declining for about 4 months. pt reports normally able to stand without assist but has chair follow for his walking with rw. repots now having increased extensor tone in ble. reports being indep with bed mobility PTA baseline pain level per pt is 5.5/10   General   Family/Caregiver Present No   Cognition   Overall Cognitive Status WFL   Orientation Level Oriented X4   Comments impatient   Subjective   Subjective couldn't stand up yesterday, does not want to wear any clothing.  discarded sheet covering lower body   RUE Assessment   RUE Assessment X  (see OT eval, noted edema rue, redness olecranon)   LUE Assessment   LUE Assessment X  (see OT eval)   RLE Assessment   RLE Assessment X  (unable to motor test due to pain, noted extensor tone during bed mobility)   LLE Assessment   LLE Assessment X  (unable to motor test due to pain, noted extensor tone during bed mobility)   Coordination   Movements are Fluid and Coordinated 0   Coordination and Movement Description exensor tone ble   Sensation X   Light Touch   RLE Light Touch   (hypersensitivity)   Bed Mobility   Rolling R 2  Maximal assistance   Additional items Assist x 2; Increased time required; Impulsive;Verbal cues;LE management   Rolling L 2  Maximal assistance   Additional items Assist x 2; Increased time required; Impulsive;Verbal cues;LE management   Supine to Sit 2  Maximal assistance   Additional items Assist x 2;Bedrails; Increased time required; Impulsive;Verbal cues;LE management   Sit to Supine 2  Maximal assistance   Additional items Assist x 2;Bedrails; Increased time required; Impulsive;Verbal cues;LE management   Transfers   Sit to Stand 1  Dependent   Additional items Assist x 2;Verbal cues; Bedrails; Increased time required  (no contribution from ble, blue pad sling)   Stand to Sit 1  Dependent   Additional items Assist x 2; Increased time required;Verbal cues; Bedrails   Ambulation/Elevation   Gait pattern Not tested   Balance   Static Sitting Fair -  (lean to left but complaining that is is fearful of falling to right)   Dynamic Sitting Poor +   Static Standing Zero   Endurance Deficit   Endurance Deficit Yes   Endurance Deficit Description pain   Activity Tolerance   Activity Tolerance Patient limited by pain;Treatment limited secondary to medical complications (Comment)   Medical Staff Made Aware OT   Nurse Made Aware yes   Assessment   Prognosis Fair   Problem List Decreased strength;Decreased range of motion;Decreased endurance; Impaired balance;Decreased mobility; Decreased safety awareness; Impaired sensation; Impaired tone;Obesity;Pain   Assessment pt admitted with complaint of R elbow pain and swelling. pt dx with cullulitis and arm swelling. pt referred to PT. pt has history of cervical spinal cord malacia, quadriparesis, cauda equinal syndrome and spinal cord stimulator. pt referred to PT. pt was living in multi level home on first floor.  pt was receiving O[facility]T [facility] at [facility] for PT, OT, aquatic therapy and home massage, . pt was using rw for distance amb 300-600' with chair follow. pt reports gradual decline over last month, now unable to stand. pt relies on caregivers for all other functions. pt  demonstrated a dependent functional level. pt has limjitations in bue strength and rom, ble strength and tone, pain in generalized fashion and was unable to stand or amb. pt noted to have increased extensor tone ble. pt did needed max assist of 2 for rolling, supine to and from sitting, attempt at standing. pt reported 8/10 pain generalized. pt has current deficits in rom, strength, tone, balance, locomotion, pain control, sensation, activity tolerance. pt will need skilled PT and rehab. Barriers to Discharge None   Goals   Patient Goals less pain and be able to pull himself up   STG Expiration Date 08/20/23   Short Term Goal #1 bed mobility with mod assist of 1, sitting balance F+ to G_. activity tolerance 45 minutes. improve strength by 1-2 grades ble, lateral scoot on bed, transition to sliding board with mod  assist of 2, demonstrate good safety awareness   Plan   Treatment/Interventions Functional transfer training;LE strengthening/ROM; Therapeutic exercise; Endurance training;Patient/family training;Equipment eval/education; Bed mobility;OT  (sliding board when able to lateral scoot in bed.)   PT Frequency 4-6x/wk   Recommendation   PT Discharge Recommendation Post acute rehabilitation services   AM-PAC Basic Mobility Inpatient   Turning in Flat Bed Without Bedrails 1   Lying on Back to Sitting on Edge of Flat Bed Without Bedrails 1   Moving Bed to Chair 1   Standing Up From Chair Using Arms 1   Walk in Room 1   Climb 3-5 Stairs With Railing 1   Basic Mobility Inpatient Raw Score 6   Turning Head Towards Sound 4   Follow Simple Instructions 3   Low Function Basic Mobility Raw Score  13   Low Function Basic Mobility Standardized Score  20.14   Highest Level Of Mobility   JH-HLM Goal 2: Bed activities/Dependent transfer   JH-HLM Achieved 3: Sit at edge of bed     An AM-PAC Basic Mobility standardized score less than 42.9 suggests the patient may benefit from discharge to post-acute rehab services.     History: co - morbidities, fall risk, use of assistive device, assist for adl's,  multiple lines  Exam: impairments in locomotion, musculoskeletal, balance,, joint integrity(spine), tone, sensation, Clinical: unstable/unpredictable- quadriparesis  Complexity:high  Ann Townsend, PT

## 2023-08-06 NOTE — PLAN OF CARE
Problem: Potential for Falls  Goal: Patient will remain free of falls  Description: INTERVENTIONS:  - Educate patient/family on patient safety including physical limitations  - Instruct patient to call for assistance with activity   - Consult OT/PT to assist with strengthening/mobility   - Keep Call bell within reach  - Keep bed low and locked with side rails adjusted as appropriate  - Keep care items and personal belongings within reach  - Initiate and maintain comfort rounds  - Make Fall Risk Sign visible to staff  - Offer Toileting every 2 Hours, in advance of need  - Initiate/Maintain bed alarm  - Obtain necessary fall risk management equipment  - Apply yellow socks and bracelet for high fall risk patients  - Consider moving patient to room near nurses station  Outcome: Progressing     Problem: MOBILITY - ADULT  Goal: Maintain or return to baseline ADL function  Description: INTERVENTIONS:  -  Assess patient's ability to carry out ADLs; assess patient's baseline for ADL function and identify physical deficits which impact ability to perform ADLs (bathing, care of mouth/teeth, toileting, grooming, dressing, etc.)  - Assess/evaluate cause of self-care deficits   - Assess range of motion  - Assess patient's mobility; develop plan if impaired  - Assess patient's need for assistive devices and provide as appropriate  - Encourage maximum independence but intervene and supervise when necessary  - Involve family in performance of ADLs  - Assess for home care needs following discharge   - Consider OT consult to assist with ADL evaluation and planning for discharge  - Provide patient education as appropriate  Outcome: Progressing  Goal: Maintains/Returns to pre admission functional level  Description: INTERVENTIONS:  - Perform BMAT or MOVE assessment daily.   - Set and communicate daily mobility goal to care team and patient/family/caregiver.    - Collaborate with rehabilitation services on mobility goals if consulted  - Perform Range of Motion 2 times a day. - Reposition patient every 2 hours.   - Dangle patient 2 times a day  - Stand patient 2 times a day  - Ambulate patient 2 times a day  - Out of bed to chair 2 times a day   - Out of bed for meals 2 times a day  - Out of bed for toileting  - Record patient progress and toleration of activity level   Outcome: Progressing     Problem: PAIN - ADULT  Goal: Verbalizes/displays adequate comfort level or baseline comfort level  Description: Interventions:  - Encourage patient to monitor pain and request assistance  - Assess pain using appropriate pain scale  - Administer analgesics based on type and severity of pain and evaluate response  - Implement non-pharmacological measures as appropriate and evaluate response  - Consider cultural and social influences on pain and pain management  - Notify physician/advanced practitioner if interventions unsuccessful or patient reports new pain  Outcome: Progressing     Problem: INFECTION - ADULT  Goal: Absence or prevention of progression during hospitalization  Description: INTERVENTIONS:  - Assess and monitor for signs and symptoms of infection  - Monitor lab/diagnostic results  - Monitor all insertion sites, i.e. indwelling lines, tubes, and drains  - Monitor endotracheal if appropriate and nasal secretions for changes in amount and color  - [place] appropriate cooling/warming therapies per order  - Administer medications as ordered  - Instruct and encourage patient and family to use good hand hygiene technique  - Identify and instruct in appropriate isolation precautions for identified infection/condition  Outcome: Progressing  Goal: Absence of fever/infection during neutropenic period  Description: INTERVENTIONS:  - Monitor WBC    Outcome: Progressing     Problem: SAFETY ADULT  Goal: Patient will remain free of falls  Description: INTERVENTIONS:  - Educate patient/family on patient safety including physical limitations  - Instruct patient to call for assistance with activity   - Consult OT/PT to assist with strengthening/mobility   - Keep Call bell within reach  - Keep bed low and locked with side rails adjusted as appropriate  - Keep care items and personal belongings within reach  - Initiate and maintain comfort rounds  - Make Fall Risk Sign visible to staff  - Offer Toileting every 2 Hours, in advance of need  - Initiate/Maintain bed alarm  - Obtain necessary fall risk management equipment  - Apply yellow socks and bracelet for high fall risk patients  - Consider moving patient to room near nurses station  Outcome: Progressing  Goal: Maintain or return to baseline ADL function  Description: INTERVENTIONS:  -  Assess patient's ability to carry out ADLs; assess patient's baseline for ADL function and identify physical deficits which impact ability to perform ADLs (bathing, care of mouth/teeth, toileting, grooming, dressing, etc.)  - Assess/evaluate cause of self-care deficits   - Assess range of motion  - Assess patient's mobility; develop plan if impaired  - Assess patient's need for assistive devices and provide as appropriate  - Encourage maximum independence but intervene and supervise when necessary  - Involve family in performance of ADLs  - Assess for home care needs following discharge   - Consider OT consult to assist with ADL evaluation and planning for discharge  - Provide patient education as appropriate  Outcome: Progressing  Goal: Maintains/Returns to pre admission functional level  Description: INTERVENTIONS:  - Perform BMAT or MOVE assessment daily.   - Set and communicate daily mobility goal to care team and patient/family/caregiver. - Collaborate with rehabilitation services on mobility goals if consulted  - Perform Range of Motion 2 times a day. - Reposition patient every 2 hours.   - Dangle patient 2 times a day  - Stand patient 2 times a day  - Ambulate patient 2 times a day  - Out of bed to chair 2 times a day   - Out of bed for meals 2 times a day  - Out of bed for toileting  - Record patient progress and toleration of activity level   Outcome: Progressing     Problem: DISCHARGE PLANNING  Goal: Discharge to home or other facility with appropriate resources  Description: INTERVENTIONS:  - Identify barriers to discharge w/patient and caregiver  - Arrange for needed discharge resources and transportation as appropriate  - Identify discharge learning needs (meds, wound care, etc.)  - Arrange for interpretive services to assist at discharge as needed  - Refer to Case Management Department for coordinating discharge planning if the patient needs post-hospital services based on physician/advanced practitioner order or complex needs related to functional status, cognitive ability, or social support system  Outcome: Progressing     Problem: Knowledge Deficit  Goal: Patient/family/caregiver demonstrates understanding of disease process, treatment plan, medications, and discharge instructions  Description: Complete learning assessment and assess knowledge base.   Interventions:  - Provide teaching at level of understanding  - Provide teaching via preferred learning methods  Outcome: Progressing

## 2023-08-06 NOTE — PROGRESS NOTES
[address] [facility]  Progress Note  Name: Airam Eason  MRN: [id number]  Unit/Bed#: E2 -01 I Date of Admission: 8/4/2023   Date of Service: 8/6/2023 I Hospital Day: 2    Assessment/Plan   * Cellulitis  Assessment & Plan  Erythema and swelling of right upper extremity involving elbow  · CT shows bursitis  · Concern for septic arthritis given warm erythematous and swollen elbow joint  · History of immunosuppression  · Blood cultures negative at 24 hours  · Orthopedics evaluated patient  · Recommending possible bedside I&D versus I&D in the OR  · Patient is currently n.p.o. and awaiting reevaluation by orthopedics today    Myelomalacia of cervical cord [facility])  Assessment & Plan  Follows with neurology as an outpatient  · Uses wheelchair mostly for ambulation  · PT/OT eval  · Outpatient follow-up with neurology  · Continue steroid taper as documented in outpatient neurology notes    Ambulatory dysfunction  Assessment & Plan  Secondary to chronic neurologic issue  · Patient reports progressive weakness is now likely worsened in setting of acute infection  · PT/OT    Acute cystitis without hematuria  Assessment & Plan  Outpatient urine culture growing greater than 100,000 CFU's of Staph aureus  · Repeat urine culture growing 60,000 CFU Staph aureus  · We will continue IV vancomycin at this time      BPH associated with nocturia  Assessment & Plan  Beard catheter placed on admission due to urinary retention  · Consider removal in the next 24 to 48 hours           VTE Pharmacologic Prophylaxis: VTE Score: 4 Moderate Risk (Score 3-4) - Pharmacological DVT Prophylaxis Ordered: enoxaparin (Lovenox). Patient Centered Rounds: I performed bedside rounds with nursing staff today. Discussions with Specialists or Other Care Team Provider: none    Education and Discussions with Family / Patient: Patient declined call to .      Total Time Spent on Date of Encounter in care of patient: 28 minutes This time was spent on one or more of the following: performing physical exam; counseling and coordination of care; obtaining or reviewing history; documenting in the medical record; reviewing/ordering tests, medications or procedures; communicating with other healthcare professionals and discussing with patient's family/caregivers. Current Length of Stay: 2 day(s)  Current Patient Status: Inpatient   Certification Statement: The patient will continue to require additional inpatient hospital stay due to possible or intervention, iv abx  Discharge Plan: Anticipate discharge in 48 hrs to discharge location to be determined pending rehab evaluations. Code Status: Level 1 - Full Code    Subjective:   Patient seen resting comfortably in bed. He reports continued erythema and redness/pain to his right arm. He denies any fevers or chills overnight, denies any chest pain or shortness of breath. We are awaiting determination from orthopedics regarding OR intervention versus bedside I&D. Objective:     Vitals:   Temp (24hrs), Av.6 °F (36.4 °C), Min:97.4 °F (36.3 °C), Max:98 °F (36.7 °C)    Temp:  [97.4 °F (36.3 °C)-98 °F (36.7 °C)] 97.4 °F (36.3 °C)  HR:  [50-68] 50  Resp:  [18] 18  BP: (123-133)/(59-71) 125/71  SpO2:  [93 %-100 %] 100 %  Body mass index is 29.84 kg/m². Input and Output Summary (last 24 hours): Intake/Output Summary (Last 24 hours) at 2023 1035  Last data filed at 2023 2330  Gross per 24 hour   Intake --   Output 4550 ml   Net -4550 ml       Physical Exam:   Physical Exam  Vitals and nursing note reviewed. Constitutional:       General: He is not in acute distress. Appearance: Normal appearance. He is not ill-appearing, toxic-appearing or diaphoretic. HENT:      Head: Normocephalic and atraumatic. Cardiovascular:      Rate and Rhythm: Normal rate and regular rhythm. Heart sounds: No murmur heard. No friction rub. No gallop.    Pulmonary:      Effort: Pulmonary effort is normal. No respiratory distress. Breath sounds: Normal breath sounds. No wheezing, rhonchi or rales. Abdominal:      General: Abdomen is flat. Bowel sounds are normal. There is no distension. Palpations: Abdomen is soft. Tenderness: There is no abdominal tenderness. Genitourinary:     Comments: Beard catheter in place  Musculoskeletal:      Right lower leg: No edema. Left lower leg: No edema. Comments: With significant swelling, erythema, and warmth to right upper extremity. Right elbow tender to palpation   Skin:     General: Skin is warm and dry. Coloration: Skin is not jaundiced or pale. Neurological:      General: No focal deficit present. Mental Status: He is alert. Mental status is at baseline. Additional Data:     Labs:  Results from last 7 days   Lab Units 08/06/23 0540 08/05/23 0447 08/04/23  1406   WBC Thousand/uL 9.58 11.64* 14.51*   HEMOGLOBIN g/dL 15.9 15.8 18.5*   HEMATOCRIT % 48.6 49.9* 57.8*   PLATELETS Thousands/uL 150 145* 158   BANDS PCT %  --   --  1   NEUTROS PCT %  --  77*  --    LYMPHS PCT %  --  10*  --    LYMPHO PCT %  --   --  3*   MONOS PCT %  --  8  --    MONO PCT %  --   --  1*   EOS PCT %  --  0 1     Results from last 7 days   Lab Units 08/06/23 0540 08/05/23 0447 08/04/23  1406   SODIUM mmol/L 135   < > 129*   POTASSIUM mmol/L 4.4   < > 4.2   CHLORIDE mmol/L 100   < > 95*   CO2 mmol/L 32   < > 27   BUN mg/dL 15   < > 19   CREATININE mg/dL 0.69   < > 0.69   ANION GAP mmol/L 3   < > 7   CALCIUM mg/dL 8.3*   < > 8.6   ALBUMIN g/dL  --   --  3.4*   TOTAL BILIRUBIN mg/dL  --   --  0.76   ALK PHOS U/L  --   --  67   ALT U/L  --   --  21   AST U/L  --   --  16   GLUCOSE RANDOM mg/dL 80   < > 117    < > = values in this interval not displayed.                        Lines/Drains:  Invasive Devices     Peripheral Intravenous Line  Duration           Peripheral IV 08/04/23 Left;Ventral (anterior) Forearm 1 day Drain  Duration           Urethral Catheter Latex 16 Fr. 1 day              Urinary Catheter:  Goal for removal: Remove after 48 hrs of I/O monitoring               Imaging: No pertinent imaging reviewed. Recent Cultures (last 7 days):   Results from last 7 days   Lab Units 08/04/23  1645 08/04/23  1406 08/01/23  0938   BLOOD CULTURE  No Growth at 24 hrs. No Growth at 24 hrs.  --    URINE CULTURE   --  60,000-69,000 cfu/ml Staphylococcus aureus* >100,000 cfu/ml Staphylococcus aureus*       Last 24 Hours Medication List:   Current Facility-Administered Medications   Medication Dose Route Frequency Provider Last Rate   • acetaminophen  650 mg Oral Q6H PRN Clayton Bilberry, DO     • aluminum-magnesium hydroxide-simethicone  30 mL Oral Q6H PRN Michele Bilberry, DO     • bisacodyl  10 mg Rectal Daily PRN Dee Jimenes PA-C     • cefTRIAXone  2,000 mg Intravenous Q24H Michele Bilberry, DO 2,000 mg (08/05/23 2155)   • enoxaparin  40 mg Subcutaneous Daily Clayton Bilberry, DO     • melatonin  3 mg Oral DEBBI Landrum PA-C     • ondansetron  4 mg Intravenous Q6H PRN Michele Bilberry, DO     • oxyCODONE  5 mg Oral Q6H PRN Clayton Bilberry, DO      Or   • oxyCODONE  10 mg Oral Q6H PRN Clayton Bilberry, DO     • predniSONE  20 mg Oral Daily Michele Bilberry, DO      Followed by   • [START ON 8/8/2023] predniSONE  10 mg Oral Daily Clayton Bilberry, DO      Followed by   • [START ON 8/10/2023] predniSONE  5 mg Oral Daily Michele Bilberry, DO     • sodium chloride  75 mL/hr Intravenous Continuous Clayton Bilberry, DO 75 mL/hr (08/04/23 1948)   • vancomycin  1,500 mg Intravenous Q12H Antelmo Bourne MD          Today, Patient Was Seen By: Dee Jimenes PA-C    **Please Note: This note may have been constructed using a voice recognition system. **

## 2023-08-06 NOTE — PROGRESS NOTES
Progress Note - Orthopedics   Nahum Oliveira 57 y.o. male MRN: [id number]  Unit/Bed#: E2 -01 Encounter: [id number]    Assessment:  57year old Male with Right septic olecranon bursitis with resolving right upper extremity cellulitis, no concern for septic elbow at this time     Plan:  -No acute orthopedic surgical intervention at this time, we will continue to monitor right septic olecranon bursitis given significant improvement on IV Abx. Patient made NPO at midnight, if no significant improvement or worsening on AM exam tomorrow, we will determine if bedside I&D or formal I&D and washout is appropriate. No concern for septic elbow at this time, patient is able to tolerate active ROM of the elbow without pain within the elbow joint. -WBAT RUE  -Advised patient to not continue to lean and put continuous pressure on the right elbow/arm  -Strict elevation to the right arm to help alleviate swelling  -Pain control PRN  -Medical management per SLIM  -Continue IV Abx per SLIM/ID, patient is currently receiving IV Cefazolin  - Leukocytosis resolved, WBC 9.58, we will continue to monitor   -Ortho will continue to follow     Subjective: 67-year-old male with a right septic olecranon bursitis with right upper extremity cellulitis was seen and examined at bedside. Patient reports improvement of the right elbow. He states that he has less pain within his right elbow compared to yesterday. Patient states that he believes he has more swelling within his right hand. He denies any fevers chills or sweats. Vitals: Blood pressure 125/71, pulse (!) 50, temperature (!) 97.4 °F (36.3 °C), temperature source Temporal, resp. rate 18, height 6' (1.829 m), weight 99.8 kg (220 lb 0.3 oz), SpO2 100 %. ,Body mass index is 29.84 kg/m².       Intake/Output Summary (Last 24 hours) at 8/6/2023 7334  Last data filed at 8/5/2023 2330  Gross per 24 hour   Intake --   Output 1550 ml   Net -1550 ml       Invasive Devices     Peripheral Intravenous Line  Duration           Peripheral IV 08/04/23 Left;Ventral (anterior) Forearm 2 days          Drain  Duration           Urethral Catheter Latex 16 Fr. 2 days                Physical Exam: General appearance: alert and oriented, in no acute distress  Head: Normocephalic, without obvious abnormality, atraumatic  Eyes: conjunctivae/corneas clear. PERRL, EOM's intact. Lungs: No stridor or wheezing  Heart: No apparent distress    Ortho Exam:   Right Upper Extremity Exam  Inspection:  Swelling about the digit, hand and forearm is present but decreased compared to yesterday. Distribution of erythema consistent with cellulitis has also significantly decreased compared to yesterday as well. Patient no longer has erythema about the forearm circumferentially and what appears to be improving cellulitis of the posterior aspect of the upper arm. Erythema is more localized directly to the elbow and olecranon bursa compared to yesterday with continued olecranon bursa swelling. Skin continues to remain intact  Palpation: Tenderness to palpation about the olecranon bursa, bursa is boggy without a pocketed area of fluid accumulation. compartments of the upper and lower arm are soft and compressible  ROM:   active without pain in the elbow joint, pain over the bursa with moiton  Strength:  Able to actively flex & extend elbow. Able to actively dorsiflex & volarflex wrist. Able to actively flex, extend, abduct, & adduct fingers. Neurovascular:  Sensation intact in Ax/R/M/U nerve distributions. Palpable radial pulse. Lab, Imaging and other studies:   I have personally reviewed pertinent lab results.   CBC:   Lab Results   Component Value Date    WBC 9.58 08/06/2023    HGB 15.9 08/06/2023    HCT 48.6 08/06/2023    MCV 97 08/06/2023     08/06/2023    RBC 5.02 08/06/2023    MCH 31.7 08/06/2023    MCHC 32.7 08/06/2023    RDW 13.2 08/06/2023    MPV 10.0 08/06/2023     CMP:   Lab Results   Component Value Date    SODIUM 135 08/06/2023     08/06/2023    CO2 32 08/06/2023    BUN 15 08/06/2023    CREATININE 0.69 08/06/2023    CALCIUM 8.3 (L) 08/06/2023    EGFR 100 08/06/2023     [place], [place]

## 2023-08-06 NOTE — ASSESSMENT & PLAN NOTE
Erythema and swelling of right upper extremity involving elbow  · CT shows bursitis  · Concern for septic arthritis given warm erythematous and swollen elbow joint  · History of immunosuppression  · Blood cultures negative at 24 hours  · Orthopedics evaluated patient  · Recommending possible bedside I&D versus I&D in the OR  · Patient is currently n.p.o. and awaiting reevaluation by orthopedics today

## 2023-08-06 NOTE — PLAN OF CARE
Problem: Potential for Falls  Goal: Patient will remain free of falls  Description: INTERVENTIONS:  - Educate patient/family on patient safety including physical limitations  - Instruct patient to call for assistance with activity   - Consult OT/PT to assist with strengthening/mobility   - Keep Call bell within reach  - Keep bed low and locked with side rails adjusted as appropriate  - Keep care items and personal belongings within reach  - Initiate and maintain comfort rounds  - Make Fall Risk Sign visible to staff  - Offer Toileting every 2 Hours, in advance of need  - Initiate/Maintain Bed alarm  - Apply yellow socks and bracelet for high fall risk patients  - Consider moving patient to room near nurses station  Outcome: Progressing     Problem: MOBILITY - ADULT  Goal: Maintain or return to baseline ADL function  Description: INTERVENTIONS:  -  Assess patient's ability to carry out ADLs; assess patient's baseline for ADL function and identify physical deficits which impact ability to perform ADLs (bathing, care of mouth/teeth, toileting, grooming, dressing, etc.)  - Assess/evaluate cause of self-care deficits   - Assess range of motion  - Assess patient's mobility; develop plan if impaired  - Assess patient's need for assistive devices and provide as appropriate  - Encourage maximum independence but intervene and supervise when necessary  - Involve family in performance of ADLs  - Assess for home care needs following discharge   - Consider OT consult to assist with ADL evaluation and planning for discharge  - Provide patient education as appropriate  Outcome: Progressing  Goal: Maintains/Returns to pre admission functional level  Description: INTERVENTIONS:  - Perform BMAT or MOVE assessment daily.   - Set and communicate daily mobility goal to care team and patient/family/caregiver. - Collaborate with rehabilitation services on mobility goals if consulted  - Perform Range of Motion 3 times a day.   - Reposition patient every 2 hours.   - Dangle patient 3 times a day  - Stand patient 3 times a day  - Ambulate patient 3 times a day  - Out of bed to chair 3 times a day   - Out of bed for meals 3 times a day  - Out of bed for toileting  - Record patient progress and toleration of activity level   Outcome: Progressing     Problem: PAIN - ADULT  Goal: Verbalizes/displays adequate comfort level or baseline comfort level  Description: Interventions:  - Encourage patient to monitor pain and request assistance  - Assess pain using appropriate pain scale  - Administer analgesics based on type and severity of pain and evaluate response  - Implement non-pharmacological measures as appropriate and evaluate response  - Consider cultural and social influences on pain and pain management  - Notify physician/advanced practitioner if interventions unsuccessful or patient reports new pain  Outcome: Progressing     Problem: INFECTION - ADULT  Goal: Absence or prevention of progression during hospitalization  Description: INTERVENTIONS:  - Assess and monitor for signs and symptoms of infection  - Monitor lab/diagnostic results  - Monitor all insertion sites, i.e. indwelling lines, tubes, and drains  - Monitor endotracheal if appropriate and nasal secretions for changes in amount and color  - [place] appropriate cooling/warming therapies per order  - Administer medications as ordered  - Instruct and encourage patient and family to use good hand hygiene technique  - Identify and instruct in appropriate isolation precautions for identified infection/condition  Outcome: Progressing  Goal: Absence of fever/infection during neutropenic period  Description: INTERVENTIONS:  - Monitor WBC    Outcome: Progressing

## 2023-08-06 NOTE — PROGRESS NOTES
Vancomycin therapy has been discontinued. Pharmacy will sign off. Thank you for this consult. Please do not hesitate to call us with questions or re-consult us if the need arises.

## 2023-08-06 NOTE — UTILIZATION REVIEW
Initial Clinical Review    Admission: Date/Time/Statement:   Admission Orders (From admission, onward)     Ordered        08/04/23 1656  INPATIENT ADMISSION  Once                      Orders Placed This Encounter   Procedures   • INPATIENT ADMISSION     Standing Status:   Standing     Number of Occurrences:   1     Order Specific Question:   Level of Care     Answer:   Med Surg [16]     Order Specific Question:   Estimated length of stay     Answer:   More than 2 Midnights     Order Specific Question:   Certification     Answer:   I certify that inpatient services are medically necessary for this patient for a duration of greater than two midnights. See H&P and MD Progress Notes for additional information about the patient's course of treatment. ED Arrival Information     Expected   8/4/2023     Arrival   8/4/2023 12:30    Acuity   Urgent            Means of arrival   Wheelchair    Escorted by   Family Member    Service   Hospitalist    Admission type   Emergency            Arrival complaint   Acute cystitis without hematuria           Chief Complaint   Patient presents with   • Arm Swelling     Rt arm swelling/redness & pain since Wednesday, had problem with Rt elbow the past weekend. Also dx with UTI this week & started antibiotic Cipro changed to Bactrim DS last night. Initial Presentation: 57 y.o. male presents to ED from home due to pain and swelling of right elbow with erythema, Recent dx of staph aureus UTI, treated with Cipro, then Bactrim based on sensitivity results. [place] January with neurology as OP for myelomalacia of cervical cord, ambulatory dysfunction. Pt uses R elbow a lot to support himself, lean on when urinating, skin has been thin and worn for some time. WBC elevated 14.51 Na 129 CT RUE shows olecranon bursitis and edema/cellulitis  Exam notes erythema and swelling RUE. Trace edema BLE .  Admitted as inpatient to med surg for cellulitis, hyponatremia Concern for septic arthritis given warm, erythematous, swollen elbow joint, history of immunosuppression Plan treat with IV vanco and Rocephin, gentle IV fluids overnight, check urine studies, rodriguez placed on admission due to urinary retention, consider removal in next 24-48 h. Date:  8/5   Day 2:  Reports continued right elbow pain. Right olecranon bursitis, warmth and erythema noted to entire right arm , swelling into right hand  Rodriguez in place urine clear, yellow     Intake/Output Summary (Last 24 hours) at 8/5/2023 1122  Last data filed at 8/5/2023 0451      Gross per 24 hour   Intake 1240 ml   Output 1475 ml   Net -235 ml     Ortho consult 8/5 No acute orthopedic surgical intervention at this time, we will continue to monitor right septic olecranon bursitis on IV Abx. Patient made NPO at midnight, if no significant improvement or worsening on AM exam tomorrow, we will determine if bedside I&D or formal I&D and washout is appropriate. No concern for septic elbow at this time, patient is able to tolerate active ROM of the elbow without pain within the elbow joint.  Continue IV Abx per SLIM, patient is currently receiving IV Vanco and Rocephin   -Leukocytosis is trending down from 14.51 to 11.64, we will continue to monitor     ED Triage Vitals   Temperature Pulse Respirations Blood Pressure SpO2   08/04/23 1240 08/04/23 1240 08/04/23 1240 08/04/23 1240 08/04/23 1240   98 °F (36.7 °C) 69 18 112/95 99 %      Temp Source Heart Rate Source Patient Position - Orthostatic VS BP Location FiO2 (%)   08/04/23 1812 08/04/23 1534 08/04/23 1534 08/04/23 1534 --   Temporal Monitor Lying Left arm       Pain Score       08/04/23 1240       8          Wt Readings from Last 1 Encounters:   08/04/23 99.8 kg (220 lb 0.3 oz)     Additional Vital Signs:   Date/Time Temp Pulse Resp BP MAP (mmHg) SpO2 O2 Device Patient Position - Orthostatic VS   08/06/23 1514 98.2 °F (36.8 °C) 52 Abnormal  18 110/57 -- 90 % None (Room air) Lying   08/06/23 0806 97.4 °F (36.3 °C) Abnormal  50 Abnormal  18 125/71 -- 100 % None (Room air) Lying   08/05/23 2323 97.4 °F (36.3 °C) Abnormal  51 Abnormal  18 133/69 94 98 % None (Room air) Lying   08/05/23 1521 98 °F (36.7 °C) 68 18 123/59 85 93 % None (Room air) Lying   08/05/23 0715 97 °F (36.1 °C) Abnormal  57 20 117/57 -- 95 % None (Room air) Lying   08/04/23 2151 98.3 °F (36.8 °C) 60 20 108/81 -- 97 % None (Room air) Lying   08/04/23 1812 97.8 °F (36.6 °C) 63 18 136/80 92 99 % None (Room air) Lying   08/04/23 1755 -- 77 -- 117/64 -- 98 % None (Room air) Lying   08/04/23 1534 -- 71 18 113/60 -- 98 % None (Room air        Pertinent Labs/Diagnostic Test Results:   CT upper extremity w contrast right   Final Result by Giorgio Rodríguez MD (08/04 1548)      Olecranon bursitis and adjacent edema/cellulitis, possibly infectious or inflammatory (consider gout). No acute osseous abnormality.                   Results from last 7 days   Lab Units 08/06/23 0540 08/05/23 0447 08/04/23 1406 08/01/23  0938   WBC Thousand/uL 9.58 11.64* 14.51* 18.01*   HEMOGLOBIN g/dL 15.9 15.8 18.5* 18.5*   HEMATOCRIT % 48.6 49.9* 57.8* 54.1*   PLATELETS Thousands/uL 150 145* 158 161   NEUTROS ABS Thousands/µL  --  8.97*  --   --    BANDS PCT %  --   --  1  --          Results from last 7 days   Lab Units 08/06/23 0540 08/05/23 0447 08/04/23 1406 08/01/23  0938   SODIUM mmol/L 135 136 129* 131*   POTASSIUM mmol/L 4.4 4.6 4.2 4.6   CHLORIDE mmol/L 100 101 95* 99   CO2 mmol/L 32 31 27 26   ANION GAP mmol/L 3 4 7 6   BUN mg/dL 15 21 19 22   CREATININE mg/dL 0.69 0.65 0.69 0.66   EGFR ml/min/1.73sq m 100 102 100 102   CALCIUM mg/dL 8.3* 8.3* 8.6 9.1     Results from last 7 days   Lab Units 08/04/23  1406 08/01/23  0938   AST U/L 16 15   ALT U/L 21 44   ALK PHOS U/L 67 84   TOTAL PROTEIN g/dL 6.3* 6.5   ALBUMIN g/dL 3.4* 3.0*   TOTAL BILIRUBIN mg/dL 0.76 1.15*         Results from last 7 days   Lab Units 08/06/23  0540 08/05/23  0447 08/04/23  1406 GLUCOSE RANDOM mg/dL 80 79 117     Results from last 7 days   Lab Units 08/04/23  1406   OSMOLALITY, SERUM mmol/         No results found for: "BETA-HYDROXYBUTYRATE"                               Results from last 7 days   Lab Units 08/05/23  0448   TSH 3RD GENERATON uIU/mL 3.149                                             Results from last 7 days   Lab Units 08/01/23  0938   CRP mg/L 23.2*         Results from last 7 days   Lab Units 08/04/23  1406   OSMOLALITY, SERUM mmol/   OSMO UR mmol/     Results from last 7 days   Lab Units 08/04/23  1406 08/01/23  0938   CLARITY UA  Clear Turbid   COLOR UA  Yellow Yellow   SPEC GRAV UA  1.023 1.014   PH UA  6.5 7.0   GLUCOSE UA mg/dl Negative Negative   KETONES UA mg/dl Negative Negative   BLOOD UA  Negative Negative   PROTEIN UA mg/dl Trace* Trace*   NITRITE UA  Negative Negative   BILIRUBIN UA  Negative Negative   UROBILINOGEN UA (BE) mg/dl 2.0* 2.0*   LEUKOCYTES UA  Moderate* Large*   WBC UA /hpf Innumerable* Innumerable*   RBC UA /hpf 4-10* None Seen   BACTERIA UA /hpf Occasional Occasional   EPITHELIAL CELLS WET PREP /hpf None Seen None Seen   MUCUS THREADS  Moderate*  --    SODIUM UR  124  --                                  Results from last 7 days   Lab Units 08/04/23  1645 08/04/23  1406 08/01/23  0938   BLOOD CULTURE  No Growth at 24 hrs.  No Growth at 24 hrs.  --    URINE CULTURE   --  60,000-69,000 cfu/ml Staphylococcus aureus* >100,000 cfu/ml Staphylococcus aureus*             Results from last 7 days   Lab Units 08/06/23  0540   VANCOMYCIN RM ug/mL 8.8*       ED Treatment:   Medication Administration from 08/04/2023 1132 to 08/04/2023 1802       Date/Time Order Dose Route Action     08/04/2023 1355 EDT fentanyl citrate (PF) 100 MCG/2ML 50 mcg 50 mcg Intravenous Given     08/04/2023 1455 EDT iohexol (OMNIPAQUE) 350 MG/ML injection (SINGLE-DOSE) 100 mL 100 mL Intravenous Given     08/04/2023 1600 EDT fentanyl citrate (PF) 100 MCG/2ML 50 mcg 50 mcg Intravenous Given     08/04/2023 1718 EDT vancomycin (VANCOCIN) 2,000 mg in sodium chloride 0.9 % 500 mL IVPB 2,000 mg Intravenous New Bag        Past Medical History:   Diagnosis Date   • Balance problems    • BPH (benign prostatic hyperplasia)    • Chronic back pain    • Chronic pain disorder    • COVID-19     2/2021-asymptomatic    • Erectile dysfunction    • Nocturia    • OAB (overactive bladder)    • Testicular hypogonadism    • Urinary frequency    • Urinary urgency      Present on Admission:  • BPH associated with nocturia  • Acute cystitis without hematuria  • Cauda equina syndrome (HCC)  • Ambulatory dysfunction  • Myelomalacia of cervical cord (HCC)  • Cellulitis      Admitting Diagnosis: Bursitis [M71.9]  Arm swelling [M79.89]  Cellulitis of other specified site [L03.818]  Age/Sex: 57 y.o. male  Admission Orders:  Scheduled Medications:  cefazolin, 2,000 mg, Intravenous, Q8H  enoxaparin, 40 mg, Subcutaneous, Daily  melatonin, 3 mg, Oral, HS  predniSONE, 20 mg, Oral, Daily   Followed by  Ingris Nickerson ON 8/8/2023] predniSONE, 10 mg, Oral, Daily   Followed by  Ingris Nickerson ON 8/10/2023] predniSONE, 5 mg, Oral, Daily    Additional meds from 8/4-8/6 Ceftriaxone and vanco DC'd 8/6. Cefazolin to start 8/6 @ 2000.      • cefTRIAXone  2,000 mg Intravenous Q24H       • sodium chloride  75 mL/hr Intravenous Continuous   • vancomycin  1,250 mg Intravenous Q12H       Continuous IV Infusions:     PRN Meds:  acetaminophen, 650 mg, Oral, Q6H PRN  aluminum-magnesium hydroxide-simethicone, 30 mL, Oral, Q6H PRN  bisacodyl, 10 mg, Rectal, Daily PRN  ondansetron, 4 mg, Intravenous, Q6H PRN  oxyCODONE, 5 mg, Oral, Q6H PRN   Or  oxyCODONE, 10 mg, Oral, Q6H PRN        IP CONSULT TO ORTHOPEDIC SURGERY  IP CONSULT TO CASE MANAGEMENT  IP CONSULT TO INFECTIOUS DISEASES    Network Utilization Review Department  ATTENTION: Please call with any questions or concerns to [phone] and carefully listen to the prompts so that you are directed to the right person. All voicemails are confidential.  Saul Richmond all requests for admission clinical reviews, approved or denied determinations and any other requests to dedicated fax number below belonging to the campus where the patient is receiving treatment.  List of dedicated fax numbers for the Facilities:  [place] DENIALS (Administrative/Medical Necessity) [phone] [address] [facility] [phone]   [address] [place] [address] [phone]   [facility] [address] [facility] [phone]   [address] [place] [address] [id number] [place] [address] [phone]   [address] [address] [phone]

## 2023-08-06 NOTE — OCCUPATIONAL THERAPY NOTE
Occupational Therapy Evaluation     Patient Name: Bailey MCCORMACKPRAUL Date: 8/6/2023  Problem List  Principal Problem:    Cellulitis  Active Problems:    BPH associated with nocturia    Acute cystitis without hematuria    Cauda equina syndrome [facility])    Ambulatory dysfunction    Myelomalacia of cervical cord [facility])    Past Medical History  Past Medical History:   Diagnosis Date    Balance problems     BPH (benign prostatic hyperplasia)     Chronic back pain     Chronic pain disorder     COVID-19     2/2021-asymptomatic     Erectile dysfunction     Nocturia     OAB (overactive bladder)     Testicular hypogonadism     Urinary frequency     Urinary urgency      Past Surgical History  Past Surgical History:   Procedure Laterality Date    BACK SURGERY  05/18/2016    with hardware    BACK SURGERY      2 back surgery unable to remove hardware    COLONOSCOPY  2004    Dr Donnetta Hashimoto. tubular adenoma polyp removed    COLONOSCOPY  2007    Dr Ever Carter. Normal    COLONOSCOPY  2013    Dr Ever Carter. Normal.     COLONOSCOPY  06/2019    Dr Ever Carter. Hemorrhoids, normal.     NECK SURGERY      decompression of neck     NECK SURGERY Right     scar tissues removed    TN EXCISION HYDROCELE UNILATERAL Right 06/10/2021    Procedure: HYDROCELECTOMY;  Surgeon: Nathanael Shine DO;  Location: [facility];  Service: Urology    SPINAL CORD STIMULATOR IMPLANT  10/10/2019           08/06/23 1040   Pain Assessment   Pain Assessment Tool 0-10   Pain Score 8   Pain Location/Orientation Location: Generalized;Orientation: Right;Location: Arm   Restrictions/Precautions   Weight Bearing Precautions Per Order No   Other Precautions Chair Alarm; Bed Alarm; Fall Risk;Pain   Home Living   Home Layout Multi-level; Able to live on main level with bedroom/bathroom   Bathroom Shower/Tub Walk-in shower   [facility] chair   Home Equipment Walker;Cane   Prior Function   Lives With Alone   Falls in the last 6 months 1 to 4  (1) Comments PTA pt states that he has assistance with his ADLs; states recent need for assistance with transfers; ambulates with RW, assist x 1--states able to walk 300-600ft; neg home alone--has caregivers, +fall=1; uses w/c   Lifestyle   Autonomy PTA pt states that he has assistance with his ADLs; states recent need for assistance with transfers; ambulates with RW, assist x 1--states able to walk 300-600ft; neg home alone--has caregivers, +fall=1; uses w/c   Reciprocal Relationships 3 children   Service to Others from prior admission notes, worked as a  of multiple [address] [place] [address] [employer]   Subjective   Subjective "Are you offended if I don't have clothes on, because I'm not putting anything on."   ADL   Where Assessed Edge of bed   Eating Assistance 4  Minimal Assistance   Grooming Assistance 4  Minimal Assistance   UB Bathing Assistance 2  Maximal Assistance   LB Bathing Assistance 1  Total Assistance   UB Dressing Assistance 2  Maximal Assistance   LB Dressing Assistance 1  Total Assistance   Toileting Assistance  1  Total Assistance   Bed Mobility   Rolling R 2  Maximal assistance   Additional items Assist x 1; Increased time required;LE management;Verbal cues   Rolling L 2  Maximal assistance   Additional items Assist x 1; Increased time required;Verbal cues;LE management   Supine to Sit 2  Maximal assistance   Additional items Assist x 2; Increased time required;Verbal cues;LE management   Sit to Supine 2  Maximal assistance   Additional items Assist x 2; Increased time required;LE management;Verbal cues   Transfers   Sit to Stand 1  Dependent   Additional Comments attempted standing, but pt unable to assist   Functional Mobility   Functional Mobility   (recommend hoyerlift for OOB with nsg)   Balance   Static Sitting Fair -   Dynamic Sitting Poor +   Static Standing Zero   Activity Tolerance   Activity Tolerance Patient limited by fatigue;Patient limited by pain   Medical Staff Made Aware claudia, P.T., MD   RUE Assessment   RUE Assessment X  (limited shr/elbow AROM(i.e.shr flex=30-40*, elbow extension-20-30*))   RUE Strength   RUE Overall Strength   (shr=2-/5, elbow=3-/5, hand=3+/5)   LUE Assessment   LUE Assessment X  (limited shr AROM(i.e.40-50*); elbow-distal=grossly WFLs)   LUE Strength   LUE Overall Strength   (shr=3-/5, elbow-distal=3+/5)   Hand Function   Gross Motor Coordination Functional   Fine Motor Coordination   (R=impaired, L=int)   Proprioception   Proprioception No apparent deficits   Vision-Basic Assessment   Current Vision   (no glasses noted)   Vision - Complex Assessment   Acuity Able to read clock/calendar on wall without difficulty   Psychosocial   Psychosocial (WDL) X   Patient Behaviors/Mood Anxious;Irritable   Perception   Inattention/Neglect Appears intact   Cognition   Overall Cognitive Status WFL   Arousal/Participation Alert   Attention Within functional limits   Orientation Level Oriented X4   Memory Within functional limits   Following Commands Follows one step commands without difficulty   Assessment   Limitation Decreased ADL status; Decreased UE ROM; Decreased UE strength;Decreased Safe judgement during ADL;Decreased endurance;Decreased high-level ADLs   Prognosis Fair   Assessment Pt is a 59y/o male admitted to the hospital 2* R elbow pain, swelling. Pt noted with cellulitis, acute cystitis. Pt with PMH myelomalacia of C-spine, BPH, balance, problems, chronic back pain, spinal cord stimulator implant, back/neck sx. PTA pt states that he has assistance with his ADLs; states recent need for assistance with transfers; ambulates with RW, assist x 1--states able to walk 300-600ft; neg home alone--has caregivers, +fall=1; uses w/c. During initial eval, pt demonstrated deficits with his functional balance, functional mobility, ADL status, transfer safety, b/l UE strength, and activity tolerance(currently fair=15-20mins).  Functional mobility assessment limited 2* pt's trunk control and poor LE strength. Pt would benefit from continued OT assessment and tx for the above deficits. 3-5xwk/1-2wks. The patient's raw score on the AM-PAC Daily Activity Inpatient Short Form is 11. A raw score of less than 19 suggests the patient may benefit from discharge to post-acute rehabilitation services. Please refer to the recommendation of the Occupational Therapist for safe discharge planning. Goals   Patient Goals "I need to be able to pull myself up."   STG Time Frame   (1-7 days)   Short Term Goal #1 Pt will tolerate continued functional mobility assessment and appropriate goals will be established. Short Term Goal #2 Pt will demonstrate mod A with their bed mobility to facilitate EOB ADLs. Short Term Goal  Pt will demonstrate improved activity tolerance to good(20-30mins) and sitting tolerance(on EOB) to 10-15mins to assist with ADLs. LTG Time Frame   (7-14 days)   Long Term Goal #1 Pt will demonstrate improved functional balance by 1 grade to assist with ADLs/transfers. Long Term Goal #2 Pt will demonstrate Gregoria with his UE and mod A with his LE bathing/dressing. Long Term Goal Pt will demonstrate mod A with their sit-stand transfers to assist with completion of their LE dressing. Plan   Treatment Interventions ADL retraining;Functional transfer training;UE strengthening/ROM; Endurance training;Patient/family training;Equipment evaluation/education; Compensatory technique education   Goal Expiration Date 08/20/23   OT Treatment Day 0   OT Frequency 3-5x/wk   Recommendation   OT Discharge Recommendation Post acute rehabilitation services   [facility] Daily Activity Inpatient   Lower Body Dressing 1   Bathing 1   Toileting 1   Upper Body Dressing 2   Grooming 3   Eating 3   Daily Activity Raw Score 11   Daily Activity Standardized Score (Calc for Raw Score >=11) 29.04   AM-[facility] Applied Cognition Inpatient   Following a Speech/Presentation 4   Understanding Ordinary Conversation 4 Taking Medications 4   Remembering Where Things Are Placed or Put Away 4   Remembering List of 4-5 Errands 4   Taking Care of Complicated Tasks 4   Applied Cognition Raw Score 24   Applied Cognition Standardized Score 62.21   Tiffany Ruff

## 2023-08-06 NOTE — ASSESSMENT & PLAN NOTE
Outpatient urine culture growing greater than 100,000 CFU's of Staph aureus  · Repeat urine culture growing 60,000 CFU Staph aureus  · We will continue IV vancomycin at this time

## 2023-08-07 LAB
ANION GAP SERPL CALCULATED.3IONS-SCNC: 7 MMOL/L
APPEARANCE FLD: ABNORMAL
BASOPHILS NFR FLD MANUAL: 1 %
BUN SERPL-MCNC: 18 MG/DL (ref 5–25)
C TRACH DNA SPEC QL NAA+PROBE: NEGATIVE
CALCIUM SERPL-MCNC: 8.4 MG/DL (ref 8.4–10.2)
CHLORIDE SERPL-SCNC: 99 MMOL/L (ref 96–108)
CO2 SERPL-SCNC: 30 MMOL/L (ref 21–32)
COLOR FLD: ABNORMAL
CREAT SERPL-MCNC: 0.59 MG/DL (ref 0.6–1.3)
ERYTHROCYTE [DISTWIDTH] IN BLOOD BY AUTOMATED COUNT: 13.1 % (ref 11.6–15.1)
GFR SERPL CREATININE-BSD FRML MDRD: 106 ML/MIN/1.73SQ M
GLUCOSE SERPL-MCNC: 80 MG/DL (ref 65–140)
HCT VFR BLD AUTO: 49.1 % (ref 36.5–49.3)
HGB BLD-MCNC: 16.2 G/DL (ref 12–17)
LYMPHOCYTES NFR BLD AUTO: 4 %
MCH RBC QN AUTO: 31.2 PG (ref 26.8–34.3)
MCHC RBC AUTO-ENTMCNC: 33 G/DL (ref 31.4–37.4)
MCV RBC AUTO: 95 FL (ref 82–98)
MONOCYTES NFR BLD AUTO: 1 %
N GONORRHOEA DNA SPEC QL NAA+PROBE: NEGATIVE
NEUTS SEG NFR BLD AUTO: 94 %
PLATELET # BLD AUTO: 175 THOUSANDS/UL (ref 149–390)
PMV BLD AUTO: 9.9 FL (ref 8.9–12.7)
POTASSIUM SERPL-SCNC: 4 MMOL/L (ref 3.5–5.3)
RBC # BLD AUTO: 5.19 MILLION/UL (ref 3.88–5.62)
SITE: ABNORMAL
SODIUM SERPL-SCNC: 136 MMOL/L (ref 135–147)
TOTAL CELLS COUNTED SPEC: 100
WBC # BLD AUTO: 9.67 THOUSAND/UL (ref 4.31–10.16)
WBC # FLD MANUAL: 867 /UL (ref 0–200)

## 2023-08-07 PROCEDURE — 89051 BODY FLUID CELL COUNT: CPT | Performed by: STUDENT IN AN ORGANIZED HEALTH CARE EDUCATION/TRAINING PROGRAM

## 2023-08-07 PROCEDURE — 99232 SBSQ HOSP IP/OBS MODERATE 35: CPT | Performed by: PHYSICIAN ASSISTANT

## 2023-08-07 PROCEDURE — 87205 SMEAR GRAM STAIN: CPT | Performed by: STUDENT IN AN ORGANIZED HEALTH CARE EDUCATION/TRAINING PROGRAM

## 2023-08-07 PROCEDURE — 99232 SBSQ HOSP IP/OBS MODERATE 35: CPT | Performed by: STUDENT IN AN ORGANIZED HEALTH CARE EDUCATION/TRAINING PROGRAM

## 2023-08-07 PROCEDURE — 87147 CULTURE TYPE IMMUNOLOGIC: CPT | Performed by: STUDENT IN AN ORGANIZED HEALTH CARE EDUCATION/TRAINING PROGRAM

## 2023-08-07 PROCEDURE — 87070 CULTURE OTHR SPECIMN AEROBIC: CPT | Performed by: STUDENT IN AN ORGANIZED HEALTH CARE EDUCATION/TRAINING PROGRAM

## 2023-08-07 PROCEDURE — 85027 COMPLETE CBC AUTOMATED: CPT | Performed by: PHYSICIAN ASSISTANT

## 2023-08-07 PROCEDURE — 0R9L3ZZ DRAINAGE OF RIGHT ELBOW JOINT, PERCUTANEOUS APPROACH: ICD-10-PCS | Performed by: STUDENT IN AN ORGANIZED HEALTH CARE EDUCATION/TRAINING PROGRAM

## 2023-08-07 PROCEDURE — 87186 SC STD MICRODIL/AGAR DIL: CPT | Performed by: STUDENT IN AN ORGANIZED HEALTH CARE EDUCATION/TRAINING PROGRAM

## 2023-08-07 PROCEDURE — 80048 BASIC METABOLIC PNL TOTAL CA: CPT | Performed by: PHYSICIAN ASSISTANT

## 2023-08-07 RX ADMIN — CEFAZOLIN SODIUM 2000 MG: 2 SOLUTION INTRAVENOUS at 11:42

## 2023-08-07 RX ADMIN — CEFAZOLIN SODIUM 2000 MG: 2 SOLUTION INTRAVENOUS at 04:31

## 2023-08-07 RX ADMIN — ENOXAPARIN SODIUM 40 MG: 100 INJECTION SUBCUTANEOUS at 09:32

## 2023-08-07 RX ADMIN — CEFAZOLIN SODIUM 2000 MG: 2 SOLUTION INTRAVENOUS at 19:56

## 2023-08-07 RX ADMIN — PREDNISONE 20 MG: 20 TABLET ORAL at 09:31

## 2023-08-07 RX ADMIN — OXYCODONE HYDROCHLORIDE 5 MG: 5 TABLET ORAL at 18:19

## 2023-08-07 RX ADMIN — OXYCODONE HYDROCHLORIDE 10 MG: 10 TABLET ORAL at 04:31

## 2023-08-07 RX ADMIN — Medication 3 MG: at 19:56

## 2023-08-07 NOTE — PLAN OF CARE
Problem: Potential for Falls  Goal: Patient will remain free of falls  Description: INTERVENTIONS:  - Educate patient/family on patient safety including physical limitations  - Instruct patient to call for assistance with activity   - Consult OT/PT to assist with strengthening/mobility   - Keep Call bell within reach  - Keep bed low and locked with side rails adjusted as appropriate  - Keep care items and personal belongings within reach  - Initiate and maintain comfort rounds  - Make Fall Risk Sign visible to staff  - Offer Toileting every 2 Hours, in advance of need  - Initiate/Maintain bed alarm  - Obtain necessary fall risk management equipment  - Apply yellow socks and bracelet for high fall risk patients  - Consider moving patient to room near nurses station  Outcome: Progressing     Problem: MOBILITY - ADULT  Goal: Maintain or return to baseline ADL function  Description: INTERVENTIONS:  -  Assess patient's ability to carry out ADLs; assess patient's baseline for ADL function and identify physical deficits which impact ability to perform ADLs (bathing, care of mouth/teeth, toileting, grooming, dressing, etc.)  - Assess/evaluate cause of self-care deficits   - Assess range of motion  - Assess patient's mobility; develop plan if impaired  - Assess patient's need for assistive devices and provide as appropriate  - Encourage maximum independence but intervene and supervise when necessary  - Involve family in performance of ADLs  - Assess for home care needs following discharge   - Consider OT consult to assist with ADL evaluation and planning for discharge  - Provide patient education as appropriate  Outcome: Progressing  Goal: Maintains/Returns to pre admission functional level  Description: INTERVENTIONS:  - Perform BMAT or MOVE assessment daily.   - Set and communicate daily mobility goal to care team and patient/family/caregiver.    - Collaborate with rehabilitation services on mobility goals if consulted  - Perform Range of Motion 2 times a day. - Reposition patient every 2 hours.   - Dangle patient 2 times a day  - Stand patient 2 times a day  - Ambulate patient 2 times a day  - Out of bed to chair 2 times a day   - Out of bed for meals 2 times a day  - Out of bed for toileting  - Record patient progress and toleration of activity level   Outcome: Progressing     Problem: PAIN - ADULT  Goal: Verbalizes/displays adequate comfort level or baseline comfort level  Description: Interventions:  - Encourage patient to monitor pain and request assistance  - Assess pain using appropriate pain scale  - Administer analgesics based on type and severity of pain and evaluate response  - Implement non-pharmacological measures as appropriate and evaluate response  - Consider cultural and social influences on pain and pain management  - Notify physician/advanced practitioner if interventions unsuccessful or patient reports new pain  Outcome: Progressing     Problem: INFECTION - ADULT  Goal: Absence or prevention of progression during hospitalization  Description: INTERVENTIONS:  - Assess and monitor for signs and symptoms of infection  - Monitor lab/diagnostic results  - Monitor all insertion sites, i.e. indwelling lines, tubes, and drains  - Monitor endotracheal if appropriate and nasal secretions for changes in amount and color  - [place] appropriate cooling/warming therapies per order  - Administer medications as ordered  - Instruct and encourage patient and family to use good hand hygiene technique  - Identify and instruct in appropriate isolation precautions for identified infection/condition  Outcome: Progressing  Goal: Absence of fever/infection during neutropenic period  Description: INTERVENTIONS:  - Monitor WBC    Outcome: Progressing     Problem: SAFETY ADULT  Goal: Patient will remain free of falls  Description: INTERVENTIONS:  - Educate patient/family on patient safety including physical limitations  - Instruct patient to call for assistance with activity   - Consult OT/PT to assist with strengthening/mobility   - Keep Call bell within reach  - Keep bed low and locked with side rails adjusted as appropriate  - Keep care items and personal belongings within reach  - Initiate and maintain comfort rounds  - Make Fall Risk Sign visible to staff  - Offer Toileting every 2 Hours, in advance of need  - Initiate/Maintain bed alarm  - Obtain necessary fall risk management equipment  - Apply yellow socks and bracelet for high fall risk patients  - Consider moving patient to room near nurses station  Outcome: Progressing  Goal: Maintain or return to baseline ADL function  Description: INTERVENTIONS:  -  Assess patient's ability to carry out ADLs; assess patient's baseline for ADL function and identify physical deficits which impact ability to perform ADLs (bathing, care of mouth/teeth, toileting, grooming, dressing, etc.)  - Assess/evaluate cause of self-care deficits   - Assess range of motion  - Assess patient's mobility; develop plan if impaired  - Assess patient's need for assistive devices and provide as appropriate  - Encourage maximum independence but intervene and supervise when necessary  - Involve family in performance of ADLs  - Assess for home care needs following discharge   - Consider OT consult to assist with ADL evaluation and planning for discharge  - Provide patient education as appropriate  Outcome: Progressing  Goal: Maintains/Returns to pre admission functional level  Description: INTERVENTIONS:  - Perform BMAT or MOVE assessment daily.   - Set and communicate daily mobility goal to care team and patient/family/caregiver. - Collaborate with rehabilitation services on mobility goals if consulted  - Perform Range of Motion 2 times a day. - Reposition patient every 2 hours.   - Dangle patient 2 times a day  - Stand patient 2 times a day  - Ambulate patient 2 times a day  - Out of bed to chair 2 times a day   - Out of bed for meals 2 times a day  - Out of bed for toileting  - Record patient progress and toleration of activity level   Outcome: Progressing     Problem: DISCHARGE PLANNING  Goal: Discharge to home or other facility with appropriate resources  Description: INTERVENTIONS:  - Identify barriers to discharge w/patient and caregiver  - Arrange for needed discharge resources and transportation as appropriate  - Identify discharge learning needs (meds, wound care, etc.)  - Arrange for interpretive services to assist at discharge as needed  - Refer to Case Management Department for coordinating discharge planning if the patient needs post-hospital services based on physician/advanced practitioner order or complex needs related to functional status, cognitive ability, or social support system  Outcome: Progressing     Problem: Knowledge Deficit  Goal: Patient/family/caregiver demonstrates understanding of disease process, treatment plan, medications, and discharge instructions  Description: Complete learning assessment and assess knowledge base.   Interventions:  - Provide teaching at level of understanding  - Provide teaching via preferred learning methods  Outcome: Progressing     Problem: Prexisting or High Potential for Compromised Skin Integrity  Goal: Skin integrity is maintained or improved  Description: INTERVENTIONS:  - Identify patients at risk for skin breakdown  - Assess and monitor skin integrity  - Assess and monitor nutrition and hydration status  - Monitor labs   - Assess for incontinence   - Turn and reposition patient  - Assist with mobility/ambulation  - Relieve pressure over bony prominences  - Avoid friction and shearing  - Provide appropriate hygiene as needed including keeping skin clean and dry  - Evaluate need for skin moisturizer/barrier cream  - Collaborate with interdisciplinary team   - Patient/family teaching  - Consider wound care consult   Outcome: Progressing

## 2023-08-07 NOTE — ASSESSMENT & PLAN NOTE
Erythema and swelling of right upper extremity involving elbow  · CT shows bursitis  · Concern for septic arthritis given warm erythematous and swollen elbow joint  · History of immunosuppression  · Blood cultures negative at 48 hours  · Infectious disease consulted, recommending switching antibiotic therapy to IV cefazolin  · Orthopedics evaluated patient  · Recommending possible bedside I&D versus I&D in the OR  · n.p.o. midnight and awaiting reevaluation by orthopedics tomorrow

## 2023-08-07 NOTE — ASSESSMENT & PLAN NOTE
Outpatient urine culture growing greater than 100,000 CFU's of Staph aureus  · Repeat urine culture growing 60,000 CFU Staph aureus  · ID consulted   · Switch to IV cefazolin

## 2023-08-07 NOTE — PROGRESS NOTES
Progress Note - Infectious Disease   Carmella Clark 57 y.o. male MRN: [id number]  Unit/Bed#: E2 -01 Encounter: [id number]      Impression/Plan:    1. Right elbow septic olecranon bursitis and cellulitis. There are no obvious wounds on the right elbow, but the patient frequently has irritation of the elbow since he supports his weight with it, so suspect infection due to repeated microtrauma causing translocation of skin bacteria into the bursa. No evidence of septic arthritis at the time. The patient has no history of MRSA and recent urine cultures show growth of MSSA but blood cultures show no growth. The swelling is more fluctuant today              -Continue IV cefazolin 2 g every 8 hours              -Follow-up blood cultures              -Appreciate orthopedic surgery follow-up for possible I&D              -Please send wound cultures if I&D is performed              -Monitor clinical exam     2.  Leukocytosis, POA. White blood cell count 14.51 on admission. The patient is afebrile and hemodynamically stable. Due to #1 above. Blood cultures show no growth to date despite growth of Staph aureus in urine culture.              -Antibiotics as above              -Monitor exam              -Monitor CBC with differential, fever curve              -Follow-up blood cultures     3. BPH, urinary retention. Beard catheter placed on admission. Recommend urology follow-up     4. Pyuria, bacteriuria. The patient reports urinary frequency and weak urinary stream.  I suspect his symptoms are more due to BPH than infection. Urine culture recently grew MSSA; when staph aureus is seen in the urine this is often related to bacteremia, but blood cultures thus far are negative              -Urinary retention protocol              -Outpatient urology follow-up     5.  Quadriparesis, ambulatory dysfunction due to cervical myelomalacia.   Follows with neurology outpatient and is on a steroid taper -Steroid taper per primary team              -Recommend PT/OT evaluation to help the patient reduce trauma to the right elbow in the future     I have discussed the above management plan in detail with the primary service, dorys. ID will follow. Antibiotics:  Cefazolin day 2  Abx day 4    Subjective: The patient reports that swelling is overall improved in the right arm but the area over the elbow is larger and more fluctuant today. He denies any fever, chills, nausea, vomiting. Objective:  Vitals:  Temp:  [97.2 °F (36.2 °C)-98.2 °F (36.8 °C)] 97.6 °F (36.4 °C)  HR:  [51-57] 57  Resp:  [18-20] 19  BP: (110-149)/(57-82) 133/82  SpO2:  [90 %-96 %] 96 %  Temp (24hrs), Av.7 °F (36.5 °C), Min:97.2 °F (36.2 °C), Max:98.2 °F (36.8 °C)  Current: Temperature: 97.6 °F (36.4 °C)    Physical Exam:   General Appearance:  Alert, interactive, nontoxic, no acute distress. Throat: Oropharynx moist without lesions. Lungs:   Clear to auscultation bilaterally; no wheezes, rhonchi or rales; respirations unlabored   Heart:  RRR; no murmur, rub or gallop   Abdomen:   Soft, non-tender, non-distended, positive bowel sounds. Extremities: Right elbow olecranon bursa erythema, increased fluctuance from yesterday. No swelling of the elbow joint itself. Mild swelling of the arm   Skin: No new rashes or lesions. No draining wounds noted. Labs:    All pertinent labs and imaging studies were personally reviewed  Results from last 7 days   Lab Units 23  0532 23  0540 23  0447   WBC Thousand/uL 9.67 9.58 11.64*   HEMOGLOBIN g/dL 16.2 15.9 15.8   PLATELETS Thousands/uL 175 150 145*     Results from last 7 days   Lab Units 23  0532 23  0540 23  0447 23  1406 23  0938   SODIUM mmol/L 136 135 136 129* 131*   POTASSIUM mmol/L 4.0 4.4 4.6 4.2 4.6   CHLORIDE mmol/L 99 100 101 95* 99   CO2 mmol/L 30 32 31 27 26   BUN mg/dL 18 15 21 19 22   CREATININE mg/dL 0.59* 0.69 0.65 0.69 0.66   EGFR ml/min/1.73sq m 106 100 102 100 102   CALCIUM mg/dL 8.4 8.3* 8.3* 8.6 9.1   AST U/L  --   --   --  16 15   ALT U/L  --   --   --  21 44   ALK PHOS U/L  --   --   --  67 84         Results from last 7 days   Lab Units 08/01/23  0938   CRP mg/L 23.2*               Micro:  Results from last 7 days   Lab Units 08/04/23  1645 08/04/23  1406 08/01/23  0938   BLOOD CULTURE  No Growth at 48 hrs. No Growth at 48 hrs.   --    URINE CULTURE   --  60,000-69,000 cfu/ml Staphylococcus aureus* >100,000 cfu/ml Staphylococcus aureus*       Imaging:  I have personally reviewed pertinent imaging study reports and images in PACS.     CT right arm: Olecranon bursitis and adjacent edema/cellulitis

## 2023-08-07 NOTE — PROGRESS NOTES
Progress Note - Orthopedics   Jennifer Kaur 57 y.o. male MRN: [id number]  Unit/Bed#: E2 -01 Encounter: [id number]    Assessment:  57year old Male with Right septic olecranon bursitis with resolving right upper extremity cellulitis, no concern for septic elbow at this time     Plan:  -No acute orthopedic surgical intervention at this time, we will continue to monitor right septic olecranon bursitis given significant improvement on IV Abx. Patient will be made NPO at midnight again.    - Right elbow olecranon bursa aspirated at the bedside this afternoon and sent for crystals and culture, ortho will follow for results.  -No concern for septic elbow at this time, patient is able to tolerate active ROM of the elbow without pain within the elbow joint. -WBAT RUE  -Recommend patient to not continue to lean and put continuous pressure on the right elbow/arm  -Strict elevation to the right arm to help alleviate swelling  -Pain control PRN  -Medical management per SLIM  -Continue IV Abx per SLIM/ID, patient is currently receiving IV Cefazolin  - Leukocytosis resolved, WBC 9.67 today, we will continue to monitor   -Ortho will continue to follow     Subjective: 58-year-old male with a right septic olecranon bursitis with right upper extremity cellulitis was seen and examined at bedside. Patient reports improvement of the right elbow. He states that he has less pain within his right elbow compared to yesterday. Patient states that he believes the swelling in his right hand is improved. He is unsure if the swelling over his olecranon has changed. He denies any fevers chills or sweats. Vitals: Blood pressure 133/82, pulse 57, temperature 97.6 °F (36.4 °C), temperature source Temporal, resp. rate 19, height 6' (1.829 m), weight 99.8 kg (220 lb 0.3 oz), SpO2 96 %. ,Body mass index is 29.84 kg/m².       Intake/Output Summary (Last 24 hours) at 8/7/2023 0814  Last data filed at 8/6/2023 2221  Gross per 24 hour Intake --   Output 4300 ml   Net -4300 ml       Invasive Devices     Peripheral Intravenous Line  Duration           Peripheral IV 08/04/23 Left;Ventral (anterior) Forearm 2 days          Drain  Duration           Urethral Catheter Latex 16 Fr. 2 days                Physical Exam: General appearance: alert and oriented, in no acute distress  Head: Normocephalic, without obvious abnormality, atraumatic  Eyes: conjunctivae/corneas clear. PERRL, EOM's intact. Lungs: No stridor or wheezing  Heart: No apparent distress      Ortho Exam:   Right Upper Extremity Exam  Inspection:  Mild swelling about the digits, hand and forearm is present. Distribution of erythema consistent with cellulitis is improving. Patient no longer has erythema about the forearm circumferentially and what appears to be improving cellulitis of the posterior aspect of the upper arm. Erythema is more localized directly to the elbow and olecranon bursa compared to yesterday with continued olecranon bursa swelling. Skin continues to remain intact  Palpation: Tenderness to palpation about the olecranon bursa, bursa is boggy and swollen. compartments of the upper and lower arm are soft and compressible  ROM:   active without pain in the elbow joint, pain over the bursa with moiton  Strength:  Able to actively flex & extend elbow. Able to actively dorsiflex & volarflex wrist. Able to actively flex, extend, abduct, & adduct fingers. Neurovascular:  Sensation intact in Ax/R/M/U nerve distributions. Palpable radial pulse. Procedure: After discussion of risks and benefits, patient agreed to proceed with aspiration of the right olecranon bursa. After prepping the elbow with chloraprep an 18 gauge needle was introduced and 10ccs of bloody, serous fluid was aspirated and sent for cell count and culture. The needle was removed and dry, sterile dressing was placed over the elbow. Patient tolerated the procedure well.       Lab, Imaging and other studies:   I have personally reviewed pertinent lab results.   CBC:   Lab Results   Component Value Date    WBC 9.67 08/07/2023    HGB 16.2 08/07/2023    HCT 49.1 08/07/2023    MCV 95 08/07/2023     08/07/2023    RBC 5.19 08/07/2023    MCH 31.2 08/07/2023    MCHC 33.0 08/07/2023    RDW 13.1 08/07/2023    MPV 9.9 08/07/2023     CMP:   Lab Results   Component Value Date    SODIUM 136 08/07/2023    CL 99 08/07/2023    CO2 30 08/07/2023    BUN 18 08/07/2023    CREATININE 0.59 (L) 08/07/2023    CALCIUM 8.4 08/07/2023    EGFR 106 08/07/2023         Randy Jiang PA-C

## 2023-08-07 NOTE — PLAN OF CARE
Problem: PAIN - ADULT  Goal: Verbalizes/displays adequate comfort level or baseline comfort level  Description: Interventions:  - Encourage patient to monitor pain and request assistance  - Assess pain using appropriate pain scale  - Administer analgesics based on type and severity of pain and evaluate response  - Implement non-pharmacological measures as appropriate and evaluate response  - Consider cultural and social influences on pain and pain management  - Notify physician/advanced practitioner if interventions unsuccessful or patient reports new pain  Outcome: Progressing     Problem: INFECTION - ADULT  Goal: Absence or prevention of progression during hospitalization  Description: INTERVENTIONS:  - Assess and monitor for signs and symptoms of infection  - Monitor lab/diagnostic results  - Monitor all insertion sites, i.e. indwelling lines, tubes, and drains  - Monitor endotracheal if appropriate and nasal secretions for changes in amount and color  - [place] appropriate cooling/warming therapies per order  - Administer medications as ordered  - Instruct and encourage patient and family to use good hand hygiene technique  - Identify and instruct in appropriate isolation precautions for identified infection/condition  Outcome: Progressing     Problem: SAFETY ADULT  Goal: Patient will remain free of falls  Description: INTERVENTIONS:  - Educate patient/family on patient safety including physical limitations  - Instruct patient to call for assistance with activity   - Consult OT/PT to assist with strengthening/mobility   - Keep Call bell within reach  - Keep bed low and locked with side rails adjusted as appropriate  - Keep care items and personal belongings within reach  - Initiate and maintain comfort rounds  - Make Fall Risk Sign visible to staff  - Offer Toileting every 2 Hours, in advance of need  - Initiate/Maintain bed alarm  - Obtain necessary fall risk management equipment: wheel chair  - Apply yellow socks and bracelet for high fall risk patients  - Consider moving patient to room near nurses station  Outcome: Progressing     Problem: Prexisting or High Potential for Compromised Skin Integrity  Goal: Skin integrity is maintained or improved  Description: INTERVENTIONS:  - Identify patients at risk for skin breakdown  - Assess and monitor skin integrity  - Assess and monitor nutrition and hydration status  - Monitor labs   - Assess for incontinence   - Turn and reposition patient  - Assist with mobility/ambulation  - Relieve pressure over bony prominences  - Avoid friction and shearing  - Provide appropriate hygiene as needed including keeping skin clean and dry  - Evaluate need for skin moisturizer/barrier cream  - Collaborate with interdisciplinary team   - Patient/family teaching  - Consider wound care consult   Outcome: Progressing

## 2023-08-07 NOTE — ARC ADMISSION
Referral received for consideration of patient for inpatient acute rehab. Will review patient's case with [facility] physician and update CM as able. Reviewed patient's case with [facility] physician - patient is recommended for SCI rehab vs SNF/subacute rehab for ongoing therapy needs, as patient required assistance with all tasks PTA. CM has been updated.

## 2023-08-07 NOTE — PROGRESS NOTES
[address] [facility]  Progress Note  Name: Jean-Pierre Elliott  MRN: [id number]  Unit/Bed#: E2 -01 I Date of Admission: 8/4/2023   Date of Service: 8/7/2023 I Hospital Day: 3    Assessment/Plan   * Cellulitis  Assessment & Plan  Erythema and swelling of right upper extremity involving elbow  · CT shows bursitis  · Concern for septic arthritis given warm erythematous and swollen elbow joint  · History of immunosuppression  · Blood cultures negative at 48 hours  · Infectious disease consulted, recommending switching antibiotic therapy to IV cefazolin  · Orthopedics evaluated patient  · Recommending possible bedside I&D versus I&D in the OR  · n.p.o. midnight and awaiting reevaluation by orthopedics tomorrow    Myelomalacia of cervical cord [facility])  Assessment & Plan  Follows with neurology as an outpatient  · Uses wheelchair mostly for ambulation  · PT/OT eval  · Outpatient follow-up with neurology  · Continue steroid taper as documented in outpatient neurology notes    Ambulatory dysfunction  Assessment & Plan  Secondary to chronic neurologic issue  · Patient reports progressive weakness is now likely worsened in setting of acute infection  · PT/OT    Acute cystitis without hematuria  Assessment & Plan  Outpatient urine culture growing greater than 100,000 CFU's of Staph aureus  · Repeat urine culture growing 60,000 CFU Staph aureus  · ID consulted   · Switch to IV cefazolin      BPH associated with nocturia  Assessment & Plan  Beard catheter placed on admission due to urinary retention  · Consider removal in the next 24 to 48 hours           VTE Pharmacologic Prophylaxis: VTE Score: 4 Moderate Risk (Score 3-4) - Pharmacological DVT Prophylaxis Ordered: enoxaparin (Lovenox). Patient Centered Rounds: I performed bedside rounds with nursing staff today.   Discussions with Specialists or Other Care Team Provider: id    Education and Discussions with Family / Patient: Updated  (caregiver) at bedside. Total Time Spent on Date of Encounter in care of patient: 45 minutes This time was spent on one or more of the following: performing physical exam; counseling and coordination of care; obtaining or reviewing history; documenting in the medical record; reviewing/ordering tests, medications or procedures; communicating with other healthcare professionals and discussing with patient's family/caregivers. Current Length of Stay: 3 day(s)  Current Patient Status: Inpatient   Certification Statement: The patient will continue to require additional inpatient hospital stay due to IV antibiotics  Discharge Plan: Anticipate discharge in 24-48 hrs to rehab facility. Code Status: Level 1 - Full Code    Subjective:   Patient seen resting comfortably in bed. He offers no complaints at this time, denies any fevers chills overnight. States that he feels that the swelling in his right hand has improved significantly and he is able to move it much better than he was yesterday. He denies any chest pain or shortness of breath. Objective:     Vitals:   Temp (24hrs), Av.7 °F (36.5 °C), Min:97.2 °F (36.2 °C), Max:98.2 °F (36.8 °C)    Temp:  [97.2 °F (36.2 °C)-98.2 °F (36.8 °C)] 97.6 °F (36.4 °C)  HR:  [51-57] 57  Resp:  [18-20] 19  BP: (110-149)/(57-82) 133/82  SpO2:  [90 %-96 %] 96 %  Body mass index is 29.84 kg/m². Input and Output Summary (last 24 hours): Intake/Output Summary (Last 24 hours) at 2023 1108  Last data filed at 2023 2221  Gross per 24 hour   Intake --   Output 3300 ml   Net -3300 ml       Physical Exam:   Physical Exam  Vitals and nursing note reviewed. Constitutional:       General: He is not in acute distress. Appearance: Normal appearance. He is obese. He is not ill-appearing, toxic-appearing or diaphoretic. HENT:      Head: Normocephalic and atraumatic. Cardiovascular:      Rate and Rhythm: Normal rate and regular rhythm.       Heart sounds: No murmur heard.     No friction rub. No gallop. Pulmonary:      Effort: Pulmonary effort is normal. No respiratory distress. Breath sounds: Normal breath sounds. No wheezing, rhonchi or rales. Abdominal:      General: Abdomen is flat. Bowel sounds are normal. There is no distension. Palpations: Abdomen is soft. Tenderness: There is no abdominal tenderness. Genitourinary:     Comments: Beard catheter in place  Musculoskeletal:      Right lower leg: No edema. Left lower leg: No edema. Comments: Improvement in right hand swelling, erythema warmth and swelling isolated to right elbow   Skin:     General: Skin is warm and dry. Coloration: Skin is not jaundiced or pale. Neurological:      General: No focal deficit present. Mental Status: He is alert. Mental status is at baseline. Additional Data:     Labs:  Results from last 7 days   Lab Units 08/07/23  0532 08/06/23  0540 08/05/23 0447 08/04/23  1406   WBC Thousand/uL 9.67   < > 11.64* 14.51*   HEMOGLOBIN g/dL 16.2   < > 15.8 18.5*   HEMATOCRIT % 49.1   < > 49.9* 57.8*   PLATELETS Thousands/uL 175   < > 145* 158   BANDS PCT %  --   --   --  1   NEUTROS PCT %  --   --  77*  --    LYMPHS PCT %  --   --  10*  --    LYMPHO PCT %  --   --   --  3*   MONOS PCT %  --   --  8  --    MONO PCT %  --   --   --  1*   EOS PCT %  --   --  0 1    < > = values in this interval not displayed. Results from last 7 days   Lab Units 08/07/23  0532 08/05/23 0447 08/04/23  1406   SODIUM mmol/L 136   < > 129*   POTASSIUM mmol/L 4.0   < > 4.2   CHLORIDE mmol/L 99   < > 95*   CO2 mmol/L 30   < > 27   BUN mg/dL 18   < > 19   CREATININE mg/dL 0.59*   < > 0.69   ANION GAP mmol/L 7   < > 7   CALCIUM mg/dL 8.4   < > 8.6   ALBUMIN g/dL  --   --  3.4*   TOTAL BILIRUBIN mg/dL  --   --  0.76   ALK PHOS U/L  --   --  67   ALT U/L  --   --  21   AST U/L  --   --  16   GLUCOSE RANDOM mg/dL 80   < > 117    < > = values in this interval not displayed. Lines/Drains:  Invasive Devices     Peripheral Intravenous Line  Duration           Peripheral IV 08/04/23 Left;Ventral (anterior) Forearm 2 days          Drain  Duration           Urethral Catheter Latex 16 Fr. 2 days              Urinary Catheter:  Goal for removal: Remove after 48 hrs of I/O monitoring               Imaging: No pertinent imaging reviewed. Recent Cultures (last 7 days):   Results from last 7 days   Lab Units 08/04/23  1645 08/04/23  1406 08/01/23  0938   BLOOD CULTURE  No Growth at 48 hrs. No Growth at 48 hrs. --    URINE CULTURE   --  60,000-69,000 cfu/ml Staphylococcus aureus* >100,000 cfu/ml Staphylococcus aureus*       Last 24 Hours Medication List:   Current Facility-Administered Medications   Medication Dose Route Frequency Provider Last Rate   • acetaminophen  650 mg Oral Q6H PRN Ryan Kohler DO     • aluminum-magnesium hydroxide-simethicone  30 mL Oral Q6H PRN Ryan Kohler DO     • bisacodyl  10 mg Rectal Daily PRN Ev Kim PA-C     • cefazolin  2,000 mg Intravenous Q8H Keyla Isbell MD 2,000 mg (08/07/23 0431)   • enoxaparin  40 mg Subcutaneous Daily Ryan Kohler DO     • melatonin  3 mg Oral HS Raman Cassidy PA-C     • ondansetron  4 mg Intravenous Q6H PRN Ryan Kohler DO     • oxyCODONE  5 mg Oral Q6H PRN Ryan Kohler DO      Or   • oxyCODONE  10 mg Oral Q6H PRN Ryan Kohler DO     • [START ON 8/8/2023] predniSONE  10 mg Oral Daily Ryan Kohler DO      Followed by   • [START ON 8/10/2023] predniSONE  5 mg Oral Daily Ryan Kohler DO          Today, Patient Was Seen By: Ev Kim PA-C    **Please Note: This note may have been constructed using a voice recognition system. **

## 2023-08-08 LAB
ANION GAP SERPL CALCULATED.3IONS-SCNC: 5 MMOL/L
BUN SERPL-MCNC: 18 MG/DL (ref 5–25)
CALCIUM SERPL-MCNC: 8.6 MG/DL (ref 8.4–10.2)
CHLORIDE SERPL-SCNC: 99 MMOL/L (ref 96–108)
CO2 SERPL-SCNC: 31 MMOL/L (ref 21–32)
CREAT SERPL-MCNC: 0.59 MG/DL (ref 0.6–1.3)
ERYTHROCYTE [DISTWIDTH] IN BLOOD BY AUTOMATED COUNT: 13 % (ref 11.6–15.1)
GFR SERPL CREATININE-BSD FRML MDRD: 106 ML/MIN/1.73SQ M
GLUCOSE SERPL-MCNC: 78 MG/DL (ref 65–140)
HCT VFR BLD AUTO: 52.5 % (ref 36.5–49.3)
HGB BLD-MCNC: 16.8 G/DL (ref 12–17)
MCH RBC QN AUTO: 30.3 PG (ref 26.8–34.3)
MCHC RBC AUTO-ENTMCNC: 32 G/DL (ref 31.4–37.4)
MCV RBC AUTO: 95 FL (ref 82–98)
PLATELET # BLD AUTO: 157 THOUSANDS/UL (ref 149–390)
PMV BLD AUTO: 9.5 FL (ref 8.9–12.7)
POTASSIUM SERPL-SCNC: 4.1 MMOL/L (ref 3.5–5.3)
RBC # BLD AUTO: 5.55 MILLION/UL (ref 3.88–5.62)
SODIUM SERPL-SCNC: 135 MMOL/L (ref 135–147)
WBC # BLD AUTO: 8.56 THOUSAND/UL (ref 4.31–10.16)

## 2023-08-08 PROCEDURE — 97530 THERAPEUTIC ACTIVITIES: CPT

## 2023-08-08 PROCEDURE — 99232 SBSQ HOSP IP/OBS MODERATE 35: CPT | Performed by: PHYSICIAN ASSISTANT

## 2023-08-08 PROCEDURE — 99232 SBSQ HOSP IP/OBS MODERATE 35: CPT | Performed by: STUDENT IN AN ORGANIZED HEALTH CARE EDUCATION/TRAINING PROGRAM

## 2023-08-08 PROCEDURE — 85027 COMPLETE CBC AUTOMATED: CPT | Performed by: PHYSICIAN ASSISTANT

## 2023-08-08 PROCEDURE — 80048 BASIC METABOLIC PNL TOTAL CA: CPT | Performed by: PHYSICIAN ASSISTANT

## 2023-08-08 PROCEDURE — NC001 PR NO CHARGE: Performed by: PHYSICIAN ASSISTANT

## 2023-08-08 RX ADMIN — CEFAZOLIN SODIUM 2000 MG: 2 SOLUTION INTRAVENOUS at 04:10

## 2023-08-08 RX ADMIN — Medication 3 MG: at 20:01

## 2023-08-08 RX ADMIN — OXYCODONE HYDROCHLORIDE 5 MG: 5 TABLET ORAL at 01:06

## 2023-08-08 RX ADMIN — CEFAZOLIN SODIUM 2000 MG: 2 SOLUTION INTRAVENOUS at 20:01

## 2023-08-08 RX ADMIN — CEFAZOLIN SODIUM 2000 MG: 2 SOLUTION INTRAVENOUS at 11:35

## 2023-08-08 RX ADMIN — OXYCODONE HYDROCHLORIDE 10 MG: 10 TABLET ORAL at 11:37

## 2023-08-08 RX ADMIN — PREDNISONE 10 MG: 10 TABLET ORAL at 10:19

## 2023-08-08 RX ADMIN — OXYCODONE HYDROCHLORIDE 10 MG: 10 TABLET ORAL at 18:08

## 2023-08-08 RX ADMIN — ENOXAPARIN SODIUM 40 MG: 100 INJECTION SUBCUTANEOUS at 10:19

## 2023-08-08 NOTE — PROGRESS NOTES
[address] [facility]  Progress Note  Name: Rafia Borjas  MRN: [id number]  Unit/Bed#: E2 -01 I Date of Admission: 8/4/2023   Date of Service: 8/8/2023  Hospital Day: 4    Assessment/Plan   * Cellulitis  Assessment & Plan  Erythema and swelling of right upper extremity involving elbow  · CT shows bursitis  · Concern for septic arthritis given warm erythematous and swollen elbow joint  · History of immunosuppression  · Blood cultures negative at 48 hours  · Infectious disease consulted, recommending switching antibiotic therapy to IV cefazolin  · Orthopedics evaluated patient  · Bedside aspiration performed yesterday  · Cultures sent- no growth thus far, anaerobic cultures pending    Myelomalacia of cervical cord [facility])  Assessment & Plan  Follows with neurology as an outpatient  · Uses wheelchair mostly for ambulation  · PT/OT eval recommending rehab  · Outpatient follow-up with neurology  · Continue steroid taper as documented in outpatient neurology notes    Ambulatory dysfunction  Assessment & Plan  Secondary to chronic neurologic issue  · Patient reports progressive weakness is now likely worsened in setting of acute infection  · PT/OT recommending rehab    Acute cystitis without hematuria  Assessment & Plan  Outpatient urine culture growing greater than 100,000 CFU's of Staph aureus  · Repeat urine culture growing 60,000 CFU Staph aureus  · ID consulted   · Switched to IV cefazolin, day 2      BPH associated with nocturia  Assessment & Plan  Beard catheter placed on admission due to urinary retention  · Consider removal in the next 48 hours as ambulation improves         VTE Pharmacologic Prophylaxis: VTE Score: 4 Moderate Risk (Score 3-4) - Pharmacological DVT Prophylaxis Ordered: enoxaparin (Lovenox). Patient Centered Rounds: I performed bedside rounds with nursing staff today.   Discussions with Specialists or Other Care Team Provider: ID    Education and Discussions with Family / Patient: Updated  (caregiver) at bedside. Total Time Spent on Date of Encounter in care of patient: 35 minutes This time was spent on one or more of the following: performing physical exam; counseling and coordination of care; obtaining or reviewing history; documenting in the medical record; reviewing/ordering tests, medications or procedures; communicating with other healthcare professionals and discussing with patient's family/caregivers. Current Length of Stay: 4 day(s)  Current Patient Status: Inpatient   Certification Statement: The patient will continue to require additional inpatient hospital stay due to IV abx, placment  Discharge Plan: Anticipate discharge in 48 hrs to rehab facility. Code Status: Level 1 - Full Code    Subjective:   Patient seen resting comfortably in bed. He states that his arm is feeling much improved. He reports that they performed a bedside aspiration yesterday. He states that the swelling, warmth, and erythema has improved. Denies any fevers or chills overnight. Denies any shortness of breath or chest pain. Objective:     Vitals:   Temp (24hrs), Av.4 °F (36.3 °C), Min:96.7 °F (35.9 °C), Max:98.1 °F (36.7 °C)    Temp:  [96.7 °F (35.9 °C)-98.1 °F (36.7 °C)] 96.7 °F (35.9 °C)  HR:  [52-85] 52  Resp:  [17-20] 20  BP: (106-131)/(69-74) 119/69  SpO2:  [94 %-96 %] 96 %  Body mass index is 29.84 kg/m². Input and Output Summary (last 24 hours): Intake/Output Summary (Last 24 hours) at 2023 1031  Last data filed at 2023 0452  Gross per 24 hour   Intake --   Output 3600 ml   Net -3600 ml       Physical Exam:   Physical Exam  Vitals and nursing note reviewed. Constitutional:       General: He is not in acute distress. Appearance: Normal appearance. He is not ill-appearing, toxic-appearing or diaphoretic. HENT:      Head: Normocephalic and atraumatic. Cardiovascular:      Rate and Rhythm: Normal rate and regular rhythm.       Heart sounds: No murmur heard. No friction rub. No gallop. Pulmonary:      Effort: Pulmonary effort is normal. No respiratory distress. Breath sounds: Normal breath sounds. No wheezing, rhonchi or rales. Abdominal:      General: Abdomen is flat. Bowel sounds are normal. There is no distension. Palpations: Abdomen is soft. Tenderness: There is no abdominal tenderness. Genitourinary:     Comments: Beard catheter in place  Musculoskeletal:      Right lower leg: No edema. Left lower leg: No edema. Comments: Erythema and warmth isolated to right elbow   Skin:     General: Skin is warm and dry. Coloration: Skin is not jaundiced or pale. Neurological:      General: No focal deficit present. Mental Status: He is alert. Mental status is at baseline. Additional Data:     Labs:  Results from last 7 days   Lab Units 08/08/23 0452 08/06/23  0540 08/05/23 0447 08/04/23  1406   WBC Thousand/uL 8.56   < > 11.64* 14.51*   HEMOGLOBIN g/dL 16.8   < > 15.8 18.5*   HEMATOCRIT % 52.5*   < > 49.9* 57.8*   PLATELETS Thousands/uL 157   < > 145* 158   BANDS PCT %  --   --   --  1   NEUTROS PCT %  --   --  77*  --    LYMPHS PCT %  --   --  10*  --    LYMPHO PCT %  --   --   --  3*   MONOS PCT %  --   --  8  --    MONO PCT %  --   --   --  1*   EOS PCT %  --   --  0 1    < > = values in this interval not displayed. Results from last 7 days   Lab Units 08/08/23 0452 08/05/23 0447 08/04/23  1406   SODIUM mmol/L 135   < > 129*   POTASSIUM mmol/L 4.1   < > 4.2   CHLORIDE mmol/L 99   < > 95*   CO2 mmol/L 31   < > 27   BUN mg/dL 18   < > 19   CREATININE mg/dL 0.59*   < > 0.69   ANION GAP mmol/L 5   < > 7   CALCIUM mg/dL 8.6   < > 8.6   ALBUMIN g/dL  --   --  3.4*   TOTAL BILIRUBIN mg/dL  --   --  0.76   ALK PHOS U/L  --   --  67   ALT U/L  --   --  21   AST U/L  --   --  16   GLUCOSE RANDOM mg/dL 78   < > 117    < > = values in this interval not displayed. Lines/Drains:  Invasive Devices     Peripheral Intravenous Line  Duration           Peripheral IV 08/07/23 Dorsal (posterior); Left;Proximal Forearm <1 day          Drain  Duration           Urethral Catheter Latex 16 Fr. 3 days              Urinary Catheter:  Goal for removal: Voiding trial when ambulation improves               Imaging: No pertinent imaging reviewed. Recent Cultures (last 7 days):   Results from last 7 days   Lab Units 08/07/23  1246 08/04/23  1645 08/04/23  1406   BLOOD CULTURE   --  No Growth at 72 hrs. No Growth at 72 hrs. GRAM STAIN RESULT  4+ Polys  No bacteria seen  --   --    URINE CULTURE   --   --  60,000-69,000 cfu/ml Staphylococcus aureus*       Last 24 Hours Medication List:   Current Facility-Administered Medications   Medication Dose Route Frequency Provider Last Rate   • acetaminophen  650 mg Oral Q6H PRN Rolm Pun, DO     • aluminum-magnesium hydroxide-simethicone  30 mL Oral Q6H PRN Rolm Pun, DO     • bisacodyl  10 mg Rectal Daily PRN Roberto Quinones PA-C     • cefazolin  2,000 mg Intravenous Q8H Shorty Jerez MD 2,000 mg (08/08/23 0410)   • enoxaparin  40 mg Subcutaneous Daily Rolm Pun, DO     • melatonin  3 mg Oral HS Jass Delatorre PA-C     • ondansetron  4 mg Intravenous Q6H PRN Rolm Pun, DO     • oxyCODONE  5 mg Oral Q6H PRN Rolm Pun, DO      Or   • oxyCODONE  10 mg Oral Q6H PRN Rolm Pun, DO     • predniSONE  10 mg Oral Daily Rolm Pun, DO      Followed by   • [START ON 8/10/2023] predniSONE  5 mg Oral Daily Rolm Pun, DO          Today, Patient Was Seen By: Roberto Quinones PA-C    **Please Note: This note may have been constructed using a voice recognition system. **

## 2023-08-08 NOTE — PROGRESS NOTES
Progress Note - Infectious Disease   Nahum Oliveira 57 y.o. male MRN: [id number]  Unit/Bed#: E2 -01 Encounter: [id number]      Impression/Plan:    1. Right elbow septic olecranon bursitis and cellulitis. There are no obvious wounds on the right elbow, but the patient frequently has irritation of the elbow since he supports his weight with it, so suspect infection due to repeated microtrauma causing translocation of skin bacteria into the bursa. No evidence of septic arthritis at the time. The patient has no history of MRSA and recent urine cultures show growth of MSSA but blood cultures show no growth. Leukocytosis has improved and the patient is afebrile. 10 cc of bloody, serous fluid aspirated from the right bursa yesterday. Overall his right arm swelling has improved but there is still fluctuance localized over the right bursa. Aspirate culture without growth to date although the patient has been on antibiotics              -Continue IV cefazolin 2 g every 8 hours              -Follow-up blood cultures, aspirate culture              -Appreciate orthopedic surgery follow-up               -Monitor clinical exam   -Anticipate transition to p.o. antibiotics at the time of discharge     2.  Leukocytosis, POA. White blood cell count 14.51 on admission. The patient is afebrile and hemodynamically stable. Due to #1 above. Blood cultures show no growth to date despite growth of Staph aureus in urine culture.              -Antibiotics as above              -Monitor exam              -Monitor CBC with differential, fever curve              -Follow-up blood cultures, aspirate culture     3. BPH, urinary retention. Beard catheter placed on admission. Recommend urology follow-up     4. Pyuria, bacteriuria. The patient reports urinary frequency and weak urinary stream.  I suspect his symptoms are more due to BPH than infection.   Urine culture recently grew MSSA; when staph aureus is seen in the urine this is often related to bacteremia, but blood cultures thus far are negative              -Urinary retention protocol              -Outpatient urology follow-up     5.  Quadriparesis, ambulatory dysfunction due to cervical myelomalacia. Follows with neurology outpatient and is on a steroid taper              -Steroid taper per primary team              -Recommend PT/OT evaluation to help the patient reduce trauma to the right elbow in the future     I have discussed the above management plan in detail with the primary service, dorys. ID will follow. Antibiotics:  Cefazolin day 3  Abx day 5    Subjective:  Orthopedic surgery aspirated 10 cc of bloody, serous fluid from the right elbow bursa yesterday. The patient reports that the swelling in his right arm is overall improved but that the erythema and fluctuance is more centered over the right elbow now. He denies any fever, chills. Objective:  Vitals:  Temp:  [96.7 °F (35.9 °C)-97.9 °F (36.6 °C)] 97.9 °F (36.6 °C)  HR:  [52-57] 55  Resp:  [20] 20  BP: (116-131)/(69-74) 116/71  SpO2:  [94 %-96 %] 94 %  Temp (24hrs), Av.3 °F (36.3 °C), Min:96.7 °F (35.9 °C), Max:97.9 °F (36.6 °C)  Current: Temperature: 97.9 °F (36.6 °C)    Physical Exam:   General Appearance:  Alert, interactive, nontoxic, no acute distress. Throat: Oropharynx moist without lesions. Lungs:   Clear to auscultation bilaterally; no wheezes, rhonchi or rales; respirations unlabored   Heart:  RRR; no murmur, rub or gallop   Abdomen:   Soft, non-tender, non-distended, positive bowel sounds. Extremities: Right elbow olecranon bursa erythema, fluctuance. No swelling of the elbow joint itself. Skin: No new rashes or lesions. No draining wounds noted. Labs:    All pertinent labs and imaging studies were personally reviewed  Results from last 7 days   Lab Units 23  0452 23  0532 23  0540   WBC Thousand/uL 8.56 9.67 9.58   HEMOGLOBIN g/dL 16.8 16.2 15.9 PLATELETS Thousands/uL 157 175 150     Results from last 7 days   Lab Units 08/08/23  0452 08/07/23  0532 08/06/23  0540 08/05/23  0447 08/04/23  1406   SODIUM mmol/L 135 136 135   < > 129*   POTASSIUM mmol/L 4.1 4.0 4.4   < > 4.2   CHLORIDE mmol/L 99 99 100   < > 95*   CO2 mmol/L 31 30 32   < > 27   BUN mg/dL 18 18 15   < > 19   CREATININE mg/dL 0.59* 0.59* 0.69   < > 0.69   EGFR ml/min/1.73sq m 106 106 100   < > 100   CALCIUM mg/dL 8.6 8.4 8.3*   < > 8.6   AST U/L  --   --   --   --  16   ALT U/L  --   --   --   --  21   ALK PHOS U/L  --   --   --   --  67    < > = values in this interval not displayed. Micro:  Results from last 7 days   Lab Units 08/07/23  1246 08/04/23  1645 08/04/23  1406   BLOOD CULTURE   --  No Growth at 72 hrs. No Growth at 72 hrs.    GRAM STAIN RESULT  4+ Polys  No bacteria seen  --   --    URINE CULTURE   --   --  60,000-69,000 cfu/ml Staphylococcus aureus*   BODY FLUID CULTURE, STERILE  No growth  --   --        Imaging:  I have personally reviewed pertinent imaging study reports and images in PACS.     CT right arm: Olecranon bursitis and adjacent edema/cellulitis

## 2023-08-08 NOTE — ASSESSMENT & PLAN NOTE
Beard catheter placed on admission due to urinary retention  · Consider removal in the next 48 hours as ambulation improves

## 2023-08-08 NOTE — ASSESSMENT & PLAN NOTE
Outpatient urine culture growing greater than 100,000 CFU's of Staph aureus  · Repeat urine culture growing 60,000 CFU Staph aureus  · ID consulted   · Switched to IV cefazolin, day 2

## 2023-08-08 NOTE — PLAN OF CARE
Problem: OCCUPATIONAL THERAPY ADULT  Goal: Performs self-care activities at highest level of function for planned discharge setting. See evaluation for individualized goals. Description: Treatment Interventions: ADL retraining, Functional transfer training, UE strengthening/ROM, Endurance training, Cognitive reorientation, Patient/family training, Equipment evaluation/education, Compensatory technique education, Continued evaluation          See flowsheet documentation for full assessment, interventions and recommendations. Outcome: Progressing  Note: Limitation: Decreased ADL status, Decreased UE ROM, Decreased UE strength, Decreased Safe judgement during ADL, Decreased endurance, Decreased high-level ADLs  Prognosis: Fair  Assessment: Pt seen for 41 min tx session with focus on functional balance, functional mobility, ADL status, transfer safety, and education. Pt able to tolerate EOB mobility; sitting balance=f/f-, standing balance=p+/p. Pt required heavy assistance with all functional mobility task. Pt demonstrated need for heavy assistance with UE and LE ADLs. Pt able to demonstrate good cognition. Limited b/l UE AROM/functional use. Pt continues to demonstrate appropriatness for inpt rehab to improve his overall level of independence. Will continue. The patient's raw score on the AM-PAC Daily Activity Inpatient Short Form is 14. A raw score of less than 19 suggests the patient may benefit from discharge to post-acute rehabilitation services. Please refer to the recommendation of the Occupational Therapist for safe discharge planning.      OT Discharge Recommendation: Post acute rehabilitation services

## 2023-08-08 NOTE — PLAN OF CARE
Problem: Potential for Falls  Goal: Patient will remain free of falls  Description: INTERVENTIONS:  - Educate patient/family on patient safety including physical limitations  - Instruct patient to call for assistance with activity   - Consult OT/PT to assist with strengthening/mobility   - Keep Call bell within reach  - Keep bed low and locked with side rails adjusted as appropriate  - Keep care items and personal belongings within reach  - Initiate and maintain comfort rounds  - Make Fall Risk Sign visible to staff  - Offer Toileting every 2 Hours, in advance of need  - Initiate/Maintain bed alarm  - Obtain necessary fall risk management equipment:   - Apply yellow socks and bracelet for high fall risk patients  - Consider moving patient to room near nurses station  Outcome: Progressing     Problem: MOBILITY - ADULT  Goal: Maintain or return to baseline ADL function  Description: INTERVENTIONS:  -  Assess patient's ability to carry out ADLs; assess patient's baseline for ADL function and identify physical deficits which impact ability to perform ADLs (bathing, care of mouth/teeth, toileting, grooming, dressing, etc.)  - Assess/evaluate cause of self-care deficits   - Assess range of motion  - Assess patient's mobility; develop plan if impaired  - Assess patient's need for assistive devices and provide as appropriate  - Encourage maximum independence but intervene and supervise when necessary  - Involve family in performance of ADLs  - Assess for home care needs following discharge   - Consider OT consult to assist with ADL evaluation and planning for discharge  - Provide patient education as appropriate  Outcome: Progressing  Goal: Maintains/Returns to pre admission functional level  Description: INTERVENTIONS:  - Perform BMAT or MOVE assessment daily.   - Set and communicate daily mobility goal to care team and patient/family/caregiver.    - Collaborate with rehabilitation services on mobility goals if consulted  - Perform Range of Motion 3 times a day. - Reposition patient every 2 hours.   - Dangle patient 3 times a day  - Stand patient 3 times a day  - Ambulate patient 3 times a day  - Out of bed to chair 3 times a day   - Out of bed for meals 3 times a day  - Out of bed for toileting  - Record patient progress and toleration of activity level   Outcome: Progressing     Problem: PAIN - ADULT  Goal: Verbalizes/displays adequate comfort level or baseline comfort level  Description: Interventions:  - Encourage patient to monitor pain and request assistance  - Assess pain using appropriate pain scale  - Administer analgesics based on type and severity of pain and evaluate response  - Implement non-pharmacological measures as appropriate and evaluate response  - Consider cultural and social influences on pain and pain management  - Notify physician/advanced practitioner if interventions unsuccessful or patient reports new pain  Outcome: Progressing     Problem: INFECTION - ADULT  Goal: Absence or prevention of progression during hospitalization  Description: INTERVENTIONS:  - Assess and monitor for signs and symptoms of infection  - Monitor lab/diagnostic results  - Monitor all insertion sites, i.e. indwelling lines, tubes, and drains  - Monitor endotracheal if appropriate and nasal secretions for changes in amount and color  - [place] appropriate cooling/warming therapies per order  - Administer medications as ordered  - Instruct and encourage patient and family to use good hand hygiene technique  - Identify and instruct in appropriate isolation precautions for identified infection/condition  Outcome: Progressing  Goal: Absence of fever/infection during neutropenic period  Description: INTERVENTIONS:  - Monitor WBC    Outcome: Progressing     Problem: SAFETY ADULT  Goal: Patient will remain free of falls  Description: INTERVENTIONS:  - Educate patient/family on patient safety including physical limitations  - Instruct patient to call for assistance with activity   - Consult OT/PT to assist with strengthening/mobility   - Keep Call bell within reach  - Keep bed low and locked with side rails adjusted as appropriate  - Keep care items and personal belongings within reach  - Initiate and maintain comfort rounds  - Make Fall Risk Sign visible to staff  - Apply yellow socks and bracelet for high fall risk patients  - Consider moving patient to room near nurses station  Outcome: Progressing  Goal: Maintain or return to baseline ADL function  Description: INTERVENTIONS:  -  Assess patient's ability to carry out ADLs; assess patient's baseline for ADL function and identify physical deficits which impact ability to perform ADLs (bathing, care of mouth/teeth, toileting, grooming, dressing, etc.)  - Assess/evaluate cause of self-care deficits   - Assess range of motion  - Assess patient's mobility; develop plan if impaired  - Assess patient's need for assistive devices and provide as appropriate  - Encourage maximum independence but intervene and supervise when necessary  - Involve family in performance of ADLs  - Assess for home care needs following discharge   - Consider OT consult to assist with ADL evaluation and planning for discharge  - Provide patient education as appropriate  Outcome: Progressing  Goal: Maintains/Returns to pre admission functional level  Description: INTERVENTIONS:  - Perform BMAT or MOVE assessment daily.   - Set and communicate daily mobility goal to care team and patient/family/caregiver.    - Collaborate with rehabilitation services on mobility goals if consulted  - Out of bed for toileting  - Record patient progress and toleration of activity level   Outcome: Progressing     Problem: DISCHARGE PLANNING  Goal: Discharge to home or other facility with appropriate resources  Description: INTERVENTIONS:  - Identify barriers to discharge w/patient and caregiver  - Arrange for needed discharge resources and transportation as appropriate  - Identify discharge learning needs (meds, wound care, etc.)  - Arrange for interpretive services to assist at discharge as needed  - Refer to Case Management Department for coordinating discharge planning if the patient needs post-hospital services based on physician/advanced practitioner order or complex needs related to functional status, cognitive ability, or social support system  Outcome: Progressing     Problem: Knowledge Deficit  Goal: Patient/family/caregiver demonstrates understanding of disease process, treatment plan, medications, and discharge instructions  Description: Complete learning assessment and assess knowledge base.   Interventions:  - Provide teaching at level of understanding  - Provide teaching via preferred learning methods  Outcome: Progressing     Problem: Prexisting or High Potential for Compromised Skin Integrity  Goal: Skin integrity is maintained or improved  Description: INTERVENTIONS:  - Identify patients at risk for skin breakdown  - Assess and monitor skin integrity  - Assess and monitor nutrition and hydration status  - Monitor labs   - Assess for incontinence   - Turn and reposition patient  - Assist with mobility/ambulation  - Relieve pressure over bony prominences  - Avoid friction and shearing  - Provide appropriate hygiene as needed including keeping skin clean and dry  - Evaluate need for skin moisturizer/barrier cream  - Collaborate with interdisciplinary team   - Patient/family teaching  - Consider wound care consult   Outcome: Progressing

## 2023-08-08 NOTE — OCCUPATIONAL THERAPY NOTE
Occupational Therapy Progress Note     Patient Name: Cristine Mcardle  JUBXL'[id number] Date: 8/8/2023  Problem List  Principal Problem:    Cellulitis  Active Problems:    BPH associated with nocturia    Acute cystitis without hematuria    Cauda equina syndrome [facility])    Ambulatory dysfunction    Myelomalacia of cervical cord (HCC)            08/08/23 1427   Note Type   Note Type Treatment   Pain Assessment   Pain Assessment Tool 0-10   Pain Score 4   Pain Location/Orientation Location: Generalized   Restrictions/Precautions   Weight Bearing Precautions Per Order No   Other Precautions Chair Alarm; Bed Alarm; Fall Risk;Pain   ADL   Where Assessed Edge of bed   Eating Assistance 4  Minimal Assistance   Grooming Assistance 4  Minimal Assistance   UB Bathing Assistance 2  Maximal Assistance   LB Bathing Assistance 2  Maximal Assistance   UB Dressing Assistance 2  Maximal Assistance   LB Dressing Assistance 2  Maximal Assistance   Functional Standing Tolerance   Time 3-4 mins   Bed Mobility   Rolling R 2  Maximal assistance   Additional items Assist x 1   Rolling L 2  Maximal assistance   Additional items Assist x 1; Increased time required;Verbal cues;LE management   Supine to Sit 2  Maximal assistance   Additional items Assist x 2; Increased time required;LE management;Verbal cues   Sit to Supine 2  Maximal assistance   Additional items Assist x 2; Increased time required;Verbal cues;LE management   Transfers   Sit to Stand 2  Maximal assistance   Additional items Assist x 2; Increased time required;Verbal cues   Stand to Sit 2  Maximal assistance   Additional items Assist x 2; Increased time required;Verbal cues   Functional Mobility   Functional Mobility   (continue to recommend hoyerlift for OOB with nsg)   Subjective   Subjective "I feel very happy about standing."   Cognition   Overall Cognitive Status [facility]   Arousal/Participation Alert   Attention Within functional limits   Orientation Level Oriented X4   Memory Within functional limits   Following Commands Follows all commands and directions without difficulty   Activity Tolerance   Activity Tolerance Patient limited by fatigue;Patient limited by pain   Medical Staff Made Aware nsg, P.T. Assessment   Assessment Pt seen for 41 min tx session with focus on functional balance, functional mobility, ADL status, transfer safety, and education. Pt able to tolerate EOB mobility; sitting balance=f/f-, standing balance=p+/p. Pt required heavy assistance with all functional mobility task. Pt demonstrated need for heavy assistance with UE and LE ADLs. Pt able to demonstrate good cognition. Limited b/l UE AROM/functional use. Pt continues to demonstrate appropriatness for inpt rehab to improve his overall level of independence. Will continue. The patient's raw score on the AM-PAC Daily Activity Inpatient Short Form is 14. A raw score of less than 19 suggests the patient may benefit from discharge to post-acute rehabilitation services. Please refer to the recommendation of the Occupational Therapist for safe discharge planning. Plan   Treatment Interventions ADL retraining;Functional transfer training;UE strengthening/ROM; Endurance training;Cognitive reorientation;Patient/family training;Equipment evaluation/education; Compensatory technique education;Continued evaluation   Goal Expiration Date 08/20/23   OT Treatment Day 1   OT Frequency 3-5x/wk   Recommendation   OT Discharge Recommendation Post acute rehabilitation services   [facility] Daily Activity Inpatient   Lower Body Dressing 2   Bathing 2   Toileting 2   Upper Body Dressing 2   Grooming 3   Eating 3   Daily Activity Raw Score 14   Daily Activity Standardized Score (Calc for Raw Score >=11) 33.39   AM-PAC Applied Cognition Inpatient   Following a Speech/Presentation 4   Understanding Ordinary Conversation 4   Taking Medications 4   Remembering Where Things Are Placed or Put Away 4   Remembering List of 4-5 Errands 4   Taking Care of Complicated Tasks 4   Applied Cognition Raw Score 24   Applied Cognition Standardized Score 62.21     [facility] [place]

## 2023-08-08 NOTE — ASSESSMENT & PLAN NOTE
Erythema and swelling of right upper extremity involving elbow  · CT shows bursitis  · Concern for septic arthritis given warm erythematous and swollen elbow joint  · History of immunosuppression  · Blood cultures negative at 48 hours  · Infectious disease consulted, recommending switching antibiotic therapy to IV cefazolin  · Orthopedics evaluated patient  · Bedside aspiration performed yesterday  · Cultures sent- no growth thus far, anaerobic cultures pending

## 2023-08-08 NOTE — PLAN OF CARE
Problem: Potential for Falls  Goal: Patient will remain free of falls  Description: INTERVENTIONS:  - Educate patient/family on patient safety including physical limitations  - Instruct patient to call for assistance with activity   - Consult OT/PT to assist with strengthening/mobility   - Keep Call bell within reach  - Keep bed low and locked with side rails adjusted as appropriate  - Keep care items and personal belongings within reach  - Initiate and maintain comfort rounds  - Make Fall Risk Sign visible to staff  - Offer Toileting every 2 Hours, in advance of need  - Initiate/Maintain bed alarm  - Obtain necessary fall risk management equipment: gripper socks, call bell  - Apply yellow socks and bracelet for high fall risk patients  - Consider moving patient to room near nurses station  Outcome: Progressing     Problem: MOBILITY - ADULT  Goal: Maintain or return to baseline ADL function  Description: INTERVENTIONS:  -  Assess patient's ability to carry out ADLs; assess patient's baseline for ADL function and identify physical deficits which impact ability to perform ADLs (bathing, care of mouth/teeth, toileting, grooming, dressing, etc.)  - Assess/evaluate cause of self-care deficits   - Assess range of motion  - Assess patient's mobility; develop plan if impaired  - Assess patient's need for assistive devices and provide as appropriate  - Encourage maximum independence but intervene and supervise when necessary  - Involve family in performance of ADLs  - Assess for home care needs following discharge   - Consider OT consult to assist with ADL evaluation and planning for discharge  - Provide patient education as appropriate  Outcome: Progressing     Problem: PAIN - ADULT  Goal: Verbalizes/displays adequate comfort level or baseline comfort level  Description: Interventions:  - Encourage patient to monitor pain and request assistance  - Assess pain using appropriate pain scale  - Administer analgesics based on type and severity of pain and evaluate response  - Implement non-pharmacological measures as appropriate and evaluate response  - Consider cultural and social influences on pain and pain management  - Notify physician/advanced practitioner if interventions unsuccessful or patient reports new pain  Outcome: Progressing     Problem: INFECTION - ADULT  Goal: Absence or prevention of progression during hospitalization  Description: INTERVENTIONS:  - Assess and monitor for signs and symptoms of infection  - Monitor lab/diagnostic results  - Monitor all insertion sites, i.e. indwelling lines, tubes, and drains  - Monitor endotracheal if appropriate and nasal secretions for changes in amount and color  - [place] appropriate cooling/warming therapies per order  - Administer medications as ordered  - Instruct and encourage patient and family to use good hand hygiene technique  - Identify and instruct in appropriate isolation precautions for identified infection/condition  Outcome: Progressing    Goal: Maintain or return to baseline ADL function  Description: INTERVENTIONS:  -  Assess patient's ability to carry out ADLs; assess patient's baseline for ADL function and identify physical deficits which impact ability to perform ADLs (bathing, care of mouth/teeth, toileting, grooming, dressing, etc.)  - Assess/evaluate cause of self-care deficits   - Assess range of motion  - Assess patient's mobility; develop plan if impaired  - Assess patient's need for assistive devices and provide as appropriate  - Encourage maximum independence but intervene and supervise when necessary  - Involve family in performance of ADLs  - Assess for home care needs following discharge   - Consider OT consult to assist with ADL evaluation and planning for discharge  - Provide patient education as appropriate  Outcome: Progressing     Problem: DISCHARGE PLANNING  Goal: Discharge to home or other facility with appropriate resources  Description: INTERVENTIONS:  - Identify barriers to discharge w/patient and caregiver  - Arrange for needed discharge resources and transportation as appropriate  - Identify discharge learning needs (meds, wound care, etc.)  - Arrange for interpretive services to assist at discharge as needed  - Refer to Case Management Department for coordinating discharge planning if the patient needs post-hospital services based on physician/advanced practitioner order or complex needs related to functional status, cognitive ability, or social support system  Outcome: Progressing     Problem: Knowledge Deficit  Goal: Patient/family/caregiver demonstrates understanding of disease process, treatment plan, medications, and discharge instructions  Description: Complete learning assessment and assess knowledge base.   Interventions:  - Provide teaching at level of understanding  - Provide teaching via preferred learning methods  Outcome: Progressing

## 2023-08-08 NOTE — PLAN OF CARE
Problem: PHYSICAL THERAPY ADULT  Goal: Performs mobility at highest level of function for planned discharge setting. See evaluation for individualized goals. Description: Treatment/Interventions: Functional transfer training, LE strengthening/ROM, Therapeutic exercise, Endurance training, Patient/family training, Equipment eval/education, Bed mobility, OT (sliding board when able to lateral scoot in bed.)          See flowsheet documentation for full assessment, interventions and recommendations. Outcome: Progressing  Note: Prognosis: Fair  Problem List: Decreased strength, Decreased range of motion, Decreased endurance, Impaired balance, Decreased mobility, Decreased skin integrity, Pain, Impaired tone, Impaired sensation (b/l le purple and cold from b/l knees to feet.)  Assessment: Pt seen for PT treatment session this date with interventions consisting of bed mobility, transfer training and HEP, and education provided as needed for safety and direction to improve functional mobility, safety awareness, and activity tolerance. Pt agreeable to PT treatment session upon arrival, pt found in R sidelying position in bed . At end of session, pt left in R sidelying position in bed with all needs in reach. In comparison to previous session, pt with improvement in activity tolerance, endurance, static sitting balance, standing balance and AM- pac score. Pt  Is showing progress with impovements as noted. Pt  Continues to require max assist x2 for bed mobility supine<>sit and sit to stand, stand to sit transfers with increased time to perform and complete. Pt progressed with sit to stand transfers and was able to achieve full upright standing posture with support of Rw, cues for upright posture and to maintain b/l knee extension with max assist x2 progressing to mod assist x2 and then min assist x2 for static standing as time progressed. Pt tolerated 4 minutes static standing. Pt pleased with progress this session. Pt  Tolerated 15- 20 minutes sitting EOB, pt  Requires max assist to scoot to EOB and assist for weight shifting while scooting. pt  Unable to perform b/l knee extension aor hip flexion while seated at EOB, PROM performed. Pt reports fear of falling and decreased proprioception. Noted right sided and posterior lean initially upon sitting EOB max- mod assist to correct. pt return to r sidelying position with max assist x2. Continue to recommend IP rehab at time of d/c in order to maximize pt's functional independence and safety w/ mobility. Pt continues to be functioning below baseline level. PT will continue to see pt while here in order to address the deficits listed above and provide interventions consistent w/ POC in effort to achieve STGs. The patient's AM-PAC Basic Mobility Inpatient Short Form Raw Score is 6. A raw score less than 16 suggests the patient may benefit from discharge to post-acute rehabilitation services. Please also  refer to the recommendation of the Physical Therapist for safe discharge planning. Barriers to Discharge: None     PT Discharge Recommendation: Post acute rehabilitation services    See flowsheet documentation for full assessment.

## 2023-08-08 NOTE — PROGRESS NOTES
Progress Note - Orthopedics   Cristine Mcardle 57 y.o. male MRN: [id number]  Unit/Bed#: E2 -01 Encounter: [id number]    Assessment:  Right olecranon bursitis with resolving right upper extremity cellulitis, no concern for septic elbow at this time     Plan:  -No acute orthopedic surgical intervention at this time, we will continue to monitor for worsening given significant improvement on IV Abx.   -Cultures from aspiration yesterday negative for growth up to this point. Cell count not concerning for infection at this time.  -No concern for septic elbow at this time, patient is able to tolerate active ROM of the elbow without pain within the elbow joint. -WBAT RUE  -Recommend patient to not continue to lean and put continuous pressure on the right elbow/arm  -Strict elevation to the right arm to help alleviate swelling  -Pain control PRN  -Medical management per SLIM  -Continue IV Abx per SLIM/ID, patient is currently receiving IV Cefazolin  -Ortho will continue to follow     Subjective: 25-year-old male with a right olecranon bursitis with right upper extremity cellulitis was seen and examined at bedside. Patient reports improvement of the right elbow and cellulitis. He continues to have daily improvement and feels that the aspiration did provide increased relief since yesterday. He denies any fevers chills or sweats. Vitals: Blood pressure 119/69, pulse (!) 52, temperature (!) 96.7 °F (35.9 °C), temperature source Temporal, resp. rate 20, height 6' (1.829 m), weight 99.8 kg (220 lb 0.3 oz), SpO2 96 %. ,Body mass index is 29.84 kg/m². Intake/Output Summary (Last 24 hours) at 8/8/2023 1325  Last data filed at 8/8/2023 0452  Gross per 24 hour   Intake --   Output 2500 ml   Net -2500 ml       Invasive Devices     Peripheral Intravenous Line  Duration           Peripheral IV 08/07/23 Dorsal (posterior); Left;Proximal Forearm 1 day          Drain  Duration           Urethral Catheter Latex 16 Fr. 3 days Physical Exam: General appearance: alert and oriented, in no acute distress  Head: Normocephalic, without obvious abnormality, atraumatic  Eyes: conjunctivae/corneas clear. PERRL, EOM's intact. Lungs: No stridor or wheezing  Heart: No apparent distress      Ortho Exam:   Right Upper Extremity Exam  Inspection:  Mild swelling about the digits, hand and forearm is present. Mild erythema is localized directly posterior to the elbow and the area of the olecranon bursa. Skin continues to remain intact  Palpation:  Mild tenderness to palpation about the olecranon bursa, bursa is boggy and swollen. compartments of the upper and lower arm are soft and compressible  ROM:   active without pain in the elbow joint, pain over the bursa with moiton  Strength:  Able to actively flex & extend elbow. Able to actively dorsiflex & volarflex wrist. Able to actively flex, extend, abduct, & adduct fingers. Neurovascular:  Sensation intact in Ax/R/M/U nerve distributions. Palpable radial pulse. Lab, Imaging and other studies:   I have personally reviewed pertinent lab results.   CBC:   Lab Results   Component Value Date    WBC 8.56 08/08/2023    HGB 16.8 08/08/2023    HCT 52.5 (H) 08/08/2023    MCV 95 08/08/2023     08/08/2023    RBC 5.55 08/08/2023    MCH 30.3 08/08/2023    MCHC 32.0 08/08/2023    RDW 13.0 08/08/2023    MPV 9.5 08/08/2023     CMP:   Lab Results   Component Value Date    SODIUM 135 08/08/2023    CL 99 08/08/2023    CO2 31 08/08/2023    BUN 18 08/08/2023    CREATININE 0.59 (L) 08/08/2023    CALCIUM 8.6 08/08/2023    EGFR 106 08/08/2023         Thomas Navarrete PA-C

## 2023-08-08 NOTE — PHYSICAL THERAPY NOTE
PHYSICAL THERAPY NOTE          Patient Name: Cielo Jason  [facility] Date: 8/8/2023 08/08/23 1508   Note Type   Note Type Treatment   Pain Assessment   Pain Assessment Tool 0-10   Pain Score 4   Pain Location/Orientation Location: Back; Location: Leg  (R elbow)   Hospital Pain Intervention(s) Repositioned; Ambulation/increased activity; Emotional support   Restrictions/Precautions   Other Precautions Chair Alarm; Bed Alarm; Fall Risk;Pain;Multiple lines  (rodriguez)   General   Chart Reviewed Yes   Family/Caregiver Present No   Cognition   Overall Cognitive Status WFL   Arousal/Participation Alert   Attention Within functional limits   Orientation Level Oriented X4   Memory Within functional limits   Following Commands Follows all commands and directions without difficulty   Subjective   Subjective I don't know how I will do but I will sit up on the edge of the bed. Bed Mobility   Supine to Sit 2  Maximal assistance   Additional items Assist x 2; Increased time required;LE management;Verbal cues   Sit to Supine 2  Maximal assistance   Additional items Assist x 2; Increased time required;Verbal cues;LE management   Transfers   Sit to Stand 2  Maximal assistance   Additional items Assist x 2; Increased time required;Verbal cues   Stand to Sit 2  Maximal assistance   Additional items Assist x 2; Increased time required;Verbal cues   Additional Comments pt  performs static standing trials with support of Rw and max A x2- mod A x 2- min assist x2 x 4 minutes with use of Rw.  and verbal cues for upright posture and b/l knee extension.    Ambulation/Elevation   Gait pattern Not tested   Balance   Static Sitting Fair  (with unilater ue support and use of bedrails on R, pt tolerated 15-20 minutes ta EOB with min- close supervison x2-1.)   Dynamic Sitting Fair -   Static Standing Poor -  (poor(-)- poor)   Endurance Deficit   Endurance Deficit Description generalized weakness and pain   Activity Tolerance   Activity Tolerance Patient limited by fatigue  (generalized weakness, and pain)   Exercises   Hip Flexion Sitting;PROM  (x 3 reps)   Knee AROM Long Arc Quad Sitting;PROM; Right;Left  (x 3 reps)   Balance training  sitting and standing balance/ tolernace activities, 15- 20 minutes sitting eob, 4 minutes standing. Assessment   Prognosis Fair   Problem List Decreased strength;Decreased range of motion;Decreased endurance; Impaired balance;Decreased mobility; Decreased skin integrity;Pain; Impaired tone; Impaired sensation  (b/l le purple and cold from b/l knees to feet.)   Assessment Pt seen for PT treatment session this date with interventions consisting of bed mobility, transfer training and HEP, and education provided as needed for safety and direction to improve functional mobility, safety awareness, and activity tolerance. Pt agreeable to PT treatment session upon arrival, pt found in R sidelying position in bed . At end of session, pt left in R sidelying position in bed with all needs in reach. In comparison to previous session, pt with improvement in activity tolerance, endurance, static sitting balance, standing balance and AM- pac score. Pt  Is showing progress with impovements as noted. Pt  Continues to require max assist x2 for bed mobility supine<>sit and sit to stand, stand to sit transfers with increased time to perform and complete. Pt progressed with sit to stand transfers and was able to achieve full upright standing posture with support of Rw, cues for upright posture and to maintain b/l knee extension with max assist x2 progressing to mod assist x2 and then min assist x2 for static standing as time progressed. Pt tolerated 4 minutes static standing. Pt pleased with progress this session. Pt  Tolerated 15- 20 minutes sitting EOB, pt  Requires max assist to scoot to EOB and assist for weight shifting while scooting.   pt  Unable to perform b/l knee extension aor hip flexion while seated at EOB, PROM performed. Pt reports fear of falling and decreased proprioception. Noted right sided and posterior lean initially upon sitting EOB max- mod assist to correct. pt return to r sidelying position with max assist x2. Continue to recommend IP rehab at time of d/c in order to maximize pt's functional independence and safety w/ mobility. Pt continues to be functioning below baseline level. PT will continue to see pt while here in order to address the deficits listed above and provide interventions consistent w/ POC in effort to achieve STGs. The patient's AM-[facility] Basic Mobility Inpatient Short Form Raw Score is 6. A raw score less than 16 suggests the patient may benefit from discharge to post-acute rehabilitation services. Please also  refer to the recommendation of the Physical Therapist for safe discharge planning. Goals   Patient Goals to be able to walk again. STG Expiration Date 08/20/23   PT Treatment Day 1   Plan   Treatment/Interventions Functional transfer training; Therapeutic exercise; Endurance training;Patient/family training;Bed mobility; Equipment eval/education;Gait training;Spoke to nursing;OT   Progress Slow progress, decreased activity tolerance   PT Frequency 4-6x/wk   Recommendation   PT Discharge Recommendation Post acute rehabilitation services   AM-[facility] Basic Mobility Inpatient   Turning in Flat Bed Without Bedrails 1   Lying on Back to Sitting on Edge of Flat Bed Without Bedrails 1   Moving Bed to Chair 1   Standing Up From Chair Using Arms 1   Walk in Room 1   Climb 3-5 Stairs With Railing 1   Basic Mobility Inpatient Raw Score 6   Turning Head Towards Sound 4   Follow Simple Instructions 4   Low Function Basic Mobility Raw Score  14   Low Function Basic Mobility Standardized Score  22.01   Highest Level Of Mobility   [facility] Goal 2: Bed activities/Dependent transfer   [facility] Achieved 5: Stand (1 or more minutes) Education   Education Provided Mobility training;Home exercise program;Assistive device   Patient Reinforcement needed;Demonstrates acceptance/verbal understanding   End of Consult   Patient Position at End of Consult Supine;Bed/Chair alarm activated; All needs within reach   End of Consult Comments pt is  R sidelying position with sheet placed between le's. 08/08/23 5057   Note Type   Note Type Treatment   Pain Assessment   Pain Assessment Tool 0-10   Pain Score 4   Pain Location/Orientation Location: Back; Location: Leg  (R elbow)   Hospital Pain Intervention(s) Repositioned; Ambulation/increased activity; Emotional support   Restrictions/Precautions   Other Precautions Chair Alarm; Bed Alarm; Fall Risk;Pain;Multiple lines  (rodriguez)   General   Chart Reviewed Yes   Family/Caregiver Present No   Cognition   Overall Cognitive Status WFL   Arousal/Participation Alert   Attention Within functional limits   Orientation Level Oriented X4   Memory Within functional limits   Following Commands Follows all commands and directions without difficulty   Subjective   Subjective I don't know how I will do but I will sit up on the edge of the bed. Bed Mobility   Supine to Sit 2  Maximal assistance   Additional items Assist x 2; Increased time required;LE management;Verbal cues   Sit to Supine 2  Maximal assistance   Additional items Assist x 2; Increased time required;Verbal cues;LE management   Transfers   Sit to Stand 2  Maximal assistance   Additional items Assist x 2; Increased time required;Verbal cues   Stand to Sit 2  Maximal assistance   Additional items Assist x 2; Increased time required;Verbal cues   Additional Comments pt  performs static standing trials with support of Rw and max A x2- mod A x 2- min assist x2 x 4 minutes with use of Rw.  and verbal cues for upright posture and b/l knee extension.    Ambulation/Elevation   Gait pattern Not tested   Balance   Static Sitting Fair  (with unilater ue support and use of bedrails on R, pt tolerated 15-20 minutes ta EOB with min- close supervison x2-1.)   Dynamic Sitting Fair -   Static Standing Poor -  (poor(-)- poor)   Endurance Deficit   Endurance Deficit Description generalized weakness and pain   Activity Tolerance   Activity Tolerance Patient limited by fatigue  (generalized weakness, and pain)   Exercises   Hip Flexion Sitting;PROM  (x 3 reps)   Knee AROM Long Arc Quad Sitting;PROM; Right;Left  (x 3 reps)   Balance training  sitting and standing balance/ tolernace activities, 15- 20 minutes sitting eob, 4 minutes standing. Assessment   Prognosis Fair   Problem List Decreased strength;Decreased range of motion;Decreased endurance; Impaired balance;Decreased mobility; Decreased skin integrity;Pain; Impaired tone; Impaired sensation  (b/l le purple and cold from b/l knees to feet.)   Assessment Pt seen for PT treatment session this date with interventions consisting of bed mobility, transfer training and HEP, and education provided as needed for safety and direction to improve functional mobility, safety awareness, and activity tolerance. Pt agreeable to PT treatment session upon arrival, pt found in R sidelying position in bed . At end of session, pt left in R sidelying position in bed with all needs in reach. In comparison to previous session, pt with improvement in activity tolerance, endurance, static sitting balance, standing balance and AM- pac score. Pt  Is showing progress with impovements as noted. Pt  Continues to require max assist x2 for bed mobility supine<>sit and sit to stand, stand to sit transfers with increased time to perform and complete. Pt progressed with sit to stand transfers and was able to achieve full upright standing posture with support of Rw, cues for upright posture and to maintain b/l knee extension with max assist x2 progressing to mod assist x2 and then min assist x2 for static standing as time progressed. Pt tolerated 4 minutes static standing. Pt pleased with progress this session. Pt  Tolerated 15- 20 minutes sitting EOB, pt  Requires max assist to scoot to EOB and assist for weight shifting while scooting. pt  Unable to perform b/l knee extension aor hip flexion while seated at EOB, PROM performed. Pt reports fear of falling and decreased proprioception. Noted right sided and posterior lean initially upon sitting EOB max- mod assist to correct. pt return to r sidelying position with max assist x2. Continue to recommend IP rehab at time of d/c in order to maximize pt's functional independence and safety w/ mobility. Pt continues to be functioning below baseline level. PT will continue to see pt while here in order to address the deficits listed above and provide interventions consistent w/ POC in effort to achieve STGs. The patient's AM-[facility] Basic Mobility Inpatient Short Form Raw Score is 6. A raw score less than 16 suggests the patient may benefit from discharge to post-acute rehabilitation services. Please also  refer to the recommendation of the Physical Therapist for safe discharge planning. Goals   Patient Goals to be able to walk again. STG Expiration Date 08/20/23   PT Treatment Day 1   Plan   Treatment/Interventions Functional transfer training; Therapeutic exercise; Endurance training;Patient/family training;Bed mobility; Equipment eval/education;Gait training;Spoke to nursing;OT   Progress Slow progress, decreased activity tolerance   PT Frequency 4-6x/wk   Recommendation   PT Discharge Recommendation Post acute rehabilitation services   AM-[facility] Basic Mobility Inpatient   Turning in Flat Bed Without Bedrails 1   Lying on Back to Sitting on Edge of Flat Bed Without Bedrails 1   Moving Bed to Chair 1   Standing Up From Chair Using Arms 1   Walk in Room 1   Climb 3-5 Stairs With Railing 1   Basic Mobility Inpatient Raw Score 6   Turning Head Towards Sound 4   Follow Simple Instructions 4   Low Function Basic Mobility Raw Score  14   Low Function Basic Mobility Standardized Score  22.01   Highest Level Of Mobility   -[facility] Goal 2: Bed activities/Dependent transfer   -[facility] Achieved 5: Stand (1 or more minutes)   Education   Education Provided Mobility training;Home exercise program;Assistive device   Patient Reinforcement needed;Demonstrates acceptance/verbal understanding   End of Consult   Patient Position at End of Consult Supine;Bed/Chair alarm activated; All needs within reach   End of Consult Comments pt is  R sidelying position with sheet placed between delvis'nohemy Vieyra, PTA

## 2023-08-08 NOTE — ASSESSMENT & PLAN NOTE
Follows with neurology as an outpatient  · Uses wheelchair mostly for ambulation  · PT/OT eval recommending rehab  · Outpatient follow-up with neurology  · Continue steroid taper as documented in outpatient neurology notes

## 2023-08-08 NOTE — ASSESSMENT & PLAN NOTE
Secondary to chronic neurologic issue  · Patient reports progressive weakness is now likely worsened in setting of acute infection  · PT/OT recommending rehab

## 2023-08-09 ENCOUNTER — ANESTHESIA EVENT (INPATIENT)
Dept: PERIOP | Facility: HOSPITAL | Age: 63
DRG: 500 | End: 2023-08-09
Payer: COMMERCIAL

## 2023-08-09 LAB
ANION GAP SERPL CALCULATED.3IONS-SCNC: 4 MMOL/L
BACTERIA BLD CULT: NORMAL
BACTERIA BLD CULT: NORMAL
BUN SERPL-MCNC: 17 MG/DL (ref 5–25)
CALCIUM SERPL-MCNC: 8.5 MG/DL (ref 8.4–10.2)
CHLORIDE SERPL-SCNC: 99 MMOL/L (ref 96–108)
CO2 SERPL-SCNC: 30 MMOL/L (ref 21–32)
CREAT SERPL-MCNC: 0.49 MG/DL (ref 0.6–1.3)
ERYTHROCYTE [DISTWIDTH] IN BLOOD BY AUTOMATED COUNT: 13 % (ref 11.6–15.1)
GFR SERPL CREATININE-BSD FRML MDRD: 115 ML/MIN/1.73SQ M
GLUCOSE SERPL-MCNC: 76 MG/DL (ref 65–140)
HCT VFR BLD AUTO: 50.3 % (ref 36.5–49.3)
HGB BLD-MCNC: 16.8 G/DL (ref 12–17)
MCH RBC QN AUTO: 31.1 PG (ref 26.8–34.3)
MCHC RBC AUTO-ENTMCNC: 33.4 G/DL (ref 31.4–37.4)
MCV RBC AUTO: 93 FL (ref 82–98)
PLATELET # BLD AUTO: 149 THOUSANDS/UL (ref 149–390)
PMV BLD AUTO: 9.1 FL (ref 8.9–12.7)
POTASSIUM SERPL-SCNC: 4.1 MMOL/L (ref 3.5–5.3)
RBC # BLD AUTO: 5.41 MILLION/UL (ref 3.88–5.62)
SODIUM SERPL-SCNC: 133 MMOL/L (ref 135–147)
WBC # BLD AUTO: 8.37 THOUSAND/UL (ref 4.31–10.16)

## 2023-08-09 PROCEDURE — NC001 PR NO CHARGE

## 2023-08-09 PROCEDURE — 23931 I&D UPR A/E BURSA: CPT

## 2023-08-09 PROCEDURE — 99232 SBSQ HOSP IP/OBS MODERATE 35: CPT

## 2023-08-09 PROCEDURE — 99232 SBSQ HOSP IP/OBS MODERATE 35: CPT | Performed by: STUDENT IN AN ORGANIZED HEALTH CARE EDUCATION/TRAINING PROGRAM

## 2023-08-09 PROCEDURE — 0M930ZZ DRAINAGE OF RIGHT ELBOW BURSA AND LIGAMENT, OPEN APPROACH: ICD-10-PCS | Performed by: ORTHOPAEDIC SURGERY

## 2023-08-09 PROCEDURE — 80048 BASIC METABOLIC PNL TOTAL CA: CPT | Performed by: PHYSICIAN ASSISTANT

## 2023-08-09 PROCEDURE — 85027 COMPLETE CBC AUTOMATED: CPT | Performed by: PHYSICIAN ASSISTANT

## 2023-08-09 RX ORDER — HYDROMORPHONE HCL/PF 1 MG/ML
0.5 SYRINGE (ML) INJECTION ONCE
Status: COMPLETED | OUTPATIENT
Start: 2023-08-09 | End: 2023-08-09

## 2023-08-09 RX ORDER — LIDOCAINE HYDROCHLORIDE 10 MG/ML
10 INJECTION, SOLUTION EPIDURAL; INFILTRATION; INTRACAUDAL; PERINEURAL ONCE
Status: COMPLETED | OUTPATIENT
Start: 2023-08-09 | End: 2023-08-09

## 2023-08-09 RX ADMIN — CEFAZOLIN SODIUM 2000 MG: 2 SOLUTION INTRAVENOUS at 20:26

## 2023-08-09 RX ADMIN — PREDNISONE 10 MG: 10 TABLET ORAL at 08:30

## 2023-08-09 RX ADMIN — OXYCODONE HYDROCHLORIDE 10 MG: 10 TABLET ORAL at 10:10

## 2023-08-09 RX ADMIN — OXYCODONE HYDROCHLORIDE 10 MG: 10 TABLET ORAL at 16:04

## 2023-08-09 RX ADMIN — OXYCODONE HYDROCHLORIDE 5 MG: 5 TABLET ORAL at 01:24

## 2023-08-09 RX ADMIN — OXYCODONE HYDROCHLORIDE 10 MG: 10 TABLET ORAL at 22:01

## 2023-08-09 RX ADMIN — HYDROMORPHONE HYDROCHLORIDE 0.5 MG: 1 INJECTION, SOLUTION INTRAMUSCULAR; INTRAVENOUS; SUBCUTANEOUS at 21:19

## 2023-08-09 RX ADMIN — BISACODYL 10 MG: 10 SUPPOSITORY RECTAL at 05:49

## 2023-08-09 RX ADMIN — Medication 3 MG: at 20:26

## 2023-08-09 RX ADMIN — ENOXAPARIN SODIUM 40 MG: 100 INJECTION SUBCUTANEOUS at 08:30

## 2023-08-09 RX ADMIN — CEFAZOLIN SODIUM 2000 MG: 2 SOLUTION INTRAVENOUS at 03:50

## 2023-08-09 RX ADMIN — LIDOCAINE HYDROCHLORIDE 10 ML: 10 INJECTION, SOLUTION EPIDURAL; INFILTRATION; INTRACAUDAL at 12:58

## 2023-08-09 RX ADMIN — CEFAZOLIN SODIUM 2000 MG: 2 SOLUTION INTRAVENOUS at 11:29

## 2023-08-09 NOTE — UTILIZATION REVIEW
Continued Stay Review    Date:    8/9/23                          Current Patient Class:    Inpatient  Current Level of Care:    Med surg    HPI:62 y.o. male initially admitted on    8/4   With cellulitis   R e lbow    Assessment/Plan:   8/9   Continue  PATTY. Monitor  Labs, follow  Up blood  Cultures. Continues with  Fluctuance over right elbow bursa with erythema and tenderness. Swelling and  Erythema  Rest of arm improved. Orhto now planning  I & D  This  Pm   Or  Thurs   8/10. Continue  PT/OT. Feels  Generally  Weak. Vital Signs:    97-56-18      137/75       sats  94  % RA    Pertinent Labs/Diagnostic Results:       Results from last 7 days   Lab Units 08/09/23 0521 08/08/23 0452 08/07/23  0532 08/06/23  0540 08/05/23 0447 08/04/23  1406   WBC Thousand/uL 8.37 8.56 9.67 9.58 11.64* 14.51*   HEMOGLOBIN g/dL 16.8 16.8 16.2 15.9 15.8 18.5*   HEMATOCRIT % 50.3* 52.5* 49.1 48.6 49.9* 57.8*   PLATELETS Thousands/uL 149 157 175 150 145* 158   NEUTROS ABS Thousands/µL  --   --   --   --  8.97*  --    BANDS PCT %  --   --   --   --   --  1         Results from last 7 days   Lab Units 08/09/23 0521 08/08/23 0452 08/07/23 0532 08/06/23 0540 08/05/23 0447   SODIUM mmol/L 133* 135 136 135 136   POTASSIUM mmol/L 4.1 4.1 4.0 4.4 4.6   CHLORIDE mmol/L 99 99 99 100 101   CO2 mmol/L 30 31 30 32 31   ANION GAP mmol/L 4 5 7 3 4   BUN mg/dL 17 18 18 15 21   CREATININE mg/dL 0.49* 0.59* 0.59* 0.69 0.65   EGFR ml/min/1.73sq m 115 106 106 100 102   CALCIUM mg/dL 8.5 8.6 8.4 8.3* 8.3*           Results from last 7 days   Lab Units 08/09/23 0521 08/08/23 0452 08/07/23  0532 08/06/23  0540 08/05/23 0447 08/04/23  1406   GLUCOSE RANDOM mg/dL 76 78 80 80 79 117                 Results from last 7 days   Lab Units 08/07/23  1246 08/04/23  1645 08/04/23  1406   BLOOD CULTURE   --  No Growth After 4 Days. No Growth After 4 Days.    GRAM STAIN RESULT  4+ Polys  No bacteria seen  --   --    URINE CULTURE   --   -- 60,000-69,000 cfu/ml Staphylococcus aureus*   BODY FLUID CULTURE, STERILE  2 colonies Staphylococcus aureus*  --   --      Results from last 7 days   Lab Units 08/07/23  1246   TOTAL COUNTED  100   WBC FLUID /ul 867*         Results from last 7 days   Lab Units 08/06/23  0540   VANCOMYCIN RM ug/mL 8.8*       Medications:   Scheduled Medications:  cefazolin, 2,000 mg, Intravenous, Q8H  enoxaparin, 40 mg, Subcutaneous, Daily  lidocaine (PF), 10 mL, Infiltration, Once  melatonin, 3 mg, Oral, HS  [START ON 8/10/2023] predniSONE, 5 mg, Oral, Daily      Continuous IV Infusions:     PRN Meds:  acetaminophen, 650 mg, Oral, Q6H PRN  aluminum-magnesium hydroxide-simethicone, 30 mL, Oral, Q6H PRN  bisacodyl, 10 mg, Rectal, Daily PRN  ondansetron, 4 mg, Intravenous, Q6H PRN  oxyCODONE, 5 mg, Oral, Q6H PRN   Or  oxyCODONE, 10 mg, Oral, Q6H PRN        Discharge Plan:   [facility]  Network Utilization Review Department  ATTENTION: Please call with any questions or concerns to [phone] and carefully listen to the prompts so that you are directed to the right person. All voicemails are confidential.  Ramesh Aguila all requests for admission clinical reviews, approved or denied determinations and any other requests to dedicated fax number below belonging to the campus where the patient is receiving treatment.  List of dedicated fax numbers for the Facilities:  [place] DENIALS (Administrative/Medical Necessity) [phone]   [facility]9 [facility] (Maternity/NICU/Pediatrics) [phone]   [address] [place] [address] Drive [phone]   [facility] [address] [facility] [phone] [address] [phone]   [facility] [address] [facility] [phone]   [address] [place] [address]  [address]  [phone]

## 2023-08-09 NOTE — ASSESSMENT & PLAN NOTE
Outpatient urine culture growing greater than 100,000 CFU's of Staph aureus  · Repeat urine culture growing 60,000 CFU Staph aureus  · ID consulted   · Switched to IV cefazolin, day 4

## 2023-08-09 NOTE — PROGRESS NOTES
Progress Note - Infectious Disease   Glen Walters 57 y.o. male MRN: [id number]  Unit/Bed#: E2 -01 Encounter: [id number]      Impression/Plan:    1. Right elbow septic olecranon bursitis and cellulitis. There are no obvious wounds on the right elbow, but the patient frequently has irritation of the elbow since he supports his weight with it, so suspect infection due to repeated microtrauma causing translocation of skin bacteria into the bursa. No evidence of septic arthritis at the time. The patient has no history of MRSA and recent urine cultures show growth of MSSA but blood cultures show no growth. Leukocytosis has improved and the patient is afebrile. 10 cc of bloody, serous fluid aspirated from the right bursa yesterday. Overall his right arm swelling has improved but there is still fluctuance localized over the right bursa. Aspirate culture growing staph aureus. Now plan for I&D later today or tomorrow              -Continue IV cefazolin 2 g every 8 hours              -Follow-up blood cultures, aspirate culture              -Appreciate orthopedic surgery follow-up               -Monitor clinical exam   -Anticipate transition to p.o. antibiotics after I&D, pending clinical improvement     2.  Leukocytosis, POA. White blood cell count 14.51 on admission. The patient is afebrile and hemodynamically stable. Due to #1 above. Blood cultures show no growth to date despite growth of Staph aureus in urine culture. WBC count normalized              -Antibiotics as above              -Monitor exam              -Monitor CBC with differential, fever curve              -Follow-up aspirate culture     3. BPH, urinary retention. Beard catheter placed on admission. Recommend urology follow-up     4. Pyuria, bacteriuria. The patient reports urinary frequency and weak urinary stream.  I suspect his symptoms are more due to BPH than infection.   Urine culture recently grew MSSA; when staph aureus is seen in the urine this is often related to bacteremia, but blood cultures show no growth              -Urinary retention protocol              -Outpatient urology follow-up     5.  Quadriparesis, ambulatory dysfunction due to cervical myelomalacia. Follows with neurology outpatient and is on a steroid taper              -Steroid taper per primary team              -Recommend PT/OT evaluation to help the patient reduce trauma to the right elbow in the future     I have discussed the above management plan in detail with the primary service, patient. ID will follow. Antibiotics:  Cefazolin day 4  Abx day 6    Subjective: The patient continues to have fluctuance over the right elbow bursa. The area is erythematous and tender. Otherwise the swelling and erythema in the rest of his arm is improving. He denies any fever or chills. Orthopedic surgery now planning I&D later today or tomorrow. Objective:  Vitals:  Temp:  [97 °F (36.1 °C)-97.9 °F (36.6 °C)] 97 °F (36.1 °C)  HR:  [54-56] 56  Resp:  [18-20] 18  BP: (106-137)/(56-75) 137/75  SpO2:  [94 %-98 %] 94 %  Temp (24hrs), Av.6 °F (36.4 °C), Min:97 °F (36.1 °C), Max:97.9 °F (36.6 °C)  Current: Temperature: (!) 97 °F (36.1 °C)    Physical Exam:   General Appearance:  Alert, interactive, nontoxic, no acute distress. Throat: Oropharynx moist without lesions. Lungs:   Clear to auscultation bilaterally; no wheezes, rhonchi or rales; respirations unlabored   Heart:  RRR; no murmur, rub or gallop   Abdomen:   Soft, non-tender, non-distended, positive bowel sounds. Extremities: Right elbow olecranon bursa erythema, fluctuance. No swelling of the elbow joint itself. Skin: No new rashes or lesions. No draining wounds noted. Labs:    All pertinent labs and imaging studies were personally reviewed  Results from last 7 days   Lab Units 23  0521 23  0452 23  0532   WBC Thousand/uL 8.37 8.56 9.67   HEMOGLOBIN g/dL 16.8 16.8 16.2 PLATELETS Thousands/uL 149 157 175     Results from last 7 days   Lab Units 08/09/23  0521 08/08/23  0452 08/07/23  0532 08/05/23  0447 08/04/23  1406   SODIUM mmol/L 133* 135 136   < > 129*   POTASSIUM mmol/L 4.1 4.1 4.0   < > 4.2   CHLORIDE mmol/L 99 99 99   < > 95*   CO2 mmol/L 30 31 30   < > 27   BUN mg/dL 17 18 18   < > 19   CREATININE mg/dL 0.49* 0.59* 0.59*   < > 0.69   EGFR ml/min/1.73sq m 115 106 106   < > 100   CALCIUM mg/dL 8.5 8.6 8.4   < > 8.6   AST U/L  --   --   --   --  16   ALT U/L  --   --   --   --  21   ALK PHOS U/L  --   --   --   --  67    < > = values in this interval not displayed. Micro:  Results from last 7 days   Lab Units 08/07/23  1246 08/04/23  1645 08/04/23  1406   BLOOD CULTURE   --  No Growth After 4 Days. No Growth After 4 Days.    GRAM STAIN RESULT  4+ Polys  No bacteria seen  --   --    URINE CULTURE   --   --  60,000-69,000 cfu/ml Staphylococcus aureus*   BODY FLUID CULTURE, STERILE  2 colonies Staphylococcus aureus*  --   --        Imaging:  I have personally reviewed pertinent imaging study reports and images in PACS.     CT right arm: Olecranon bursitis and adjacent edema/cellulitis

## 2023-08-09 NOTE — ANESTHESIA PREPROCEDURE EVALUATION
Procedure:  DEBRIDEMENT UPPER EXTREMITY ([address] OUT) - elbow (Right: Arm Lower)    Relevant Problems   MUSCULOSKELETAL   (+) Quadriparesis (HCC)      NEURO/PSYCH   (+) Myelomalacia of cervical cord (HCC)   (+) Quadriparesis (HCC)   (+) Weakness of right lower extremity      Other   (+) S/P insertion of spinal cord stimulator        Physical Exam    Airway    Mallampati score: II  TM Distance: >3 FB  Neck ROM: full     Dental       Cardiovascular  Rhythm: regular, Rate: normal, Cardiovascular exam normal    Pulmonary  Pulmonary exam normal     Other Findings        Anesthesia Plan  ASA Score- 2     Anesthesia Type- general with ASA Monitors. Additional Monitors:   Airway Plan: LMA. Plan Factors-Exercise tolerance (METS): >4 METS. Chart reviewed. Existing labs reviewed. Patient summary reviewed. Patient is not a current smoker. Induction- intravenous. Postoperative Plan-     Informed Consent- Anesthetic plan and risks discussed with patient.

## 2023-08-09 NOTE — ASSESSMENT & PLAN NOTE
Patient presented with sodium 129  · May be poor solute intake or SIADH in setting of acute illness  · Previously improved on IVF, encourage oral intake   · Repeat BMP

## 2023-08-09 NOTE — PROGRESS NOTES
[address] [facility]  Progress Note  Name: Maren Cuellar  MRN: [id number]  Unit/Bed#: E2 -01 I Date of Admission: 8/4/2023   Date of Service: 8/9/2023  Hospital Day: 5    Assessment/Plan   * Cellulitis  Assessment & Plan  Erythema and swelling of right upper extremity involving elbow  · CT shows bursitis  · Concern for septic arthritis given warm erythematous and swollen elbow joint  · History of immunosuppression  · Blood cultures negative at 4 days  · Infectious disease consulted,continue IV cefazolin  · Orthopedics evaluated patient  · Bedside aspiration performed 8/7/23  · Cultures today resulting in staphylococcus aureus   · NPO for possible washout later today vs tomorrow      Ambulatory dysfunction  Assessment & Plan  Secondary to chronic neurologic issue  · Patient reports progressive weakness is now likely worsened in setting of acute infection  · PT/OT recommending rehab    Myelomalacia of cervical cord [facility])  Assessment & Plan  Follows with neurology as an outpatient  · Uses wheelchair mostly for ambulation  · PT/OT eval recommending rehab  · Outpatient follow-up with neurology  · Continue steroid taper as documented in outpatient neurology notes    Acute cystitis without hematuria  Assessment & Plan  Outpatient urine culture growing greater than 100,000 CFU's of Staph aureus  · Repeat urine culture growing 60,000 CFU Staph aureus  · ID consulted   · Switched to IV cefazolin, day 4      Hyponatremia-resolved as of 8/5/2023  Assessment & Plan  Patient presented with sodium 129  · May be poor solute intake or SIADH in setting of acute illness  · Previously improved on IVF, encourage oral intake   · Repeat BMP      BPH associated with nocturia  Assessment & Plan  Beard catheter placed on admission due to urinary retention  · Consider removal as ambulation improves              VTE Pharmacologic Prophylaxis: VTE Score: 4 Moderate Risk (Score 3-4) - Pharmacological DVT Prophylaxis Ordered: enoxaparin (Lovenox). Patient Centered Rounds: I performed bedside rounds with nursing staff today. Discussions with Specialists or Other Care Team Provider: ortho, infectious disease     Education and Discussions with Family / Patient: Patient declined call to . Total Time Spent on Date of Encounter in care of patient: 45 minutes This time was spent on one or more of the following: performing physical exam; counseling and coordination of care; obtaining or reviewing history; documenting in the medical record; reviewing/ordering tests, medications or procedures; communicating with other healthcare professionals and discussing with patient's family/caregivers. Current Length of Stay: 5 day(s)  Current Patient Status: Inpatient   Certification Statement: The patient will continue to require additional inpatient hospital stay due to pending I&D  Discharge Plan: Anticipate discharge in 48 hrs to rehab facility. Code Status: Level 1 - Full Code    Subjective:   Patient was seen laying in bed today. He reports pain in right elbow controlled on current pain regimen. He denies any other acute complaints. Objective:     Vitals:   Temp (24hrs), Av.6 °F (36.4 °C), Min:97 °F (36.1 °C), Max:97.9 °F (36.6 °C)    Temp:  [97 °F (36.1 °C)-97.9 °F (36.6 °C)] 97 °F (36.1 °C)  HR:  [54-56] 56  Resp:  [18-20] 18  BP: (106-137)/(56-75) 137/75  SpO2:  [94 %-98 %] 94 %  Body mass index is 29.84 kg/m². Input and Output Summary (last 24 hours): Intake/Output Summary (Last 24 hours) at 2023 1013  Last data filed at 2023 1010  Gross per 24 hour   Intake 240 ml   Output 4300 ml   Net -4060 ml       Physical Exam:   Physical Exam  Vitals and nursing note reviewed. Constitutional:       Appearance: Normal appearance. HENT:      Head: Normocephalic and atraumatic. Eyes:      General: No scleral icterus. Cardiovascular:      Rate and Rhythm: Normal rate and regular rhythm. Pulmonary:      Effort: Pulmonary effort is normal.      Breath sounds: Normal breath sounds. Abdominal:      General: Abdomen is flat. Bowel sounds are normal.      Palpations: Abdomen is soft. Tenderness: There is no abdominal tenderness. Comments: Beard catheter in place   Musculoskeletal:         General: Swelling (right elbow) present. Skin:     General: Skin is warm and dry. Findings: Erythema (right elbow) present. Neurological:      Mental Status: He is alert. Mental status is at baseline. Psychiatric:         Mood and Affect: Mood normal.         Additional Data:     Labs:  Results from last 7 days   Lab Units 08/09/23  0521 08/06/23  0540 08/05/23 0447 08/04/23  1406   WBC Thousand/uL 8.37   < > 11.64* 14.51*   HEMOGLOBIN g/dL 16.8   < > 15.8 18.5*   HEMATOCRIT % 50.3*   < > 49.9* 57.8*   PLATELETS Thousands/uL 149   < > 145* 158   BANDS PCT %  --   --   --  1   NEUTROS PCT %  --   --  77*  --    LYMPHS PCT %  --   --  10*  --    LYMPHO PCT %  --   --   --  3*   MONOS PCT %  --   --  8  --    MONO PCT %  --   --   --  1*   EOS PCT %  --   --  0 1    < > = values in this interval not displayed. Results from last 7 days   Lab Units 08/09/23 0521 08/05/23 0447 08/04/23  1406   SODIUM mmol/L 133*   < > 129*   POTASSIUM mmol/L 4.1   < > 4.2   CHLORIDE mmol/L 99   < > 95*   CO2 mmol/L 30   < > 27   BUN mg/dL 17   < > 19   CREATININE mg/dL 0.49*   < > 0.69   ANION GAP mmol/L 4   < > 7   CALCIUM mg/dL 8.5   < > 8.6   ALBUMIN g/dL  --   --  3.4*   TOTAL BILIRUBIN mg/dL  --   --  0.76   ALK PHOS U/L  --   --  67   ALT U/L  --   --  21   AST U/L  --   --  16   GLUCOSE RANDOM mg/dL 76   < > 117    < > = values in this interval not displayed. Lines/Drains:  Invasive Devices     Peripheral Intravenous Line  Duration           Peripheral IV 08/07/23 Dorsal (posterior); Left;Proximal Forearm 1 day          Drain  Duration           Urethral Catheter Latex 16 Fr. 4 days Urinary Catheter:  Goal for removal: Voiding trial when ambulation improves               Imaging: No pertinent imaging reviewed. Recent Cultures (last 7 days):   Results from last 7 days   Lab Units 08/07/23  1246 08/04/23  1645 08/04/23  1406   BLOOD CULTURE   --  No Growth After 4 Days. No Growth After 4 Days. GRAM STAIN RESULT  4+ Polys  No bacteria seen  --   --    URINE CULTURE   --   --  60,000-69,000 cfu/ml Staphylococcus aureus*   BODY FLUID CULTURE, STERILE  2 colonies Staphylococcus aureus*  --   --        Last 24 Hours Medication List:   Current Facility-Administered Medications   Medication Dose Route Frequency Provider Last Rate   • acetaminophen  650 mg Oral Q6H PRN Eather Freeze, DO     • aluminum-magnesium hydroxide-simethicone  30 mL Oral Q6H PRN Eather Freeze, DO     • bisacodyl  10 mg Rectal Daily PRN Laura Yeboah PA-C     • cefazolin  2,000 mg Intravenous Q8H Emani Cerda MD 2,000 mg (08/09/23 0350)   • enoxaparin  40 mg Subcutaneous Daily Eather Freeze, DO     • melatonin  3 mg Oral HS Ivis Mera PA-C     • ondansetron  4 mg Intravenous Q6H PRN Eather Freeze, DO     • oxyCODONE  5 mg Oral Q6H PRN Eather Freeze, DO      Or   • oxyCODONE  10 mg Oral Q6H PRN Eather Freeze, DO     • [START ON 8/10/2023] predniSONE  5 mg Oral Daily Eather Freeze, DO          Today, Patient Was Seen By: Yohan Valdez PA-C    **Please Note: This note may have been constructed using a voice recognition system. **

## 2023-08-09 NOTE — PLAN OF CARE
Problem: Potential for Falls  Goal: Patient will remain free of falls  Description: INTERVENTIONS:  - Educate patient/family on patient safety including physical limitations  - Instruct patient to call for assistance with activity   - Consult OT/PT to assist with strengthening/mobility   - Keep Call bell within reach  - Keep bed low and locked with side rails adjusted as appropriate  - Keep care items and personal belongings within reach  - Initiate and maintain comfort rounds  - Make Fall Risk Sign visible to staff  - Offer Toileting every 2 Hours, in advance of need  - Initiate/Maintain bed alarm  - Obtain necessary fall risk management equipment: gripper socks, call bell  - Apply yellow socks and bracelet for high fall risk patients  - Consider moving patient to room near nurses station  Outcome: Progressing     Problem: MOBILITY - ADULT  Goal: Maintain or return to baseline ADL function  Description: INTERVENTIONS:  -  Assess patient's ability to carry out ADLs; assess patient's baseline for ADL function and identify physical deficits which impact ability to perform ADLs (bathing, care of mouth/teeth, toileting, grooming, dressing, etc.)  - Assess/evaluate cause of self-care deficits   - Assess range of motion  - Assess patient's mobility; develop plan if impaired  - Assess patient's need for assistive devices and provide as appropriate  - Encourage maximum independence but intervene and supervise when necessary  - Involve family in performance of ADLs  - Assess for home care needs following discharge   - Consider OT consult to assist with ADL evaluation and planning for discharge  - Provide patient education as appropriate  Outcome: Progressing     Problem: PAIN - ADULT  Goal: Verbalizes/displays adequate comfort level or baseline comfort level  Description: Interventions:  - Encourage patient to monitor pain and request assistance  - Assess pain using appropriate pain scale  - Administer analgesics based on type and severity of pain and evaluate response  - Implement non-pharmacological measures as appropriate and evaluate response  - Consider cultural and social influences on pain and pain management  - Notify physician/advanced practitioner if interventions unsuccessful or patient reports new pain  Outcome: Progressing     Problem: INFECTION - ADULT  Goal: Absence or prevention of progression during hospitalization  Description: INTERVENTIONS:  - Assess and monitor for signs and symptoms of infection  - Monitor lab/diagnostic results  - Monitor all insertion sites, i.e. indwelling lines, tubes, and drains  - Monitor endotracheal if appropriate and nasal secretions for changes in amount and color  - [place] appropriate cooling/warming therapies per order  - Administer medications as ordered  - Instruct and encourage patient and family to use good hand hygiene technique  - Identify and instruct in appropriate isolation precautions for identified infection/condition  Outcome: Progressing    Goal: Maintain or return to baseline ADL function  Description: INTERVENTIONS:  -  Assess patient's ability to carry out ADLs; assess patient's baseline for ADL function and identify physical deficits which impact ability to perform ADLs (bathing, care of mouth/teeth, toileting, grooming, dressing, etc.)  - Assess/evaluate cause of self-care deficits   - Assess range of motion  - Assess patient's mobility; develop plan if impaired  - Assess patient's need for assistive devices and provide as appropriate  - Encourage maximum independence but intervene and supervise when necessary  - Involve family in performance of ADLs  - Assess for home care needs following discharge   - Consider OT consult to assist with ADL evaluation and planning for discharge  - Provide patient education as appropriate  Outcome: Progressing     Problem: DISCHARGE PLANNING  Goal: Discharge to home or other facility with appropriate resources  Description: INTERVENTIONS:  - Identify barriers to discharge w/patient and caregiver  - Arrange for needed discharge resources and transportation as appropriate  - Identify discharge learning needs (meds, wound care, etc.)  - Arrange for interpretive services to assist at discharge as needed  - Refer to Case Management Department for coordinating discharge planning if the patient needs post-hospital services based on physician/advanced practitioner order or complex needs related to functional status, cognitive ability, or social support system  Outcome: Progressing     Problem: Knowledge Deficit  Goal: Patient/family/caregiver demonstrates understanding of disease process, treatment plan, medications, and discharge instructions  Description: Complete learning assessment and assess knowledge base.   Interventions:  - Provide teaching at level of understanding  - Provide teaching via preferred learning methods  Outcome: Progressing

## 2023-08-09 NOTE — PLAN OF CARE
Problem: Potential for Falls  Goal: Patient will remain free of falls  Description: INTERVENTIONS:  - Educate patient/family on patient safety including physical limitations  - Instruct patient to call for assistance with activity   - Consult OT/PT to assist with strengthening/mobility   - Keep Call bell within reach  - Keep bed low and locked with side rails adjusted as appropriate  - Keep care items and personal belongings within reach  - Initiate and maintain comfort rounds  - Make Fall Risk Sign visible to staff  - Offer Toileting every 2 Hours, in advance of need  - Initiate/Maintain bed alarm  - Obtain necessary fall risk management equipment:   - Apply yellow socks and bracelet for high fall risk patients  - Consider moving patient to room near nurses station  Outcome: Progressing     Problem: MOBILITY - ADULT  Goal: Maintain or return to baseline ADL function  Description: INTERVENTIONS:  -  Assess patient's ability to carry out ADLs; assess patient's baseline for ADL function and identify physical deficits which impact ability to perform ADLs (bathing, care of mouth/teeth, toileting, grooming, dressing, etc.)  - Assess/evaluate cause of self-care deficits   - Assess range of motion  - Assess patient's mobility; develop plan if impaired  - Assess patient's need for assistive devices and provide as appropriate  - Encourage maximum independence but intervene and supervise when necessary  - Involve family in performance of ADLs  - Assess for home care needs following discharge   - Consider OT consult to assist with ADL evaluation and planning for discharge  - Provide patient education as appropriate  Outcome: Progressing  Goal: Maintains/Returns to pre admission functional level  Description: INTERVENTIONS:  - Perform BMAT or MOVE assessment daily.   - Set and communicate daily mobility goal to care team and patient/family/caregiver.    - Collaborate with rehabilitation services on mobility goals if consulted  - Perform Range of Motion 3 times a day. - Reposition patient every 2 hours.   - Dangle patient 3 times a day  - Stand patient 3 times a day  - Ambulate patient 3 times a day  - Out of bed to chair 3 times a day   - Out of bed for meals 3 times a day  - Out of bed for toileting  - Record patient progress and toleration of activity level   Outcome: Progressing     Problem: PAIN - ADULT  Goal: Verbalizes/displays adequate comfort level or baseline comfort level  Description: Interventions:  - Encourage patient to monitor pain and request assistance  - Assess pain using appropriate pain scale  - Administer analgesics based on type and severity of pain and evaluate response  - Implement non-pharmacological measures as appropriate and evaluate response  - Consider cultural and social influences on pain and pain management  - Notify physician/advanced practitioner if interventions unsuccessful or patient reports new pain  Outcome: Progressing     Problem: INFECTION - ADULT  Goal: Absence or prevention of progression during hospitalization  Description: INTERVENTIONS:  - Assess and monitor for signs and symptoms of infection  - Monitor lab/diagnostic results  - Monitor all insertion sites, i.e. indwelling lines, tubes, and drains  - Monitor endotracheal if appropriate and nasal secretions for changes in amount and color  - [place] appropriate cooling/warming therapies per order  - Administer medications as ordered  - Instruct and encourage patient and family to use good hand hygiene technique  - Identify and instruct in appropriate isolation precautions for identified infection/condition  Outcome: Progressing  Goal: Absence of fever/infection during neutropenic period  Description: INTERVENTIONS:  - Monitor WBC    Outcome: Progressing     Problem: SAFETY ADULT  Goal: Patient will remain free of falls  Description: INTERVENTIONS:  - Educate patient/family on patient safety including physical limitations  - Instruct patient to call for assistance with activity   - Consult OT/PT to assist with strengthening/mobility   - Keep Call bell within reach  - Keep bed low and locked with side rails adjusted as appropriate  - Keep care items and personal belongings within reach  - Initiate and maintain comfort rounds  - Make Fall Risk Sign visible to staff  - Apply yellow socks and bracelet for high fall risk patients  - Consider moving patient to room near nurses station  Outcome: Progressing  Goal: Maintain or return to baseline ADL function  Description: INTERVENTIONS:  -  Assess patient's ability to carry out ADLs; assess patient's baseline for ADL function and identify physical deficits which impact ability to perform ADLs (bathing, care of mouth/teeth, toileting, grooming, dressing, etc.)  - Assess/evaluate cause of self-care deficits   - Assess range of motion  - Assess patient's mobility; develop plan if impaired  - Assess patient's need for assistive devices and provide as appropriate  - Encourage maximum independence but intervene and supervise when necessary  - Involve family in performance of ADLs  - Assess for home care needs following discharge   - Consider OT consult to assist with ADL evaluation and planning for discharge  - Provide patient education as appropriate  Outcome: Progressing  Goal: Maintains/Returns to pre admission functional level  Description: INTERVENTIONS:  - Perform BMAT or MOVE assessment daily.   - Set and communicate daily mobility goal to care team and patient/family/caregiver.    - Collaborate with rehabilitation services on mobility goals if consulted  - Out of bed for toileting  - Record patient progress and toleration of activity level   Outcome: Progressing     Problem: DISCHARGE PLANNING  Goal: Discharge to home or other facility with appropriate resources  Description: INTERVENTIONS:  - Identify barriers to discharge w/patient and caregiver  - Arrange for needed discharge resources and transportation as appropriate  - Identify discharge learning needs (meds, wound care, etc.)  - Arrange for interpretive services to assist at discharge as needed  - Refer to Case Management Department for coordinating discharge planning if the patient needs post-hospital services based on physician/advanced practitioner order or complex needs related to functional status, cognitive ability, or social support system  Outcome: Progressing     Problem: Knowledge Deficit  Goal: Patient/family/caregiver demonstrates understanding of disease process, treatment plan, medications, and discharge instructions  Description: Complete learning assessment and assess knowledge base.   Interventions:  - Provide teaching at level of understanding  - Provide teaching via preferred learning methods  Outcome: Progressing     Problem: Prexisting or High Potential for Compromised Skin Integrity  Goal: Skin integrity is maintained or improved  Description: INTERVENTIONS:  - Identify patients at risk for skin breakdown  - Assess and monitor skin integrity  - Assess and monitor nutrition and hydration status  - Monitor labs   - Assess for incontinence   - Turn and reposition patient  - Assist with mobility/ambulation  - Relieve pressure over bony prominences  - Avoid friction and shearing  - Provide appropriate hygiene as needed including keeping skin clean and dry  - Evaluate need for skin moisturizer/barrier cream  - Collaborate with interdisciplinary team   - Patient/family teaching  - Consider wound care consult   Outcome: Progressing

## 2023-08-09 NOTE — ASSESSMENT & PLAN NOTE
Erythema and swelling of right upper extremity involving elbow  · CT shows bursitis  · Concern for septic arthritis given warm erythematous and swollen elbow joint  · History of immunosuppression  · Blood cultures negative at 4 days  · Infectious disease consulted,continue IV cefazolin  · Orthopedics evaluated patient  · Bedside aspiration performed 8/7/23  · Cultures today resulting in staphylococcus aureus   · NPO for possible washout later today vs tomorrow

## 2023-08-09 NOTE — PROGRESS NOTES
Progress Note - Orthopedics   Jimi Sports 57 y.o. male MRN: [id number]  Unit/Bed#: E2 -01      Subjective:    62 y.o.male with right septic olecranon bursitis with right upper extremity cellulitis s/p aspiration. No acute overnight events, no new complaints. Patient doing well. Pain reasonably well-controlled with medication. He feels that he has slowly been improving over the past few days.     Labs:  0   Lab Value Date/Time    HCT 50.3 (H) 08/09/2023 0521    HCT 52.5 (H) 08/08/2023 0452    HCT 49.1 08/07/2023 0532    HGB 16.8 08/09/2023 0521    HGB 16.8 08/08/2023 0452    HGB 16.2 08/07/2023 0532    WBC 8.37 08/09/2023 0521    WBC 8.56 08/08/2023 0452    WBC 9.67 08/07/2023 0532    CRP 23.2 (H) 08/01/2023 0938       Meds:    Current Facility-Administered Medications:   •  acetaminophen (TYLENOL) tablet 650 mg, 650 mg, Oral, Q6H PRN, Rita More, DO  •  aluminum-magnesium hydroxide-simethicone (MAALOX) oral suspension 30 mL, 30 mL, Oral, Q6H PRN, Rita More, DO  •  bisacodyl (DULCOLAX) rectal suppository 10 mg, 10 mg, Rectal, Daily PRN, An Rudd PA-C, 10 mg at 08/09/23 0549  •  ceFAZolin (ANCEF) IVPB (premix in dextrose) 2,000 mg 50 mL, 2,000 mg, Intravenous, Q8H, Vu Andersen MD, Last Rate: 100 mL/hr at 08/09/23 0350, 2,000 mg at 08/09/23 0350  •  enoxaparin (LOVENOX) subcutaneous injection 40 mg, 40 mg, Subcutaneous, Daily, Rita More, DO, 40 mg at 08/09/23 0830  •  melatonin tablet 3 mg, 3 mg, Oral, HS, Moshe Aguirre PA-C, 3 mg at 08/08/23 2001  •  ondansetron (ZOFRAN) injection 4 mg, 4 mg, Intravenous, Q6H PRN, Rita Cota DO  •  oxyCODONE (ROXICODONE) IR tablet 5 mg, 5 mg, Oral, Q6H PRN, 5 mg at 08/09/23 0124 **OR** oxyCODONE (ROXICODONE) immediate release tablet 10 mg, 10 mg, Oral, Q6H PRN, Rita Cota DO, 10 mg at 08/08/23 1808  •  [COMPLETED] predniSONE tablet 30 mg, 30 mg, Oral, Daily, 30 mg at 08/05/23 0837 **FOLLOWED BY** [COMPLETED] predniSONE tablet 20 mg, 20 mg, Oral, Daily, 20 mg at 08/07/23 0931 **FOLLOWED BY** [COMPLETED] predniSONE tablet 10 mg, 10 mg, Oral, Daily, 10 mg at 08/09/23 0830 **FOLLOWED BY** [START ON 8/10/2023] predniSONE tablet 5 mg, 5 mg, Oral, Daily, Ivis Jimenez,     Blood Culture:   Lab Results   Component Value Date    BLOODCX No Growth After 4 Days. 08/04/2023       Wound Culture:   No results found for: "WOUNDCULT"    Ins and Outs:  I/O last 24 hours: In: 240 [P.O.:240]  Out: 3450 [Urine:3450]          Physical:  Vitals:    08/09/23 0656   BP: 137/75   Pulse: 56   Resp: 18   Temp: (!) 97 °F (36.1 °C)   SpO2: 94%     Musculoskeletal: right Upper Extremity  · Skin over the elbow is erythematous. Swelling is present over the elbow. · He is not significantly tender with palpation of the olecranon. · The bursa is palpably boggy and swollen. ·  degrees of active motion without pain in the elbow joint  · Sensation intact to median/radial/ulnar nerve distribution   · Digits warm and well perfused  · Capillary refill < 2 seconds      Procedure: Incision and drainage right olecranon bursa  After informed consent was obtained a local block of 10 cc 1% lidocaine was administered around the intended incision site on the olecranon. The area was then sterilely prepped and draped. Once adequate anesthesia was obtained, an approximately 1 cm incision was made over the distal aspect of the bursa with an 11 blade. A significant amount of blood and some thick purulent material was expressed. Forceps were used to expand the wound and break up local loculations. The wound was then irrigated with NS. 1/4" packing was inserted into the wound. The wound was dressed with xeroform, 4x4, tonia wrap, and an ace bandage. Pt tolerated the procedure well and was neurovascularly intact pre and post procedure. Assessment:    57 y.o.male with right septic olecranon bursitis with right upper extremity cellulitis s/p aspiration on 8/7. Culture results are growing Staph aureus. Plan:  · Based on the results of the patient's cultures, he would benefit from a bedside I&D of the right olecranon bursa. · Consent was obtained prior to the procedure and placed into his chart.   · The wound was washed out with sterile saline, packed, and dressed as stated above under "Procedure."  · The procedure was tolerated well without any immediate complications  · NPO at midnight pending repeat wound check  · WBAT RUE; ROM to tolerance  · Continue IV Abx per SLIM/ID, patient is currently receiving IV Cefazolin  · Recommend patient to not continue to lean and put continuous pressure on the right elbow/arm  · Strict elevation to the right arm to help alleviate swelling  · PT/OT  · Pain control  · DVT ppx per primary team  · Medical co-morbidities are being managed per primary team  · Dispo: Ortho will follow    Darnell Miranda PA-C

## 2023-08-10 ENCOUNTER — ANESTHESIA (INPATIENT)
Dept: PERIOP | Facility: HOSPITAL | Age: 63
DRG: 500 | End: 2023-08-10
Payer: COMMERCIAL

## 2023-08-10 ENCOUNTER — APPOINTMENT (INPATIENT)
Dept: NON INVASIVE DIAGNOSTICS | Facility: HOSPITAL | Age: 63
DRG: 500 | End: 2023-08-10
Payer: COMMERCIAL

## 2023-08-10 LAB
ANION GAP SERPL CALCULATED.3IONS-SCNC: 4 MMOL/L
BACTERIA SPEC BFLD CULT: ABNORMAL
BUN SERPL-MCNC: 18 MG/DL (ref 5–25)
CALCIUM SERPL-MCNC: 7.9 MG/DL (ref 8.4–10.2)
CHLORIDE SERPL-SCNC: 101 MMOL/L (ref 96–108)
CO2 SERPL-SCNC: 29 MMOL/L (ref 21–32)
CREAT SERPL-MCNC: 0.61 MG/DL (ref 0.6–1.3)
ERYTHROCYTE [DISTWIDTH] IN BLOOD BY AUTOMATED COUNT: 12.8 % (ref 11.6–15.1)
GFR SERPL CREATININE-BSD FRML MDRD: 104 ML/MIN/1.73SQ M
GLUCOSE SERPL-MCNC: 81 MG/DL (ref 65–140)
GRAM STN SPEC: ABNORMAL
GRAM STN SPEC: ABNORMAL
HCT VFR BLD AUTO: 50.5 % (ref 36.5–49.3)
HGB BLD-MCNC: 16.4 G/DL (ref 12–17)
MAGNESIUM SERPL-MCNC: 2 MG/DL (ref 1.9–2.7)
MCH RBC QN AUTO: 30.9 PG (ref 26.8–34.3)
MCHC RBC AUTO-ENTMCNC: 32.5 G/DL (ref 31.4–37.4)
MCV RBC AUTO: 95 FL (ref 82–98)
PLATELET # BLD AUTO: 145 THOUSANDS/UL (ref 149–390)
PMV BLD AUTO: 9.5 FL (ref 8.9–12.7)
POTASSIUM SERPL-SCNC: 4.1 MMOL/L (ref 3.5–5.3)
RBC # BLD AUTO: 5.3 MILLION/UL (ref 3.88–5.62)
SODIUM SERPL-SCNC: 134 MMOL/L (ref 135–147)
WBC # BLD AUTO: 8.37 THOUSAND/UL (ref 4.31–10.16)

## 2023-08-10 PROCEDURE — 80048 BASIC METABOLIC PNL TOTAL CA: CPT

## 2023-08-10 PROCEDURE — 87205 SMEAR GRAM STAIN: CPT | Performed by: ORTHOPAEDIC SURGERY

## 2023-08-10 PROCEDURE — 87070 CULTURE OTHR SPECIMN AEROBIC: CPT | Performed by: ORTHOPAEDIC SURGERY

## 2023-08-10 PROCEDURE — 87147 CULTURE TYPE IMMUNOLOGIC: CPT | Performed by: ORTHOPAEDIC SURGERY

## 2023-08-10 PROCEDURE — 0MD30ZZ EXTRACTION OF RIGHT ELBOW BURSA AND LIGAMENT, OPEN APPROACH: ICD-10-PCS | Performed by: ORTHOPAEDIC SURGERY

## 2023-08-10 PROCEDURE — 24105 EXCISION OLECRANON BURSA: CPT | Performed by: ORTHOPAEDIC SURGERY

## 2023-08-10 PROCEDURE — 85027 COMPLETE CBC AUTOMATED: CPT

## 2023-08-10 PROCEDURE — 83735 ASSAY OF MAGNESIUM: CPT

## 2023-08-10 PROCEDURE — 24105 EXCISION OLECRANON BURSA: CPT

## 2023-08-10 PROCEDURE — 99232 SBSQ HOSP IP/OBS MODERATE 35: CPT

## 2023-08-10 PROCEDURE — 87075 CULTR BACTERIA EXCEPT BLOOD: CPT | Performed by: ORTHOPAEDIC SURGERY

## 2023-08-10 PROCEDURE — 93971 EXTREMITY STUDY: CPT

## 2023-08-10 PROCEDURE — 93971 EXTREMITY STUDY: CPT | Performed by: SURGERY

## 2023-08-10 PROCEDURE — 99232 SBSQ HOSP IP/OBS MODERATE 35: CPT | Performed by: STUDENT IN AN ORGANIZED HEALTH CARE EDUCATION/TRAINING PROGRAM

## 2023-08-10 RX ORDER — ONDANSETRON 2 MG/ML
4 INJECTION INTRAMUSCULAR; INTRAVENOUS ONCE AS NEEDED
Status: DISCONTINUED | OUTPATIENT
Start: 2023-08-10 | End: 2023-08-10 | Stop reason: HOSPADM

## 2023-08-10 RX ORDER — HYDROMORPHONE HCL/PF 1 MG/ML
0.5 SYRINGE (ML) INJECTION
Status: DISCONTINUED | OUTPATIENT
Start: 2023-08-10 | End: 2023-08-10 | Stop reason: HOSPADM

## 2023-08-10 RX ORDER — MEPERIDINE HYDROCHLORIDE 25 MG/ML
12.5 INJECTION INTRAMUSCULAR; INTRAVENOUS; SUBCUTANEOUS ONCE AS NEEDED
Status: DISCONTINUED | OUTPATIENT
Start: 2023-08-10 | End: 2023-08-10 | Stop reason: HOSPADM

## 2023-08-10 RX ORDER — VANCOMYCIN HYDROCHLORIDE 1 G/20ML
INJECTION, POWDER, LYOPHILIZED, FOR SOLUTION INTRAVENOUS AS NEEDED
Status: DISCONTINUED | OUTPATIENT
Start: 2023-08-10 | End: 2023-08-10 | Stop reason: HOSPADM

## 2023-08-10 RX ORDER — SODIUM CHLORIDE 9 MG/ML
125 INJECTION, SOLUTION INTRAVENOUS CONTINUOUS
Status: DISCONTINUED | OUTPATIENT
Start: 2023-08-10 | End: 2023-08-12

## 2023-08-10 RX ORDER — MAGNESIUM HYDROXIDE 1200 MG/15ML
LIQUID ORAL AS NEEDED
Status: DISCONTINUED | OUTPATIENT
Start: 2023-08-10 | End: 2023-08-10 | Stop reason: HOSPADM

## 2023-08-10 RX ORDER — CEFAZOLIN SODIUM 2 G/50ML
2000 SOLUTION INTRAVENOUS
Status: COMPLETED | OUTPATIENT
Start: 2023-08-10 | End: 2023-08-10

## 2023-08-10 RX ORDER — DOCUSATE SODIUM 100 MG/1
100 CAPSULE, LIQUID FILLED ORAL 2 TIMES DAILY
Status: DISCONTINUED | OUTPATIENT
Start: 2023-08-10 | End: 2023-08-15 | Stop reason: HOSPADM

## 2023-08-10 RX ORDER — ONDANSETRON 2 MG/ML
INJECTION INTRAMUSCULAR; INTRAVENOUS AS NEEDED
Status: DISCONTINUED | OUTPATIENT
Start: 2023-08-10 | End: 2023-08-10

## 2023-08-10 RX ORDER — LIDOCAINE HYDROCHLORIDE 10 MG/ML
INJECTION, SOLUTION EPIDURAL; INFILTRATION; INTRACAUDAL; PERINEURAL AS NEEDED
Status: DISCONTINUED | OUTPATIENT
Start: 2023-08-10 | End: 2023-08-10

## 2023-08-10 RX ORDER — MIDAZOLAM HYDROCHLORIDE 2 MG/2ML
INJECTION, SOLUTION INTRAMUSCULAR; INTRAVENOUS AS NEEDED
Status: DISCONTINUED | OUTPATIENT
Start: 2023-08-10 | End: 2023-08-10

## 2023-08-10 RX ORDER — FENTANYL CITRATE/PF 50 MCG/ML
50 SYRINGE (ML) INJECTION
Status: DISCONTINUED | OUTPATIENT
Start: 2023-08-10 | End: 2023-08-10 | Stop reason: HOSPADM

## 2023-08-10 RX ORDER — PROPOFOL 10 MG/ML
INJECTION, EMULSION INTRAVENOUS AS NEEDED
Status: DISCONTINUED | OUTPATIENT
Start: 2023-08-10 | End: 2023-08-10

## 2023-08-10 RX ORDER — PROMETHAZINE HYDROCHLORIDE 25 MG/ML
6.25 INJECTION, SOLUTION INTRAMUSCULAR; INTRAVENOUS ONCE AS NEEDED
Status: DISCONTINUED | OUTPATIENT
Start: 2023-08-10 | End: 2023-08-10 | Stop reason: HOSPADM

## 2023-08-10 RX ORDER — SUCCINYLCHOLINE/SOD CL,ISO/PF 100 MG/5ML
SYRINGE (ML) INTRAVENOUS AS NEEDED
Status: DISCONTINUED | OUTPATIENT
Start: 2023-08-10 | End: 2023-08-10

## 2023-08-10 RX ADMIN — PROPOFOL 200 MG: 10 INJECTION, EMULSION INTRAVENOUS at 15:43

## 2023-08-10 RX ADMIN — LIDOCAINE HYDROCHLORIDE 100 MG: 10 INJECTION, SOLUTION EPIDURAL; INFILTRATION; INTRACAUDAL; PERINEURAL at 14:54

## 2023-08-10 RX ADMIN — CEFAZOLIN SODIUM 2000 MG: 2 SOLUTION INTRAVENOUS at 21:06

## 2023-08-10 RX ADMIN — SODIUM CHLORIDE: 0.9 INJECTION, SOLUTION INTRAVENOUS at 15:40

## 2023-08-10 RX ADMIN — CEFAZOLIN SODIUM 2000 MG: 2 SOLUTION INTRAVENOUS at 04:35

## 2023-08-10 RX ADMIN — DOCUSATE SODIUM 100 MG: 100 CAPSULE, LIQUID FILLED ORAL at 09:40

## 2023-08-10 RX ADMIN — ONDANSETRON 4 MG: 2 INJECTION INTRAMUSCULAR; INTRAVENOUS at 16:22

## 2023-08-10 RX ADMIN — PREDNISONE 5 MG: 5 TABLET ORAL at 09:40

## 2023-08-10 RX ADMIN — CEFAZOLIN SODIUM 2000 MG: 2 SOLUTION INTRAVENOUS at 16:24

## 2023-08-10 RX ADMIN — OXYCODONE HYDROCHLORIDE 10 MG: 10 TABLET ORAL at 04:34

## 2023-08-10 RX ADMIN — MIDAZOLAM 2 MG: 1 INJECTION INTRAMUSCULAR; INTRAVENOUS at 14:42

## 2023-08-10 RX ADMIN — OXYCODONE HYDROCHLORIDE 10 MG: 10 TABLET ORAL at 18:19

## 2023-08-10 RX ADMIN — Medication 3 MG: at 21:06

## 2023-08-10 RX ADMIN — Medication 100 MG: at 16:02

## 2023-08-10 RX ADMIN — CEFAZOLIN SODIUM 2000 MG: 2 SOLUTION INTRAVENOUS at 12:19

## 2023-08-10 RX ADMIN — OXYCODONE HYDROCHLORIDE 10 MG: 10 TABLET ORAL at 09:42

## 2023-08-10 RX ADMIN — PROPOFOL 100 MG: 10 INJECTION, EMULSION INTRAVENOUS at 16:02

## 2023-08-10 RX ADMIN — PROPOFOL 200 MG: 10 INJECTION, EMULSION INTRAVENOUS at 14:54

## 2023-08-10 RX ADMIN — DOCUSATE SODIUM 100 MG: 100 CAPSULE, LIQUID FILLED ORAL at 18:19

## 2023-08-10 RX ADMIN — SODIUM CHLORIDE 125 ML/HR: 0.9 INJECTION, SOLUTION INTRAVENOUS at 17:49

## 2023-08-10 NOTE — OP NOTE
OPERATIVE REPORT  PATIENT NAME: Merlin Marseilles    :  1960  MRN: [id number]  Pt Location: [facility] OR ROOM 06    SURGERY DATE: 8/10/2023    Surgeon(s) and Role:     * Miguel Mckeon MD - Primary     * Arnaldo Forrester PA-C - Assisting    Preop Diagnosis:  Bursitis [M71.9]  Cellulitis of other specified site [I17.295]    Post-Op Diagnosis Codes:     * Bursitis [M71.9]     * Cellulitis of other specified site [G04.113]    Procedure(s):  Right - DEBRIDEMENT UPPER EXTREMITY ([address] [place] [address] OUT) - elbow    Specimen(s):  ID Type Source Tests Collected by Time Destination   A :  Tissue Joint, Right Elbow ANAEROBIC CULTURE AND GRAM STAIN, CULTURE, TISSUE AND GRAM STAIN Miguel Mckeon MD 8/10/2023 1539    B :  Tissue Joint, Right Elbow ANAEROBIC CULTURE AND GRAM STAIN, CULTURE, TISSUE AND GRAM STAIN Miguel Mckeon MD 8/10/2023 1609    C : olecranon bursa Tissue Joint, Right Elbow ANAEROBIC CULTURE AND GRAM STAIN, CULTURE, TISSUE AND GRAM STAIN Miguel Mckeon MD 8/10/2023 1611        Estimated Blood Loss:   Minimal    Drains:  Urethral Catheter Latex 16 Fr. (Active)   Amt returned on insertion(mL) 275 mL 23 1449   Reasons to continue Urinary Catheter  Acute urinary retention/obstruction failing urinary retention protocol 23 2201   Goal for Removal Will consult urology 23 220   Site Assessment Clean;Skin intact 23 220   Beard Care Done 08/10/23 0900   Collection Container Standard drainage bag 23 220   Securement Method Securing device (Describe) 23   Output (mL) 600 mL 08/10/23 0601   Number of days: 6       Anesthesia Type:   Choice    Operative Indications:  Bursitis [M71.9]  Cellulitis of other specified site [U89.371]  Septic olecranon bursitis     Operative Findings:  See below    Complications:   None    Procedure and Technique:    ASSISTING:   I requested SAUL Hernandez to 1st assist to assist with this procedure.  Assistance was medically necessary in order to safely perform the procedure without increased blood loss or morbidity. Assistance was provided through positioning, instrumentation, and retraction of incisions for better visualization of underlying structures and cauterization for hemostasis. Assistance was also provided through wound closure, application of sterile dressing, and safe transport from the operative suite. No qualified resident physician was available for assistance    PRE-OPERATIVE DIAGNOSIS  1. Septic olecranon bursitis    POST-OPERATIVE DIAGNOSIS  Same as pre-operative diagnosis. PROCEDURE{S)  1. Irrigation and debridement of right septic olecranon bursitis      DRAINS  None. COMPLICATIONS  None. ANESTHESIA TYPE  General.    SPECIMENS  Intraoperative cultures sent for microbiology analyssis    ESTIMATED BLOOD LOSS  10 cc    INDICATION:  Juan Mcgill is a 70 y.o. male who was diagnosed with right septic olecranon bursitis. Accordingly, after a lengthy discussion of the risks and benefits and alternatives to both operative and non-operative treatment, he elected to proceed with operative intervention, and I am in agreement. Please see my clinical documentation for additional details on the history, exam, and discussion regarding surgical decision making. PRE-PROCEDURE  PROTOCOL:  The patient was identified in the preoperative holding area where informed consent and surgical site markings were confirmed. The patient was then transferred to the operating room where anesthesia was administered by our Anesthesia colleagues in accordance with our preoperative plan. The operative extremity was prepped and draped in the usual sterile fashion. A procedural pause was performed to verify correct patient identification, procedure to be performed, laterality, agreement of all team members, availability of all equipment, and administration of preoperative antibiotics. Afterwards, the procedure commenced.     DESCRIPTION OF PROCEDURE  The elbow was prepped and draped in sterile fashion. There was a significant area of erythema and fluctuance around the right elbow. Next, a 2 cm incision was made over prior drainage vision site superficial to the olecranon bursa. Upon entering the subcutaneous area, several cc of white pussy fluid was evacuated. Some of it was collected and sent to the laboratory microbiology for analysis. Next, using a combination of curette and rongeur, deep dissection down to the elbow joint was done and necrotic tissue was removed to all areas of pus collection and pockets of pus were evacuated. Next, 9 L of sterile saline were copiously irrigated and the wound. 1 g of Vanco powder was then placed into the wound and it was closed with nylon sutures sterile dressing and Ace wrap. Patient was then transferred to the PACU in stable condition. Plan to transfer to floor when medically stable  Follow up intraoperative cultures  Take down dressing for Wound check tomorrow   Consult/appreciate ID recs   WBAT, OT         I was present for the entire procedure.     Patient Disposition:  PACU         SIGNATURE: Yasmany Diaz MD  DATE: August 10, 2023  TIME: 4:22 PM

## 2023-08-10 NOTE — OCCUPATIONAL THERAPY NOTE
Occupational Therapy Cancel Note:    Patient Name: Carmella CLEMENSUOXEvanK Date: 8/10/2023    Chart reviewed. Pt off floor at OR for I&D of R olecranon bursa. Will cx and complete OT session at later time as medically appropriate post-op.     Monster Sheppard, OTR/L

## 2023-08-10 NOTE — PHYSICAL THERAPY NOTE
Physical Therapy Cancellation Note        Attempted to see pt for Pt treatment session however pt scott off floor in holding for R elbow I & d. Will continue to follow and  attempt at a later time.         Yanet Burrell, PTA

## 2023-08-10 NOTE — ASSESSMENT & PLAN NOTE
Outpatient urine culture growing greater than 100,000 CFU's of Staph aureus  · Repeat urine culture growing 60,000 CFU Staph aureus  · ID consulted   · Switched to IV cefazolin, day 5

## 2023-08-10 NOTE — PROGRESS NOTES
Progress Note - Infectious Disease   Edwin Peralta 70 y.o. male MRN: [id number]  Unit/Bed#: E2 -01 Encounter: [id number]      Impression/Plan:    1. Right elbow septic olecranon bursitis and cellulitis. There are no obvious wounds on the right elbow, but the patient frequently has irritation of the elbow since he supports his weight with it, so suspect infection due to repeated microtrauma causing translocation of skin bacteria into the bursa. No evidence of septic arthritis at the time. The patient has no history of MRSA and recent urine cultures show growth of MSSA but blood cultures show no growth. Leukocytosis has improved and the patient is afebrile. Orthopedic surgery aspirated 10 cc of bloody, serous fluid from the right bursa 8/7. Overall his right arm swelling has improved but there is still fluctuance localized over the right bursa. Aspirate culture growing MSSA so suspect this is more of a source control issue than due to antibiotic coverage. Now plan for I&D today              -Continue IV cefazolin 2 g every 8 hours              -Appreciate orthopedic surgery follow-up for I&D today              -Monitor clinical exam   -Anticipate transition to p.o. antibiotics after I&D, pending clinical improvement     2.  Leukocytosis, POA. White blood cell count 14.51 on admission. The patient is afebrile and hemodynamically stable. Due to #1 above. Blood cultures show no growth to date despite growth of Staph aureus in urine culture. WBC count normalized              -Antibiotics as above              -Monitor exam              -Monitor CBC with differential, fever curve     3. BPH, urinary retention. Beard catheter placed on admission. Recommend urology follow-up     4. Pyuria, bacteriuria. The patient reports urinary frequency and weak urinary stream.  I suspect his symptoms are more due to BPH than infection.   Urine culture recently grew MSSA; when staph aureus is seen in the urine this is often related to bacteremia, but blood cultures show no growth              -Urinary retention protocol              -Outpatient urology follow-up     5.  Quadriparesis, ambulatory dysfunction due to cervical myelomalacia. Follows with neurology outpatient and is on a steroid taper              -Steroid taper per primary team              -Recommend PT/OT evaluation to help the patient reduce trauma to the right elbow in the future     I have discussed the above management plan in detail with the patient at bedside. ID will follow. Antibiotics:  Cefazolin day 5  Abx day 7    Subjective: The patient continues to have tenderness over the right elbow. Also reports swelling in the left arm when he woke up today. He has no fever, chills. Plan for right elbow bursa I&D in the OR today. Objective:  Vitals:  Temp:  [97.2 °F (36.2 °C)-97.7 °F (36.5 °C)] 97.6 °F (36.4 °C)  HR:  [64-70] 70  Resp:  [16-18] 18  BP: ()/(53-81) 121/81  SpO2:  [94 %-97 %] 97 %  Temp (24hrs), Av.5 °F (36.4 °C), Min:97.2 °F (36.2 °C), Max:97.7 °F (36.5 °C)  Current: Temperature: 97.6 °F (36.4 °C)    Physical Exam:   General Appearance:  Alert, interactive, nontoxic, no acute distress. Throat: Oropharynx moist without lesions. Lungs:   Clear to auscultation bilaterally; no wheezes, rhonchi or rales; respirations unlabored   Heart:  RRR; no murmur, rub or gallop   Abdomen:   Soft, non-tender, non-distended, positive bowel sounds. Extremities: Right elbow olecranon bursa erythema, fluctuance. No swelling of the elbow joint itself. Skin: No new rashes or lesions. No draining wounds noted. Labs:    All pertinent labs and imaging studies were personally reviewed  Results from last 7 days   Lab Units 08/10/23  0530 23  0521 23  0452   WBC Thousand/uL 8.37 8.37 8.56   HEMOGLOBIN g/dL 16.4 16.8 16.8   PLATELETS Thousands/uL 145* 149 157     Results from last 7 days   Lab Units 08/10/23  0530 23  5071 08/08/23  0452 08/05/23  0447 08/04/23  1406   SODIUM mmol/L 134* 133* 135   < > 129*   POTASSIUM mmol/L 4.1 4.1 4.1   < > 4.2   CHLORIDE mmol/L 101 99 99   < > 95*   CO2 mmol/L 29 30 31   < > 27   BUN mg/dL 18 17 18   < > 19   CREATININE mg/dL 0.61 0.49* 0.59*   < > 0.69   EGFR ml/min/1.73sq m 104 115 106   < > 100   CALCIUM mg/dL 7.9* 8.5 8.6   < > 8.6   AST U/L  --   --   --   --  16   ALT U/L  --   --   --   --  21   ALK PHOS U/L  --   --   --   --  67    < > = values in this interval not displayed. Micro:  Results from last 7 days   Lab Units 08/07/23  1246 08/04/23  1645 08/04/23  1406   BLOOD CULTURE   --  No Growth After 5 Days. No Growth After 5 Days.    GRAM STAIN RESULT  4+ Polys  No bacteria seen  --   --    URINE CULTURE   --   --  60,000-69,000 cfu/ml Staphylococcus aureus*   BODY FLUID CULTURE, STERILE  2 colonies Staphylococcus aureus*  --   --        Imaging:  I have personally reviewed pertinent imaging study reports and images in PACS.     CT right arm: Olecranon bursitis and adjacent edema/cellulitis

## 2023-08-10 NOTE — PLAN OF CARE
Problem: PAIN - ADULT  Goal: Verbalizes/displays adequate comfort level or baseline comfort level  Description: Interventions:  - Encourage patient to monitor pain and request assistance  - Assess pain using appropriate pain scale  - Administer analgesics based on type and severity of pain and evaluate response  - Implement non-pharmacological measures as appropriate and evaluate response  - Consider cultural and social influences on pain and pain management  - Notify physician/advanced practitioner if interventions unsuccessful or patient reports new pain  Outcome: Progressing     Problem: INFECTION - ADULT  Goal: Absence or prevention of progression during hospitalization  Description: INTERVENTIONS:  - Assess and monitor for signs and symptoms of infection  - Monitor lab/diagnostic results  - Monitor all insertion sites, i.e. indwelling lines, tubes, and drains  - Monitor endotracheal if appropriate and nasal secretions for changes in amount and color  - [place] appropriate cooling/warming therapies per order  - Administer medications as ordered  - Instruct and encourage patient and family to use good hand hygiene technique  - Identify and instruct in appropriate isolation precautions for identified infection/condition  Outcome: Progressing  Goal: Absence of fever/infection during neutropenic period  Description: INTERVENTIONS:  - Monitor WBC    Outcome: Progressing

## 2023-08-10 NOTE — DISCHARGE INSTR - AVS FIRST PAGE
POSTOPERATIVE INSTRUCTIONS    MEDICATIONS:  Antibiotics per primary team    WOUND CARE:  Keep the dressing clean and dry. Light drainage may occur the first 2 days postop. Continue dry dressing changes daily. Keep the elbow dry. DO NOT remove steri-strips or sutures. DO NOT immerse the incision under water. Carefully pat the incision dry. If there is wound drainage, re-apply a fresh dry gauze dressing. Please call our office ([phone]) if you experience any of the following:  Sudden increase in swelling, redness, or warmth at the surgical site  Excessive incisional drainage that persists beyond the 3rd day after surgery  Oral temperature greater than 101 degrees, not relieved with Tylenol  Shortness of breath, chest pain, nausea, or any other concerning symptoms    SWELLING CONTROL:  Cold Therapy:  Apply ice (20 min on, 20 min off) as often as you feel is necessary. Elevation:  Keep your arm at or above heart level as much as possible. IMMOBILIZATION:  None. You are allowed full range of motion as tolerated. ACTIVITY:  DO NOT lift, carry, push, or pull anything with your arm until cleared by your surgeon. Wrist / Finger Motion:  Move your wrist and fingers (if not immobilized in splint) through a full range of motion 20 times per hour while awake. PHYSICAL THERAPY:  You will be given a physical therapy prescription when you are seen in the office for your postoperative appointment.     FOLLOW-UP APPOINTMENT:  10-14 days after surgery with:    Dr. Castillo Finch MD

## 2023-08-10 NOTE — PROGRESS NOTES
[facility]  Progress Note  Name: Rip Friday  MRN: [id number]  Unit/Bed#: E2 MS [facility]iel-01 I Date of Admission: 8/4/2023   Date of Service: 8/10/2023 I Hospital Day: 6    Assessment/Plan   * Cellulitis  Assessment & Plan  Erythema and swelling of right upper extremity involving elbow  · CT shows bursitis  · Concern for septic arthritis given warm erythematous and swollen elbow joint  · History of immunosuppression  · Blood cultures negative at 5 days  · Infectious disease consulted,continue IV cefazolin likely transition to oral antibiotics   · Orthopedics evaluated patient  · Bedside aspiration performed 8/7/23  · Bedside I&D performed 8/9/23  · Cultures resulting in staphylococcus aureus  · NPO for possible debridement in the OR today    Ambulatory dysfunction  Assessment & Plan  Secondary to chronic neurologic issue  · Patient reports progressive weakness is now likely worsened in setting of acute infection  · PT/OT recommending rehab    Myelomalacia of cervical cord [facility])  Assessment & Plan  Follows with neurology as an outpatient  · Uses wheelchair mostly for ambulation  · PT/OT eval recommending rehab  · Outpatient follow-up with neurology  · Continue steroid taper as documented in outpatient neurology notes    Acute cystitis without hematuria  Assessment & Plan  Outpatient urine culture growing greater than 100,000 CFU's of Staph aureus  · Repeat urine culture growing 60,000 CFU Staph aureus  · ID consulted   · Switched to IV cefazolin, day 5      BPH associated with nocturia  Assessment & Plan  Beard catheter placed on admission due to urinary retention  · Consider removal as ambulation improves          VTE Pharmacologic Prophylaxis: VTE Score: 4 Moderate Risk (Score 3-4) - Pharmacological DVT Prophylaxis Ordered: enoxaparin (Lovenox). Patient Centered Rounds: I performed bedside rounds with nursing staff today.   Discussions with Specialists or Other Care Team Provider: Ortho, CM    Education and Discussions with Family / Patient: Patient declined call to . Total Time Spent on Date of Encounter in care of patient: 45 minutes This time was spent on one or more of the following: performing physical exam; counseling and coordination of care; obtaining or reviewing history; documenting in the medical record; reviewing/ordering tests, medications or procedures; communicating with other healthcare professionals and discussing with patient's family/caregivers. Current Length of Stay: 6 day(s)  Current Patient Status: Inpatient   Certification Statement: The patient will continue to require additional inpatient hospital stay due to pending possible ortho intervention  Discharge Plan: Anticipate discharge in 24-48 hrs to rehab facility. Code Status: Level 1 - Full Code    Subjective:   Patient was seen laying in bed today. He reports pain in right arm and swelling in left. He reports pain mediation is helping. He reports only having a small bowel movement yesterday. He denies any other acute complaints     Objective:     Vitals:   Temp (24hrs), Av.5 °F (36.4 °C), Min:97.2 °F (36.2 °C), Max:97.7 °F (36.5 °C)    Temp:  [97.2 °F (36.2 °C)-97.7 °F (36.5 °C)] 97.6 °F (36.4 °C)  HR:  [64-70] 70  Resp:  [16-18] 18  BP: ()/(53-81) 121/81  SpO2:  [94 %-97 %] 97 %  Body mass index is 29.84 kg/m². Input and Output Summary (last 24 hours): Intake/Output Summary (Last 24 hours) at 8/10/2023 0900  Last data filed at 8/10/2023 0601  Gross per 24 hour   Intake 90 ml   Output 3200 ml   Net -3110 ml       Physical Exam:   Physical Exam  Vitals and nursing note reviewed. Constitutional:       Appearance: Normal appearance. HENT:      Head: Normocephalic and atraumatic. Eyes:      General: No scleral icterus. Cardiovascular:      Rate and Rhythm: Normal rate and regular rhythm.    Pulmonary:      Effort: Pulmonary effort is normal.      Breath sounds: Normal breath sounds. Abdominal:      General: Abdomen is flat. Bowel sounds are normal.      Palpations: Abdomen is soft. Tenderness: There is no abdominal tenderness. Comments: Beard in place   Musculoskeletal:      Right lower leg: No edema. Left lower leg: No edema. Skin:     General: Skin is warm and dry. Comments: Right arm in ace warp bandage. Left arm with swelling around IV site, no erythema, tenderness, warmth   Neurological:      Mental Status: He is alert. Mental status is at baseline. Psychiatric:         Behavior: Behavior normal.        Additional Data:     Labs:  Results from last 7 days   Lab Units 08/10/23  0530 08/06/23  0540 08/05/23 0447 08/04/23  1406   WBC Thousand/uL 8.37   < > 11.64* 14.51*   HEMOGLOBIN g/dL 16.4   < > 15.8 18.5*   HEMATOCRIT % 50.5*   < > 49.9* 57.8*   PLATELETS Thousands/uL 145*   < > 145* 158   BANDS PCT %  --   --   --  1   NEUTROS PCT %  --   --  77*  --    LYMPHS PCT %  --   --  10*  --    LYMPHO PCT %  --   --   --  3*   MONOS PCT %  --   --  8  --    MONO PCT %  --   --   --  1*   EOS PCT %  --   --  0 1    < > = values in this interval not displayed. Results from last 7 days   Lab Units 08/10/23  0530 08/05/23 0447 08/04/23  1406   SODIUM mmol/L 134*   < > 129*   POTASSIUM mmol/L 4.1   < > 4.2   CHLORIDE mmol/L 101   < > 95*   CO2 mmol/L 29   < > 27   BUN mg/dL 18   < > 19   CREATININE mg/dL 0.61   < > 0.69   ANION GAP mmol/L 4   < > 7   CALCIUM mg/dL 7.9*   < > 8.6   ALBUMIN g/dL  --   --  3.4*   TOTAL BILIRUBIN mg/dL  --   --  0.76   ALK PHOS U/L  --   --  67   ALT U/L  --   --  21   AST U/L  --   --  16   GLUCOSE RANDOM mg/dL 81   < > 117    < > = values in this interval not displayed. Lines/Drains:  Invasive Devices     Peripheral Intravenous Line  Duration           Peripheral IV 08/07/23 Dorsal (posterior); Left;Proximal Forearm 2 days          Drain  Duration           Urethral Catheter Latex 16 Fr. 5 days Urinary Catheter:  Goal for removal: Voiding trial when ambulation improves               Imaging: No pertinent imaging reviewed. Recent Cultures (last 7 days):   Results from last 7 days   Lab Units 08/07/23  1246 08/04/23  1645 08/04/23  1406   BLOOD CULTURE   --  No Growth After 5 Days. No Growth After 5 Days. GRAM STAIN RESULT  4+ Polys  No bacteria seen  --   --    URINE CULTURE   --   --  60,000-69,000 cfu/ml Staphylococcus aureus*   BODY FLUID CULTURE, STERILE  2 colonies Staphylococcus aureus*  --   --        Last 24 Hours Medication List:   Current Facility-Administered Medications   Medication Dose Route Frequency Provider Last Rate   • acetaminophen  650 mg Oral Q6H PRN Eather Freeze, DO     • aluminum-magnesium hydroxide-simethicone  30 mL Oral Q6H PRN Eather Freeze, DO     • bisacodyl  10 mg Rectal Daily PRN Laura Yeboah PA-C     • cefazolin  2,000 mg Intravenous Q8H Emani Cerda MD 2,000 mg (08/10/23 0435)   • enoxaparin  40 mg Subcutaneous Daily Eather Freeze, DO     • melatonin  3 mg Oral HS Ivis Mera PA-C     • ondansetron  4 mg Intravenous Q6H PRN Eather Freeze, DO     • oxyCODONE  5 mg Oral Q6H PRN Eather Freeze, DO      Or   • oxyCODONE  10 mg Oral Q6H PRN Eather Freeze, DO     • predniSONE  5 mg Oral Daily Eather Freeze, DO          Today, Patient Was Seen By: Yohan Valdez PA-C    **Please Note: This note may have been constructed using a voice recognition system. **

## 2023-08-10 NOTE — ASSESSMENT & PLAN NOTE
Beard catheter placed on admission due to urinary retention  · Consider removal as ambulation improves

## 2023-08-10 NOTE — PLAN OF CARE
Problem: Potential for Falls  Goal: Patient will remain free of falls  Description: INTERVENTIONS:  - Educate patient/family on patient safety including physical limitations  - Instruct patient to call for assistance with activity   - Consult OT/PT to assist with strengthening/mobility   - Keep Call bell within reach  - Keep bed low and locked with side rails adjusted as appropriate  - Keep care items and personal belongings within reach  - Initiate and maintain comfort rounds  - Make Fall Risk Sign visible to staff  - Offer Toileting every 2 Hours, in advance of need  - Initiate/Maintain Bed alarm  - Apply yellow socks and bracelet for high fall risk patients  - Consider moving patient to room near nurses station  Outcome: Progressing     Problem: MOBILITY - ADULT  Goal: Maintain or return to baseline ADL function  Description: INTERVENTIONS:  -  Assess patient's ability to carry out ADLs; assess patient's baseline for ADL function and identify physical deficits which impact ability to perform ADLs (bathing, care of mouth/teeth, toileting, grooming, dressing, etc.)  - Assess/evaluate cause of self-care deficits   - Assess range of motion  - Assess patient's mobility; develop plan if impaired  - Assess patient's need for assistive devices and provide as appropriate  - Encourage maximum independence but intervene and supervise when necessary  - Involve family in performance of ADLs  - Assess for home care needs following discharge   - Consider OT consult to assist with ADL evaluation and planning for discharge  - Provide patient education as appropriate  Outcome: Progressing  Goal: Maintains/Returns to pre admission functional level  Description: INTERVENTIONS:  - Perform BMAT or MOVE assessment daily.   - Set and communicate daily mobility goal to care team and patient/family/caregiver. - Collaborate with rehabilitation services on mobility goals if consulted  - Perform Range of Motion 3 times a day.   - Reposition patient every 2 hours.   - Dangle patient 3 times a day  - Stand patient 3 times a day  - Ambulate patient 3 times a day  - Out of bed to chair 3 times a day   - Out of bed for meals 3 times a day  - Out of bed for toileting  - Record patient progress and toleration of activity level   Outcome: Progressing     Problem: PAIN - ADULT  Goal: Verbalizes/displays adequate comfort level or baseline comfort level  Description: Interventions:  - Encourage patient to monitor pain and request assistance  - Assess pain using appropriate pain scale  - Administer analgesics based on type and severity of pain and evaluate response  - Implement non-pharmacological measures as appropriate and evaluate response  - Consider cultural and social influences on pain and pain management  - Notify physician/advanced practitioner if interventions unsuccessful or patient reports new pain  Outcome: Progressing     Problem: INFECTION - ADULT  Goal: Absence or prevention of progression during hospitalization  Description: INTERVENTIONS:  - Assess and monitor for signs and symptoms of infection  - Monitor lab/diagnostic results  - Monitor all insertion sites, i.e. indwelling lines, tubes, and drains  - Monitor endotracheal if appropriate and nasal secretions for changes in amount and color  - [place] appropriate cooling/warming therapies per order  - Administer medications as ordered  - Instruct and encourage patient and family to use good hand hygiene technique  - Identify and instruct in appropriate isolation precautions for identified infection/condition  Outcome: Progressing  Goal: Absence of fever/infection during neutropenic period  Description: INTERVENTIONS:  - Monitor WBC    Outcome: Progressing     Problem: SAFETY ADULT  Goal: Patient will remain free of falls  Description: INTERVENTIONS:  - Educate patient/family on patient safety including physical limitations  - Instruct patient to call for assistance with activity   - Consult OT/PT to assist with strengthening/mobility   - Keep Call bell within reach  - Keep bed low and locked with side rails adjusted as appropriate  - Keep care items and personal belongings within reach  - Initiate and maintain comfort rounds  - Make Fall Risk Sign visible to staff  - Offer Toileting every 2 Hours, in advance of need  - Initiate/Maintain Bed alarm  - Apply yellow socks and bracelet for high fall risk patients  - Consider moving patient to room near nurses station  Outcome: Progressing  Goal: Maintain or return to baseline ADL function  Description: INTERVENTIONS:  -  Assess patient's ability to carry out ADLs; assess patient's baseline for ADL function and identify physical deficits which impact ability to perform ADLs (bathing, care of mouth/teeth, toileting, grooming, dressing, etc.)  - Assess/evaluate cause of self-care deficits   - Assess range of motion  - Assess patient's mobility; develop plan if impaired  - Assess patient's need for assistive devices and provide as appropriate  - Encourage maximum independence but intervene and supervise when necessary  - Involve family in performance of ADLs  - Assess for home care needs following discharge   - Consider OT consult to assist with ADL evaluation and planning for discharge  - Provide patient education as appropriate  Outcome: Progressing  Goal: Maintains/Returns to pre admission functional level  Description: INTERVENTIONS:  - Perform BMAT or MOVE assessment daily.   - Set and communicate daily mobility goal to care team and patient/family/caregiver. - Collaborate with rehabilitation services on mobility goals if consulted  - Perform Range of Motion 3 times a day. - Reposition patient every 2 hours.   - Dangle patient 3 times a day  - Stand patient 3 times a day  - Ambulate patient 3 times a day  - Out of bed to chair 3 times a day   - Out of bed for meals 3 times a day  - Out of bed for toileting  - Record patient progress and toleration of activity level   Outcome: Progressing     Problem: DISCHARGE PLANNING  Goal: Discharge to home or other facility with appropriate resources  Description: INTERVENTIONS:  - Identify barriers to discharge w/patient and caregiver  - Arrange for needed discharge resources and transportation as appropriate  - Identify discharge learning needs (meds, wound care, etc.)  - Arrange for interpretive services to assist at discharge as needed  - Refer to Case Management Department for coordinating discharge planning if the patient needs post-hospital services based on physician/advanced practitioner order or complex needs related to functional status, cognitive ability, or social support system  Outcome: Progressing     Problem: Knowledge Deficit  Goal: Patient/family/caregiver demonstrates understanding of disease process, treatment plan, medications, and discharge instructions  Description: Complete learning assessment and assess knowledge base.   Interventions:  - Provide teaching at level of understanding  - Provide teaching via preferred learning methods  Outcome: Progressing     Problem: Prexisting or High Potential for Compromised Skin Integrity  Goal: Skin integrity is maintained or improved  Description: INTERVENTIONS:  - Identify patients at risk for skin breakdown  - Assess and monitor skin integrity  - Assess and monitor nutrition and hydration status  - Monitor labs   - Assess for incontinence   - Turn and reposition patient  - Assist with mobility/ambulation  - Relieve pressure over bony prominences  - Avoid friction and shearing  - Provide appropriate hygiene as needed including keeping skin clean and dry  - Evaluate need for skin moisturizer/barrier cream  - Collaborate with interdisciplinary team   - Patient/family teaching  - Consider wound care consult   Outcome: Progressing No

## 2023-08-10 NOTE — ASSESSMENT & PLAN NOTE
Erythema and swelling of right upper extremity involving elbow  · CT shows bursitis  · Concern for septic arthritis given warm erythematous and swollen elbow joint  · History of immunosuppression  · Blood cultures negative at 5 days  · Infectious disease consulted,continue IV cefazolin likely transition to oral antibiotics   · Orthopedics evaluated patient  · Bedside aspiration performed 8/7/23  · Bedside I&D performed 8/9/23  · Cultures resulting in staphylococcus aureus  · NPO for possible debridement in the OR today

## 2023-08-11 PROBLEM — M71.129 SEPTIC OLECRANON BURSITIS: Status: ACTIVE | Noted: 2023-08-04

## 2023-08-11 PROBLEM — I82.619 SUPERFICIAL VENOUS THROMBOSIS OF ARM: Status: ACTIVE | Noted: 2023-08-11

## 2023-08-11 PROBLEM — I80.8 THROMBOPHLEBITIS ARM: Status: ACTIVE | Noted: 2023-08-11

## 2023-08-11 PROBLEM — M70.20 OLECRANON BURSITIS: Status: ACTIVE | Noted: 2023-08-04

## 2023-08-11 LAB
ANION GAP SERPL CALCULATED.3IONS-SCNC: 6 MMOL/L
BUN SERPL-MCNC: 20 MG/DL (ref 5–25)
CALCIUM SERPL-MCNC: 8.5 MG/DL (ref 8.4–10.2)
CHLORIDE SERPL-SCNC: 96 MMOL/L (ref 96–108)
CO2 SERPL-SCNC: 31 MMOL/L (ref 21–32)
CREAT SERPL-MCNC: 0.68 MG/DL (ref 0.6–1.3)
ERYTHROCYTE [DISTWIDTH] IN BLOOD BY AUTOMATED COUNT: 13 % (ref 11.6–15.1)
GFR SERPL CREATININE-BSD FRML MDRD: 100 ML/MIN/1.73SQ M
GLUCOSE SERPL-MCNC: 108 MG/DL (ref 65–140)
HCT VFR BLD AUTO: 56.5 % (ref 36.5–49.3)
HGB BLD-MCNC: 18.3 G/DL (ref 12–17)
MCH RBC QN AUTO: 31.2 PG (ref 26.8–34.3)
MCHC RBC AUTO-ENTMCNC: 32.4 G/DL (ref 31.4–37.4)
MCV RBC AUTO: 96 FL (ref 82–98)
PLATELET # BLD AUTO: 111 THOUSANDS/UL (ref 149–390)
PMV BLD AUTO: 10.5 FL (ref 8.9–12.7)
POTASSIUM SERPL-SCNC: 5.5 MMOL/L (ref 3.5–5.3)
RBC # BLD AUTO: 5.87 MILLION/UL (ref 3.88–5.62)
SODIUM SERPL-SCNC: 133 MMOL/L (ref 135–147)
WBC # BLD AUTO: 6.31 THOUSAND/UL (ref 4.31–10.16)

## 2023-08-11 PROCEDURE — 85027 COMPLETE CBC AUTOMATED: CPT

## 2023-08-11 PROCEDURE — 99232 SBSQ HOSP IP/OBS MODERATE 35: CPT | Performed by: STUDENT IN AN ORGANIZED HEALTH CARE EDUCATION/TRAINING PROGRAM

## 2023-08-11 PROCEDURE — 97110 THERAPEUTIC EXERCISES: CPT

## 2023-08-11 PROCEDURE — 97530 THERAPEUTIC ACTIVITIES: CPT

## 2023-08-11 PROCEDURE — 99232 SBSQ HOSP IP/OBS MODERATE 35: CPT

## 2023-08-11 PROCEDURE — 80048 BASIC METABOLIC PNL TOTAL CA: CPT

## 2023-08-11 PROCEDURE — 05HF33Z INSERTION OF INFUSION DEVICE INTO LEFT CEPHALIC VEIN, PERCUTANEOUS APPROACH: ICD-10-PCS | Performed by: STUDENT IN AN ORGANIZED HEALTH CARE EDUCATION/TRAINING PROGRAM

## 2023-08-11 PROCEDURE — 97535 SELF CARE MNGMENT TRAINING: CPT

## 2023-08-11 PROCEDURE — 99024 POSTOP FOLLOW-UP VISIT: CPT | Performed by: ORTHOPAEDIC SURGERY

## 2023-08-11 RX ORDER — PREDNISONE 5 MG/1
5 TABLET ORAL DAILY
Status: COMPLETED | OUTPATIENT
Start: 2023-08-11 | End: 2023-08-12

## 2023-08-11 RX ADMIN — OXYCODONE HYDROCHLORIDE 10 MG: 10 TABLET ORAL at 18:22

## 2023-08-11 RX ADMIN — OXYCODONE HYDROCHLORIDE 10 MG: 10 TABLET ORAL at 00:18

## 2023-08-11 RX ADMIN — CEFAZOLIN SODIUM 2000 MG: 2 SOLUTION INTRAVENOUS at 05:06

## 2023-08-11 RX ADMIN — BISACODYL 10 MG: 10 SUPPOSITORY RECTAL at 14:59

## 2023-08-11 RX ADMIN — OXYCODONE HYDROCHLORIDE 10 MG: 10 TABLET ORAL at 06:01

## 2023-08-11 RX ADMIN — OXYCODONE HYDROCHLORIDE 10 MG: 10 TABLET ORAL at 12:42

## 2023-08-11 RX ADMIN — Medication 3 MG: at 21:04

## 2023-08-11 RX ADMIN — PREDNISONE 5 MG: 5 TABLET ORAL at 11:58

## 2023-08-11 RX ADMIN — CEFAZOLIN SODIUM 2000 MG: 2 SOLUTION INTRAVENOUS at 21:04

## 2023-08-11 RX ADMIN — DOCUSATE SODIUM 100 MG: 100 CAPSULE, LIQUID FILLED ORAL at 09:18

## 2023-08-11 RX ADMIN — ENOXAPARIN SODIUM 40 MG: 100 INJECTION SUBCUTANEOUS at 09:19

## 2023-08-11 RX ADMIN — CEFAZOLIN SODIUM 2000 MG: 2 SOLUTION INTRAVENOUS at 11:59

## 2023-08-11 NOTE — PLAN OF CARE
Problem: PHYSICAL THERAPY ADULT  Goal: Performs mobility at highest level of function for planned discharge setting. See evaluation for individualized goals. Description: Treatment/Interventions: Functional transfer training, LE strengthening/ROM, Therapeutic exercise, Endurance training, Patient/family training, Equipment eval/education, Bed mobility, OT (sliding board when able to lateral scoot in bed.)          See flowsheet documentation for full assessment, interventions and recommendations. Note: Prognosis: Fair  Problem List: Decreased strength, Decreased range of motion, Decreased endurance, Impaired balance, Decreased mobility, Decreased skin integrity, Pain, Impaired tone, Impaired sensation  Assessment: Edwin Peralta is a 70 y.o. male admitted to [facility] on 8/4/2023 for Septic olecranon bursitis. Pt seen today for PT treatment session to improve functional mobility and progress toward stated goals. Able to perform rolling multiple time to complete ADLs with max Ax1. Preforms supine >sit and sit>supine with max Ax2 with asistance necessary for trunk and LEs. Pt able to sit EOB without assistance or UE support. Min A required for dynamic seated balance while performing TE. Standing deferred this session due to unclear WBS orders. Pt still demonstrates increased need for physical assist, impaired strength, decreased balance, and activity intolerance. Pt will continue to benefit from skilled IP PT to address stated impairments and  in order to maximize recovery and increase functional independence when completing mobility and ADLs. PT recommendations for d/c remain post acute rehab services. Barriers to Discharge: None     PT Discharge Recommendation: Post acute rehabilitation services    See flowsheet documentation for full assessment.

## 2023-08-11 NOTE — PROGRESS NOTES
[facility]  Progress Note  Name: Loren Pritchett  MRN: [id number]  Unit/Bed#: E2 -01 I Date of Admission: 8/4/2023   Date of Service: 8/11/2023 I Hospital Day: 7    Assessment/Plan   * Septic olecranon bursitis  Assessment & Plan  Erythema and swelling of right upper extremity involving elbow  · CT shows bursitis  · Concern for septic arthritis given warm erythematous and swollen elbow joint  · History of immunosuppression  · Blood cultures negative at 5 days  · Infectious disease consulted,continue IV cefazolin likely transition to oral antibiotics   · Orthopedics evaluated patient  · Bedside aspiration performed 8/7/23  · Bedside I&D performed 8/9/23  · Cultures resulting in staphylococcus aureus  · POD 1 debridement in OR     Ambulatory dysfunction  Assessment & Plan  Secondary to chronic neurologic issue  · Patient reports progressive weakness is now likely worsened in setting of acute infection  · PT/OT recommending rehab    Thrombophlebitis arm  Assessment & Plan  · Patient reported increased swelling in left upper extremity yesterday   · VAS venous duplex showed occlusive superficial thrombophlebitis in the basilic vein   · Supportive treatment     Myelomalacia of cervical cord [facility])  Assessment & Plan  Follows with neurology as an outpatient  · Uses wheelchair mostly for ambulation  · PT/OT eval recommending rehab  · Outpatient follow-up with neurology  · Continue steroid taper as documented in outpatient neurology notes    Acute cystitis without hematuria  Assessment & Plan  Outpatient urine culture growing greater than 100,000 CFU's of Staph aureus  · Repeat urine culture growing 60,000 CFU Staph aureus  · ID consulted   · Switched to IV cefazolin, day 6      BPH associated with nocturia  Assessment & Plan  Beard catheter placed on admission due to urinary retention  · Voiding trial today            VTE Pharmacologic Prophylaxis: VTE Score: 4 Moderate Risk (Score 3-4) - Pharmacological DVT Prophylaxis Contraindicated. Sequential Compression Devices Ordered. Patient Centered Rounds: I performed bedside rounds with nursing staff today. Discussions with Specialists or Other Care Team Provider: ortho, ID CM    Education and Discussions with Family / Patient: Patient declined call to . Total Time Spent on Date of Encounter in care of patient: 45 minutes This time was spent on one or more of the following: performing physical exam; counseling and coordination of care; obtaining or reviewing history; documenting in the medical record; reviewing/ordering tests, medications or procedures; communicating with other healthcare professionals and discussing with patient's family/caregivers. Current Length of Stay: 7 day(s)  Current Patient Status: Inpatient   Certification Statement: The patient will continue to require additional inpatient hospital stay due to pending clearance from ID and ortho then transport to rehab  Discharge Plan: Anticipate discharge in 24-48 hrs to rehab facility. Code Status: Level 1 - Full Code    Subjective:   Patient was seen laying in bed today. He reports feeling well. He states that his pain is under control on current pain regimen. He is constipated. He denies any other acute complaints     Objective:     Vitals:   Temp (24hrs), Av.4 °F (36.3 °C), Min:96.9 °F (36.1 °C), Max:97.9 °F (36.6 °C)    Temp:  [96.9 °F (36.1 °C)-97.9 °F (36.6 °C)] 96.9 °F (36.1 °C)  HR:  [54-86] 54  Resp:  [13-19] 18  BP: ()/(55-69) 105/69  SpO2:  [93 %-99 %] 98 %  Body mass index is 29.84 kg/m². Input and Output Summary (last 24 hours): Intake/Output Summary (Last 24 hours) at 2023 1019  Last data filed at 2023 7400  Gross per 24 hour   Intake 2387 ml   Output 2655 ml   Net -268 ml       Physical Exam:   Physical Exam  Vitals and nursing note reviewed. Constitutional:       Appearance: Normal appearance.    HENT:      Head: Normocephalic and atraumatic. Eyes:      General: No scleral icterus. Cardiovascular:      Rate and Rhythm: Normal rate and regular rhythm. Pulmonary:      Effort: Pulmonary effort is normal.      Breath sounds: Normal breath sounds. Abdominal:      General: Abdomen is flat. Bowel sounds are normal.      Palpations: Abdomen is soft. Tenderness: There is no abdominal tenderness. Musculoskeletal:      Right lower leg: No edema. Left lower leg: No edema. Skin:     General: Skin is warm and dry. Comments: Right elbow in ace wrap bandage, left arm swelling   Neurological:      Mental Status: He is alert. Mental status is at baseline. Psychiatric:         Mood and Affect: Mood normal.         Behavior: Behavior normal.       Additional Data:     Labs:  Results from last 7 days   Lab Units 08/11/23  0916 08/06/23  0540 08/05/23 0447 08/04/23  1406   WBC Thousand/uL 6.31   < > 11.64* 14.51*   HEMOGLOBIN g/dL 18.3*   < > 15.8 18.5*   HEMATOCRIT % 56.5*   < > 49.9* 57.8*   PLATELETS Thousands/uL 111*   < > 145* 158   BANDS PCT %  --   --   --  1   NEUTROS PCT %  --   --  77*  --    LYMPHS PCT %  --   --  10*  --    LYMPHO PCT %  --   --   --  3*   MONOS PCT %  --   --  8  --    MONO PCT %  --   --   --  1*   EOS PCT %  --   --  0 1    < > = values in this interval not displayed. Results from last 7 days   Lab Units 08/10/23  0530 08/05/23 0447 08/04/23  1406   SODIUM mmol/L 134*   < > 129*   POTASSIUM mmol/L 4.1   < > 4.2   CHLORIDE mmol/L 101   < > 95*   CO2 mmol/L 29   < > 27   BUN mg/dL 18   < > 19   CREATININE mg/dL 0.61   < > 0.69   ANION GAP mmol/L 4   < > 7   CALCIUM mg/dL 7.9*   < > 8.6   ALBUMIN g/dL  --   --  3.4*   TOTAL BILIRUBIN mg/dL  --   --  0.76   ALK PHOS U/L  --   --  67   ALT U/L  --   --  21   AST U/L  --   --  16   GLUCOSE RANDOM mg/dL 81   < > 117    < > = values in this interval not displayed.                        Lines/Drains:  Invasive Devices     Peripheral Intravenous Line  Duration           Peripheral IV 08/10/23 Right Hand <1 day                      Imaging: No pertinent imaging reviewed. Recent Cultures (last 7 days):   Results from last 7 days   Lab Units 08/10/23  1611 08/10/23  1609 08/10/23  1539 08/07/23  1246 08/04/23  1645 08/04/23  1406   BLOOD CULTURE   --   --   --   --  No Growth After 5 Days. No Growth After 5 Days. GRAM STAIN RESULT  Rare Polys  No bacteria seen No Polys or Bacteria seen 1+ Polys  No bacteria seen 4+ Polys  No bacteria seen  --   --    URINE CULTURE   --   --   --   --   --  60,000-69,000 cfu/ml Staphylococcus aureus*   BODY FLUID CULTURE, STERILE   --   --   --  2 colonies Staphylococcus aureus*  --   --        Last 24 Hours Medication List:   Current Facility-Administered Medications   Medication Dose Route Frequency Provider Last Rate   • acetaminophen  650 mg Oral Q6H PRN Abraham Schilder, PA-C     • aluminum-magnesium hydroxide-simethicone  30 mL Oral Q6H PRN Abraham Schilder, PA-C     • bisacodyl  10 mg Rectal Daily PRN Abraham Schilder, PA-C     • cefazolin  2,000 mg Intravenous Q8H Abraham Schilder, PA-C 2,000 mg (08/11/23 0506)   • docusate sodium  100 mg Oral BID Abraham Schilder, PA-C     • enoxaparin  40 mg Subcutaneous Daily Abraham Schilder, PA-C     • melatonin  3 mg Oral HS Abraham Schilder, PA-C     • ondansetron  4 mg Intravenous Q6H PRN Abraham Schilder, PA-C     • oxyCODONE  5 mg Oral Q6H PRN Abraham Schilder, PA-C      Or   • oxyCODONE  10 mg Oral Q6H PRN Abraham Schilder, PA-C     • [facility] Hold] predniSONE  5 mg Oral Daily Lore Christensen DO     • predniSONE  5 mg Oral Daily Jasmina Wheeler PA-C     • sodium chloride  125 mL/hr Intravenous Continuous Abraham Schilder, PA-C Stopped (08/11/23 7521)        Today, Patient Was Seen By: Jasmina Summer, PA-C    **Please Note: This note may have been constructed using a voice recognition system. **

## 2023-08-11 NOTE — PHYSICAL THERAPY NOTE
PHYSICAL THERAPY NOTE          Patient Name: Margi Teresa  GDPXO'K Date: 8/11/2023 08/11/23 1449   Note Type   Note Type Treatment   Pain Assessment   Pain Assessment Tool 0-10   Pain Score 7   Pain Location/Orientation Orientation: Upper;Orientation: Right;Location: Arm   Hospital Pain Intervention(s) Repositioned; Ambulation/increased activity; Emotional support   Restrictions/Precautions   Weight Bearing Precautions Per Order Yes   RUE Weight Bearing Per Order WBAT  (confirmed with ortho PA-C via tiger text)   Other Precautions Fall Risk;Pain;Bed Alarm; Chair Alarm;WBS   General   Chart Reviewed Yes   Response to Previous Treatment Patient with no complaints from previous session. Family/Caregiver Present Yes   Cognition   Overall Cognitive Status WFL   Arousal/Participation Cooperative   Attention Within functional limits   Orientation Level Oriented X4   Memory Within functional limits   Following Commands Follows all commands and directions without difficulty   Bed Mobility   Rolling R 2  Maximal assistance   Additional items Assist x 1;Bedrails   Rolling L 2  Maximal assistance   Additional items Assist x 1;Bedrails; Increased time required   Supine to Sit 2  Maximal assistance   Additional items Assist x 2; Increased time required;Verbal cues;LE management; Bedrails   Sit to Supine 2  Maximal assistance   Additional items Assist x 2; Increased time required;HOB elevated; Bedrails;LE management;Verbal cues   Transfers   Sit to Stand Unable to assess   Additional Comments Transfer not preformed due to unclear WBS for RUE at time of session.    Balance   Static Sitting Fair  (can sit without UE support)   Dynamic Sitting Poor +   Endurance Deficit   Endurance Deficit Yes   Endurance Deficit Description   (gross weakness)   Activity Tolerance   Activity Tolerance Patient limited by fatigue;Patient limited by pain   Medical Staff Made Aware OT Kurt Jiménez, PT chele   Nurse Made Aware yes   Exercises   Quad Sets Supine;10 reps;AROM; Bilateral   Knee AROM Long Arc Quad Sitting;10 reps;AAROM; Bilateral   Ankle Pumps Supine;10 reps;AAROM; Bilateral   Balance training  Seated EOB 20 min with supervison. Assessment   Prognosis Fair   Problem List Decreased strength;Decreased range of motion;Decreased endurance; Impaired balance;Decreased mobility; Decreased skin integrity;Pain; Impaired tone; Impaired sensation   Assessment Wood Timmons is a 70 y.o. male admitted to [address] [place] [address] on 8/4/2023 for Septic olecranon bursitis. Pt seen today for PT treatment session to improve functional mobility and progress toward stated goals. Able to perform rolling multiple time to complete ADLs with max Ax1. Preforms supine >sit and sit>supine with max Ax2 with asistance necessary for trunk and LEs. Pt able to sit EOB without assistance or UE support. Min A required for dynamic seated balance while performing TE. Standing deferred this session due to unclear WBS orders. Pt still demonstrates increased need for physical assist, impaired strength, decreased balance, and activity intolerance. Pt will continue to benefit from skilled IP PT to address stated impairments and  in order to maximize recovery and increase functional independence when completing mobility and ADLs. PT recommendations for d/c remain post acute rehab services. Barriers to Discharge None   Goals   Patient Goals to be able to walk   STG Expiration Date 08/20/23   PT Treatment Day 2   Plan   Treatment/Interventions Functional transfer training;LE strengthening/ROM; Therapeutic exercise; Endurance training;Patient/family training;Equipment eval/education; Bed mobility;OT;Spoke to nursing; Compensatory technique education   Progress Slow progress, decreased activity tolerance   PT Frequency 4-6x/wk   Recommendation   PT Discharge Recommendation Post acute rehabilitation services   AM-PAC Basic Mobility Inpatient   Turning in Flat Bed Without Bedrails 2   Lying on Back to Sitting on Edge of Flat Bed Without Bedrails 1   Moving Bed to Chair 1   Standing Up From Chair Using Arms 1   Walk in Room 1   Climb 3-5 Stairs With Railing 1   Basic Mobility Inpatient Raw Score 7  The patient's AM-PAC Basic Mobility Inpatient Short Form Raw Score is 7. A Raw score of less than 16 suggests the patient may benefit from discharge to post-acute rehabilitation services. Please also refer to the recommendation of the Physical Therapist for safe discharge planning. Turning Head Towards Sound 4   Follow Simple Instructions 4   Low Function Basic Mobility Raw Score  15   Low Function Basic Mobility Standardized Score  23.9   Highest Level Of Mobility   JH-HLM Goal 2: Bed activities/Dependent transfer   JH-HLM Achieved 3: Sit at edge of bed   Education   Education Provided Mobility training;Home exercise program   Patient Demonstrates acceptance/verbal understanding;Reinforcement needed   End of Consult   Patient Position at End of Consult Supine; All needs within reach   End of Consult Comments OT present     Jeannine Booker, SPT

## 2023-08-11 NOTE — ANESTHESIA POSTPROCEDURE EVALUATION
Post-Op Assessment Note    CV Status:  Stable    Pain management: adequate     Mental Status:  Alert and awake   Hydration Status:  Euvolemic   PONV Controlled:  Controlled   Airway Patency:  Patent      Post Op Vitals Reviewed: Yes      Staff: Anesthesiologist         No notable events documented.     BP      Temp     Pulse     Resp      SpO2      /60 (BP Location: Left leg)   Pulse 73   Temp (!) 97.2 °F (36.2 °C) (Temporal)   Resp 18   Ht 6' (1.829 m)   Wt 99.8 kg (220 lb 0.3 oz)   SpO2 95%   BMI 29.84 kg/m²

## 2023-08-11 NOTE — PROCEDURES
Midline Insertion    Date/Time: 8/11/2023 1:09 PM    Performed by: Jose Howard RN  Authorized by: Chang Abbott MD    Patient location:  Bedside  Consent:     Consent obtained: per protocol no consent required. Universal protocol:     Procedure explained and questions answered to patient or proxy's satisfaction: yes      Relevant documents present and verified: yes      Site/side marked: yes      Immediately prior to procedure, a time out was called: yes      Patient identity confirmed:  Verbally with patient, arm band, provided demographic data and hospital-assigned identification number  Pre-procedure details:     Skin preparation:  ChloraPrep    Skin preparation agent: Skin preparation agent completely dried prior to procedure    Indications:     Midline indications: no peripheral vascular access    Anesthesia (see MAR for exact dosages): Anesthesia method:  None  Procedure details:     Location:  Left cephalic    Laterality:  Left    Catheter size:  18 gauge    Landmarks identified: yes      Ultrasound guidance: yes      Ultrasound image availability:  Not saved    Sterile ultrasound techniques: Sterile gel and sterile probe covers were used      Number of attempts:  1    Successful placement: yes      Cath access vessel: midline. Catheter length (cm):  10    Exposed catheter length (cm):  0    Arm circumference (cm):  36    Lot number:  [id number] 2024-06-30  Post-procedure details:     Post-procedure:  Dressing applied and securement device placed    Assessment:  Blood return through all ports and free fluid flow    Post-procedure complications: none      Patient tolerance of procedure:   Tolerated well, no immediate complications

## 2023-08-11 NOTE — ASSESSMENT & PLAN NOTE
Outpatient urine culture growing greater than 100,000 CFU's of Staph aureus  · Repeat urine culture growing 60,000 CFU Staph aureus  · ID consulted   · Switched to IV cefazolin, day 6

## 2023-08-11 NOTE — PROGRESS NOTES
Progress Note - Infectious Disease   Gege Lundberg 70 y.o. male MRN: [id number]  Unit/Bed#: E2 -01 Encounter: [id number]      Impression/Plan:    1. Right elbow septic olecranon bursitis and cellulitis. There are no obvious wounds on the right elbow, but the patient frequently has irritation of the elbow since he supports his weight with it, so suspect infection due to repeated microtrauma causing translocation of skin bacteria into the bursa. No evidence of septic arthritis at the time. The patient has no history of MRSA and recent urine cultures show growth of MSSA but blood cultures show no growth. Leukocytosis has improved and the patient is afebrile. 10 cc of bloody, serous fluid aspirated from the right bursa. Aspirate culture growing MSSA. Now status post right bursa I&D 8/10/2023 with significant purulent fluid evacuated. -Continue IV cefazolin 2 g every 8 hours while inpatient   -Follow-up operative cultures   -Okay for discharge from ID perspective on p.o. Keflex 1 g every 6 hours to complete a 7-day postop course of antibiotics through 8/16/2023              -[facility] orthopedic surgery follow-up               -Monitor clinical exam     2.  Leukocytosis, POA. White blood cell count 14.51 on admission. The patient is afebrile and hemodynamically stable. Due to #1 above. Blood cultures show no growth to date despite growth of Staph aureus in urine culture. WBC count normalized              -Antibiotics as above              -Monitor exam              -Monitor CBC with differential, fever curve              -Follow-up OR cultures     3. BPH, urinary retention. Beard catheter placed on admission, now removed and patient undergoing void trial.  Recommend urology follow-up     4. Pyuria, bacteriuria. The patient reports urinary frequency and weak urinary stream.  I suspect his symptoms are more due to BPH than infection.   Urine culture recently grew MSSA; when staph aureus is seen in the urine this is often related to bacteremia, but blood cultures show no growth              -Urinary retention protocol              -Outpatient urology follow-up     5.  Quadriparesis, ambulatory dysfunction due to cervical myelomalacia. Follows with neurology outpatient and is on a steroid taper              -Steroid taper per primary team              -Recommend PT/OT to help the patient reduce trauma to the right elbow in the future     I have discussed the above management plan in detail with the primary service, patient. Okay for discharge from ID perspective. Antibiotics:  Cefazolin day 5  Abx day 7    Subjective:  Status post right olecranon bursa I&D yesterday with several cc of purulent fluid evacuated. The patient reports pain in the right elbow related to surgery but is otherwise feeling better. He has no fever, chills. Feels that the strength in his legs is improving      Objective:  Vitals:  Temp:  [96.9 °F (36.1 °C)-97.9 °F (36.6 °C)] 97.7 °F (36.5 °C)  HR:  [54-86] 56  Resp:  [13-19] 19  BP: ()/(55-80) 159/80  SpO2:  [93 %-99 %] 98 %  Temp (24hrs), Av.4 °F (36.3 °C), Min:96.9 °F (36.1 °C), Max:97.9 °F (36.6 °C)  Current: Temperature: 97.7 °F (36.5 °C)    Physical Exam:   General Appearance:  Alert, interactive, nontoxic, no acute distress. Throat: Oropharynx moist without lesions. Lungs:   Clear to auscultation bilaterally; no wheezes, rhonchi or rales; respirations unlabored   Heart:  RRR; no murmur, rub or gallop   Abdomen:   Soft, non-tender, non-distended, positive bowel sounds. Extremities: Right elbow olecranon bursa erythema, fluctuance. No swelling of the elbow joint itself. Skin: No new rashes or lesions. No draining wounds noted. Labs:    All pertinent labs and imaging studies were personally reviewed  Results from last 7 days   Lab Units 23  0916 08/10/23  0530 23  0521   WBC Thousand/uL 6.31 8.37 8.37   HEMOGLOBIN g/dL 18.3* 16.4 16.8   PLATELETS Thousands/uL 111* 145* 149     Results from last 7 days   Lab Units 08/11/23  1259 08/10/23  0530 08/09/23  0521   SODIUM mmol/L 133* 134* 133*   POTASSIUM mmol/L 5.5* 4.1 4.1   CHLORIDE mmol/L 96 101 99   CO2 mmol/L 31 29 30   BUN mg/dL 20 18 17   CREATININE mg/dL 0.68 0.61 0.49*   EGFR ml/min/1.73sq m 100 104 115   CALCIUM mg/dL 8.5 7.9* 8.5                       Micro:  Results from last 7 days   Lab Units 08/10/23  1611 08/10/23  1609 08/10/23  1539 08/07/23  1246 08/04/23  1645   BLOOD CULTURE   --   --   --   --  No Growth After 5 Days.    GRAM STAIN RESULT  Rare Polys  No bacteria seen No Polys or Bacteria seen 1+ Polys  No bacteria seen 4+ Polys  No bacteria seen  --    BODY FLUID CULTURE, STERILE   --   --   --  2 colonies Staphylococcus aureus*  --        Imaging:  I have personally reviewed pertinent imaging study reports and images in PACS.     CT right arm: Olecranon bursitis and adjacent edema/cellulitis

## 2023-08-11 NOTE — PLAN OF CARE
Problem: Potential for Falls  Goal: Patient will remain free of falls  Description: INTERVENTIONS:  - Educate patient/family on patient safety including physical limitations  - Instruct patient to call for assistance with activity   - Consult OT/PT to assist with strengthening/mobility   - Keep Call bell within reach  - Keep bed low and locked with side rails adjusted as appropriate  - Keep care items and personal belongings within reach  - Initiate and maintain comfort rounds  - Make Fall Risk Sign visible to staff  - Offer Toileting every 2 Hours, in advance of need  - Initiate/Maintain bedalarm  - Obtain necessary fall risk management equipment: at bedside  - Apply yellow socks and bracelet for high fall risk patients  - Consider moving patient to room near nurses station  Outcome: Progressing     Problem: MOBILITY - ADULT  Goal: Maintain or return to baseline ADL function  Description: INTERVENTIONS:  -  Assess patient's ability to carry out ADLs; assess patient's baseline for ADL function and identify physical deficits which impact ability to perform ADLs (bathing, care of mouth/teeth, toileting, grooming, dressing, etc.)  - Assess/evaluate cause of self-care deficits   - Assess range of motion  - Assess patient's mobility; develop plan if impaired  - Assess patient's need for assistive devices and provide as appropriate  - Encourage maximum independence but intervene and supervise when necessary  - Involve family in performance of ADLs  - Assess for home care needs following discharge   - Consider OT consult to assist with ADL evaluation and planning for discharge  - Provide patient education as appropriate  Outcome: Progressing  Goal: Maintains/Returns to pre admission functional level  Description: INTERVENTIONS:  - Perform BMAT or MOVE assessment daily.   - Set and communicate daily mobility goal to care team and patient/family/caregiver.    - Collaborate with rehabilitation services on mobility goals if consulted  - Perform Range of Motion 2 times a day. - Reposition patient every 2 hours.   - Dangle patient 2 times a day  - Stand patient 2 times a day  - Ambulate patient 2 times a day  - Out of bed to chair 2 times a day   - Out of bed for meals 2 times a day  - Out of bed for toileting  - Record patient progress and toleration of activity level   Outcome: Progressing     Problem: PAIN - ADULT  Goal: Verbalizes/displays adequate comfort level or baseline comfort level  Description: Interventions:  - Encourage patient to monitor pain and request assistance  - Assess pain using appropriate pain scale  - Administer analgesics based on type and severity of pain and evaluate response  - Implement non-pharmacological measures as appropriate and evaluate response  - Consider cultural and social influences on pain and pain management  - Notify physician/advanced practitioner if interventions unsuccessful or patient reports new pain  Outcome: Progressing     Problem: INFECTION - ADULT  Goal: Absence or prevention of progression during hospitalization  Description: INTERVENTIONS:  - Assess and monitor for signs and symptoms of infection  - Monitor lab/diagnostic results  - Monitor all insertion sites, i.e. indwelling lines, tubes, and drains  - Monitor endotracheal if appropriate and nasal secretions for changes in amount and color  - [place] appropriate cooling/warming therapies per order  - Administer medications as ordered  - Instruct and encourage patient and family to use good hand hygiene technique  - Identify and instruct in appropriate isolation precautions for identified infection/condition  Outcome: Progressing  Goal: Absence of fever/infection during neutropenic period  Description: INTERVENTIONS:  - Monitor WBC    Outcome: Progressing     Problem: SAFETY ADULT  Goal: Patient will remain free of falls  Description: INTERVENTIONS:  - Educate patient/family on patient safety including physical limitations  - Instruct patient to call for assistance with activity   - Consult OT/PT to assist with strengthening/mobility   - Keep Call bell within reach  - Keep bed low and locked with side rails adjusted as appropriate  - Keep care items and personal belongings within reach  - Initiate and maintain comfort rounds  - Make Fall Risk Sign visible to staff  - Offer Toileting every 2 Hours, in advance of need  - Initiate/Maintain bedalarm  - Obtain necessary fall risk management equipment: at bedside  - Apply yellow socks and bracelet for high fall risk patients  - Consider moving patient to room near nurses station  Outcome: Progressing  Goal: Maintain or return to baseline ADL function  Description: INTERVENTIONS:  -  Assess patient's ability to carry out ADLs; assess patient's baseline for ADL function and identify physical deficits which impact ability to perform ADLs (bathing, care of mouth/teeth, toileting, grooming, dressing, etc.)  - Assess/evaluate cause of self-care deficits   - Assess range of motion  - Assess patient's mobility; develop plan if impaired  - Assess patient's need for assistive devices and provide as appropriate  - Encourage maximum independence but intervene and supervise when necessary  - Involve family in performance of ADLs  - Assess for home care needs following discharge   - Consider OT consult to assist with ADL evaluation and planning for discharge  - Provide patient education as appropriate  Outcome: Progressing  Goal: Maintains/Returns to pre admission functional level  Description: INTERVENTIONS:  - Perform BMAT or MOVE assessment daily.   - Set and communicate daily mobility goal to care team and patient/family/caregiver. - Collaborate with rehabilitation services on mobility goals if consulted  - Perform Range of Motion 2 times a day. - Reposition patient every 2 hours.   - Dangle patient 2 times a day  - Stand patient 2 times a day  - Ambulate patient 2 times a day  - Out of bed to chair 2 times a day   - Out of bed for meals 2 times a day  - Out of bed for toileting  - Record patient progress and toleration of activity level   Outcome: Progressing     Problem: DISCHARGE PLANNING  Goal: Discharge to home or other facility with appropriate resources  Description: INTERVENTIONS:  - Identify barriers to discharge w/patient and caregiver  - Arrange for needed discharge resources and transportation as appropriate  - Identify discharge learning needs (meds, wound care, etc.)  - Arrange for interpretive services to assist at discharge as needed  - Refer to Case Management Department for coordinating discharge planning if the patient needs post-hospital services based on physician/advanced practitioner order or complex needs related to functional status, cognitive ability, or social support system  Outcome: Progressing     Problem: Knowledge Deficit  Goal: Patient/family/caregiver demonstrates understanding of disease process, treatment plan, medications, and discharge instructions  Description: Complete learning assessment and assess knowledge base.   Interventions:  - Provide teaching at level of understanding  - Provide teaching via preferred learning methods  Outcome: Progressing     Problem: Prexisting or High Potential for Compromised Skin Integrity  Goal: Skin integrity is maintained or improved  Description: INTERVENTIONS:  - Identify patients at risk for skin breakdown  - Assess and monitor skin integrity  - Assess and monitor nutrition and hydration status  - Monitor labs   - Assess for incontinence   - Turn and reposition patient  - Assist with mobility/ambulation  - Relieve pressure over bony prominences  - Avoid friction and shearing  - Provide appropriate hygiene as needed including keeping skin clean and dry  - Evaluate need for skin moisturizer/barrier cream  - Collaborate with interdisciplinary team   - Patient/family teaching  - Consider wound care consult   Outcome: Progressing

## 2023-08-11 NOTE — ASSESSMENT & PLAN NOTE
· Patient reported increased swelling in left upper extremity yesterday   · VAS venous duplex showed occlusive superficial thrombophlebitis in the basilic vein   · Supportive treatment

## 2023-08-11 NOTE — PROGRESS NOTES
Progress Note - Orthopedics   Adarsh Janey 70 y.o. male MRN: [id number]  Unit/Bed#: PACU      Subjective:    70 y. o.male POD 1 right olecranon bursa incision and drainage for septic olecranon bursitis. No acute overnight events, no new complaints. The patient reports increased pain when compared to before the surgery. Pain reasonably well-controlled with medication. He denies fevers, chills, CP, SOB, N/V, or numbness or tingling.      Labs:  0   Lab Value Date/Time    HCT 56.5 (H) 08/11/2023 0916    HCT 50.5 (H) 08/10/2023 0530    HCT 50.3 (H) 08/09/2023 0521    HGB 18.3 (H) 08/11/2023 0916    HGB 16.4 08/10/2023 0530    HGB 16.8 08/09/2023 0521    WBC 6.31 08/11/2023 0916    WBC 8.37 08/10/2023 0530    WBC 8.37 08/09/2023 0521    CRP 23.2 (H) 08/01/2023 0938       Meds:    Current Facility-Administered Medications:   •  acetaminophen (TYLENOL) tablet 650 mg, 650 mg, Oral, Q6H PRN, Lynsey Maxwell PA-C  •  aluminum-magnesium hydroxide-simethicone (MAALOX) oral suspension 30 mL, 30 mL, Oral, Q6H PRN, Lynsey Maxwell PA-C  •  bisacodyl (DULCOLAX) rectal suppository 10 mg, 10 mg, Rectal, Daily PRN, Lynsey Maxwell PA-C, 10 mg at 08/09/23 0549  •  ceFAZolin (ANCEF) IVPB (premix in dextrose) 2,000 mg 50 mL, 2,000 mg, Intravenous, Q8H, Lynsey Maxwell PA-C, Last Rate: 100 mL/hr at 08/11/23 0506, 2,000 mg at 08/11/23 5248  •  docusate sodium (COLACE) capsule 100 mg, 100 mg, Oral, BID, Lynsey Maxwell PA-C, 100 mg at 08/11/23 5260  •  enoxaparin (LOVENOX) subcutaneous injection 40 mg, 40 mg, Subcutaneous, Daily, Lynsey Maxwell PA-C, 40 mg at 08/11/23 7936  •  melatonin tablet 3 mg, 3 mg, Oral, HS, Lynsey Maxwell PA-C, 3 mg at 08/10/23 2106  •  ondansetron [facility]) injection 4 mg, 4 mg, Intravenous, Q6H PRN, Lynsey Maxwell PA-C  •  oxyCODONE (ROXICODONE) IR tablet 5 mg, 5 mg, Oral, Q6H PRN, 5 mg at 08/09/23 0124 **OR** oxyCODONE (ROXICODONE) immediate release tablet 10 mg, 10 mg, Oral, Q6H PRN, Euganna Calix PA-C, 10 mg at 08/11/23 0601  •  [COMPLETED] predniSONE tablet 30 mg, 30 mg, Oral, Daily, 30 mg at 08/05/23 0837 **FOLLOWED BY** [COMPLETED] predniSONE tablet 20 mg, 20 mg, Oral, Daily, 20 mg at 08/07/23 0931 **FOLLOWED BY** [COMPLETED] predniSONE tablet 10 mg, 10 mg, Oral, Daily, 10 mg at 08/09/23 0830 **FOLLOWED BY** [MAR Hold] predniSONE tablet 5 mg, 5 mg, Oral, Daily, Doreen Sinha DO, 5 mg at 08/10/23 0940  •  predniSONE tablet 5 mg, 5 mg, Oral, Daily, Urban Griffith PA-C  •  sodium chloride 0.9 % infusion, 125 mL/hr, Intravenous, Continuous, Sayda Calix PA-C, Stopped at 08/11/23 8988    Blood Culture:   Lab Results   Component Value Date    BLOODCX No Growth After 5 Days. 08/04/2023       Wound Culture:   No results found for: "WOUNDCULT"    Ins and Outs:  I/O last 24 hours: In: 2387 [I.V.:2337; IV Piggyback:50]  Out: 7567 [Urine:2650; Blood:5]          Physical:  Vitals:    08/11/23 0729   BP: 105/69   Pulse: (!) 54   Resp: 18   Temp: (!) 96.9 °F (36.1 °C)   SpO2: 98%     Musculoskeletal: right Upper Extremity  · Dressing noted to be essentially falling off this morning. The dressings over the incision were removed. Incision well-approximated without active drainage. Clean dressings were placed. · TTP over the elbow  · Sensation intact to median/radial/ulnar nerve distribution   · Motor intact anterior interosseous nerve/posterior interosseous nerve/median/radial/ulnar nerve distributions  · Digits warm and well perfused  · Capillary refill < 2 seconds    Assessment:    70 y. o.male POD 1 right olecranon bursa incision and drainage for septic olecranon bursitis.       Plan:  · WBAT RUE  · Prior aspiration results revealed Staph aureus  · Continue antibiotics per the recommendations of Infectious Disease  · We will continue to follow-up on the results of the intra-operative cultures as they become available  · PT/OT  · Pain control  · DVT ppx per primary team  · Medical co-morbidities are being managed per primary team  · Dispo: [facility] for discharge from ortho perspective when antibiotic plan is finalized.     Dominic Brown PA-C

## 2023-08-11 NOTE — ASSESSMENT & PLAN NOTE
Erythema and swelling of right upper extremity involving elbow  · CT shows bursitis  · Concern for septic arthritis given warm erythematous and swollen elbow joint  · History of immunosuppression  · Blood cultures negative at 5 days  · Infectious disease consulted,continue IV cefazolin likely transition to oral antibiotics   · Orthopedics evaluated patient  · Bedside aspiration performed 8/7/23  · Bedside I&D performed 8/9/23  · Cultures resulting in staphylococcus aureus  · POD 1 debridement in OR

## 2023-08-11 NOTE — OCCUPATIONAL THERAPY NOTE
Occupational Therapy Progress Note     Patient Name: Jennifer Kaur  SZVHK'T Date: 8/11/2023  Problem List  Principal Problem:    Septic olecranon bursitis  Active Problems:    BPH associated with nocturia    Acute cystitis without hematuria    Cauda equina syndrome [facility])    Ambulatory dysfunction    Myelomalacia of cervical cord (HCC)    Thrombophlebitis arm            08/11/23 1405   Note Type   Note Type Treatment   Pain Assessment   Pain Assessment Tool 0-10   Pain Score 7   Pain Location/Orientation Location: Generalized;Orientation: Right;Orientation: Upper   Restrictions/Precautions   Weight Bearing Precautions Per Order Yes   RUE Weight Bearing Per Order WBAT  (per ortho(DERREK Witt))   ADL   Where Assessed Edge of bed   Eating Assistance 4  Minimal Assistance   Grooming Assistance 3  Moderate Assistance   UB Bathing Assistance 2  Maximal Assistance   LB Bathing Assistance 2  Maximal Assistance   UB Dressing Assistance 2  Maximal Assistance   LB Dressing Assistance 2  Maximal Assistance   Toileting Assistance  1  Total Assistance   Bed Mobility   Rolling R 2  Maximal assistance   Additional items Assist x 1; Increased time required;LE management;Verbal cues   Rolling L 2  Maximal assistance   Additional items Assist x 1; Increased time required;LE management;Verbal cues   Supine to Sit 2  Maximal assistance   Additional items Assist x 2; Increased time required;Verbal cues;LE management   Sit to Supine 2  Maximal assistance   Additional items Assist x 2; Increased time required;Verbal cues;LE management   Transfers   Sit to Stand Unable to assess   Functional Mobility   Functional Mobility   (continue to recommend hoyerlift for OOB with nsg)   Therapeutic Exercise - ROM   UE-ROM Yes   Therapeutic Excerise-Strength   UE Strength Yes   Subjective   Subjective "I know I will need about 3wks to get back on my feet."   Cognition   Overall Cognitive Status [facility]   Arousal/Participation Alert   Attention Within functional limits   Orientation Level Oriented X4   Memory Within functional limits   Following Commands Follows all commands and directions without difficulty   Activity Tolerance   Activity Tolerance Patient tolerated treatment well   Medical Staff Made Aware nsg, P.T., CM   Assessment   Assessment Pt seen for 53min tx session with focus on functional balance, functional mobility, ADL status, transfer safety, and education. Pt is a s/p R elbow debridement(8/10). Pt allowed WBAT R UE per ortho. Pt able to tolerate EOB sitting for 12-15mins; sitting balance=f/f-. Declined standing 2* pending R UE WBS clarification(received after tx session). Pt demonstrating need for heavy assistance with UE/LE ADLs and functional mobility. Pt able to demonstrate AROM b/l UE's(i.e.limited b/l shr endranges,R forearm, and [place]). Therapist reviewed pressure relief techniques with R UE(i.e.with bed mobility, positioning). Pt able to demonstrate good cognition. Pt continues to demonstrate appropriateness for inpt rehab to improve his overall level of independence. Will continue. The patient's raw score on the AM-PAC Daily Activity Inpatient Short Form is 12. A raw score of less than 19 suggests the patient may benefit from discharge to post-acute rehabilitation services. Please refer to the recommendation of the Occupational Therapist for safe discharge planning. Plan   Treatment Interventions ADL retraining;Functional transfer training;UE strengthening/ROM; Endurance training;Cognitive reorientation;Patient/family training;Equipment evaluation/education; Compensatory technique education   Goal Expiration Date 08/20/23   OT Treatment Day 2   OT Frequency 3-5x/wk   Recommendation   OT Discharge Recommendation Post acute rehabilitation services   [facility] Daily Activity Inpatient   Lower Body Dressing 1   Bathing 2   Toileting 1   Upper Body Dressing 2   Grooming 3   Eating 3   Daily Activity Raw Score 12   Daily Activity Standardized Score (Calc for Raw Score >=11) 30.6   AM-PAC Applied Cognition Inpatient   Following a Speech/Presentation 4   Understanding Ordinary Conversation 4   Taking Medications 4   Remembering Where Things Are Placed or Put Away 4   Remembering List of 4-5 Errands 4   Taking Care of Complicated Tasks 4   Applied Cognition Raw Score 24   Applied Cognition Standardized Score 62.21     Anival Calderon

## 2023-08-11 NOTE — PLAN OF CARE
Problem: OCCUPATIONAL THERAPY ADULT  Goal: Performs self-care activities at highest level of function for planned discharge setting. See evaluation for individualized goals. Description: Treatment Interventions: ADL retraining, Functional transfer training, UE strengthening/ROM, Endurance training, Cognitive reorientation, Patient/family training, Equipment evaluation/education, Compensatory technique education          See flowsheet documentation for full assessment, interventions and recommendations. Note: Limitation: Decreased ADL status, Decreased UE ROM, Decreased UE strength, Decreased Safe judgement during ADL, Decreased endurance, Decreased high-level ADLs  Prognosis: Fair  Assessment: Pt seen for 53min tx session with focus on functional balance, functional mobility, ADL status, transfer safety, and education. Pt is a s/p R elbow debridement(8/10). Pt allowed WBAT R UE per ortho. Pt able to tolerate EOB sitting for 12-15mins; sitting balance=f/f-. Declined standing 2* pending R UE WBS clarification(received after tx session). Pt demonstrating need for heavy assistance with UE/LE ADLs and functional mobility. Pt able to demonstrate AROM b/l UE's(i.e.limited b/l shr endranges,R forearm, and [place]). Therapist reviewed pressure relief techniques with R UE(i.e.with bed mobility, positioning). Pt able to demonstrate good cognition. Pt continues to demonstrate appropriateness for inpt rehab to improve his overall level of independence. Will continue. The patient's raw score on the AM-PAC Daily Activity Inpatient Short Form is 12. A raw score of less than 19 suggests the patient may benefit from discharge to post-acute rehabilitation services. Please refer to the recommendation of the Occupational Therapist for safe discharge planning.      OT Discharge Recommendation: Post acute rehabilitation services

## 2023-08-11 NOTE — PLAN OF CARE
Problem: INFECTION - ADULT  Goal: Absence or prevention of progression during hospitalization  Description: INTERVENTIONS:  Midline procedure and maintenance  - Assess and monitor for signs and symptoms of infection  - Monitor lab/diagnostic results  - Monitor all insertion sites, i.e. indwelling lines, tubes, and drains  - Monitor endotracheal if appropriate and nasal secretions for changes in amount and color  - [place] appropriate cooling/warming therapies per order  - Administer medications as ordered  - Instruct and encourage patient and family to use good hand hygiene technique  - Identify and instruct in appropriate isolation precautions for identified infection/condition  Outcome: Progressing

## 2023-08-12 LAB
ERYTHROCYTE [DISTWIDTH] IN BLOOD BY AUTOMATED COUNT: 12.9 % (ref 11.6–15.1)
HCT VFR BLD AUTO: 48 % (ref 36.5–49.3)
HGB BLD-MCNC: 15.4 G/DL (ref 12–17)
MCH RBC QN AUTO: 30.8 PG (ref 26.8–34.3)
MCHC RBC AUTO-ENTMCNC: 32.1 G/DL (ref 31.4–37.4)
MCV RBC AUTO: 96 FL (ref 82–98)
PLATELET # BLD AUTO: 137 THOUSANDS/UL (ref 149–390)
PMV BLD AUTO: 10 FL (ref 8.9–12.7)
RBC # BLD AUTO: 5 MILLION/UL (ref 3.88–5.62)
WBC # BLD AUTO: 7.1 THOUSAND/UL (ref 4.31–10.16)

## 2023-08-12 PROCEDURE — 99232 SBSQ HOSP IP/OBS MODERATE 35: CPT

## 2023-08-12 PROCEDURE — 99024 POSTOP FOLLOW-UP VISIT: CPT | Performed by: PHYSICIAN ASSISTANT

## 2023-08-12 PROCEDURE — 85027 COMPLETE CBC AUTOMATED: CPT

## 2023-08-12 PROCEDURE — NC001 PR NO CHARGE: Performed by: PHYSICIAN ASSISTANT

## 2023-08-12 RX ADMIN — CEFAZOLIN SODIUM 2000 MG: 2 SOLUTION INTRAVENOUS at 20:03

## 2023-08-12 RX ADMIN — DOCUSATE SODIUM 100 MG: 100 CAPSULE, LIQUID FILLED ORAL at 08:30

## 2023-08-12 RX ADMIN — CEFAZOLIN SODIUM 2000 MG: 2 SOLUTION INTRAVENOUS at 11:37

## 2023-08-12 RX ADMIN — PREDNISONE 5 MG: 5 TABLET ORAL at 08:30

## 2023-08-12 RX ADMIN — OXYCODONE HYDROCHLORIDE 10 MG: 10 TABLET ORAL at 00:13

## 2023-08-12 RX ADMIN — OXYCODONE HYDROCHLORIDE 10 MG: 10 TABLET ORAL at 12:04

## 2023-08-12 RX ADMIN — ENOXAPARIN SODIUM 40 MG: 100 INJECTION SUBCUTANEOUS at 08:30

## 2023-08-12 RX ADMIN — OXYCODONE HYDROCHLORIDE 10 MG: 10 TABLET ORAL at 06:10

## 2023-08-12 RX ADMIN — OXYCODONE HYDROCHLORIDE 10 MG: 10 TABLET ORAL at 18:12

## 2023-08-12 RX ADMIN — Medication 3 MG: at 20:05

## 2023-08-12 RX ADMIN — DOCUSATE SODIUM 100 MG: 100 CAPSULE, LIQUID FILLED ORAL at 18:12

## 2023-08-12 RX ADMIN — CEFAZOLIN SODIUM 2000 MG: 2 SOLUTION INTRAVENOUS at 04:51

## 2023-08-12 NOTE — ASSESSMENT & PLAN NOTE
Follows with neurology as an outpatient  · Uses wheelchair mostly for ambulation  · PT/OT nigel recommending rehab  · Outpatient follow-up with neurology  · Completed steroid taper recommended by neurology during hospitalization Office visit    7/25/2022    Assessment and Plan  1. Coronary artery disease  In regard to her history of coronary artery disease, I do feel that she is currently stable.  I will continue to work on risk factor modification is appropriate.    2. Hyperlipidemia  In regard to her history of hyperlipidemia, her cholesterol is currently well controlled.  I will continue her current medications as is    3. Essential hypertension, benign  In regard to her history of hypertension, her blood pressure is well controlled.  Given her complaint of fatigue I will decrease her metoprolol to metoprolol succinate 50 mg once per day  - metoPROLOL succinate (TOPROL-XL) 50 MG 24 hr tablet; Take 1 tablet by mouth daily.  Dispense: 30 tablet; Refill: 5        Chief Complaint  Office Visit (SARIKA from Dr. Archuleta, feeling fatigued all the time, CABG x4 in [place])      History of present illness  Katarzyna is a very pleasant 72-year-old woman who presents for follow-up of known coronary artery disease.  She initially presented in 2021 at which point she underwent a CT scan for follow-up of adenocarcinoma of her lung.  At this point she was noted to have dense calcification of the left anterior descending artery.  A subsequent nuclear stress test was unremarkable.  She then underwent a CT scan of her coronary artery which was abnormal.  Subsequent cardiac catheterization revealed multivessel disease.  She underwent bypass surgery with an internal mammary artery graft to the LAD, a vein graft to the obtuse and diagonal branches and another vein graft to the posterior descending artery.  She also had an appendage ligation.  She states that she currently does not have any angina or angina-like symptoms.  She denies any congestive heart failure symptoms.  She denies any palpitations, near-syncope or syncopal symptoms.  She does note that she is extraordinarily fatigued and attributes this to her metoprolol.  She keeps track of her blood pressures and  notes that her systolics are in the 120-130 range with diastolics in the 60s to 70s.  Her last lipid panel was in March which demonstrated good control.  Her last A1 c was in May which was 6.7%.  She had a recent echocardiogram in May 2022 which was unremarkable with the exception of an enlarged left atrium      Review of systems  Review of Systems   Constitutional: Negative for decreased appetite, malaise/fatigue, weight gain and weight loss.   HENT: Negative for nosebleeds and stridor.    Cardiovascular: Negative for chest pain, claudication, cyanosis, dyspnea on exertion, irregular heartbeat, leg swelling, near-syncope, orthopnea, palpitations, paroxysmal nocturnal dyspnea and syncope.   Respiratory: Negative for cough, hemoptysis, shortness of breath, sleep disturbances due to breathing and snoring.    Endocrine: Negative for cold intolerance, heat intolerance, polydipsia, polyphagia and polyuria.   Hematologic/Lymphatic: Negative for bleeding problem.   Skin: Negative for color change and flushing.   Musculoskeletal: Negative for arthritis, joint pain, joint swelling and muscle weakness.   Gastrointestinal: Negative for abdominal pain, change in bowel habit, hematemesis, hematochezia, melena, nausea and vomiting.   Neurological: Negative for dizziness, focal weakness, light-headedness, loss of balance and weakness.   Psychiatric/Behavioral: Negative for altered mental status and depression.       Home medications  Outpatient Medications Marked as Taking for the 7/25/22 encounter (Office Visit) with Aaron Vargas MD   Medication Sig Dispense Refill   • acetaminophen (TYLENOL) 325 MG tablet Take 325 mg by mouth every 4 hours as needed.     • blood glucose test strip 1 each by Other route in the morning and 1 each at noon and 1 each in the evening and 1 each before bedtime.     • tretinoin (RETIN-A) 0.025 % cream tretinoin 0.025 % topical cream     • methocarbamol (ROBAXIN) 500 MG tablet 1pill by mouth 2x /  day prn muscle pain / spasm 21 tablet 0   • sitaGLIPTIN-metFORMIN (Janumet)  MG per tablet Take 1 tablet by mouth 2 times daily (with meals). 1 tablet 0   • atorvastatin (LIPITOR) 80 MG tablet Take 1 tablet by mouth daily. 90 tablet 3   • FLUoxetine (PROzac) 20 MG capsule Take 20 mg by mouth. Take 1 cap by mouth 2 times daily     • vitamin B-12 (CYANOCOBALAMIN) 1000 MCG tablet Take 1,000 mcg by mouth daily.     • irbesartan (AVAPRO) 300 MG tablet Take 300 mg by mouth daily.         Past histories  The patient's medical, family, and social history (including tobacco usage) were reviewed and updated as appropriate    Social History     Tobacco Use   Smoking Status Former Smoker   • Packs/day: 0.50   • Years: 40.00   • Pack years: 20.00   • Types: Cigarettes   • Start date: 1974   • Quit date: 2020   • Years since quittin.6   Smokeless Tobacco Never Used   Tobacco Comment    cutting back     Counseling given: No  Comment: cutting back    family history includes Arthritis in her mother; Cancer in her mother; Cancer, Skin Melanoma in her daughter; Diabetes in her father; Heart disease in her brother and father; High blood pressure in her father and sister; High cholesterol in her brother, mother, and sister; Psychiatric in her mother; Sleep Apnea in her son.    Past Medical History:   Diagnosis Date   • Baker's cyst, ruptured     right   • Breast cancer (CMS/HCC)     left   • Bunion    • Degenerative joint disease of knee, left     severe   • DM2 (diabetes mellitus, type 2) (CMS/HCC)    • HTN (hypertension)    • Hypercholesterolemia    • WILBER (obstructive sleep apnea)    • RLS (restless legs syndrome)    • Sleep apnea        Patient Active Problem List   Diagnosis   • Essential hypertension, benign   • DM2 (diabetes mellitus, type 2) (CMS/HCC)   • Pure hypercholesterolemia   • Knee pain   • Sleep apnea   • Non-allergic rhinitis (negative serology 2015)   • Simple chronic conjunctivitis   • Watery  eyes   • Pain in both knees   • Major depression in full remission (CMS/HCC)   • History of colonic polyps   • Chronic allergic rhinitis   • Coronary artery disease involving coronary bypass graft of native heart without angina pectoris       Physical examination  Visit Vitals  /66 (BP Location: LUE - Left upper extremity, Patient Position: Sitting, Cuff Size: Regular)   Pulse (!) 54   Resp 16   Ht 5' 2\" (1.575 m)   Wt 56.4 kg (124 lb 4.8 oz)   BMI 22.73 kg/m²       Physical Exam  Vitals reviewed.   Constitutional:       Appearance: Normal appearance.   HENT:      Head: Normocephalic and atraumatic.      Mouth/Throat:      Mouth: Mucous membranes are moist.      Pharynx: Oropharynx is clear.   Eyes:      Extraocular Movements: Extraocular movements intact.   Neck:      Vascular: Normal carotid pulses. No carotid bruit, hepatojugular reflux or JVD.   Cardiovascular:      Rate and Rhythm: Normal rate and regular rhythm.      Chest Wall: PMI is not displaced.      Pulses: Normal pulses.      Heart sounds: Normal heart sounds, S1 normal and S2 normal. Heart sounds not distant. No midsystolic click and no opening snap. No murmur heard.    No friction rub. No S3 or S4 sounds.   Pulmonary:      Effort: Pulmonary effort is normal. No accessory muscle usage or respiratory distress.      Breath sounds: Normal breath sounds and air entry. No decreased air movement.   Abdominal:      General: Abdomen is flat. Bowel sounds are normal. There is no abdominal bruit.      Palpations: Abdomen is soft. There is no hepatomegaly or splenomegaly.      Tenderness: There is no abdominal tenderness.   Musculoskeletal:      Right upper arm: Normal.      Left upper arm: Normal.      Cervical back: Normal range of motion.      Right lower leg: Normal.      Left lower leg: Normal.   Skin:     General: Skin is warm and dry.      Coloration: Skin is not cyanotic or jaundiced.      Nails: There is no clubbing.   Neurological:      General:  No focal deficit present.      Mental Status: She is alert and oriented to person, place, and time.      Sensory: Sensation is intact.      Motor: Motor function is intact.   Psychiatric:         Attention and Perception: Attention and perception normal.         Mood and Affect: Mood and affect normal.         Speech: Speech normal.         Behavior: Behavior normal.         Recent results and studies    See EMR for details    I spent a total of 30 minutes on the day of the visit.  This includes chart review and documenting.    Electronically signed by : Aaron Vargas MD, 7/25/2022

## 2023-08-12 NOTE — CASE MANAGEMENT
Case Management Discharge Planning Note    Patient name Margi Teresa  Location [facility] /E2 -* MRN [id number]  : 1960 Date 2023       Current Admission Date: 2023  Current Admission Diagnosis:Septic olecranon bursitis   Patient Active Problem List    Diagnosis Date Noted   • Thrombophlebitis arm 2023   • Septic olecranon bursitis 2023   • Arm swelling 2023   • Myelomalacia of cervical cord ([address]) 2022   • Quadriparesis ([address]) 10/19/2022   • Slow transit constipation 2022   • Weakness of right lower extremity 2022   • Ambulatory dysfunction 2022   • Status post lumbar spinal fusion 2021   • S/P insertion of spinal cord stimulator 06/10/2021   • Hydrocele of testis 2020   • Chest discomfort 2020   • Hx of colonoscopy 2019   • Cauda equina syndrome ([address]) 2018   • Spinal stenosis of thoracolumbar region 2018   • Acute cystitis without hematuria 2018   • Erectile dysfunction 2018   • Hypogonadism in male 2018   • BPH associated with nocturia 2018      LOS (days): 8  Geometric Mean LOS (GMLOS) (days): 4.50  Days to GMLOS:-3.4     OBJECTIVE:  Risk of Unplanned Readmission Score: 6.29         Current admission status: Inpatient   Preferred Pharmacy:   [id number] Loletta Apley, [address] [place] [address] [place] [address]  Phone: [phone] Fax: [phone]    [address] [facility] [address]  Phone: [phone] Fax: [phone]    [address] [place] [address], [address] [facility] [address]  Phone: [phone] Fax: [phone]    Primary Care Provider: Doraine Jackson, DO    Primary Insurance: BLUE CROSS  Secondary Insurance:     DISCHARGE DETAILS: Additional Comments: CM reached out to PT/OT requesting that pt is seen tomorrow or first thing monday am so updated notes can be submitted for auth. Confirmed they can accommodate this.

## 2023-08-12 NOTE — ASSESSMENT & PLAN NOTE
· Patient reported increased swelling in left upper extremity   · VAS venous duplex showed occlusive superficial thrombophlebitis in the basilic vein   · Supportive treatment

## 2023-08-12 NOTE — ASSESSMENT & PLAN NOTE
Beard catheter placed on admission due to urinary retention  · Beard was removed, voiding well   · Monitor for retention

## 2023-08-12 NOTE — ASSESSMENT & PLAN NOTE
Outpatient urine culture growing greater than 100,000 CFU's of Staph aureus  · Repeat urine culture growing 60,000 CFU Staph aureus  · ID consulted   · Switched to IV cefazolin, day 7

## 2023-08-12 NOTE — ASSESSMENT & PLAN NOTE
Erythema and swelling of right upper extremity involving elbow  · CT shows bursitis  · Concern for septic arthritis given warm erythematous and swollen elbow joint  · History of immunosuppression  · Blood cultures negative at 5 days  · Infectious disease consulted,continue IV cefazolin while inpatient, ID recommending transition to Keflex 1 g every 6 hours through 8/16/23 at discharge   · Orthopedics evaluated patient  · Bedside aspiration performed 8/7/23  · Bedside I&D performed 8/9/23  · Cultures resulting in staphylococcus aureus  · POD 2 debridement in OR

## 2023-08-12 NOTE — CASE MANAGEMENT
Case Management Discharge Planning Note    Patient name [facility]  Location [facility] 2 /E2 -* MRN [id number]  : 1960 Date 2023       Current Admission Date: 2023  Current Admission Diagnosis:Septic olecranon bursitis   Patient Active Problem List    Diagnosis Date Noted   • Thrombophlebitis arm 2023   • Septic olecranon bursitis 2023   • Arm swelling 2023   • Myelomalacia of cervical cord ([address]) 2022   • Quadriparesis ([address]) 10/19/2022   • Slow transit constipation 2022   • Weakness of right lower extremity 2022   • Ambulatory dysfunction 2022   • Status post lumbar spinal fusion 2021   • S/P insertion of spinal cord stimulator 06/10/2021   • Hydrocele of testis 2020   • Chest discomfort 2020   • Hx of colonoscopy 2019   • Cauda equina syndrome ([address]) 2018   • Spinal stenosis of thoracolumbar region 2018   • Acute cystitis without hematuria 2018   • Erectile dysfunction 2018   • Hypogonadism in male 2018   • BPH associated with nocturia 2018      LOS (days): 8  Geometric Mean LOS (GMLOS) (days): 4.50  Days to GMLOS:-3.2     OBJECTIVE:  Risk of Unplanned Readmission Score: 6.35         Current admission status: Inpatient   Preferred Pharmacy:   [address] [place] [address] #[id number] [address] [address]  Phone: [phone] Fax: [phone]    [address] Jayce Finn. [address] [address]  Phone: [phone] Fax: [phone]    [address] [place] [address] [address] [facility] [address] [address]  Phone: [phone] Fax: [phone]    Primary Care Provider: Jaylyn Capellan DO    Primary Insurance: BLUE CROSS  Secondary Insurance:     DISCHARGE DETAILS:    Discharge planning discussed with[de-identified] Pt and good stout  Freedom of Choice: Yes  Comments - Freedom of Choice: Pt agreeable to [facility]                               Other Referral/Resources/Interventions Provided:  Interventions: Acute Rehab  Referral Comments: [facility] reserved                                                      Additional Comments: CM spoke with pt to review [facility] as accepting facility. Pt is agreeable to [facility] and facility was reserved. CM spoke with Brenda Cortez from [facility]. Brenda Cortez stated they do not have availability over weekend, and [employer] auth comes back within 4 hours typically. Brenda Cortez stated they will have to start the auth on Monday morning but will need PT and OT to see the pt on Sunday or Monday morning.

## 2023-08-12 NOTE — PLAN OF CARE
Problem: Potential for Falls  Goal: Patient will remain free of falls  Description: INTERVENTIONS:  - Educate patient/family on patient safety including physical limitations  - Instruct patient to call for assistance with activity   - Consult OT/PT to assist with strengthening/mobility   - Keep Call bell within reach  - Keep bed low and locked with side rails adjusted as appropriate  - Keep care items and personal belongings within reach  - Initiate and maintain comfort rounds  - Make Fall Risk Sign visible to staff  - Offer Toileting every 2 Hours, in advance of need  - Initiate/Maintain bed alarm  - Obtain necessary fall risk management equipment: gripper socks, call bell  - Apply yellow socks and bracelet for high fall risk patients  - Consider moving patient to room near nurses station  Outcome: Progressing     Problem: MOBILITY - ADULT  Goal: Maintain or return to baseline ADL function  Description: INTERVENTIONS:  -  Assess patient's ability to carry out ADLs; assess patient's baseline for ADL function and identify physical deficits which impact ability to perform ADLs (bathing, care of mouth/teeth, toileting, grooming, dressing, etc.)  - Assess/evaluate cause of self-care deficits   - Assess range of motion  - Assess patient's mobility; develop plan if impaired  - Assess patient's need for assistive devices and provide as appropriate  - Encourage maximum independence but intervene and supervise when necessary  - Involve family in performance of ADLs  - Assess for home care needs following discharge   - Consider OT consult to assist with ADL evaluation and planning for discharge  - Provide patient education as appropriate  Outcome: Progressing     Problem: PAIN - ADULT  Goal: Verbalizes/displays adequate comfort level or baseline comfort level  Description: Interventions:  - Encourage patient to monitor pain and request assistance  - Assess pain using appropriate pain scale  - Administer analgesics based on type and severity of pain and evaluate response  - Implement non-pharmacological measures as appropriate and evaluate response  - Consider cultural and social influences on pain and pain management  - Notify physician/advanced practitioner if interventions unsuccessful or patient reports new pain  Outcome: Progressing     Problem: INFECTION - ADULT  Goal: Absence or prevention of progression during hospitalization  Description: INTERVENTIONS:  - Assess and monitor for signs and symptoms of infection  - Monitor lab/diagnostic results  - Monitor all insertion sites, i.e. indwelling lines, tubes, and drains  - Monitor endotracheal if appropriate and nasal secretions for changes in amount and color  - Griffin appropriate cooling/warming therapies per order  - Administer medications as ordered  - Instruct and encourage patient and family to use good hand hygiene technique  - Identify and instruct in appropriate isolation precautions for identified infection/condition  Outcome: Progressing     Goal: Maintain or return to baseline ADL function  Description: INTERVENTIONS:  -  Assess patient's ability to carry out ADLs; assess patient's baseline for ADL function and identify physical deficits which impact ability to perform ADLs (bathing, care of mouth/teeth, toileting, grooming, dressing, etc.)  - Assess/evaluate cause of self-care deficits   - Assess range of motion  - Assess patient's mobility; develop plan if impaired  - Assess patient's need for assistive devices and provide as appropriate  - Encourage maximum independence but intervene and supervise when necessary  - Involve family in performance of ADLs  - Assess for home care needs following discharge   - Consider OT consult to assist with ADL evaluation and planning for discharge  - Provide patient education as appropriate  Outcome: Progressing     Problem: DISCHARGE PLANNING  Goal: Discharge to home or other facility with appropriate resources  Description: INTERVENTIONS:  - Identify barriers to discharge w/patient and caregiver  - Arrange for needed discharge resources and transportation as appropriate  - Identify discharge learning needs (meds, wound care, etc.)  - Arrange for interpretive services to assist at discharge as needed  - Refer to Case Management Department for coordinating discharge planning if the patient needs post-hospital services based on physician/advanced practitioner order or complex needs related to functional status, cognitive ability, or social support system  Outcome: Progressing     Problem: Knowledge Deficit  Goal: Patient/family/caregiver demonstrates understanding of disease process, treatment plan, medications, and discharge instructions  Description: Complete learning assessment and assess knowledge base.   Interventions:  - Provide teaching at level of understanding  - Provide teaching via preferred learning methods  Outcome: Progressing

## 2023-08-12 NOTE — ASSESSMENT & PLAN NOTE
Diverticulitis    Some people get pouches along the wall of the colon as they get older. The pouches, called diverticuli, usually cause no symptoms. If the pouches become blocked, you can get an infection. This infection is called diverticulitis. It causes pain in your lower abdomen and fever. If not treated, it can become a serious condition, causing an abscess to form inside the pouch. The abscess may block the intestinal tract even or rupture, spreading infection throughout the abdomen.  When treatment is started early, oral antibiotics alone may be enough to cure diverticulitis. This method is tried first. But, if you don't improve or if your condition gets worse while you are trying oral antibiotics, you may need to be admitted to the hospital for IV antibiotics. Severe cases may require surgery.  Home care  The following guidelines will help you care for yourself at home:    During the acute illness, rest and follow a low-fiber diet. Include foods like:    Flake cereal, mashed potatoes, pancakes, waffles, pasta, white bread, rice, applesauce, bananas, eggs, meat, fish, poultry, tofu, and cooked vegetables    Take antibiotics exactly as the doctor says. Don't miss any doses or stop taking the medication, even if you feel better.    Monitor your temperature and report any rising temperatures to your doctor.  Preventing future attacks  Once you have had an episode of diverticulitis, you are at risk for having it again. After you have recovered from this episode, you may be able to reduce your risk by eating a high-fiber diet (20-35 gm/day of fiber). This cleans out the colon pouches that already exist and prevents new ones from forming. Foods high in fiber include fresh fruits and edible peelings, raw or lightly cooked vegetables, whole grain cereals and breads, dried beans and peas, and bran.  Other steps that can help prevent future attacks include:    Take your medications, such as antibiotics, as the doctor  Secondary to chronic neurologic issue  · Patient reports progressive weakness is now likely worsened in setting of acute infection  · PT/OT recommending rehab says.    Drink 6 to 8 glasses of water every day, unless directed otherwise.    Use a heating pad or hot water bottle to reduce abdominal cramping or pain.    Begin an exercise program. Ask your doctor how to get started. You can benefit from simple activities such as walking or gardening.    Treat diarrhea with a bland diet. Start with liquids only; then slowly add fiber over time.    Watch for changes in your bowel movements (constipation to diarrhea).    Get plenty of rest and sleep.  Follow-up care  Follow up with your doctor as advised or sooner if you are not getting better in the next 2 days.  When to seek medical care  Get prompt medical attention if any of the following occur:    Fever of 100.4 F (38 C) or higher, or as directed by your health care provider    Repeated vomiting or swelling of the abdomen    Weakness, dizziness, light-headedness    Pain in your abdomen that gets worse, severe, or spreads to your back    Pain that moves to the right lower abdomen    Rectal bleeding (stools that are red, black or maroon color)    Unexpected vaginal bleeding    9422-9361 [employer]. [address] [place] [address]. All rights reserved. This information is not intended as a substitute for professional medical care. Always follow your healthcare professional's instructions.        Discharge Instructions for Diverticulitis  You have been diagnosed with diverticulitis. This is a condition in which small pouches form in your colon (large intestine) and become inflamed or infected. Follow the guidelines below for home care.  As you recover    Eat a low-fiber diet. Your health care provider may advise a liquid diet. This gives your bowel a chance to rest so that it can recover.    Foods to include: flake cereal, mashed potatoes, pancakes, waffles, pasta, white bread, rice, applesauce, bananas, eggs, meat, fish, poultry, tofu, cooked vegetables    Take your medicines as directed. Do not stop  taking the medicines, even if you feel better.    Monitor your temperature and report any rising temperature to your health care provider.    Take antibiotics exactly as directed. Do not miss any.    Drink 6 to 8 glasses of water every day, unless directed otherwise.    Use a heating pad or hot water bottle to reduce abdominal cramping or pain.  Preventing diverticulitis in the future    Eat a high-fiber diet. Fiber adds bulk to the stool so that it passes through the large intestine more easily.    Keep drinking 6 to 8 glasses of water every day, unless directed otherwise.    Begin an exercise program. Ask your health care provider how to get started. You can benefit from simple activities such as walking or gardening.    Treat diarrhea with a bland diet. Start with liquids only, then slowly add fiber over time.    Watch for changes in your bowel movements (constipation to diarrhea).    Get plenty of rest and sleep.  Follow-up care  Make a follow-up appointment as directed by our staff.  When to call your health care provider  Call your health care provider immediately if you have any of the following:    Fever above 100 F (37.7 C)    Chills    Severe cramps in the abdomen, most commonly the lower left side    Tenderness in the abdomen, most commonly the lower left side    Nausea and vomiting    Bleeding from your rectum     1503-1389 [employer]. [address] [place] [address], [place] [address]. All rights reserved. This information is not intended as a substitute for professional medical care. Always follow your healthcare professional's instructions.

## 2023-08-12 NOTE — PROGRESS NOTES
[address] [place] [address]  Progress Note  Name: Madison Orona  MRN: [id number]  Unit/Bed#: E2 -01 I Date of Admission: 8/4/2023   Date of Service: 8/12/2023 I Hospital Day: 8    Assessment/Plan   * Septic olecranon bursitis  Assessment & Plan  Erythema and swelling of right upper extremity involving elbow  · CT shows bursitis  · Concern for septic arthritis given warm erythematous and swollen elbow joint  · History of immunosuppression  · Blood cultures negative at 5 days  · Infectious disease consulted,continue IV cefazolin while inpatient, ID recommending transition to Keflex 1 g every 6 hours through 8/16/23 at discharge   · Orthopedics evaluated patient  · Bedside aspiration performed 8/7/23  · Bedside I&D performed 8/9/23  · Cultures resulting in staphylococcus aureus  · POD 2 debridement in OR     Ambulatory dysfunction  Assessment & Plan  Secondary to chronic neurologic issue  · Patient reports progressive weakness is now likely worsened in setting of acute infection  · PT/OT recommending rehab    Thrombophlebitis arm  Assessment & Plan  · Patient reported increased swelling in left upper extremity   · VAS venous duplex showed occlusive superficial thrombophlebitis in the basilic vein   · Supportive treatment     Myelomalacia of cervical cord [facility])  Assessment & Plan  Follows with neurology as an outpatient  · Uses wheelchair mostly for ambulation  · PT/OT eval recommending rehab  · Outpatient follow-up with neurology  · Completed steroid taper recommended by neurology during hospitalization    Acute cystitis without hematuria  Assessment & Plan  Outpatient urine culture growing greater than 100,000 CFU's of Staph aureus  · Repeat urine culture growing 60,000 CFU Staph aureus  · ID consulted   · Switched to IV cefazolin, day 7    BPH associated with nocturia  Assessment & Plan  Beard catheter placed on admission due to urinary retention  · Beard was removed, voiding well   · Monitor for retention           VTE Pharmacologic Prophylaxis: VTE Score: 4 Moderate Risk (Score 3-4) - Pharmacological DVT Prophylaxis Ordered: enoxaparin (Lovenox). Patient Centered Rounds: I performed bedside rounds with nursing staff today. Discussions with Specialists or Other Care Team Provider: JANE    Education and Discussions with Family / Patient: Patient declined call to . Total Time Spent on Date of Encounter in care of patient: 45 minutes This time was spent on one or more of the following: performing physical exam; counseling and coordination of care; obtaining or reviewing history; documenting in the medical record; reviewing/ordering tests, medications or procedures; communicating with other healthcare professionals and discussing with patient's family/caregivers. Current Length of Stay: 8 day(s)  Current Patient Status: Inpatient   Certification Statement: The patient will continue to require additional inpatient hospital stay due to pending placement in acute rehab  Discharge Plan: Anticipate discharge in 24-48 hrs to rehab facility. Code Status: Level 1 - Full Code    Subjective:   Patient was seen laying in bed today. He reports pain is improved today but still present. He denies any other acute complaints. Objective:     Vitals:   Temp (24hrs), Av.5 °F (36.4 °C), Min:97.3 °F (36.3 °C), Max:97.7 °F (36.5 °C)    Temp:  [97.3 °F (36.3 °C)-97.7 °F (36.5 °C)] 97.3 °F (36.3 °C)  HR:  [52-73] 52  Resp:  [18-19] 18  BP: (146-159)/(80-81) 146/80  SpO2:  [98 %] 98 %  Body mass index is 29.84 kg/m². Input and Output Summary (last 24 hours): Intake/Output Summary (Last 24 hours) at 2023 0816  Last data filed at 2023 0444  Gross per 24 hour   Intake --   Output 1775 ml   Net -1775 ml       Physical Exam:   Physical Exam  Vitals and nursing note reviewed. Constitutional:       Appearance: Normal appearance. HENT:      Head: Normocephalic and atraumatic.    Eyes: General: No scleral icterus. Cardiovascular:      Rate and Rhythm: Normal rate. Pulmonary:      Effort: Pulmonary effort is normal.      Breath sounds: Normal breath sounds. Abdominal:      General: Abdomen is flat. Bowel sounds are normal.      Tenderness: There is no abdominal tenderness. Skin:     General: Skin is warm and dry. Comments: Swelling in left arm and ace wrap bandage on right    Neurological:      Mental Status: He is alert. Mental status is at baseline. Psychiatric:         Mood and Affect: Mood normal.         Behavior: Behavior normal.       Additional Data:     Labs:  Results from last 7 days   Lab Units 08/12/23  0459   WBC Thousand/uL 7.10   HEMOGLOBIN g/dL 15.4   HEMATOCRIT % 48.0   PLATELETS Thousands/uL 137*     Results from last 7 days   Lab Units 08/11/23  1259   SODIUM mmol/L 133*   POTASSIUM mmol/L 5.5*   CHLORIDE mmol/L 96   CO2 mmol/L 31   BUN mg/dL 20   CREATININE mg/dL 0.68   ANION GAP mmol/L 6   CALCIUM mg/dL 8.5   GLUCOSE RANDOM mg/dL 108                       Lines/Drains:  Invasive Devices     Peripheral Intravenous Line  Duration           Long-Dwell Peripheral IV (Midline) 16/69/10 Left Cephalic Vein <1 day                      Imaging: No pertinent imaging reviewed.     Recent Cultures (last 7 days):   Results from last 7 days   Lab Units 08/10/23  1611 08/10/23  1609 08/10/23  1539 08/07/23  1246   GRAM STAIN RESULT  Rare Polys  No bacteria seen No Polys or Bacteria seen 1+ Polys  No bacteria seen 4+ Polys  No bacteria seen   BODY FLUID CULTURE, STERILE   --   --   --  2 colonies Staphylococcus aureus*       Last 24 Hours Medication List:   Current Facility-Administered Medications   Medication Dose Route Frequency Provider Last Rate   • acetaminophen  650 mg Oral Q6H PRN Rose Espinoza PA-C     • aluminum-magnesium hydroxide-simethicone  30 mL Oral Q6H PRN Rose Espinoza PA-C     • bisacodyl  10 mg Rectal Daily PRN Rose Espinoza PA-C     • cefazolin  2,000 mg Intravenous Q8H SAUL Gama-C 2,000 mg (08/12/23 0451)   • docusate sodium  100 mg Oral BID Lamin Bass PA-C     • enoxaparin  40 mg Subcutaneous Daily Lamin Bass PA-C     • melatonin  3 mg Oral HS Lamin Bass PA-C     • ondansetron  4 mg Intravenous Q6H PRN Lamin Bass PA-C     • oxyCODONE  5 mg Oral Q6H PRN Lamin Bass PA-C      Or   • oxyCODONE  10 mg Oral Q6H PRN Lamin Shape, PUNEET     • [facility] Hold] predniSONE  5 mg Oral Daily Yane Church, DO     • predniSONE  5 mg Oral Daily Yohan Valdez PA-C     • sodium chloride  125 mL/hr Intravenous Continuous Lamin Bass PA-C Stopped (08/11/23 4676)        Today, Patient Was Seen By: Yohan Valdez PA-C    **Please Note: This note may have been constructed using a voice recognition system. **

## 2023-08-12 NOTE — PLAN OF CARE
Problem: MOBILITY - ADULT  Goal: Maintain or return to baseline ADL function  Description: INTERVENTIONS:  -  Assess patient's ability to carry out ADLs; assess patient's baseline for ADL function and identify physical deficits which impact ability to perform ADLs (bathing, care of mouth/teeth, toileting, grooming, dressing, etc.)  - Assess/evaluate cause of self-care deficits   - Assess range of motion  - Assess patient's mobility; develop plan if impaired  - Assess patient's need for assistive devices and provide as appropriate  - Encourage maximum independence but intervene and supervise when necessary  - Involve family in performance of ADLs  - Assess for home care needs following discharge   - Consider OT consult to assist with ADL evaluation and planning for discharge  - Provide patient education as appropriate  Outcome: Progressing     Problem: PAIN - ADULT  Goal: Verbalizes/displays adequate comfort level or baseline comfort level  Description: Interventions:  - Encourage patient to monitor pain and request assistance  - Assess pain using appropriate pain scale  - Administer analgesics based on type and severity of pain and evaluate response  - Implement non-pharmacological measures as appropriate and evaluate response  - Consider cultural and social influences on pain and pain management  - Notify physician/advanced practitioner if interventions unsuccessful or patient reports new pain  Outcome: Progressing     Problem: DISCHARGE PLANNING  Goal: Discharge to home or other facility with appropriate resources  Description: INTERVENTIONS:  - Identify barriers to discharge w/patient and caregiver  - Arrange for needed discharge resources and transportation as appropriate  - Identify discharge learning needs (meds, wound care, etc.)  - Arrange for interpretive services to assist at discharge as needed  - Refer to Case Management Department for coordinating discharge planning if the patient needs post-hospital services based on physician/advanced practitioner order or complex needs related to functional status, cognitive ability, or social support system  Outcome: Progressing     Problem: Knowledge Deficit  Goal: Patient/family/caregiver demonstrates understanding of disease process, treatment plan, medications, and discharge instructions  Description: Complete learning assessment and assess knowledge base.   Interventions:  - Provide teaching at level of understanding  - Provide teaching via preferred learning methods  Outcome: Progressing     Problem: Prexisting or High Potential for Compromised Skin Integrity  Goal: Skin integrity is maintained or improved  Description: INTERVENTIONS:  - Identify patients at risk for skin breakdown  - Assess and monitor skin integrity  - Assess and monitor nutrition and hydration status  - Monitor labs   - Assess for incontinence   - Turn and reposition patient  - Assist with mobility/ambulation  - Relieve pressure over bony prominences  - Avoid friction and shearing  - Provide appropriate hygiene as needed including keeping skin clean and dry  - Evaluate need for skin moisturizer/barrier cream  - Collaborate with interdisciplinary team   - Patient/family teaching  - Consider wound care consult   Outcome: Progressing

## 2023-08-13 LAB
ANION GAP SERPL CALCULATED.3IONS-SCNC: 3 MMOL/L
BACTERIA SPEC ANAEROBE CULT: NO GROWTH
BACTERIA SPEC ANAEROBE CULT: NO GROWTH
BUN SERPL-MCNC: 17 MG/DL (ref 5–25)
CALCIUM SERPL-MCNC: 8.3 MG/DL (ref 8.4–10.2)
CHLORIDE SERPL-SCNC: 98 MMOL/L (ref 96–108)
CO2 SERPL-SCNC: 33 MMOL/L (ref 21–32)
CREAT SERPL-MCNC: 0.64 MG/DL (ref 0.6–1.3)
ERYTHROCYTE [DISTWIDTH] IN BLOOD BY AUTOMATED COUNT: 13.1 % (ref 11.6–15.1)
GFR SERPL CREATININE-BSD FRML MDRD: 102 ML/MIN/1.73SQ M
GLUCOSE SERPL-MCNC: 82 MG/DL (ref 65–140)
HCT VFR BLD AUTO: 48 % (ref 36.5–49.3)
HGB BLD-MCNC: 15.3 G/DL (ref 12–17)
MCH RBC QN AUTO: 30.8 PG (ref 26.8–34.3)
MCHC RBC AUTO-ENTMCNC: 31.9 G/DL (ref 31.4–37.4)
MCV RBC AUTO: 97 FL (ref 82–98)
PLATELET # BLD AUTO: 139 THOUSANDS/UL (ref 149–390)
PMV BLD AUTO: 9.5 FL (ref 8.9–12.7)
POTASSIUM SERPL-SCNC: 4.1 MMOL/L (ref 3.5–5.3)
RBC # BLD AUTO: 4.96 MILLION/UL (ref 3.88–5.62)
SODIUM SERPL-SCNC: 134 MMOL/L (ref 135–147)
WBC # BLD AUTO: 6.74 THOUSAND/UL (ref 4.31–10.16)

## 2023-08-13 PROCEDURE — 97535 SELF CARE MNGMENT TRAINING: CPT

## 2023-08-13 PROCEDURE — 99024 POSTOP FOLLOW-UP VISIT: CPT | Performed by: STUDENT IN AN ORGANIZED HEALTH CARE EDUCATION/TRAINING PROGRAM

## 2023-08-13 PROCEDURE — 99232 SBSQ HOSP IP/OBS MODERATE 35: CPT

## 2023-08-13 PROCEDURE — 80048 BASIC METABOLIC PNL TOTAL CA: CPT

## 2023-08-13 PROCEDURE — 97530 THERAPEUTIC ACTIVITIES: CPT

## 2023-08-13 PROCEDURE — 85027 COMPLETE CBC AUTOMATED: CPT

## 2023-08-13 RX ADMIN — OXYCODONE HYDROCHLORIDE 10 MG: 10 TABLET ORAL at 18:06

## 2023-08-13 RX ADMIN — CEFAZOLIN SODIUM 2000 MG: 2 SOLUTION INTRAVENOUS at 11:38

## 2023-08-13 RX ADMIN — OXYCODONE HYDROCHLORIDE 10 MG: 10 TABLET ORAL at 06:06

## 2023-08-13 RX ADMIN — ENOXAPARIN SODIUM 40 MG: 100 INJECTION SUBCUTANEOUS at 08:22

## 2023-08-13 RX ADMIN — DOCUSATE SODIUM 100 MG: 100 CAPSULE, LIQUID FILLED ORAL at 18:07

## 2023-08-13 RX ADMIN — OXYCODONE HYDROCHLORIDE 10 MG: 10 TABLET ORAL at 00:10

## 2023-08-13 RX ADMIN — DOCUSATE SODIUM 100 MG: 100 CAPSULE, LIQUID FILLED ORAL at 08:22

## 2023-08-13 RX ADMIN — OXYCODONE HYDROCHLORIDE 10 MG: 10 TABLET ORAL at 11:38

## 2023-08-13 RX ADMIN — CEFAZOLIN SODIUM 2000 MG: 2 SOLUTION INTRAVENOUS at 20:28

## 2023-08-13 RX ADMIN — Medication 3 MG: at 20:28

## 2023-08-13 RX ADMIN — CEFAZOLIN SODIUM 2000 MG: 2 SOLUTION INTRAVENOUS at 04:32

## 2023-08-13 NOTE — PLAN OF CARE
Problem: OCCUPATIONAL THERAPY ADULT  Goal: Performs self-care activities at highest level of function for planned discharge setting. See evaluation for individualized goals. Description: Treatment Interventions: ADL retraining, Functional transfer training, Cognitive reorientation, UE strengthening/ROM, Endurance training, Patient/family training, Equipment evaluation/education, Compensatory technique education, Energy conservation, Activityengagement          See flowsheet documentation for full assessment, interventions and recommendations. 8/13/2023 1442 by Deloris Malik OT  Note: Limitation: Decreased ADL status, Decreased UE ROM, Decreased UE strength, Decreased Safe judgement during ADL, Decreased endurance, Decreased high-level ADLs  Prognosis: Fair  Assessment: Pt seen for skilled OT session focused on ADLs, bed mobility, functional transfers. Pt w/ increased encouragement to participate in session. Pt w/ MAX assist x1 rolling in bed and max assist to don shorts w/ max assist for hiking shorts up over hips w/ rolling. Pt w/ MAX assist x2 supine>sit bed mobility w/ increased time to complete. Pt w/ MAX assist x2 sit>stand w/ VCs for hand placement and positioning and pt unable to achieve upright positioning in stance. Pt on 2nd trial of standing sit>stand w/ VCs for hand placement and positioning w/ 2 minutes tolerance and mod assist x2 steadying support and MAX assist x2 stand>sit transfer to bed w/ cues for controlled descent. Pt on 3rd trial of standing MAX assist x2 w/ increased time to achieve upright and then improvement w/ standing balance to min assist w/ RW and increased standing tolerance to 12-15minutes. Pt w/ improvement in stand>sit transfer w/ MOD assist x2 and improved controlled descent. Pt w/ min assist simulated UB Dressing w/ assist w/ R UE due to pain and decreased AROM. Pt w/ MAX Assist x2 sit>supine bed mobility w/ increased time to complete.  Pt upright in bed w/ all needs met. Pt continues to be limited due to increased pain in R UE, limited AROM R UE, impaired balance, impaired activity tolerance, decreased strength and endurance, decreased standing tolerance, decreased dynamic standing balance, all causing a decline in ADLs, functional transfers and mobility. Pt w/ improvement in standing tolerance and functional transfers and UB ADLs this session and is motivated for rehab. Recommend STR when medically stable. Will continue to follow. The patient's raw score on the AM-PAC Daily Activity Inpatient Short Form is 14. A raw score of less than 19 suggests the patient may benefit from discharge to post-acute rehabilitation services. Please refer to the recommendation of the Occupational Therapist for safe discharge planning. OT Discharge Recommendation: Post acute rehabilitation services       8/13/2023 1442 by Akilah Sultana OT  Note: Limitation: Decreased ADL status, Decreased UE ROM, Decreased UE strength, Decreased Safe judgement during ADL, Decreased endurance, Decreased high-level ADLs  Prognosis: Fair  Assessment: Pt seen for skilled OT session focused on ADLs, bed mobility, functional transfers. Pt w/ increased encouragement to participate in session. Pt w/ MAX assist x1 rolling in bed and max assist to don shorts w/ max assist for hiking shorts up over hips w/ rolling. Pt w/ MAX assist x2 supine>sit bed mobility w/ increased time to complete. Pt w/ MAX assist x2 sit>stand w/ VCs for hand placement and positioning and pt unable to achieve upright positioning in stance. Pt on 2nd trial of standing sit>stand w/ VCs for hand placement and positioning w/ 2 minutes tolerance and mod assist x2 steadying support and MAX assist x2 stand>sit transfer to bed w/ cues for controlled descent. Pt on 3rd trial of standing MAX assist x2 w/ increased time to achieve upright and then improvement w/ standing balance to min assist w/ RW and increased standing tolerance to 12-15minutes. Pt w/ improvement in stand>sit transfer w/ MOD assist x2 and improved controlled descent. Pt w/ min assist simulated UB Dressing w/ assist w/ R UE due to pain and decreased AROM. Pt w/ MAX Assist x2 sit>supine bed mobility w/ increased time to complete. Pt upright in bed w/ all needs met. Pt continues to be limited due to increased pain in R UE, limited AROM R UE, impaired balance, impaired activity tolerance, decreased strength and endurance, decreased standing tolerance, decreased dynamic standing balance, all causing a decline in ADLs, functional transfers and mobility. Pt w/ improvement in standing tolerance and functional transfers and UB ADLs this session and is motivated for rehab. Recommend STR when medically stable. Will continue to follow. The patient's raw score on the AM-PAC Daily Activity Inpatient Short Form is 14. A raw score of less than 19 suggests the patient may benefit from discharge to post-acute rehabilitation services. Please refer to the recommendation of the Occupational Therapist for safe discharge planning.      OT Discharge Recommendation: Post acute rehabilitation services

## 2023-08-13 NOTE — PROGRESS NOTES
Nubia Hawk 70 y.o. male MRN: [id number]  Unit/Bed#: E2 -01 Encounter: [id number]      Subjective:  Travis العلي was seen and evaluated bedside today. He is resting comfortably at this time. He has mild pain that is well controlled. He reports some scant drainage from dressings over the last 24 hours. Denies fever or chills. He feels he is overall improving.   Labs:  0   Lab Value Date/Time    HCT 48.0 08/13/2023 0431    HCT 48.0 08/12/2023 0459    HCT 56.5 (H) 08/11/2023 0916    HGB 15.3 08/13/2023 0431    HGB 15.4 08/12/2023 0459    HGB 18.3 (H) 08/11/2023 0916    WBC 6.74 08/13/2023 0431    WBC 7.10 08/12/2023 0459    WBC 6.31 08/11/2023 0916    CRP 23.2 (H) 08/01/2023 0938       Meds:    Current Facility-Administered Medications:   •  acetaminophen (TYLENOL) tablet 650 mg, 650 mg, Oral, Q6H PRN, Karen Onofre PA-C  •  aluminum-magnesium hydroxide-simethicone (MAALOX) oral suspension 30 mL, 30 mL, Oral, Q6H PRN, Karen Onofre PA-C  •  bisacodyl (DULCOLAX) rectal suppository 10 mg, 10 mg, Rectal, Daily PRN, Karen Onofre PA-C, 10 mg at 08/11/23 1459  •  ceFAZolin (ANCEF) IVPB (premix in dextrose) 2,000 mg 50 mL, 2,000 mg, Intravenous, Q8H, Karen Onofre PA-C, Last Rate: 100 mL/hr at 08/13/23 0432, 2,000 mg at 08/13/23 0432  •  docusate sodium (COLACE) capsule 100 mg, 100 mg, Oral, BID, Karen Onofre PA-C, 100 mg at 08/13/23 0439  •  enoxaparin (LOVENOX) subcutaneous injection 40 mg, 40 mg, Subcutaneous, Daily, Karen Onofre PA-C, 40 mg at 08/13/23 9194  •  melatonin tablet 3 mg, 3 mg, Oral, HS, Karen Onofre PA-C, 3 mg at 08/12/23 2005  •  ondansetron [facility]) injection 4 mg, 4 mg, Intravenous, Q6H PRN, Karen Onofre PA-C  •  oxyCODONE (ROXICODONE) IR tablet 5 mg, 5 mg, Oral, Q6H PRN, 5 mg at 08/09/23 0124 **OR** oxyCODONE (ROXICODONE) immediate release tablet 10 mg, 10 mg, Oral, Q6H PRN, Karen Onofre PA-C, 10 mg at 08/13/23 0606    Physical exam:  Vitals:    08/13/23 0659   BP: 141/71   Pulse: (!) 46   Resp: 18   Temp: 98 °F (36.7 °C)   SpO2: 99%     Right elbow:  · Dressings with mild serosanguinous drainage, no purulence  · Incision is clean and dry  · Very scant palpable fluid within the olecranon bursa  · Mild diffuse generalized swelling about the elbow. No significant erythema  · Near full elbow ROM without pain  · Sensation intact median, ulnar, and radial nerves. · 2+ radia pulse    Assessment: 70 y. o.male POD 3 s/p I&D right elbow olecranon bursa for septic olecranon bursitis    Plan:  · Pain Control  · WBAT RUE  · PT/OT  · Abx per ID  · Dressing changes as needed  · WBC stable, afebrile, improving pain. · Stable for d/c from orthopedic standpoint. Follow up with Dr. Jas Barrera as outpt.     Cale Tuttle DO

## 2023-08-13 NOTE — ASSESSMENT & PLAN NOTE
Erythema and swelling of right upper extremity involving elbow  · CT shows bursitis  · Concern for septic arthritis given warm erythematous and swollen elbow joint  · History of immunosuppression  · Blood cultures negative at 5 days  · Infectious disease consulted,continue IV cefazolin while inpatient, ID recommending transition to Keflex 1 g every 6 hours through 8/16/23 at discharge   · Orthopedics evaluated patient  · Bedside aspiration performed 8/7/23  · Bedside I&D performed 8/9/23  · Cultures resulting in staphylococcus aureus  · POD 3 debridement in OR

## 2023-08-13 NOTE — ASSESSMENT & PLAN NOTE
Outpatient urine culture growing greater than 100,000 CFU's of Staph aureus  · Repeat urine culture growing 60,000 CFU Staph aureus  · ID consulted   · Switched to IV cefazolin, day 8

## 2023-08-13 NOTE — PROGRESS NOTES
for retention         VTE Pharmacologic Prophylaxis: VTE Score: 4 Moderate Risk (Score 3-4) - Pharmacological DVT Prophylaxis Ordered: enoxaparin (Lovenox). Patient Centered Rounds: I performed bedside rounds with nursing staff today. Discussions with Specialists or Other Care Team Provider: JANE    Education and Discussions with Family / Patient: Patient declined call to . Total Time Spent on Date of Encounter in care of patient: 45 minutes This time was spent on one or more of the following: performing physical exam; counseling and coordination of care; obtaining or reviewing history; documenting in the medical record; reviewing/ordering tests, medications or procedures; communicating with other healthcare professionals and discussing with patient's family/caregivers. Current Length of Stay: 9 day(s)  Current Patient Status: Inpatient   Certification Statement: The patient will continue to require additional inpatient hospital stay due to pending placement in rehab likely monday  Discharge Plan: Anticipate discharge in 24-48 hrs to rehab facility. Code Status: Level 1 - Full Code    Subjective:   Patient was seen laying in bed today. He denies any acute complaints. He states that right elbow is feeling better. He is voiding independently. Objective:     Vitals:   Temp (24hrs), Av °F (36.7 °C), Min:97.8 °F (36.6 °C), Max:98.1 °F (36.7 °C)    Temp:  [97.8 °F (36.6 °C)-98.1 °F (36.7 °C)] 98 °F (36.7 °C)  HR:  [46-67] 46  Resp:  [18-20] 18  BP: (133-142)/(71-84) 141/71  SpO2:  [96 %-99 %] 99 %  Body mass index is 29.84 kg/m². Input and Output Summary (last 24 hours): Intake/Output Summary (Last 24 hours) at 2023 0800  Last data filed at 2023 0436  Gross per 24 hour   Intake --   Output 1075 ml   Net -1075 ml       Physical Exam:   Physical Exam  Vitals and nursing note reviewed. Constitutional:       Appearance: Normal appearance.    HENT:      Head: Normocephalic and atraumatic. Eyes:      General: No scleral icterus. Cardiovascular:      Rate and Rhythm: Normal rate and regular rhythm. Pulmonary:      Effort: Pulmonary effort is normal.      Breath sounds: Normal breath sounds. Abdominal:      General: Abdomen is flat. Bowel sounds are normal.      Palpations: Abdomen is soft. Tenderness: There is no abdominal tenderness. Musculoskeletal:      Right lower leg: No edema. Left lower leg: No edema. Skin:     General: Skin is warm and dry. Comments: Left arm swelling, right arm elbow in ace wrap bandage    Neurological:      Mental Status: He is alert. Mental status is at baseline. Psychiatric:         Mood and Affect: Mood normal.         Behavior: Behavior normal.        Additional Data:     Labs:  Results from last 7 days   Lab Units 08/13/23  0431   WBC Thousand/uL 6.74   HEMOGLOBIN g/dL 15.3   HEMATOCRIT % 48.0   PLATELETS Thousands/uL 139*     Results from last 7 days   Lab Units 08/13/23  0431   SODIUM mmol/L 134*   POTASSIUM mmol/L 4.1   CHLORIDE mmol/L 98   CO2 mmol/L 33*   BUN mg/dL 17   CREATININE mg/dL 0.64   ANION GAP mmol/L 3   CALCIUM mg/dL 8.3*   GLUCOSE RANDOM mg/dL 82                       Lines/Drains:  Invasive Devices     Peripheral Intravenous Line  Duration           Long-Dwell Peripheral IV (Midline) 95/00/32 Left Cephalic Vein 1 day                      Imaging: No pertinent imaging reviewed.     Recent Cultures (last 7 days):   Results from last 7 days   Lab Units 08/10/23  1611 08/10/23  1609 08/10/23  1539 08/07/23  1246   GRAM STAIN RESULT  Rare Polys  No bacteria seen No Polys or Bacteria seen 1+ Polys  No bacteria seen 4+ Polys  No bacteria seen   BODY FLUID CULTURE, STERILE   --   --   --  2 colonies Staphylococcus aureus*       Last 24 Hours Medication List:   Current Facility-Administered Medications   Medication Dose Route Frequency Provider Last Rate   • acetaminophen  650 mg Oral Q6H PRN Yogesh Wilcox PA-C • aluminum-magnesium hydroxide-simethicone  30 mL Oral Q6H PRN Karen Hallman PA-C     • bisacodyl  10 mg Rectal Daily PRN Karen Hallman PA-C     • cefazolin  2,000 mg Intravenous Q8H Karen Hallman PA-C 2,000 mg (08/13/23 0432)   • docusate sodium  100 mg Oral BID Karen Hallman PA-C     • enoxaparin  40 mg Subcutaneous Daily Karen Hallman PA-C     • melatonin  3 mg Oral HS Karen Hallman PA-C     • ondansetron  4 mg Intravenous Q6H PRN MAYRA MendezC     • oxyCODONE  5 mg Oral Q6H PRN Karen Hallman PA-C      Or   • oxyCODONE  10 mg Oral Q6H PRN Karen Hallman PA-C          Today, Patient Was Seen By: Dedrick Mancini PA-C    **Please Note: This note may have been constructed using a voice recognition system. **

## 2023-08-13 NOTE — PLAN OF CARE
Problem: PAIN - ADULT  Goal: Verbalizes/displays adequate comfort level or baseline comfort level  Description: Interventions:  - Encourage patient to monitor pain and request assistance  - Assess pain using appropriate pain scale  - Administer analgesics based on type and severity of pain and evaluate response  - Implement non-pharmacological measures as appropriate and evaluate response  - Consider cultural and social influences on pain and pain management  - Notify physician/advanced practitioner if interventions unsuccessful or patient reports new pain  Outcome: Progressing     Problem: INFECTION - ADULT  Goal: Absence or prevention of progression during hospitalization  Description: INTERVENTIONS:  - Assess and monitor for signs and symptoms of infection  - Monitor lab/diagnostic results  - Monitor all insertion sites, i.e. indwelling lines, tubes, and drains  - Monitor endotracheal if appropriate and nasal secretions for changes in amount and color  - [place] appropriate cooling/warming therapies per order  - Administer medications as ordered  - Instruct and encourage patient and family to use good hand hygiene technique  - Identify and instruct in appropriate isolation precautions for identified infection/condition  Outcome: Progressing     Problem: SAFETY ADULT  Goal: Patient will remain free of falls  Description: INTERVENTIONS:  - Educate patient/family on patient safety including physical limitations  - Instruct patient to call for assistance with activity   - Consult OT/PT to assist with strengthening/mobility   - Keep Call bell within reach  - Keep bed low and locked with side rails adjusted as appropriate  - Keep care items and personal belongings within reach  - Initiate and maintain comfort rounds  - Make Fall Risk Sign visible to staff  - Offer Toileting every 2 Hours, in advance of need  - Initiate/Maintain bed alarm  - Obtain necessary fall risk management equipment: gripper socks, call bell  - Apply yellow socks and bracelet for high fall risk patients  - Consider moving patient to room near nurses station  Outcome: Progressing  Goal: Maintain or return to baseline ADL function  Description: INTERVENTIONS:  -  Assess patient's ability to carry out ADLs; assess patient's baseline for ADL function and identify physical deficits which impact ability to perform ADLs (bathing, care of mouth/teeth, toileting, grooming, dressing, etc.)  - Assess/evaluate cause of self-care deficits   - Assess range of motion  - Assess patient's mobility; develop plan if impaired  - Assess patient's need for assistive devices and provide as appropriate  - Encourage maximum independence but intervene and supervise when necessary  - Involve family in performance of ADLs  - Assess for home care needs following discharge   - Consider OT consult to assist with ADL evaluation and planning for discharge  - Provide patient education as appropriate  Outcome: Progressing     Problem: DISCHARGE PLANNING  Goal: Discharge to home or other facility with appropriate resources  Description: INTERVENTIONS:  - Identify barriers to discharge w/patient and caregiver  - Arrange for needed discharge resources and transportation as appropriate  - Identify discharge learning needs (meds, wound care, etc.)  - Arrange for interpretive services to assist at discharge as needed  - Refer to Case Management Department for coordinating discharge planning if the patient needs post-hospital services based on physician/advanced practitioner order or complex needs related to functional status, cognitive ability, or social support system  Outcome: Progressing     Problem: Knowledge Deficit  Goal: Patient/family/caregiver demonstrates understanding of disease process, treatment plan, medications, and discharge instructions  Description: Complete learning assessment and assess knowledge base.   Interventions:  - Provide teaching at level of understanding  - Provide teaching via preferred learning methods  Outcome: Progressing

## 2023-08-13 NOTE — CASE MANAGEMENT
Case Management Progress Note    Patient name Shiva Zavala  Location [facility] /E2 -* MRN [id number]  : 1960 Date 2023       LOS (days): 9  Geometric Mean LOS (GMLOS) (days): 4.50  Days to GMLOS:-4.4        OBJECTIVE:        Current admission status: Inpatient  Preferred Pharmacy:   [address] [place] [address] #[id number] Loni Hernandez, [address]  [address] [address]  Phone: [phone] Fax: [phone]    Gareth Erb, [address] [place]. [address] [facility] [address]  Phone: [phone] Fax: [phone]    [address] [place] [address]  Phone: [phone] Fax: [phone]    Primary Care Provider: Marci Carrillo DO    Primary Insurance: BLUE CROSS  Secondary Insurance:     PROGRESS NOTE: PT/OT notes needed for auth, as reported by prior CM. Notes are not available to this time. 1524 - PT/OT notes now available. Auth to be initiated on Monday, as requested per [facility].

## 2023-08-13 NOTE — OCCUPATIONAL THERAPY NOTE
Occupational Therapy Progress Note     Patient Name: Farideh Robbins  EJWIU'R Date: 8/13/2023  Problem List  Principal Problem:    Septic olecranon bursitis  Active Problems:    BPH associated with nocturia    Acute cystitis without hematuria    Cauda equina syndrome [facility])    Ambulatory dysfunction    Myelomalacia of cervical cord (HCC)    Thrombophlebitis arm          08/13/23 1403   OT Last Visit   OT Visit Date 08/13/23   Note Type   Note Type Treatment for insurance authorization   Pain Assessment   Pain Assessment Tool FLACC   Pain Location/Orientation Orientation: Right;Location: Arm   Pain Rating: FLACC (Rest) - Face 0   Pain Rating: FLACC (Rest) - Legs 0   Pain Rating: FLACC (Rest) - Activity 0   Pain Rating: FLACC (Rest) - Cry 1   Pain Rating: FLACC (Rest) - Consolability 1   Score: FLACC (Rest) 2   Pain Rating: FLACC (Activity) - Face 1   Pain Rating: FLACC (Activity) - Legs 0   Pain Rating: FLACC (Activity) - Activity 0   Pain Rating: FLACC (Activity) - Cry 1   Pain Rating: FLACC (Activity) - Consolability 1   Score: FLACC (Activity) 3   Restrictions/Precautions   Weight Bearing Precautions Per Order Yes   RUE Weight Bearing Per Order WBAT   Braces or Orthoses   (ace bandage R UE)   Other Precautions Bed Alarm;Pain; Fall Risk   ADL   Where Assessed Edge of bed   UB Dressing Assistance 4  Minimal Assistance   UB Dressing Deficit Setup;Steadying;Supervision/safety;Verbal cueing; Increased time to complete   UB Dressing Comments simulated don shirt assist due to R UE limited movement   LB Dressing Assistance 2  Maximal Assistance   LB Dressing Deficit Setup; Increased time to complete;Verbal cueing;Supervision/safety; Thread RLE into pants; Thread LLE into pants;Pull up over hips   LB Dressing Comments rolling in bed to weightshift to pull up shorts over hips w/ increased time and assist   Functional Standing Tolerance   Time 12-15minutes   Activity w/ initially MOD assist x 2steadying support then improved to min assist support w/ RW   Comments no LOB   Bed Mobility   Rolling R 2  Maximal assistance   Additional items Assist x 1;HOB elevated; Bedrails; Increased time required;Verbal cues;LE management   Rolling L 2  Maximal assistance   Additional items Assist x 1; Increased time required;LE management;Verbal cues;HOB elevated; Bedrails   Supine to Sit 2  Maximal assistance   Additional items Assist x 2; Increased time required;Verbal cues;LE management; Bedrails;HOB elevated   Sit to Supine 2  Maximal assistance   Additional items Assist x 2; Increased time required;Verbal cues;LE management; Bedrails   Additional Comments increased time to complete; initially w/ MOD assist support while EOB then improved to close supervision   Transfers   Sit to Stand 2  Maximal assistance   Additional items Assist x 2; Increased time required;Verbal cues; Bedrails   Stand to Sit 2  Maximal assistance   Additional items Assist x 2; Increased time required;Verbal cues;Armrests   Additional Comments 3 trials of standing; on first trial unable to achieve upright stance; on 2nd and 3rd trial able to achieve upright w/ increased time to achieve upright;   Functional Mobility   Additional Comments pt declined to trial side stepping to [facility] due to fear of falling   Subjective   Subjective "No one is more motivated then me for rehab, I am going to [facility] tomorrow"   Cognition   Overall Cognitive Status [facility]   Arousal/Participation Alert; Cooperative;Responsive   Attention Within functional limits   Orientation Level Oriented X4   Memory Within functional limits   Following Commands Follows all commands and directions without difficulty   Comments engages in appropriate conversations   Additional Activities   Additional Activities   (education on positioning and safety)   Additional Activities Comments pt receptive   Activity Tolerance   Activity Tolerance Patient limited by pain; Patient limited by fatigue;Treatment limited secondary to medical complications (Comment)   Medical Staff Made Aware appropriate to see per Grant S[place] Pt seen for skilled OT session focused on ADLs, bed mobility, functional transfers. Pt w/ increased encouragement to participate in session. Pt w/ MAX assist x1 rolling in bed and max assist to don shorts w/ max assist for hiking shorts up over hips w/ rolling. Pt w/ MAX assist x2 supine>sit bed mobility w/ increased time to complete. Pt w/ MAX assist x2 sit>stand w/ VCs for hand placement and positioning and pt unable to achieve upright positioning in stance. Pt on 2nd trial of standing sit>stand w/ VCs for hand placement and positioning w/ 2 minutes tolerance and mod assist x2 steadying support and MAX assist x2 stand>sit transfer to bed w/ cues for controlled descent. Pt on 3rd trial of standing MAX assist x2 w/ increased time to achieve upright and then improvement w/ standing balance to min assist w/ RW and increased standing tolerance to 12-15minutes. Pt w/ improvement in stand>sit transfer w/ MOD assist x2 and improved controlled descent. Pt w/ min assist simulated UB Dressing w/ assist w/ R UE due to pain and decreased AROM. Pt w/ MAX Assist x2 sit>supine bed mobility w/ increased time to complete. Pt upright in bed w/ all needs met. Pt continues to be limited due to increased pain in R UE, limited AROM R UE, impaired balance, impaired activity tolerance, decreased strength and endurance, decreased standing tolerance, decreased dynamic standing balance, all causing a decline in ADLs, functional transfers and mobility. Pt w/ improvement in standing tolerance and functional transfers and UB ADLs this session and is motivated for rehab. Recommend STR when medically stable. Will continue to follow. The patient's raw score on the AM-PAC Daily Activity Inpatient Short Form is 14. A raw score of less than 19 suggests the patient may benefit from discharge to post-acute rehabilitation services.  Please refer to the recommendation of the Occupational Therapist for safe discharge planning. Plan   Treatment Interventions ADL retraining;Functional transfer training;Cognitive reorientation;UE strengthening/ROM; Endurance training;Patient/family training;Equipment evaluation/education; Compensatory technique education; Energy conservation; Activityengagement   Goal Expiration Date 08/20/23   OT Treatment Day 3   OT Frequency 3-5x/wk   Recommendation   OT Discharge Recommendation Post acute rehabilitation services   AM-PAC Daily Activity Inpatient   Lower Body Dressing 1   Bathing 2   Toileting 2   Upper Body Dressing 3   Grooming 3   Eating 3   Daily Activity Raw Score 14   Daily Activity Standardized Score (Calc for Raw Score >=11) 33.39   AM-PAC Applied Cognition Inpatient   Following a Speech/Presentation 4   Understanding Ordinary Conversation 4   Taking Medications 4   Remembering Where Things Are Placed or Put Away 4   Remembering List of 4-5 Errands 4   Taking Care of Complicated Tasks 4   Applied Cognition Raw Score 24   Applied Cognition Standardized Score 62.21   Modified [place] Scale   Modified Manuel Scale 4   End of Consult   Education Provided Yes   Patient Position at End of Consult All needs within reach;Bed/Chair alarm activated;Supine   Nurse Communication Nurse aware of consult       Documentation completed by: Era Caraballo MS, OTR/L

## 2023-08-13 NOTE — PLAN OF CARE
Problem: PHYSICAL THERAPY ADULT  Goal: Performs mobility at highest level of function for planned discharge setting. See evaluation for individualized goals. Description: Treatment/Interventions: Functional transfer training, LE strengthening/ROM, Therapeutic exercise, Endurance training, Patient/family training, Equipment eval/education, Bed mobility, OT (sliding board when able to lateral scoot in bed.)          See flowsheet documentation for full assessment, interventions and recommendations. Outcome: Progressing  Note: Prognosis: Fair  Problem List: Decreased strength, Decreased range of motion, Decreased endurance, Impaired balance, Decreased mobility, Decreased skin integrity, Pain, Impaired tone, Impaired sensation  Assessment: Wood Timmons is a 70 y.o. male admitted to [address] [place] [address] on 8/4/2023 for Septic olecranon bursitis. Pt seen today for PT treatment session to improve functional mobility and progress toward stated goals. Pt demonstrates improvements this session, with improved standing tolerance. Pt requiring max Ax2 for LE and trunk assistance for sit>supine and supine> sit transfers. Attempted sit>stand 3 times, pt unable to achieve full stance first 2 times. PT given additional cuing for foot and hand placement and was able to achieve full stand. Tolerated standing for 10 minutes with RW. Progressed from max Ax2 to min Ax2 for static standing balance with AD. Despite improvements, pt still demonstrates need for physical assist, impaired seated/standing balance, decreased strength and activity intolerance. Pt will continue to benefit from skilled IP PT to address stated impairments and  in order to maximize recovery and increase functional independence when completing mobility and ADLs. PT recommendations for d/c remain post acute rehab services. Barriers to Discharge: None     PT Discharge Recommendation: Post acute rehabilitation services    See flowsheet documentation for full assessment.

## 2023-08-13 NOTE — PHYSICAL THERAPY NOTE
PHYSICAL THERAPY NOTE          Patient Name: Glen CUMMINGS Date: 8/13/2023 08/13/23 1402   PT Last Visit   PT Visit Date 08/13/23   Pain Assessment   Pain Assessment Tool FLACC   Pain Location/Orientation Orientation: Bilateral;Location: Leg   Hospital Pain Intervention(s) Ambulation/increased activity; Environmental changes; Declines   Pain Rating: FLACC (Rest) - Face 0   Pain Rating: FLACC (Rest) - Legs 0   Pain Rating: FLACC (Rest) - Activity 0   Pain Rating: FLACC (Rest) - Cry 1   Pain Rating: FLACC (Rest) - Consolability 0   Score: FLACC (Rest) 1   Pain Rating: FLACC (Activity) - Face 1   Pain Rating: FLACC (Activity) - Legs 0   Pain Rating: FLACC (Activity) - Activity 0   Pain Rating: FLACC (Activity) - Cry 1   Pain Rating: FLACC (Activity) - Consolability 1   Score: FLACC (Activity) 3   Restrictions/Precautions   Weight Bearing Precautions Per Order Yes   RUE Weight Bearing Per Order WBAT   Other Precautions Pain; Fall Risk   General   Family/Caregiver Present Yes   Cognition   Overall Cognitive Status WFL   Attention Within functional limits   Orientation Level Oriented X4   Memory Within functional limits   Following Commands Follows all commands and directions without difficulty   Subjective   Subjective "My legs feel stronger the more I stand."   Bed Mobility   Rolling R 2  Maximal assistance   Additional items Assist x 1;HOB elevated; Increased time required; Bedrails   Rolling L 2  Maximal assistance   Additional items Increased time required; Bedrails;HOB elevated;Assist x 1   Supine to Sit 2  Maximal assistance   Additional items Assist x 2;LE management; Bedrails;HOB elevated; Increased time required   Sit to Supine 2  Maximal assistance   Additional items Assist x 2; Increased time required;LE management;HOB elevated; Bedrails   Additional Comments Assist for trunk and LEs   Transfers   Sit to Stand 2  Maximal assistance   Additional items Assist x 2; Increased time required; Bedrails; Other  (RW)   Stand to Sit 2  Maximal assistance   Additional items Assist x 2; Increased time required;Verbal cues; Bedrails   Additional Comments VCs for hand placement, foot placement and upright positioning   Balance   Static Sitting Fair   Dynamic Sitting Poor +   Static Standing Poor -  (progresing to poor+)   Endurance Deficit   Endurance Deficit Yes   Endurance Deficit Description gross weakness   Activity Tolerance   Activity Tolerance Patient limited by fatigue   Medical Staff Made Aware PT PIERO Navarro   Nurse Made Aware Keith Babin RN   Assessment   Prognosis Fair   Problem List Decreased strength;Decreased range of motion;Decreased endurance; Impaired balance;Decreased mobility; Decreased skin integrity;Pain; Impaired tone; Impaired sensation   Assessment Merlin Marseilles is a 70 y.o. male admitted to [address] [place] [address] on 8/4/2023 for Septic olecranon bursitis. Pt seen today for PT treatment session to improve functional mobility and progress toward stated goals. Pt demonstrates improvements this session, with improved standing tolerance. Pt requiring max Ax2 for LE and trunk assistance for sit>supine and supine> sit transfers. Attempted sit>stand 3 times, pt unable to achieve full stance first 2 times. PT given additional cuing for foot and hand placement and was able to achieve full stand. Tolerated standing for 10 minutes with RW. Progressed from max Ax2 to min Ax2 for static standing balance with AD. Despite improvements, pt still demonstrates need for physical assist, impaired seated/standing balance, decreased strength and activity intolerance. Pt will continue to benefit from skilled IP PT to address stated impairments and  in order to maximize recovery and increase functional independence when completing mobility and ADLs. PT recommendations for d/c remain post acute rehab services.    Barriers to Discharge None   Goals   Patient Goals to be able to walk   STG Expiration Date 08/20/23   Short Term Goal #1 (S)  (1) Pt will perform bed mobility with min Ax2 demonstrating appropriate form 100% of the time to promote improved functional mobility. (2) Perform all transfers with min Ax2 and appropriate technique 100% of the time to promote ability to safely negotiate home environment. (3) Ambulate at least 48' with LRAD and min Ax2 to promote safe negotiation of home environment. (4) Improve overall strength and balance 1/2 grade to optimize ability to perform functional mobility, ADLs, IADLs and demonstrate reduced fall risk. (5) Increase activity tolerance to 45 minutes to promote endurance for completion of functional tasks. (6) Continue PT for ongoing pt/family education, DME needs, and d/c planning to promote highest level of function in least restricted environment. PT Treatment Day 3   Plan   Treatment/Interventions Functional transfer training;LE strengthening/ROM; Therapeutic exercise; Endurance training;Patient/family training;Bed mobility;Gait training; Compensatory technique education;Spoke to nursing;OT   PT Frequency 4-6x/wk   Recommendation   PT Discharge Recommendation Post acute rehabilitation services   AM-PAC Basic Mobility Inpatient   Turning in Flat Bed Without Bedrails 2   Lying on Back to Sitting on Edge of Flat Bed Without Bedrails 1   Moving Bed to Chair 1   Standing Up From Chair Using Arms 1   Walk in Room 1   Climb 3-5 Stairs With Railing 1   Basic Mobility Inpatient Raw Score 7   Turning Head Towards Sound 4   Follow Simple Instructions 4   Low Function Basic Mobility Raw Score  15   Low Function Basic Mobility Standardized Score  23.9   Highest Level Of Mobility   JH-HLM Goal 2: Bed activities/Dependent transfer   JH-HLM Achieved 5: Stand (1 or more minutes)   Exercises   Balance training  Standing 10 min, Max x2 progressing to CGA for static standing   End of Consult   Patient Position at End of Consult Supine; All needs within reach   End of Consult Comments caregiver present     Lavern Aden, SPT

## 2023-08-13 NOTE — ASSESSMENT & PLAN NOTE
Follows with neurology as an outpatient  · Uses wheelchair mostly for ambulation  · PT/OT nigel recommending rehab  · Outpatient follow-up with neurology  · Completed steroid taper recommended by neurology during hospitalization

## 2023-08-14 LAB
ANION GAP SERPL CALCULATED.3IONS-SCNC: 3 MMOL/L
BACTERIA TISS AEROBE CULT: ABNORMAL
BACTERIA TISS AEROBE CULT: NO GROWTH
BACTERIA TISS AEROBE CULT: NO GROWTH
BUN SERPL-MCNC: 14 MG/DL (ref 5–25)
CALCIUM SERPL-MCNC: 8.6 MG/DL (ref 8.4–10.2)
CHLORIDE SERPL-SCNC: 98 MMOL/L (ref 96–108)
CO2 SERPL-SCNC: 34 MMOL/L (ref 21–32)
CREAT SERPL-MCNC: 0.64 MG/DL (ref 0.6–1.3)
ERYTHROCYTE [DISTWIDTH] IN BLOOD BY AUTOMATED COUNT: 12.8 % (ref 11.6–15.1)
GFR SERPL CREATININE-BSD FRML MDRD: 102 ML/MIN/1.73SQ M
GLUCOSE SERPL-MCNC: 90 MG/DL (ref 65–140)
GRAM STN SPEC: ABNORMAL
GRAM STN SPEC: ABNORMAL
GRAM STN SPEC: NORMAL
HCT VFR BLD AUTO: 46 % (ref 36.5–49.3)
HGB BLD-MCNC: 15.1 G/DL (ref 12–17)
MCH RBC QN AUTO: 31.4 PG (ref 26.8–34.3)
MCHC RBC AUTO-ENTMCNC: 32.8 G/DL (ref 31.4–37.4)
MCV RBC AUTO: 96 FL (ref 82–98)
PLATELET # BLD AUTO: 130 THOUSANDS/UL (ref 149–390)
PMV BLD AUTO: 9.9 FL (ref 8.9–12.7)
POTASSIUM SERPL-SCNC: 4.4 MMOL/L (ref 3.5–5.3)
RBC # BLD AUTO: 4.81 MILLION/UL (ref 3.88–5.62)
SODIUM SERPL-SCNC: 135 MMOL/L (ref 135–147)
WBC # BLD AUTO: 6.59 THOUSAND/UL (ref 4.31–10.16)

## 2023-08-14 PROCEDURE — 99232 SBSQ HOSP IP/OBS MODERATE 35: CPT

## 2023-08-14 PROCEDURE — 80048 BASIC METABOLIC PNL TOTAL CA: CPT

## 2023-08-14 PROCEDURE — 99024 POSTOP FOLLOW-UP VISIT: CPT | Performed by: PHYSICIAN ASSISTANT

## 2023-08-14 PROCEDURE — 85027 COMPLETE CBC AUTOMATED: CPT

## 2023-08-14 PROCEDURE — 99232 SBSQ HOSP IP/OBS MODERATE 35: CPT | Performed by: STUDENT IN AN ORGANIZED HEALTH CARE EDUCATION/TRAINING PROGRAM

## 2023-08-14 RX ADMIN — DOCUSATE SODIUM 100 MG: 100 CAPSULE, LIQUID FILLED ORAL at 08:58

## 2023-08-14 RX ADMIN — Medication 3 MG: at 20:37

## 2023-08-14 RX ADMIN — OXYCODONE HYDROCHLORIDE 10 MG: 10 TABLET ORAL at 11:49

## 2023-08-14 RX ADMIN — DOCUSATE SODIUM 100 MG: 100 CAPSULE, LIQUID FILLED ORAL at 17:37

## 2023-08-14 RX ADMIN — CEFAZOLIN SODIUM 2000 MG: 2 SOLUTION INTRAVENOUS at 04:00

## 2023-08-14 RX ADMIN — OXYCODONE HYDROCHLORIDE 10 MG: 10 TABLET ORAL at 05:56

## 2023-08-14 RX ADMIN — OXYCODONE HYDROCHLORIDE 5 MG: 5 TABLET ORAL at 23:58

## 2023-08-14 RX ADMIN — CEFAZOLIN SODIUM 2000 MG: 2 SOLUTION INTRAVENOUS at 11:44

## 2023-08-14 RX ADMIN — OXYCODONE HYDROCHLORIDE 10 MG: 10 TABLET ORAL at 17:37

## 2023-08-14 RX ADMIN — OXYCODONE HYDROCHLORIDE 10 MG: 10 TABLET ORAL at 00:01

## 2023-08-14 RX ADMIN — CEFAZOLIN SODIUM 2000 MG: 2 SOLUTION INTRAVENOUS at 20:37

## 2023-08-14 RX ADMIN — ENOXAPARIN SODIUM 40 MG: 100 INJECTION SUBCUTANEOUS at 08:58

## 2023-08-14 NOTE — PROGRESS NOTES
Nubia Hawk 70 y.o. male MRN: [id number]  Unit/Bed#: E2 -01 Encounter: [id number]      Subjective:  Travis العلي was seen and evaluated bedside today. He is resting comfortably at this time. He has mild pain that is well controlled. He reports drainage on his dressing as well as movement of his dressing. Denies fever or chills. He feels he is overall improving.     Labs:  0   Lab Value Date/Time    HCT 46.0 08/14/2023 0519    HCT 48.0 08/13/2023 0431    HCT 48.0 08/12/2023 0459    HGB 15.1 08/14/2023 0519    HGB 15.3 08/13/2023 0431    HGB 15.4 08/12/2023 0459    WBC 6.59 08/14/2023 0519    WBC 6.74 08/13/2023 0431    WBC 7.10 08/12/2023 0459    CRP 23.2 (H) 08/01/2023 0938       Meds:    Current Facility-Administered Medications:   •  acetaminophen (TYLENOL) tablet 650 mg, 650 mg, Oral, Q6H PRN, Karen Onofre PA-C  •  aluminum-magnesium hydroxide-simethicone (MAALOX) oral suspension 30 mL, 30 mL, Oral, Q6H PRN, Karen Onofre PA-C  •  bisacodyl (DULCOLAX) rectal suppository 10 mg, 10 mg, Rectal, Daily PRN, Karen Onofre PA-C, 10 mg at 08/11/23 1459  •  ceFAZolin (ANCEF) IVPB (premix in dextrose) 2,000 mg 50 mL, 2,000 mg, Intravenous, Q8H, Karen Onofre PA-C, Last Rate: 100 mL/hr at 08/14/23 1144, 2,000 mg at 08/14/23 1144  •  docusate sodium (COLACE) capsule 100 mg, 100 mg, Oral, BID, Karen Onofre PA-C, 100 mg at 08/14/23 1996  •  enoxaparin (LOVENOX) subcutaneous injection 40 mg, 40 mg, Subcutaneous, Daily, Karen Onofre PA-C, 40 mg at 08/14/23 8541  •  melatonin tablet 3 mg, 3 mg, Oral, HS, Karen Onofre PA-C, 3 mg at 08/13/23 2028  •  ondansetron [facility]) injection 4 mg, 4 mg, Intravenous, Q6H PRN, Karen Onofre PA-C  •  oxyCODONE (ROXICODONE) IR tablet 5 mg, 5 mg, Oral, Q6H PRN, 5 mg at 08/09/23 0124 **OR** oxyCODONE (ROXICODONE) immediate release tablet 10 mg, 10 mg, Oral, Q6H PRN, Karen Onofre PA-C, 10 mg at 08/14/23 1149    Physical exam:  Vitals:    08/14/23 0705   BP: 156/70   Pulse: (!) 53   Resp: 18   Temp: 97.6 °F (36.4 °C)   SpO2: 96%     Right elbow:  · Dressings with mild serosanguinous drainage, no purulence. No drainage from incision with palpation of the bursa. · Incision is clean and dry, however his kerlix is unwrapped and moved around the arm. · +Swelling of the olecranon bursa  · Mild diffuse generalized swelling about the elbow. No significant erythema  · Near full elbow ROM without pain  · Sensation intact median, ulnar, and radial nerves. · 2+ radia pulse    Assessment: 70 y. o.male POD 4 s/p I&D right elbow olecranon bursa for septic olecranon bursitis    Plan:  · Pain Control  · WBAT RUE  · PT/OT  · Abx per ID  · Medical management per primary  · Dressing changes as needed, nursing advised to change dressing after this morning's visit. · WBC stable, afebrile, improving pain. · Stable for d/c from orthopedic standpoint. Follow up with Dr. Terri Pastrana as outpt.       Edie Montilla PA-C

## 2023-08-14 NOTE — PROGRESS NOTES
Progress Note - Infectious Disease   Emiliana Jack 70 y.o. male MRN: [id number]  Unit/Bed#: E2 -01 Encounter: [id number]      Impression/Plan:    1. Right elbow septic olecranon bursitis and cellulitis. There are no obvious wounds on the right elbow, but the patient frequently has irritation of the elbow since he supports his weight with it, so suspect infection due to repeated microtrauma causing translocation of skin bacteria into the bursa. No evidence of septic arthritis at the time. The patient has no history of MRSA and recent urine cultures show growth of MSSA but blood cultures show no growth. Leukocytosis has improved and the patient is afebrile. 10 cc of bloody, serous fluid aspirated from the right bursa. Aspirate culture growing MSSA. Now status post right bursa I&D 8/10/2023 with significant purulent fluid evacuated. Wound cultures only growing coagulase-negative staph in broth only which is likely skin colonization              -Continue IV cefazolin 2 g every 8 hours while inpatient   -Okay for discharge from ID perspective on p.o. Keflex 1 g every 6 hours to complete a 7-day postop course of antibiotics through 8/16/2023              -Appreciate orthopedic surgery follow-up               -Monitor clinical exam     2.  Leukocytosis, POA. White blood cell count 14.51 on admission. The patient is afebrile and hemodynamically stable. Due to #1 above. Blood cultures show no growth to date despite growth of Staph aureus in urine culture. WBC count normalized              -Antibiotics as above              -Monitor exam              -Monitor CBC with differential, fever curve     3. BPH, urinary retention. Beard catheter placed on admission, now removed. Recommend urology follow-up. Monitor for recurrent retention     4.  Pyuria, bacteriuria. The patient reports urinary frequency and weak urinary stream.  I suspect his symptoms are more due to BPH than infection.   Urine culture recently grew MSSA; when staph aureus is seen in the urine this is often related to bacteremia, but blood cultures show no growth              -Urinary retention protocol              -Outpatient urology follow-up     5.  Quadriparesis, ambulatory dysfunction due to cervical myelomalacia. Follows with neurology outpatient and is on a steroid taper              -Steroid taper per primary team              -Recommend PT/OT to help the patient reduce trauma to the right elbow in the future     I have discussed the above management plan in detail with the primary service, patient. Rustam for discharge from ID perspective. Antibiotics:  Cefazolin day 8  Abx day 10    Subjective: The patient reports improvement in his right elbow pain and swelling after I&D, although there is some swelling present. He denies any fever or chills. Patient is awaiting rehab placement. Objective:  Vitals:  Temp:  [97.2 °F (36.2 °C)-98.1 °F (36.7 °C)] 97.2 °F (36.2 °C)  HR:  [53-75] 75  Resp:  [18-20] 20  BP: (139-156)/(70-90) 147/90  SpO2:  [94 %-97 %] 94 %  Temp (24hrs), Av.6 °F (36.4 °C), Min:97.2 °F (36.2 °C), Max:98.1 °F (36.7 °C)  Current: Temperature: (!) 97.2 °F (36.2 °C)    Physical Exam:   General Appearance:  Alert, interactive, nontoxic, no acute distress. Throat: Oropharynx moist without lesions. Lungs:   Clear to auscultation bilaterally; no wheezes, rhonchi or rales; respirations unlabored   Heart:  RRR; no murmur, rub or gallop   Abdomen:   Soft, non-tender, non-distended, positive bowel sounds. Extremities: Right elbow olecranon bursa swelling, decreased erythema. No swelling of the elbow joint itself. Skin: No new rashes or lesions. No draining wounds noted. Labs:    All pertinent labs and imaging studies were personally reviewed  Results from last 7 days   Lab Units 23  0519 23  0431 23  0459   WBC Thousand/uL 6.59 6.74 7.10   HEMOGLOBIN g/dL 15.1 15.3 15.4   PLATELETS Thousands/uL 130* 139* 137*     Results from last 7 days   Lab Units 08/14/23  0519 08/13/23  0431 08/11/23  1259   SODIUM mmol/L 135 134* 133*   POTASSIUM mmol/L 4.4 4.1 5.5*   CHLORIDE mmol/L 98 98 96   CO2 mmol/L 34* 33* 31   BUN mg/dL 14 17 20   CREATININE mg/dL 0.64 0.64 0.68   EGFR ml/min/1.73sq m 102 102 100   CALCIUM mg/dL 8.6 8.3* 8.5                       Micro:  Results from last 7 days   Lab Units 08/10/23  1611 08/10/23  1609 08/10/23  1539   GRAM STAIN RESULT  Rare Polys  No bacteria seen No Polys or Bacteria seen 1+ Polys  No bacteria seen       Imaging:  I have personally reviewed pertinent imaging study reports and images in PACS.     CT right arm: Olecranon bursitis and adjacent edema/cellulitis

## 2023-08-14 NOTE — ASSESSMENT & PLAN NOTE
Erythema and swelling of right upper extremity involving elbow  · CT shows bursitis  · Concern for septic arthritis given warm erythematous and swollen elbow joint  · History of immunosuppression  · Blood cultures negative at 5 days  · Infectious disease consulted,continue IV cefazolin while inpatient, ID recommending transition to Keflex 1 g every 6 hours through 8/16/23 at discharge   · Orthopedics evaluated patient  · Bedside aspiration performed 8/7/23  · Bedside I&D performed 8/9/23  · Cultures resulting in staphylococcus aureus  · POD 4 debridement in OR

## 2023-08-14 NOTE — PROGRESS NOTES
[facility]  Progress Note  Name: Consuelo Reed  MRN: [id number]  Unit/Bed#: E2 -01 I Date of Admission: 8/4/2023   Date of Service: 8/14/2023 I Hospital Day: 10    Assessment/Plan   * Septic olecranon bursitis  Assessment & Plan  Erythema and swelling of right upper extremity involving elbow  · CT shows bursitis  · Concern for septic arthritis given warm erythematous and swollen elbow joint  · History of immunosuppression  · Blood cultures negative at 5 days  · Infectious disease consulted,continue IV cefazolin while inpatient, ID recommending transition to Keflex 1 g every 6 hours through 8/16/23 at discharge   · Orthopedics evaluated patient  · Bedside aspiration performed 8/7/23  · Bedside I&D performed 8/9/23  · Cultures resulting in staphylococcus aureus  · POD 4 debridement in OR     Ambulatory dysfunction  Assessment & Plan  Secondary to chronic neurologic issue  · Patient reports progressive weakness is now likely worsened in setting of acute infection  · PT/OT recommending rehab    Thrombophlebitis arm  Assessment & Plan  · Patient reported increased swelling in left upper extremity   · VAS venous duplex showed occlusive superficial thrombophlebitis in the basilic vein   · Supportive treatment     Myelomalacia of cervical cord [facility])  Assessment & Plan  Follows with neurology as an outpatient  · Uses wheelchair mostly for ambulation  · PT/OT eval recommending rehab  · Outpatient follow-up with neurology  · Completed steroid taper recommended by neurology during hospitalization    Acute cystitis without hematuria  Assessment & Plan  Outpatient urine culture growing greater than 100,000 CFU's of Staph aureus  · Repeat urine culture growing 60,000 CFU Staph aureus  · ID consulted   · Switched to IV cefazolin, day 9    BPH associated with nocturia  Assessment & Plan  Beard catheter placed on admission due to urinary retention  · Beard was removed, voiding well   · Monitor for retention      VTE Pharmacologic Prophylaxis: VTE Score: 4 Moderate Risk (Score 3-4) - Pharmacological DVT Prophylaxis Ordered: enoxaparin (Lovenox). Patient Centered Rounds: I performed bedside rounds with nursing staff today. Discussions with Specialists or Other Care Team Provider: JANE    Education and Discussions with Family / Patient: caretaker present at bedside . Total Time Spent on Date of Encounter in care of patient: 45 minutes This time was spent on one or more of the following: performing physical exam; counseling and coordination of care; obtaining or reviewing history; documenting in the medical record; reviewing/ordering tests, medications or procedures; communicating with other healthcare professionals and discussing with patient's family/caregivers. Current Length of Stay: 10 day(s)  Current Patient Status: Inpatient   Certification Statement: The patient will continue to require additional inpatient hospital stay due to pending placement in rehba  Discharge Plan: Anticipate discharge in 24-48 hrs to rehab facility. Code Status: Level 1 - Full Code    Subjective:   Patient was seen laying in bed today. He reports pain in right arm continues to improve. Swelling in left forearm is improving. He reports constipation. He denies any other acute complaints. Objective:     Vitals:   Temp (24hrs), Av.9 °F (36.6 °C), Min:97.6 °F (36.4 °C), Max:98.1 °F (36.7 °C)    Temp:  [97.6 °F (36.4 °C)-98.1 °F (36.7 °C)] 97.6 °F (36.4 °C)  HR:  [53-62] 53  Resp:  [18-20] 18  BP: (139-156)/(70-88) 156/70  SpO2:  [96 %-97 %] 96 %  Body mass index is 29.84 kg/m². Input and Output Summary (last 24 hours): Intake/Output Summary (Last 24 hours) at 2023 1051  Last data filed at 2023 0924  Gross per 24 hour   Intake 240 ml   Output 975 ml   Net -735 ml       Physical Exam:   Physical Exam  Vitals and nursing note reviewed. Constitutional:       Appearance: Normal appearance.    HENT: Head: Normocephalic and atraumatic. Eyes:      General: No scleral icterus. Cardiovascular:      Rate and Rhythm: Normal rate and regular rhythm. Pulmonary:      Effort: Pulmonary effort is normal.      Breath sounds: Normal breath sounds. Abdominal:      General: Abdomen is flat. Bowel sounds are normal.      Palpations: Abdomen is soft. Tenderness: There is no abdominal tenderness. Musculoskeletal:      Right lower leg: No edema. Left lower leg: No edema. Skin:     General: Skin is warm and dry. Comments: Right arm in gauze bandage. Left arm with swelling in forearm   Neurological:      Mental Status: He is alert. Mental status is at baseline. Psychiatric:         Mood and Affect: Mood normal.         Behavior: Behavior normal.          Additional Data:     Labs:  Results from last 7 days   Lab Units 08/14/23  0519   WBC Thousand/uL 6.59   HEMOGLOBIN g/dL 15.1   HEMATOCRIT % 46.0   PLATELETS Thousands/uL 130*     Results from last 7 days   Lab Units 08/14/23  0519   SODIUM mmol/L 135   POTASSIUM mmol/L 4.4   CHLORIDE mmol/L 98   CO2 mmol/L 34*   BUN mg/dL 14   CREATININE mg/dL 0.64   ANION GAP mmol/L 3   CALCIUM mg/dL 8.6   GLUCOSE RANDOM mg/dL 90                       Lines/Drains:  Invasive Devices     Peripheral Intravenous Line  Duration           Long-Dwell Peripheral IV (Midline) 47/60/21 Left Cephalic Vein 2 days                      Imaging: No pertinent imaging reviewed.     Recent Cultures (last 7 days):   Results from last 7 days   Lab Units 08/10/23  1611 08/10/23  1609 08/10/23  1539 08/07/23  1246   GRAM STAIN RESULT  Rare Polys  No bacteria seen No Polys or Bacteria seen 1+ Polys  No bacteria seen 4+ Polys  No bacteria seen   BODY FLUID CULTURE, STERILE   --   --   --  2 colonies Staphylococcus aureus*       Last 24 Hours Medication List:   Current Facility-Administered Medications   Medication Dose Route Frequency Provider Last Rate   • acetaminophen  650 mg Oral Q6H PRN Linda Lal PA-C     • aluminum-magnesium hydroxide-simethicone  30 mL Oral Q6H PRN Linda Lal PA-C     • bisacodyl  10 mg Rectal Daily PRN Linda Lal PA-C     • cefazolin  2,000 mg Intravenous Q8H Linda Lal PA-C 2,000 mg (08/14/23 0400)   • docusate sodium  100 mg Oral BID Linda Lal PA-C     • enoxaparin  40 mg Subcutaneous Daily Linda Lal PA-C     • melatonin  3 mg Oral HS Linda Lal PA-C     • ondansetron  4 mg Intravenous Q6H PRN Linda Lal PA-C     • oxyCODONE  5 mg Oral Q6H PRN Linda Lal PA-C      Or   • oxyCODONE  10 mg Oral Q6H PRN Linda Lal PA-C          Today, Patient Was Seen By: Tobi Munoz PA-C    **Please Note: This note may have been constructed using a voice recognition system. **

## 2023-08-14 NOTE — ASSESSMENT & PLAN NOTE
Outpatient urine culture growing greater than 100,000 CFU's of Staph aureus  · Repeat urine culture growing 60,000 CFU Staph aureus  · ID consulted   · Switched to IV cefazolin, day 9

## 2023-08-14 NOTE — PLAN OF CARE
Problem: Potential for Falls  Goal: Patient will remain free of falls  Description: INTERVENTIONS:  - Educate patient/family on patient safety including physical limitations  - Instruct patient to call for assistance with activity   - Consult OT/PT to assist with strengthening/mobility   - Keep Call bell within reach  - Keep bed low and locked with side rails adjusted as appropriate  - Keep care items and personal belongings within reach  - Initiate and maintain comfort rounds  - Make Fall Risk Sign visible to staff  - Offer Toileting every 2 Hours, in advance of need  - Initiate/Maintain bed alarm  - Obtain necessary fall risk management equipment  - Apply yellow socks and bracelet for high fall risk patients  - Consider moving patient to room near nurses station  Outcome: Progressing     Problem: MOBILITY - ADULT  Goal: Maintain or return to baseline ADL function  Description: INTERVENTIONS:  -  Assess patient's ability to carry out ADLs; assess patient's baseline for ADL function and identify physical deficits which impact ability to perform ADLs (bathing, care of mouth/teeth, toileting, grooming, dressing, etc.)  - Assess/evaluate cause of self-care deficits   - Assess range of motion  - Assess patient's mobility; develop plan if impaired  - Assess patient's need for assistive devices and provide as appropriate  - Encourage maximum independence but intervene and supervise when necessary  - Involve family in performance of ADLs  - Assess for home care needs following discharge   - Consider OT consult to assist with ADL evaluation and planning for discharge  - Provide patient education as appropriate  Outcome: Progressing  Goal: Maintains/Returns to pre admission functional level  Description: INTERVENTIONS:  - Perform BMAT or MOVE assessment daily.   - Set and communicate daily mobility goal to care team and patient/family/caregiver.    - Collaborate with rehabilitation services on mobility goals if consulted  - Perform Range of Motion 2 times a day. - Reposition patient every 2 hours.   - Dangle patient 2 times a day  - Stand patient 2 times a day  - Ambulate patient 2 times a day  - Out of bed to chair 2 times a day   - Out of bed for meals 2 times a day  - Out of bed for toileting  - Record patient progress and toleration of activity level   Outcome: Progressing     Problem: PAIN - ADULT  Goal: Verbalizes/displays adequate comfort level or baseline comfort level  Description: Interventions:  - Encourage patient to monitor pain and request assistance  - Assess pain using appropriate pain scale  - Administer analgesics based on type and severity of pain and evaluate response  - Implement non-pharmacological measures as appropriate and evaluate response  - Consider cultural and social influences on pain and pain management  - Notify physician/advanced practitioner if interventions unsuccessful or patient reports new pain  Outcome: Progressing     Problem: INFECTION - ADULT  Goal: Absence or prevention of progression during hospitalization  Description: INTERVENTIONS:  - Assess and monitor for signs and symptoms of infection  - Monitor lab/diagnostic results  - Monitor all insertion sites, i.e. indwelling lines, tubes, and drains  - Monitor endotracheal if appropriate and nasal secretions for changes in amount and color  - [place] appropriate cooling/warming therapies per order  - Administer medications as ordered  - Instruct and encourage patient and family to use good hand hygiene technique  - Identify and instruct in appropriate isolation precautions for identified infection/condition  Outcome: Progressing  Goal: Absence of fever/infection during neutropenic period  Description: INTERVENTIONS:  - Monitor WBC    Outcome: Progressing     Problem: SAFETY ADULT  Goal: Patient will remain free of falls  Description: INTERVENTIONS:  - Educate patient/family on patient safety including physical limitations  - Instruct patient to call for assistance with activity   - Consult OT/PT to assist with strengthening/mobility   - Keep Call bell within reach  - Keep bed low and locked with side rails adjusted as appropriate  - Keep care items and personal belongings within reach  - Initiate and maintain comfort rounds  - Make Fall Risk Sign visible to staff  - Offer Toileting every 2 Hours, in advance of need  - Initiate/Maintain bed alarm  - Obtain necessary fall risk management equipment  - Apply yellow socks and bracelet for high fall risk patients  - Consider moving patient to room near nurses station  Outcome: Progressing  Goal: Maintain or return to baseline ADL function  Description: INTERVENTIONS:  -  Assess patient's ability to carry out ADLs; assess patient's baseline for ADL function and identify physical deficits which impact ability to perform ADLs (bathing, care of mouth/teeth, toileting, grooming, dressing, etc.)  - Assess/evaluate cause of self-care deficits   - Assess range of motion  - Assess patient's mobility; develop plan if impaired  - Assess patient's need for assistive devices and provide as appropriate  - Encourage maximum independence but intervene and supervise when necessary  - Involve family in performance of ADLs  - Assess for home care needs following discharge   - Consider OT consult to assist with ADL evaluation and planning for discharge  - Provide patient education as appropriate  Outcome: Progressing  Goal: Maintains/Returns to pre admission functional level  Description: INTERVENTIONS:  - Perform BMAT or MOVE assessment daily.   - Set and communicate daily mobility goal to care team and patient/family/caregiver. - Collaborate with rehabilitation services on mobility goals if consulted  - Perform Range of Motion 2 times a day. - Reposition patient every 2 hours.   - Dangle patient 2 times a day  - Stand patient 2 times a day  - Ambulate patient 2 times a day  - Out of bed to chair 2 times a day   - Out of bed for meals 2 times a day  - Out of bed for toileting  - Record patient progress and toleration of activity level   Outcome: Progressing     Problem: DISCHARGE PLANNING  Goal: Discharge to home or other facility with appropriate resources  Description: INTERVENTIONS:  - Identify barriers to discharge w/patient and caregiver  - Arrange for needed discharge resources and transportation as appropriate  - Identify discharge learning needs (meds, wound care, etc.)  - Arrange for interpretive services to assist at discharge as needed  - Refer to Case Management Department for coordinating discharge planning if the patient needs post-hospital services based on physician/advanced practitioner order or complex needs related to functional status, cognitive ability, or social support system  Outcome: Progressing     Problem: Knowledge Deficit  Goal: Patient/family/caregiver demonstrates understanding of disease process, treatment plan, medications, and discharge instructions  Description: Complete learning assessment and assess knowledge base.   Interventions:  - Provide teaching at level of understanding  - Provide teaching via preferred learning methods  Outcome: Progressing     Problem: Prexisting or High Potential for Compromised Skin Integrity  Goal: Skin integrity is maintained or improved  Description: INTERVENTIONS:  - Identify patients at risk for skin breakdown  - Assess and monitor skin integrity  - Assess and monitor nutrition and hydration status  - Monitor labs   - Assess for incontinence   - Turn and reposition patient  - Assist with mobility/ambulation  - Relieve pressure over bony prominences  - Avoid friction and shearing  - Provide appropriate hygiene as needed including keeping skin clean and dry  - Evaluate need for skin moisturizer/barrier cream  - Collaborate with interdisciplinary team   - Patient/family teaching  - Consider wound care consult   Outcome: Progressing

## 2023-08-15 VITALS
DIASTOLIC BLOOD PRESSURE: 69 MMHG | TEMPERATURE: 97.5 F | WEIGHT: 220.02 LBS | HEART RATE: 55 BPM | HEIGHT: 72 IN | OXYGEN SATURATION: 97 % | SYSTOLIC BLOOD PRESSURE: 130 MMHG | RESPIRATION RATE: 18 BRPM | BODY MASS INDEX: 29.8 KG/M2

## 2023-08-15 LAB
ANION GAP SERPL CALCULATED.3IONS-SCNC: 5 MMOL/L
BACTERIA SPEC ANAEROBE CULT: NORMAL
BUN SERPL-MCNC: 13 MG/DL (ref 5–25)
CALCIUM SERPL-MCNC: 8.4 MG/DL (ref 8.4–10.2)
CHLORIDE SERPL-SCNC: 97 MMOL/L (ref 96–108)
CO2 SERPL-SCNC: 33 MMOL/L (ref 21–32)
CREAT SERPL-MCNC: 0.67 MG/DL (ref 0.6–1.3)
ERYTHROCYTE [DISTWIDTH] IN BLOOD BY AUTOMATED COUNT: 12.7 % (ref 11.6–15.1)
GFR SERPL CREATININE-BSD FRML MDRD: 100 ML/MIN/1.73SQ M
GLUCOSE SERPL-MCNC: 85 MG/DL (ref 65–140)
HCT VFR BLD AUTO: 47.5 % (ref 36.5–49.3)
HGB BLD-MCNC: 15.2 G/DL (ref 12–17)
MCH RBC QN AUTO: 30.9 PG (ref 26.8–34.3)
MCHC RBC AUTO-ENTMCNC: 32 G/DL (ref 31.4–37.4)
MCV RBC AUTO: 97 FL (ref 82–98)
PLATELET # BLD AUTO: 134 THOUSANDS/UL (ref 149–390)
PMV BLD AUTO: 9.7 FL (ref 8.9–12.7)
POTASSIUM SERPL-SCNC: 4.2 MMOL/L (ref 3.5–5.3)
RBC # BLD AUTO: 4.92 MILLION/UL (ref 3.88–5.62)
SODIUM SERPL-SCNC: 135 MMOL/L (ref 135–147)
WBC # BLD AUTO: 6.84 THOUSAND/UL (ref 4.31–10.16)

## 2023-08-15 PROCEDURE — 99024 POSTOP FOLLOW-UP VISIT: CPT | Performed by: PHYSICIAN ASSISTANT

## 2023-08-15 PROCEDURE — 99232 SBSQ HOSP IP/OBS MODERATE 35: CPT | Performed by: STUDENT IN AN ORGANIZED HEALTH CARE EDUCATION/TRAINING PROGRAM

## 2023-08-15 PROCEDURE — 99239 HOSP IP/OBS DSCHRG MGMT >30: CPT | Performed by: INTERNAL MEDICINE

## 2023-08-15 PROCEDURE — 85027 COMPLETE CBC AUTOMATED: CPT

## 2023-08-15 PROCEDURE — 80048 BASIC METABOLIC PNL TOTAL CA: CPT

## 2023-08-15 RX ORDER — CEPHALEXIN 500 MG/1
1000 CAPSULE ORAL EVERY 6 HOURS SCHEDULED
Qty: 16 CAPSULE | Refills: 0
Start: 2023-08-15 | End: 2023-08-17

## 2023-08-15 RX ORDER — DOCUSATE SODIUM 100 MG/1
100 CAPSULE, LIQUID FILLED ORAL 2 TIMES DAILY
Refills: 0
Start: 2023-08-15

## 2023-08-15 RX ORDER — OXYCODONE HYDROCHLORIDE 5 MG/1
TABLET ORAL
Qty: 30 TABLET | Refills: 0 | Status: SHIPPED | OUTPATIENT
Start: 2023-08-15

## 2023-08-15 RX ORDER — BISACODYL 10 MG
10 SUPPOSITORY, RECTAL RECTAL DAILY PRN
Qty: 12 SUPPOSITORY | Refills: 0
Start: 2023-08-15

## 2023-08-15 RX ADMIN — ENOXAPARIN SODIUM 40 MG: 100 INJECTION SUBCUTANEOUS at 09:08

## 2023-08-15 RX ADMIN — OXYCODONE HYDROCHLORIDE 10 MG: 10 TABLET ORAL at 12:12

## 2023-08-15 RX ADMIN — OXYCODONE HYDROCHLORIDE 5 MG: 5 TABLET ORAL at 06:34

## 2023-08-15 RX ADMIN — CEFAZOLIN SODIUM 2000 MG: 2 SOLUTION INTRAVENOUS at 04:07

## 2023-08-15 RX ADMIN — DOCUSATE SODIUM 100 MG: 100 CAPSULE, LIQUID FILLED ORAL at 09:08

## 2023-08-15 RX ADMIN — CEFAZOLIN SODIUM 2000 MG: 2 SOLUTION INTRAVENOUS at 12:13

## 2023-08-15 NOTE — CASE MANAGEMENT
Case Management Discharge Planning Note    Patient name Heber Knee  Location [facility] /E2 -* MRN [id number]  : 1960 Date 8/15/2023       Current Admission Date: 2023  Current Admission Diagnosis:Septic olecranon bursitis   Patient Active Problem List    Diagnosis Date Noted   • Thrombophlebitis arm 2023   • Septic olecranon bursitis 2023   • Arm swelling 2023   • Myelomalacia of cervical cord ([address]) 2022   • Quadriparesis ([address]) 10/19/2022   • Slow transit constipation 2022   • Weakness of right lower extremity 2022   • Ambulatory dysfunction 2022   • Status post lumbar spinal fusion 2021   • S/P insertion of spinal cord stimulator 06/10/2021   • Hydrocele of testis 2020   • Chest discomfort 2020   • Hx of colonoscopy 2019   • Cauda equina syndrome ([address]) 2018   • Spinal stenosis of thoracolumbar region 2018   • Acute cystitis without hematuria 2018   • Erectile dysfunction 2018   • Hypogonadism in male 2018   • BPH associated with nocturia 2018      LOS (days): 11  Geometric Mean LOS (GMLOS) (days): 4.50  Days to GMLOS:-6.2     OBJECTIVE:  Risk of Unplanned Readmission Score: 6.44         Current admission status: Inpatient   Preferred Pharmacy:   [address] [place] [address] #[id number] [address] [address]  Phone: [phone] Fax: [phone]    [place], [place] -  [facility] [address]. [facility] [address] [facility] [address]  Phone: [phone] Fax: [phone]    [address] [place] [address], [address] [address]  Phone: [phone] Fax: [phone]    Primary Care Provider: Ramiro Treadwell DO    Primary Insurance: BLUE CROSS  Secondary Insurance:     DISCHARGE DETAILS:    Discharge planning discussed with[de-identified] Patient  Freedom of Choice: Yes  Comments - Freedom of Choice: Pt agreeable to [facility]  CM contacted family/caregiver?: Yes (caregiver in room)  Were Treatment Team discharge recommendations reviewed with patient/caregiver?: Yes  Did patient/caregiver verbalize understanding of patient care needs?: N/A- going to facility  Were patient/caregiver advised of the risks associated with not following Treatment Team discharge recommendations?: Yes         Requested [address]         Is the patient interested in [address] [place] [address] East at discharge?: No    DME Referral Provided  Referral made for DME?: No    Other Referral/Resources/Interventions Provided:  Interventions: Acute Rehab         Treatment Team Recommendation: Acute Rehab  Discharge Destination Plan[de-identified] Acute Rehab  Transport at Discharge : BLS Ambulance        Transported by [facility] and Unit #):  [facility]  ETA of Transport (Date): 08/15/23  ETA of Transport (Time): 1300     Transfer Mode: Stretcher  Accompanied by: EMS personnel                   Accepting Facility Name, [address] : [address] [place] [address]

## 2023-08-15 NOTE — PLAN OF CARE
Problem: Potential for Falls  Goal: Patient will remain free of falls  Description: INTERVENTIONS:  - Educate patient/family on patient safety including physical limitations  - Instruct patient to call for assistance with activity   - Consult OT/PT to assist with strengthening/mobility   - Keep Call bell within reach  - Keep bed low and locked with side rails adjusted as appropriate  - Keep care items and personal belongings within reach  - Initiate and maintain comfort rounds  - Make Fall Risk Sign visible to staff  - Offer Toileting every 2 Hours, in advance of need  - Initiate/Maintain bed alarm  - Obtain necessary fall risk management equipment:   - Apply yellow socks and bracelet for high fall risk patients  - Consider moving patient to room near nurses station  Outcome: Progressing     Problem: MOBILITY - ADULT  Goal: Maintain or return to baseline ADL function  Description: INTERVENTIONS:  -  Assess patient's ability to carry out ADLs; assess patient's baseline for ADL function and identify physical deficits which impact ability to perform ADLs (bathing, care of mouth/teeth, toileting, grooming, dressing, etc.)  - Assess/evaluate cause of self-care deficits   - Assess range of motion  - Assess patient's mobility; develop plan if impaired  - Assess patient's need for assistive devices and provide as appropriate  - Encourage maximum independence but intervene and supervise when necessary  - Involve family in performance of ADLs  - Assess for home care needs following discharge   - Consider OT consult to assist with ADL evaluation and planning for discharge  - Provide patient education as appropriate  Outcome: Progressing  Goal: Maintains/Returns to pre admission functional level  Description: INTERVENTIONS:  - Perform BMAT or MOVE assessment daily.   - Set and communicate daily mobility goal to care team and patient/family/caregiver.    - Collaborate with rehabilitation services on mobility goals if consulted  - Perform Range of Motion 3 times a day. - Reposition patient every 2 hours.   - Dangle patient 3 times a day  - Stand patient 3 times a day  - Ambulate patient 3 times a day  - Out of bed to chair 3 times a day   - Out of bed for meals 3 times a day  - Out of bed for toileting  - Record patient progress and toleration of activity level   Outcome: Progressing     Problem: PAIN - ADULT  Goal: Verbalizes/displays adequate comfort level or baseline comfort level  Description: Interventions:  - Encourage patient to monitor pain and request assistance  - Assess pain using appropriate pain scale  - Administer analgesics based on type and severity of pain and evaluate response  - Implement non-pharmacological measures as appropriate and evaluate response  - Consider cultural and social influences on pain and pain management  - Notify physician/advanced practitioner if interventions unsuccessful or patient reports new pain  Outcome: Progressing     Problem: INFECTION - ADULT  Goal: Absence or prevention of progression during hospitalization  Description: INTERVENTIONS:  - Assess and monitor for signs and symptoms of infection  - Monitor lab/diagnostic results  - Monitor all insertion sites, i.e. indwelling lines, tubes, and drains  - Monitor endotracheal if appropriate and nasal secretions for changes in amount and color  - [place] appropriate cooling/warming therapies per order  - Administer medications as ordered  - Instruct and encourage patient and family to use good hand hygiene technique  - Identify and instruct in appropriate isolation precautions for identified infection/condition  Outcome: Progressing  Goal: Absence of fever/infection during neutropenic period  Description: INTERVENTIONS:  - Monitor WBC    Outcome: Progressing     Problem: SAFETY ADULT  Goal: Patient will remain free of falls  Description: INTERVENTIONS:  - Educate patient/family on patient safety including physical limitations  - Instruct patient to call for assistance with activity   - Consult OT/PT to assist with strengthening/mobility   - Keep Call bell within reach  - Keep bed low and locked with side rails adjusted as appropriate  - Keep care items and personal belongings within reach  - Initiate and maintain comfort rounds  - Make Fall Risk Sign visible to staff  - Apply yellow socks and bracelet for high fall risk patients  - Consider moving patient to room near nurses station  Outcome: Progressing  Goal: Maintain or return to baseline ADL function  Description: INTERVENTIONS:  -  Assess patient's ability to carry out ADLs; assess patient's baseline for ADL function and identify physical deficits which impact ability to perform ADLs (bathing, care of mouth/teeth, toileting, grooming, dressing, etc.)  - Assess/evaluate cause of self-care deficits   - Assess range of motion  - Assess patient's mobility; develop plan if impaired  - Assess patient's need for assistive devices and provide as appropriate  - Encourage maximum independence but intervene and supervise when necessary  - Involve family in performance of ADLs  - Assess for home care needs following discharge   - Consider OT consult to assist with ADL evaluation and planning for discharge  - Provide patient education as appropriate  Outcome: Progressing  Goal: Maintains/Returns to pre admission functional level  Description: INTERVENTIONS:  - Perform BMAT or MOVE assessment daily.   - Set and communicate daily mobility goal to care team and patient/family/caregiver.    - Collaborate with rehabilitation services on mobility goals if consulted  - Out of bed for toileting  - Record patient progress and toleration of activity level   Outcome: Progressing     Problem: DISCHARGE PLANNING  Goal: Discharge to home or other facility with appropriate resources  Description: INTERVENTIONS:  - Identify barriers to discharge w/patient and caregiver  - Arrange for needed discharge resources and transportation as appropriate  - Identify discharge learning needs (meds, wound care, etc.)  - Arrange for interpretive services to assist at discharge as needed  - Refer to Case Management Department for coordinating discharge planning if the patient needs post-hospital services based on physician/advanced practitioner order or complex needs related to functional status, cognitive ability, or social support system  Outcome: Progressing     Problem: Knowledge Deficit  Goal: Patient/family/caregiver demonstrates understanding of disease process, treatment plan, medications, and discharge instructions  Description: Complete learning assessment and assess knowledge base.   Interventions:  - Provide teaching at level of understanding  - Provide teaching via preferred learning methods  Outcome: Progressing     Problem: Prexisting or High Potential for Compromised Skin Integrity  Goal: Skin integrity is maintained or improved  Description: INTERVENTIONS:  - Identify patients at risk for skin breakdown  - Assess and monitor skin integrity  - Assess and monitor nutrition and hydration status  - Monitor labs   - Assess for incontinence   - Turn and reposition patient  - Assist with mobility/ambulation  - Relieve pressure over bony prominences  - Avoid friction and shearing  - Provide appropriate hygiene as needed including keeping skin clean and dry  - Evaluate need for skin moisturizer/barrier cream  - Collaborate with interdisciplinary team   - Patient/family teaching  - Consider wound care consult   Outcome: Progressing

## 2023-08-15 NOTE — ASSESSMENT & PLAN NOTE
Erythema and swelling of right upper extremity involving elbow  · CT shows bursitis  · Concern for septic arthritis given warm erythematous and swollen elbow joint  · History of immunosuppression  · Blood cultures negative at 5 days  · Infectious disease consulted,continue IV cefazolin while inpatient, ID recommending transition to Keflex 1 g every 6 hours through 8/16/23 at discharge   · Orthopedics evaluated patient  · Bedside aspiration performed 8/7/23  · Bedside I&D performed 8/9/23  · Cultures resulting in staphylococcus aureus  · POD 5 debridement in OR

## 2023-08-15 NOTE — PLAN OF CARE
Problem: Potential for Falls  Goal: Patient will remain free of falls  Description: INTERVENTIONS:  - Educate patient/family on patient safety including physical limitations  - Instruct patient to call for assistance with activity   - Consult OT/PT to assist with strengthening/mobility   - Keep Call bell within reach  - Keep bed low and locked with side rails adjusted as appropriate  - Keep care items and personal belongings within reach  - Initiate and maintain comfort rounds  - Make Fall Risk Sign visible to staff  - Offer Toileting every  Hours, in advance of need  - Initiate/Maintain alarm  - Obtain necessary fall risk management equipment:   - Apply yellow socks and bracelet for high fall risk patients  - Consider moving patient to room near nurses station  Outcome: Adequate for Discharge     Problem: MOBILITY - ADULT  Goal: Maintain or return to baseline ADL function  Description: INTERVENTIONS:  -  Assess patient's ability to carry out ADLs; assess patient's baseline for ADL function and identify physical deficits which impact ability to perform ADLs (bathing, care of mouth/teeth, toileting, grooming, dressing, etc.)  - Assess/evaluate cause of self-care deficits   - Assess range of motion  - Assess patient's mobility; develop plan if impaired  - Assess patient's need for assistive devices and provide as appropriate  - Encourage maximum independence but intervene and supervise when necessary  - Involve family in performance of ADLs  - Assess for home care needs following discharge   - Consider OT consult to assist with ADL evaluation and planning for discharge  - Provide patient education as appropriate  Outcome: Adequate for Discharge  Goal: Maintains/Returns to pre admission functional level  Description: INTERVENTIONS:  - Perform BMAT or MOVE assessment daily.   - Set and communicate daily mobility goal to care team and patient/family/caregiver.    - Collaborate with rehabilitation services on mobility goals if consulted  - Perform Range of Motion 3 times a day. - Reposition patient every  hours.   - Dangle patient 3 times a day  - Stand patient 3 times a day  - Ambulate patient 3 times a day  - Out of bed to chair 3 times a day   - Out of bed for meals 3 times a day  - Out of bed for toileting  - Record patient progress and toleration of activity level   Outcome: Adequate for Discharge     Problem: PAIN - ADULT  Goal: Verbalizes/displays adequate comfort level or baseline comfort level  Description: Interventions:  - Encourage patient to monitor pain and request assistance  - Assess pain using appropriate pain scale  - Administer analgesics based on type and severity of pain and evaluate response  - Implement non-pharmacological measures as appropriate and evaluate response  - Consider cultural and social influences on pain and pain management  - Notify physician/advanced practitioner if interventions unsuccessful or patient reports new pain  Outcome: Adequate for Discharge     Problem: INFECTION - ADULT  Goal: Absence or prevention of progression during hospitalization  Description: INTERVENTIONS:  - Assess and monitor for signs and symptoms of infection  - Monitor lab/diagnostic results  - Monitor all insertion sites, i.e. indwelling lines, tubes, and drains  - Monitor endotracheal if appropriate and nasal secretions for changes in amount and color  - [place] appropriate cooling/warming therapies per order  - Administer medications as ordered  - Instruct and encourage patient and family to use good hand hygiene technique  - Identify and instruct in appropriate isolation precautions for identified infection/condition  Outcome: Adequate for Discharge  Goal: Absence of fever/infection during neutropenic period  Description: INTERVENTIONS:  - Monitor WBC    Outcome: Adequate for Discharge     Problem: SAFETY ADULT  Goal: Patient will remain free of falls  Description: INTERVENTIONS:  - Educate patient/family on patient safety including physical limitations  - Instruct patient to call for assistance with activity   - Consult OT/PT to assist with strengthening/mobility   - Keep Call bell within reach  - Keep bed low and locked with side rails adjusted as appropriate  - Keep care items and personal belongings within reach  - Initiate and maintain comfort rounds  - Make Fall Risk Sign visible to staff  - Offer Toileting every  Hours, in advance of need  - Initiate/Maintain alarm  - Obtain necessary fall risk management equipment:   - Apply yellow socks and bracelet for high fall risk patients  - Consider moving patient to room near nurses station  Outcome: Adequate for Discharge  Goal: Maintain or return to baseline ADL function  Description: INTERVENTIONS:  -  Assess patient's ability to carry out ADLs; assess patient's baseline for ADL function and identify physical deficits which impact ability to perform ADLs (bathing, care of mouth/teeth, toileting, grooming, dressing, etc.)  - Assess/evaluate cause of self-care deficits   - Assess range of motion  - Assess patient's mobility; develop plan if impaired  - Assess patient's need for assistive devices and provide as appropriate  - Encourage maximum independence but intervene and supervise when necessary  - Involve family in performance of ADLs  - Assess for home care needs following discharge   - Consider OT consult to assist with ADL evaluation and planning for discharge  - Provide patient education as appropriate  Outcome: Adequate for Discharge  Goal: Maintains/Returns to pre admission functional level  Description: INTERVENTIONS:  - Perform BMAT or MOVE assessment daily.   - Set and communicate daily mobility goal to care team and patient/family/caregiver. - Collaborate with rehabilitation services on mobility goals if consulted  - Perform Range of Motion times a day. - Reposition patient every hours.   - Dangle patient  times a day  - Stand patient times a day  - Ambulate patient  times a day  - Out of bed to chair  times a day   - Out of bed for meals times a day  - Out of bed for toileting  - Record patient progress and toleration of activity level   Outcome: Adequate for Discharge     Problem: DISCHARGE PLANNING  Goal: Discharge to home or other facility with appropriate resources  Description: INTERVENTIONS:  - Identify barriers to discharge w/patient and caregiver  - Arrange for needed discharge resources and transportation as appropriate  - Identify discharge learning needs (meds, wound care, etc.)  - Arrange for interpretive services to assist at discharge as needed  - Refer to Case Management Department for coordinating discharge planning if the patient needs post-hospital services based on physician/advanced practitioner order or complex needs related to functional status, cognitive ability, or social support system  Outcome: Adequate for Discharge     Problem: Knowledge Deficit  Goal: Patient/family/caregiver demonstrates understanding of disease process, treatment plan, medications, and discharge instructions  Description: Complete learning assessment and assess knowledge base.   Interventions:  - Provide teaching at level of understanding  - Provide teaching via preferred learning methods  Outcome: Adequate for Discharge     Problem: Prexisting or High Potential for Compromised Skin Integrity  Goal: Skin integrity is maintained or improved  Description: INTERVENTIONS:  - Identify patients at risk for skin breakdown  - Assess and monitor skin integrity  - Assess and monitor nutrition and hydration status  - Monitor labs   - Assess for incontinence   - Turn and reposition patient  - Assist with mobility/ambulation  - Relieve pressure over bony prominences  - Avoid friction and shearing  - Provide appropriate hygiene as needed including keeping skin clean and dry  - Evaluate need for skin moisturizer/barrier cream  - Collaborate with interdisciplinary team   - Patient/family teaching  - Consider wound care consult   Outcome: Adequate for Discharge

## 2023-08-15 NOTE — PROGRESS NOTES
Progress Note - Infectious Disease   Cielo Jason 70 y.o. male MRN: [id number]  Unit/Bed#: E2 -01 Encounter: [id number]      Impression/Plan:    1. Right elbow septic olecranon bursitis and cellulitis. There are no obvious wounds on the right elbow, but the patient frequently has irritation of the elbow since he supports his weight with it, so suspect infection due to repeated microtrauma causing translocation of skin bacteria into the bursa. No evidence of septic arthritis at the time. The patient has no history of MRSA and recent urine cultures show growth of MSSA but blood cultures show no growth. Leukocytosis has improved and the patient is afebrile. 10 cc of bloody, serous fluid aspirated from the right bursa. Aspirate culture growing MSSA. Now status post right bursa I&D 8/10/2023 with significant purulent fluid evacuated. Exam improving. Wound cultures only growing coagulase-negative staph in broth only which is likely skin colonization              -Continue IV cefazolin 2 g every 8 hours while inpatient   -Okay for discharge from ID perspective on p.o. Keflex 1 g every 6 hours to complete a 7-day postop course of antibiotics through 8/16/2023              -[facility] orthopedic surgery follow-up               -Monitor clinical exam     2.  Leukocytosis, POA. White blood cell count 14.51 on admission. The patient is afebrile and hemodynamically stable. Due to #1 above. Blood cultures show no growth to date despite growth of Staph aureus in urine culture. WBC count now normalized              -Antibiotics as above              -Monitor exam              -Monitor CBC with differential, fever curve     3. BPH, urinary retention. Beard catheter placed on admission, now removed. Recommend urology follow-up. Monitor for recurrent retention     4.  Pyuria, bacteriuria.   The patient reports urinary frequency and weak urinary stream.  I suspect his symptoms are more due to BPH than infection. Urine culture recently grew MSSA but blood cultures show no growth              -Urinary retention protocol              -Outpatient urology follow-up     5.  Quadriparesis, ambulatory dysfunction due to cervical myelomalacia. Follows with neurology outpatient and is on a steroid taper              -Steroid taper per primary team              -Recommend PT/OT to help the patient reduce trauma to the right elbow in the future     I have discussed the above management plan in detail with the primary service, patient. Okay for discharge from ID perspective. Antibiotics:  Cefazolin day 9  Abx day 11    Subjective:  Patient reports overall improvement in his right elbow pain and swelling. He denies fever, chills, nausea. Objective:  Vitals:  Temp:  [97.2 °F (36.2 °C)-98.2 °F (36.8 °C)] 97.5 °F (36.4 °C)  HR:  [55-82] 55  Resp:  [18-20] 18  BP: (130-147)/(69-90) 130/69  SpO2:  [94 %-97 %] 97 %  Temp (24hrs), Av.6 °F (36.4 °C), Min:97.2 °F (36.2 °C), Max:98.2 °F (36.8 °C)  Current: Temperature: 97.5 °F (36.4 °C)    Physical Exam:   General Appearance:  Alert, interactive, nontoxic, no acute distress. Throat: Oropharynx moist without lesions. Lungs:   Clear to auscultation bilaterally; no wheezes, rhonchi or rales; respirations unlabored   Heart:  RRR; no murmur, rub or gallop   Abdomen:   Soft, non-tender, non-distended, positive bowel sounds. Extremities: Right elbow olecranon bursa swelling, decreased erythema. No swelling of the elbow joint itself. Skin: No new rashes or lesions. No draining wounds noted. Labs:    All pertinent labs and imaging studies were personally reviewed  Results from last 7 days   Lab Units 08/15/23  0511 23  0519 23  0431   WBC Thousand/uL 6.84 6.59 6.74   HEMOGLOBIN g/dL 15.2 15.1 15.3   PLATELETS Thousands/uL 134* 130* 139*     Results from last 7 days   Lab Units 08/15/23  0511 23  0519 23  0431   SODIUM mmol/L 135 135 134*   POTASSIUM mmol/L 4.2 4.4 4.1   CHLORIDE mmol/L 97 98 98   CO2 mmol/L 33* 34* 33*   BUN mg/dL 13 14 17   CREATININE mg/dL 0.67 0.64 0.64   EGFR ml/min/1.73sq m 100 102 102   CALCIUM mg/dL 8.4 8.6 8.3*                       Micro:  Results from last 7 days   Lab Units 08/10/23  1611 08/10/23  1609 08/10/23  1539   GRAM STAIN RESULT  Rare Polys  No bacteria seen No Polys or Bacteria seen 1+ Polys  No bacteria seen       Imaging:  I have personally reviewed pertinent imaging study reports and images in PACS.     CT right arm: Olecranon bursitis and adjacent edema/cellulitis

## 2023-08-15 NOTE — ASSESSMENT & PLAN NOTE
Outpatient urine culture growing greater than 100,000 CFU's of Staph aureus  · Repeat urine culture growing 60,000 CFU Staph aureus  · ID consulted   · Switched to IV cefazolin, day 10 to start tonight

## 2023-08-16 NOTE — UTILIZATION REVIEW
NOTIFICATION OF ADMISSION DISCHARGE   This is a Notification of Discharge from [address] [place] [address]. Please be advised that this patient has been discharge from our facility. Below you will find the admission and discharge date and time including the patient’s disposition. UTILIZATION REVIEW CONTACT:  James Liao  Utilization   Network Utilization Review Department  Phone: [phone] x carefully listen to the prompts. All voicemails are confidential.  Email: [email or web address]     ADMISSION INFORMATION  PRESENTATION DATE: 8/4/2023 12:43 PM  OBERVATION ADMISSION DATE:   INPATIENT ADMISSION DATE: 8/4/23  4:56 PM   DISCHARGE DATE: 8/15/2023  1:26 PM   [facility]    IMPORTANT INFORMATION:  Send all requests for admission clinical reviews, approved or denied determinations and any other requests to dedicated fax number below belonging to the campus where the patient is receiving treatment.  List of dedicated fax numbers:  [place] DENIALS (Administrative/Medical Necessity) [phone] [facility] [phone]   [facility] [phone]   [facility] [phone] [address] [facility] [phone] 401 [facility] [phone]   [facility] [phone]   [address] [place] [address] [address] [place] [address] [phone]   [address] [place] [address] [phone] 3[address] [facility] [phone]   [address] [facility] [address] [facility] [phone]

## 2023-08-20 NOTE — DISCHARGE SUMMARY
[address] [facility]  Discharge- Rebeca Moritz 1960, 70 y.o. male MRN: [id number]  Unit/Bed#: E2 -01 Encounter: [id number]  Primary Care Provider: Lyubov Paris DO   Date and time admitted to hospital: 8/4/2023 12:43 PM    * Septic olecranon bursitis  Assessment & Plan  Erythema and swelling of right upper extremity involving elbow  · CT shows bursitis  · Concern for septic arthritis given warm erythematous and swollen elbow joint  · History of immunosuppression  · Blood cultures negative at 5 days  · Infectious disease consulted,continue IV cefazolin while inpatient, ID recommending transition to Keflex 1 g every 6 hours through 8/16/23 at discharge   · Orthopedics evaluated patient  · Bedside aspiration performed 8/7/23  · Bedside I&D performed 8/9/23  · Cultures resulting in staphylococcus aureus  · POD 5 debridement in OR     Thrombophlebitis arm  Assessment & Plan  · Patient reported increased swelling in left upper extremity   · VAS venous duplex showed occlusive superficial thrombophlebitis in the basilic vein   · Supportive treatment     Myelomalacia of cervical cord [facility])  Assessment & Plan  Follows with neurology as an outpatient  · Uses wheelchair mostly for ambulation  · PT/OT eval recommending rehab  · Outpatient follow-up with neurology  · Completed steroid taper recommended by neurology during hospitalization    Ambulatory dysfunction  Assessment & Plan  Secondary to chronic neurologic issue  · Patient reports progressive weakness is now likely worsened in setting of acute infection  · PT/OT recommending rehab    Acute cystitis without hematuria  Assessment & Plan  Outpatient urine culture growing greater than 100,000 CFU's of Staph aureus  · Repeat urine culture growing 60,000 CFU Staph aureus  · ID consulted   · Switched to IV cefazolin, day 10 to start tonight    BPH associated with nocturia  Assessment & Plan  Beard catheter placed on admission due to urinary retention  · Beard was removed, voiding well   · Monitor for retention    Hyponatremia-resolved as of 8/5/2023  Assessment & Plan  Patient presented with sodium 129  · May be poor solute intake or SIADH in setting of acute illness  · Previously improved on IVF, encourage oral intake   · Repeat BMP          Discharging Physician / Practitioner: Maninder Smith MD  PCP: Chintan Cummings DO  Admission Date:   Admission Orders (From admission, onward)     Ordered        08/04/23 1656  INPATIENT ADMISSION  Once                      Discharge Date: 08/15/23    Medical Problems     Resolved Problems  Date Reviewed: 8/20/2023   None         Consultations During Hospital Stay:  · Orthopedics  · Infectious disease    Procedures Performed:   · Bedside aspiration of affected elbow  · Surgical I&D of infected elbow    Significant Findings / Test Results:   · Right elbow effusion  · Acute cystitis    Incidental Findings:   ·      Test Results Pending at Discharge (will require follow up): · None     Outpatient Tests Requested:  · CBC/CMP    Complications: None    Reason for Admission: Right elbow swelling    Hospital Course: As per HPI:    "Clarissa Yu is a 57 y.o. male With past medical history of myelomalacia of cervical cord, ambulatory dysfunction who presents of swelling and pain of right elbow with erythema. Patient was recently diagnosed with urinary tract infection after expressing urinary frequency. Culture and sensitivity showed Staph aureus and patient was started on Cipro then changed to Bactrim once sensitivities returned. Patient is currently following with neurology and is on chronic steroids prednisone 30 mg a day. He uses that elbow a lot for supporting himself and to lean on to urinate.  The skin has been thin and worn for some time. "    Condition at Discharge: stable     Discharge Day Visit / Exam:     Subjective: Please see septic olecranon bursitis above      Vitals: Blood Pressure: 130/69 (08/15/23 0708)  Pulse: 55 (08/15/23 0708)  Temperature: 97.5 °F (36.4 °C) (08/15/23 0708)  Temp Source: Temporal (08/15/23 0708)  Respirations: 18 (08/15/23 0708)  Height: 6' (182.9 cm) (08/04/23 1240)  Weight - Scale: 99.8 kg (220 lb 0.3 oz) (08/04/23 1240)  SpO2: 97 % (08/15/23 0708)  Exam:   Physical Exam  Physical exam:  HEENT:  CN 2 through 12 grossly intact, PERRLA  CV:  S1/S2 within normal limits, no rubs murmurs gallops  Pulmonary:  Clear to auscultation bilaterally  MSK:  Normal range of movement; tenderness and mild erythema of right elbow  Neuro:  Alert orient x3  Psych:  Normal mood, affect, behavior      Discharge instructions/Information to patient and family:   See after visit summary for information provided to patient and family. Provisions for Follow-Up Care:  See after visit summary for information related to follow-up care and any pertinent home health orders. Disposition:     Home    For Discharges to [facility] SNF:   · Not Applicable to this Patient - Not Applicable to this Patient    Planned Readmission: None     Discharge Statement:  I spent 45 minutes discharging the patient. This time was spent on the day of discharge. I had direct contact with the patient on the day of discharge. Greater than 50% of the total time was spent examining patient, answering all patient questions, arranging and discussing plan of care with patient as well as directly providing post-discharge instructions. Additional time then spent on discharge activities. Discharge Medications:  See after visit summary for reconciled discharge medications provided to patient and family.       ** Please Note: This note has been constructed using a voice recognition system **

## 2023-08-25 ENCOUNTER — OFFICE VISIT (OUTPATIENT)
Dept: OBGYN CLINIC | Facility: CLINIC | Age: 63
End: 2023-08-25

## 2023-08-25 VITALS
BODY MASS INDEX: 29.8 KG/M2 | WEIGHT: 220 LBS | HEIGHT: 72 IN | SYSTOLIC BLOOD PRESSURE: 94 MMHG | DIASTOLIC BLOOD PRESSURE: 61 MMHG | HEART RATE: 60 BPM

## 2023-08-25 DIAGNOSIS — M71.121 SEPTIC OLECRANON BURSITIS OF RIGHT ELBOW: Primary | ICD-10-CM

## 2023-08-25 PROCEDURE — 99024 POSTOP FOLLOW-UP VISIT: CPT | Performed by: ORTHOPAEDIC SURGERY

## 2023-08-25 NOTE — PROGRESS NOTES
CHIEF COMPLAIN/REASON FOR VISIT  Chief Complaint   Patient presents with   • Right Elbow - Post-op       HISTORY OF PRESENT ILLNESS  Igor Valdez is a RHD 70 y.o. male who presents for post op follow up of their right elbow. Patient said he was placed on Keflex and he has been taking this. He denies spreading redness, drainage, fever, and chills. REVIEW OF SYSTEMS  Review of systems was performed and, woutside that mentioned in the HPI, it was negative for symptomology related to the integumentary, hematologic, immunologic, allergic, neurologic, cardiovascular, respiratory, GI or  systems. MEDICAL HISTORY  Patient Active Problem List   Diagnosis   • Erectile dysfunction   • Hypogonadism in male   • BPH associated with nocturia   • Acute cystitis without hematuria   • Cauda equina syndrome (HCC)   • Spinal stenosis of thoracolumbar region   • Chest discomfort   • Hydrocele of testis   • S/P insertion of spinal cord stimulator   • Status post lumbar spinal fusion   • Hx of colonoscopy   • Weakness of right lower extremity   • Ambulatory dysfunction   • Slow transit constipation   • Quadriparesis ([address])   • Myelomalacia of cervical cord (HCC)   • Septic olecranon bursitis   • Arm swelling   • Thrombophlebitis arm       SURGICAL HISTORY  Past Surgical History:   Procedure Laterality Date   • BACK SURGERY  05/18/2016    with hardware   • BACK SURGERY      2 back surgery unable to remove hardware   • COLONOSCOPY  2004    Dr Nataliia Ho. tubular adenoma polyp removed   • COLONOSCOPY  2007    Dr Sarah Mayer. Normal   • COLONOSCOPY  2013    Dr Sarah Mayer. Normal.    • COLONOSCOPY  06/2019    Dr Sarah Mayer.  Hemorrhoids, normal.    • NECK SURGERY      decompression of neck    • NECK SURGERY Right     scar tissues removed   • UT EXCISION HYDROCELE UNILATERAL Right 06/10/2021    Procedure: HYDROCELECTOMY;  Surgeon: Holly Ernst DO;  Location: [facility];  Service: Urology   • SPINAL CORD STIMULATOR IMPLANT  10/10/2019   • WOUND DEBRIDEMENT Right 8/10/2023    Procedure: DEBRIDEMENT UPPER EXTREMITY [facility] OUT) - elbow;  Surgeon: Spencer Burton MD;  Location: [facility];  Service: Orthopedics       CURRENT MEDICATIONS    Current Outpatient Medications:   •  bisacodyl (DULCOLAX) 10 mg suppository, Insert 1 suppository (10 mg total) into the rectum daily as needed for constipation, Disp: 12 suppository, Rfl: 0  •  clobetasol (TEMOVATE) 0.05 % external solution, , Disp: , Rfl:   •  docusate sodium (COLACE) 100 mg capsule, Take 1 capsule (100 mg total) by mouth 2 (two) times a day, Disp: , Rfl: 0  •  fluocinonide (LIDEX) 0.05 % cream, , Disp: , Rfl:   •  ibuprofen (MOTRIN) 400 mg tablet, Take 1 tablet (400 mg total) by mouth every 6 (six) hours as needed for mild pain, Disp: , Rfl: 0  •  ketoconazole (NIZORAL) 2 % shampoo, , Disp: , Rfl:   •  nystatin powder, , Disp: , Rfl:   •  oxyCODONE (ROXICODONE) 5 immediate release tablet, Take 1 tablet (5 mg) for moderate pain every 6 hours as needed or take 2 tablets (10 mg) for severe pain every 6 hours as needed. , Disp: 30 tablet, Rfl: 0  •  sildenafil (VIAGRA) 100 mg tablet, Take 100 mg by mouth As directed, Disp: , Rfl:     SOCIAL HISTORY  Social History     Socioeconomic History   • Marital status: /Civil Union     Spouse name: Not on file   • Number of children: Not on file   • Years of education: Not on file   • Highest education level: Not on file   Occupational History   • Occupation:  [employer]   Tobacco Use   • Smoking status: Never   • Smokeless tobacco: Never   Vaping Use   • Vaping Use: Never used   Substance and Sexual Activity   • Alcohol use: Yes     Comment: Drinks socially.    • Drug use: No   • Sexual activity: Not on file   Other Topics Concern   • Not on file   Social History Narrative   • Not on file     Social Determinants of Health     Financial Resource Strain: Not on file   Food Insecurity: No Food Insecurity (8/6/2023)    Hunger Vital Sign    • Worried About Running Out of Food in the Last Year: Never true    • Ran Out of Food in the Last Year: Never true   Transportation Needs: No Transportation Needs (8/6/2023)    PRAPARE - Transportation    • Lack of Transportation (Medical): No    • Lack of Transportation (Non-Medical): No   Physical Activity: Not on file   Stress: Not on file   Social Connections: Not on file   Intimate Partner Violence: Not on file   Housing Stability: Low Risk  (8/6/2023)    Housing Stability Vital Sign    • Unable to Pay for Housing in the Last Year: No    • Number of Places Lived in the Last Year: 1    • Unstable Housing in the Last Year: No       Objective     VITAL SIGNS  BP 94/61   Pulse 60   Ht 6' (1.829 m)   Wt 99.8 kg (220 lb)   BMI 29.84 kg/m²      PHYSICAL EXAM  General:   Well-appearing  No acute distress  Appears stated age      Elbow examination  Edema and erythema but otherwise normal exam, no tenderness, instability; ligaments intact, FROM all hand, wrist, finger joints, remainder of elbow exam is normal, ipsilateral shoulder, wrist and hand exam is normal, contralateral elbow exam is normal  NVID in the radial, median and ulnar nerve distribution  BCR    RADIOGRAPHIC EXAMINATION/DIAGNOSTICS:  Procedure: CT upper extremity w contrast right    Result Date: 8/4/2023  Narrative: CT right upper extremity with IV contrast INDICATION: Swelling, fluctuance. Posterior elbow swelling/tenderness. COMPARISON: None. TECHNIQUE: CT examination of the above was performed. This examination, like all CT scans performed in the [facility], was performed utilizing techniques to minimize radiation dose exposure, including the use of iterative reconstruction  and automated exposure control software. Multiplanar 2D reformatted images were created from the source data. IV Contrast: 100 mL of iohexol (OMNIPAQUE) Rad dose  2636 mGy-cm FINDINGS: OSSEOUS STRUCTURES:  No fracture, dislocation or destructive osseous lesion. Degenerative changes of the elbow. VISUALIZED MUSCULATURE:  Unremarkable. SOFT TISSUES: Large amount of fluid posterior to the olecranon, likely contained within the olecranon bursa representing olecranon bursitis. Stranding of the adjacent subcutaneous soft tissues and mild thickening of the overlying skin. No osseous erosions. OTHER PERTINENT FINDINGS: Right middle lobe granuloma. Partially visualized aortic atherosclerosis. Impression: Olecranon bursitis and adjacent edema/cellulitis, possibly infectious or inflammatory (consider gout). No acute osseous abnormality. Workstation performed: [id number]       ASSESSMENT/PLAN:  1.  right elbow pain, septic bursitis    Today, we discussed the non-operative treatment options that include, but are not limited to: rest, ice, activity modification, and anti-inflammatory medication. Patient with like to initially proceed with these conservative measures. After discussion of options for RICE and lymphedema wraps with other conservative treatments, patient opted to trial these initially. Wrap prescription placed today. Advised to continue taking Keflex. he will plan on following up for recheck p.r.n., they voiced their understanding of this plan and were in agreement with it. All questions answered today.       If any issues, questions, or concerns arise between now and the next appointment, we have encouraged the patient to contact our team.

## 2023-09-12 ENCOUNTER — TELEPHONE (OUTPATIENT)
Dept: UROLOGY | Facility: AMBULATORY SURGERY CENTER | Age: 63
End: 2023-09-12

## 2023-09-15 ENCOUNTER — APPOINTMENT (OUTPATIENT)
Dept: LAB | Facility: MEDICAL CENTER | Age: 63
End: 2023-09-15
Payer: COMMERCIAL

## 2023-09-15 DIAGNOSIS — J06.9 ACUTE RESPIRATORY DISEASE: ICD-10-CM

## 2023-09-15 LAB
BASOPHILS # BLD AUTO: 0.05 THOUSANDS/ÂΜL (ref 0–0.1)
BASOPHILS NFR BLD AUTO: 1 % (ref 0–1)
EOSINOPHIL # BLD AUTO: 0.24 THOUSAND/ÂΜL (ref 0–0.61)
EOSINOPHIL NFR BLD AUTO: 4 % (ref 0–6)
ERYTHROCYTE [DISTWIDTH] IN BLOOD BY AUTOMATED COUNT: 14.5 % (ref 11.6–15.1)
HCT VFR BLD AUTO: 45.7 % (ref 36.5–49.3)
HGB BLD-MCNC: 14.8 G/DL (ref 12–17)
IMM GRANULOCYTES # BLD AUTO: 0.04 THOUSAND/UL (ref 0–0.2)
IMM GRANULOCYTES NFR BLD AUTO: 1 % (ref 0–2)
LYMPHOCYTES # BLD AUTO: 1.62 THOUSANDS/ÂΜL (ref 0.6–4.47)
LYMPHOCYTES NFR BLD AUTO: 24 % (ref 14–44)
MCH RBC QN AUTO: 31.6 PG (ref 26.8–34.3)
MCHC RBC AUTO-ENTMCNC: 32.4 G/DL (ref 31.4–37.4)
MCV RBC AUTO: 97 FL (ref 82–98)
MONOCYTES # BLD AUTO: 0.6 THOUSAND/ÂΜL (ref 0.17–1.22)
MONOCYTES NFR BLD AUTO: 9 % (ref 4–12)
NEUTROPHILS # BLD AUTO: 4.27 THOUSANDS/ÂΜL (ref 1.85–7.62)
NEUTS SEG NFR BLD AUTO: 61 % (ref 43–75)
NRBC BLD AUTO-RTO: 0 /100 WBCS
PLATELET # BLD AUTO: 204 THOUSANDS/UL (ref 149–390)
PMV BLD AUTO: 11 FL (ref 8.9–12.7)
RBC # BLD AUTO: 4.69 MILLION/UL (ref 3.88–5.62)
WBC # BLD AUTO: 6.82 THOUSAND/UL (ref 4.31–10.16)

## 2023-09-15 PROCEDURE — 36415 COLL VENOUS BLD VENIPUNCTURE: CPT

## 2023-09-15 PROCEDURE — 85025 COMPLETE CBC W/AUTO DIFF WBC: CPT

## 2023-09-15 PROCEDURE — 87635 SARS-COV-2 COVID-19 AMP PRB: CPT

## 2023-09-16 LAB — SARS-COV-2 RNA RESP QL NAA+PROBE: NEGATIVE

## 2023-09-19 ENCOUNTER — TELEPHONE (OUTPATIENT)
Dept: NEUROLOGY | Facility: CLINIC | Age: 63
End: 2023-09-19

## 2023-09-20 ENCOUNTER — OFFICE VISIT (OUTPATIENT)
Dept: NEUROLOGY | Facility: CLINIC | Age: 63
End: 2023-09-20
Payer: COMMERCIAL

## 2023-09-20 VITALS
WEIGHT: 226 LBS | BODY MASS INDEX: 30.61 KG/M2 | HEART RATE: 73 BPM | RESPIRATION RATE: 20 BRPM | OXYGEN SATURATION: 100 % | DIASTOLIC BLOOD PRESSURE: 65 MMHG | SYSTOLIC BLOOD PRESSURE: 105 MMHG | TEMPERATURE: 97.2 F | HEIGHT: 72 IN

## 2023-09-20 DIAGNOSIS — G95.89 MYELOMALACIA OF CERVICAL CORD (HCC): ICD-10-CM

## 2023-09-20 DIAGNOSIS — R25.2 SPASTICITY: Primary | ICD-10-CM

## 2023-09-20 PROCEDURE — 99214 OFFICE O/P EST MOD 30 MIN: CPT | Performed by: PSYCHIATRY & NEUROLOGY

## 2023-09-20 RX ORDER — CYCLOBENZAPRINE HCL 10 MG
TABLET ORAL
Qty: 30 TABLET | Refills: 1 | Status: SHIPPED | OUTPATIENT
Start: 2023-09-20

## 2023-09-20 NOTE — PROGRESS NOTES
Patient ID: Igor Valdez is a 70 y.o. male. Assessment/Plan:    Myelomalacia of cervical cord ([address])  Susanne Ivy is a 58-year-old patient with myelomalacia of the cervical spinal cord at C4. He was doing well until approximately 4 months 5 months ago where he noted worsening of his ambulation. In August he was noted to have a UTI as well as swollen right elbow. He was admitted to the hospital and hospital stay lasted for approximately 11 days. He was treated with IV antibiotics and eventually p.o. antibiotics. He was then transferred to [facility]. He was doing well but the antibiotics were restarted for several days. Botox is being considered for spasticity and he does have an appointment next week. Overall his exam does demonstrate spasticity in the lower extremities with weakness. This is slightly improved from the last visit. Overall he is looking healthier. He did try baclofen which was ineffective subsequent Nhi Goodwin I have started him on Flexeril either a full tablet or half a tablet at bedtime. I have informed her that this could help to spasticity but she he should also pursue the Botox. He did have questions regarding his urinary symptoms. I did ask him to discuss this with his urologist.  He also had questions regarding lower extremity edema. He does have pulses and previously his venous Doppler studies were normal.  I have also discussed and asked him to discuss the use of Lasix with his PCP. We did discuss that his symptoms of lower extremity edema will improve as he becomes more active. I scheduled formal follow-up in several months. He should remain off of steroids for now       Diagnoses and all orders for this visit:    Spasticity  -     cyclobenzaprine (FLEXERIL) 10 mg tablet; 1 tablet at [facility]    Myelomalacia of cervical cord (HCC)         Subjective: This is a 58-year-old gentleman with a history of lumbar disc disease.   In September 2022 while on vacation in [place] after a fall he developed weakness in his upper and lower extremities and was noted to have quadriparesis. His MRI imaging did demonstrate cervical myelomalacia at C4 and he has been undergoing aggressive physical therapy. He was doing well but has noticed worsening of his symptomatology in 3 to 4 months but it was worse prior to his last visit for approximately 1 month in early August. . He has noted weakness in his lower extremities numbness and tingling in the upper extremities. He also describes urinary frequency that has increased in frequency with urgency. UA and CNS were performed which was positive for Staph aureus. He was placed on Cipro 500 twice a day prophylactically but this was changed to  to Bactrim DS with the sensitivities. He was seen by urology, dr Drake Bowie  who did agree with the use of Bactrim. When he was evaluated in August he was noted to have swelling of the right arm and the right elbow difficultly moving his right arm with  urinary urgency and frequency. He was asked to be seen in the emergency room on 8 4. He was admitted for 15 days and treated for cellulitis and bursitis. He was treated with IV antibiotics for 14 days and then placed on oral antibiotics. He did undergo surgery with drainage after 2 other bedside drainage     After his admission in the hospital he was subsequently admitted to [facility] for approximately 18 days. He underwent aggressive physical therapy. The steroids were completely discontinued. He is no longer on steroids. He reports while being in the water he did have improvement of his strength. He does complain of spasticity in his lower extremity especially worse at night. He is undergoing physical therapy, water therapy massage therapy. Botox is being considered and he is for treatment next week of the piriformis as well as the quadriceps on the right. He does have baclofen available for spasticity but he reports this was ineffective.   He admits that TENS Inadine was ineffective. Other complaints include lower extremity edema. He was given Lasix by goldie Hodges and utilized a half a tablet at bedtime. Unfortunately he did was up at various times to urinate. He did take it twice but has no longer utilized it. He does note improvement of the swelling in his feet with improved with exercise. Does have other questions regarding his urination. I have recommended that he see a PCP and/or urologist to discuss these further questions. .     MRI of the c psine that was performed on 8/  3 demonstrated no significant changes of cervical myelomalacia. Other symptoms include left upper extremity numbness and tingling. The following portions of the patient's history were reviewed and updated as appropriate:   He  has a past medical history of Balance problems, BPH (benign prostatic hyperplasia), Chronic back pain, Chronic pain disorder, COVID-19, Erectile dysfunction, Nocturia, OAB (overactive bladder), Testicular hypogonadism, Urinary frequency, and Urinary urgency. He  has a past surgical history that includes Spinal cord stimulator implant (10/10/2019); Colonoscopy (2004); Colonoscopy (2007); Colonoscopy (2013); Colonoscopy (06/2019); Back surgery (05/18/2016); Neck surgery; pr excision hydrocele unilateral (Right, 06/10/2021); Neck surgery (Right); Back surgery; and Wound debridement (Right, 8/10/2023). His family history is not on file. He  reports that he has never smoked. He has never used smokeless tobacco. He reports current alcohol use. He reports that he does not use drugs.   Current Outpatient Medications   Medication Sig Dispense Refill   • cyclobenzaprine (FLEXERIL) 10 mg tablet 1 tablet at qhs 30 tablet 1   • bisacodyl (DULCOLAX) 10 mg suppository Insert 1 suppository (10 mg total) into the rectum daily as needed for constipation 12 suppository 0   • clobetasol (TEMOVATE) 0.05 % external solution      • docusate sodium (COLACE) 100 mg capsule Take 1 capsule (100 mg total) by mouth 2 (two) times a day  0   • Elastic Bandages & Supports (Wrap/Multipurpose 2.75"x21.4yd) MISC Use in the morning Please dispense lymphedema wrap and wrap from hand up to the proximal shoulder 1 each 0   • fluocinonide (LIDEX) 0.05 % cream      • ibuprofen (MOTRIN) 400 mg tablet Take 1 tablet (400 mg total) by mouth every 6 (six) hours as needed for mild pain  0   • ketoconazole (NIZORAL) 2 % shampoo      • nystatin powder      • oxyCODONE (ROXICODONE) 5 immediate release tablet Take 1 tablet (5 mg) for moderate pain every 6 hours as needed or take 2 tablets (10 mg) for severe pain every 6 hours as needed. 30 tablet 0   • sildenafil (VIAGRA) 100 mg tablet Take 100 mg by mouth As directed       No current facility-administered medications for this visit. He is allergic to morphine. .         Objective:    Blood pressure 105/65, pulse 73, temperature (!) 97.2 °F (36.2 °C), temperature source Temporal, resp. rate 20, height 6' (1.829 m), weight 103 kg (226 lb), SpO2 100 %. Physical Exam  Eyes:      General: Lids are normal.      Extraocular Movements: Extraocular movements intact. Pupils: Pupils are equal, round, and reactive to light. Neurological:      Deep Tendon Reflexes:      Reflex Scores:       Patellar reflexes are 2+ on the right side and 2+ on the left side. Achilles reflexes are 1+ on the right side and 1+ on the left side. Neurological Exam  Mental Status  Awake, alert and oriented to person, place and time. Oriented to person, place and time. Language is fluent with no aphasia. Cranial Nerves  CN II: Visual acuity is normal. Visual fields full to confrontation. CN III, IV, VI: Extraocular movements intact bilaterally. Normal lids and orbits bilaterally. Pupils equal round and reactive to light bilaterally. CN V: Facial sensation is normal.  CN VII: Full and symmetric facial movement.   CN VIII: Hearing is normal.  CN IX, X: Palate elevates symmetrically. Normal gag reflex. CN XI: Shoulder shrug strength is normal.  CN XII: Tongue midline without atrophy or fasciculations. Motor    He had increased tone in the lower extremities right worse than left. Right lower extremity hip flexion was a 2 knee flexion was a 4 near extension was a 3. He did have intermittent spasms of his dorsiflexion bilaterally. On the left lower extremity hip flexion was a 3+ knee flexion and knee extension were 4. He did have increased tone with spasticity in the lower extremities right greater than left. There is atrophy of the intrinsics bilaterally and decreased handgrip on the right upper extremity. Sensory  Decrease sensation on the left upper extremity to the mid forearm. .    Reflexes                                            Right                      Left  Patellar                                2+                         2+  Achilles                                1+                         1+    Coordination  Right: Finger-to-nose normal.Left: Finger-to-nose normal.    Gait    He presented  in a wheelchair. He is unable to stand without assistance. He did have increase in tone. .    Review of systems obtained from the medical assistant as below was reviewed with the patient at today's visit  ROS:    Review of Systems   Constitutional: Negative for appetite change, fatigue and fever. HENT: Negative. Negative for hearing loss, tinnitus, trouble swallowing and voice change. Eyes: Negative. Negative for photophobia, pain and visual disturbance. Respiratory: Negative. Negative for shortness of breath. Cardiovascular: Negative. Negative for palpitations. Gastrointestinal: Negative. Negative for nausea and vomiting. Endocrine: Negative. Negative for cold intolerance. Genitourinary: Negative. Negative for dysuria, frequency and urgency.    Musculoskeletal: Negative for back pain, gait problem, myalgias and neck pain. Skin: Negative. Negative for rash. Allergic/Immunologic: Negative. Neurological: Positive for dizziness, tremors (With stress R arm), weakness (legs) and light-headedness. Negative for seizures, syncope, facial asymmetry, speech difficulty, numbness (Waist down) and headaches. Hematological: Negative. Does not bruise/bleed easily. Psychiatric/Behavioral: Positive for sleep disturbance. Negative for confusion and hallucinations.

## 2023-09-20 NOTE — ASSESSMENT & PLAN NOTE
Nik Damon is a 75-year-old patient with myelomalacia of the cervical spinal cord at C4. He was doing well until approximately 4 months 5 months ago where he noted worsening of his ambulation. In August he was noted to have a UTI as well as swollen right elbow. He was admitted to the hospital and hospital stay lasted for approximately 11 days. He was treated with IV antibiotics and eventually p.o. antibiotics. He was then transferred to [facility]. He was doing well but the antibiotics were restarted for several days. Botox is being considered for spasticity and he does have an appointment next week. Overall his exam does demonstrate spasticity in the lower extremities with weakness. This is slightly improved from the last visit. Overall he is looking healthier. He did try baclofen which was ineffective subsequent Eran Ballard I have started him on Flexeril either a full tablet or half a tablet at bedtime. I have informed her that this could help to spasticity but she he should also pursue the Botox. He did have questions regarding his urinary symptoms. I did ask him to discuss this with his urologist.  He also had questions regarding lower extremity edema. He does have pulses and previously his venous Doppler studies were normal.  I have also discussed and asked him to discuss the use of Lasix with his PCP. We did discuss that his symptoms of lower extremity edema will improve as he becomes more active. I scheduled formal follow-up in several months.     He should remain off of steroids for now

## 2023-09-21 NOTE — TELEPHONE ENCOUNTER
Cd pt and scheduled NP apt on 11/22 at 11:15 with Dr. Gray Chance at the [facility] office

## 2023-10-08 PROBLEM — N30.00 ACUTE CYSTITIS WITHOUT HEMATURIA: Status: RESOLVED | Noted: 2018-09-20 | Resolved: 2023-10-08

## 2023-10-19 ENCOUNTER — HOSPITAL ENCOUNTER (EMERGENCY)
Facility: HOSPITAL | Age: 63
Discharge: HOME | End: 2023-10-19
Attending: EMERGENCY MEDICINE
Payer: MEDICARE

## 2023-10-19 ENCOUNTER — APPOINTMENT (EMERGENCY)
Dept: NON INVASIVE DIAGNOSTICS | Facility: HOSPITAL | Age: 63
End: 2023-10-19
Payer: MEDICARE

## 2023-10-19 VITALS
HEART RATE: 63 BPM | DIASTOLIC BLOOD PRESSURE: 67 MMHG | RESPIRATION RATE: 18 BRPM | SYSTOLIC BLOOD PRESSURE: 123 MMHG | TEMPERATURE: 97.6 F | OXYGEN SATURATION: 98 %

## 2023-10-19 DIAGNOSIS — M79.89 LEFT LEG SWELLING: Primary | ICD-10-CM

## 2023-10-19 PROCEDURE — 99284 EMERGENCY DEPT VISIT MOD MDM: CPT

## 2023-10-19 PROCEDURE — 93971 EXTREMITY STUDY: CPT | Performed by: SURGERY

## 2023-10-19 PROCEDURE — 99284 EMERGENCY DEPT VISIT MOD MDM: CPT | Performed by: EMERGENCY MEDICINE

## 2023-10-19 PROCEDURE — 93971 EXTREMITY STUDY: CPT

## 2023-10-19 NOTE — ED PROVIDER NOTES
History  Chief Complaint   Patient presents with    Leg Pain     Patient reports left calf x2 days. Pt reports at rehab when left calf started to hurt and pt was unable to put full pressure on foot due to pain. Pt reports PT felt that the left calf was warmer than the right. PCP recommended pt be evaluated for blood clot. History provided by:  Patient   used: No    Leg Pain  Location:  Leg  Injury: no    Leg location:  L lower leg  Pain details:     Quality:  Aching    Radiates to:  Does not radiate    Severity:  Moderate    Onset quality:  Gradual    Duration:  3 days    Timing:  Intermittent    Progression:  Waxing and waning  Chronicity:  New  Dislocation: no    Foreign body present:  Unable to specify  Tetanus status:  Unknown  Prior injury to area:  No  Relieved by:  Nothing  Worsened by:  Nothing  Ineffective treatments:  None tried  Associated symptoms: swelling    Associated symptoms: no back pain, no decreased ROM, no fever and no neck pain    Swelling:     Location:  Leg    Onset quality:  Gradual    Duration:  3 days    Timing:  Intermittent    Progression:  Unchanged    Chronicity:  New  Risk factors comment:  Olecranon bursitis      Prior to Admission Medications   Prescriptions Last Dose Informant Patient Reported? Taking?    Elastic Bandages & Supports (Wrap/Multipurpose 2.75"x21.4yd) MISC   No No   Sig: Use in the morning Please dispense lymphedema wrap and wrap from hand up to the proximal shoulder   bisacodyl (DULCOLAX) 10 mg suppository   No No   Sig: Insert 1 suppository (10 mg total) into the rectum daily as needed for constipation   clobetasol (TEMOVATE) 0.05 % external solution   Yes No   cyclobenzaprine (FLEXERIL) 10 mg tablet   No No   Si tablet at qhs   docusate sodium (COLACE) 100 mg capsule   No No   Sig: Take 1 capsule (100 mg total) by mouth 2 (two) times a day   fluocinonide (LIDEX) 0.05 % cream   Yes No   ibuprofen (MOTRIN) 400 mg tablet   No No   Sig: Take 1 tablet (400 mg total) by mouth every 6 (six) hours as needed for mild pain   ketoconazole (NIZORAL) 2 % shampoo   Yes No   nystatin powder   Yes No   oxyCODONE (ROXICODONE) 5 immediate release tablet   No No   Sig: Take 1 tablet (5 mg) for moderate pain every 6 hours as needed or take 2 tablets (10 mg) for severe pain every 6 hours as needed. sildenafil (VIAGRA) 100 mg tablet  Self Yes No   Sig: Take 100 mg by mouth As directed      Facility-Administered Medications: None       Past Medical History:   Diagnosis Date    Balance problems     BPH (benign prostatic hyperplasia)     Chronic back pain     Chronic pain disorder     COVID-19     2/2021-asymptomatic     Erectile dysfunction     Nocturia     OAB (overactive bladder)     Testicular hypogonadism     Urinary frequency     Urinary urgency        Past Surgical History:   Procedure Laterality Date    BACK SURGERY  05/18/2016    with hardware    BACK SURGERY      2 back surgery unable to remove hardware    COLONOSCOPY  2004    Dr Idania Crews. tubular adenoma polyp removed    COLONOSCOPY  2007    Dr Darshana Reddy. Normal    COLONOSCOPY  2013    Dr Darshana Reddy. Normal.     COLONOSCOPY  06/2019    Dr Darshana Redyd. Hemorrhoids, normal.     NECK SURGERY      decompression of neck     NECK SURGERY Right     scar tissues removed    [place] EXCISION HYDROCELE UNILATERAL Right 06/10/2021    Procedure: HYDROCELECTOMY;  Surgeon: Chucho Petit DO;  Location: [facility];  Service: Urology    SPINAL CORD STIMULATOR IMPLANT  10/10/2019    WOUND DEBRIDEMENT Right 8/10/2023    Procedure: DEBRIDEMENT UPPER EXTREMITY ([facility]) - elbow;  Surgeon: Mars Loyola MD;  Location: [facility];  Service: Orthopedics       History reviewed. No pertinent family history. I have reviewed and agree with the history as documented.     E-Cigarette/Vaping    E-Cigarette Use Never User      E-Cigarette/Vaping Substances    Nicotine No     THC No     CBD No     Flavoring No     Other No     Unknown No Social History     Tobacco Use    Smoking status: Never    Smokeless tobacco: Never   Vaping Use    Vaping Use: Never used   Substance Use Topics    Alcohol use: Yes     Comment: Drinks socially. Drug use: No       Review of Systems   Constitutional:  Negative for chills and fever. HENT:  Negative for facial swelling, sore throat and trouble swallowing. Eyes:  Negative for pain and visual disturbance. Respiratory:  Negative for cough and shortness of breath. Cardiovascular:  Negative for chest pain and leg swelling. Gastrointestinal:  Negative for abdominal pain, diarrhea, nausea and vomiting. Genitourinary:  Negative for dysuria and flank pain. Musculoskeletal:  Negative for back pain, neck pain and neck stiffness. Left leg swelling   Skin:  Negative for pallor and rash. Allergic/Immunologic: Negative for environmental allergies and immunocompromised state. Neurological:  Negative for dizziness and headaches. Hematological:  Negative for adenopathy. Does not bruise/bleed easily. Psychiatric/Behavioral:  Negative for agitation and behavioral problems. All other systems reviewed and are negative. Physical Exam  Physical Exam  Vitals and nursing note reviewed. Constitutional:       General: He is not in acute distress. Appearance: He is well-developed. HENT:      Head: Normocephalic and atraumatic. Eyes:      Extraocular Movements: Extraocular movements intact. Cardiovascular:      Rate and Rhythm: Normal rate and regular rhythm. Pulmonary:      Effort: Pulmonary effort is normal. No respiratory distress. Abdominal:      Palpations: Abdomen is soft. Tenderness: There is no abdominal tenderness. There is no guarding or rebound. Musculoskeletal:         General: Swelling present. Normal range of motion. Cervical back: Normal range of motion and neck supple.       Comments: Swelling involving left calf and lower leg noted, intact distal pulses DP, PT, confirmed with Doppler,   Skin:     General: Skin is warm and dry. Neurological:      General: No focal deficit present. Mental Status: He is alert and oriented to person, place, and time. Psychiatric:         Mood and Affect: Mood normal.         Behavior: Behavior normal.         Vital Signs  ED Triage Vitals   Temperature Pulse Respirations Blood Pressure SpO2   10/19/23 1119 10/19/23 1110 10/19/23 1110 10/19/23 1110 10/19/23 1110   97.6 °F (36.4 °C) 63 18 91/51 98 %      Temp Source Heart Rate Source Patient Position - Orthostatic VS BP Location FiO2 (%)   10/19/23 1119 10/19/23 1110 10/19/23 1110 10/19/23 1110 --   Oral Monitor Sitting Left arm       Pain Score       --                  Vitals:    10/19/23 1110 10/19/23 1151   BP: 91/51 123/67   Pulse: 63    Patient Position - Orthostatic VS: Sitting Lying         Visual Acuity      ED Medications  Medications - No data to display    Diagnostic Studies  Results Reviewed       None                   VAS lower limb venous duplex study, unilateral/limited   Final Result by Amaury Monae MD (10/19 1613)                 Procedures  Procedures         ED Course  ED Course as of 10/19/23 1754   Thu Oct 19, 2023   1200 Blood Pressure: 123/67   1201 Temperature: 97.6 °F (36.4 °C)   1201 Pulse: 63   1201 Respirations: 18   1201 SpO2: 98 %  Vitals stable. 1258 Duplex negative per prelim report by Vascular Tech as communicated by PATEL Kearns SBIRT 20yo+      Flowsheet Row Most Recent Value   Initial Alcohol Screen: US AUDIT-C     1. How often do you have a drink containing alcohol? 0 Filed at: 10/19/2023 1151   2. How many drinks containing alcohol do you have on a typical day you are drinking? 0 Filed at: 10/19/2023 1151   3a. Male UNDER 65: How often do you have five or more drinks on one occasion? 0 Filed at: 10/19/2023 1109   3b. FEMALE Any Age, or MALE 65+:  How often do you have 4 or more drinks on one occassion? 0 Filed at: 10/19/2023 1151   Audit-C Score 0 Filed at: 10/19/2023 1151   RACHEL: How many times in the past year have you. .. Used an illegal drug or used a prescription medication for non-medical reasons? Never Filed at: 10/19/2023 1151                      Medical Decision Making  Patient is a 75-year-old male, history of olecranial bursitis, staph infection, comes in with complaints of left leg pain and swelling, going on for past 3 days, sent to the ED from his rehab for possible DVT rule out, patient denies chest pain, dyspnea, fever, chills. On exam, patient is conscious, alert, vital signs noted for blood pressure 91/51, afebrile, no respiratory distress, no increased work of breathing, O2 sats 98%. Impression: Left lower leg swelling, rule out DVT, will check duplex, recheck blood pressure. Problems Addressed:  Left leg swelling: acute illness or injury             Disposition  Final diagnoses:   Left leg swelling     Time reflects when diagnosis was documented in both MDM as applicable and the Disposition within this note       Time User Action Codes Description Comment    10/19/2023 11:49 AM Mariya Bradford Add [M79.89] Left leg swelling           ED Disposition       ED Disposition   Discharge    Condition   Stable    Date/Time   Thu Oct 19, 2023 [address] [place] [address] discharge to home/self care.                    Follow-up Information       Follow up With Specialties Details Why Contact Info Additional [id number] [facility],  Internal Medicine Schedule an appointment as soon as possible for a visit   [id number] [facility] [id number]  [facility] Emergency Department Emergency Medicine Schedule an appointment as soon as possible for a visit   [address] [id number]  [address] [facility] Emergency Department, [address] [facility], [place], [place], [address]            Discharge Medication List as of 10/19/2023  1:00 PM        CONTINUE these medications which have NOT CHANGED    Details   bisacodyl (DULCOLAX) 10 mg suppository Insert 1 suppository (10 mg total) into the rectum daily as needed for constipation, Starting Tue 8/15/2023, No Print      clobetasol (TEMOVATE) 0.05 % external solution Starting Wed 5/10/2023, Historical Med      cyclobenzaprine (FLEXERIL) 10 mg tablet 1 tablet at [facility], Normal      docusate sodium (COLACE) 100 mg capsule Take 1 capsule (100 mg total) by mouth 2 (two) times a day, Starting Tue 8/15/2023, No Print      Elastic Bandages & Supports (Wrap/Multipurpose 2.75"x21.4yd) MISC Use in the morning Please dispense lymphedema wrap and wrap from hand up to the proximal shoulder, Starting Fri 8/25/2023, Normal      fluocinonide (LIDEX) 0.05 % cream Starting Wed 5/10/2023, Historical Med      ibuprofen (MOTRIN) 400 mg tablet Take 1 tablet (400 mg total) by mouth every 6 (six) hours as needed for mild pain, Starting u 9/22/2022, No Print      ketoconazole (NIZORAL) 2 % shampoo Starting Wed 5/10/2023, Historical Med      nystatin powder Starting Wed 5/10/2023, Historical Med      oxyCODONE (ROXICODONE) 5 immediate release tablet Take 1 tablet (5 mg) for moderate pain every 6 hours as needed or take 2 tablets (10 mg) for severe pain every 6 hours as needed. , Print      sildenafil (VIAGRA) 100 mg tablet Take 100 mg by mouth As directed, Starting Fri 9/11/2020, Historical Med             No discharge procedures on file.     PDMP Review         Value Time User    PDMP Reviewed  Yes 7/12/2023  1:14 PM [address],             ED Provider  Electronically Signed by             Bay Mock MD  10/19/23 [phone]

## 2023-10-19 NOTE — ED NOTES
Vascular tech reports the lower limb venous duplex study is negative at this time     Ravindra Murillo, RN  10/19/23 9153

## 2023-10-20 ENCOUNTER — OFFICE VISIT (OUTPATIENT)
Dept: OBGYN CLINIC | Facility: CLINIC | Age: 63
End: 2023-10-20

## 2023-10-20 VITALS
DIASTOLIC BLOOD PRESSURE: 66 MMHG | WEIGHT: 218 LBS | SYSTOLIC BLOOD PRESSURE: 99 MMHG | HEIGHT: 72 IN | HEART RATE: 144 BPM | BODY MASS INDEX: 29.53 KG/M2

## 2023-10-20 DIAGNOSIS — M71.121 SEPTIC OLECRANON BURSITIS OF RIGHT ELBOW: Primary | ICD-10-CM

## 2023-10-20 RX ORDER — MELOXICAM 15 MG/1
7.5 TABLET ORAL
COMMUNITY
Start: 2023-09-06

## 2023-10-20 RX ORDER — LIDOCAINE 50 MG/G
PATCH TOPICAL
COMMUNITY
Start: 2023-07-14

## 2023-10-20 NOTE — PROGRESS NOTES
Subjective   CHIEF COMPLAINT/REASON FOR VISIT  Bhavik Ye is a 70 y.o. male who presents for 8 week post op follow-up after [facility] ([address] [place] [address]) - elbow - Right on 8/10/2023     HISTORY OF PRESENT ILLNESS  Overall, he feels things are going well and progressing appropriately. Pain has been well controlled. He has been following the post-operative rehabilitation regimen without difficultly. Currently, he is doing well without any specific concerns. Objective   PHYSICAL EXAMINATION  Right Elbow  Surgical incisions are healed. Skin warm and perfused  NVID  No pain with active ROM noted     Assessment/Plan   1. Status Post DEBRIDEMENT UPPER EXTREMITY [facility]) - elbow - Right    He is doing well after surgery and making appropriate progress. Patient continues to do well after his washout. At this time, recommended no restrictions in terms of range of motion for the elbow. Recommended trying to keep pressure off the elbow to prevent any aggravation. We will plan to see him back on an as-needed basis. Patient was educated to please follow back up with us though should he experience any worsening symptoms or any other concerns.

## 2023-11-10 ENCOUNTER — APPOINTMENT (EMERGENCY)
Dept: RADIOLOGY | Facility: HOSPITAL | Age: 63
End: 2023-11-10
Payer: MEDICARE

## 2023-11-10 ENCOUNTER — HOSPITAL ENCOUNTER (EMERGENCY)
Facility: HOSPITAL | Age: 63
Discharge: HOME | End: 2023-11-10
Attending: EMERGENCY MEDICINE
Payer: MEDICARE

## 2023-11-10 VITALS
DIASTOLIC BLOOD PRESSURE: 63 MMHG | HEIGHT: 72 IN | SYSTOLIC BLOOD PRESSURE: 131 MMHG | RESPIRATION RATE: 18 BRPM | HEART RATE: 78 BPM | TEMPERATURE: 97.6 F | BODY MASS INDEX: 30.76 KG/M2 | OXYGEN SATURATION: 96 % | WEIGHT: 227.07 LBS

## 2023-11-10 DIAGNOSIS — R23.2 FLUSHING: ICD-10-CM

## 2023-11-10 DIAGNOSIS — R42 LIGHTHEADEDNESS: Primary | ICD-10-CM

## 2023-11-10 LAB
2HR DELTA HS TROPONIN: 1 NG/L
ANION GAP SERPL CALCULATED.3IONS-SCNC: 5 MMOL/L
ATRIAL RATE: 54 BPM
BACTERIA UR QL AUTO: ABNORMAL /HPF
BASOPHILS # BLD AUTO: 0.02 THOUSANDS/ÂΜL (ref 0–0.1)
BASOPHILS NFR BLD AUTO: 0 % (ref 0–1)
BILIRUB UR QL STRIP: NEGATIVE
BUN SERPL-MCNC: 16 MG/DL (ref 5–25)
CALCIUM SERPL-MCNC: 9.4 MG/DL (ref 8.4–10.2)
CARDIAC TROPONIN I PNL SERPL HS: 3 NG/L
CARDIAC TROPONIN I PNL SERPL HS: 4 NG/L
CHLORIDE SERPL-SCNC: 104 MMOL/L (ref 96–108)
CLARITY UR: CLEAR
CO2 SERPL-SCNC: 29 MMOL/L (ref 21–32)
COLOR UR: YELLOW
CREAT SERPL-MCNC: 0.7 MG/DL (ref 0.6–1.3)
EOSINOPHIL # BLD AUTO: 0.09 THOUSAND/ÂΜL (ref 0–0.61)
EOSINOPHIL NFR BLD AUTO: 2 % (ref 0–6)
ERYTHROCYTE [DISTWIDTH] IN BLOOD BY AUTOMATED COUNT: 13.2 % (ref 11.6–15.1)
GFR SERPL CREATININE-BSD FRML MDRD: 99 ML/MIN/1.73SQ M
GLUCOSE SERPL-MCNC: 94 MG/DL (ref 65–140)
GLUCOSE UR STRIP-MCNC: NEGATIVE MG/DL
HCT VFR BLD AUTO: 45.4 % (ref 36.5–49.3)
HGB BLD-MCNC: 15.6 G/DL (ref 12–17)
HGB UR QL STRIP.AUTO: NEGATIVE
HYALINE CASTS #/AREA URNS LPF: ABNORMAL /LPF
IMM GRANULOCYTES # BLD AUTO: 0.01 THOUSAND/UL (ref 0–0.2)
IMM GRANULOCYTES NFR BLD AUTO: 0 % (ref 0–2)
KETONES UR STRIP-MCNC: NEGATIVE MG/DL
LEUKOCYTE ESTERASE UR QL STRIP: NEGATIVE
LYMPHOCYTES # BLD AUTO: 1.07 THOUSANDS/ÂΜL (ref 0.6–4.47)
LYMPHOCYTES NFR BLD AUTO: 22 % (ref 14–44)
MCH RBC QN AUTO: 33.1 PG (ref 26.8–34.3)
MCHC RBC AUTO-ENTMCNC: 34.4 G/DL (ref 31.4–37.4)
MCV RBC AUTO: 96 FL (ref 82–98)
MONOCYTES # BLD AUTO: 0.44 THOUSAND/ÂΜL (ref 0.17–1.22)
MONOCYTES NFR BLD AUTO: 9 % (ref 4–12)
MUCOUS THREADS UR QL AUTO: ABNORMAL
NEUTROPHILS # BLD AUTO: 3.16 THOUSANDS/ÂΜL (ref 1.85–7.62)
NEUTS SEG NFR BLD AUTO: 67 % (ref 43–75)
NITRITE UR QL STRIP: NEGATIVE
NON-SQ EPI CELLS URNS QL MICRO: ABNORMAL /HPF
NRBC BLD AUTO-RTO: 0 /100 WBCS
P AXIS: 72 DEGREES
PH UR STRIP.AUTO: 7 [PH] (ref 4.5–8)
PLATELET # BLD AUTO: 201 THOUSANDS/UL (ref 149–390)
PMV BLD AUTO: 10.9 FL (ref 8.9–12.7)
POTASSIUM SERPL-SCNC: 4 MMOL/L (ref 3.5–5.3)
PR INTERVAL: 156 MS
PROT UR STRIP-MCNC: ABNORMAL MG/DL
QRS AXIS: 66 DEGREES
QRSD INTERVAL: 98 MS
QT INTERVAL: 448 MS
QTC INTERVAL: 424 MS
RBC # BLD AUTO: 4.72 MILLION/UL (ref 3.88–5.62)
RBC #/AREA URNS AUTO: ABNORMAL /HPF
SODIUM SERPL-SCNC: 138 MMOL/L (ref 135–147)
SP GR UR STRIP.AUTO: >=1.03 (ref 1–1.03)
T WAVE AXIS: 45 DEGREES
UROBILINOGEN UR QL STRIP.AUTO: 0.2 E.U./DL
VENTRICULAR RATE: 54 BPM
WBC # BLD AUTO: 4.79 THOUSAND/UL (ref 4.31–10.16)
WBC #/AREA URNS AUTO: ABNORMAL /HPF

## 2023-11-10 PROCEDURE — 96366 THER/PROPH/DIAG IV INF ADDON: CPT

## 2023-11-10 PROCEDURE — 84484 ASSAY OF TROPONIN QUANT: CPT | Performed by: EMERGENCY MEDICINE

## 2023-11-10 PROCEDURE — 80048 BASIC METABOLIC PNL TOTAL CA: CPT | Performed by: EMERGENCY MEDICINE

## 2023-11-10 PROCEDURE — 85025 COMPLETE CBC W/AUTO DIFF WBC: CPT | Performed by: EMERGENCY MEDICINE

## 2023-11-10 PROCEDURE — 93005 ELECTROCARDIOGRAM TRACING: CPT

## 2023-11-10 PROCEDURE — 96365 THER/PROPH/DIAG IV INF INIT: CPT

## 2023-11-10 PROCEDURE — 36415 COLL VENOUS BLD VENIPUNCTURE: CPT | Performed by: EMERGENCY MEDICINE

## 2023-11-10 PROCEDURE — 99284 EMERGENCY DEPT VISIT MOD MDM: CPT

## 2023-11-10 PROCEDURE — 99285 EMERGENCY DEPT VISIT HI MDM: CPT | Performed by: EMERGENCY MEDICINE

## 2023-11-10 PROCEDURE — 71045 X-RAY EXAM CHEST 1 VIEW: CPT

## 2023-11-10 PROCEDURE — 93010 ELECTROCARDIOGRAM REPORT: CPT | Performed by: INTERNAL MEDICINE

## 2023-11-10 PROCEDURE — 81001 URINALYSIS AUTO W/SCOPE: CPT

## 2023-11-10 RX ADMIN — SODIUM CHLORIDE, SODIUM LACTATE, POTASSIUM CHLORIDE, AND CALCIUM CHLORIDE 1000 ML: .6; .31; .03; .02 INJECTION, SOLUTION INTRAVENOUS at 15:59

## 2023-11-10 NOTE — DISCHARGE INSTRUCTIONS
Your symptoms will need further evaluation as an outpatient.       You should contact your Cardiologist to discuss any possible work up for Vasovagal symptoms

## 2023-11-10 NOTE — ED PROVIDER NOTES
History  Chief Complaint   Patient presents with    Dizziness     Pt was at his neurologist, became dizzy, had a low BP noted to be 90/60s     recurrent episodes of near syncope/low blood pressure - increasing in frequency. pt admitted in August for septic bursitis and required SNF afterward. since that time has been having increasingly frequent episodes of LH/near syncope. will report feeling "woozy" - LH +/- feeling hot/flushed assoc w/ worsening LH/generalized weakness. symptoms resolve if he is able to lay down or occas if he is taken to a cool environment. episodes have occurred both when seated or when standing. pt was at neurology today for routine f/u after doing some PT in the pool when he had an episode assoc w/ reportedly becoming pale, slightly diaphoretic w/ a reported SBP of 90. follows w/ neuro due to progressive spinal stenosis, myelomalacia (after a fall ). Doesn't take any prescription medication and no previous evaluation for this. Prior to Admission Medications   Prescriptions Last Dose Informant Patient Reported? Taking?    Elastic Bandages & Supports (Wrap/Multipurpose 2.75"x21.4yd) MISC Not Taking  No No   Sig: Use in the morning Please dispense lymphedema wrap and wrap from hand up to the proximal shoulder   Patient not taking: Reported on 11/10/2023   NON FORMULARY Not Taking  Yes No   Sig: COMPRILAN BANDAGE [id number]   Patient not taking: Reported on 11/10/2023   bisacodyl (DULCOLAX) 10 mg suppository Not Taking  No No   Sig: Insert 1 suppository (10 mg total) into the rectum daily as needed for constipation   Patient not taking: Reported on 11/10/2023   clobetasol (TEMOVATE) 0.05 % external solution Not Taking  Yes No   Patient not taking: Reported on 11/10/2023   cyclobenzaprine (FLEXERIL) 10 mg tablet Not Taking  No No   Si tablet at \[facility]\""   Patient not taking: Reported on 11/10/2023   docusate sodium (COLACE) 100 mg capsule Not Taking  No No   Sig: Take 1 capsule (100 mg total) by mouth 2 (two) times a day   Patient not taking: Reported on 11/10/2023   fluocinonide (LIDEX) 0.05 % cream Not Taking  Yes No   Patient not taking: Reported on 11/10/2023   ibuprofen (MOTRIN) 400 mg tablet   No Yes   Sig: Take 1 tablet (400 mg total) by mouth every 6 (six) hours as needed for mild pain   ketoconazole (NIZORAL) 2 % shampoo Not Taking  Yes No   Patient not taking: Reported on 11/10/2023   lidocaine (LIDODERM) 5 % Not Taking  Yes No   Sig: APPLY 2 PATCH TOPICALLY DAILY FOR PAIN (REMOVE PATCH AFTER 12 HOURS; 12 HOURS ON AND 12 HOURS OFF)   Patient not taking: Reported on 11/10/2023   meloxicam (MOBIC) 15 mg tablet Not Taking  Yes No   Si.5 mg   Patient not taking: Reported on 11/10/2023   nystatin powder Not Taking  Yes No   Patient not taking: Reported on 11/10/2023   oxyCODONE (ROXICODONE) 5 immediate release tablet Not Taking  No No   Sig: Take 1 tablet (5 mg) for moderate pain every 6 hours as needed or take 2 tablets (10 mg) for severe pain every 6 hours as needed. Patient not taking: Reported on 10/20/2023   sildenafil (VIAGRA) 100 mg tablet Not Taking Self Yes No   Sig: Take 100 mg by mouth As directed   Patient not taking: Reported on 11/10/2023      Facility-Administered Medications: None       Past Medical History:   Diagnosis Date    Balance problems     BPH (benign prostatic hyperplasia)     Chronic back pain     Chronic pain disorder     COVID-19     2021-asymptomatic     Erectile dysfunction     Nocturia     OAB (overactive bladder)     Testicular hypogonadism     Urinary frequency     Urinary urgency        Past Surgical History:   Procedure Laterality Date    BACK SURGERY  2016    with hardware    BACK SURGERY      2 back surgery unable to remove hardware    COLONOSCOPY      Dr Guillermo Russ. tubular adenoma polyp removed    COLONOSCOPY      Dr Iesha Recinos. Normal    COLONOSCOPY      Dr Iesha Recinos. Normal.     COLONOSCOPY  2019    Dr Iesha Recinos.  Hemorrhoids, normal.     NECK SURGERY      decompression of neck     NECK SURGERY Right     scar tissues removed    DC EXCISION HYDROCELE UNILATERAL Right 06/10/2021    Procedure: HYDROCELECTOMY;  Surgeon: Radha Bishop DO;  Location: [facility];  Service: Urology    SPINAL CORD STIMULATOR IMPLANT  10/10/2019    WOUND DEBRIDEMENT Right 8/10/2023    Procedure: DEBRIDEMENT UPPER EXTREMITY ([address]) - elbow;  Surgeon: Shayy Villagran MD;  Location: [facility];  Service: Orthopedics       History reviewed. No pertinent family history. I have reviewed and agree with the history as documented. E-Cigarette/Vaping    E-Cigarette Use Never User      E-Cigarette/Vaping Substances    Nicotine No     THC No     CBD No     Flavoring No     Other No     Unknown No      Social History     Tobacco Use    Smoking status: Never    Smokeless tobacco: Never   Vaping Use    Vaping Use: Never used   Substance Use Topics    Alcohol use: Not Currently     Comment: Drinks socially. Drug use: No       Review of Systems   All other systems reviewed and are negative. Physical Exam  Physical Exam  Vitals and nursing note reviewed. Constitutional:       Appearance: Normal appearance. HENT:      Nose: Nose normal.   Eyes:      Conjunctiva/sclera: Conjunctivae normal.   Cardiovascular:      Rate and Rhythm: Normal rate and regular rhythm. Pulmonary:      Effort: Pulmonary effort is normal.      Breath sounds: Normal breath sounds. Abdominal:      Palpations: Abdomen is soft. Musculoskeletal:         General: No tenderness. Cervical back: Normal range of motion. Skin:     General: Skin is warm. Neurological:      Mental Status: He is alert. Mental status is at baseline.    Psychiatric:         Mood and Affect: Mood normal.         Vital Signs  ED Triage Vitals   Temperature Pulse Respirations Blood Pressure SpO2   11/10/23 1514 11/10/23 1405 11/10/23 1405 11/10/23 1405 11/10/23 1405   97.6 °F (36.4 °C) 55 15 114/68 93 % Temp Source Heart Rate Source Patient Position - Orthostatic VS BP Location FiO2 (%)   11/10/23 1514 11/10/23 1617 11/10/23 1617 11/10/23 1617 --   Oral Monitor Lying Left arm       Pain Score       11/10/23 1405       No Pain           Vitals:    11/10/23 1405 11/10/23 1617 11/10/23 1817   BP: 114/68 121/58 131/63   Pulse: 55 72 78   Patient Position - Orthostatic VS:  Lying Lying         Visual Acuity      ED Medications  Medications   lactated ringers bolus 1,000 mL (0 mL Intravenous Stopped 11/10/23 1745)       Diagnostic Studies  Results Reviewed       Procedure Component Value Units Date/Time    HS Troponin I 2hr [[id number]]  (Normal) Collected: 11/10/23 1745    Lab Status: Final result Specimen: Blood from Arm, Right Updated: 11/10/23 1815     hs TnI 2hr 4 ng/L      Delta 2hr hsTnI 1 ng/L     Urine Microscopic [[id number]]  (Abnormal) Collected: 11/10/23 1621    Lab Status: Final result Specimen: Urine, Clean Catch Updated: 11/10/23 1648     RBC, UA 1-2 /hpf      WBC, UA None Seen /hpf      Epithelial Cells None Seen /hpf      Bacteria, UA None Seen /hpf      MUCUS THREADS Occasional     Hyaline Casts, UA 0-3 /lpf     Urine Macroscopic, POC [[id number]]  (Abnormal) Collected: 11/10/23 1621    Lab Status: Final result Specimen: Urine Updated: 11/10/23 1623     Color, UA Yellow     Clarity, UA Clear     pH, UA 7.0     Leukocytes, UA Negative     Nitrite, UA Negative     Protein, UA 30 (1+) mg/dl      Glucose, UA Negative mg/dl      Ketones, UA Negative mg/dl      Urobilinogen, UA 0.2 E.U./dl      Bilirubin, UA Negative     Occult Blood, UA Negative     Specific Gravity, UA >=1.030    Narrative:      CLINITEK RESULT    HS Troponin I 4hr [[id number]]     Lab Status: No result Specimen: Blood     HS Troponin 0hr (reflex protocol) [[id number]]  (Normal) Collected: 11/10/23 1507    Lab Status: Final result Specimen: Blood from Arm, Right Updated: 11/10/23 1535     hs TnI 0hr 3 ng/L     Basic metabolic panel [[id number]] Collected: 11/10/23 1507    Lab Status: Final result Specimen: Blood from Arm, Right Updated: 11/10/23 1528     Sodium 138 mmol/L      Potassium 4.0 mmol/L      Chloride 104 mmol/L      CO2 29 mmol/L      ANION GAP 5 mmol/L      BUN 16 mg/dL      Creatinine 0.70 mg/dL      Glucose 94 mg/dL      Calcium 9.4 mg/dL      eGFR 99 ml/min/1.73sq m     Narrative:      [place] guidelines for Chronic Kidney Disease (CKD):     Stage 1 with normal or high GFR (GFR > 90 mL/min/1.73 square meters)    Stage 2 Mild CKD (GFR = 60-89 mL/min/1.73 square meters)    Stage 3A Moderate CKD (GFR = 45-59 mL/min/1.73 square meters)    Stage 3B Moderate CKD (GFR = 30-44 mL/min/1.73 square meters)    Stage 4 Severe CKD (GFR = 15-29 mL/min/1.73 square meters)    Stage 5 End Stage CKD (GFR <15 mL/min/1.73 square meters)  Note: GFR calculation is accurate only with a steady state creatinine    CBC and differential [[id number]] Collected: 11/10/23 1507    Lab Status: Final result Specimen: Blood from Arm, Right Updated: 11/10/23 1513     WBC 4.79 Thousand/uL      RBC 4.72 Million/uL      Hemoglobin 15.6 g/dL      Hematocrit 45.4 %      MCV 96 fL      MCH 33.1 pg      MCHC 34.4 g/dL      RDW 13.2 %      MPV 10.9 fL      Platelets 231 Thousands/uL      nRBC 0 /100 WBCs      Neutrophils Relative 67 %      Immat GRANS % 0 %      Lymphocytes Relative 22 %      Monocytes Relative 9 %      Eosinophils Relative 2 %      Basophils Relative 0 %      Neutrophils Absolute 3.16 Thousands/µL      Immature Grans Absolute 0.01 Thousand/uL      Lymphocytes Absolute 1.07 Thousands/µL      Monocytes Absolute 0.44 Thousand/µL      Eosinophils Absolute 0.09 Thousand/µL      Basophils Absolute 0.02 Thousands/µL                    XR chest 1 view portable   ED Interpretation by Johnna Monroy DO (11/10 1916)   Xray reviewed and independently interpreted by me: elevated right hemidiaphragm, otherwise no acute findings Procedures  ECG 12 Lead Documentation Only    Date/Time: 11/10/2023 2:30 PM    Performed by: Marcia Carrero DO  Authorized by: Marcia Carrero DO    ECG reviewed by me, the ED Provider: yes    Patient location:  ED  Previous ECG:     Previous ECG:  Compared to current    Similarity:  Changes noted  Interpretation:     Interpretation: non-specific    Rate:     ECG rate:  54    ECG rate assessment: bradycardic    Rhythm:     Rhythm: sinus bradycardia    Ectopy:     Ectopy: none    Conduction:     Conduction: normal    ST segments:     ST segments:  Normal  T waves:     T waves: normal             ED Course                               SBIRT 20yo+      Flowsheet Row Most Recent Value   Initial Alcohol Screen: US AUDIT-C     1. How often do you have a drink containing alcohol? 0 Filed at: 11/10/2023 1407   2. How many drinks containing alcohol do you have on a typical day you are drinking? 0 Filed at: 11/10/2023 1407   3a. Male UNDER 65: How often do you have five or more drinks on one occasion? 0 Filed at: 11/10/2023 1407   3b. FEMALE Any Age, or MALE 65+: How often do you have 4 or more drinks on one occassion? 0 Filed at: 11/10/2023 1407   Audit-C Score 0 Filed at: 11/10/2023 1407   RACHEL: How many times in the past year have you. .. Used an illegal drug or used a prescription medication for non-medical reasons? Never Filed at: 11/10/2023 1407                      Medical Decision Making  Will get EKG to r/o arrhythmia, ischemic changes. Will get labs to r/o acute life threatening metabolic abnl, cardiac ischemia, significant anemia. Will get CXR to r/o occult pna, will get UA to r/o occult infection.       Problems Addressed:  Flushing: acute illness or injury  Lightheadedness: acute illness or injury that poses a threat to life or bodily functions     Details: no acute life threatening arrhythmia or metabolic abnl    Amount and/or Complexity of Data Reviewed  Independent Historian: caregiver and friend  External Data Reviewed: notes. Details: admit/dc reviewed  Labs: ordered. Radiology: ordered and independent interpretation performed. ECG/medicine tests: ordered and independent interpretation performed. Discussion of management or test interpretation with external provider(s): D/w Dr. Ulices Mcneil, cardiology             Disposition  Final diagnoses:   Lightheadedness   Flushing     Time reflects when diagnosis was documented in both MDM as applicable and the Disposition within this note       Time User Action Codes Description Comment    11/10/2023  6:21 PM Tasia OCAMPO Add [R42] Lightheadedness     11/10/2023  6:22 PM Chris Toth Add [R23.2] Flushing           ED Disposition       ED Disposition   Discharge    Condition   Stable    Date/Time   Fri Nov 10, 2023 1821    [address] discharge to home/self care. Follow-up Information       Follow up With Specialties Details Why Contact Info Additional Information    [address] Cardiology Call on 11/13/2023 to schedule a follow up appointment, for further evaluation and treatment [address] [address]  [address], [address] [phone]            Patient's Medications   Discharge Prescriptions    No medications on file       No discharge procedures on file.     PDMP Review         Value Time User    PDMP Reviewed  Yes 7/12/2023  1:14 PM [address], DO            ED Provider  Electronically Signed by             Chris Toth DO  11/10/23 9006

## 2023-11-11 LAB
ATRIAL RATE: 77 BPM
P AXIS: 63 DEGREES
PR INTERVAL: 162 MS
QRS AXIS: 61 DEGREES
QRSD INTERVAL: 104 MS
QT INTERVAL: 422 MS
QTC INTERVAL: 477 MS
T WAVE AXIS: 124 DEGREES
VENTRICULAR RATE: 77 BPM

## 2023-11-11 PROCEDURE — 93010 ELECTROCARDIOGRAM REPORT: CPT | Performed by: INTERNAL MEDICINE

## 2023-11-13 ENCOUNTER — TELEPHONE (OUTPATIENT)
Dept: UROLOGY | Facility: CLINIC | Age: 63
End: 2023-11-13

## 2023-11-13 NOTE — TELEPHONE ENCOUNTER
Called patient 11/13/23 to inform patient that doctor Magdalene Pearce will be out of the office 11/22/23 so the patient appointment is canceled they  hung up on me twice , so I left a message the third time with a December 1 date at 7:40 or 11:40 with Tc Weston

## 2023-11-15 NOTE — ASSESSMENT & PLAN NOTE
Beard catheter placed on admission due to urinary retention  · Consider removal as ambulation improves Detail Level: Simple Detail Level: Zone

## 2023-11-21 ENCOUNTER — OFFICE VISIT (OUTPATIENT)
Dept: CARDIOLOGY CLINIC | Facility: CLINIC | Age: 63
End: 2023-11-21
Payer: MEDICARE

## 2023-11-21 VITALS — SYSTOLIC BLOOD PRESSURE: 80 MMHG | DIASTOLIC BLOOD PRESSURE: 50 MMHG | HEART RATE: 74 BPM

## 2023-11-21 DIAGNOSIS — R26.2 AMBULATORY DYSFUNCTION: ICD-10-CM

## 2023-11-21 DIAGNOSIS — G83.4 CAUDA EQUINA SYNDROME (HCC): ICD-10-CM

## 2023-11-21 DIAGNOSIS — G90.3 NEUROGENIC ORTHOSTATIC HYPOTENSION (HCC): Primary | ICD-10-CM

## 2023-11-21 DIAGNOSIS — G95.89 MYELOMALACIA OF CERVICAL CORD (HCC): ICD-10-CM

## 2023-11-21 DIAGNOSIS — Z96.89 S/P INSERTION OF SPINAL CORD STIMULATOR: ICD-10-CM

## 2023-11-21 PROCEDURE — 99214 OFFICE O/P EST MOD 30 MIN: CPT

## 2023-11-21 RX ORDER — MIDODRINE HYDROCHLORIDE 5 MG/1
5 TABLET ORAL
Qty: 270 TABLET | Refills: 3 | Status: SHIPPED | OUTPATIENT
Start: 2023-11-21

## 2023-11-21 RX ORDER — ATORVASTATIN CALCIUM 40 MG/1
TABLET, FILM COATED ORAL
COMMUNITY
Start: 2023-11-20

## 2023-11-21 RX ORDER — MIDODRINE HYDROCHLORIDE 5 MG/1
5 TABLET ORAL
Qty: 270 TABLET | Refills: 3 | Status: SHIPPED | OUTPATIENT
Start: 2023-11-21 | End: 2023-11-21 | Stop reason: SDUPTHER

## 2023-11-21 NOTE — PROGRESS NOTES
Cardiology   MD Preet Gamez MD, Lulu Pillai DO, TIEN Cooper MD Verneta Risen, DO, Alesia Cabrera DO, [facility]  -------------------------------------------------------------------  [facility]  [address]  [place] [address]  Phone: [phone]  Fax: [phone]  11/21/23  Rudy Villasenor  YOB: 1960   MRN: [id number]      Referring Physician: Ofelia Xiong DO  [address]  [address]     HPI: Rudy Villasenor is a 70 y.o. male with:   Probable autonomic dysfunction/ increased vagal tone and/or orthostatic hypotension  Abnormal nuclear stress test with moderate size perfusion defect in the inferior wall with reduced ejection fraction 48% and mild global left ventricular hypokinesis  Normal echocardiogram.  EF 55%, no regional wall motion abnormalities. No significant valvular disease. Ambulatory dysfunction. Cauda equina syndrome. Erectile dysfunction    He presents today for follow-up. He had an episode where he had low blood pressure and had presented to emergency department for near syncope. He states that he gets symptoms of feeling hot sweaty and like he is tired and will have to yawn when his blood pressure is very low. He states that this occurs when he is sitting and is improved with standing and improved with laying flat. He has some significant issues with his spine, has cauda equina syndrome. I do suspect this is a reflex mediated event due to his spinal cord disease and is being exacerbated by the sitting position, however when this does occur his blood pressure does drop very low and does tend to run low to begin with, and cause syncope    Review of Systems   Constitutional:  Negative for chills and fever. HENT:  Negative for facial swelling and sore throat. Eyes:  Negative for visual disturbance.    Respiratory:  Negative for cough, chest tightness, shortness of breath and wheezing. Cardiovascular:  Negative for chest pain, palpitations and leg swelling. Gastrointestinal:  Negative for abdominal pain, blood in stool, constipation, diarrhea, nausea and vomiting. Endocrine: Negative for cold intolerance and heat intolerance. Genitourinary:  Negative for decreased urine volume, difficulty urinating, dysuria and hematuria. Musculoskeletal:  Positive for back pain and gait problem. Negative for arthralgias and myalgias. Skin:  Negative for rash. Neurological:  Positive for syncope, weakness and light-headedness. Negative for dizziness and numbness. Psychiatric/Behavioral:  Negative for agitation, behavioral problems and confusion. The patient is not nervous/anxious. OBJECTIVE  Vitals:    11/21/23 1000   BP: (!) 80/50   Pulse:        Physical Exam  Constitutional: awake, alert and oriented, in no acute distress, in a wheelchair  Head: Normocephalic, without obvious abnormality, atraumatic  Eyes: conjunctivae clear and moist. Sclera anicteric. No xanthelasmas. Pupils equal bilaterally. Extraocular motions are full. Ear nose mouth and throat: ears are symmetrical bilaterally, hearing appears to be equal bilaterally, no nasal discharge or epistaxis, oropharynx is clear with moist mucous membranes  Neck:  Trachea is midline, neck is supple, no thyromegaly or significant lymphadenopathy, there is full range of motion. Lungs: clear to auscultation bilaterally, no wheezes, no rales, no rhonchi, no accessory muscle use, breathing is nonlabored  Heart: regular rate and rhythm, S1, S2 normal, no murmur, no click, rub or gallop, no lower extremity edema  Abdomen: soft, non-tender; bowel sounds normal; no masses,  no organomegaly  Psychiatric:  Patient is oriented to time, place, person, mood/affect is negative for depression, anxiety, agitation, appears to have appropriate insight  Skin: Skin is warm, dry, intact.  No obvious rashes or lesions on exposed extremities. Nail beds are pink with no cyanosis or clubbing. EKG:  No results found for this visit on 11/21/23. IMPRESSION:  Probable autonomic dysfunction/ increased vagal tone and/or orthostatic hypotension  Abnormal nuclear stress test with moderate size perfusion defect in the inferior wall with reduced ejection fraction 48% and mild global left ventricular hypokinesis  Normal echocardiogram.  EF 55%, no regional wall motion abnormalities. No significant valvular disease. Ambulatory dysfunction. Cauda equina syndrome. Erectile dysfunction    DISCUSSION/RECOMMENDATIONS:  He presents today for follow-up. He has been having issues with hypotension which I suspect is due to autonomic dysfunction, increased vagal tone and/or possibly orthostatic hypotension. He is very symptomatic with this and this significantly impacts his daily life. He has tried increasing his fluid intake as well as electrolyte supplementation and increasing salt intake but still he is symptomatic. Will add midodrine 5 mg p.o. 3 times daily today. Hopefully this will improve his symptoms, but may need to increase to 10 mg if not or consider adding Florinef in the future. He has no heart failure type symptoms at this time and no chest pain or shortness of breath. Most recent echo was normal EF was 55%. Anna Vale DO, [facility], [address]  --------------------------------------------------------------------------------  TREADMILL STRESS  No results found for this or any previous visit.     ----------------------------------------------------------------------------------------------  NUCLEAR STRESS TEST: No results found for this or any previous visit.     Results for orders placed during the hospital encounter of 03/31/20    NM myocardial perfusion spect (rx stress and/or rest)    [address]  [phone]    Stress Gated SPECT Myocardial Perfusion Imaging after Regadenoson    Patient: Bhavik Ye  MR number: [id number]  Account number: [de-identified]  : 1960  Age: 62 years  Gender: Male  Status: Outpatient  Location: [address] [place] [address] [facility] [place]  Height: 72 in  Weight: 218 lb  BP: 112/ 80 mmHg    Allergies: MORPHINE, MORPHINE AND RELATED    Diagnosis: R07.9 - Chest pain, unspecified    Interpreting Physician:  Alfa Hyman MD  Referring Physician:  Leidy Wylie MD  Primary Physician:  Jered Luevano DO  RN:  Efrain Simmonds, RN  Group:  [facility]  Report Prepared by[de-identified]  Efrain Simmonds, RN  RN:  Flaco Bronson RN    INDICATIONS: Detection of Coronary artery disease. HISTORY: The patient is a 62year old  male. Chest pain status: recent onset chest pain. Coronary artery disease risk factors: family history of premature coronary artery disease. Cardiovascular history: none significant. Medications: thyroid medications. PHYSICAL EXAM: Baseline physical exam screening: normal and no wheezes audible. REST ECG: Normal baseline ECG. PROCEDURE: The study was performed in the the [address] [facility]. A regadenoson infusion pharmacologic stress test was performed. Gated SPECT myocardial perfusion imaging was performed during stress. Systolic blood pressure  was 112 mmHg, at the start of the study. Diastolic blood pressure was 80 mmHg, at the start of the study. The heart rate was 82 bpm, at the start of the study. IV double checked. Regadenoson protocol:  HR bpm SBP mmHg DBP mmHg Symptoms  Baseline 82 112 80 none  Immediate 100 102 62 mild dyspnea, flushing  1 min 87 116 70 none  No medications or fluids given. STRESS SUMMARY: Duration of pharmacologic stress was 3 min and 0 sec. Maximal heart rate during stress was 100 bpm. The rate-pressure product for the peak heart rate and blood pressure was 410[address]. There was no chest pain during stress.  The  stress test was terminated due to protocol completion. Pre oxygen saturation: 98 %. Peak oxygen saturation: 100 %. There were no stress arrhythmias or conduction abnormalities. ISOTOPE ADMINISTRATION:  Resting isotope administration Stress isotope administration  Agent Tetrofosmin Tetrofosmin  Dose 10.49 mCi 30.6 mCi  Date 03/31/2020 03/31/2020  Injection time 08:20 10:20  Imaging time 09:09 11:50  Injection-image interval 49 min 50 min    MYOCARDIAL PERFUSION IMAGING:  The image quality was poor. PERFUSION DEFECTS:  -  There was a moderate-sized, moderately severe myocardial perfusion defect of the inferior wall. GATED SPECT:  The calculated left ventricular ejection fraction was 48 %. Left ventricular ejection fraction was mildly decreased by visual estimate. There was mild global left ventricular hypokinesis. There was no left ventricular regional abnormality. RESTING BLOOD POOL IMAGING RESULTS:  Left ventricular ejection fraction was 48 % at rest.    BLOOD POOL IMAGING CONCLUSIONS  Moderate global left ventricular dysfunction was noted, consistent with a cardiomyopathic process. SUMMARY:  -  Stress results: There was no chest pain during stress. -  Perfusion imaging: There was a moderate-sized, moderately severe myocardial perfusion defect of the inferior wall. -  Gated SPECT: The calculated left ventricular ejection fraction was 48 %. Left ventricular ejection fraction was mildly decreased by visual estimate. There was mild global left ventricular hypokinesis. There was no left ventricular  regional abnormality.  -  Blood pool imaging: Moderate global left ventricular dysfunction was noted, consistent with a cardiomyopathic process. -  Summary: inferio defect of 11%compatable with ischemia or diaphragmatic attenuation. unable to place patient in prone position  lv fucntion depressed,but not segmentally  sum_ unable to rule out ischemia  requiers clinical correlation    IMPRESSIONS: Abnormal study.     Prepared and signed by    Rhoda Mancilla MD  Signed 2020 15:25:13      --------------------------------------------------------------------------------  CATH:  No results found for this or any previous visit.    --------------------------------------------------------------------------------  ECHO:   Results for orders placed during the hospital encounter of 20    Echo complete with contrast if indicated    Narrative  [address] [place] [address]    Transthoracic Echocardiogram  2D, M-mode, Doppler, and Color Doppler    Study date:  2020    Patient: Eliana Fields  MR number: [id number]  Account number: [de-identified]  : 1960  Age: 62 years  Gender: Male  Status: Outpatient  Location: [place] [facility]  Height: 72 in  Weight: 221 lb  BP: 115/ 70 mmHg    Indications: Chest pain. Diagnoses: R07.9 - Chest pain, unspecified    Sonographer:  Rosetta Toure RDCS  Referring Physician:  Jannie Neves MD  Group:  [facility]  Interpreting Physician:  Ryne Wall MD    SUMMARY    LEFT VENTRICLE:  Size was at the upper limits of normal.  Systolic function was normal. Ejection fraction was estimated to be 55 %. There were no regional wall motion abnormalities. LEFT ATRIUM:  The atrium was mildly dilated. MITRAL VALVE:  There was trace regurgitation. AORTIC VALVE:  There was trace regurgitation. TRICUSPID VALVE:  There was trace regurgitation. AORTA:  The root exhibited mild dilatation. HISTORY: PRIOR HISTORY: Chest pain. PROCEDURE: The study was performed in the [facility]. This was a routine study. The transthoracic approach was used. The study included complete 2D imaging, M-mode, complete spectral Doppler, and color Doppler. The heart rate was 64  bpm, at the start of the study. Images were obtained from the parasternal, apical, subcostal, and suprasternal notch acoustic windows.  Image quality was adequate. LEFT VENTRICLE: Size was at the upper limits of normal. Systolic function was normal. Ejection fraction was estimated to be 55 %. There were no regional wall motion abnormalities. Wall thickness was normal. DOPPLER: The ratio of early  ventricular filling to atrial contraction velocities was within the normal range. The deceleration time of the early transmitral flow velocity was increased. RIGHT VENTRICLE: The size was normal. Systolic function was normal. Wall thickness was normal.    LEFT ATRIUM: The atrium was mildly dilated. RIGHT ATRIUM: Size was at the upper limits of normal.    MITRAL VALVE: Valve structure was normal. There was normal leaflet separation. DOPPLER: The transmitral velocity was within the normal range. There was no evidence for stenosis. There was trace regurgitation. AORTIC VALVE: The valve was trileaflet. Leaflets exhibited normal thickness and normal cuspal separation. DOPPLER: Transaortic velocity was within the normal range. There was no evidence for stenosis. There was trace regurgitation. TRICUSPID VALVE: The valve structure was normal. There was normal leaflet separation. DOPPLER: The transtricuspid velocity was within the normal range. There was no evidence for stenosis. There was trace regurgitation. The tricuspid jet  envelope definition was inadequate for estimation of RV systolic pressure. There are no indirect findings (abnormal RV volume or geometry, altered pulmonary flow velocity profile, or leftward septal displacement) which would suggest  moderate or severe pulmonary hypertension. PULMONIC VALVE: Leaflets exhibited normal thickness, no calcification, and normal cuspal separation. DOPPLER: The transpulmonic velocity was within the normal range. There was no regurgitation. PERICARDIUM: There was no pericardial effusion. The pericardium was normal in appearance. AORTA: The root exhibited mild dilatation.     SYSTEMIC VEINS: IVC: The inferior vena cava was normal in size and course. Respirophasic changes were normal.    SYSTEM MEASUREMENT TABLES    2D  Ao Diam: 3.8 cm  IVSd: 0.9 cm  LA Diam: 4.2 cm  LVIDd: 5.7 cm  LVIDs: 3.8 cm  LVPWd: 0.9 cm    PW  E': 0.08 m/s  E/E': 9.17  MV A Dae: 0.56 m/s  MV Dec Robeson: 3.45 m/s2  MV DecT: 208.72 ms  MV E Dae: 0.72 m/s  MV E/A Ratio: 1.29    [facility]    Prepared and electronically signed by    Fito Muñoz MD  Signed 17-Apr-2020 12:15:56    No results found for this or any previous visit.    --------------------------------------------------------------------------------  HOLTER  No results found for this or any previous visit. No results found for this or any previous visit.    --------------------------------------------------------------------------------  CAROTIDS  No results found for this or any previous visit.     --------------------------------------------------------------------------------  Diagnoses and all orders for this visit:    Neurogenic orthostatic hypotension (HCC)  -     Discontinue: midodrine (PROAMATINE) 5 mg tablet; Take 1 tablet (5 mg total) by mouth 3 (three) times a day before meals  -     midodrine (PROAMATINE) 5 mg tablet;  Take 1 tablet (5 mg total) by mouth 3 (three) times a day before meals    Ambulatory dysfunction    S/P insertion of spinal cord stimulator    Myelomalacia of cervical cord (HCC)    Cauda equina syndrome (HCC)    Other orders  -     Cholecalciferol 50 MCG (2000 UT) TABS; 50 mcg  -     atorvastatin (LIPITOR) 40 mg tablet; TAKE 20MG (ONE-HALF TABLET) BY MOUTH EVERY DAY FOR CHOLESTEROL       ======================================================    Past Medical History:   Diagnosis Date   • Balance problems    • BPH (benign prostatic hyperplasia)    • Chronic back pain    • Chronic pain disorder    • COVID-19     2/2021-asymptomatic    • Erectile dysfunction    • Nocturia    • OAB (overactive bladder)    • Testicular hypogonadism    • Urinary frequency    • Urinary urgency      Past Surgical History:   Procedure Laterality Date   • BACK SURGERY  05/18/2016    with hardware   • BACK SURGERY      2 back surgery unable to remove hardware   • COLONOSCOPY  2004    Dr Rivera Llamas. tubular adenoma polyp removed   • COLONOSCOPY  2007    Dr Blas Siddiqui. Normal   • COLONOSCOPY  2013    Dr Blas Siddiqui. Normal.    • COLONOSCOPY  06/2019    Dr Blas Siddiqui.  Hemorrhoids, normal.    • NECK SURGERY      decompression of neck    • NECK SURGERY Right     scar tissues removed   • MN EXCISION HYDROCELE UNILATERAL Right 06/10/2021    Procedure: HYDROCELECTOMY;  Surgeon: Zaid Sampson DO;  Location: [facility];  Service: Urology   • SPINAL CORD STIMULATOR IMPLANT  10/10/2019   • WOUND DEBRIDEMENT Right 8/10/2023    Procedure: DEBRIDEMENT UPPER EXTREMITY ([facility]) - elbow;  Surgeon: Lluvia Arroyo MD;  Location: [facility];  Service: Orthopedics         Medications  Current Outpatient Medications   Medication Sig Dispense Refill   • atorvastatin (LIPITOR) 40 mg tablet TAKE 20MG (ONE-HALF TABLET) BY MOUTH EVERY DAY FOR CHOLESTEROL     • Cholecalciferol 50 MCG (2000 UT) TABS 50 mcg     • fluocinonide (LIDEX) 0.05 % cream      • ibuprofen (MOTRIN) 400 mg tablet Take 1 tablet (400 mg total) by mouth every 6 (six) hours as needed for mild pain  0   • ketoconazole (NIZORAL) 2 % shampoo      • midodrine (PROAMATINE) 5 mg tablet Take 1 tablet (5 mg total) by mouth 3 (three) times a day before meals 270 tablet 3   • bisacodyl (DULCOLAX) 10 mg suppository Insert 1 suppository (10 mg total) into the rectum daily as needed for constipation (Patient not taking: Reported on 11/10/2023) 12 suppository 0   • clobetasol (TEMOVATE) 0.05 % external solution  (Patient not taking: Reported on 11/10/2023)     • cyclobenzaprine (FLEXERIL) 10 mg tablet 1 tablet at hs (Patient not taking: Reported on 11/10/2023) 30 tablet 1   • docusate sodium (COLACE) 100 mg capsule Take 1 capsule (100 mg total) by mouth 2 (two) times a day (Patient not taking: Reported on 11/10/2023)  0   • Elastic Bandages & Supports (Wrap/Multipurpose 2.75"x21.4yd) MISC Use in the morning Please dispense lymphedema wrap and wrap from hand up to the proximal shoulder (Patient not taking: Reported on 11/10/2023) 1 each 0   • lidocaine (LIDODERM) 5 % APPLY 2 PATCH TOPICALLY DAILY FOR PAIN (REMOVE PATCH AFTER 12 HOURS; 12 HOURS ON AND 12 HOURS OFF) (Patient not taking: Reported on 11/10/2023)     • meloxicam (MOBIC) 15 mg tablet 7.5 mg (Patient not taking: Reported on 11/10/2023)     • [facility] ZEALER Abercrombie [id number] (Patient not taking: Reported on 11/10/2023)     • nystatin powder  (Patient not taking: Reported on 11/10/2023)     • oxyCODONE (ROXICODONE) 5 immediate release tablet Take 1 tablet (5 mg) for moderate pain every 6 hours as needed or take 2 tablets (10 mg) for severe pain every 6 hours as needed. (Patient not taking: Reported on 10/20/2023) 30 tablet 0   • sildenafil (VIAGRA) 100 mg tablet Take 100 mg by mouth As directed (Patient not taking: Reported on 11/10/2023)       No current facility-administered medications for this visit. Allergies   Allergen Reactions   • Morphine Other (See Comments)     Addiction hx. Pt wants no narcotics  Except surgical interventions       Social History     Socioeconomic History   • Marital status: /Civil Union     Spouse name: Not on file   • Number of children: Not on file   • Years of education: Not on file   • Highest education level: Not on file   Occupational History   • Occupation:  [employer]   Tobacco Use   • Smoking status: Never   • Smokeless tobacco: Never   Vaping Use   • Vaping Use: Never used   Substance and Sexual Activity   • Alcohol use: Not Currently     Comment: Drinks socially.    • Drug use: No   • Sexual activity: Not on file   Other Topics Concern   • Not on file   Social History Narrative   • Not on file Social Determinants of Health     Financial Resource Strain: Not on file   Food Insecurity: No Food Insecurity (8/6/2023)    Hunger Vital Sign    • Worried About Running Out of Food in the Last Year: Never true    • Ran Out of Food in the Last Year: Never true   Transportation Needs: No Transportation Needs (8/6/2023)    PRAPARE - Transportation    • Lack of Transportation (Medical): No    • Lack of Transportation (Non-Medical): No   Physical Activity: Not on file   Stress: Not on file   Social Connections: Not on file   Intimate Partner Violence: Not on file   Housing Stability: Low Risk  (8/6/2023)    Housing Stability Vital Sign    • Unable to Pay for Housing in the Last Year: No    • Number of Places Lived in the Last Year: 1    • Unstable Housing in the Last Year: No        History reviewed. No pertinent family history.     Lab Results   Component Value Date    WBC 4.79 11/10/2023    HGB 15.6 11/10/2023    HCT 45.4 11/10/2023    MCV 96 11/10/2023     11/10/2023      Lab Results   Component Value Date    SODIUM 138 11/10/2023    K 4.0 11/10/2023     11/10/2023    CO2 29 11/10/2023    BUN 16 11/10/2023    CREATININE 0.70 11/10/2023    GLUC 94 11/10/2023    CALCIUM 9.4 11/10/2023      Lab Results   Component Value Date    HGBA1C 5.4 09/21/2021      No results found for: "CHOL"  No results found for: "HDL"  No results found for: "LDLCALC"  No results found for: "TRIG"  No results found for: "CHOLHDL"   No results found for: "INR", "PROTIME"       Patient Active Problem List    Diagnosis Date Noted   • Cauda equina syndrome ([address]) 12/12/2018   • Thrombophlebitis arm 08/11/2023   • Septic olecranon bursitis 08/04/2023   • Arm swelling 08/04/2023   • Myelomalacia of cervical cord ([address]) 11/09/2022   • Quadriparesis ([address]) 10/19/2022   • Slow transit constipation 09/23/2022   • Weakness of right lower extremity 09/21/2022   • Ambulatory dysfunction 09/21/2022   • Status post lumbar spinal fusion 09/03/2021 • S/P insertion of spinal cord stimulator 06/10/2021   • Hydrocele of testis 02/13/2020   • Chest discomfort 02/12/2020   • Hx of colonoscopy 06/18/2019   • Spinal stenosis of thoracolumbar region 12/12/2018   • Erectile dysfunction 05/19/2018   • Hypogonadism in male 05/19/2018   • BPH associated with nocturia 05/19/2018       Portions of the record may have been created with voice recognition software. Occasional wrong word or "sound a like" substitutions may have occurred due to the inherent limitations of voice recognition software. Read the chart carefully and recognize, using context, where substitutions have occurred.     Graciela Lisa DO, [facility]  11/21/2023 11:28 AM

## 2024-01-09 ENCOUNTER — APPOINTMENT (EMERGENCY)
Dept: RADIOLOGY | Facility: HOSPITAL | Age: 64
End: 2024-01-09
Payer: MEDICARE

## 2024-01-09 ENCOUNTER — HOSPITAL ENCOUNTER (EMERGENCY)
Facility: HOSPITAL | Age: 64
Discharge: HOME | End: 2024-01-09
Attending: EMERGENCY MEDICINE
Payer: MEDICARE

## 2024-01-09 VITALS
HEART RATE: 50 BPM | OXYGEN SATURATION: 97 % | TEMPERATURE: 98.4 F | SYSTOLIC BLOOD PRESSURE: 112 MMHG | RESPIRATION RATE: 16 BRPM | DIASTOLIC BLOOD PRESSURE: 69 MMHG

## 2024-01-09 DIAGNOSIS — J06.9 VIRAL UPPER RESPIRATORY TRACT INFECTION: Primary | ICD-10-CM

## 2024-01-09 LAB
ALBUMIN SERPL BCP-MCNC: 4.1 G/DL (ref 3.5–5)
ALP SERPL-CCNC: 77 U/L (ref 34–104)
ALT SERPL W P-5'-P-CCNC: 29 U/L (ref 7–52)
ANION GAP SERPL CALCULATED.3IONS-SCNC: 6 MMOL/L
AST SERPL W P-5'-P-CCNC: 23 U/L (ref 13–39)
BASOPHILS # BLD AUTO: 0.06 THOUSANDS/ÂΜL (ref 0–0.1)
BASOPHILS NFR BLD AUTO: 1 % (ref 0–1)
BILIRUB SERPL-MCNC: 0.61 MG/DL (ref 0.2–1)
BUN SERPL-MCNC: 16 MG/DL (ref 5–25)
CALCIUM SERPL-MCNC: 9.4 MG/DL (ref 8.4–10.2)
CHLORIDE SERPL-SCNC: 94 MMOL/L (ref 96–108)
CO2 SERPL-SCNC: 33 MMOL/L (ref 21–32)
CREAT SERPL-MCNC: 0.67 MG/DL (ref 0.6–1.3)
EOSINOPHIL # BLD AUTO: 0.29 THOUSAND/ÂΜL (ref 0–0.61)
EOSINOPHIL NFR BLD AUTO: 4 % (ref 0–6)
ERYTHROCYTE [DISTWIDTH] IN BLOOD BY AUTOMATED COUNT: 12.9 % (ref 11.6–15.1)
FLUAV RNA RESP QL NAA+PROBE: NEGATIVE
FLUBV RNA RESP QL NAA+PROBE: NEGATIVE
GFR SERPL CREATININE-BSD FRML MDRD: 100 ML/MIN/1.73SQ M
GLUCOSE SERPL-MCNC: 95 MG/DL (ref 65–140)
HCT VFR BLD AUTO: 44.4 % (ref 36.5–49.3)
HGB BLD-MCNC: 14.8 G/DL (ref 12–17)
IMM GRANULOCYTES # BLD AUTO: 0.13 THOUSAND/UL (ref 0–0.2)
IMM GRANULOCYTES NFR BLD AUTO: 2 % (ref 0–2)
LYMPHOCYTES # BLD AUTO: 1.53 THOUSANDS/ÂΜL (ref 0.6–4.47)
LYMPHOCYTES NFR BLD AUTO: 21 % (ref 14–44)
MCH RBC QN AUTO: 31.6 PG (ref 26.8–34.3)
MCHC RBC AUTO-ENTMCNC: 33.3 G/DL (ref 31.4–37.4)
MCV RBC AUTO: 95 FL (ref 82–98)
MONOCYTES # BLD AUTO: 0.69 THOUSAND/ÂΜL (ref 0.17–1.22)
MONOCYTES NFR BLD AUTO: 10 % (ref 4–12)
NEUTROPHILS # BLD AUTO: 4.47 THOUSANDS/ÂΜL (ref 1.85–7.62)
NEUTS SEG NFR BLD AUTO: 62 % (ref 43–75)
NRBC BLD AUTO-RTO: 0 /100 WBCS
PLATELET # BLD AUTO: 279 THOUSANDS/UL (ref 149–390)
PMV BLD AUTO: 9.9 FL (ref 8.9–12.7)
POTASSIUM SERPL-SCNC: 4.5 MMOL/L (ref 3.5–5.3)
PROT SERPL-MCNC: 6.6 G/DL (ref 6.4–8.4)
RBC # BLD AUTO: 4.69 MILLION/UL (ref 3.88–5.62)
RSV RNA RESP QL NAA+PROBE: NEGATIVE
SARS-COV-2 RNA RESP QL NAA+PROBE: NEGATIVE
SODIUM SERPL-SCNC: 133 MMOL/L (ref 135–147)
WBC # BLD AUTO: 7.17 THOUSAND/UL (ref 4.31–10.16)

## 2024-01-09 PROCEDURE — 36415 COLL VENOUS BLD VENIPUNCTURE: CPT

## 2024-01-09 PROCEDURE — 0241U HB NFCT DS VIR RESP RNA 4 TRGT: CPT

## 2024-01-09 PROCEDURE — 80053 COMPREHEN METABOLIC PANEL: CPT

## 2024-01-09 PROCEDURE — 99283 EMERGENCY DEPT VISIT LOW MDM: CPT

## 2024-01-09 PROCEDURE — 85025 COMPLETE CBC W/AUTO DIFF WBC: CPT

## 2024-01-09 PROCEDURE — 71046 X-RAY EXAM CHEST 2 VIEWS: CPT

## 2024-01-09 RX ORDER — FLUTICASONE PROPIONATE 50 MCG
1 SPRAY, SUSPENSION (ML) NASAL DAILY
Status: DISCONTINUED | OUTPATIENT
Start: 2024-01-09 | End: 2024-01-09 | Stop reason: HOSPADM

## 2024-01-09 RX ORDER — ACETAMINOPHEN 325 MG/1
650 TABLET ORAL ONCE
Status: COMPLETED | OUTPATIENT
Start: 2024-01-09 | End: 2024-01-09

## 2024-01-09 RX ADMIN — FLUTICASONE PROPIONATE 1 SPRAY: 50 SPRAY, METERED NASAL at 13:26

## 2024-01-09 RX ADMIN — ACETAMINOPHEN 325MG 650 MG: 325 TABLET ORAL at 13:26

## 2024-01-09 NOTE — ED PROVIDER NOTES
"History  Chief Complaint   Patient presents with    URI     Pt reports uri symptoms that started  and reports his legs are stiff and can't get out of bed, pt reports thinks he has an infection and has been self medicating with cipro; pt reports also got scratch by a dog UTD on shots      Patient is a 63 year old male with pmh of cauda equina syndrome, BPH, cervical myelomalacia denting for 8 days of cough, congestion and sore throat.  Patient notes his symptoms started on New Year's Day.  Home COVID test was negative.  Patient also reports he has had an earache since Saturday.  Patient found ciprofloxacin at home and he began taking twice daily since Saturday.  Patient is also taking Tylenol last dose yesterday a.m. and using Afrin nose spray 3 times daily for the past week.  Patient awoke this morning he was concerned that his legs felt stiff.  Patient has progressive neurological issues but was concerned he may have an infection and presented to the ED.  Patient also notes he was stepped on by his son's Labrador on his arm and had a scratch with scab present.        Prior to Admission Medications   Prescriptions Last Dose Informant Patient Reported? Taking?   Cholecalciferol 50 MCG (2000) TABS   Yes No   Si mcg   Elastic Bandages & Supports (Wrap/Multipurpose 2.75\"x21.4yd) MISC   No No   Sig: Use in the morning Please dispense lymphedema wrap and wrap from hand up to the proximal shoulder   Patient not taking: Reported on 11/10/2023   NON FORMULARY   Yes No   Sig: COMPRILAN BANDAGE [id number] [id number]   Patient not taking: Reported on 11/10/2023   atorvastatin (LIPITOR) 40 mg tablet   Yes No   Sig: TAKE 20MG (ONE-HALF TABLET) BY MOUTH EVERY DAY FOR CHOLESTEROL   bisacodyl (DULCOLAX) 10 mg suppository   No No   Sig: Insert 1 suppository (10 mg total) into the rectum daily as needed for constipation   Patient not taking: Reported on 11/10/2023   clobetasol (TEMOVATE) 0.05 % external solution   Yes No "   Patient not taking: Reported on 11/10/2023   cyclobenzaprine (FLEXERIL) 10 mg tablet   No No   Si tablet at [facility]   Patient not taking: Reported on 11/10/2023   docusate sodium (COLACE) 100 mg capsule   No No   Sig: Take 1 capsule (100 mg total) by mouth 2 (two) times a day   Patient not taking: Reported on 11/10/2023   fluocinonide (LIDEX) 0.05 % cream   Yes No   ibuprofen (MOTRIN) 400 mg tablet   No No   Sig: Take 1 tablet (400 mg total) by mouth every 6 (six) hours as needed for mild pain   ketoconazole (NIZORAL) 2 % shampoo   Yes No   lidocaine (LIDODERM) 5 %   Yes No   Sig: APPLY 2 PATCH TOPICALLY DAILY FOR PAIN (REMOVE PATCH AFTER 12 HOURS; 12 HOURS ON AND 12 HOURS OFF)   Patient not taking: Reported on 11/10/2023   meloxicam (MOBIC) 15 mg tablet   Yes No   Si.5 mg   Patient not taking: Reported on 11/10/2023   midodrine (PROAMATINE) 5 mg tablet   No No   Sig: Take 1 tablet (5 mg total) by mouth 3 (three) times a day before meals   nystatin powder   Yes No   Patient not taking: Reported on 11/10/2023   oxyCODONE (ROXICODONE) 5 immediate release tablet   No No   Sig: Take 1 tablet (5 mg) for moderate pain every 6 hours as needed or take 2 tablets (10 mg) for severe pain every 6 hours as needed.   Patient not taking: Reported on 10/20/2023   sildenafil (VIAGRA) 100 mg tablet  Self Yes No   Sig: Take 100 mg by mouth As directed   Patient not taking: Reported on 11/10/2023      Facility-Administered Medications: None       Past Medical History:   Diagnosis Date    Balance problems     BPH (benign prostatic hyperplasia)     Chronic back pain     Chronic pain disorder     COVID-19     2021-asymptomatic     Erectile dysfunction     Nocturia     OAB (overactive bladder)     Testicular hypogonadism     Urinary frequency     Urinary urgency        Past Surgical History:   Procedure Laterality Date    BACK SURGERY  2016    with hardware    BACK SURGERY      2 back surgery unable to remove hardware     COLONOSCOPY  11/2004    Dr Taylor. tubular adenoma polyp removed    COLONOSCOPY  12/2007    Dr Cameron. Normal    COLONOSCOPY  07/2013    Dr Cameron. Normal.     COLONOSCOPY  06/2019    Dr Cameron. Hemorrhoids, normal.     NECK SURGERY      decompression of neck     NECK SURGERY Right     scar tissues removed    OR EXCISION HYDROCELE UNILATERAL Right 06/10/2021    Procedure: HYDROCELECTOMY;  Surgeon: Dereck Goddard DO;  Location: [facility];  Service: Urology    SPINAL CORD STIMULATOR IMPLANT  10/10/2019    WOUND DEBRIDEMENT Right 08/10/2023    Procedure: DEBRIDEMENT UPPER EXTREMITY (WASH OUT) - elbow;  Surgeon: Jesse Avendaño MD;  Location: [facility];  Service: Orthopedics       History reviewed. No pertinent family history.  I have reviewed and agree with the history as documented.    E-Cigarette/Vaping    E-Cigarette Use Never User      E-Cigarette/Vaping Substances    Nicotine No     THC No     CBD No     Flavoring No     Other No     Unknown No      Social History     Tobacco Use    Smoking status: Never    Smokeless tobacco: Never   Vaping Use    Vaping status: Never Used   Substance Use Topics    Alcohol use: Not Currently     Comment: Drinks socially.    Drug use: No        Review of Systems   Constitutional:  Negative for chills and fever.   HENT:  Positive for congestion. Negative for ear pain and sore throat.    Eyes:  Negative for pain and visual disturbance.   Respiratory:  Positive for cough. Negative for shortness of breath and wheezing.    Cardiovascular:  Negative for chest pain and palpitations.   Gastrointestinal:  Negative for abdominal pain and vomiting.   Genitourinary:  Negative for dysuria and hematuria.   Musculoskeletal:  Positive for arthralgias and myalgias. Negative for back pain.   Skin:  Negative for color change and rash.   Neurological:  Negative for seizures and syncope.   All other systems reviewed and are negative.      Physical Exam  ED Triage Vitals   Temperature Pulse  Respirations Blood Pressure SpO2   01/09/24 1133 01/09/24 1135 01/09/24 1133 01/09/24 1135 01/09/24 1134   98.4 °F (36.9 °C) (!) 50 16 112/69 97 %      Temp src Heart Rate Source Patient Position - Orthostatic VS BP Location FiO2 (%)   -- 01/09/24 1135 01/09/24 1133 01/09/24 1133 --    Monitor Sitting Right arm       Pain Score       01/09/24 1326       10 - Worst Possible Pain             Orthostatic Vital Signs  Vitals:    01/09/24 1133 01/09/24 1135   BP:  112/69   Pulse:  (!) 50   Patient Position - Orthostatic VS: Sitting        Physical Exam  Constitutional:       Appearance: Normal appearance.   HENT:      Left Ear: Tympanic membrane, ear canal and external ear normal.      Ears:      Comments: Right small effusion behind tympanic membrane without erythema     Nose: Congestion present.      Mouth/Throat:      Mouth: Mucous membranes are moist.   Cardiovascular:      Rate and Rhythm: Normal rate and regular rhythm.      Pulses:           Dorsalis pedis pulses are 1+ on the right side and 1+ on the left side.      Heart sounds: Normal heart sounds. No murmur heard.     No friction rub. No gallop.   Pulmonary:      Effort: Pulmonary effort is normal.      Breath sounds: Normal breath sounds. No wheezing, rhonchi or rales.   Abdominal:      General: Abdomen is flat. Bowel sounds are normal.      Palpations: Abdomen is soft.   Musculoskeletal:      Right lower leg: No edema.      Left lower leg: No edema.      Comments: Small superficial abrasion on dorsal aspect of right foot. No surrounding erythema or fluctuance.     Legs appear dusky bilaterally, pulses weakly palpable.    Neurological:      Mental Status: He is alert.         ED Medications  Medications   fluticasone (FLONASE) 50 mcg/act nasal spray 1 spray (1 spray Each Nare Given 1/9/24 1326)   acetaminophen (TYLENOL) tablet 650 mg (650 mg Oral Given 1/9/24 1326)       Diagnostic Studies  Results Reviewed       Procedure Component Value Units Date/Time     FLU/RSV/COVID - if FLU/RSV clinically relevant [[id number]]  (Normal) Collected: 01/09/24 1320    Lab Status: Final result Specimen: Nares from Nose Updated: 01/09/24 1417     SARS-CoV-2 Negative     INFLUENZA A PCR Negative     INFLUENZA B PCR Negative     RSV PCR Negative    Narrative:      FOR PEDIATRIC PATIENTS - copy/paste COVID Guidelines URL to browser: [email or web address]    SARS-CoV-2 assay is a Nucleic Acid Amplification assay intended for the  qualitative detection of nucleic acid from SARS-CoV-2 in nasopharyngeal  swabs. Results are for the presumptive identification of SARS-CoV-2 RNA.    Positive results are indicative of infection with SARS-CoV-2, the virus  causing COVID-19, but do not rule out bacterial infection or co-infection  with other viruses. Laboratories within the [place] and its  territories are required to report all positive results to the appropriate  public health authorities. Negative results do not preclude SARS-CoV-2  infection and should not be used as the sole basis for treatment or other  patient management decisions. Negative results must be combined with  clinical observations, patient history, and epidemiological information.  This test has not been FDA cleared or approved.    This test has been authorized by FDA under an Emergency Use Authorization  (EUA). This test is only authorized for the duration of time the  declaration that circumstances exist justifying the authorization of the  emergency use of an in vitro diagnostic tests for detection of SARS-CoV-2  virus and/or diagnosis of COVID-19 infection under section 564(b)(1) of  the Act, 21 U.S.C. 360bbb-3(b)(1), unless the authorization is terminated  or revoked sooner. The test has been validated but independent review by FDA  and CLIA is pending.    Test performed using [employer]: This RT-PCR assay targets N2,  a region unique to SARS-CoV-2. A conserved  region in the E-gene was chosen  for pan-Sarbecovirus detection which includes SARS-CoV-2.    According to CMS-2020-01-R, this platform meets the definition of high-throughput technology.    Comprehensive metabolic panel [[id number]]  (Abnormal) Collected: 01/09/24 1320    Lab Status: Final result Specimen: Blood from Arm, Left Updated: 01/09/24 1345     Sodium 133 mmol/L      Potassium 4.5 mmol/L      Chloride 94 mmol/L      CO2 33 mmol/L      ANION GAP 6 mmol/L      BUN 16 mg/dL      Creatinine 0.67 mg/dL      Glucose 95 mg/dL      Calcium 9.4 mg/dL      AST 23 U/L      ALT 29 U/L      Alkaline Phosphatase 77 U/L      Total Protein 6.6 g/dL      Albumin 4.1 g/dL      Total Bilirubin 0.61 mg/dL      eGFR 100 ml/min/1.73sq m     Narrative:      National Kidney Disease Foundation guidelines for Chronic Kidney Disease (CKD):     Stage 1 with normal or high GFR (GFR > 90 mL/min/1.73 square meters)    Stage 2 Mild CKD (GFR = 60-89 mL/min/1.73 square meters)    Stage 3A Moderate CKD (GFR = 45-59 mL/min/1.73 square meters)    Stage 3B Moderate CKD (GFR = 30-44 mL/min/1.73 square meters)    Stage 4 Severe CKD (GFR = 15-29 mL/min/1.73 square meters)    Stage 5 End Stage CKD (GFR <15 mL/min/1.73 square meters)  Note: GFR calculation is accurate only with a steady state creatinine    CBC and differential [[id number]] Collected: 01/09/24 1320    Lab Status: Final result Specimen: Blood from Arm, Left Updated: 01/09/24 1331     WBC 7.17 Thousand/uL      RBC 4.69 Million/uL      Hemoglobin 14.8 g/dL      Hematocrit 44.4 %      MCV 95 fL      MCH 31.6 pg      MCHC 33.3 g/dL      RDW 12.9 %      MPV 9.9 fL      Platelets 279 Thousands/uL      nRBC 0 /100 WBCs      Neutrophils Relative 62 %      Immat GRANS % 2 %      Lymphocytes Relative 21 %      Monocytes Relative 10 %      Eosinophils Relative 4 %      Basophils Relative 1 %      Neutrophils Absolute 4.47 Thousands/µL      Immature Grans Absolute 0.13 Thousand/uL      Lymphocytes  Absolute 1.53 Thousands/µL      Monocytes Absolute 0.69 Thousand/µL      Eosinophils Absolute 0.29 Thousand/µL      Basophils Absolute 0.06 Thousands/µL                    XR chest 2 views    (Results Pending)         Procedures  Procedures      ED Course  ED Course as of 01/09/24 1457   Tue Jan 09, 2024   1340 CBC and differential  Wbc count normal   1352 Sodium(!): 133  Sodium low at 133   1352 XR chest 2 views  Per my read, Cxr negative for pneumonia or pneumothorax   1400 Discussed with patient the lab work was mostly unremarkable.  Advised him to have salty snacks due to his slightly low sodium.  Patient agreeable to discharge home to continue with supportive care with follow-up in 1 week with PCP   1428 FLU/RSV/COVID - if FLU/RSV clinically relevant  COVID flu RSV negative                                       Medical Decision Making  Patient is 63-year-old male with known history of cervical myelomalacia and cauda equina syndrome presenting for 8 days of cough, congestion, sore throat.  Patient not complaining of any shortness of breath and is saturating well on room air.  On examination, patient has moist mucous membranes, small effusion behind right tympanic membrane without any erythema.  Regular rate and rhythm, no adventitious breath sounds. Legs have dusky appearance with weakly palpable pulses bilaterally which patient confirms is baseline due to neurological issues.  Small superficial abrasion on right lateral aspect of foot without any surrounding erythema or fluctuance. Will obtain COVID flu and RSV with chest x-ray.  Patient is also concerned that he may have an infection, CBC and CMP checked.  Patient does not have an elevated white blood cell count, potassium normal however sodium patient mildly hyponatremic at 133.  Discussed with patient this is not low enough to require additional medication, advised patient to have salty snack.  Patient endorses that he actually just ate bag of potato chips,  mildly low sodium has been a problem for him in the past.    Discussed with patient that symptoms are likely due to viral illness.  Chest x-ray appears negative for any acute pneumonia per my read and white blood cell count is within normal limits.  Advised patient to abstain from Afrin nose spray and instead utilize Flonase for head congestion symptoms.  Discussed continued oral hydration with medications of Tylenol or ibuprofen at home as needed. Advised patient to follow-up with PCP in 1 week if symptoms do not resolve.  Patient stable for discharge home to continue with supportive care.     Amount and/or Complexity of Data Reviewed  Labs: ordered. Decision-making details documented in ED Course.  Radiology: ordered. Decision-making details documented in ED Course.    Risk  OTC drugs.          Disposition  Final diagnoses:   Viral upper respiratory tract infection     Time reflects when diagnosis was documented in both MDM as applicable and the Disposition within this note       Time User Action Codes Description Comment    1/9/2024  1:26 PM Juliet Paige Add [J06.9] Viral upper respiratory tract infection           ED Disposition       ED Disposition   Discharge    Condition   Stable    Date/Time   Tue Jan 9, 2024  1:26 PM    Comment   Jos Hawk discharge to home/self care.                   Follow-up Information       Follow up With Specialties Details Why Contact Info    Demarco Tirado,  Internal Medicine Schedule an appointment as soon as possible for a visit in 1 week  [address] [place], [place] [id number] [phone]              Discharge Medication List as of 1/9/2024  2:03 PM        CONTINUE these medications which have NOT CHANGED    Details   atorvastatin (LIPITOR) 40 mg tablet TAKE 20MG (ONE-HALF TABLET) BY MOUTH EVERY DAY FOR CHOLESTEROL, Historical Med      bisacodyl (DULCOLAX) 10 mg suppository Insert 1 suppository (10 mg total) into the rectum daily as needed for constipation,  "Starting Tue 8/15/2023, No Print      Cholecalciferol 50 MCG (2000 UT) TABS 50 mcg, Starting Wed 10/11/2023, Historical Med      clobetasol (TEMOVATE) 0.05 % external solution Starting Wed 5/10/2023, Historical Med      cyclobenzaprine (FLEXERIL) 10 mg tablet 1 tablet at qhs, Normal      docusate sodium (COLACE) 100 mg capsule Take 1 capsule (100 mg total) by mouth 2 (two) times a day, Starting Tue 8/15/2023, No Print      Elastic Bandages & Supports (Wrap/Multipurpose 2.75\"x21.4yd) MISC Use in the morning Please dispense lymphedema wrap and wrap from hand up to the proximal shoulder, Starting Fri 8/25/2023, Normal      fluocinonide (LIDEX) 0.05 % cream Starting Wed 5/10/2023, Historical Med      ibuprofen (MOTRIN) 400 mg tablet Take 1 tablet (400 mg total) by mouth every 6 (six) hours as needed for mild pain, Starting u 9/22/2022, No Print      ketoconazole (NIZORAL) 2 % shampoo Starting Wed 5/10/2023, Historical Med      lidocaine (LIDODERM) 5 % APPLY 2 PATCH TOPICALLY DAILY FOR PAIN (REMOVE PATCH AFTER 12 HOURS; 12 HOURS ON AND 12 HOURS OFF), Historical Med      meloxicam (MOBIC) 15 mg tablet 7.5 mg, Starting Wed 9/6/2023, Historical Med      midodrine (PROAMATINE) 5 mg tablet Take 1 tablet (5 mg total) by mouth 3 (three) times a day before meals, Starting Tue 11/21/2023, Normal      NON FORMULARY COMPRILAN BANDAGE [id number] [id number], Starting Fri 8/25/2023, Historical Med      nystatin powder Starting Wed 5/10/2023, Historical Med      oxyCODONE (ROXICODONE) 5 immediate release tablet Take 1 tablet (5 mg) for moderate pain every 6 hours as needed or take 2 tablets (10 mg) for severe pain every 6 hours as needed., Print      sildenafil (VIAGRA) 100 mg tablet Take 100 mg by mouth As directed, Starting Fri 9/11/2020, Historical Med           No discharge procedures on file.    PDMP Review         Value Time User    PDMP Reviewed  Yes 7/12/2023  1:14 PM Debra Leyva, DO             ED Provider  Attending " physically available and evaluated Jos Hawk. I managed the patient along with the ED Attending.    Electronically Signed by           Juliet Paige MD  01/09/24 6736

## 2024-01-17 ENCOUNTER — OFFICE VISIT (OUTPATIENT)
Dept: NEUROLOGY | Facility: CLINIC | Age: 64
End: 2024-01-17
Payer: MEDICARE

## 2024-01-17 VITALS
BODY MASS INDEX: 28.44 KG/M2 | WEIGHT: 210 LBS | SYSTOLIC BLOOD PRESSURE: 98 MMHG | HEART RATE: 64 BPM | HEIGHT: 72 IN | DIASTOLIC BLOOD PRESSURE: 62 MMHG | OXYGEN SATURATION: 97 % | TEMPERATURE: 97.3 F | RESPIRATION RATE: 14 BRPM

## 2024-01-17 DIAGNOSIS — G82.50 QUADRIPARESIS (HCC): ICD-10-CM

## 2024-01-17 DIAGNOSIS — F41.9 ANXIETY DISORDER: Primary | ICD-10-CM

## 2024-01-17 DIAGNOSIS — G90.3 NEUROGENIC ORTHOSTATIC HYPOTENSION (HCC): ICD-10-CM

## 2024-01-17 PROCEDURE — 99214 OFFICE O/P EST MOD 30 MIN: CPT | Performed by: PSYCHIATRY & NEUROLOGY

## 2024-01-17 RX ORDER — NALOXONE HYDROCHLORIDE 4 MG/.1ML
SPRAY NASAL
Qty: 1 EACH | Refills: 1 | Status: SHIPPED | OUTPATIENT
Start: 2024-01-17 | End: 2025-01-16

## 2024-01-17 RX ORDER — ALPRAZOLAM 0.5 MG/1
0.5 TABLET ORAL
Qty: 10 TABLET | Refills: 0 | Status: SHIPPED | OUTPATIENT
Start: 2024-01-17

## 2024-01-17 NOTE — PROGRESS NOTES
Patient ID: Jos Hawk is a 63 y.o. male.    Assessment/Plan:    Myelomalacia of cervical cord (HCC)  Tony is a 63-year-old patient with myelomalacia of the cervical spinal cord at C4.  He was doing well until approximately 10 months ago where he noted worsening of his ambulation.  In August , 2023 he was noted to have a UTI as well as swollen right elbow.  He was admitted to the hospital and hospital stay lasted for approximately 11 days.  He was treated with IV antibiotics and eventually p.o. antibiotics.  He was then transferred to [facility].  He was doing well but the antibiotics were restarted for several days.  He had atrophy as well as overall deconditioning.  After stay at [facility] he then was discharged home where he has been proceeding with aggressive PT and OT.  He does report his weakness is improving but is still not back to where it was prior to his hospitalization in August 2023.  He did receive Botox in the left quadriceps and in the paraspinals which has helped.    In the interim he was seen in the ER for syncope and was noted to have an autonomic dysfunction he is currently on midodrine 5 mg 3 times a day with minimal recurrent symptoms.      He does have intermittent spasticity and utilizes muscle relaxants only when necessary.    He does have MRI of the thoracic and lumbar spine scheduled for tomorrow.    He denies any problems with her bowel incontinence but does report some irregular bowel movements.  He was seen by GI.  He also admits to numbness which occurs from the thighs down to his feet and irregular aspects of his legs.  We discussed that this is probably due to cauda equina syndrome underlying his myelomalacia involving the cervical spinal region.    Overall his strength in his lower extremity has improved.  However this is not as quick as he would like.  I have informed him that this may take years.  He should continue with his aggressive physical and occupational  therapy.    I did provide him with a prescription for 10 Xanax to take for severe anxiety.    We discussed that if he requires this on a regular basis we may need to discuss preventative medications.    He is to return to our offices in 4 months.       Diagnoses and all orders for this visit:    Anxiety disorder  -     ALPRAZolam (XANAX) 0.5 mg tablet; Take 1 tablet (0.5 mg total) by mouth daily at bedtime as needed for anxiety  -     naloxone (NARCAN) 4 mg/0.1 mL nasal spray; Administer 1 spray into a nostril. If no response after 2-3 minutes, give another dose in the other nostril using a new spray.    Neurogenic orthostatic hypotension (HCC)    Quadriparesis (HCC)       Subjective:    This is a 63-year-old gentleman with a history of lumbar disc disease.  In September 2022 while on vacation in [place] after a fall he developed weakness in his upper and lower extremities and was noted to have quadriparesis.  His MRI imaging did demonstrate cervical myelomalacia at C4 and he has been undergoing aggressive physical therapy.  He had noted progressive worsening of symptomatology in July and August.    He also describes urinary frequency that has increased in frequency with urgency.  UA and CNS were performed which was positive for Staph aureus.  He was placed on Cipro 500 twice a day prophylactically but this was changed to  to Bactrim DS with the sensitivities.  He was seen by urology, dr Goddard  who did agree with the use of Bactrim.  When he was evaluated in August he was noted to have swelling of the right arm and the right elbow difficultly moving his right arm with  urinary urgency and frequency.  He was asked to be seen in the emergency room on 8 4.  He was admitted for 15 days and treated for cellulitis and bursitis.  He was treated with IV antibiotics for 14 days and then placed on oral antibiotics.  He did undergo surgery with drainage after 2 other bedside drainage      After his admission in the hospital he  was subsequently admitted to [facility] for approximately 18 days.  He underwent aggressive physical therapy.  The steroids were completely discontinued.  He is no longer on steroids.  He reports while being in the water he did have improvement of his strength.  He does complain of spasticity in his lower extremity especially worse at night.  He was treated with muscle relaxant but he admits that this can cause drowsiness.  He does not take it on a regular basis.  Since his last visit he did receive Botox by the physiatrist at [facility] which has helped.    Overall he reports improvement of his strength and ability to ambulate but does report difficulty with sitting independently.  He does require assistance to transfer.     Overall the lower extremity edema has improved.  He does follow-up with Dr. Blue from [facility].    Since his last visit he was diagnosed with autonomic dysfunction and is currently on midodrine 5 mg 3 times a day.  This has helped and prevented the severity of episodes.  We did discuss that this is probably due to his on cervical spinal dysfunction.      He does undergo water therapy physical therapy.  He does continue to have numbness in his upper extremities and in his feet       He has lost 20 pounds in the last few months.  He does have the urge for bladder and bowel movements but does have some intermittent incontinence.  He has had some issues with constipation and utilizes suppositories                   The following portions of the patient's history were reviewed and updated as appropriate: He  has a past medical history of Balance problems, BPH (benign prostatic hyperplasia), Chronic back pain, Chronic pain disorder, COVID-19, Erectile dysfunction, Nocturia, OAB (overactive bladder), Testicular hypogonadism, Urinary frequency, and Urinary urgency.  He  has a past surgical history that includes Spinal cord stimulator implant (10/10/2019); Colonoscopy (11/2004);  Colonoscopy (12/2007); Colonoscopy (07/2013); Colonoscopy (06/2019); Back surgery (05/18/2016); Neck surgery; pr excision hydrocele unilateral (Right, 06/10/2021); Neck surgery (Right); Back surgery; and Wound debridement (Right, 08/10/2023).  His family history is not on file.  He  reports that he has never smoked. He has never used smokeless tobacco. He reports that he does not currently use alcohol after a past usage of about 4.0 - 7.0 standard drinks of alcohol per week. He reports that he does not use drugs.  Current Outpatient Medications   Medication Sig Dispense Refill    ALPRAZolam (XANAX) 0.5 mg tablet Take 1 tablet (0.5 mg total) by mouth daily at bedtime as needed for anxiety 10 tablet 0    atorvastatin (LIPITOR) 40 mg tablet TAKE 20MG (ONE-HALF TABLET) BY MOUTH EVERY DAY FOR CHOLESTEROL      Cholecalciferol 50 MCG (2000 UT) TABS 50 mcg      clobetasol (TEMOVATE) 0.05 % external solution       fluocinonide (LIDEX) 0.05 % cream       ibuprofen (MOTRIN) 400 mg tablet Take 1 tablet (400 mg total) by mouth every 6 (six) hours as needed for mild pain  0    ketoconazole (NIZORAL) 2 % shampoo       meloxicam (MOBIC) 15 mg tablet 7.5 mg      midodrine (PROAMATINE) 5 mg tablet Take 1 tablet (5 mg total) by mouth 3 (three) times a day before meals 270 tablet 3    naloxone (NARCAN) 4 mg/0.1 mL nasal spray Administer 1 spray into a nostril. If no response after 2-3 minutes, give another dose in the other nostril using a new spray. 1 each 1    nystatin powder       oxyCODONE (ROXICODONE) 5 immediate release tablet Take 1 tablet (5 mg) for moderate pain every 6 hours as needed or take 2 tablets (10 mg) for severe pain every 6 hours as needed. 30 tablet 0    sildenafil (VIAGRA) 100 mg tablet Take 100 mg by mouth As directed      bisacodyl (DULCOLAX) 10 mg suppository Insert 1 suppository (10 mg total) into the rectum daily as needed for constipation (Patient not taking: Reported on 11/10/2023) 12 suppository 0     "docusate sodium (COLACE) 100 mg capsule Take 1 capsule (100 mg total) by mouth 2 (two) times a day  0    Elastic Bandages & Supports (Wrap/Multipurpose 2.75\"x21.4yd) MISC Use in the morning Please dispense lymphedema wrap and wrap from hand up to the proximal shoulder 1 each 0    lidocaine (LIDODERM) 5 % APPLY 2 PATCH TOPICALLY DAILY FOR PAIN (REMOVE PATCH AFTER 12 HOURS; 12 HOURS ON AND 12 HOURS OFF) (Patient not taking: Reported on 11/10/2023)      NON FORMULARY COMPRILAN BANDAGE [id number] (Patient not taking: Reported on 11/10/2023)       No current facility-administered medications for this visit.     He has No Known Allergies..         Objective:    Blood pressure 98/62, pulse 64, temperature (!) 97.3 °F (36.3 °C), temperature source Temporal, resp. rate 14, height 6' (1.829 m), weight 95.3 kg (210 lb), SpO2 97%.    Physical Exam  Eyes:      General: Lids are normal.      Extraocular Movements: Extraocular movements intact.      Pupils: Pupils are equal, round, and reactive to light.         Neurological Exam  Mental Status  Awake, alert and oriented to person, place and time. Oriented to person, place and time. Language is fluent with no aphasia.    Cranial Nerves  CN II: Visual acuity is normal. Visual fields full to confrontation.  CN III, IV, VI: Extraocular movements intact bilaterally. Normal lids and orbits bilaterally. Pupils equal round and reactive to light bilaterally.  CN V: Facial sensation is normal.  CN VII: Full and symmetric facial movement.  CN VIII: Hearing is normal.  CN IX, X: Palate elevates symmetrically. Normal gag reflex.  CN XI: Shoulder shrug strength is normal.  CN XII: Tongue midline without atrophy or fasciculations.    Motor    He had increased tone in the lower extremities right worse than left.  Right lower extremity hip flexion was a 5-/ 5 knee flexion was a 5.  On the left lower extremity hip flexion was a 5-/ 5  .  On the left at the knee flexion  was a 5 - knee " extension was a 4+ on the right knee flexion was a 4 and knee extension was a 4+.     He did have increased tone with spasticity in the lower extremities right greater than left.  Handgrip strength was a 5 - on the left and on the right was a 4+.  The atrophy had improved.  There is less spasticity in the lower extremities t   .    Sensory  He had attenuation of sensation in the feet.  Lower extremity edema was +1.  In the upper extremities there is attenuation sensation in the left side to the above the elbow.  He also difficulty with the manipulating his fingers on the right..    Reflexes                                            Right                      Left  Biceps                                 Tr                         Tr  Triceps                                Tr                         Tr  Patellar                                Tr                         Tr  Achilles                                Tr                         Tr  Right Plantar: downgoing  Left Plantar: downgoing    Coordination  Right: Finger-to-nose normal.Left: Finger-to-nose normal.    Gait   Unable to rise from chair without using arms.    Review of systems obtained from the medical assistant as below was reviewed with the patient at today's visit    ROS:    Review of Systems   Constitutional:  Negative for appetite change, fatigue and fever.   HENT:  Positive for hearing loss (right ear clogged). Negative for tinnitus, trouble swallowing and voice change.    Eyes: Negative.  Negative for photophobia, pain and visual disturbance.   Respiratory: Negative.  Negative for shortness of breath.    Cardiovascular: Negative.  Negative for palpitations.   Gastrointestinal: Negative.  Negative for nausea and vomiting.   Endocrine: Negative.  Negative for cold intolerance.   Genitourinary:  Positive for frequency and urgency. Negative for dysuria.   Musculoskeletal:  Positive for back pain and gait problem. Negative for myalgias, neck pain and  neck stiffness.   Skin: Negative.  Negative for rash.   Allergic/Immunologic: Negative.    Neurological:  Positive for dizziness, tremors (when he is doing something it comes are goes), light-headedness and numbness. Negative for seizures, syncope, facial asymmetry, speech difficulty, weakness and headaches.   Hematological:  Bruises/bleeds easily.   Psychiatric/Behavioral:  Positive for sleep disturbance (not sleeping at all). Negative for confusion and hallucinations.    All other systems reviewed and are negative.        Can return to our offices in several months.    Today I spent 35 minutes with the patient discussing his recent history his symptoms and formulating a plan.

## 2024-01-17 NOTE — ASSESSMENT & PLAN NOTE
Tony is a 63-year-old patient with myelomalacia of the cervical spinal cord at C4.  He was doing well until approximately 10 months ago where he noted worsening of his ambulation.  In August , 2023 he was noted to have a UTI as well as swollen right elbow.  He was admitted to the hospital and hospital stay lasted for approximately 11 days.  He was treated with IV antibiotics and eventually p.o. antibiotics.  He was then transferred to [facility].  He was doing well but the antibiotics were restarted for several days.  He had atrophy as well as overall deconditioning.  After stay at [facility] he then was discharged home where he has been proceeding with aggressive PT and OT.  He does report his weakness is improving but is still not back to where it was prior to his hospitalization in August 2023.  He did receive Botox in the left quadriceps and in the paraspinals which has helped.    In the interim he was seen in the ER for syncope and was noted to have an autonomic dysfunction he is currently on midodrine 5 mg 3 times a day with minimal recurrent symptoms.      He does have intermittent spasticity and utilizes muscle relaxants only when necessary.    He does have MRI of the thoracic and lumbar spine scheduled for tomorrow.    He denies any problems with her bowel incontinence but does report some irregular bowel movements.  He was seen by GI.  He also admits to numbness which occurs from the thighs down to his feet and irregular aspects of his legs.  We discussed that this is probably due to cauda equina syndrome underlying his myelomalacia involving the cervical spinal region.    Overall his strength in his lower extremity has improved.  However this is not as quick as he would like.  I have informed him that this may take years.  He should continue with his aggressive physical and occupational therapy.    I did provide him with a prescription for 10 Xanax to take for severe anxiety.    We discussed that if  he requires this on a regular basis we may need to discuss preventative medications.    He is to return to our offices in 4 months.

## 2024-01-18 ENCOUNTER — HOSPITAL ENCOUNTER (OUTPATIENT)
Dept: RADIOLOGY | Facility: HOSPITAL | Age: 64
Discharge: HOME | End: 2024-01-18
Payer: MEDICARE

## 2024-01-18 DIAGNOSIS — G82.52 QUADRIPLEGIA, C1-C4, INCOMPLETE (HCC): ICD-10-CM

## 2024-01-18 PROCEDURE — 72148 MRI LUMBAR SPINE W/O DYE: CPT

## 2024-01-18 PROCEDURE — G1004 CDSM NDSC: HCPCS

## 2024-01-18 PROCEDURE — 72146 MRI CHEST SPINE W/O DYE: CPT

## 2024-01-19 NOTE — TELEPHONE ENCOUNTER
Date of Service:  01/18/2024    SUBJECTIVE:  Here today for followup  1. Depression, anxiety, bipolar disorder.  Sees Psychiatry.  2. Impaired glucose tolerance.  States he has been trying to watch diet and exercise.  No other problems.    REVIEW OF SYSTEMS:  All other review of systems reviewed and are negative.    ALLERGIES AND MEDICATIONS:  See updated allergy and med list in Epic.    SOCIAL HISTORY:  Does not smoke.    PHYSICAL EXAMINATION:  GENERAL:  Alert and oriented x3, in no apparent distress.  VITAL SIGNS:  Blood pressure is 106/78, pulse 80, temperature is 97.8.  Weight is 234 pounds, down 8 pounds since October.  LUNGS:  Clear.  HEART:  Regular rate and rhythm.  ABDOMEN:  Soft.  EXTREMITIES:  No peripheral edema.    LABORATORY DATA:  Potassium is 4.1, glucose 88.  Hemoglobin A1c is 6.5.    IMPRESSION:  1. The patient with bipolar disorder, depression, and anxiety.  Sees Psychiatry.  2. Elevated glucose, now with diabetes.  His last A1c were 6.5, 7.1, and now still 6.5, so it is better, but still in the range where he has diabetes.  He is willing to work on his diet and exercise.  We agreed he does need to start medicine at this time, but he will call if any other problems or questions.  He will follow up in 6 months with chemistry profile, hemoglobin A1c, lipid panel, and call if any other problems or questions.        Dictated By:  Jeancarlos Servin MD  Signing Provider:  Jeancarlos Servin MD    TLS/AQT  D:  01/18/2024 04:22:40 PM  T:  01/19/2024 01:04:27 AM  Job:  [id number]        Spoke with patient regarding his appt on 2/3/2021 12PM Dr Gray Earing at [facility] due to provider being out  Patient agreeable to new appt on Friday, 2/12/2021 12PM Dr Gray Earing at [facility]

## 2024-01-25 ENCOUNTER — TELEPHONE (OUTPATIENT)
Dept: OTHER | Facility: HOSPITAL | Age: 64
End: 2024-01-25

## 2024-01-25 NOTE — TELEPHONE ENCOUNTER
Neurology    I spoke to Bill today.  We reviewed his recent MRIs of the thoracic and lumbar spines.  These are stable.  Overall he continues to do well.  He was seen by Occupational Therapy and was informed that his strength has  improved.  He continues to have fatigue when he is exercising but he has noted improvement.  He has an appointment with [facility] rehab next week and we will he will be discussing further treatments of Botox.

## 2024-02-21 ENCOUNTER — APPOINTMENT (OUTPATIENT)
Dept: LAB | Facility: MEDICAL CENTER | Age: 64
End: 2024-02-21

## 2024-02-21 DIAGNOSIS — E29.1 TESTICULAR FAILURE: ICD-10-CM

## 2024-02-22 ENCOUNTER — TELEPHONE (OUTPATIENT)
Age: 64
End: 2024-02-22

## 2024-03-11 ENCOUNTER — HOSPITAL ENCOUNTER (EMERGENCY)
Facility: HOSPITAL | Age: 64
Discharge: HOME | End: 2024-03-11
Attending: EMERGENCY MEDICINE
Payer: MEDICARE

## 2024-03-11 ENCOUNTER — APPOINTMENT (EMERGENCY)
Dept: RADIOLOGY | Facility: HOSPITAL | Age: 64
End: 2024-03-11
Payer: MEDICARE

## 2024-03-11 VITALS
OXYGEN SATURATION: 96 % | DIASTOLIC BLOOD PRESSURE: 65 MMHG | WEIGHT: 211 LBS | SYSTOLIC BLOOD PRESSURE: 100 MMHG | BODY MASS INDEX: 28.62 KG/M2 | TEMPERATURE: 97.7 F | HEART RATE: 81 BPM | RESPIRATION RATE: 18 BRPM

## 2024-03-11 DIAGNOSIS — R29.898 LOWER EXTREMITY WEAKNESS: Primary | ICD-10-CM

## 2024-03-11 DIAGNOSIS — R05.9 COUGH: ICD-10-CM

## 2024-03-11 DIAGNOSIS — R53.83 FATIGUE: ICD-10-CM

## 2024-03-11 LAB
ALBUMIN SERPL BCP-MCNC: 4.2 G/DL (ref 3.5–5)
ALP SERPL-CCNC: 84 U/L (ref 34–104)
ALT SERPL W P-5'-P-CCNC: 24 U/L (ref 7–52)
ANION GAP SERPL CALCULATED.3IONS-SCNC: 4 MMOL/L
AST SERPL W P-5'-P-CCNC: 16 U/L (ref 13–39)
BASOPHILS # BLD AUTO: 0.04 THOUSANDS/ÂΜL (ref 0–0.1)
BASOPHILS NFR BLD AUTO: 1 % (ref 0–1)
BILIRUB SERPL-MCNC: 0.86 MG/DL (ref 0.2–1)
BILIRUB UR QL STRIP: NEGATIVE
BUN SERPL-MCNC: 14 MG/DL (ref 5–25)
CALCIUM SERPL-MCNC: 9.6 MG/DL (ref 8.4–10.2)
CHLORIDE SERPL-SCNC: 98 MMOL/L (ref 96–108)
CLARITY UR: CLEAR
CO2 SERPL-SCNC: 35 MMOL/L (ref 21–32)
COLOR UR: YELLOW
CREAT SERPL-MCNC: 0.85 MG/DL (ref 0.6–1.3)
EOSINOPHIL # BLD AUTO: 0.17 THOUSAND/ÂΜL (ref 0–0.61)
EOSINOPHIL NFR BLD AUTO: 2 % (ref 0–6)
ERYTHROCYTE [DISTWIDTH] IN BLOOD BY AUTOMATED COUNT: 14.7 % (ref 11.6–15.1)
FLUAV RNA RESP QL NAA+PROBE: NEGATIVE
FLUBV RNA RESP QL NAA+PROBE: NEGATIVE
GFR SERPL CREATININE-BSD FRML MDRD: 91 ML/MIN/1.73SQ M
GLUCOSE SERPL-MCNC: 96 MG/DL (ref 65–140)
GLUCOSE UR STRIP-MCNC: NEGATIVE MG/DL
HCT VFR BLD AUTO: 53.1 % (ref 36.5–49.3)
HGB BLD-MCNC: 16.9 G/DL (ref 12–17)
HGB UR QL STRIP.AUTO: NEGATIVE
IMM GRANULOCYTES # BLD AUTO: 0.06 THOUSAND/UL (ref 0–0.2)
IMM GRANULOCYTES NFR BLD AUTO: 1 % (ref 0–2)
KETONES UR STRIP-MCNC: NEGATIVE MG/DL
LEUKOCYTE ESTERASE UR QL STRIP: NEGATIVE
LYMPHOCYTES # BLD AUTO: 1.7 THOUSANDS/ÂΜL (ref 0.6–4.47)
LYMPHOCYTES NFR BLD AUTO: 21 % (ref 14–44)
MAGNESIUM SERPL-MCNC: 2.2 MG/DL (ref 1.9–2.7)
MCH RBC QN AUTO: 31.3 PG (ref 26.8–34.3)
MCHC RBC AUTO-ENTMCNC: 31.8 G/DL (ref 31.4–37.4)
MCV RBC AUTO: 98 FL (ref 82–98)
MONOCYTES # BLD AUTO: 0.82 THOUSAND/ÂΜL (ref 0.17–1.22)
MONOCYTES NFR BLD AUTO: 10 % (ref 4–12)
NEUTROPHILS # BLD AUTO: 5.45 THOUSANDS/ÂΜL (ref 1.85–7.62)
NEUTS SEG NFR BLD AUTO: 65 % (ref 43–75)
NITRITE UR QL STRIP: NEGATIVE
NRBC BLD AUTO-RTO: 0 /100 WBCS
PH UR STRIP.AUTO: 5.5 [PH] (ref 4.5–8)
PLATELET # BLD AUTO: 189 THOUSANDS/UL (ref 149–390)
PMV BLD AUTO: 9.9 FL (ref 8.9–12.7)
POTASSIUM SERPL-SCNC: 3.8 MMOL/L (ref 3.5–5.3)
PROT SERPL-MCNC: 6.6 G/DL (ref 6.4–8.4)
PROT UR STRIP-MCNC: NEGATIVE MG/DL
RBC # BLD AUTO: 5.4 MILLION/UL (ref 3.88–5.62)
RSV RNA RESP QL NAA+PROBE: NEGATIVE
SARS-COV-2 RNA RESP QL NAA+PROBE: NEGATIVE
SODIUM SERPL-SCNC: 137 MMOL/L (ref 135–147)
SP GR UR STRIP.AUTO: 1.02 (ref 1–1.03)
UROBILINOGEN UR QL STRIP.AUTO: 0.2 E.U./DL
WBC # BLD AUTO: 8.24 THOUSAND/UL (ref 4.31–10.16)

## 2024-03-11 PROCEDURE — 99284 EMERGENCY DEPT VISIT MOD MDM: CPT | Performed by: PHYSICIAN ASSISTANT

## 2024-03-11 PROCEDURE — 83735 ASSAY OF MAGNESIUM: CPT | Performed by: PHYSICIAN ASSISTANT

## 2024-03-11 PROCEDURE — 36415 COLL VENOUS BLD VENIPUNCTURE: CPT | Performed by: PHYSICIAN ASSISTANT

## 2024-03-11 PROCEDURE — 71045 X-RAY EXAM CHEST 1 VIEW: CPT

## 2024-03-11 PROCEDURE — 81003 URINALYSIS AUTO W/O SCOPE: CPT

## 2024-03-11 PROCEDURE — 85025 COMPLETE CBC W/AUTO DIFF WBC: CPT | Performed by: PHYSICIAN ASSISTANT

## 2024-03-11 PROCEDURE — 80053 COMPREHEN METABOLIC PANEL: CPT | Performed by: PHYSICIAN ASSISTANT

## 2024-03-11 PROCEDURE — 99285 EMERGENCY DEPT VISIT HI MDM: CPT

## 2024-03-11 PROCEDURE — 0241U HB NFCT DS VIR RESP RNA 4 TRGT: CPT | Performed by: PHYSICIAN ASSISTANT

## 2024-03-11 NOTE — DISCHARGE INSTRUCTIONS
Please refer to the attached information for strict return instructions. If symptoms worsen or new symptoms develop please return to the ER. Please schedule outpatient MRI's as instructed. Follow up with your primary care physician for re-evaluation.

## 2024-03-12 NOTE — ED PROVIDER NOTES
"History  Chief Complaint   Patient presents with    Extremity Weakness     Pt reports bilateral leg weakness that he noticed 1 week ago. Reports these are the same symptoms he noticed when he had a staph infection 1 year ago. Pt denies fevers, reports a sore throat this morning and coughing up mucous.      Tony is a 64 yo M, history of myelomalacia of c-spine, partial spastic quadriplegia, chronic back pain, presenting with about one week of weakness with ambulation. He has been following with PT regularly and had been able to walk several hundred feet at each visit until about a week ago. He has had gradual decline over the past week in the distance he can walk, and in the past 1-2 has struggled to walk more than a few feet. Reports sensation of being more weak than usual from just above knees down to feet bilaterally. He also notes he has partial numbness to b/l hands/legs at baseline, but the numbness feels slightly worse than usual along similar distribution to weakness (from just above knees down to feet). No recent falls/injuries. Back pain is at baseline. He does wear condom catheter at night but otherwise does not require catheter to urinate. No urinary/fecal incontinence/retention reported. He otherwise feels well, no fevers/chills. Slightly more tired than usual. Slightly increased urinary frequency but no other urinary symptoms. He has had a mild cough today but did not have this at onset of weakness a week ago.       History provided by:  Patient and caregiver   used: No        Prior to Admission Medications   Prescriptions Last Dose Informant Patient Reported? Taking?   ALPRAZolam (XANAX) 0.5 mg tablet   No Yes   Sig: Take 1 tablet (0.5 mg total) by mouth daily at bedtime as needed for anxiety   Cholecalciferol 50 MCG (2000) TABS   Yes No   Si mcg   Elastic Bandages & Supports (Wrap/Multipurpose 2.75\"x21.4yd) MISC Not Taking  No No   Sig: Use in the morning Please dispense " lymphedema wrap and wrap from hand up to the proximal shoulder   Patient not taking: Reported on 3/11/2024   NON FORMULARY   Yes No   Sig: COMPRILAN BANDAGE [id number]   Patient not taking: Reported on 11/10/2023   atorvastatin (LIPITOR) 40 mg tablet   Yes Yes   Sig: TAKE 20MG (ONE-HALF TABLET) BY MOUTH EVERY DAY FOR CHOLESTEROL   bisacodyl (DULCOLAX) 10 mg suppository Not Taking  No No   Sig: Insert 1 suppository (10 mg total) into the rectum daily as needed for constipation   Patient not taking: Reported on 11/10/2023   clobetasol (TEMOVATE) 0.05 % external solution   Yes No   docusate sodium (COLACE) 100 mg capsule   No No   Sig: Take 1 capsule (100 mg total) by mouth 2 (two) times a day   fluocinonide (LIDEX) 0.05 % cream   Yes No   ibuprofen (MOTRIN) 400 mg tablet   No No   Sig: Take 1 tablet (400 mg total) by mouth every 6 (six) hours as needed for mild pain   ketoconazole (NIZORAL) 2 % shampoo   Yes No   lidocaine (LIDODERM) 5 % Not Taking  Yes No   Sig: APPLY 2 PATCH TOPICALLY DAILY FOR PAIN (REMOVE PATCH AFTER 12 HOURS; 12 HOURS ON AND 12 HOURS OFF)   Patient not taking: Reported on 11/10/2023   meloxicam (MOBIC) 15 mg tablet   Yes No   Si.5 mg   midodrine (PROAMATINE) 5 mg tablet   No No   Sig: Take 1 tablet (5 mg total) by mouth 3 (three) times a day before meals   naloxone (NARCAN) 4 mg/0.1 mL nasal spray   No No   Sig: Administer 1 spray into a nostril. If no response after 2-3 minutes, give another dose in the other nostril using a new spray.   nystatin powder   Yes No   oxyCODONE (ROXICODONE) 5 immediate release tablet   No No   Sig: Take 1 tablet (5 mg) for moderate pain every 6 hours as needed or take 2 tablets (10 mg) for severe pain every 6 hours as needed.   sildenafil (VIAGRA) 100 mg tablet  Self Yes No   Sig: Take 100 mg by mouth As directed      Facility-Administered Medications: None       Past Medical History:   Diagnosis Date    Balance problems     BPH (benign prostatic  hyperplasia)     Chronic back pain     Chronic pain disorder     COVID-19     2/2021-asymptomatic     Erectile dysfunction     Nocturia     OAB (overactive bladder)     Testicular hypogonadism     Urinary frequency     Urinary urgency        Past Surgical History:   Procedure Laterality Date    BACK SURGERY  05/18/2016    with hardware    BACK SURGERY      2 back surgery unable to remove hardware    COLONOSCOPY  11/2004    Dr Taylor. tubular adenoma polyp removed    COLONOSCOPY  12/2007    Dr Cameron. Normal    COLONOSCOPY  07/2013    Dr Cameron. Normal.     COLONOSCOPY  06/2019    Dr Cameron. Hemorrhoids, normal.     NECK SURGERY      decompression of neck     NECK SURGERY Right     scar tissues removed    MI EXCISION HYDROCELE UNILATERAL Right 06/10/2021    Procedure: HYDROCELECTOMY;  Surgeon: Dereck Goddard DO;  Location: [facility];  Service: Urology    SPINAL CORD STIMULATOR IMPLANT  10/10/2019    WOUND DEBRIDEMENT Right 08/10/2023    Procedure: DEBRIDEMENT UPPER EXTREMITY (WASH OUT) - elbow;  Surgeon: Jesse Avendaño MD;  Location: [facility];  Service: Orthopedics       History reviewed. No pertinent family history.  I have reviewed and agree with the history as documented.    E-Cigarette/Vaping    E-Cigarette Use Never User      E-Cigarette/Vaping Substances    Nicotine No     THC No     CBD No     Flavoring No     Other No     Unknown No      Social History     Tobacco Use    Smoking status: Never    Smokeless tobacco: Never   Vaping Use    Vaping status: Never Used   Substance Use Topics    Alcohol use: Not Currently     Alcohol/week: 4.0 - 7.0 standard drinks of alcohol     Types: 4 - 7 Glasses of wine per week     Comment: Just weekends    Drug use: No       Review of Systems   Constitutional:  Positive for fatigue. Negative for chills and fever.   HENT:  Negative for congestion, rhinorrhea and sore throat.    Eyes:  Negative for pain and visual disturbance.   Respiratory:  Positive for cough.  Negative for shortness of breath and wheezing.    Cardiovascular:  Negative for chest pain and palpitations.   Gastrointestinal:  Negative for abdominal pain, diarrhea, nausea and vomiting.   Genitourinary:  Positive for frequency. Negative for dysuria, flank pain, scrotal swelling, testicular pain and urgency.   Musculoskeletal:  Positive for back pain (baseline). Negative for joint swelling, neck pain and neck stiffness.   Skin:  Negative for rash and wound.   Neurological:  Positive for weakness and numbness. Negative for dizziness and light-headedness.       Physical Exam  Physical Exam  Constitutional:       General: He is not in acute distress.     Appearance: He is well-developed. He is not diaphoretic.   HENT:      Head: Normocephalic and atraumatic.      Right Ear: External ear normal.      Left Ear: External ear normal.   Eyes:      Extraocular Movements:      Right eye: Normal extraocular motion.      Left eye: Normal extraocular motion.      Conjunctiva/sclera: Conjunctivae normal.      Pupils: Pupils are equal, round, and reactive to light.   Cardiovascular:      Rate and Rhythm: Normal rate and regular rhythm.   Pulmonary:      Effort: Pulmonary effort is normal. No accessory muscle usage or respiratory distress.      Breath sounds: No wheezing or rales.   Abdominal:      General: Abdomen is flat. There is no distension.      Tenderness: There is no abdominal tenderness.   Musculoskeletal:      Cervical back: Normal range of motion. No rigidity.      Comments: 5/5 strength to b/l Ue's. 4/5 strength to b/l LE's in knee flexion/extension, ankle plantar/dorsiflexion. Diminished but symmetric sensation to b/l thighs, lower legs, apparently baseline for patient.    Skin:     General: Skin is warm and dry.      Capillary Refill: Capillary refill takes less than 2 seconds.      Findings: No erythema or rash.   Neurological:      Mental Status: He is alert and oriented to person, place, and time.      Motor:  No abnormal muscle tone.      Coordination: Coordination normal.   Psychiatric:         Behavior: Behavior normal.         Thought Content: Thought content normal.         Judgment: Judgment normal.         Vital Signs  ED Triage Vitals   Temperature Pulse Respirations Blood Pressure SpO2   03/11/24 1142 03/11/24 1143 03/11/24 1142 03/11/24 1143 03/11/24 1142   97.7 °F (36.5 °C) 77 18 101/68 95 %      Temp Source Heart Rate Source Patient Position - Orthostatic VS BP Location FiO2 (%)   03/11/24 1142 03/11/24 1142 03/11/24 1142 03/11/24 1142 --   Oral Monitor Sitting Right arm       Pain Score       03/11/24 1142       7           Vitals:    03/11/24 1142 03/11/24 1143 03/11/24 1346   BP:  101/68 100/65   Pulse:  77 81   Patient Position - Orthostatic VS: Sitting  Sitting         Visual Acuity      ED Medications  Medications - No data to display    Diagnostic Studies  Results Reviewed       Procedure Component Value Units Date/Time    FLU/RSV/COVID - if FLU/RSV clinically relevant [[id number]]  (Normal) Collected: 03/11/24 1338    Lab Status: Final result Specimen: Nares from Nose Updated: 03/11/24 1429     SARS-CoV-2 Negative     INFLUENZA A PCR Negative     INFLUENZA B PCR Negative     RSV PCR Negative    Narrative:      FOR PEDIATRIC PATIENTS - copy/paste COVID Guidelines URL to browser: [email or web address]    SARS-CoV-2 assay is a Nucleic Acid Amplification assay intended for the  qualitative detection of nucleic acid from SARS-CoV-2 in nasopharyngeal  swabs. Results are for the presumptive identification of SARS-CoV-2 RNA.    Positive results are indicative of infection with SARS-CoV-2, the virus  causing COVID-19, but do not rule out bacterial infection or co-infection  with other viruses. Laboratories within the [place] and its  territories are required to report all positive results to the appropriate  public health authorities. Negative results do  not preclude SARS-CoV-2  infection and should not be used as the sole basis for treatment or other  patient management decisions. Negative results must be combined with  clinical observations, patient history, and epidemiological information.  This test has not been FDA cleared or approved.    This test has been authorized by FDA under an Emergency Use Authorization  (EUA). This test is only authorized for the duration of time the  declaration that circumstances exist justifying the authorization of the  emergency use of an in vitro diagnostic tests for detection of SARS-CoV-2  virus and/or diagnosis of COVID-19 infection under section 564(b)(1) of  the Act, 21 U.S.C. 360bbb-3(b)(1), unless the authorization is terminated  or revoked sooner. The test has been validated but independent review by FDA  and CLIA is pending.    Test performed using [employer]: This RT-PCR assay targets N2,  a region unique to SARS-CoV-2. A conserved region in the E-gene was chosen  for pan-Sarbecovirus detection which includes SARS-CoV-2.    According to CMS-2020-01-R, this platform meets the definition of high-throughput technology.    Comprehensive metabolic panel [[id number]]  (Abnormal) Collected: 03/11/24 1338    Lab Status: Final result Specimen: Blood from Arm, Left Updated: 03/11/24 1406     Sodium 137 mmol/L      Potassium 3.8 mmol/L      Chloride 98 mmol/L      CO2 35 mmol/L      ANION GAP 4 mmol/L      BUN 14 mg/dL      Creatinine 0.85 mg/dL      Glucose 96 mg/dL      Calcium 9.6 mg/dL      AST 16 U/L      ALT 24 U/L      Alkaline Phosphatase 84 U/L      Total Protein 6.6 g/dL      Albumin 4.2 g/dL      Total Bilirubin 0.86 mg/dL      eGFR 91 ml/min/1.73sq m     Narrative:      National Kidney Disease Foundation guidelines for Chronic Kidney Disease (CKD):     Stage 1 with normal or high GFR (GFR > 90 mL/min/1.73 square meters)    Stage 2 Mild CKD (GFR = 60-89 mL/min/1.73 square meters)    Stage 3A Moderate CKD (GFR =  45-59 mL/min/1.73 square meters)    Stage 3B Moderate CKD (GFR = 30-44 mL/min/1.73 square meters)    Stage 4 Severe CKD (GFR = 15-29 mL/min/1.73 square meters)    Stage 5 End Stage CKD (GFR <15 mL/min/1.73 square meters)  Note: GFR calculation is accurate only with a steady state creatinine    Magnesium [[id number]]  (Normal) Collected: 03/11/24 1338    Lab Status: Final result Specimen: Blood from Arm, Left Updated: 03/11/24 1406     Magnesium 2.2 mg/dL     CBC and differential [[id number]]  (Abnormal) Collected: 03/11/24 1338    Lab Status: Final result Specimen: Blood from Arm, Left Updated: 03/11/24 1346     WBC 8.24 Thousand/uL      RBC 5.40 Million/uL      Hemoglobin 16.9 g/dL      Hematocrit 53.1 %      MCV 98 fL      MCH 31.3 pg      MCHC 31.8 g/dL      RDW 14.7 %      MPV 9.9 fL      Platelets 189 Thousands/uL      nRBC 0 /100 WBCs      Neutrophils Relative 65 %      Immat GRANS % 1 %      Lymphocytes Relative 21 %      Monocytes Relative 10 %      Eosinophils Relative 2 %      Basophils Relative 1 %      Neutrophils Absolute 5.45 Thousands/µL      Immature Grans Absolute 0.06 Thousand/uL      Lymphocytes Absolute 1.70 Thousands/µL      Monocytes Absolute 0.82 Thousand/µL      Eosinophils Absolute 0.17 Thousand/µL      Basophils Absolute 0.04 Thousands/µL     Urine Macroscopic, POC [[id number]] Collected: 03/11/24 1333    Lab Status: Final result Specimen: Urine Updated: 03/11/24 1334     Color, UA Yellow     Clarity, UA Clear     pH, UA 5.5     Leukocytes, UA Negative     Nitrite, UA Negative     Protein, UA Negative mg/dl      Glucose, UA Negative mg/dl      Ketones, UA Negative mg/dl      Urobilinogen, UA 0.2 E.U./dl      Bilirubin, UA Negative     Occult Blood, UA Negative     Specific Gravity, UA 1.020    Narrative:      CLINITEK RESULT                   XR chest 1 view portable   Final Result by Jose Chandler MD (03/12 0833)      No acute cardiopulmonary disease.            Workstation  performed: [id number]         MRI cervical spine wo contrast    (Results Pending)   MRI lumbar spine wo contrast    (Results Pending)              Procedures  Procedures         ED Course  ED Course as of 03/12/24 0835   Mon Mar 11, 2024   1331 Pt declines EKG as he is in wheelchair. Will d/c                                             Medical Decision Making  Pt reporting lower extremity weakness/impaired ambulation over the past week from baseline level. Notes sensation of being weaker from b/l lower thighs down to feet, slightly diminished sensation from baseline in this distribution also noted. Reports some slight fatigue over that time as well as slight cough starting today. Otherwise well appearing, normal vitals, afebrile. He has symmetric 4/5 strength to entire b/l lower extremities, diminished but symmetric sensation to to b/l LE's, reports feels slightly less than usual from knee level down.     Lab workup obtained given fatigue/cough today - normal CBC, CMP, magnesium. XR of chest limited in quality but no focal infiltrate on my initial read. COVID19/flu/RSV testing negative. Urine dip without evidence of UTI. Cough/fatigue likely viral in etiology. Supportive care for same.     Recommendation made to pt of repeat MRI of c-spine given history as well as l-spine given worsening LE weakness - he is agreeable to obtaining outpatient but declines while in ED/hospital. These studies are ordered at time of discharge, advised to obtain as soon as possible, importance of f/u with neurology discussed. Return to ED indications reviewed.     Amount and/or Complexity of Data Reviewed  Labs: ordered.  Radiology: ordered.             Disposition  Final diagnoses:   Lower extremity weakness   Fatigue   Cough     Time reflects when diagnosis was documented in both MDM as applicable and the Disposition within this note       Time User Action Codes Description Comment    3/11/2024  2:42 PM Samir Becerra Add  [R29.898] Lower extremity weakness     3/12/2024  8:34 AM Samir Becerra Add [R53.83] Fatigue     3/12/2024  8:34 AM Samir Becerra [R05.9] Cough           ED Disposition       ED Disposition   Discharge    Condition   Stable    Date/Time   Mon Mar 11, 2024  2:44 PM    Comment   Jos Hawk discharge to home/self care.                   Follow-up Information       Follow up With Specialties Details Why Contact Info Additional Information    [facility] Scheduling Central Scheduling  Call to schedule  [facility] [address]  [phone] [facility]UL[facility], [address], [place], [address]   [phone]    [facility] Emergency Department Emergency Medicine  If symptoms worsen [address] [address]  [phone] [facility] Emergency Department, [address], [place], [address]    Demarco Tirado DO Internal Medicine Call   [address] [address] [phone]               Discharge Medication List as of 3/11/2024  2:44 PM        CONTINUE these medications which have NOT CHANGED    Details   ALPRAZolam (XANAX) 0.5 mg tablet Take 1 tablet (0.5 mg total) by mouth daily at bedtime as needed for anxiety, Starting Wed 1/17/2024, Normal      atorvastatin (LIPITOR) 40 mg tablet TAKE 20MG (ONE-HALF TABLET) BY MOUTH EVERY DAY FOR CHOLESTEROL, Historical Med      bisacodyl (DULCOLAX) 10 mg suppository Insert 1 suppository (10 mg total) into the rectum daily as needed for constipation, Starting Tue 8/15/2023, No Print      Cholecalciferol 50 MCG (2000 UT) TABS 50 mcg, Starting Wed 10/11/2023, Historical Med      clobetasol (TEMOVATE) 0.05 % external solution Starting Wed 5/10/2023, Historical Med      docusate sodium (COLACE) 100 mg capsule Take 1 capsule (100 mg total) by mouth 2 (two) times a day, Starting Tue 8/15/2023, No Print      Elastic Bandages &  "Supports (Wrap/Multipurpose 2.75\"x21.4yd) MISC Use in the morning Please dispense lymphedema wrap and wrap from hand up to the proximal shoulder, Starting Fri 8/25/2023, Normal      fluocinonide (LIDEX) 0.05 % cream Starting Wed 5/10/2023, Historical Med      ibuprofen (MOTRIN) 400 mg tablet Take 1 tablet (400 mg total) by mouth every 6 (six) hours as needed for mild pain, Starting u 9/22/2022, No Print      ketoconazole (NIZORAL) 2 % shampoo Starting Wed 5/10/2023, Historical Med      lidocaine (LIDODERM) 5 % APPLY 2 PATCH TOPICALLY DAILY FOR PAIN (REMOVE PATCH AFTER 12 HOURS; 12 HOURS ON AND 12 HOURS OFF), Historical Med      meloxicam (MOBIC) 15 mg tablet 7.5 mg, Starting Wed 9/6/2023, Historical Med      midodrine (PROAMATINE) 5 mg tablet Take 1 tablet (5 mg total) by mouth 3 (three) times a day before meals, Starting Tue 11/21/2023, Normal      naloxone (NARCAN) 4 mg/0.1 mL nasal spray Administer 1 spray into a nostril. If no response after 2-3 minutes, give another dose in the other nostril using a new spray., Normal      NON FORMULARY COMPRILAN BANDAGE [id number] [id number], Starting Fri 8/25/2023, Historical Med      nystatin powder Starting Wed 5/10/2023, Historical Med      oxyCODONE (ROXICODONE) 5 immediate release tablet Take 1 tablet (5 mg) for moderate pain every 6 hours as needed or take 2 tablets (10 mg) for severe pain every 6 hours as needed., Print      sildenafil (VIAGRA) 100 mg tablet Take 100 mg by mouth As directed, Starting Fri 9/11/2020, Historical Med             Outpatient Discharge Orders   MRI cervical spine wo contrast   Standing Status: Future Standing Exp. Date: 03/11/28     MRI lumbar spine wo contrast   Standing Status: Future Standing Exp. Date: 03/11/28       PDMP Review         Value Time User    PDMP Reviewed  Yes 1/17/2024  3:38 PM Debra Leyva DO            ED Provider  Electronically Signed by             Samir Becerra PA-C  03/12/24 0835    "

## 2024-04-16 ENCOUNTER — TELEPHONE (OUTPATIENT)
Dept: OTHER | Facility: HOSPITAL | Age: 64
End: 2024-04-16

## 2024-04-16 NOTE — TELEPHONE ENCOUNTER
Him    Please obtain the EMG result, and recent progress notes from the physicians at [facility].      The patient would like me to order further testing but this cannot be ordered unless I review this information more in detail.    I did send a [employer] message to Bill to reflect this fact.

## 2024-04-25 NOTE — TELEPHONE ENCOUNTER
Morning,    Received a referral and medical record information from the [place]. The referral has been entered into the patient appointment desk, and all information will soon be scanned into the chart of the patient. The patient needs to be scheduled for a new patient appointment with the diagnosis being BPH w/LUTS. Resident/Fellow

## 2024-04-30 ENCOUNTER — HOSPITAL ENCOUNTER (EMERGENCY)
Facility: HOSPITAL | Age: 64
Discharge: HOME | End: 2024-04-30
Attending: EMERGENCY MEDICINE
Payer: MEDICARE

## 2024-04-30 ENCOUNTER — APPOINTMENT (EMERGENCY)
Dept: CT IMAGING | Facility: HOSPITAL | Age: 64
End: 2024-04-30
Payer: MEDICARE

## 2024-04-30 VITALS
WEIGHT: 210 LBS | BODY MASS INDEX: 28.48 KG/M2 | DIASTOLIC BLOOD PRESSURE: 69 MMHG | TEMPERATURE: 98.2 F | OXYGEN SATURATION: 97 % | RESPIRATION RATE: 20 BRPM | SYSTOLIC BLOOD PRESSURE: 152 MMHG | HEART RATE: 60 BPM

## 2024-04-30 DIAGNOSIS — R93.5 ABNORMAL CT OF THE ABDOMEN: ICD-10-CM

## 2024-04-30 DIAGNOSIS — G90.1 DYSAUTONOMIA (HCC): Primary | ICD-10-CM

## 2024-04-30 DIAGNOSIS — M54.9 BACK PAIN: ICD-10-CM
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
## 2024-04-30 LAB
ATRIAL RATE: 63 BPM
BASOPHILS # BLD AUTO: 0.05 THOUSANDS/ÂΜL (ref 0–0.1)
BASOPHILS NFR BLD AUTO: 1 % (ref 0–1)
CARDIAC TROPONIN I PNL SERPL HS: 3 NG/L
EOSINOPHIL # BLD AUTO: 0.15 THOUSAND/ÂΜL (ref 0–0.61)
EOSINOPHIL NFR BLD AUTO: 2 % (ref 0–6)
ERYTHROCYTE [DISTWIDTH] IN BLOOD BY AUTOMATED COUNT: 13.7 % (ref 11.6–15.1)
HCT VFR BLD AUTO: 51.8 % (ref 36.5–49.3)
HGB BLD-MCNC: 16.8 G/DL (ref 12–17)
IMM GRANULOCYTES # BLD AUTO: 0.04 THOUSAND/UL (ref 0–0.2)
IMM GRANULOCYTES NFR BLD AUTO: 1 % (ref 0–2)
LYMPHOCYTES # BLD AUTO: 0.98 THOUSANDS/ÂΜL (ref 0.6–4.47)
LYMPHOCYTES NFR BLD AUTO: 14 % (ref 14–44)
MCH RBC QN AUTO: 31.6 PG (ref 26.8–34.3)
MCHC RBC AUTO-ENTMCNC: 32.4 G/DL (ref 31.4–37.4)
MCV RBC AUTO: 98 FL (ref 82–98)
MONOCYTES # BLD AUTO: 0.72 THOUSAND/ÂΜL (ref 0.17–1.22)
MONOCYTES NFR BLD AUTO: 10 % (ref 4–12)
NEUTROPHILS # BLD AUTO: 5.28 THOUSANDS/ÂΜL (ref 1.85–7.62)
NEUTS SEG NFR BLD AUTO: 72 % (ref 43–75)
NRBC BLD AUTO-RTO: 0 /100 WBCS
P AXIS: 32 DEGREES
PLATELET # BLD AUTO: 166 THOUSANDS/UL (ref 149–390)
PMV BLD AUTO: 10.9 FL (ref 8.9–12.7)
PR INTERVAL: 170 MS
QRS AXIS: -15 DEGREES
QRSD INTERVAL: 106 MS
QT INTERVAL: 396 MS
QTC INTERVAL: 405 MS
RBC # BLD AUTO: 5.31 MILLION/UL (ref 3.88–5.62)
T WAVE AXIS: 17 DEGREES
VENTRICULAR RATE: 63 BPM
WBC # BLD AUTO: 7.22 THOUSAND/UL (ref 4.31–10.16)

## 2024-04-30 PROCEDURE — 72131 CT LUMBAR SPINE W/O DYE: CPT

## 2024-04-30 PROCEDURE — 99285 EMERGENCY DEPT VISIT HI MDM: CPT

## 2024-04-30 PROCEDURE — 70450 CT HEAD/BRAIN W/O DYE: CPT

## 2024-04-30 PROCEDURE — 85025 COMPLETE CBC W/AUTO DIFF WBC: CPT | Performed by: EMERGENCY MEDICINE

## 2024-04-30 PROCEDURE — 84484 ASSAY OF TROPONIN QUANT: CPT | Performed by: EMERGENCY MEDICINE

## 2024-04-30 PROCEDURE — 96374 THER/PROPH/DIAG INJ IV PUSH: CPT

## 2024-04-30 PROCEDURE — 99285 EMERGENCY DEPT VISIT HI MDM: CPT | Performed by: EMERGENCY MEDICINE

## 2024-04-30 PROCEDURE — 80053 COMPREHEN METABOLIC PANEL: CPT | Performed by: EMERGENCY MEDICINE

## 2024-04-30 PROCEDURE — 93005 ELECTROCARDIOGRAM TRACING: CPT

## 2024-04-30 PROCEDURE — 93010 ELECTROCARDIOGRAM REPORT: CPT | Performed by: INTERNAL MEDICINE

## 2024-04-30 PROCEDURE — 36415 COLL VENOUS BLD VENIPUNCTURE: CPT | Performed by: EMERGENCY MEDICINE

## 2024-04-30 PROCEDURE — 96361 HYDRATE IV INFUSION ADD-ON: CPT

## 2024-04-30 RX ORDER — IBUPROFEN 600 MG/1
600 TABLET ORAL EVERY 6 HOURS PRN
Qty: 30 TABLET | Refills: 0 | Status: SHIPPED | OUTPATIENT
Start: 2024-04-30

## 2024-04-30 RX ORDER — KETOROLAC TROMETHAMINE 30 MG/ML
15 INJECTION, SOLUTION INTRAMUSCULAR; INTRAVENOUS ONCE
Status: COMPLETED | OUTPATIENT
Start: 2024-04-30 | End: 2024-04-30

## 2024-04-30 RX ORDER — OXYCODONE HYDROCHLORIDE AND ACETAMINOPHEN 5; 325 MG/1; MG/1
1 TABLET ORAL EVERY 4 HOURS PRN
Qty: 10 TABLET | Refills: 0 | Status: SHIPPED | OUTPATIENT
Start: 2024-04-30 | End: 2024-05-10

## 2024-04-30 RX ORDER — MIDODRINE HYDROCHLORIDE 5 MG/1
5 TABLET ORAL ONCE
Status: COMPLETED | OUTPATIENT
Start: 2024-04-30 | End: 2024-04-30

## 2024-04-30 RX ORDER — OXYCODONE HYDROCHLORIDE AND ACETAMINOPHEN 5; 325 MG/1; MG/1
1 TABLET ORAL ONCE
Status: COMPLETED | OUTPATIENT
Start: 2024-04-30 | End: 2024-04-30

## 2024-04-30 RX ADMIN — SODIUM CHLORIDE 1000 ML: 0.9 INJECTION, SOLUTION INTRAVENOUS at 13:50

## 2024-04-30 RX ADMIN — NALOXONE HYDROCHLORIDE 4 MG: 4 SPRAY NASAL at 15:52

## 2024-04-30 RX ADMIN — KETOROLAC TROMETHAMINE 15 MG: 30 INJECTION, SOLUTION INTRAMUSCULAR; INTRAVENOUS at 15:48

## 2024-04-30 RX ADMIN — MIDODRINE HYDROCHLORIDE 5 MG: 5 TABLET ORAL at 13:48

## 2024-04-30 RX ADMIN — OXYCODONE AND ACETAMINOPHEN 1 TABLET: 5; 325 TABLET ORAL at 13:48

## 2024-04-30 NOTE — DISCHARGE INSTRUCTIONS
As we discussed, keep the midodrine at the base dose, but if you are symptomatic and your blood pressure is below your comfortable level, typically less than 100, try repositioning first, and if persistent, you can take an extra dose of midodrine in the interval between scheduled doses.  The radiologist recommended follow up for the iliac aneursym to repeat imaging for it, which is being done.  Return as needed for new concerns.

## 2024-04-30 NOTE — ED PROVIDER NOTES
"History  Chief Complaint   Patient presents with    Syncope     Fell Thursday, in bathroom, W/C bound, can stand, stood R knee gave out and fell, left shoulder hit sink then left side of head, no LOC, yesterday felt dizzy and flushed, that lasted for a min, today at work became pale mottled and diaphoretic, slurring words, more numbness noted mid thigh and tips of toes, + headache L side.        62 yo M with myelomalacia of the cervical spinal cord at C4, incomplete quadriplegic, ambulatory by wheelchair, can ambulate with walker for short supervised distances, has caretakers for assistance, presents with concern for spells of lightheaded, blurry vision, slurring words, which are periodic and attributed to autonomic dysfunction with low blood pressure, but more frequent over the past 4 days.  Today the spell was onset about 12pm, while sitting, which is the typical position, and resolved after 30 mins, and corresponds to when he was repositioned, which often helps. He had an accidental on  at home, while he was ambulating himself, his right knee gave out, and he fell, impacting his left shoulder on the sink.  His caretakers helped him up, and he actually did well the rest of that day.  Then  is when he started having spells.        Syncope      Prior to Admission Medications   Prescriptions Last Dose Informant Patient Reported? Taking?   ALPRAZolam (XANAX) 0.5 mg tablet   No Yes   Sig: Take 1 tablet (0.5 mg total) by mouth daily at bedtime as needed for anxiety   Cholecalciferol 50 MCG ( UT) TABS   Yes Yes   Si mcg   Elastic Bandages & Supports (Wrap/Multipurpose 2.75\"x21.4yd) MISC   No Yes   Sig: Use in the morning Please dispense lymphedema wrap and wrap from hand up to the proximal shoulder   NON FORMULARY   Yes No   Sig: COMPRILAN BANDAGE [id number] [id number]   Patient not taking: Reported on 11/10/2023   atorvastatin (LIPITOR) 40 mg tablet   Yes Yes   Sig: TAKE 20MG (ONE-HALF TABLET) BY MOUTH EVERY " DAY FOR CHOLESTEROL   bisacodyl (DULCOLAX) 10 mg suppository   No No   Sig: Insert 1 suppository (10 mg total) into the rectum daily as needed for constipation   Patient not taking: Reported on 11/10/2023   clobetasol (TEMOVATE) 0.05 % external solution   Yes Yes   docusate sodium (COLACE) 100 mg capsule Not Taking  No No   Sig: Take 1 capsule (100 mg total) by mouth 2 (two) times a day   Patient not taking: Reported on 2024   fluocinonide (LIDEX) 0.05 % cream   Yes Yes   ibuprofen (MOTRIN) 400 mg tablet   No Yes   Sig: Take 1 tablet (400 mg total) by mouth every 6 (six) hours as needed for mild pain   ketoconazole (NIZORAL) 2 % shampoo   Yes Yes   lidocaine (LIDODERM) 5 %   Yes No   Sig: APPLY 2 PATCH TOPICALLY DAILY FOR PAIN (REMOVE PATCH AFTER 12 HOURS; 12 HOURS ON AND 12 HOURS OFF)   Patient not taking: Reported on 11/10/2023   meloxicam (MOBIC) 15 mg tablet Not Taking  Yes No   Si.5 mg   Patient not taking: Reported on 2024   midodrine (PROAMATINE) 5 mg tablet   No Yes   Sig: Take 1 tablet (5 mg total) by mouth 3 (three) times a day before meals   naloxone (NARCAN) 4 mg/0.1 mL nasal spray   No Yes   Sig: Administer 1 spray into a nostril. If no response after 2-3 minutes, give another dose in the other nostril using a new spray.   nystatin powder Not Taking  Yes No   Patient not taking: Reported on 2024   oxyCODONE (ROXICODONE) 5 immediate release tablet   No Yes   Sig: Take 1 tablet (5 mg) for moderate pain every 6 hours as needed or take 2 tablets (10 mg) for severe pain every 6 hours as needed.   sildenafil (VIAGRA) 100 mg tablet  Self Yes Yes   Sig: Take 100 mg by mouth As directed      Facility-Administered Medications: None       Past Medical History:   Diagnosis Date    Balance problems     BPH (benign prostatic hyperplasia)     Chronic back pain     Chronic pain disorder     COVID-19     2021-asymptomatic     Erectile dysfunction     Nocturia     OAB (overactive bladder)      Testicular hypogonadism     Urinary frequency     Urinary urgency        Past Surgical History:   Procedure Laterality Date    BACK SURGERY  05/18/2016    with hardware    BACK SURGERY      2 back surgery unable to remove hardware    COLONOSCOPY  11/2004    Dr Taylor. tubular adenoma polyp removed    COLONOSCOPY  12/2007    Dr Cameron. Normal    COLONOSCOPY  07/2013    Dr Cameron. Normal.     COLONOSCOPY  06/2019    Dr Cameron. Hemorrhoids, normal.     NECK SURGERY      decompression of neck     NECK SURGERY Right     scar tissues removed    [place] EXCISION HYDROCELE UNILATERAL Right 06/10/2021    Procedure: HYDROCELECTOMY;  Surgeon: Dereck Goddard DO;  Location: [facility];  Service: Urology    SPINAL CORD STIMULATOR IMPLANT  10/10/2019    WOUND DEBRIDEMENT Right 08/10/2023    Procedure: DEBRIDEMENT UPPER EXTREMITY (WASH OUT) - elbow;  Surgeon: Jesse Avendaño MD;  Location: [facility];  Service: Orthopedics       History reviewed. No pertinent family history.  I have reviewed and agree with the history as documented.    E-Cigarette/Vaping    E-Cigarette Use Never User      E-Cigarette/Vaping Substances    Nicotine No     THC No     CBD No     Flavoring No     Other No     Unknown No      Social History     Tobacco Use    Smoking status: Never    Smokeless tobacco: Never   Vaping Use    Vaping status: Never Used   Substance Use Topics    Alcohol use: Not Currently     Alcohol/week: 4.0 - 7.0 standard drinks of alcohol     Types: 4 - 7 Glasses of wine per week     Comment: Just weekends    Drug use: No       Review of Systems   Cardiovascular:  Positive for syncope.       Physical Exam  Physical Exam  Vitals and nursing note reviewed.   Constitutional:       Appearance: He is well-developed. He is obese.   HENT:      Head: Normocephalic and atraumatic.      Right Ear: External ear normal.      Left Ear: External ear normal.      Nose: Nose normal.      Mouth/Throat:      Mouth: Mucous membranes are moist.   Eyes:       Conjunctiva/sclera: Conjunctivae normal.      Pupils: Pupils are equal, round, and reactive to light.   Cardiovascular:      Rate and Rhythm: Normal rate.   Pulmonary:      Effort: Pulmonary effort is normal.   Abdominal:      Tenderness: There is no abdominal tenderness.   Musculoskeletal:      Cervical back: Normal range of motion and neck supple. Normal range of motion.      Thoracic back: Normal range of motion.      Lumbar back: Decreased range of motion.      Comments: No extremity deformity, he moves everything at his baseline   Skin:     General: Skin is warm and dry.      Capillary Refill: Capillary refill takes less than 2 seconds.      Findings: No bruising, signs of injury or wound.   Neurological:      Mental Status: He is alert and oriented to person, place, and time.      Cranial Nerves: No cranial nerve deficit.      Motor: Abnormal muscle tone (increased upper and lower extremity) present.      Comments: Chronic apraxia of upper and lower extremity at his baseline   Psychiatric:         Behavior: Behavior normal.         Thought Content: Thought content normal.         Judgment: Judgment normal.         Vital Signs  ED Triage Vitals [04/30/24 1315]   Temperature Pulse Respirations Blood Pressure SpO2   98.2 °F (36.8 °C) 63 22 (!) 88/53 94 %      Temp Source Heart Rate Source Patient Position - Orthostatic VS BP Location FiO2 (%)   Oral Monitor Sitting Right arm --      Pain Score       4           Vitals:    04/30/24 1315 04/30/24 1328 04/30/24 1432   BP: (!) 88/53  152/69   Pulse: 63  60   Patient Position - Orthostatic VS: Sitting Sitting Sitting         Visual Acuity      ED Medications  Medications   sodium chloride 0.9 % bolus 1,000 mL (0 mL Intravenous Stopped 4/30/24 1550)   oxyCODONE-acetaminophen (PERCOCET) 5-325 mg per tablet 1 tablet (1 tablet Oral Given 4/30/24 1348)   midodrine (PROAMATINE) tablet 5 mg (5 mg Oral Given 4/30/24 1348)   ketorolac (TORADOL) injection 15 mg (15 mg  Intravenous Given 4/30/24 1548)   naloxone nasal- Given to patient by provider at discharge. (NARCAN) 4 mg/0.1 mL nasal spray 4 mg (4 mg Does not apply Given by Other 4/30/24 5244)       Diagnostic Studies  Results Reviewed       Procedure Component Value Units Date/Time    Comprehensive metabolic panel [[id number]]  (Abnormal) Collected: 04/30/24 1406    Lab Status: Final result Specimen: Blood from Arm, Left Updated: 04/30/24 1449     Sodium 132 mmol/L      Potassium 4.8 mmol/L      Chloride 100 mmol/L      CO2 27 mmol/L      ANION GAP 5 mmol/L      BUN 12 mg/dL      Creatinine 0.76 mg/dL      Glucose 80 mg/dL      Calcium 8.7 mg/dL      AST 33 U/L      ALT 19 U/L      Alkaline Phosphatase 67 U/L      Total Protein 5.6 g/dL      Albumin 3.5 g/dL      Total Bilirubin 0.70 mg/dL      eGFR 95 ml/min/1.73sq m     Narrative:      National Kidney Disease Foundation guidelines for Chronic Kidney Disease (CKD):     Stage 1 with normal or high GFR (GFR > 90 mL/min/1.73 square meters)    Stage 2 Mild CKD (GFR = 60-89 mL/min/1.73 square meters)    Stage 3A Moderate CKD (GFR = 45-59 mL/min/1.73 square meters)    Stage 3B Moderate CKD (GFR = 30-44 mL/min/1.73 square meters)    Stage 4 Severe CKD (GFR = 15-29 mL/min/1.73 square meters)    Stage 5 End Stage CKD (GFR <15 mL/min/1.73 square meters)  Note: GFR calculation is accurate only with a steady state creatinine    HS Troponin 0hr (reflex protocol) [[id number]]  (Normal) Collected: 04/30/24 1406    Lab Status: Final result Specimen: Blood from Arm, Left Updated: 04/30/24 1445     hs TnI 0hr 3 ng/L     CBC and differential [[id number]]  (Abnormal) Collected: 04/30/24 1406    Lab Status: Final result Specimen: Blood from Arm, Left Updated: 04/30/24 1420     WBC 7.22 Thousand/uL      RBC 5.31 Million/uL      Hemoglobin 16.8 g/dL      Hematocrit 51.8 %      MCV 98 fL      MCH 31.6 pg      MCHC 32.4 g/dL      RDW 13.7 %      MPV 10.9 fL      Platelets 166 Thousands/uL      nRBC 0  /100 WBCs      Segmented % 72 %      Immature Grans % 1 %      Lymphocytes % 14 %      Monocytes % 10 %      Eosinophils Relative 2 %      Basophils Relative 1 %      Absolute Neutrophils 5.28 Thousands/µL      Absolute Immature Grans 0.04 Thousand/uL      Absolute Lymphocytes 0.98 Thousands/µL      Absolute Monocytes 0.72 Thousand/µL      Eosinophils Absolute 0.15 Thousand/µL      Basophils Absolute 0.05 Thousands/µL                    CT spine lumbar without contrast   Final Result by Travis Huynh MD (04/30 9195)         1. No acute fracture or subluxation.   2. This spinal construct is grossly stable to the prior 2020 outside CT with the exception of the right L2 pedicle screw which has been removed. Chronic loosening of bilateral S1 pedicle screws is also unchanged from 2020.   3. Common iliac artery aneurysm is redemonstrated, similar to 2020. Further clinical surveillance and follow-up advised. Consider abdominal ultrasound or CTA of the abdominal aorta in 6 months to assess for stability.         Workstation performed: [id number]         CT head without contrast   Final Result by Job Goldberg DO (04/30 1441)      No acute intracranial abnormality.                  Workstation performed: [id number]                    Procedures  ECG 12 Lead Documentation Only    Date/Time: 4/30/2024 1:17 PM    Performed by: Gonzalez Greer MD  Authorized by: Gonzalez Greer MD    Rate:     ECG rate:  63  Rhythm:     Rhythm: sinus rhythm    Ectopy:     Ectopy: PAC             ED Course  ED Course as of 04/30/24 1718   Tue Apr 30, 2024   1414 He has a CT of L spine tomorrow, asking if it can be done today, which is reasonable since he fell   1436 I have independently reviewed external records are available to me to the level of detail possible within the time constraints of my patient care responsibilities in the ED. His spells have been previously attributed to autonomic dysfunction, treated with  "midodrine   1447 BP recovering to normal   1518 CT spine lumbar without contrast  Imaging reviewed and interpreted myself and concur with radiologist:   no acute findings, \"1. No acute fracture or subluxation.   2. This spinal construct is grossly stable to the prior 2020 outside CT with the exception of the right L2 pedicle screw which has been removed. Chronic loosening of bilateral S1 pedicle screws is also unchanged from 2020.   3. Common iliac artery aneurysm is redemonstrated, similar to 2020. Further clinical surveillance and follow-up advised. Consider abdominal ultrasound or CTA of the abdominal aorta in 6 months to assess for stability.\"     1541 Reviewed results with patient at bedside and updated on the plan.  He feels back to his baseline, and BP has recovered.  I offered admission for observation, but he is asking to be discharged, since these spells are known to him, and he feels the extra dose of midodrine was helpful.  He has been told he can increase the dose, so we discussed keeping the baseline dose, but taking an extra in the interval if he is experiencing symptoms and his BP is not resolving.  I went over his CT results.  He asked for pain medication for acute on chronic back pain, and he can take NSAIDs, he has tizandine for spasm.                                 SBIRT 22yo+      Flowsheet Row Most Recent Value   Initial Alcohol Screen: US AUDIT-C     1. How often do you have a drink containing alcohol? 0 Filed at: 04/30/2024 1436   2. How many drinks containing alcohol do you have on a typical day you are drinking?  0 Filed at: 04/30/2024 1436   3a. Male UNDER 65: How often do you have five or more drinks on one occasion? 0 Filed at: 04/30/2024 1436   3b. FEMALE Any Age, or MALE 65+: How often do you have 4 or more drinks on one occassion? 0 Filed at: 04/30/2024 1436   Audit-C Score 0 Filed at: 04/30/2024 1436   RACHEL: How many times in the past year have you...    Used an illegal drug or used " a prescription medication for non-medical reasons? Never Filed at: 04/30/2024 1436                      Medical Decision Making  Amount and/or Complexity of Data Reviewed  Labs: ordered.  Radiology: ordered. Decision-making details documented in ED Course.    Risk  Prescription drug management.             Disposition  Final diagnoses:   Dysautonomia (HCC)   Back pain   Abnormal CT of the abdomen - iliac artery aneurysm   Fall, initial encounter     Time reflects when diagnosis was documented in both MDM as applicable and the Disposition within this note       Time User Action Codes Description Comment    4/30/2024  3:44 PM Gonzalez Greer Add [G90.1] Dysautonomia (HCC)     4/30/2024  3:44 PM Gonzalez Greer Add [M54.9] Back pain     4/30/2024  3:45 PM Gonzalez Greer Add [R93.5] Abnormal CT of the abdomen     4/30/2024  3:45 PM Gonzalez Greer Modify [R93.5] Abnormal CT of the abdomen iliac artery aneurysm    4/30/2024  3:49 PM Gonzalez Greer Add [W19.XXXA] Fall, initial encounter           ED Disposition       ED Disposition   Discharge    Condition   Good    Date/Time   Tue Apr 30, 2024 1545    Comment   Jos Hawk discharge to home/self care.                   Follow-up Information       Follow up With Specialties Details Why Contact Info    Demarco Tirado, DO Internal Medicine Go to  As needed [address] [facility] [address] [phone]      Jesse Weeks, DO Cardiology Schedule an appointment as soon as possible for a visit  For followup [address] [facility] [address] [phone]              Discharge Medication List as of 4/30/2024  3:50 PM        START taking these medications    Details   !! ibuprofen (MOTRIN) 600 mg tablet Take 1 tablet (600 mg total) by mouth every 6 (six) hours as needed for mild pain or moderate pain, Starting Tue 4/30/2024, Normal      oxyCODONE-acetaminophen (PERCOCET) 5-325 mg per tablet Take 1 tablet by mouth every 4 (four) hours  "as needed for severe pain for up to 10 days Max Daily Amount: 6 tablets, Starting Tue 4/30/2024, Until Fri 5/10/2024 at 2359, Normal       !! - Potential duplicate medications found. Please discuss with provider.        CONTINUE these medications which have NOT CHANGED    Details   ALPRAZolam (XANAX) 0.5 mg tablet Take 1 tablet (0.5 mg total) by mouth daily at bedtime as needed for anxiety, Starting Wed 1/17/2024, Normal      atorvastatin (LIPITOR) 40 mg tablet TAKE 20MG (ONE-HALF TABLET) BY MOUTH EVERY DAY FOR CHOLESTEROL, Historical Med      Cholecalciferol 50 MCG (2000 UT) TABS 50 mcg, Starting Wed 10/11/2023, Historical Med      clobetasol (TEMOVATE) 0.05 % external solution Starting Wed 5/10/2023, Historical Med      Elastic Bandages & Supports (Wrap/Multipurpose 2.75\"x21.4yd) MISC Use in the morning Please dispense lymphedema wrap and wrap from hand up to the proximal shoulder, Starting Fri 8/25/2023, Normal      fluocinonide (LIDEX) 0.05 % cream Starting Wed 5/10/2023, Historical Med      !! ibuprofen (MOTRIN) 400 mg tablet Take 1 tablet (400 mg total) by mouth every 6 (six) hours as needed for mild pain, Starting Thu 9/22/2022, No Print      ketoconazole (NIZORAL) 2 % shampoo Starting Wed 5/10/2023, Historical Med      midodrine (PROAMATINE) 5 mg tablet Take 1 tablet (5 mg total) by mouth 3 (three) times a day before meals, Starting Tue 11/21/2023, Normal      naloxone (NARCAN) 4 mg/0.1 mL nasal spray Administer 1 spray into a nostril. If no response after 2-3 minutes, give another dose in the other nostril using a new spray., Normal      oxyCODONE (ROXICODONE) 5 immediate release tablet Take 1 tablet (5 mg) for moderate pain every 6 hours as needed or take 2 tablets (10 mg) for severe pain every 6 hours as needed., Print      sildenafil (VIAGRA) 100 mg tablet Take 100 mg by mouth As directed, Starting Fri 9/11/2020, Historical Med      bisacodyl (DULCOLAX) 10 mg suppository Insert 1 suppository (10 mg " total) into the rectum daily as needed for constipation, Starting Tue 8/15/2023, No Print      docusate sodium (COLACE) 100 mg capsule Take 1 capsule (100 mg total) by mouth 2 (two) times a day, Starting Tue 8/15/2023, No Print      lidocaine (LIDODERM) 5 % APPLY 2 PATCH TOPICALLY DAILY FOR PAIN (REMOVE PATCH AFTER 12 HOURS; 12 HOURS ON AND 12 HOURS OFF), Historical Med      meloxicam (MOBIC) 15 mg tablet 7.5 mg, Starting Wed 9/6/2023, Historical Med      NON FORMULARY COMPRILAN BANDAGE [id number] [id number], Starting Fri 8/25/2023, Historical Med      nystatin powder Starting Wed 5/10/2023, Historical Med       !! - Potential duplicate medications found. Please discuss with provider.          No discharge procedures on file.    PDMP Review         Value Time User    PDMP Reviewed  Yes 1/17/2024  3:38 PM Debra Leyva DO            ED Provider  Electronically Signed by             Gonzalez Greer MD  04/30/24 0658

## 2024-05-03 ENCOUNTER — HOSPITAL ENCOUNTER (OUTPATIENT)
Dept: MRI IMAGING | Facility: HOSPITAL | Age: 64
Discharge: HOME | End: 2024-05-03
Payer: MEDICARE

## 2024-05-03 DIAGNOSIS — R29.898 LOWER EXTREMITY WEAKNESS: ICD-10-CM

## 2024-05-03 PROCEDURE — 72148 MRI LUMBAR SPINE W/O DYE: CPT

## 2024-05-03 PROCEDURE — 72141 MRI NECK SPINE W/O DYE: CPT

## 2024-05-04 ENCOUNTER — HOSPITAL ENCOUNTER (EMERGENCY)
Facility: HOSPITAL | Age: 64
Discharge: HOME | End: 2024-05-04
Attending: EMERGENCY MEDICINE
Payer: MEDICARE

## 2024-05-04 ENCOUNTER — APPOINTMENT (EMERGENCY)
Dept: NON INVASIVE DIAGNOSTICS | Facility: HOSPITAL | Age: 64
End: 2024-05-04
Payer: MEDICARE

## 2024-05-04 ENCOUNTER — APPOINTMENT (EMERGENCY)
Dept: CT IMAGING | Facility: HOSPITAL | Age: 64
End: 2024-05-04
Payer: MEDICARE

## 2024-05-04 VITALS
OXYGEN SATURATION: 93 % | DIASTOLIC BLOOD PRESSURE: 77 MMHG | SYSTOLIC BLOOD PRESSURE: 171 MMHG | TEMPERATURE: 97.8 F | RESPIRATION RATE: 18 BRPM | HEART RATE: 63 BPM

## 2024-05-04 DIAGNOSIS — R60.0 BILATERAL LOWER EXTREMITY EDEMA: Primary | ICD-10-CM

## 2024-05-04 DIAGNOSIS — R93.5 ABNORMAL CT OF THE ABDOMEN: ICD-10-CM

## 2024-05-04 DIAGNOSIS — I72.3 BILATERAL ILIAC ARTERY ANEURYSM (HCC): ICD-10-CM

## 2024-05-04 LAB
2HR DELTA HS TROPONIN: 0 NG/L
ALBUMIN SERPL BCP-MCNC: 3.9 G/DL (ref 3.5–5)
ALP SERPL-CCNC: 80 U/L (ref 34–104)
ALT SERPL W P-5'-P-CCNC: 21 U/L (ref 7–52)
ANION GAP SERPL CALCULATED.3IONS-SCNC: 2 MMOL/L (ref 4–13)
APTT PPP: 27 SECONDS (ref 23–37)
AST SERPL W P-5'-P-CCNC: 26 U/L (ref 13–39)
BASOPHILS # BLD AUTO: 0.05 THOUSANDS/ÂΜL (ref 0–0.1)
BASOPHILS NFR BLD AUTO: 1 % (ref 0–1)
BILIRUB SERPL-MCNC: 0.65 MG/DL (ref 0.2–1)
BNP SERPL-MCNC: 148 PG/ML (ref 0–100)
BUN SERPL-MCNC: 19 MG/DL (ref 5–25)
CALCIUM SERPL-MCNC: 8.8 MG/DL (ref 8.4–10.2)
CARDIAC TROPONIN I PNL SERPL HS: 10 NG/L
CARDIAC TROPONIN I PNL SERPL HS: 10 NG/L
CHLORIDE SERPL-SCNC: 99 MMOL/L (ref 96–108)
CO2 SERPL-SCNC: 35 MMOL/L (ref 21–32)
CREAT SERPL-MCNC: 0.84 MG/DL (ref 0.6–1.3)
EOSINOPHIL # BLD AUTO: 0.21 THOUSAND/ÂΜL (ref 0–0.61)
EOSINOPHIL NFR BLD AUTO: 3 % (ref 0–6)
ERYTHROCYTE [DISTWIDTH] IN BLOOD BY AUTOMATED COUNT: 13.6 % (ref 11.6–15.1)
GFR SERPL CREATININE-BSD FRML MDRD: 91 ML/MIN/1.73SQ M
GLUCOSE SERPL-MCNC: 58 MG/DL (ref 65–140)
GLUCOSE SERPL-MCNC: 78 MG/DL (ref 65–140)
HCT VFR BLD AUTO: 54 % (ref 36.5–49.3)
HGB BLD-MCNC: 17.5 G/DL (ref 12–17)
IMM GRANULOCYTES # BLD AUTO: 0.03 THOUSAND/UL (ref 0–0.2)
IMM GRANULOCYTES NFR BLD AUTO: 1 % (ref 0–2)
INR PPP: 0.98 (ref 0.84–1.19)
LACTATE SERPL-SCNC: 1.1 MMOL/L (ref 0.5–2)
LYMPHOCYTES # BLD AUTO: 1.16 THOUSANDS/ÂΜL (ref 0.6–4.47)
LYMPHOCYTES NFR BLD AUTO: 18 % (ref 14–44)
MCH RBC QN AUTO: 31.5 PG (ref 26.8–34.3)
MCHC RBC AUTO-ENTMCNC: 32.4 G/DL (ref 31.4–37.4)
MCV RBC AUTO: 97 FL (ref 82–98)
MONOCYTES # BLD AUTO: 0.76 THOUSAND/ÂΜL (ref 0.17–1.22)
MONOCYTES NFR BLD AUTO: 12 % (ref 4–12)
NEUTROPHILS # BLD AUTO: 4.14 THOUSANDS/ÂΜL (ref 1.85–7.62)
NEUTS SEG NFR BLD AUTO: 65 % (ref 43–75)
NRBC BLD AUTO-RTO: 0 /100 WBCS
PLATELET # BLD AUTO: 191 THOUSANDS/UL (ref 149–390)
PMV BLD AUTO: 10.5 FL (ref 8.9–12.7)
POTASSIUM SERPL-SCNC: 4.1 MMOL/L (ref 3.5–5.3)
PROCALCITONIN SERPL-MCNC: <0.05 NG/ML
PROT SERPL-MCNC: 6.2 G/DL (ref 6.4–8.4)
PROTHROMBIN TIME: 13.2 SECONDS (ref 11.6–14.5)
RBC # BLD AUTO: 5.55 MILLION/UL (ref 3.88–5.62)
SODIUM SERPL-SCNC: 136 MMOL/L (ref 135–147)
WBC # BLD AUTO: 6.35 THOUSAND/UL (ref 4.31–10.16)

## 2024-05-04 PROCEDURE — 71275 CT ANGIOGRAPHY CHEST: CPT

## 2024-05-04 PROCEDURE — 83880 ASSAY OF NATRIURETIC PEPTIDE: CPT | Performed by: EMERGENCY MEDICINE

## 2024-05-04 PROCEDURE — 82948 REAGENT STRIP/BLOOD GLUCOSE: CPT

## 2024-05-04 PROCEDURE — 74177 CT ABD & PELVIS W/CONTRAST: CPT

## 2024-05-04 PROCEDURE — 85730 THROMBOPLASTIN TIME PARTIAL: CPT | Performed by: EMERGENCY MEDICINE

## 2024-05-04 PROCEDURE — 99284 EMERGENCY DEPT VISIT MOD MDM: CPT

## 2024-05-04 PROCEDURE — 84484 ASSAY OF TROPONIN QUANT: CPT | Performed by: EMERGENCY MEDICINE

## 2024-05-04 PROCEDURE — 93005 ELECTROCARDIOGRAM TRACING: CPT

## 2024-05-04 PROCEDURE — 85610 PROTHROMBIN TIME: CPT | Performed by: EMERGENCY MEDICINE

## 2024-05-04 PROCEDURE — 83605 ASSAY OF LACTIC ACID: CPT | Performed by: EMERGENCY MEDICINE

## 2024-05-04 PROCEDURE — 80053 COMPREHEN METABOLIC PANEL: CPT | Performed by: EMERGENCY MEDICINE

## 2024-05-04 PROCEDURE — 85025 COMPLETE CBC W/AUTO DIFF WBC: CPT | Performed by: EMERGENCY MEDICINE

## 2024-05-04 PROCEDURE — 36415 COLL VENOUS BLD VENIPUNCTURE: CPT | Performed by: EMERGENCY MEDICINE

## 2024-05-04 PROCEDURE — 93970 EXTREMITY STUDY: CPT

## 2024-05-04 PROCEDURE — 99285 EMERGENCY DEPT VISIT HI MDM: CPT | Performed by: EMERGENCY MEDICINE

## 2024-05-04 PROCEDURE — 87040 BLOOD CULTURE FOR BACTERIA: CPT | Performed by: EMERGENCY MEDICINE

## 2024-05-04 PROCEDURE — 84145 PROCALCITONIN (PCT): CPT | Performed by: EMERGENCY MEDICINE

## 2024-05-04 RX ORDER — OXYCODONE HYDROCHLORIDE 5 MG/1
5 TABLET ORAL ONCE
Status: COMPLETED | OUTPATIENT
Start: 2024-05-04 | End: 2024-05-04

## 2024-05-04 RX ORDER — OXYCODONE HYDROCHLORIDE AND ACETAMINOPHEN 5; 325 MG/1; MG/1
1 TABLET ORAL ONCE
Status: COMPLETED | OUTPATIENT
Start: 2024-05-04 | End: 2024-05-04

## 2024-05-04 RX ORDER — OXYCODONE HYDROCHLORIDE 5 MG/1
5 TABLET ORAL EVERY 4 HOURS PRN
Qty: 5 TABLET | Refills: 0 | Status: ON HOLD | OUTPATIENT
Start: 2024-05-04

## 2024-05-04 RX ADMIN — OXYCODONE 5 MG: 5 TABLET ORAL at 22:14

## 2024-05-04 RX ADMIN — IOHEXOL 100 ML: 350 INJECTION, SOLUTION INTRAVENOUS at 20:25

## 2024-05-04 RX ADMIN — OXYCODONE HYDROCHLORIDE AND ACETAMINOPHEN 1 TABLET: 5; 325 TABLET ORAL at 19:19

## 2024-05-04 NOTE — ED PROVIDER NOTES
"History  Chief Complaint   Patient presents with    Leg Swelling     Pt reports increased b/l leg swelling, R arm swelling SOB, hot/cold flashes, purple coloration noted to pts extremities.      64 yo M with myelomalacia of the cervical spinal cord at C4, incomplete quadriplegic, ambulatory by wheelchair, can ambulate with walker for short supervised distances, has caretakers for assistance, presents with concern lower leg swelling, intermittent hot/cold flashes, shortness of breath, and he is concerned about RUE swelling as well.  He denies any fever taken by his caretakers.  He denies new leg pain. He has chronic pain that is unchanged, but controlled at baseline.           Prior to Admission Medications   Prescriptions Last Dose Informant Patient Reported? Taking?   ALPRAZolam (XANAX) 0.5 mg tablet   No No   Sig: Take 1 tablet (0.5 mg total) by mouth daily at bedtime as needed for anxiety   Cholecalciferol 50 MCG (2000 UT) TABS   Yes No   Si mcg   Elastic Bandages & Supports (Wrap/Multipurpose 2.75\"x21.4yd) MISC   No No   Sig: Use in the morning Please dispense lymphedema wrap and wrap from hand up to the proximal shoulder   NON FORMULARY   Yes No   Sig: COMPRILAN BANDAGE [id number]   Patient not taking: Reported on 11/10/2023   atorvastatin (LIPITOR) 40 mg tablet   Yes No   Sig: TAKE 20MG (ONE-HALF TABLET) BY MOUTH EVERY DAY FOR CHOLESTEROL   bisacodyl (DULCOLAX) 10 mg suppository   No No   Sig: Insert 1 suppository (10 mg total) into the rectum daily as needed for constipation   Patient not taking: Reported on 11/10/2023   clobetasol (TEMOVATE) 0.05 % external solution   Yes No   docusate sodium (COLACE) 100 mg capsule   No No   Sig: Take 1 capsule (100 mg total) by mouth 2 (two) times a day   Patient not taking: Reported on 2024   fluocinonide (LIDEX) 0.05 % cream   Yes No   ibuprofen (MOTRIN) 400 mg tablet   No No   Sig: Take 1 tablet (400 mg total) by mouth every 6 (six) hours as needed for " mild pain   ibuprofen (MOTRIN) 600 mg tablet   No No   Sig: Take 1 tablet (600 mg total) by mouth every 6 (six) hours as needed for mild pain or moderate pain   ketoconazole (NIZORAL) 2 % shampoo   Yes No   lidocaine (LIDODERM) 5 %   Yes No   Sig: APPLY 2 PATCH TOPICALLY DAILY FOR PAIN (REMOVE PATCH AFTER 12 HOURS; 12 HOURS ON AND 12 HOURS OFF)   Patient not taking: Reported on 11/10/2023   meloxicam (MOBIC) 15 mg tablet   Yes No   Si.5 mg   Patient not taking: Reported on 2024   midodrine (PROAMATINE) 5 mg tablet   No No   Sig: Take 1 tablet (5 mg total) by mouth 3 (three) times a day before meals   naloxone (NARCAN) 4 mg/0.1 mL nasal spray   No No   Sig: Administer 1 spray into a nostril. If no response after 2-3 minutes, give another dose in the other nostril using a new spray.   nystatin powder   Yes No   Patient not taking: Reported on 2024   oxyCODONE (ROXICODONE) 5 immediate release tablet   No No   Sig: Take 1 tablet (5 mg) for moderate pain every 6 hours as needed or take 2 tablets (10 mg) for severe pain every 6 hours as needed.   oxyCODONE-acetaminophen (PERCOCET) 5-325 mg per tablet   No No   Sig: Take 1 tablet by mouth every 4 (four) hours as needed for severe pain for up to 10 days Max Daily Amount: 6 tablets   sildenafil (VIAGRA) 100 mg tablet  Self Yes No   Sig: Take 100 mg by mouth As directed      Facility-Administered Medications: None       Past Medical History:   Diagnosis Date    Balance problems     BPH (benign prostatic hyperplasia)     Chronic back pain     Chronic pain disorder     COVID-19     2021-asymptomatic     Erectile dysfunction     Nocturia     OAB (overactive bladder)     Testicular hypogonadism     Urinary frequency     Urinary urgency        Past Surgical History:   Procedure Laterality Date    BACK SURGERY  2016    with hardware    BACK SURGERY      2 back surgery unable to remove hardware    COLONOSCOPY  2004    Dr Taylor. tubular adenoma polyp removed     COLONOSCOPY  12/2007    Dr Cameron. Normal    COLONOSCOPY  07/2013    Dr Cameron. Normal.     COLONOSCOPY  06/2019    Dr Cameron. Hemorrhoids, normal.     NECK SURGERY      decompression of neck     NECK SURGERY Right     scar tissues removed    IL EXCISION HYDROCELE UNILATERAL Right 06/10/2021    Procedure: HYDROCELECTOMY;  Surgeon: Dereck Goddard DO;  Location: [facility] OR;  Service: Urology    SPINAL CORD STIMULATOR IMPLANT  10/10/2019    WOUND DEBRIDEMENT Right 08/10/2023    Procedure: DEBRIDEMENT UPPER EXTREMITY (WASH OUT) - elbow;  Surgeon: Jesse Avendaño MD;  Location: [facility] OR;  Service: Orthopedics       History reviewed. No pertinent family history.  I have reviewed and agree with the history as documented.    E-Cigarette/Vaping    E-Cigarette Use Never User      E-Cigarette/Vaping Substances    Nicotine No     THC No     CBD No     Flavoring No     Other No     Unknown No      Social History     Tobacco Use    Smoking status: Never    Smokeless tobacco: Never   Vaping Use    Vaping status: Never Used   Substance Use Topics    Alcohol use: Not Currently     Alcohol/week: 4.0 - 7.0 standard drinks of alcohol     Types: 4 - 7 Glasses of wine per week     Comment: Just weekends    Drug use: No       Review of Systems    Physical Exam  Physical Exam  Vitals and nursing note reviewed.   Constitutional:       Appearance: He is well-developed.   HENT:      Head: Normocephalic and atraumatic.      Right Ear: External ear normal.      Left Ear: External ear normal.      Nose: Nose normal.      Mouth/Throat:      Mouth: Mucous membranes are moist.   Eyes:      Conjunctiva/sclera: Conjunctivae normal.      Pupils: Pupils are equal, round, and reactive to light.   Cardiovascular:      Rate and Rhythm: Normal rate and regular rhythm.      Pulses:           Dorsalis pedis pulses are 2+ on the right side and 2+ on the left side.        Posterior tibial pulses are 2+ on the right side and 2+ on the left side.    Pulmonary:      Effort: Pulmonary effort is normal.   Abdominal:      Tenderness: There is no abdominal tenderness.   Musculoskeletal:         General: Normal range of motion.      Cervical back: Normal range of motion and neck supple.      Right lower leg: Edema present.      Left lower leg: Edema present.      Comments: Bilateral lower extremities have a purple, reddish discoloration   Skin:     General: Skin is warm and dry.      Capillary Refill: Capillary refill takes less than 2 seconds.   Neurological:      Mental Status: He is alert and oriented to person, place, and time.      Cranial Nerves: No cranial nerve deficit.      Coordination: Coordination normal.      Comments: Moves all extremities at his functional baseline   Psychiatric:         Behavior: Behavior normal.         Thought Content: Thought content normal.         Judgment: Judgment normal.         Vital Signs  ED Triage Vitals   Temperature Pulse Respirations Blood Pressure SpO2   05/04/24 1828 05/04/24 1827 05/04/24 1827 05/04/24 1827 05/04/24 1827   97.8 °F (36.6 °C) 66 16 138/77 92 %      Temp src Heart Rate Source Patient Position - Orthostatic VS BP Location FiO2 (%)   -- 05/04/24 1827 05/04/24 1942 05/04/24 1942 --    Monitor Lying Left arm       Pain Score       05/04/24 1919       7           Vitals:    05/04/24 1942 05/04/24 2100 05/04/24 2130 05/04/24 2200   BP: 143/69 157/79 156/70 (!) 171/77   Pulse: (!) 52 57 60 63   Patient Position - Orthostatic VS: Lying Lying Lying Lying         Visual Acuity      ED Medications  Medications   oxyCODONE-acetaminophen (PERCOCET) 5-325 mg per tablet 1 tablet (1 tablet Oral Given 5/4/24 1919)   iohexol (OMNIPAQUE) 350 MG/ML injection (MULTI-DOSE) 100 mL (100 mL Intravenous Given 5/4/24 2025)   oxyCODONE (ROXICODONE) IR tablet 5 mg (5 mg Oral Given 5/4/24 2214)       Diagnostic Studies  Results Reviewed       Procedure Component Value Units Date/Time    HS Troponin I 2hr [[id number]]  (Normal)  Collected: 05/04/24 2109    Lab Status: Final result Specimen: Blood from Arm, Left Updated: 05/04/24 2135     hs TnI 2hr 10 ng/L      Delta 2hr hsTnI 0 ng/L     Fingerstick Glucose (POCT) [[id number]]  (Normal) Collected: 05/04/24 2026    Lab Status: Final result Specimen: Blood Updated: 05/04/24 2028     POC Glucose 78 mg/dl     HS Troponin I 4hr [[id number]]     Lab Status: No result Specimen: Blood     Procalcitonin [[id number]]  (Normal) Collected: 05/04/24 1907    Lab Status: Final result Specimen: Blood from Arm, Right Updated: 05/04/24 1945     Procalcitonin <0.05 ng/ml     HS Troponin 0hr (reflex protocol) [[id number]]  (Normal) Collected: 05/04/24 1907    Lab Status: Final result Specimen: Blood from Arm, Right Updated: 05/04/24 1941     hs TnI 0hr 10 ng/L     B-Type Natriuretic Peptide(BNP) [[id number]]  (Abnormal) Collected: 05/04/24 1907    Lab Status: Final result Specimen: Blood from Arm, Right Updated: 05/04/24 1941      pg/mL     Comprehensive metabolic panel [[id number]]  (Abnormal) Collected: 05/04/24 1907    Lab Status: Final result Specimen: Blood from Arm, Right Updated: 05/04/24 1938     Sodium 136 mmol/L      Potassium 4.1 mmol/L      Chloride 99 mmol/L      CO2 35 mmol/L      ANION GAP 2 mmol/L      BUN 19 mg/dL      Creatinine 0.84 mg/dL      Glucose 58 mg/dL      Calcium 8.8 mg/dL      AST 26 U/L      ALT 21 U/L      Alkaline Phosphatase 80 U/L      Total Protein 6.2 g/dL      Albumin 3.9 g/dL      Total Bilirubin 0.65 mg/dL      eGFR 91 ml/min/1.73sq m     Narrative:      National Kid[employer] Disease Foundation guidelines for Chronic Kidney Disease (CKD):     Stage 1 with normal or high GFR (GFR > 90 mL/min/1.73 square meters)    Stage 2 Mild CKD (GFR = 60-89 mL/min/1.73 square meters)    Stage 3A Moderate CKD (GFR = 45-59 mL/min/1.73 square meters)    Stage 3B Moderate CKD (GFR = 30-44 mL/min/1.73 square meters)    Stage 4 Severe CKD (GFR = 15-29 mL/min/1.73 square meters)    Stage 5 End  Stage CKD (GFR <15 mL/min/1.73 square meters)  Note: GFR calculation is accurate only with a steady state creatinine    Protime-INR [[id number]]  (Normal) Collected: 05/04/24 1907    Lab Status: Final result Specimen: Blood from Arm, Right Updated: 05/04/24 1937     Protime 13.2 seconds      INR 0.98    APTT [[id number]]  (Normal) Collected: 05/04/24 1907    Lab Status: Final result Specimen: Blood from Arm, Right Updated: 05/04/24 1937     PTT 27 seconds     Lactic acid [[id number]]  (Normal) Collected: 05/04/24 1907    Lab Status: Final result Specimen: Blood from Arm, Right Updated: 05/04/24 1934     LACTIC ACID 1.1 mmol/L     Narrative:      Result may be elevated if tourniquet was used during collection.    CBC and differential [[id number]]  (Abnormal) Collected: 05/04/24 1907    Lab Status: Final result Specimen: Blood from Arm, Right Updated: 05/04/24 1920     WBC 6.35 Thousand/uL      RBC 5.55 Million/uL      Hemoglobin 17.5 g/dL      Hematocrit 54.0 %      MCV 97 fL      MCH 31.5 pg      MCHC 32.4 g/dL      RDW 13.6 %      MPV 10.5 fL      Platelets 191 Thousands/uL      nRBC 0 /100 WBCs      Segmented % 65 %      Immature Grans % 1 %      Lymphocytes % 18 %      Monocytes % 12 %      Eosinophils Relative 3 %      Basophils Relative 1 %      Absolute Neutrophils 4.14 Thousands/µL      Absolute Immature Grans 0.03 Thousand/uL      Absolute Lymphocytes 1.16 Thousands/µL      Absolute Monocytes 0.76 Thousand/µL      Eosinophils Absolute 0.21 Thousand/µL      Basophils Absolute 0.05 Thousands/µL     Blood culture #1 [[id number]] Collected: 05/04/24 1907    Lab Status: In process Specimen: Blood from Arm, Right Updated: 05/04/24 1914    Blood culture #2 [[id number]] Collected: 05/04/24 1907    Lab Status: In process Specimen: Blood from Arm, Left Updated: 05/04/24 1914                   PE Study with CT Abdomen and Pelvis with contrast   Final Result by Tristin Molina DO (05/04 2140)      No acute pulmonary embolus.  "     Possible mild acute sigmoid diverticulitis without complication      3.2 cm infrarenal abdominal aortic aneurysm. Bilateral common iliac artery aneurysms.         The study was marked in EPIC for immediate notification.      Workstation performed: [id number]          VAS VENOUS DUPLEX - LOWER LIMB BILATERAL    (Results Pending)              Procedures  Procedures         ED Course  ED Course as of 05/04/24 2222   Sat May 04, 2024   1926  VAS VENOUS DUPLEX - LOWER LIMB BILATERAL  No DVT, preliminary reading   2000 LACTIC ACID: 1.1  normal   2000 Procalcitonin: <0.05  normal   2001 GLUCOSE(!): 58  Relative hypoglycemia   2039 POC Glucose: 78  improved   2122 BNP(!): 148  Not significantly elevated   2128 Reviewed results with patient at bedside and updated on the plan. Pain is resolved.  Symptoms c/w GERD.  She has been under increased stress at work.  ACS is sufficiently low risk to be discharged.     2147 PE Study with CT Abdomen and Pelvis with contrast  Imaging reviewed and interpreted myself and concur with radiologist:   \"No acute pulmonary embolus. Possible mild acute sigmoid diverticulitis without complication 3.2 cm infrarenal abdominal aortic aneurysm. Bilateral common iliac artery aneurysms. The study was marked in EPIC for immediate notification.\"   2200 Reviewed results with patient at bedside and updated on the plan.  I reviewed CT findings and labs.  He has no sepsis markers.  He has no abdominal pain, and no pain to palpation . I am not inclined to treat the possible diverticulitis with abx, but rather watchful waiting, he is going to have low residue diet, drink plenty of fluids, and has return precautions for increasing pain, diarrhea, fever.  He is asking for more pain medication.                                     Wells' Criteria for PE      Flowsheet Row Most Recent Value   Wells' Criteria for PE    Clinical signs and symptoms of DVT 3 Filed at: 05/04/2024 1914   PE is primary diagnosis or " equally likely 3 Filed at: 05/04/2024 1914   HR >100 0 Filed at: 05/04/2024 1914   Immobilization at least 3 days or Surgery in the previous 4 weeks 1.5 Filed at: 05/04/2024 1914   Previous, objectively diagnosed PE or DVT 0 Filed at: 05/04/2024 1914   Hemoptysis 0 Filed at: 05/04/2024 1914   Malignancy with treatment within 6 months or palliative 0 Filed at: 05/04/2024 1914   Wells' Criteria Total 7.5 Filed at: 05/04/2024 1914                  Medical Decision Making  Amount and/or Complexity of Data Reviewed  Labs: ordered. Decision-making details documented in ED Course.  Radiology: ordered. Decision-making details documented in ED Course.    Risk  Prescription drug management.             Disposition  Final diagnoses:   Bilateral lower extremity edema   Abnormal CT of the abdomen - possible mild sigmoid diverticulitis   Bilateral iliac artery aneurysm (HCC)     Time reflects when diagnosis was documented in both MDM as applicable and the Disposition within this note       Time User Action Codes Description Comment    5/4/2024 10:02 PM Gonzalez Greer Add [R60.0] Bilateral lower extremity edema     5/4/2024 10:02 PM Gonzalez Greer Add [R93.5] Abnormal CT of the abdomen     5/4/2024 10:02 PM Gonzalez Greer Modify [R93.5] Abnormal CT of the abdomen possible mild sigmoid diverticulitis    5/4/2024 10:06 PM Gonzalez Greer Add [I72.3] Bilateral iliac artery aneurysm (HCC)           ED Disposition       ED Disposition   Discharge    Condition   Good    Date/Time   Sat May 4, 2024 2203    Comment   Jos Hawk discharge to home/self care.                   Follow-up Information       Follow up With Specialties Details Why Contact Info    Demarco Tirado, DO Internal Medicine Go to  As needed [address] [id number] [phone]              Patient's Medications   Discharge Prescriptions    OXYCODONE (ROXICODONE) 5 IMMEDIATE RELEASE TABLET    Take 1 tablet (5 mg total) by mouth every 4  (four) hours as needed for moderate pain Max Daily Amount: 30 mg       Start Date: 5/4/2024  End Date: --       Order Dose: 5 mg       Quantity: 5 tablet    Refills: 0       No discharge procedures on file.    PDMP Review         Value Time User    PDMP Reviewed  Yes 1/17/2024  3:38 PM Debra Leyva DO            ED Provider  Electronically Signed by             Gonzalez Greer MD  05/04/24 5754

## 2024-05-05 LAB
ATRIAL RATE: 60 BPM
P AXIS: 65 DEGREES
PR INTERVAL: 164 MS
QRS AXIS: 7 DEGREES
QRSD INTERVAL: 102 MS
QT INTERVAL: 446 MS
QTC INTERVAL: 446 MS
T WAVE AXIS: 7 DEGREES
VENTRICULAR RATE: 60 BPM

## 2024-05-05 PROCEDURE — 93970 EXTREMITY STUDY: CPT | Performed by: SURGERY

## 2024-05-05 PROCEDURE — 93010 ELECTROCARDIOGRAM REPORT: CPT | Performed by: INTERNAL MEDICINE

## 2024-05-05 NOTE — DISCHARGE INSTRUCTIONS
You have some vascular aneurysms of your aorta and iliac arteries (both upper legs) that just need to be monitored chronically by your doctor.      If you are requiring more pain medication after today you will need to follow up with your PCP.      Diverticulitis   WHAT YOU NEED TO KNOW:   Diverticulitis is a condition that causes small pockets along your intestine called diverticula to become inflamed or infected. This is caused by hard bowel movements, food, or bacteria that get stuck in the pockets.       DISCHARGE INSTRUCTIONS:   Return to the emergency department if:     You have severe diarrhea.    You urinate less than usual or not at all.    You are not able to have a bowel movement.    You cannot stop vomiting.     You have severe abdominal pain, a fever, and your abdomen is larger than usual.     You have new or increased blood in your bowel movements.    Eat bland foods. When you feel hungry, begin eating soft, bland foods. Examples are bananas, clear soup, potatoes, and applesauce. Do not have dairy products, alcohol, sugary drinks, or drinks with caffeine until you feel better.

## 2024-05-06 LAB
ATRIAL RATE: 59 BPM
P AXIS: 58 DEGREES
PR INTERVAL: 164 MS
QRS AXIS: 21 DEGREES
QRSD INTERVAL: 84 MS
QT INTERVAL: 410 MS
QTC INTERVAL: 405 MS
T WAVE AXIS: -6 DEGREES
VENTRICULAR RATE: 59 BPM

## 2024-05-08 LAB
ALBUMIN SERPL BCP-MCNC: 3.5 G/DL (ref 3.5–5)
ALP SERPL-CCNC: 67 U/L (ref 34–104)
ALT SERPL W P-5'-P-CCNC: 19 U/L (ref 7–52)
ANION GAP SERPL CALCULATED.3IONS-SCNC: 5 MMOL/L (ref 4–13)
AST SERPL W P-5'-P-CCNC: 33 U/L (ref 13–39)
BILIRUB SERPL-MCNC: 0.7 MG/DL (ref 0.2–1)
BUN SERPL-MCNC: 12 MG/DL (ref 5–25)
CALCIUM SERPL-MCNC: 8.7 MG/DL (ref 8.4–10.2)
CHLORIDE SERPL-SCNC: 100 MMOL/L (ref 96–108)
CO2 SERPL-SCNC: 27 MMOL/L (ref 21–32)
CREAT SERPL-MCNC: 0.76 MG/DL (ref 0.6–1.3)
GFR SERPL CREATININE-BSD FRML MDRD: 95 ML/MIN/1.73SQ M
GLUCOSE SERPL-MCNC: 80 MG/DL (ref 65–140)
POTASSIUM SERPL-SCNC: 4.8 MMOL/L (ref 3.5–5.3)
PROT SERPL-MCNC: 5.6 G/DL (ref 6.4–8.4)
SODIUM SERPL-SCNC: 132 MMOL/L (ref 135–147)

## 2024-05-09 ENCOUNTER — TELEPHONE (OUTPATIENT)
Dept: NEUROLOGY | Facility: CLINIC | Age: 64
End: 2024-05-09

## 2024-05-09 NOTE — TELEPHONE ENCOUNTER
Made an appt reminder call no answer lmom. Appt with dr márquez on 5/15/24@1:00pm  in [facility].

## 2024-05-10 LAB
BACTERIA BLD CULT: NORMAL
BACTERIA BLD CULT: NORMAL

## 2024-05-14 ENCOUNTER — TELEPHONE (OUTPATIENT)
Dept: NEUROLOGY | Facility: CLINIC | Age: 64
End: 2024-05-14

## 2024-05-14 NOTE — TELEPHONE ENCOUNTER
Bebo PARRISH from [facility] called about patient decline in himself. He follows with physiatry with them. Bebo states not sure if the botox injection he received a month is causing the decline and increase in anxiety.  If any questions Bebo can be reached at:     Tel. [phone]

## 2024-05-15 ENCOUNTER — OFFICE VISIT (OUTPATIENT)
Dept: NEUROLOGY | Facility: CLINIC | Age: 64
End: 2024-05-15
Payer: MEDICARE

## 2024-05-15 VITALS
SYSTOLIC BLOOD PRESSURE: 112 MMHG | OXYGEN SATURATION: 98 % | HEIGHT: 72 IN | RESPIRATION RATE: 16 BRPM | TEMPERATURE: 96.9 F | DIASTOLIC BLOOD PRESSURE: 70 MMHG | BODY MASS INDEX: 29.12 KG/M2 | WEIGHT: 215 LBS | HEART RATE: 74 BPM

## 2024-05-15 DIAGNOSIS — G82.50 QUADRIPARESIS (HCC): Primary | ICD-10-CM

## 2024-05-15 DIAGNOSIS — F41.9 ANXIETY DISORDER: ICD-10-CM

## 2024-05-15 DIAGNOSIS — G62.9 POLYNEUROPATHY: ICD-10-CM

## 2024-05-15 DIAGNOSIS — M48.05 SPINAL STENOSIS OF THORACOLUMBAR REGION: ICD-10-CM

## 2024-05-15 DIAGNOSIS — M62.838 MUSCLE SPASMS OF BOTH LOWER EXTREMITIES: ICD-10-CM

## 2024-05-15 PROCEDURE — 99215 OFFICE O/P EST HI 40 MIN: CPT | Performed by: PSYCHIATRY & NEUROLOGY

## 2024-05-15 RX ORDER — METHOCARBAMOL 500 MG/1
500 TABLET, FILM COATED ORAL 4 TIMES DAILY
Qty: 120 TABLET | Refills: 5 | Status: ON HOLD | OUTPATIENT
Start: 2024-05-15

## 2024-05-15 RX ORDER — FOLIC ACID 1 MG/1
TABLET ORAL
Status: ON HOLD | COMMUNITY
Start: 2024-02-16

## 2024-05-15 RX ORDER — ALPRAZOLAM 0.5 MG/1
0.5 TABLET ORAL
Qty: 10 TABLET | Refills: 0 | Status: ON HOLD | OUTPATIENT
Start: 2024-05-15

## 2024-05-15 RX ORDER — TESTOSTERONE CYPIONATE 200 MG/ML
INJECTION, SOLUTION INTRAMUSCULAR
Status: ON HOLD | COMMUNITY
Start: 2024-04-30

## 2024-05-15 RX ORDER — DULOXETIN HYDROCHLORIDE 60 MG/1
60 CAPSULE, DELAYED RELEASE ORAL DAILY
Qty: 30 CAPSULE | Refills: 2 | Status: ON HOLD | OUTPATIENT
Start: 2024-05-15

## 2024-05-15 RX ORDER — BUSPIRONE HYDROCHLORIDE 15 MG/1
15 TABLET ORAL
Status: ON HOLD | COMMUNITY
Start: 2024-04-04 | End: 2024-05-16

## 2024-05-15 NOTE — PATIENT INSTRUCTIONS
Cymbalta 60 mg daily       Decrease buspar to 15 mg daily in one week     Robaxin( metcarbamol )  500 mg 4 times a day , stop zanaflex    Referral for neurosurgery     Midodrine ( autonomic)     [facility] , vascular , results of vascualr study

## 2024-05-15 NOTE — ASSESSMENT & PLAN NOTE
This is  a 63-year-old patient with a spinal disease.  He did undergo to be in 2016, spinal cord stimulator placement in 2018 and scar removal in 2021.  He describes 2 to 3-month worsening of his gait weakness with increased tone.  He did receive Botox in February and 5 weeks later he noticed his increase in weakness and tone.  Today's examination does demonstrate severe increase in tone paraspinals in the lower legs.    He does have autonomic dysfunction and is currently utilizes midodrine 5 mg 3 times a day.  This has prompted ER evaluations.  He is also zanaflex 2 mg 3 times a day but but complains of cognitive sleepiness.    We discussed that Zanaflex can be associated with his chronic cognitive sleepiness as well as hypotension.  Due to his ongoing issues with autonomic dysfunction I have elected to change his Zanaflex to methocarbamol.  I have ordered 500  mg up to 4  times a day.  In the past baclofen also resulted in cognitive slowing.  We discussed that this is at methocarbamol does not have as much cognitive issues compared to the other 2 medications and I would like him to try this.  He also requested a evaluation by neurosurgery to discuss spinal cord stimulator removal, potential removal of hardware and scar tissue.  I have informed him that it would be the neurosurgeons decision regarding if he would be a candidate.    Review his EMG study that was performed at [facility].  It did demonstrate a radiculopathy and consistent with a polyneuropathy.     I also placed him on duloxetine 60 mg a day and asked him to taper off of BuSpar to 15 mg a day.  He does have a component of anxiety and depression due to his underlying neurological dysfunction.

## 2024-05-15 NOTE — PROGRESS NOTES
Patient ID: Jos Hawk is a 63 y.o. male.    Assessment/Plan:    Spinal stenosis of thoracolumbar region  This is  a 63-year-old patient with a spinal disease.  He did undergo to be in 2016, spinal cord stimulator placement in 2018 and scar removal in 2021.  He describes 2 to 3-month worsening of his gait weakness with increased tone.  He did receive Botox in February and 5 weeks later he noticed his increase in weakness and tone.  Today's examination does demonstrate severe increase in tone paraspinals in the lower legs.    He does have autonomic dysfunction and is currently utilizes midodrine 5 mg 3 times a day.  This has prompted ER evaluations.  He is also zanaflex 2 mg 3 times a day but but complains of cognitive sleepiness.    We discussed that Zanaflex can be associated with his chronic cognitive sleepiness as well as hypotension.  Due to his ongoing issues with autonomic dysfunction I have elected to change his Zanaflex to methocarbamol.  I have ordered 500  mg up to 4  times a day.  In the past baclofen also resulted in cognitive slowing.  We discussed that this is at methocarbamol does not have as much cognitive issues compared to the other 2 medications and I would like him to try this.  He also requested a evaluation by neurosurgery to discuss spinal cord stimulator removal, potential removal of hardware and scar tissue.  I have informed him that it would be the neurosurgeons decision regarding if he would be a candidate.    Review his EMG study that was performed at [facility].  It did demonstrate a radiculopathy and consistent with a polyneuropathy.     I also placed him on duloxetine 60 mg a day and asked him to taper off of BuSpar to 15 mg a day.  He does have a component of anxiety and depression due to his underlying neurological dysfunction.        We discussed that steroids are not an option     In the past frequent steroid did result in infections and further hospitalizations.    Diagnoses and all orders for this visit:    Quadriparesis (HCC)    Anxiety disorder  -     DULoxetine (CYMBALTA) 60 mg delayed release capsule; Take 1 capsule (60 mg total) by mouth daily  -     ALPRAZolam (XANAX) 0.5 mg tablet; Take 1 tablet (0.5 mg total) by mouth daily at bedtime as needed for anxiety    Muscle spasms of both lower extremities  -     methocarbamol (ROBAXIN) 500 mg tablet; Take 1 tablet (500 mg total) by mouth 4 (four) times a day  -     DULoxetine (CYMBALTA) 60 mg delayed release capsule; Take 1 capsule (60 mg total) by mouth daily  -     Ambulatory referral to Neurosurgery; Future    Polyneuropathy  -     methocarbamol (ROBAXIN) 500 mg tablet; Take 1 tablet (500 mg total) by mouth 4 (four) times a day  -     DULoxetine (CYMBALTA) 60 mg delayed release capsule; Take 1 capsule (60 mg total) by mouth daily    Spinal stenosis of thoracolumbar region  -     Ambulatory referral to Neurosurgery; Future    Other orders  -     busPIRone (BUSPAR) 15 mg tablet; 15 mg  -     folic acid (FOLVITE) 1 mg tablet; Take 1 tablet (1mg) by mouth Once Daily.  -     testosterone cypionate (DEPO-TESTOSTERONE) 200 mg/mL SOLN; inject 2 MILLILITERS intramuscularly every 14 days       Subjective:              This is a 63-year-old gentleman with a history of lumbar disc disease.  2016 he underwent surgery followed by spinal cord stimulator placement thousand 18 and recent scar removal in [place] in 2022.  In September 2022 while on vacation in [place] after a fall he developed weakness in his upper and lower extremities and was noted to have quadriparesis.  His MRI imaging did demonstrate cervical myelomalacia at C4 and he has been undergoing aggressive physical therapy.  Was last evaluated here in January he was doing well and improving.  Did receive Botox of the quads hamstrings which provided us with some benefit but after 5 weeks he did notice an increase in tone pain and more dysfunction in his ambulation.  He describes  increased tone with spasms in his legs and lower back.  He is now on pain meds.  He has been taking Zanaflex 2 mg 3 times a day with benefit but this does result in cognitive slowing during the day.  He does report a great deal of anxiety due to his medical issues.  [facility] started him on BuSpar 10 mg twice a day and increase the dose to 15 twice a day.  Despite this he continues to have anxiety regarding his symptoms and ability to walk.  He describes no significant change with the BuSpar.    Over the last 2 months he has noted progressive worsening of his gait dysfunction with muscle cramping and spasms and discoloration.  [facility] who asked him to be followed by vascular physician but that appointment has not been made.  Recently he was seen in the ER and a CAT scan of the lumbar spine was performed which showed a iliac aneurysm without sepsis markers.    Recently he was seen in the emergency room after syncopal episode thought to be due to an autonomic dysfunction.  He is on midodrine 50 mg 3 times a day with the possibility of increasing the dose to 10 mg 3 times a day.  He continues to have lightheadedness, palpitations diaphoresis.      Today he would like to have further imaging as well as an evaluation by another surgeon.  He would like them to assess and removal of the spinal cord stimulator scar repair and potential removal of hardware.    His strength in the upper extremities unchanged.    Intermittent swelling in his legs and discoloration.        Prior history:. He did  complainin of stiffness and heaviness in his legs with progressive weakness .  MRI did show foraminal stenosis a left L5-S1 as well as impingement on the left L3 L4 region.  He was seen by dr. Huddleston who  suggested surgery but he and then sought a 2nd opinion and underwent fusion with placement of  L3-S1 in May of 2016 by Dr. Hi ,  the head of Neurosurgery  "at [facility].  After the surgery in May of 2016 he noted increasing numbness in his bilateral thighs, in the saddle regions and numbness in the perineum.  The spinal cord stimulator was placed in 2018.  Hardware was removed in 2021 in [place]                                    The following portions of the patient's history were reviewed and updated as appropriate: He  has a past medical history of Balance problems, BPH (benign prostatic hyperplasia), Chronic back pain, Chronic pain disorder, COVID-19, Erectile dysfunction, Nocturia, OAB (overactive bladder), Testicular hypogonadism, Urinary frequency, and Urinary urgency.  He  has a past surgical history that includes Spinal cord stimulator implant (10/10/2019); Colonoscopy (11/2004); Colonoscopy (12/2007); Colonoscopy (07/2013); Colonoscopy (06/2019); Back surgery (05/18/2016); Neck surgery; pr excision hydrocele unilateral (Right, 06/10/2021); Neck surgery (Right); Back surgery; and Wound debridement (Right, 08/10/2023).  His family history is not on file.  He  reports that he has never smoked. He has never used smokeless tobacco. He reports that he does not currently use alcohol after a past usage of about 4.0 - 7.0 standard drinks of alcohol per week. He reports that he does not use drugs.  Current Outpatient Medications   Medication Sig Dispense Refill    ALPRAZolam (XANAX) 0.5 mg tablet Take 1 tablet (0.5 mg total) by mouth daily at bedtime as needed for anxiety 10 tablet 0    atorvastatin (LIPITOR) 40 mg tablet TAKE 20MG (ONE-HALF TABLET) BY MOUTH EVERY DAY FOR CHOLESTEROL      busPIRone (BUSPAR) 15 mg tablet 15 mg      Cholecalciferol 50 MCG (2000 UT) TABS 50 mcg      DULoxetine (CYMBALTA) 60 mg delayed release capsule Take 1 capsule (60 mg total) by mouth daily 30 capsule 2    Elastic Bandages & Supports (Wrap/Multipurpose 2.75\"x21.4yd) MISC Use in the morning Please dispense lymphedema wrap and wrap from hand up to the proximal shoulder 1 " each 0    fluocinonide (LIDEX) 0.05 % cream       folic acid (FOLVITE) 1 mg tablet Take 1 tablet (1mg) by mouth Once Daily.      ibuprofen (MOTRIN) 400 mg tablet Take 1 tablet (400 mg total) by mouth every 6 (six) hours as needed for mild pain  0    ibuprofen (MOTRIN) 600 mg tablet Take 1 tablet (600 mg total) by mouth every 6 (six) hours as needed for mild pain or moderate pain 30 tablet 0    ketoconazole (NIZORAL) 2 % shampoo       methocarbamol (ROBAXIN) 500 mg tablet Take 1 tablet (500 mg total) by mouth 4 (four) times a day 120 tablet 5    midodrine (PROAMATINE) 5 mg tablet Take 1 tablet (5 mg total) by mouth 3 (three) times a day before meals 270 tablet 3    naloxone (NARCAN) 4 mg/0.1 mL nasal spray Administer 1 spray into a nostril. If no response after 2-3 minutes, give another dose in the other nostril using a new spray. 1 each 1    oxyCODONE (ROXICODONE) 5 immediate release tablet Take 1 tablet (5 mg) for moderate pain every 6 hours as needed or take 2 tablets (10 mg) for severe pain every 6 hours as needed. 30 tablet 0    oxyCODONE (Roxicodone) 5 immediate release tablet Take 1 tablet (5 mg total) by mouth every 4 (four) hours as needed for moderate pain Max Daily Amount: 30 mg 5 tablet 0    sildenafil (VIAGRA) 100 mg tablet Take 100 mg by mouth As directed      testosterone cypionate (DEPO-TESTOSTERONE) 200 mg/mL SOLN inject 2 MILLILITERS intramuscularly every 14 days      bisacodyl (DULCOLAX) 10 mg suppository Insert 1 suppository (10 mg total) into the rectum daily as needed for constipation (Patient not taking: Reported on 5/15/2024) 12 suppository 0    clobetasol (TEMOVATE) 0.05 % external solution  (Patient not taking: Reported on 5/15/2024)      docusate sodium (COLACE) 100 mg capsule Take 1 capsule (100 mg total) by mouth 2 (two) times a day (Patient not taking: Reported on 4/30/2024)  0    lidocaine (LIDODERM) 5 % APPLY 2 PATCH TOPICALLY DAILY FOR PAIN (REMOVE PATCH AFTER 12 HOURS; 12 HOURS ON  AND 12 HOURS OFF) (Patient not taking: Reported on 11/10/2023)      meloxicam (MOBIC) 15 mg tablet 7.5 mg (Patient not taking: Reported on 4/30/2024)      NON FORMULARY COMPRILAN BANDAGE [id number] [id number] (Patient not taking: Reported on 11/10/2023)      nystatin powder  (Patient not taking: Reported on 4/30/2024)       No current facility-administered medications for this visit.     He has No Known Allergies..         Objective:    Blood pressure 112/70, pulse 74, temperature (!) 96.9 °F (36.1 °C), temperature source Temporal, resp. rate 16, height 6' (1.829 m), weight 97.5 kg (215 lb), SpO2 98%.    Physical Exam  Eyes:      General: Lids are normal.      Extraocular Movements: Extraocular movements intact.      Pupils: Pupils are equal, round, and reactive to light.   Neurological:      Deep Tendon Reflexes:      Reflex Scores:       Patellar reflexes are 1+ on the right side and 1+ on the left side.  Psychiatric:         Speech: Speech normal.         Neurological Exam  Mental Status  Awake, alert and oriented to person, place and time. Speech is normal. Language is fluent with no aphasia.    Cranial Nerves  CN II: Visual acuity is normal. Visual fields full to confrontation.  CN III, IV, VI: Extraocular movements intact bilaterally. Normal lids and orbits bilaterally. Pupils equal round and reactive to light bilaterally.  CN V: Facial sensation is normal.  CN VII: Full and symmetric facial movement.  CN VIII: Hearing is normal.  CN IX, X: Palate elevates symmetrically. Normal gag reflex.  CN XI: Shoulder shrug strength is normal.  CN XII: Tongue midline without atrophy or fasciculations.    Motor    Strength testing in upper extremities was a 5- out of 5 distally and 5 out of 5 proximally.  He had numbness and tingling in the left upper extremity.  In the lower extremity there is a discrete increased tone in the paraspinals and in the lower extremities this is worse on the right than the left.  Hip flexion was a 2  out of 5 ankle dorsiflexion and plantarflexion was a 0 out of 5..    Sensory  There is a stimulation of sensation in the legs there was swelling with discoloration.  Pulses were intact..    Reflexes                                            Right                      Left  Patellar                                1+                         1+  Achilles                                Tr                         Tr  Right Plantar: downgoing  Left Plantar: downgoing    Coordination  Right: Finger-to-nose normal.Left: Finger-to-nose normal.    Gait   Unable to rise from chair without using arms.  Requires assistance to transfer with 2.  He is unable to stand independently or with a walker..    Review of systems obtained from the medical assistant as below was reviewed with the patient at today's visit    ROS:    Review of Systems   Constitutional:  Negative for appetite change, fatigue and fever.   HENT: Negative.  Negative for hearing loss, tinnitus, trouble swallowing and voice change.    Eyes: Negative.  Negative for photophobia, pain and visual disturbance.   Respiratory: Negative.  Negative for shortness of breath.    Cardiovascular: Negative.  Negative for palpitations.   Gastrointestinal: Negative.  Negative for nausea and vomiting.   Endocrine: Positive for cold intolerance and heat intolerance.   Genitourinary: Negative.  Negative for dysuria, frequency and urgency.   Musculoskeletal:  Negative for back pain, gait problem, myalgias, neck pain and neck stiffness.   Skin: Negative.  Negative for rash.   Allergic/Immunologic: Negative.    Neurological:  Positive for dizziness and numbness. Negative for tremors, seizures, syncope, facial asymmetry, speech difficulty, weakness, light-headedness and headaches.   Hematological: Negative.  Does not bruise/bleed easily.   Psychiatric/Behavioral: Negative.  Negative for confusion, hallucinations and sleep disturbance.    All other systems reviewed and are  negative.        I have spent a total time of 48 minutes on 05/15/24 in caring for this patient including Diagnostic results, Risks and benefits of tx options, Impressions, Counseling / Coordination of care, Documenting in the medical record, Reviewing / ordering tests, medicine, procedures  , and Obtaining or reviewing history  .       Can return to our offices in 3 months but keep in close telephone contact

## 2024-05-16 ENCOUNTER — APPOINTMENT (EMERGENCY)
Dept: CT IMAGING | Facility: HOSPITAL | Age: 64
End: 2024-05-16
Payer: COMMERCIAL

## 2024-05-16 ENCOUNTER — HOSPITAL ENCOUNTER (INPATIENT)
Facility: HOSPITAL | Age: 64
LOS: 7 days | End: 2024-05-23
Attending: EMERGENCY MEDICINE | Admitting: INTERNAL MEDICINE
Payer: COMMERCIAL

## 2024-05-16 DIAGNOSIS — R33.9 URINARY RETENTION: ICD-10-CM

## 2024-05-16 DIAGNOSIS — M48.05 SPINAL STENOSIS OF THORACOLUMBAR REGION: ICD-10-CM

## 2024-05-16 DIAGNOSIS — G82.50 QUADRIPARESIS (HCC): ICD-10-CM

## 2024-05-16 DIAGNOSIS — G95.89 MYELOMALACIA OF CERVICAL CORD (HCC): ICD-10-CM

## 2024-05-16 DIAGNOSIS — R53.1 WEAKNESS: Primary | ICD-10-CM

## 2024-05-16 LAB
2HR DELTA HS TROPONIN: -7 NG/L
4HR DELTA HS TROPONIN: -1 NG/L
ALBUMIN SERPL BCP-MCNC: 4.1 G/DL (ref 3.5–5)
ALP SERPL-CCNC: 74 U/L (ref 34–104)
ALT SERPL W P-5'-P-CCNC: 19 U/L (ref 7–52)
ANION GAP SERPL CALCULATED.3IONS-SCNC: 6 MMOL/L (ref 4–13)
AST SERPL W P-5'-P-CCNC: 21 U/L (ref 13–39)
ATRIAL RATE: 83 BPM
ATRIAL RATE: 85 BPM
BASOPHILS # BLD AUTO: 0.04 THOUSANDS/ÂΜL (ref 0–0.1)
BASOPHILS NFR BLD AUTO: 1 % (ref 0–1)
BILIRUB DIRECT SERPL-MCNC: 0.21 MG/DL (ref 0–0.2)
BILIRUB SERPL-MCNC: 1.18 MG/DL (ref 0.2–1)
BILIRUB UR QL STRIP: NEGATIVE
BNP SERPL-MCNC: 144 PG/ML (ref 0–100)
BUN SERPL-MCNC: 17 MG/DL (ref 5–25)
CALCIUM SERPL-MCNC: 8.9 MG/DL (ref 8.4–10.2)
CARDIAC TROPONIN I PNL SERPL HS: 10 NG/L
CARDIAC TROPONIN I PNL SERPL HS: 11 NG/L
CARDIAC TROPONIN I PNL SERPL HS: 4 NG/L
CHLORIDE SERPL-SCNC: 96 MMOL/L (ref 96–108)
CK SERPL-CCNC: 47 U/L (ref 39–308)
CLARITY UR: CLEAR
CO2 SERPL-SCNC: 31 MMOL/L (ref 21–32)
COLOR UR: YELLOW
CREAT SERPL-MCNC: 0.77 MG/DL (ref 0.6–1.3)
D DIMER PPP FEU-MCNC: 0.54 UG/ML FEU
EOSINOPHIL # BLD AUTO: 0.07 THOUSAND/ÂΜL (ref 0–0.61)
EOSINOPHIL NFR BLD AUTO: 1 % (ref 0–6)
ERYTHROCYTE [DISTWIDTH] IN BLOOD BY AUTOMATED COUNT: 13.1 % (ref 11.6–15.1)
GFR SERPL CREATININE-BSD FRML MDRD: 95 ML/MIN/1.73SQ M
GLUCOSE SERPL-MCNC: 73 MG/DL (ref 65–140)
GLUCOSE UR STRIP-MCNC: NEGATIVE MG/DL
HCT VFR BLD AUTO: 56.8 % (ref 36.5–49.3)
HGB BLD-MCNC: 18.8 G/DL (ref 12–17)
HGB UR QL STRIP.AUTO: NEGATIVE
IMM GRANULOCYTES # BLD AUTO: 0.03 THOUSAND/UL (ref 0–0.2)
IMM GRANULOCYTES NFR BLD AUTO: 0 % (ref 0–2)
KETONES UR STRIP-MCNC: ABNORMAL MG/DL
LACTATE SERPL-SCNC: 1 MMOL/L (ref 0.5–2)
LEUKOCYTE ESTERASE UR QL STRIP: NEGATIVE
LYMPHOCYTES # BLD AUTO: 0.95 THOUSANDS/ÂΜL (ref 0.6–4.47)
LYMPHOCYTES NFR BLD AUTO: 14 % (ref 14–44)
MAGNESIUM SERPL-MCNC: 2 MG/DL (ref 1.9–2.7)
MCH RBC QN AUTO: 32 PG (ref 26.8–34.3)
MCHC RBC AUTO-ENTMCNC: 33.1 G/DL (ref 31.4–37.4)
MCV RBC AUTO: 97 FL (ref 82–98)
MONOCYTES # BLD AUTO: 0.64 THOUSAND/ÂΜL (ref 0.17–1.22)
MONOCYTES NFR BLD AUTO: 9 % (ref 4–12)
NEUTROPHILS # BLD AUTO: 5.24 THOUSANDS/ÂΜL (ref 1.85–7.62)
NEUTS SEG NFR BLD AUTO: 75 % (ref 43–75)
NITRITE UR QL STRIP: NEGATIVE
NRBC BLD AUTO-RTO: 0 /100 WBCS
P AXIS: 50 DEGREES
P AXIS: 54 DEGREES
PH UR STRIP.AUTO: 7 [PH]
PLATELET # BLD AUTO: 182 THOUSANDS/UL (ref 149–390)
PMV BLD AUTO: 10.8 FL (ref 8.9–12.7)
POTASSIUM SERPL-SCNC: 3.8 MMOL/L (ref 3.5–5.3)
PR INTERVAL: 162 MS
PR INTERVAL: 164 MS
PROT SERPL-MCNC: 6.5 G/DL (ref 6.4–8.4)
PROT UR STRIP-MCNC: NEGATIVE MG/DL
QRS AXIS: -3 DEGREES
QRS AXIS: -8 DEGREES
QRSD INTERVAL: 104 MS
QRSD INTERVAL: 96 MS
QT INTERVAL: 348 MS
QT INTERVAL: 374 MS
QTC INTERVAL: 408 MS
QTC INTERVAL: 445 MS
RBC # BLD AUTO: 5.88 MILLION/UL (ref 3.88–5.62)
SODIUM SERPL-SCNC: 133 MMOL/L (ref 135–147)
SP GR UR STRIP.AUTO: 1.02 (ref 1–1.03)
T WAVE AXIS: -2 DEGREES
T WAVE AXIS: 9 DEGREES
T4 FREE SERPL-MCNC: 0.89 NG/DL (ref 0.61–1.12)
TSH SERPL DL<=0.05 MIU/L-ACNC: 5.4 UIU/ML (ref 0.45–4.5)
UROBILINOGEN UR STRIP-ACNC: 3 MG/DL
VENTRICULAR RATE: 83 BPM
VENTRICULAR RATE: 85 BPM
WBC # BLD AUTO: 6.97 THOUSAND/UL (ref 4.31–10.16)

## 2024-05-16 PROCEDURE — 99285 EMERGENCY DEPT VISIT HI MDM: CPT | Performed by: EMERGENCY MEDICINE

## 2024-05-16 PROCEDURE — 93010 ELECTROCARDIOGRAM REPORT: CPT | Performed by: INTERNAL MEDICINE

## 2024-05-16 PROCEDURE — 85379 FIBRIN DEGRADATION QUANT: CPT | Performed by: EMERGENCY MEDICINE

## 2024-05-16 PROCEDURE — 36415 COLL VENOUS BLD VENIPUNCTURE: CPT | Performed by: EMERGENCY MEDICINE

## 2024-05-16 PROCEDURE — 84484 ASSAY OF TROPONIN QUANT: CPT | Performed by: STUDENT IN AN ORGANIZED HEALTH CARE EDUCATION/TRAINING PROGRAM

## 2024-05-16 PROCEDURE — 87086 URINE CULTURE/COLONY COUNT: CPT | Performed by: EMERGENCY MEDICINE

## 2024-05-16 PROCEDURE — 84439 ASSAY OF FREE THYROXINE: CPT | Performed by: EMERGENCY MEDICINE

## 2024-05-16 PROCEDURE — 93005 ELECTROCARDIOGRAM TRACING: CPT

## 2024-05-16 PROCEDURE — 80048 BASIC METABOLIC PNL TOTAL CA: CPT | Performed by: EMERGENCY MEDICINE

## 2024-05-16 PROCEDURE — 84443 ASSAY THYROID STIM HORMONE: CPT | Performed by: EMERGENCY MEDICINE

## 2024-05-16 PROCEDURE — 70450 CT HEAD/BRAIN W/O DYE: CPT

## 2024-05-16 PROCEDURE — 84484 ASSAY OF TROPONIN QUANT: CPT | Performed by: EMERGENCY MEDICINE

## 2024-05-16 PROCEDURE — 87040 BLOOD CULTURE FOR BACTERIA: CPT | Performed by: EMERGENCY MEDICINE

## 2024-05-16 PROCEDURE — 85025 COMPLETE CBC W/AUTO DIFF WBC: CPT | Performed by: EMERGENCY MEDICINE

## 2024-05-16 PROCEDURE — 83880 ASSAY OF NATRIURETIC PEPTIDE: CPT | Performed by: EMERGENCY MEDICINE

## 2024-05-16 PROCEDURE — 99223 1ST HOSP IP/OBS HIGH 75: CPT | Performed by: STUDENT IN AN ORGANIZED HEALTH CARE EDUCATION/TRAINING PROGRAM

## 2024-05-16 PROCEDURE — 82550 ASSAY OF CK (CPK): CPT | Performed by: STUDENT IN AN ORGANIZED HEALTH CARE EDUCATION/TRAINING PROGRAM

## 2024-05-16 PROCEDURE — 99284 EMERGENCY DEPT VISIT MOD MDM: CPT

## 2024-05-16 PROCEDURE — 80076 HEPATIC FUNCTION PANEL: CPT | Performed by: EMERGENCY MEDICINE

## 2024-05-16 PROCEDURE — 81003 URINALYSIS AUTO W/O SCOPE: CPT | Performed by: EMERGENCY MEDICINE

## 2024-05-16 PROCEDURE — 83735 ASSAY OF MAGNESIUM: CPT | Performed by: EMERGENCY MEDICINE

## 2024-05-16 PROCEDURE — 83605 ASSAY OF LACTIC ACID: CPT | Performed by: EMERGENCY MEDICINE

## 2024-05-16 RX ORDER — METHOCARBAMOL 500 MG/1
500 TABLET, FILM COATED ORAL 4 TIMES DAILY
Status: DISCONTINUED | OUTPATIENT
Start: 2024-05-16 | End: 2024-05-23 | Stop reason: HOSPADM

## 2024-05-16 RX ORDER — DULOXETIN HYDROCHLORIDE 60 MG/1
60 CAPSULE, DELAYED RELEASE ORAL DAILY
Status: DISCONTINUED | OUTPATIENT
Start: 2024-05-17 | End: 2024-05-17

## 2024-05-16 RX ORDER — OXYCODONE HYDROCHLORIDE 5 MG/1
5 TABLET ORAL EVERY 4 HOURS PRN
Status: DISCONTINUED | OUTPATIENT
Start: 2024-05-16 | End: 2024-05-23 | Stop reason: HOSPADM

## 2024-05-16 RX ORDER — ENOXAPARIN SODIUM 100 MG/ML
40 INJECTION SUBCUTANEOUS DAILY
Status: DISCONTINUED | OUTPATIENT
Start: 2024-05-17 | End: 2024-05-23 | Stop reason: HOSPADM

## 2024-05-16 RX ORDER — ACETAMINOPHEN 325 MG/1
650 TABLET ORAL EVERY 4 HOURS PRN
Status: DISCONTINUED | OUTPATIENT
Start: 2024-05-16 | End: 2024-05-23 | Stop reason: HOSPADM

## 2024-05-16 RX ORDER — MIDODRINE HYDROCHLORIDE 5 MG/1
5 TABLET ORAL
Status: DISCONTINUED | OUTPATIENT
Start: 2024-05-17 | End: 2024-05-23 | Stop reason: HOSPADM

## 2024-05-16 RX ORDER — OXYCODONE HYDROCHLORIDE 10 MG/1
10 TABLET ORAL EVERY 4 HOURS PRN
Status: DISCONTINUED | OUTPATIENT
Start: 2024-05-16 | End: 2024-05-23 | Stop reason: HOSPADM

## 2024-05-16 RX ORDER — ATORVASTATIN CALCIUM 20 MG/1
20 TABLET, FILM COATED ORAL
Status: DISCONTINUED | OUTPATIENT
Start: 2024-05-16 | End: 2024-05-23 | Stop reason: HOSPADM

## 2024-05-16 RX ORDER — BUSPIRONE HYDROCHLORIDE 15 MG/1
15 TABLET ORAL DAILY
COMMUNITY
End: 2024-06-03

## 2024-05-16 RX ADMIN — OXYCODONE HYDROCHLORIDE 10 MG: 10 TABLET ORAL at 19:56

## 2024-05-16 RX ADMIN — ATORVASTATIN CALCIUM 20 MG: 20 TABLET, FILM COATED ORAL at 19:56

## 2024-05-16 RX ADMIN — METHOCARBAMOL 500 MG: 500 TABLET ORAL at 21:42

## 2024-05-16 NOTE — ED NOTES
Duloxetine 60 mg 1 last night for 1st time, tizanidine 2 mg, 1 today, midodrine 5 mg 1 today, oxycodone 5 mg 2 today     Madison Alvarenga RN  05/16/24 [id number]

## 2024-05-16 NOTE — H&P
[facility]  H&P  Name: Jos Hawk 63 y.o. male I MRN: [id number]  Unit/Bed#: E5 -01 I Date of Admission: 5/16/2024   Date of Service: 5/16/2024 I Hospital Day: 0      Assessment & Plan   * Weakness  Assessment & Plan  Patient is 63-year-old male with a history of spinal disease status post spinal cord stimulator 2018 and scar removal back in 2021 who presented to our institution due to worsening gait, weakness, and increasing tone.  He saw neurology yesterday who then recommended some changes with his regimen: Duloxetine 60 mg a day and taper of BuSpar to 15 mg a day, methocarbamol 500 mg up to 4 times a day.  Admission was requested as a result of his worsening weakness.  PT OT was consulted  Neurology evaluation requested    Neurogenic orthostatic hypotension (HCC)  Assessment & Plan  Takes midodrine 5 mg 3 times daily.  Placed hold parameters for SBP greater than 110.    Quadriparesis (HCC)  Assessment & Plan  Chronic spinal disease status post spinal cord stimulator.    BPH associated with nocturia  Assessment & Plan  Beard was placed earlier today due to concerns for urinary retention.           VTE Prophylaxis: Enoxaparin (Lovenox)  / sequential compression device   Code Status: nursing  Discussion with family: patient    Anticipated Length of Stay:  Patient will be admitted on an Inpatient basis with an anticipated length of stay of  > 2 midnights.   Justification for Hospital Stay: pain control, neuro eval, pt/ot    Chief Complaint:   weakness    History of Present Illness:    Jos Hawk is a 63 y.o. male who presents with weakness.    63-year-old male with a history of history of spinal disease status post spinal cord stimulator and scar removal, neurogenic orthostatic hypotension, and quadriparesis who presented to our ED as a result of worsening weakness.    Patient states that he does have quadriparesis, but was able to ambulate with some distance  limitations.  He noticed that over the last 5 weeks, he has been having decreasing exercise tolerance.  His weakness continues to progress.  He decided to follow-up with his neurologist yesterday.  Medication changes were done, but patient continues to remain concerned about his persistent weakness thus prompting his visit in our ED.  Of note, patient had an MRI cervical spine and lumbar last 5/3/2024.  Admission was requested for neurology evaluation and PT OT.    Review of Systems:    Review of Systems   Constitutional:  Negative for chills and fever.   HENT:  Negative for congestion.    Respiratory:  Negative for cough, shortness of breath and wheezing.    Cardiovascular:  Negative for chest pain and palpitations.   Gastrointestinal:  Negative for abdominal pain, diarrhea, nausea and vomiting.   Skin:  Negative for color change and pallor.   Neurological:  Positive for weakness. Negative for headaches.   Psychiatric/Behavioral:  Negative for confusion.        Past Medical and Surgical History:     Past Medical History:   Diagnosis Date    Balance problems     BPH (benign prostatic hyperplasia)     Chronic back pain     Chronic pain disorder     COVID-19     2/2021-asymptomatic     Erectile dysfunction     Nocturia     OAB (overactive bladder)     Testicular hypogonadism     Urinary frequency     Urinary urgency        Past Surgical History:   Procedure Laterality Date    BACK SURGERY  05/18/2016    with hardware    BACK SURGERY      2 back surgery unable to remove hardware    COLONOSCOPY  11/2004    Dr Taylor. tubular adenoma polyp removed    COLONOSCOPY  12/2007    Dr Cameron. Normal    COLONOSCOPY  07/2013    Dr Cameron. Normal.     COLONOSCOPY  06/2019    Dr Cameron. Hemorrhoids, normal.     NECK SURGERY      decompression of neck     NECK SURGERY Right     scar tissues removed    WI EXCISION HYDROCELE UNILATERAL Right 06/10/2021    Procedure: HYDROCELECTOMY;  Surgeon: Dereck Goddard DO;  Location: [facility];   "Service: Urology    SPINAL CORD STIMULATOR IMPLANT  10/10/2019    WOUND DEBRIDEMENT Right 08/10/2023    Procedure: DEBRIDEMENT UPPER EXTREMITY (WASH OUT) - elbow;  Surgeon: Jesse Avendaño MD;  Location: [facility];  Service: Orthopedics       Meds/Allergies:    Prior to Admission medications    Medication Sig Start Date End Date Taking? Authorizing Provider   DULoxetine (CYMBALTA) 60 mg delayed release capsule Take 1 capsule (60 mg total) by mouth daily 5/15/24  Yes Debra Leyva DO   ALPRAZolam (XANAX) 0.5 mg tablet Take 1 tablet (0.5 mg total) by mouth daily at bedtime as needed for anxiety 5/15/24   Debra Leyva DO   atorvastatin (LIPITOR) 40 mg tablet TAKE 20MG (ONE-HALF TABLET) BY MOUTH EVERY DAY FOR CHOLESTEROL 11/20/23   Historical Provider, MD   bisacodyl (DULCOLAX) 10 mg suppository Insert 1 suppository (10 mg total) into the rectum daily as needed for constipation  Patient not taking: Reported on 5/15/2024 8/15/23   Meño Betancourt MD   Cholecalciferol 50 MCG (2000 UT) TABS 50 mcg 10/11/23   Historical Provider, MD   clobetasol (TEMOVATE) 0.05 % external solution  5/10/23   Historical Provider, MD   docusate sodium (COLACE) 100 mg capsule Take 1 capsule (100 mg total) by mouth 2 (two) times a day  Patient not taking: Reported on 4/30/2024 8/15/23   Meño Betancourt MD   Elastic Bandages & Supports (Wrap/Multipurpose 2.75\"x21.4yd) MISC Use in the morning Please dispense lymphedema wrap and wrap from hand up to the proximal shoulder 8/25/23   King Forrester PA-C   fluocinonide (LIDEX) 0.05 % cream  5/10/23   Historical Provider, MD   folic acid (FOLVITE) 1 mg tablet Take 1 tablet (1mg) by mouth Once Daily. 2/16/24   Historical Provider, MD   ibuprofen (MOTRIN) 400 mg tablet Take 1 tablet (400 mg total) by mouth every 6 (six) hours as needed for mild pain 9/22/22   STACEY Cho   ibuprofen (MOTRIN) 600 mg tablet Take 1 tablet (600 mg total) by mouth every 6 (six) hours as " needed for mild pain or moderate pain 4/30/24   Gonzalez Greer MD   ketoconazole (NIZORAL) 2 % shampoo  5/10/23   Historical Provider, MD   lidocaine (LIDODERM) 5 % APPLY 2 PATCH TOPICALLY DAILY FOR PAIN (REMOVE PATCH AFTER 12 HOURS; 12 HOURS ON AND 12 HOURS OFF)  Patient not taking: Reported on 11/10/2023 7/14/23   Historical Provider, MD   meloxicam (MOBIC) 15 mg tablet 7.5 mg  Patient not taking: Reported on 4/30/2024 9/6/23   Historical Provider, MD   methocarbamol (ROBAXIN) 500 mg tablet Take 1 tablet (500 mg total) by mouth 4 (four) times a day 5/15/24   Debra Leyva DO   midodrine (PROAMATINE) 5 mg tablet Take 1 tablet (5 mg total) by mouth 3 (three) times a day before meals 11/21/23   Jesse Weeks DO   naloxone (NARCAN) 4 mg/0.1 mL nasal spray Administer 1 spray into a nostril. If no response after 2-3 minutes, give another dose in the other nostril using a new spray. 1/17/24 1/16/25  Debra Leyva DO   NON FORMULARY COMPRILAN BANDAGE [id number] [id number]  Patient not taking: Reported on 11/10/2023 8/25/23   Historical Provider, MD   nystatin powder  5/10/23   Historical Provider, MD   oxyCODONE (ROXICODONE) 5 immediate release tablet Take 1 tablet (5 mg) for moderate pain every 6 hours as needed or take 2 tablets (10 mg) for severe pain every 6 hours as needed. 8/15/23   Meño Betancourt MD   oxyCODONE (Roxicodone) 5 immediate release tablet Take 1 tablet (5 mg total) by mouth every 4 (four) hours as needed for moderate pain Max Daily Amount: 30 mg 5/4/24   Gonzalez Greer MD   sildenafil (VIAGRA) 100 mg tablet Take 100 mg by mouth As directed 9/11/20   Historical Provider, MD   testosterone cypionate (DEPO-TESTOSTERONE) 200 mg/mL SOLN inject 2 MILLILITERS intramuscularly every 14 days 4/30/24   Historical Provider, MD   busPIRone (BUSPAR) 15 mg tablet 15 mg 4/4/24 5/16/24  Historical Provider, MD     I have reviewed home medications with patient personally.    Allergies: No Known  Allergies    Social History:     Marital Status: /Civil Union   Substance Use History:   Social History     Substance and Sexual Activity   Alcohol Use Not Currently    Alcohol/week: 4.0 - 7.0 standard drinks of alcohol    Types: 4 - 7 Glasses of wine per week    Comment: Just weekends     Social History     Tobacco Use   Smoking Status Never   Smokeless Tobacco Never     Social History     Substance and Sexual Activity   Drug Use No       Family History:    History reviewed. No pertinent family history.    Physical Exam:     Vitals:   Blood Pressure: 117/75 (05/16/24 1813)  Pulse: 91 (05/16/24 1813)  Temperature: 98.2 °F (36.8 °C) (05/16/24 1813)  Temp Source: Oral (05/16/24 1813)  Respirations: 20 (05/16/24 1813)  Height: 6' (182.9 cm) (05/16/24 1347)  Weight - Scale: 114 kg (251 lb 5.2 oz) (05/16/24 1347)  SpO2: 95 % (05/16/24 1813)    Physical Exam  Vitals reviewed.   Constitutional:       General: He is not in acute distress.  HENT:      Head: Normocephalic.      Nose: Nose normal.      Mouth/Throat:      Mouth: Mucous membranes are moist.   Eyes:      General: No scleral icterus.  Cardiovascular:      Rate and Rhythm: Normal rate and regular rhythm.   Pulmonary:      Effort: Pulmonary effort is normal. No respiratory distress.      Breath sounds: No wheezing or rales.   Abdominal:      General: There is no distension.      Palpations: Abdomen is soft.      Tenderness: There is no abdominal tenderness.   Skin:     General: Skin is warm.   Neurological:      Mental Status: He is alert and oriented to person, place, and time.      Sensory: Sensory deficit (chronic LE less than right) present.      Motor: Weakness present.   Psychiatric:         Mood and Affect: Mood normal.         Behavior: Behavior normal.       Additional Data:     Lab Results: I have personally reviewed pertinent reports.      Results from last 7 days   Lab Units 05/16/24  1428   WBC Thousand/uL 6.97   HEMOGLOBIN g/dL 18.8*    HEMATOCRIT % 56.8*   PLATELETS Thousands/uL 182   SEGS PCT % 75   LYMPHO PCT % 14   MONO PCT % 9   EOS PCT % 1     Results from last 7 days   Lab Units 05/16/24  1428   SODIUM mmol/L 133*   POTASSIUM mmol/L 3.8   CHLORIDE mmol/L 96   CO2 mmol/L 31   BUN mg/dL 17   CREATININE mg/dL 0.77   ANION GAP mmol/L 6   CALCIUM mg/dL 8.9   ALBUMIN g/dL 4.1   TOTAL BILIRUBIN mg/dL 1.18*   ALK PHOS U/L 74   ALT U/L 19   AST U/L 21   GLUCOSE RANDOM mg/dL 73                 Results from last 7 days   Lab Units 05/16/24  1428   LACTIC ACID mmol/L 1.0       Imaging: I have personally reviewed pertinent reports.      CT head without contrast   Final Result by Bandar Vega MD (05/16 1621)      No acute intracranial abnormality.                  Workstation performed: [id number]             EKG, Pathology, and Other Studies Reviewed on Admission:   EKG: Sinys rhythm with PVCs, LAE    Allscripts / Epic Records Reviewed: Yes     ** Please Note: This note has been constructed using a voice recognition system. **

## 2024-05-16 NOTE — ASSESSMENT & PLAN NOTE
Patient is 63-year-old male with a history of spinal disease status post spinal cord stimulator 2018 and scar removal back in 2021 who presented to our institution due to worsening gait, weakness, and increasing tone.  He saw neurology yesterday who then recommended some changes with his regimen: Duloxetine 60 mg a day and taper of BuSpar to 15 mg a day, methocarbamol 500 mg up to 4 times a day.  Admission was requested as a result of his worsening weakness.  PT OT was consulted  Neurology evaluation requested

## 2024-05-16 NOTE — ED PROVIDER NOTES
"History  Chief Complaint   Patient presents with    Medical Problem     Pt arrives EMS for difficulty with urination, reporting only a small amount comes out for past few days, \"I feel like my bladder is going to explode\". Pt also reporting lower and upper extremity edema, feeling like he's burning up but skin is cold to touch. Extensive spinal problems.      62 YO male with quadriparesis presents with worsening weakness, hypertonicity. States this has been present for months, he has had increased difficulty with ambulation and with decreased exercise tolerance. States concern as he did have similar symptoms when he was found to have a staph infection. He denies fevers currently. He additionally notes, more acutely, he has had increased difficulty with voiding, has had increasing suprapubic pain, unable to produce urine. Pt denies CP/SOB/F/C/N/V/D/C, no dysuria, burning on urination or blood in urine.       History provided by:  Patient   used: No        Prior to Admission Medications   Prescriptions Last Dose Informant Patient Reported? Taking?   ALPRAZolam (XANAX) 0.5 mg tablet   No No   Sig: Take 1 tablet (0.5 mg total) by mouth daily at bedtime as needed for anxiety   Cholecalciferol 50 MCG (2000 UT) TABS  Self Yes No   Si mcg   DULoxetine (CYMBALTA) 60 mg delayed release capsule   No Yes   Sig: Take 1 capsule (60 mg total) by mouth daily   Elastic Bandages & Supports (Wrap/Multipurpose 2.75\"x21.4yd) MISC  Self No No   Sig: Use in the morning Please dispense lymphedema wrap and wrap from hand up to the proximal shoulder   NON FORMULARY  Self Yes No   Sig: COMPRILAN BANDAGE [id number] [id number]   Patient not taking: Reported on 11/10/2023   atorvastatin (LIPITOR) 40 mg tablet  Self Yes No   Sig: TAKE 20MG (ONE-HALF TABLET) BY MOUTH EVERY DAY FOR CHOLESTEROL   bisacodyl (DULCOLAX) 10 mg suppository  Self No No   Sig: Insert 1 suppository (10 mg total) into the rectum daily as needed for " constipation   Patient not taking: Reported on 5/15/2024   busPIRone (BUSPAR) 15 mg tablet   Yes Yes   Sig: Take 15 mg by mouth in the morning   clobetasol (TEMOVATE) 0.05 % external solution  Self Yes No   Patient not taking: Reported on 5/15/2024   docusate sodium (COLACE) 100 mg capsule  Self No No   Sig: Take 1 capsule (100 mg total) by mouth 2 (two) times a day   Patient not taking: Reported on 2024   fluocinonide (LIDEX) 0.05 % cream  Self Yes No   folic acid (FOLVITE) 1 mg tablet  Self Yes No   Sig: Take 1 tablet (1mg) by mouth Once Daily.   ibuprofen (MOTRIN) 400 mg tablet  Self No No   Sig: Take 1 tablet (400 mg total) by mouth every 6 (six) hours as needed for mild pain   ibuprofen (MOTRIN) 600 mg tablet  Self No No   Sig: Take 1 tablet (600 mg total) by mouth every 6 (six) hours as needed for mild pain or moderate pain   ketoconazole (NIZORAL) 2 % shampoo  Self Yes No   lidocaine (LIDODERM) 5 %  Self Yes No   Sig: APPLY 2 PATCH TOPICALLY DAILY FOR PAIN (REMOVE PATCH AFTER 12 HOURS; 12 HOURS ON AND 12 HOURS OFF)   Patient not taking: Reported on 11/10/2023   meloxicam (MOBIC) 15 mg tablet  Self Yes No   Si.5 mg   Patient not taking: Reported on 2024   methocarbamol (ROBAXIN) 500 mg tablet   No No   Sig: Take 1 tablet (500 mg total) by mouth 4 (four) times a day   midodrine (PROAMATINE) 5 mg tablet  Self No No   Sig: Take 1 tablet (5 mg total) by mouth 3 (three) times a day before meals   naloxone (NARCAN) 4 mg/0.1 mL nasal spray  Self No No   Sig: Administer 1 spray into a nostril. If no response after 2-3 minutes, give another dose in the other nostril using a new spray.   nystatin powder  Self Yes No   Patient not taking: Reported on 2024   oxyCODONE (ROXICODONE) 5 immediate release tablet  Self No No   Sig: Take 1 tablet (5 mg) for moderate pain every 6 hours as needed or take 2 tablets (10 mg) for severe pain every 6 hours as needed.   oxyCODONE (Roxicodone) 5 immediate release  tablet  Self No No   Sig: Take 1 tablet (5 mg total) by mouth every 4 (four) hours as needed for moderate pain Max Daily Amount: 30 mg   sildenafil (VIAGRA) 100 mg tablet  Self Yes No   Sig: Take 100 mg by mouth As directed   testosterone cypionate (DEPO-TESTOSTERONE) 200 mg/mL SOLN  Self Yes No   Sig: inject 2 MILLILITERS intramuscularly every 14 days      Facility-Administered Medications: None       Past Medical History:   Diagnosis Date    Balance problems     BPH (benign prostatic hyperplasia)     Chronic back pain     Chronic pain disorder     COVID-19     2/2021-asymptomatic     Erectile dysfunction     Nocturia     OAB (overactive bladder)     Testicular hypogonadism     Urinary frequency     Urinary urgency        Past Surgical History:   Procedure Laterality Date    BACK SURGERY  05/18/2016    with hardware    BACK SURGERY      2 back surgery unable to remove hardware    COLONOSCOPY  11/2004    Dr Taylor. tubular adenoma polyp removed    COLONOSCOPY  12/2007    Dr Cameron. Normal    COLONOSCOPY  07/2013    Dr Cameron. Normal.     COLONOSCOPY  06/2019    Dr Cameron. Hemorrhoids, normal.     NECK SURGERY      decompression of neck     NECK SURGERY Right     scar tissues removed    MO EXCISION HYDROCELE UNILATERAL Right 06/10/2021    Procedure: HYDROCELECTOMY;  Surgeon: Dereck Goddard DO;  Location: [facility];  Service: Urology    SPINAL CORD STIMULATOR IMPLANT  10/10/2019    WOUND DEBRIDEMENT Right 08/10/2023    Procedure: DEBRIDEMENT UPPER EXTREMITY (WASH OUT) - elbow;  Surgeon: Jesse Avendaño MD;  Location: [facility];  Service: Orthopedics       History reviewed. No pertinent family history.  I have reviewed and agree with the history as documented.    E-Cigarette/Vaping    E-Cigarette Use Never User      E-Cigarette/Vaping Substances    Nicotine No     THC No     CBD No     Flavoring No     Other No     Unknown No      Social History     Tobacco Use    Smoking status: Never    Smokeless tobacco:  Never   Vaping Use    Vaping status: Never Used   Substance Use Topics    Alcohol use: Not Currently     Alcohol/week: 4.0 - 7.0 standard drinks of alcohol     Types: 4 - 7 Glasses of wine per week     Comment: Just weekends    Drug use: No       Review of Systems   Constitutional:  Negative for fever.   HENT:  Negative for dental problem.    Eyes:  Negative for visual disturbance.   Respiratory:  Negative for shortness of breath.    Cardiovascular:  Negative for chest pain.   Gastrointestinal:  Negative for abdominal pain, nausea and vomiting.   Genitourinary:  Positive for difficulty urinating. Negative for dysuria and frequency.   Musculoskeletal:  Negative for neck pain and neck stiffness.   Skin:  Negative for rash.   Neurological:  Positive for weakness. Negative for dizziness and light-headedness.   Psychiatric/Behavioral:  Negative for agitation, behavioral problems and confusion.    All other systems reviewed and are negative.      Physical Exam  Physical Exam  Vitals and nursing note reviewed.   Constitutional:       Appearance: He is well-developed.   HENT:      Head: Normocephalic and atraumatic.   Eyes:      Extraocular Movements: Extraocular movements intact.   Cardiovascular:      Rate and Rhythm: Normal rate.   Pulmonary:      Effort: Pulmonary effort is normal.   Abdominal:      General: There is no distension.   Musculoskeletal:         General: Normal range of motion.      Cervical back: Normal range of motion.   Skin:     Findings: No rash.   Neurological:      Mental Status: He is alert and oriented to person, place, and time.      Motor: Weakness present.   Psychiatric:         Behavior: Behavior normal.         Vital Signs  ED Triage Vitals   Temperature Pulse Respirations Blood Pressure SpO2   05/16/24 1347 05/16/24 1347 05/16/24 1347 05/16/24 1347 05/16/24 1347   98.4 °F (36.9 °C) 86 18 (!) 211/88 96 %      Temp Source Heart Rate Source Patient Position - Orthostatic VS BP Location FiO2 (%)    05/16/24 1347 05/16/24 1347 05/16/24 1347 05/16/24 1347 --   Oral Monitor Sitting Right arm       Pain Score       05/16/24 1956       8           Vitals:    05/20/24 0741 05/20/24 1150 05/20/24 2123 05/21/24 0731   BP: 147/91 141/82 147/89 124/78   Pulse: 76 71 87 69   Patient Position - Orthostatic VS:             Visual Acuity      ED Medications  Medications   methocarbamol (ROBAXIN) tablet 500 mg (500 mg Oral Given 5/21/24 0907)   midodrine (PROAMATINE) tablet 5 mg (5 mg Oral Not Given 5/21/24 0608)   acetaminophen (TYLENOL) tablet 650 mg (650 mg Oral Given 5/20/24 0840)   oxyCODONE (ROXICODONE) IR tablet 5 mg ( Oral See Alternative 5/21/24 0907)     Or   oxyCODONE (ROXICODONE) immediate release tablet 10 mg (10 mg Oral Given 5/21/24 0907)   atorvastatin (LIPITOR) tablet 20 mg (20 mg Oral Given 5/20/24 1714)   enoxaparin (LOVENOX) subcutaneous injection 40 mg (40 mg Subcutaneous Given 5/21/24 0907)   sodium chloride 0.9 % infusion (0 mL/hr Intravenous Stopped 5/18/24 1130)   bisacodyl (DULCOLAX) rectal suppository 10 mg (10 mg Rectal Given 5/18/24 1355)   busPIRone (BUSPAR) tablet 5 mg (5 mg Oral Given 5/21/24 0907)   sodium phosphate-biphosphate (FLEET) enema 1 enema (1 enema Rectal Given 5/18/24 1601)   morphine injection 2 mg (2 mg Intravenous Given 5/21/24 0555)   hydrOXYzine HCL (ATARAX) tablet 25 mg (25 mg Oral Given 5/20/24 2312)   tobramycin-dexamethasone (TOBRADEX) 0.3-0.1 % ophthalmic suspension 1 drop (1 drop Both Eyes Not Given 5/21/24 0907)   ALPRAZolam (XANAX) tablet 0.5 mg (0.5 mg Oral Given 5/17/24 0565)   hydrOXYzine HCL (ATARAX) tablet 25 mg (25 mg Oral Given 5/18/24 0039)   magnesium sulfate 2 g/50 mL IVPB (premix) 2 g (0 g Intravenous Stopped 5/18/24 1030)   hydrOXYzine HCL (ATARAX) tablet 25 mg (25 mg Oral Given 5/18/24 2205)   iohexol (OMNIPAQUE) 350 MG/ML injection (MULTI-DOSE) 85 mL (85 mL Intravenous Given 5/19/24 0911)   furosemide (LASIX) injection 20 mg (20 mg Intravenous Given  "5/19/24 1140)       Diagnostic Studies  Results Reviewed       Procedure Component Value Units Date/Time    Blood culture #1 [[id number]] Collected: 05/16/24 1436    Lab Status: Preliminary result Specimen: Blood from Hand, Left Updated: 05/20/24 2101     Blood Culture No Growth After 4 Days.    Blood culture #2 [[id number]] Collected: 05/16/24 1428    Lab Status: Preliminary result Specimen: Blood from Arm, Right Updated: 05/20/24 2101     Blood Culture No Growth After 4 Days.    Urine culture [[id number]] Collected: 05/16/24 1404    Lab Status: Final result Specimen: Urine, Clean Catch Updated: 05/17/24 1426     Urine Culture No Growth <1000 cfu/mL    CK [[id number]]  (Normal) Collected: 05/16/24 1428    Lab Status: Final result Specimen: Blood from Arm, Left Updated: 05/16/24 2101     Total CK 47 U/L     HS Troponin I 4hr [[id number]]  (Normal) Collected: 05/16/24 1954    Lab Status: Final result Specimen: Blood from Arm, Right Updated: 05/16/24 2029     hs TnI 4hr 10 ng/L      Delta 4hr hsTnI -1 ng/L     T4, free [[id number]]  (Normal) Collected: 05/16/24 1428    Lab Status: Final result Specimen: Blood from Arm, Left Updated: 05/16/24 2027     Free T4 0.89 ng/dL     Narrative:        \"Therapeutic range for patients medicated with thyroid disorders: 0.61-1.24 ng/dL.\"    HS Troponin I 2hr [[id number]]  (Normal) Collected: 05/16/24 1649    Lab Status: Final result Specimen: Blood from Line, Venous Updated: 05/16/24 1717     hs TnI 2hr 4 ng/L      Delta 2hr hsTnI -7 ng/L     TSH, 3rd generation with Free T4 reflex [[id number]]  (Abnormal) Collected: 05/16/24 1428    Lab Status: Final result Specimen: Blood from Arm, Left Updated: 05/16/24 1522     TSH 3RD GENERATON 5.396 uIU/mL     HS Troponin 0hr (reflex protocol) [[id number]]  (Normal) Collected: 05/16/24 1428    Lab Status: Final result Specimen: Blood from Arm, Left Updated: 05/16/24 1510     hs TnI 0hr 11 ng/L     Lactic acid, plasma (w/reflex if result > 2.0) " [[id number]]  (Normal) Collected: 05/16/24 1428    Lab Status: Final result Specimen: Blood from Arm, Right Updated: 05/16/24 1510     LACTIC ACID 1.0 mmol/L     Narrative:      Result may be elevated if tourniquet was used during collection.    Basic metabolic panel [[id number]]  (Abnormal) Collected: 05/16/24 1428    Lab Status: Final result Specimen: Blood from Arm, Left Updated: 05/16/24 1509     Sodium 133 mmol/L      Potassium 3.8 mmol/L      Chloride 96 mmol/L      CO2 31 mmol/L      ANION GAP 6 mmol/L      BUN 17 mg/dL      Creatinine 0.77 mg/dL      Glucose 73 mg/dL      Calcium 8.9 mg/dL      eGFR 95 ml/min/1.73sq m     Narrative:      [employer] guidelines for Chronic Kidney Disease (CKD):     Stage 1 with normal or high GFR (GFR > 90 mL/min/1.73 square meters)    Stage 2 Mild CKD (GFR = 60-89 mL/min/1.73 square meters)    Stage 3A Moderate CKD (GFR = 45-59 mL/min/1.73 square meters)    Stage 3B Moderate CKD (GFR = 30-44 mL/min/1.73 square meters)    Stage 4 Severe CKD (GFR = 15-29 mL/min/1.73 square meters)    Stage 5 End Stage CKD (GFR <15 mL/min/1.73 square meters)  Note: GFR calculation is accurate only with a steady state creatinine    Hepatic function panel [[id number]]  (Abnormal) Collected: 05/16/24 1428    Lab Status: Final result Specimen: Blood from Arm, Left Updated: 05/16/24 1509     Total Bilirubin 1.18 mg/dL      Bilirubin, Direct 0.21 mg/dL      Alkaline Phosphatase 74 U/L      AST 21 U/L      ALT 19 U/L      Total Protein 6.5 g/dL      Albumin 4.1 g/dL     Magnesium [[id number]]  (Normal) Collected: 05/16/24 1428    Lab Status: Final result Specimen: Blood from Arm, Left Updated: 05/16/24 1509     Magnesium 2.0 mg/dL     B-Type Natriuretic Peptide(BNP) [[id number]]  (Abnormal) Collected: 05/16/24 1428    Lab Status: Final result Specimen: Blood from Arm, Left Updated: 05/16/24 1507      pg/mL     D-Dimer [[id number]]  (Abnormal) Collected: 05/16/24 1428    Lab  Status: Final result Specimen: Blood from Arm, Left Updated: 05/16/24 1459     D-Dimer, Quant 0.54 ug/ml FEU     Narrative:      In the evaluation for possible pulmonary embolism, in the appropriate (Well's Score of 4 or less) patient, the age adjusted d-dimer cutoff for this patient can be calculated as:    Age x 0.01 (in ug/mL) for Age-adjusted D-dimer exclusion threshold for a patient over 50 years.    CBC and differential [[id number]]  (Abnormal) Collected: 05/16/24 1428    Lab Status: Final result Specimen: Blood from Arm, Left Updated: 05/16/24 1457     WBC 6.97 Thousand/uL      RBC 5.88 Million/uL      Hemoglobin 18.8 g/dL      Hematocrit 56.8 %      MCV 97 fL      MCH 32.0 pg      MCHC 33.1 g/dL      RDW 13.1 %      MPV 10.8 fL      Platelets 182 Thousands/uL      nRBC 0 /100 WBCs      Segmented % 75 %      Immature Grans % 0 %      Lymphocytes % 14 %      Monocytes % 9 %      Eosinophils Relative 1 %      Basophils Relative 1 %      Absolute Neutrophils 5.24 Thousands/µL      Absolute Immature Grans 0.03 Thousand/uL      Absolute Lymphocytes 0.95 Thousands/µL      Absolute Monocytes 0.64 Thousand/µL      Eosinophils Absolute 0.07 Thousand/µL      Basophils Absolute 0.04 Thousands/µL     UA (URINE) with reflex to Scope [[id number]]  (Abnormal) Collected: 05/16/24 1404    Lab Status: Final result Specimen: Urine, Clean Catch Updated: 05/16/24 1430     Color, UA Yellow     Clarity, UA Clear     Specific Gravity, UA 1.024     pH, UA 7.0     Leukocytes, UA Negative     Nitrite, UA Negative     Protein, UA Negative mg/dl      Glucose, UA Negative mg/dl      Ketones, UA 20 (1+) mg/dl      Urobilinogen, UA 3.0 mg/dl      Bilirubin, UA Negative     Occult Blood, UA Negative                   CTA chest pe study   Final Result by Mary Birch MD (05/19 1008)      No definite acute pulmonary emboli but sensitivity is limited by moderate motion artifact and artifact from the patient's arms at his side.      Mild  septal thickening due to interstitial edema.      Mild splenomegaly at 14 cm.            Workstation performed: [id number]         CT head without contrast   Final Result by Bandar Vega MD (05/16 1621)      No acute intracranial abnormality.                  Workstation performed: [id number]                    Procedures  Procedures         ED Course  ED Course as of 05/21/24 1052   Thu May 16, 2024   1519 D-Dimer, Quant(!): 0.54  Age adjusted, negative. Does not require CTA or vascular study.                               SBIRT 22yo+      Flowsheet Row Most Recent Value   Initial Alcohol Screen: US AUDIT-C     1. How often do you have a drink containing alcohol? 0 Filed at: 05/16/2024 1423   2. How many drinks containing alcohol do you have on a typical day you are drinking?  0 Filed at: 05/16/2024 1423   3a. Male UNDER 65: How often do you have five or more drinks on one occasion? 0 Filed at: 05/16/2024 1423   3b. FEMALE Any Age, or MALE 65+: How often do you have 4 or more drinks on one occassion? 0 Filed at: 05/16/2024 1423   Audit-C Score 0 Filed at: 05/16/2024 1423   RACHEL: How many times in the past year have you...    Used an illegal drug or used a prescription medication for non-medical reasons? Never Filed at: 05/16/2024 1423                      Medical Decision Making  1. Weakness - Patient with ongoing, worsening weakness, states similar with previous infection. Will check CBC for leukocytosis, metabolic panel for electrolyte abnormalities and dehydration, TSH, blood cultures, lactic acid. Patient may require admission for further evaluation with PT.    2. Urinary retention - Patient with increased pain, decreased urine production. Will have rodriguez catheter placed, order urine studies to assess possible infection.    Problems Addressed:  Urinary retention: acute illness or injury  Weakness: chronic illness or injury with exacerbation, progression, or side effects of treatment    Amount and/or  Complexity of Data Reviewed  Labs: ordered. Decision-making details documented in ED Course.  Radiology: ordered.    Risk  Decision regarding hospitalization.             Disposition  Final diagnoses:   Weakness   Urinary retention     Time reflects when diagnosis was documented in both MDM as applicable and the Disposition within this note       Time User Action Codes Description Comment    5/16/2024  4:46 PM Atilio Oliver Add [R53.1] Weakness     5/16/2024  4:46 PM Atilio Oliver Add [R33.9] Urinary retention     5/19/2024  9:45 AM Kurt Hooper Add [G82.50] Quadriparesis (HCC)           ED Disposition       ED Disposition   Admit    Condition   Stable    Date/Time   u May 16, 2024 0856    Comment   Case was discussed with SHADI and the patient's admission status was agreed to be Admission Status: inpatient status to the service of Dr. Pruett .               Follow-up Information    None         Current Discharge Medication List        CONTINUE these medications which have NOT CHANGED    Details   busPIRone (BUSPAR) 15 mg tablet Take 15 mg by mouth in the morning      DULoxetine (CYMBALTA) 60 mg delayed release capsule Take 1 capsule (60 mg total) by mouth daily  Qty: 30 capsule, Refills: 2    Associated Diagnoses: Anxiety disorder; Muscle spasms of both lower extremities; Polyneuropathy      ALPRAZolam (XANAX) 0.5 mg tablet Take 1 tablet (0.5 mg total) by mouth daily at bedtime as needed for anxiety  Qty: 10 tablet, Refills: 0    Associated Diagnoses: Anxiety disorder      atorvastatin (LIPITOR) 40 mg tablet TAKE 20MG (ONE-HALF TABLET) BY MOUTH EVERY DAY FOR CHOLESTEROL      bisacodyl (DULCOLAX) 10 mg suppository Insert 1 suppository (10 mg total) into the rectum daily as needed for constipation  Qty: 12 suppository, Refills: 0    Associated Diagnoses: Bursitis; Cellulitis of other specified site; Slow transit constipation      Cholecalciferol 50 MCG (2000 UT) TABS 50 mcg      clobetasol (TEMOVATE) 0.05 %  "external solution       docusate sodium (COLACE) 100 mg capsule Take 1 capsule (100 mg total) by mouth 2 (two) times a day  Refills: 0    Associated Diagnoses: Bursitis; Cellulitis of other specified site; Slow transit constipation      Elastic Bandages & Supports (Wrap/Multipurpose 2.75\"x21.4yd) MISC Use in the morning Please dispense lymphedema wrap and wrap from hand up to the proximal shoulder  Qty: 1 each, Refills: 0    Associated Diagnoses: Septic olecranon bursitis of right elbow      fluocinonide (LIDEX) 0.05 % cream       folic acid (FOLVITE) 1 mg tablet Take 1 tablet (1mg) by mouth Once Daily.      !! ibuprofen (MOTRIN) 400 mg tablet Take 1 tablet (400 mg total) by mouth every 6 (six) hours as needed for mild pain  Refills: 0    Associated Diagnoses: Status post lumbar spinal fusion      !! ibuprofen (MOTRIN) 600 mg tablet Take 1 tablet (600 mg total) by mouth every 6 (six) hours as needed for mild pain or moderate pain  Qty: 30 tablet, Refills: 0    Associated Diagnoses: Back pain      ketoconazole (NIZORAL) 2 % shampoo       lidocaine (LIDODERM) 5 % APPLY 2 PATCH TOPICALLY DAILY FOR PAIN (REMOVE PATCH AFTER 12 HOURS; 12 HOURS ON AND 12 HOURS OFF)      meloxicam (MOBIC) 15 mg tablet 7.5 mg      methocarbamol (ROBAXIN) 500 mg tablet Take 1 tablet (500 mg total) by mouth 4 (four) times a day  Qty: 120 tablet, Refills: 5    Associated Diagnoses: Muscle spasms of both lower extremities; Polyneuropathy      midodrine (PROAMATINE) 5 mg tablet Take 1 tablet (5 mg total) by mouth 3 (three) times a day before meals  Qty: 270 tablet, Refills: 3    Associated Diagnoses: Neurogenic orthostatic hypotension (HCC)      naloxone (NARCAN) 4 mg/0.1 mL nasal spray Administer 1 spray into a nostril. If no response after 2-3 minutes, give another dose in the other nostril using a new spray.  Qty: 1 each, Refills: 1    Associated Diagnoses: Anxiety disorder      NON FORMULARY COMPRILAN BANDAGE [id number] [id number]      nystatin " powder       !! oxyCODONE (ROXICODONE) 5 immediate release tablet Take 1 tablet (5 mg) for moderate pain every 6 hours as needed or take 2 tablets (10 mg) for severe pain every 6 hours as needed.  Qty: 30 tablet, Refills: 0    Associated Diagnoses: Bursitis; Cellulitis of other specified site      !! oxyCODONE (Roxicodone) 5 immediate release tablet Take 1 tablet (5 mg total) by mouth every 4 (four) hours as needed for moderate pain Max Daily Amount: 30 mg  Qty: 5 tablet, Refills: 0    Associated Diagnoses: Abnormal CT of the abdomen      sildenafil (VIAGRA) 100 mg tablet Take 100 mg by mouth As directed      testosterone cypionate (DEPO-TESTOSTERONE) 200 mg/mL SOLN inject 2 MILLILITERS intramuscularly every 14 days       !! - Potential duplicate medications found. Please discuss with provider.          No discharge procedures on file.    PDMP Review         Value Time User    PDMP Reviewed  Yes 5/18/2024  8:26 AM Tati Paige PA-C            ED Provider  Electronically Signed by             Atilio Oliver MD  05/21/24 8364

## 2024-05-17 LAB
25(OH)D3 SERPL-MCNC: 87.2 NG/ML (ref 30–100)
ALBUMIN SERPL BCP-MCNC: 3.5 G/DL (ref 3.5–5)
ALP SERPL-CCNC: 55 U/L (ref 34–104)
ALT SERPL W P-5'-P-CCNC: 15 U/L (ref 7–52)
ANION GAP SERPL CALCULATED.3IONS-SCNC: 6 MMOL/L (ref 4–13)
AST SERPL W P-5'-P-CCNC: 16 U/L (ref 13–39)
BACTERIA UR CULT: NORMAL
BASOPHILS # BLD AUTO: 0.04 THOUSANDS/ÂΜL (ref 0–0.1)
BASOPHILS NFR BLD AUTO: 1 % (ref 0–1)
BILIRUB SERPL-MCNC: 1.08 MG/DL (ref 0.2–1)
BUN SERPL-MCNC: 15 MG/DL (ref 5–25)
CALCIUM SERPL-MCNC: 8.5 MG/DL (ref 8.4–10.2)
CHLORIDE SERPL-SCNC: 98 MMOL/L (ref 96–108)
CO2 SERPL-SCNC: 30 MMOL/L (ref 21–32)
CREAT SERPL-MCNC: 0.64 MG/DL (ref 0.6–1.3)
EOSINOPHIL # BLD AUTO: 0.09 THOUSAND/ÂΜL (ref 0–0.61)
EOSINOPHIL NFR BLD AUTO: 1 % (ref 0–6)
ERYTHROCYTE [DISTWIDTH] IN BLOOD BY AUTOMATED COUNT: 13.2 % (ref 11.6–15.1)
GFR SERPL CREATININE-BSD FRML MDRD: 102 ML/MIN/1.73SQ M
GLUCOSE SERPL-MCNC: 77 MG/DL (ref 65–140)
HCT VFR BLD AUTO: 51.7 % (ref 36.5–49.3)
HGB BLD-MCNC: 17.1 G/DL (ref 12–17)
IMM GRANULOCYTES # BLD AUTO: 0.04 THOUSAND/UL (ref 0–0.2)
IMM GRANULOCYTES NFR BLD AUTO: 1 % (ref 0–2)
LYMPHOCYTES # BLD AUTO: 0.98 THOUSANDS/ÂΜL (ref 0.6–4.47)
LYMPHOCYTES NFR BLD AUTO: 12 % (ref 14–44)
MAGNESIUM SERPL-MCNC: 1.9 MG/DL (ref 1.9–2.7)
MCH RBC QN AUTO: 31.3 PG (ref 26.8–34.3)
MCHC RBC AUTO-ENTMCNC: 33.1 G/DL (ref 31.4–37.4)
MCV RBC AUTO: 95 FL (ref 82–98)
MONOCYTES # BLD AUTO: 0.92 THOUSAND/ÂΜL (ref 0.17–1.22)
MONOCYTES NFR BLD AUTO: 11 % (ref 4–12)
NEUTROPHILS # BLD AUTO: 6.22 THOUSANDS/ÂΜL (ref 1.85–7.62)
NEUTS SEG NFR BLD AUTO: 74 % (ref 43–75)
NRBC BLD AUTO-RTO: 0 /100 WBCS
PLATELET # BLD AUTO: 144 THOUSANDS/UL (ref 149–390)
PMV BLD AUTO: 10.4 FL (ref 8.9–12.7)
POTASSIUM SERPL-SCNC: 4 MMOL/L (ref 3.5–5.3)
PROCALCITONIN SERPL-MCNC: <0.05 NG/ML
PROT SERPL-MCNC: 5.6 G/DL (ref 6.4–8.4)
RBC # BLD AUTO: 5.46 MILLION/UL (ref 3.88–5.62)
SODIUM SERPL-SCNC: 134 MMOL/L (ref 135–147)
WBC # BLD AUTO: 8.29 THOUSAND/UL (ref 4.31–10.16)

## 2024-05-17 PROCEDURE — 97167 OT EVAL HIGH COMPLEX 60 MIN: CPT

## 2024-05-17 PROCEDURE — 80053 COMPREHEN METABOLIC PANEL: CPT | Performed by: STUDENT IN AN ORGANIZED HEALTH CARE EDUCATION/TRAINING PROGRAM

## 2024-05-17 PROCEDURE — 83735 ASSAY OF MAGNESIUM: CPT | Performed by: STUDENT IN AN ORGANIZED HEALTH CARE EDUCATION/TRAINING PROGRAM

## 2024-05-17 PROCEDURE — 85025 COMPLETE CBC W/AUTO DIFF WBC: CPT | Performed by: STUDENT IN AN ORGANIZED HEALTH CARE EDUCATION/TRAINING PROGRAM

## 2024-05-17 PROCEDURE — 82306 VITAMIN D 25 HYDROXY: CPT | Performed by: PHYSICIAN ASSISTANT

## 2024-05-17 PROCEDURE — 97163 PT EVAL HIGH COMPLEX 45 MIN: CPT

## 2024-05-17 PROCEDURE — 99223 1ST HOSP IP/OBS HIGH 75: CPT | Performed by: PSYCHIATRY & NEUROLOGY

## 2024-05-17 PROCEDURE — 99232 SBSQ HOSP IP/OBS MODERATE 35: CPT | Performed by: PHYSICIAN ASSISTANT

## 2024-05-17 PROCEDURE — 84145 PROCALCITONIN (PCT): CPT | Performed by: PHYSICIAN ASSISTANT

## 2024-05-17 RX ORDER — ALPRAZOLAM 0.5 MG/1
0.5 TABLET ORAL ONCE
Status: COMPLETED | OUTPATIENT
Start: 2024-05-17 | End: 2024-05-17

## 2024-05-17 RX ORDER — BISACODYL 10 MG
10 SUPPOSITORY, RECTAL RECTAL DAILY PRN
Status: DISCONTINUED | OUTPATIENT
Start: 2024-05-17 | End: 2024-05-23 | Stop reason: HOSPADM

## 2024-05-17 RX ORDER — SODIUM CHLORIDE 9 MG/ML
75 INJECTION, SOLUTION INTRAVENOUS CONTINUOUS
Status: DISPENSED | OUTPATIENT
Start: 2024-05-17 | End: 2024-05-18

## 2024-05-17 RX ADMIN — SODIUM CHLORIDE 75 ML/HR: 0.9 INJECTION, SOLUTION INTRAVENOUS at 23:42

## 2024-05-17 RX ADMIN — OXYCODONE HYDROCHLORIDE 10 MG: 10 TABLET ORAL at 09:53

## 2024-05-17 RX ADMIN — ALPRAZOLAM 0.5 MG: 0.5 TABLET ORAL at 05:53

## 2024-05-17 RX ADMIN — SODIUM CHLORIDE 75 ML/HR: 0.9 INJECTION, SOLUTION INTRAVENOUS at 10:33

## 2024-05-17 RX ADMIN — ATORVASTATIN CALCIUM 20 MG: 20 TABLET, FILM COATED ORAL at 17:33

## 2024-05-17 RX ADMIN — OXYCODONE HYDROCHLORIDE 10 MG: 10 TABLET ORAL at 01:47

## 2024-05-17 RX ADMIN — ENOXAPARIN SODIUM 40 MG: 40 INJECTION SUBCUTANEOUS at 09:53

## 2024-05-17 RX ADMIN — OXYCODONE HYDROCHLORIDE 10 MG: 10 TABLET ORAL at 14:29

## 2024-05-17 RX ADMIN — OXYCODONE HYDROCHLORIDE 10 MG: 10 TABLET ORAL at 20:21

## 2024-05-17 RX ADMIN — METHOCARBAMOL 500 MG: 500 TABLET ORAL at 21:31

## 2024-05-17 RX ADMIN — METHOCARBAMOL 500 MG: 500 TABLET ORAL at 15:15

## 2024-05-17 RX ADMIN — BUSPIRONE HYDROCHLORIDE 15 MG: 10 TABLET ORAL at 09:53

## 2024-05-17 NOTE — OCCUPATIONAL THERAPY NOTE
Occupational Therapy Evaluation     Patient Name: Jos Hawk  Today's Date: 5/17/2024  Problem List  Principal Problem:    Weakness  Active Problems:    BPH associated with nocturia    Quadriparesis (HCC)    Neurogenic orthostatic hypotension (HCC)    Past Medical History  Past Medical History:   Diagnosis Date    Balance problems     BPH (benign prostatic hyperplasia)     Chronic back pain     Chronic pain disorder     COVID-19     2/2021-asymptomatic     Erectile dysfunction     Nocturia     OAB (overactive bladder)     Testicular hypogonadism     Urinary frequency     Urinary urgency      Past Surgical History  Past Surgical History:   Procedure Laterality Date    BACK SURGERY  05/18/2016    with hardware    BACK SURGERY      2 back surgery unable to remove hardware    COLONOSCOPY  11/2004    Dr Taylor. tubular adenoma polyp removed    COLONOSCOPY  12/2007    Dr Cameron. Normal    COLONOSCOPY  07/2013    Dr Cameron. Normal.     COLONOSCOPY  06/2019    Dr Cameron. Hemorrhoids, normal.     NECK SURGERY      decompression of neck     NECK SURGERY Right     scar tissues removed    TN EXCISION HYDROCELE UNILATERAL Right 06/10/2021    Procedure: HYDROCELECTOMY;  Surgeon: Dereck Goddard DO;  Location: [facility];  Service: Urology    SPINAL CORD STIMULATOR IMPLANT  10/10/2019    WOUND DEBRIDEMENT Right 08/10/2023    Procedure: DEBRIDEMENT UPPER EXTREMITY (WASH OUT) - elbow;  Surgeon: Jesse Avendaño MD;  Location: [facility];  Service: Orthopedics        05/17/24 1340   OT Last Visit   OT Visit Date 05/17/24   Note Type   Note type Evaluation   Pain Assessment   Pain Assessment Tool 0-10   Pain Score 7   Pain Location/Orientation Location: Back   Hospital Pain Intervention(s) Ambulation/increased activity;Emotional support;Relaxation technique;Repositioned   Restrictions/Precautions   Weight Bearing Precautions Per Order No   Other Precautions Bed Alarm;Multiple lines;Fall Risk;Pain   Home Living   Type  of Home House   Home Layout Multi-level;Able to live on main level with bedroom/bathroom   Bathroom Shower/Tub Walk-in shower   Bathroom Toilet Raised   Bathroom Equipment Grab bars in shower;Shower chair;Grab bars around toilet   Home Equipment Walker;Cane;Other (Comment);Wheelchair-manual  (gaitbelt)   Additional Comments sleeps in adjustable bed. 0 SETH   Prior Function   Level of [place] Needs assistance with ADLs;Needs assistance with functional mobility;Needs assistance with IADLS   Lives With Alone   Receives Help From Personal care attendant  (aides M-F from 7am-8pm and Sat/Sun from 8am-6pm)   IADLs Family/Friend/Other provides transportation;Family/Friend/Other provides meals;Family/Friend/Other provides medication management   Falls in the last 6 months 1 to 4   Vocational Full time employment   Comments use of RW and wc. can walk short household distances. recently relying more on wc.   Lifestyle   Autonomy PTA, required (A) with ADLs and required (A) with IADLs. Patient lives in a multistory home, able to stay on main fl. Walk in shower - GB, SC. Raised toilet w/ GB. DME: RW, cane, w/c, gaitbelt, adjustable bed. Has a caregiver 7AM-8PM M-F, 8-6pm Sat. Reports 2 falls. Was going to OP PT/OT [facility], Tues, Thurs, Fri; private - [facility].   Reciprocal Relationships Son, caregiver   Service to Others Caretaker,  of a company   Intrinsic Gratification Participating in therapies.   General   Additional Pertinent History Comorbidities affecting pt’s functional performance include a significant PMH of: quadriparesis, neurogenic orthostatic hypotension, cauda equina syndrome, s/p insertion of spinal cord stimulator, s/p lumbar spine fusion, hx colonoscopy, weakness of R LE, ambulatory dysfunction, myelomalacia of cervical cord.  Patient with active OT orders and activity orders for Up and OOB as tolerated .   Family/Caregiver Present Yes  (son, friend, caregiver)   Subjective   Subjective  "Agreeable to OT/PT co-eval. \"This is not my usual.\" \"My caregiver knows how to help me in a certain way.\"   ADL   Where Assessed Edge of bed   Eating Assistance 5  Supervision/Setup   Grooming Assistance 4  Minimal Assistance   UB Bathing Assistance 3  Moderate Assistance   LB Bathing Assistance 2  Maximal Assistance   UB Dressing Assistance 2  Maximal Assistance   LB Dressing Assistance 1  Total Assistance   Toileting Assistance  1  Total Assistance   Bed Mobility   Supine to Sit 1  Dependent   Additional items Assist x 2   Sit to Supine 2  Maximal assistance   Additional items Assist x 2   Transfers   Sit to Stand 2  Maximal assistance   Additional items Assist x 2;Increased time required;Verbal cues;Other  (RW, bed ht elevated)   Stand to Sit 3  Moderate assistance   Additional items Assist x 2;Increased time required;Verbal cues;Bed elevated;Other  (RW)   Functional Mobility   Additional Comments Declines.   Balance   Static Sitting Poor +   Static Standing Fair -   Activity Tolerance   Activity Tolerance Patient limited by fatigue;Patient limited by pain;Treatment limited secondary to medical complications (Comment);Other (Comment)  (increased tone, pt discretion)   Medical Staff Made Aware Regla PT   Nurse Made Aware Cheryl SWEENEY   RUE Assessment   RUE Assessment X  (3-/5 shoulder, 3/5 elbow grossly, ~110 flex noted of shoulder)   LUE Assessment   LUE Assessment X  (3-/5 shoulder, 3/5 elbow grossly, ~110 flex noted of shoulder)   Hand Function   Gross Motor Coordination Impaired   Fine Motor Coordination Impaired   Sensation   Light Touch Severe deficits in the RLE;Severe deficits in the LLE   Proprioception   Proprioception No apparent deficits;Severe deficits in the RLE;Severe deficits in the LLE;Partial deficits in the LUE   Vision - Complex Assessment   Ocular Range of Motion Intact   Acuity Able to read clock/calendar on wall without difficulty;Able to read employee name badge without difficulty "   Perception   Inattention/Neglect Appears intact   Cognition   Overall Cognitive Status WFL   Arousal/Participation Alert;Responsive   Attention Within functional limits   Memory Within functional limits   Following Commands Follows all commands and directions without difficulty   Assessment   Limitation Decreased ADL status;Decreased UE strength;Decreased Safe judgement during ADL;Decreased endurance;Decreased high-level ADLs;Decreased self-care trans;Decreased fine motor control;Decreased sensation;Decreased UE ROM  (dec balance, functional reach)   Prognosis Fair   Assessment Patient is a 63 y.o. year old male seen for OT eval s/p admit to [facility] on 5/16/2024 with weakness.  OT consulted to assess ADLs/IADLs/functional mobility and assist w/ D/C planning. Patient demonstrates the following deficits impacting occupational performance: decreased UE ROM, decreased strength , decreased balance, decreased activity tolerance, limited functional reach, impaired GMC, impaired FMC, impaired sensation, decreased safety awareness, increased pain, dizziness, mood/behavioral limitations , impaired coordination, decreased postural control, spasticity , decreased cardiovascular endurance, and decreased skin integrity . These impairments, as well at pt’s limited home support, difficulty performing ADLs, difficulty performing IADLs, difficulty performing transfers/mobility, fall risk , functional decline , increased reliance on DME , multiple admissions , and Inability to perform current job functions , limit pt’s ability to safely engage in all baseline areas of occupation. Pt's CLOF as follows: eating/grooming: supervision  and Gregoria, UB ADLs: modA and maxA, LB ADLs: maxA and dependent, toileting: dependent, bed mobility: maxAx2 and depx2, functional transfers: modAx2 and maxAx2, functional mobility: DNT, standing tolerance: 10 min w/ Fair- balance, b/l UE support. Pt would benefit from continued skilled OT while in acute setting  to address deficits as defined above and to maximize (I) w/ ADLs/functional mobility. Occupational performance areas to address include: eating, grooming, bathing/shower, toilet hygiene, dressing, health maintenance, functional mobility, clothing management, cleaning, meal prep, and household maintenance. Based on the aforementioned evaluation, functional performance deficits, and assessments, pt has been identified as a high complexity evaluation. At this time, recommendation for pt to receive post-acute rehabilitation services at a Level II (moderate resource intensity) due to above deficits and CLOF. OT will continue to follow pt 2-5x/wk to address the goals listed below to  w/in 10-14 days.   Goals   Patient Goals to return home, to regain strength   LTG Time Frame 10-   Plan   Treatment Interventions ADL retraining;Functional transfer training;UE strengthening/ROM;Endurance training;Cognitive reorientation;Patient/family training;Equipment evaluation/education;Neuromuscular reeducation;Compensatory technique education;Continued evaluation;Energy conservation;Activityengagement   Goal Expiration Date 24   OT Treatment Day 0   OT Frequency 3-5x/wk;2-3x/wk   Discharge Recommendation   Rehab Resource Intensity Level, OT II (Moderate Resource Intensity)  (pending progress/support at home)   AM-PAC Daily Activity Inpatient   Lower Body Dressing 1   Bathing 2   Toileting 1   Upper Body Dressing 2   Grooming 3   Eating 3   Daily Activity Raw Score 12   Daily Activity Standardized Score (Calc for Raw Score >=11) 30.6   AM-PAC Applied Cognition Inpatient   Following a Speech/Presentation 4   Understanding Ordinary Conversation 4   Taking Medications 4   Remembering Where Things Are Placed or Put Away 4   Remembering List of 4-5 Errands 4   Taking Care of Complicated Tasks 4   Applied Cognition Raw Score 24   Applied Cognition Standardized Score 62.21     Pt will complete grooming tasks w/c level Cali  Pt  will complete UB ADLs w/ Gregoria  Pt will complete LB ADLs w/ modA  Pt will complete toileting tasks w/ modA  Pt will increase standing balance to Fair to reduce risks of falls while engaging in ADLs/functional tasks  Pt will increase BUE strength by 1MM grade via AROM/AAROM/PROM exercises to increase independence in ADLs and transfers   Patient will increase OOB/ sitting tolerance to 2-4 hours per day for increased participation in self care and leisure tasks with no s/s of exertion.  Pt will improve functional mobility during ADL/IADL/leisure tasks to Mod I using DME as needed w/ G balance/safety    Patient will verbalize and demonstrate good body mechanics and joint protection techniques during ADLs/IADLs with no verbal cues  Pt will complete bed mobility at a ModA level w/ G balance/safety demonstrated to decrease caregiver assistance required        Janelle Richardson OTR/L

## 2024-05-17 NOTE — PLAN OF CARE
Problem: OCCUPATIONAL THERAPY ADULT  Goal: Performs self-care activities at highest level of function for planned discharge setting.  See evaluation for individualized goals.  Description: Treatment Interventions: ADL retraining, Functional transfer training, UE strengthening/ROM, Endurance training, Cognitive reorientation, Patient/family training, Equipment evaluation/education, Neuromuscular reeducation, Compensatory technique education, Continued evaluation, Energy conservation, Activityengagement          See flowsheet documentation for full assessment, interventions and recommendations.   5/17/2024 1503 by Janelle Richardson OT  Outcome: Progressing  Note: Limitation: Decreased ADL status, Decreased UE strength, Decreased Safe judgement during ADL, Decreased endurance, Decreased high-level ADLs, Decreased self-care trans, Decreased fine motor control, Decreased sensation, Decreased UE ROM (dec balance, functional reach)  Prognosis: Fair  Assessment: Patient is a 63 y.o. year old male seen for OT eval s/p admit to [facility] on 5/16/2024 with weakness.  OT consulted to assess ADLs/IADLs/functional mobility and assist w/ D/C planning. Patient demonstrates the following deficits impacting occupational performance: decreased UE ROM, decreased strength , decreased balance, decreased activity tolerance, limited functional reach, impaired GMC, impaired FMC, impaired sensation, decreased safety awareness, increased pain, dizziness, mood/behavioral limitations , impaired coordination, decreased postural control, spasticity , decreased cardiovascular endurance, and decreased skin integrity . These impairments, as well at pt’s limited home support, difficulty performing ADLs, difficulty performing IADLs, difficulty performing transfers/mobility, fall risk , functional decline , increased reliance on DME , multiple admissions , and Inability to perform current job functions , limit pt’s ability to safely engage in all baseline  areas of occupation. Pt's CLOF as follows: eating/grooming: supervision  and Gregoria, UB ADLs: modA and maxA, LB ADLs: maxA and dependent, toileting: dependent, bed mobility: maxAx2 and depx2, functional transfers: modAx2 and maxAx2, functional mobility: DNT, standing tolerance: 10 min w/ Fair- balance, b/l UE support. Pt would benefit from continued skilled OT while in acute setting to address deficits as defined above and to maximize (I) w/ ADLs/functional mobility. Occupational performance areas to address include: eating, grooming, bathing/shower, toilet hygiene, dressing, health maintenance, functional mobility, clothing management, cleaning, meal prep, and household maintenance. Based on the aforementioned evaluation, functional performance deficits, and assessments, pt has been identified as a high complexity evaluation. At this time, recommendation for pt to receive post-acute rehabilitation services at a Level II (moderate resource intensity) due to above deficits and CLOF. OT will continue to follow pt 2-5x/wk to address the goals listed below to  w/in 10-14 days.     Rehab Resource Intensity Level, OT: II (Moderate Resource Intensity) (pending progress/support at home)

## 2024-05-17 NOTE — ASSESSMENT & PLAN NOTE
Chronic spinal disease status post spinal cord stimulator.  PT/OT consults  See above plan for weakness

## 2024-05-17 NOTE — PROGRESS NOTES
[facility]  Progress Note  Name: Jos Hawk I  MRN: [id number]  Unit/Bed#: E5 -01 I Date of Admission: 5/16/2024   Date of Service: 5/17/2024 I Hospital Day: 1    Assessment & Plan   * Weakness  Assessment & Plan  Patient with history of cervical spinal injury after a fall in 2018 resulting in quadriparesis  Recent MRI cervical spine severe cord atrophy with myomalacia related to signal abnormality at C3-4, prior decompressive laminectomies at C3 6, multilevel foraminal stenosis  Following outpatient with physiatry at [facility] and neurology and at [facility] neurology on the day prior to admission, changes noted: Duloxetine 60 mg a day and taper of BuSpar to 15 mg a day, methocarbamol 500 mg up to 4 times a day.  Did have Botox completed in February, reports no improvement, did have dental work done, placed on Percocet with improvement of his pain but no change in his progressive weakness  PT/OT consults  Follow-up neurology evaluations  Rule out metabolic causes, blood and urine cultures pending  IV fluid hydration    Neurogenic orthostatic hypotension (HCC)  Assessment & Plan  Takes midodrine 5 mg 3 times daily.  Placed hold parameters for SBP greater than 110.  BP significantly elevated at time of admission, continue close monitoring while inpatient    Quadriparesis (HCC)  Assessment & Plan  Chronic spinal disease status post spinal cord stimulator.  PT/OT consults  See above plan for weakness    BPH associated with nocturia  Assessment & Plan  Beard catheter placed in the emergency room for concern of urinary retention  Follow up urine culture             VTE Pharmacologic Prophylaxis:   High Risk (Score >/= 5) - Pharmacological DVT Prophylaxis Ordered: enoxaparin (Lovenox). Sequential Compression Devices Ordered.    Mobility:   Basic Mobility Inpatient Raw Score: 12  JH-HLM Goal: 4: Move to chair/commode  JH-HLM Achieved: 1: Laying in bed  JH-HLM Goal NOT  achieved. Continue with multidisciplinary rounding and encourage appropriate mobility to improve upon [facility] goals.    Patient Centered Rounds: I performed bedside rounds with nursing staff today.   Discussions with Specialists or Other Care Team Provider: neurology       Total Time Spent on Date of Encounter in care of patient:  mins. This time was spent on one or more of the following: performing physical exam; counseling and coordination of care; obtaining or reviewing history; documenting in the medical record; reviewing/ordering tests, medications or procedures; communicating with other healthcare professionals and discussing with patient's family/caregivers.    Current Length of Stay: 1 day(s)  Current Patient Status: Inpatient   Certification Statement: The patient will continue to require additional inpatient hospital stay due to weakness in the setting of prior cervical spinal injury with resultant quadriparesis  Discharge Plan: Anticipate discharge in 48 hrs to discharge location to be determined pending rehab evaluations.    Code Status: Level 1 - Full Code    Subjective:   Patient reports he has been having ongoing symptoms over the past 6 weeks.  Reports significantly worsened hypertonia and difficulty with standing and ambulation.  Notes progressive decline over this time.  Did report urinary retention requiring Beard catheter to be placed in the emergency room yesterday, does note improvement in his urinary symptoms and feeling of bladder fullness.  Has not had a bowel movement in 3 days.  Also notes poor oral intake over the past 2 days.    Objective:     Vitals:   Temp (24hrs), Av.9 °F (36.6 °C), Min:97.6 °F (36.4 °C), Max:98.4 °F (36.9 °C)    Temp:  [97.6 °F (36.4 °C)-98.4 °F (36.9 °C)] 97.7 °F (36.5 °C)  HR:  [68-92] 81  Resp:  [16-20] 18  BP: (117-211)/(64-88) 119/68  SpO2:  [91 %-96 %] 91 %  Body mass index is 34.09 kg/m².     Input and Output Summary (last 24 hours):     Intake/Output  Summary (Last 24 hours) at 5/17/2024 1050  Last data filed at 5/17/2024 1050  Gross per 24 hour   Intake 660 ml   Output 1150 ml   Net -490 ml       Physical Exam:   Physical Exam  Vitals reviewed.   Constitutional:       General: He is not in acute distress.  HENT:      Head: Normocephalic and atraumatic.   Eyes:      General: No scleral icterus.     Conjunctiva/sclera: Conjunctivae normal.   Cardiovascular:      Rate and Rhythm: Normal rate and regular rhythm.      Heart sounds: No murmur heard.  Pulmonary:      Effort: Pulmonary effort is normal. No respiratory distress.      Breath sounds: Normal breath sounds. No wheezing.   Abdominal:      General: Bowel sounds are normal. There is no distension.      Palpations: Abdomen is soft.      Tenderness: There is no abdominal tenderness.   Genitourinary:     Comments: Beard draining yellow urine  Musculoskeletal:      Cervical back: Neck supple.      Right lower leg: No edema.      Left lower leg: No edema.   Skin:     General: Skin is warm and dry.   Neurological:      Mental Status: He is alert and oriented to person, place, and time.      Comments: Hypertonia, able to lift legs against gravity for short period of time, can wiggle toes and move arms   Psychiatric:         Mood and Affect: Mood normal.         Behavior: Behavior normal.          Additional Data:     Labs:  Results from last 7 days   Lab Units 05/17/24  0614   WBC Thousand/uL 8.29   HEMOGLOBIN g/dL 17.1*   HEMATOCRIT % 51.7*   PLATELETS Thousands/uL 144*   SEGS PCT % 74   LYMPHO PCT % 12*   MONO PCT % 11   EOS PCT % 1     Results from last 7 days   Lab Units 05/17/24  0614   SODIUM mmol/L 134*   POTASSIUM mmol/L 4.0   CHLORIDE mmol/L 98   CO2 mmol/L 30   BUN mg/dL 15   CREATININE mg/dL 0.64   ANION GAP mmol/L 6   CALCIUM mg/dL 8.5   ALBUMIN g/dL 3.5   TOTAL BILIRUBIN mg/dL 1.08*   ALK PHOS U/L 55   ALT U/L 15   AST U/L 16   GLUCOSE RANDOM mg/dL 77                 Results from last 7 days   Lab Units  05/16/24  1428   LACTIC ACID mmol/L 1.0       Lines/Drains:  Invasive Devices       Peripheral Intravenous Line  Duration             Peripheral IV 05/16/24 Left Antecubital <1 day                          Imaging: No pertinent imaging reviewed.    Recent Cultures (last 7 days):   Results from last 7 days   Lab Units 05/16/24  1436 05/16/24  1428   BLOOD CULTURE  Received in Microbiology Lab. Culture in Progress. Received in Microbiology Lab. Culture in Progress.       Last 24 Hours Medication List:   Current Facility-Administered Medications   Medication Dose Route Frequency Provider Last Rate    acetaminophen  650 mg Oral Q4H PRN Jose Pruett MD      atorvastatin  20 mg Oral Daily With Dinner Jose Pruett MD      busPIRone  15 mg Oral Daily Jose Pruett MD      DULoxetine  60 mg Oral Daily Jose Pruett MD      enoxaparin  40 mg Subcutaneous Daily Jose Pruett MD      methocarbamol  500 mg Oral 4x Daily Jose Pruett MD      midodrine  5 mg Oral TID AC Jose Pruett MD      oxyCODONE  5 mg Oral Q4H PRN Jose Pruett MD      Or    oxyCODONE  10 mg Oral Q4H PRN Jose Pruett MD      sodium chloride  75 mL/hr Intravenous Continuous Tati Paige PA-C 75 mL/hr (05/17/24 1033)        Today, Patient Was Seen By: Tati Paige PA-C    **Please Note: This note may have been constructed using a voice recognition system.**

## 2024-05-17 NOTE — ASSESSMENT & PLAN NOTE
Takes midodrine 5 mg 3 times daily.  Placed hold parameters for SBP greater than 110.  BP significantly elevated at time of admission, continue close monitoring while inpatient

## 2024-05-17 NOTE — PHYSICAL THERAPY NOTE
PHYSICAL THERAPY EVALUATION          Patient Name: Jos Hawk  Today's Date: 5/17/2024 05/17/24 1412   PT Last Visit   PT Visit Date 05/17/24   Note Type   Note type Evaluation   Pain Assessment   Pain Assessment Tool 0-10   Pain Score 7   Pain Location/Orientation Location: Back   Hospital Pain Intervention(s) Repositioned;Emotional support   Restrictions/Precautions   Other Precautions Chair Alarm;Bed Alarm;Multiple lines;Fall Risk;Pain   Home Living   Type of Home House   Home Layout Able to live on main level with bedroom/bathroom   Bathroom Shower/Tub Walk-in shower   Bathroom Toilet Raised   Bathroom Equipment Grab bars in shower;Shower chair;Grab bars around toilet   Home Equipment Walker;Cane;Wheelchair-manual;Other (Comment)  (gait belt)   Additional Comments sleeps in adjustable bed. 0 SETH   Prior Function   Level of [place] Needs assistance with ADLs;Needs assistance with functional mobility;Needs assistance with IADLS   Lives With Alone   Receives Help From Personal care attendant;Outpatient therapy  (aides M-F from 7am-8pm and Sat/Sun from 8am-6pm. OPPT MWThF (aquatic, hand therapy and land therapy) and  MWF)   IADLs Family/Friend/Other provides transportation;Family/Friend/Other provides meals;Family/Friend/Other provides medication management   Falls in the last 6 months 1 to 4   Vocational Full time employment   Comments use of RW and wc. can walk short household distances. recently relying more on wc.   General   Additional Pertinent History pt admitted 5/16/24 weakness. PMHx significant for quadriparesis, obesity, covid, chronic pain   Family/Caregiver Present Yes  (caregiver, son and friend)   Cognition   Overall Cognitive Status WFL   Arousal/Participation Cooperative   Memory Within functional limits   Following Commands Follows all commands and directions without difficulty   Subjective   Subjective pt apologetic from behavior regarding prev  encounters   Coordination   Sensation X   Bed Mobility   Supine to Sit 1  Dependent   Additional items Assist x 2   Sit to Supine 2  Maximal assistance   Additional items Assist x 2   Transfers   Sit to Stand 2  Maximal assistance   Additional items Assist x 2;Increased time required;Verbal cues;Other  (RW, bed ht elevated)   Stand to Sit 3  Moderate assistance   Additional items Assist x 2;Increased time required;Verbal cues;Bed elevated;Other  (RW)   Additional Comments gait belt   Balance   Static Sitting Poor +   Static Standing Fair -   Endurance Deficit   Endurance Deficit No  (tolerated standing about 10 min)   Activity Tolerance   Activity Tolerance Treatment limited secondary to medical complications (Comment);Patient limited by pain;Other (Comment)  (increased tone, pt discretion)   Medical Staff Made Aware Janelle OT   Nurse Made Aware Cheryl RN   Assessment   Prognosis Fair   Problem List Decreased strength;Impaired balance;Decreased mobility;Decreased safety awareness;Impaired tone;Obesity;Pain   Assessment Jos Hawk is a 63 y.o. male admitted to [facility] on 5/16/2024 for Weakness. PT was consulted and pt was seen on 5/17/2024 for mobility assessment and d/c planning. Pt presents w fall risk, multiple lines, pain. At baseline gets A for ADLs, IADLs and mobility. Reports normally able to walk a few feet around the house however lately reliant soley on wc. Pt is currently functioning at a mod-dep Ax2 for bed mobility and transfers, with functional assist provided by caregiver and family friend per patient request. Therapists present for safety and additional assist prn. Pt demonstrated impaired mobility secondary to pain and increased tone. Tolerated static standing well but decline gait trial at this time. Pt will benefit from continued skilled IP PT to address the above mentioned impairments  in order to maximize recovery and increase functional independence when completing mobility and ADLs. The  patient's AM-PAC Basic Mobility Inpatient Short Form Raw Score is 6. A Raw score of less than or equal to 16 suggests the patient may benefit from discharge to post-acute rehabilitation services. Pt does have adequate social support at home (from caregiver and addition of son and friend if needed).   Barriers to Discharge None   Goals   Patient Goals return to PLOF   STG Expiration Date 05/27/24   Short Term Goal #1 1) Pt will perform bed mobility with max Ax1 demonstrating appropriate technique 100% of the time in order to improve function. 2) Perform all transfers with max Ax1 demonstrating safe and appropriate technique 100% of the time in order to improve ability to negotiate safely in home environment.3) Amb with least restrictive AD >10'x1 with mod A in order to demonstrate ability to negotiate in home environment.4)  Improve overall strength and balance 1/2 grade in order to optimize ability to perform functional tasks and reduce fall risk.5) Increase activity tolerance to 45 minutes in order to improve endurance to functional tasks.6) wc mobility >50' at S level for household distances. 7) PT for ongoing patient and family/caregiver education, DME needs and d/c planning in order to promote highest level of function in least restrictive environment.   Plan   Treatment/Interventions Functional transfer training;LE strengthening/ROM;Therapeutic exercise;Patient/family training;Equipment eval/education;Bed mobility;Gait training;Compensatory technique education;Continued evaluation;Spoke to nursing;OT;Family   PT Frequency 3-5x/wk   Discharge Recommendation   Rehab Resource Intensity Level, PT II (Moderate Resource Intensity)   AM-PAC Basic Mobility Inpatient   Turning in Flat Bed Without Bedrails 1   Lying on Back to Sitting on Edge of Flat Bed Without Bedrails 1   Moving Bed to Chair 1   Standing Up From Chair Using Arms 1   Walk in Room 1   Climb 3-5 Stairs With Railing 1   Basic Mobility Inpatient Raw Score  6   Turning Head Towards Sound 4   Follow Simple Instructions 4   Low Function Basic Mobility Raw Score  14   Low Function Basic Mobility Standardized Score  22.01   [facility] Highest Level Of Mobility   -[facility] Goal 2: Bed activities/Dependent transfer   -[facility] Achieved 5: Stand (1 or more minutes)   End of Consult   Patient Position at End of Consult Supine;All needs within reach;Bed/Chair alarm activated   History: co - morbidities including age, coping styles, use of assistive device, assist for adl's/IADL's, current experience including fall risk, multiple lines  Exam: impairments in systems including multiple body structures involved; neuromuscular (balance, transfers, sensation), AM-PAC, cognition; activity limitations (difficulties executing an action); participation restrictions (problems associated w involvement in life situations)  Clinical: unstable/unpredictable  Complexity:high      Regla Townsend, PT

## 2024-05-17 NOTE — ASSESSMENT & PLAN NOTE
Patient with history of cervical spinal injury after a fall in 2018 resulting in quadriparesis  Recent MRI cervical spine severe cord atrophy with myomalacia related to signal abnormality at C3-4, prior decompressive laminectomies at C3 6, multilevel foraminal stenosis  Following outpatient with physiatry at [facility] and neurology and at [facility] neurology on the day prior to admission, changes noted: Duloxetine 60 mg a day and taper of BuSpar to 15 mg a day, methocarbamol 500 mg up to 4 times a day.  Did have Botox completed in February, reports no improvement, did have dental work done, placed on Percocet with improvement of his pain but no change in his progressive weakness  PT/OT consults  Follow-up neurology evaluations  Rule out metabolic causes, blood and urine cultures pending  IV fluid hydration

## 2024-05-17 NOTE — OCCUPATIONAL THERAPY NOTE
Occupational Therapy Evaluation     Patient Name: Jos Hawk  Today's Date: 5/17/2024  Problem List  Principal Problem:    Weakness  Active Problems:    BPH associated with nocturia    Quadriparesis (HCC)    Neurogenic orthostatic hypotension (HCC)    Past Medical History  Past Medical History:   Diagnosis Date    Balance problems     BPH (benign prostatic hyperplasia)     Chronic back pain     Chronic pain disorder     COVID-19     2/2021-asymptomatic     Erectile dysfunction     Nocturia     OAB (overactive bladder)     Testicular hypogonadism     Urinary frequency     Urinary urgency      Past Surgical History  Past Surgical History:   Procedure Laterality Date    BACK SURGERY  05/18/2016    with hardware    BACK SURGERY      2 back surgery unable to remove hardware    COLONOSCOPY  11/2004    Dr Taylor. tubular adenoma polyp removed    COLONOSCOPY  12/2007    Dr Cameron. Normal    COLONOSCOPY  07/2013    Dr Cameron. Normal.     COLONOSCOPY  06/2019    Dr Cameron. Hemorrhoids, normal.     NECK SURGERY      decompression of neck     NECK SURGERY Right     scar tissues removed    MS EXCISION HYDROCELE UNILATERAL Right 06/10/2021    Procedure: HYDROCELECTOMY;  Surgeon: Dereck Goddard DO;  Location: [facility];  Service: Urology    SPINAL CORD STIMULATOR IMPLANT  10/10/2019    WOUND DEBRIDEMENT Right 08/10/2023    Procedure: DEBRIDEMENT UPPER EXTREMITY (WASH OUT) - elbow;  Surgeon: Jesse Avendaño MD;  Location: [facility];  Service: Orthopedics        05/17/24 1340   OT Last Visit   OT Visit Date 05/17/24   Note Type   Note type Evaluation   Pain Assessment   Pain Assessment Tool 0-10   Pain Score 7   Pain Location/Orientation Location: Back   Hospital Pain Intervention(s) Ambulation/increased activity;Emotional support;Relaxation technique;Repositioned   Restrictions/Precautions   Weight Bearing Precautions Per Order No   Other Precautions Bed Alarm;Multiple lines;Fall Risk;Pain   Home Living   Type  of Home House   Home Layout Multi-level;Able to live on main level with bedroom/bathroom   Bathroom Shower/Tub Walk-in shower   Bathroom Toilet Raised   Bathroom Equipment Grab bars in shower;Shower chair;Grab bars around toilet   Home Equipment Walker;Cane;Other (Comment);Wheelchair-manual  (gaitbelt)   Additional Comments sleeps in adjustable bed. 0 SETH   Prior Function   Level of [place] Needs assistance with ADLs;Needs assistance with functional mobility;Needs assistance with IADLS   Lives With Alone   Receives Help From Personal care attendant  (aides M-F from 7am-8pm and Sat/Sun from 8am-6pm)   IADLs Family/Friend/Other provides transportation;Family/Friend/Other provides meals;Family/Friend/Other provides medication management   Falls in the last 6 months 1 to 4   Vocational Full time employment   Comments use of RW and wc. can walk short household distances. recently relying more on wc.   Lifestyle   Autonomy PTA, required (A) with ADLs and required (A) with IADLs. Patient lives in a multistory home, able to stay on main fl. Walk in shower - GB, SC. Raised toilet w/ GB. DME: RW, cane, w/c, gaitbelt, adjustable bed. Has a caregiver 7AM-8PM M-F, 8-6pm Sat. Reports 2 falls. Was going to OP PT/OT at [facility], Tues, Thurs, Fri; private - [facility].   Reciprocal Relationships Son, caregiver   Service to Others Caretaker,  of a company   Intrinsic Gratification Participating in therapies.   General   Additional Pertinent History Comorbidities affecting pt’s functional performance include a significant PMH of: quadriparesis, neurogenic orthostatic hypotension, cauda equina syndrome, s/p insertion of spinal cord stimulator, s/p lumbar spine fusion, hx colonoscopy, weakness of R LE, ambulatory dysfunction, myelomalacia of cervical cord.  Patient with active OT orders and activity orders for Up and OOB as tolerated .   Family/Caregiver Present Yes  (son, friend, caregiver)   Subjective   Subjective  "Agreeable to OT/PT co-eval. \"This is not my usual.\" \"My caregiver knows how to help me in a certain way.\"   ADL   Where Assessed Edge of bed   Eating Assistance 5  Supervision/Setup   Grooming Assistance 4  Minimal Assistance   UB Bathing Assistance 3  Moderate Assistance   LB Bathing Assistance 2  Maximal Assistance   UB Dressing Assistance 2  Maximal Assistance   LB Dressing Assistance 1  Total Assistance   Toileting Assistance  1  Total Assistance   Bed Mobility   Supine to Sit 1  Dependent   Additional items Assist x 2   Sit to Supine 2  Maximal assistance   Additional items Assist x 2   Additional Comments Assistance provided by caregiver and pt family/friend per pt request.   Transfers   Sit to Stand 2  Maximal assistance   Additional items Assist x 2;Increased time required;Verbal cues;Other  (RW, bed ht elevated)   Stand to Sit 3  Moderate assistance   Additional items Assist x 2;Increased time required;Verbal cues;Bed elevated;Other  (RW)   Additional Comments Assistance provided by caregiver and pt family/friend per pt request. use of gait belt.   Functional Mobility   Additional Comments Declines.   Balance   Static Sitting Poor +   Static Standing Fair -   Activity Tolerance   Activity Tolerance Patient limited by fatigue;Patient limited by pain;Treatment limited secondary to medical complications (Comment);Other (Comment)  (increased tone, pt discretion)   Medical Staff Made Aware Regla PT   Nurse Made Aware Cheryl S[place]Y   RUE Assessment   RUE Assessment X  (3-/5 shoulder, 3/5 elbow grossly, ~110 flex noted of shoulder)   LUE Assessment   LUE Assessment X  (3-/5 shoulder, 3/5 elbow grossly, ~110 flex noted of shoulder)   Hand Function   Gross Motor Coordination Impaired   Fine Motor Coordination Impaired   Sensation   Light Touch Severe deficits in the RLE;Severe deficits in the LLE   Proprioception   Proprioception No apparent deficits;Severe deficits in the RLE;Severe deficits in the LLE;Partial " deficits in the LUE   Vision - Complex Assessment   Ocular Range of Motion Intact   Acuity Able to read clock/calendar on wall without difficulty;Able to read employee name badge without difficulty   Perception   Inattention/Neglect Appears intact   Cognition   Overall Cognitive Status WFL   Arousal/Participation Alert;Responsive   Attention Within functional limits   Memory Within functional limits   Following Commands Follows all commands and directions without difficulty   Assessment   Limitation Decreased ADL status;Decreased UE strength;Decreased Safe judgement during ADL;Decreased endurance;Decreased high-level ADLs;Decreased self-care trans;Decreased fine motor control;Decreased sensation;Decreased UE ROM  (dec balance, functional reach)   Prognosis Fair   Assessment Patient is a 63 y.o. year old male seen for OT eval s/p admit to [facility] on 5/16/2024 with weakness.  OT consulted to assess ADLs/IADLs/functional mobility and assist w/ D/C planning. Patient demonstrates the following deficits impacting occupational performance: decreased UE ROM, decreased strength , decreased balance, decreased activity tolerance, limited functional reach, impaired GMC, impaired FMC, impaired sensation, decreased safety awareness, increased pain, dizziness, mood/behavioral limitations , impaired coordination, decreased postural control, spasticity , decreased cardiovascular endurance, and decreased skin integrity . These impairments, as well at pt’s limited home support, difficulty performing ADLs, difficulty performing IADLs, difficulty performing transfers/mobility, fall risk , functional decline , increased reliance on DME , multiple admissions , and Inability to perform current job functions , limit pt’s ability to safely engage in all baseline areas of occupation. Pt's CLOF as follows: eating/grooming: supervision  and Gregoria, UB ADLs: modA and maxA, LB ADLs: maxA and dependent, toileting: dependent, bed mobility: maxAx2 and  depx2, functional transfers: modAx2 and maxAx2, functional mobility: DNT, standing tolerance: 10 min w/ Fair- balance, b/l UE support. Pt would benefit from continued skilled OT while in acute setting to address deficits as defined above and to maximize (I) w/ ADLs/functional mobility. Occupational performance areas to address include: eating, grooming, bathing/shower, toilet hygiene, dressing, health maintenance, functional mobility, clothing management, cleaning, meal prep, and household maintenance. Based on the aforementioned evaluation, functional performance deficits, and assessments, pt has been identified as a high complexity evaluation. At this time, recommendation for pt to receive post-acute rehabilitation services at a Level II (moderate resource intensity) due to above deficits and CLOF. OT will continue to follow pt 2-5x/wk to address the goals listed below to  w/in 10-14 days.   Goals   Patient Goals to return home, to regain strength   LTG Time Frame 10-14   Plan   Treatment Interventions ADL retraining;Functional transfer training;UE strengthening/ROM;Endurance training;Cognitive reorientation;Patient/family training;Equipment evaluation/education;Neuromuscular reeducation;Compensatory technique education;Continued evaluation;Energy conservation;Activityengagement   Goal Expiration Date 24   OT Treatment Day 0   OT Frequency 3-5x/wk;2-3x/wk   Discharge Recommendation   Rehab Resource Intensity Level, OT II (Moderate Resource Intensity)  (pending progress/support at home)   AM-PAC Daily Activity Inpatient   Lower Body Dressing 1   Bathing 2   Toileting 1   Upper Body Dressing 2   Grooming 3   Eating 3   Daily Activity Raw Score 12   Daily Activity Standardized Score (Calc for Raw Score >=11) 30.6   AM-PAC Applied Cognition Inpatient   Following a Speech/Presentation 4   Understanding Ordinary Conversation 4   Taking Medications 4   Remembering Where Things Are Placed or Put Away 4    Remembering List of 4-5 Errands 4   Taking Care of Complicated Tasks 4   Applied Cognition Raw Score 24   Applied Cognition Standardized Score 62.21

## 2024-05-17 NOTE — PLAN OF CARE
Problem: Potential for Falls  Goal: Patient will remain free of falls  Description: INTERVENTIONS:  - Educate patient/family on patient safety including physical limitations  - Instruct patient to call for assistance with activity   - Consult OT/PT to assist with strengthening/mobility   - Keep Call bell within reach  - Keep bed low and locked with side rails adjusted as appropriate  - Keep care items and personal belongings within reach  - Initiate and maintain comfort rounds  - Make Fall Risk Sign visible to staff  - Offer Toileting every 2 Hours, in advance of need  - Initiate/Maintain bed alarm  - Obtain necessary fall risk management equipment  - Apply yellow socks and bracelet for high fall risk patients  - Consider moving patient to room near nurses station  Outcome: Progressing     Problem: Prexisting or High Potential for Compromised Skin Integrity  Goal: Skin integrity is maintained or improved  Description: INTERVENTIONS:  - Identify patients at risk for skin breakdown  - Assess and monitor skin integrity  - Assess and monitor nutrition and hydration status  - Monitor labs   - Assess for incontinence   - Turn and reposition patient  - Assist with mobility/ambulation  - Relieve pressure over bony prominences  - Avoid friction and shearing  - Provide appropriate hygiene as needed including keeping skin clean and dry  - Evaluate need for skin moisturizer/barrier cream  - Collaborate with interdisciplinary team   - Patient/family teaching  - Consider wound care consult   Outcome: Progressing     Problem: PAIN - ADULT  Goal: Verbalizes/displays adequate comfort level or baseline comfort level  Description: Interventions:  - Encourage patient to monitor pain and request assistance  - Assess pain using appropriate pain scale  - Administer analgesics based on type and severity of pain and evaluate response  - Implement non-pharmacological measures as appropriate and evaluate response  - Consider cultural and  social influences on pain and pain management  - Notify physician/advanced practitioner if interventions unsuccessful or patient reports new pain  Outcome: Progressing     Problem: NEUROSENSORY - ADULT  Goal: Achieves stable or improved neurological status  Description: INTERVENTIONS  - Monitor and report changes in neurological status  - Monitor vital signs such as temperature, blood pressure, glucose, and any other labs ordered   - Initiate measures to prevent increased intracranial pressure  - Monitor for seizure activity and implement precautions if appropriate      Outcome: Progressing  Goal: Achieves maximal functionality and self care  Description: INTERVENTIONS  - Monitor swallowing and airway patency with patient fatigue and changes in neurological status  - Encourage and assist patient to increase activity and self care.   - Encourage visually impaired, hearing impaired and aphasic patients to use assistive/communication devices  Outcome: Progressing     Problem: GENITOURINARY - ADULT  Goal: Maintains or returns to baseline urinary function  Description: INTERVENTIONS:  - Assess urinary function  - Encourage oral fluids to ensure adequate hydration if ordered  - Administer IV fluids as ordered to ensure adequate hydration  - Administer ordered medications as needed  - Offer frequent toileting  - Follow urinary retention protocol if ordered  Outcome: Progressing  Goal: Urinary catheter remains patent  Description: INTERVENTIONS:  - Assess patency of urinary catheter  - If patient has a chronic rodriguez, consider changing catheter if non-functioning  - Follow guidelines for intermittent irrigation of non-functioning urinary catheter  Outcome: Progressing

## 2024-05-17 NOTE — PLAN OF CARE
Problem: Potential for Falls  Goal: Patient will remain free of falls  Description: INTERVENTIONS:  - Educate patient/family on patient safety including physical limitations  - Instruct patient to call for assistance with activity   - Consult OT/PT to assist with strengthening/mobility   - Keep Call bell within reach  - Keep bed low and locked with side rails adjusted as appropriate  - Keep care items and personal belongings within reach  - Initiate and maintain comfort rounds  - Make Fall Risk Sign visible to staff  - Offer Toileting every 2 Hours, in advance of need  - Initiate/Maintain bed alarm  - Obtain necessary fall risk management equipment: yellow socks  - Apply yellow socks and bracelet for high fall risk patients  - Consider moving patient to room near nurses station  Outcome: Progressing

## 2024-05-17 NOTE — ASSESSMENT & PLAN NOTE
Beard catheter placed in the emergency room for concern of urinary retention  Follow up urine culture

## 2024-05-17 NOTE — CONSULTS
Consultation - Neurology   Jos Hawk 63 y.o. male MRN: [id number]  Unit/Bed#: E5 -01 Encounter: [id number]      Assessment & Plan   63-year-old male with extensive spinal history:  2016: Lumbar spine surgery  2018: Spinal cord stimulator placement  : Fall with incomplete quadriparesis, MRI imaging noted cervical myelomalacia at C3-C4  Additionally carries history of autonomic dysfunction on midodrine (prior episodes of syncope related to such).    Seen in the neuromuscular office earlier this week for follow-up: Describes approximately 6-week duration of worsening gait, lower extremity weakness with a significant increase in tone.    Was started on Cymbalta and Robaxin in the office this week (with plans to taper off BuSpar); beginning Wednesday night into yesterday morning, patient felt hot/flushed, and blood pressure at home was significantly elevated yesterday morning (SBP 210s) thus prompting ER evaluation.    Recent spinal imagin/3: MRI cervical spine: Stable Short segment severe cord atrophy with myelomalacia at C3-4; prior decompressive laminectomies at C3-C6  5/3: MRI lumbar spine: Suboptimal assessment due to motion and surgical hardware given thoracic spinal cord stimulator, stable postsurgical changes at L2-S1    Plan:  -Confirmed with attending, no need for additional neuroimaging/diagnostics as patient has not had any acute worsening or changes of his motor/sensory symptoms nor abrupt changes in tone  -Zanaflex recently discontinued  -Robaxin ordered, 500 mg up to 4x daily  -For now will continue on BuSpar 15 mg every morning for the next approximately 2 weeks; will hold Cymbalta now out of concern for possible adverse effect on Wednesday night (after starting the medication)  -Supportive care  -Therapy evaluations  -Ongoing metabolic/infectious workup per primary team:   -UA not suggestive of UTI although urine culture pending    Discussed plan of care with attending  neurologist.    With outpatient neuromuscular team follow-up scheduled for August 14th.    History of Present Illness     Reason for Consult / Principal Problem: LE weakness, pain, urinary changes, history of significant spinal disease.     HPI: Jos Hawk is a 63 y.o. male with history as mentioned above in assessment who neurology is asked to evaluate in regards to the above.    He presented to the ED yesterday   After he was seen in the outpatient neuromuscular team office on Wednesday.  At that time he was on Robaxin and Cymbalta advised to  start tapering off BuSpar.  On Wednesday night he felt hot and warm, which his caregiver states has been the case for the past few weeks.  However yesterday morning his blood pressure was checked by caregiver and noted to be significantly high at 210.  Given this, he came to the ED yesterday for evaluation and workup.  He also over the past approximately 3 days noticed diminished urine output compared to normal (however no gross incontinence nor dysuria nor malodorous urine).    Of note patient was just seen by the outpatient neuromuscular team and follow-up on Wednesday, May 15th.  Reviewed Dr. Leyva's note in detail, as well as recent notes from [facility] rehab/physiatry: Ultimately patient carries history of significant lumbar disease with prior cervical and lumbar surgeries.  Unfortunately in late 2022 while on vacation, he sustained a fall with subsequent incomplete quadriparesis and identification of severe cervical cord injury/atrophy and myelomalacia.     During his appointment earlier this week (and confirms today), he voiced concerns of 5 to 6 weeks of worsening gait, increased tone in his lower extremities and ongoing pain (before that he was ambulatory with assistance of a roller walker being several hundred feet in one session and transferring without significant issue).  He did receive Botox in February 2024 via [facility] physiatry, which was  ultimately not effective (as he noticed an increase in weakness and tone in his legs approximately 5 weeks after)    He also carries history of autonomic dysfunction and is on midodrine. He was on Zanaflex for muscle relaxant,  but has noted with this cognitive troubles and somnolence.  During his appointment few days ago, it was recommended he transition from Zanaflex to Robaxin; forthcoming neurosurgery evaluation is also pending as patient is interested in thoracic spinal cord stimulator removal.  He had recent EMG performed at [facility] (demonstrating radiculopathy/polyneuropathy).     Neuro exam a few days ago noted 2/5 hip flexion bilaterally, 0 out of 5 flexion and plantarflexion bilaterally.  Significant hypertonicity in the lower extremity/paraspinal muscles (R >L)    Consult to neurology  Consult performed by: Travis Valles PA-C  Consult ordered by: Atilio Oliver MD          Review of Systems   Constitutional:  Negative for chills, diaphoresis, fatigue and fever.   HENT:  Negative for hearing loss, tinnitus, trouble swallowing and voice change.    Eyes:  Negative for visual disturbance.   Respiratory: Negative.     Cardiovascular: Negative.    Gastrointestinal:  Negative for nausea and vomiting.   Genitourinary:  Positive for frequency and urgency. Negative for difficulty urinating, dysuria and hematuria.   Musculoskeletal:  Positive for back pain, gait problem and joint swelling. Negative for arthralgias, myalgias, neck pain and neck stiffness.   Skin:  Positive for color change.        Upper and lower extremity edema/discoloration   Neurological:  Positive for weakness. Negative for dizziness, tremors, seizures, syncope, facial asymmetry, speech difficulty, light-headedness, numbness and headaches.        Increase in extremity tone       Historical Information   Past Medical History:   Diagnosis Date    Balance problems     BPH (benign prostatic hyperplasia)     Chronic back pain     Chronic  pain disorder     COVID-19     2/2021-asymptomatic     Erectile dysfunction     Nocturia     OAB (overactive bladder)     Testicular hypogonadism     Urinary frequency     Urinary urgency      Past Surgical History:   Procedure Laterality Date    BACK SURGERY  05/18/2016    with hardware    BACK SURGERY      2 back surgery unable to remove hardware    COLONOSCOPY  11/2004    Dr Taylor. tubular adenoma polyp removed    COLONOSCOPY  12/2007    Dr Cameron. Normal    COLONOSCOPY  07/2013    Dr Cameron. Normal.     COLONOSCOPY  06/2019    Dr Cameron. Hemorrhoids, normal.     NECK SURGERY      decompression of neck     NECK SURGERY Right     scar tissues removed    AZ EXCISION HYDROCELE UNILATERAL Right 06/10/2021    Procedure: HYDROCELECTOMY;  Surgeon: Dereck Goddard DO;  Location: [facility];  Service: Urology    SPINAL CORD STIMULATOR IMPLANT  10/10/2019    WOUND DEBRIDEMENT Right 08/10/2023    Procedure: DEBRIDEMENT UPPER EXTREMITY (WASH OUT) - elbow;  Surgeon: Jesse Avendaño MD;  Location: [facility];  Service: Orthopedics     Social History   Social History     Substance and Sexual Activity   Alcohol Use Not Currently    Alcohol/week: 4.0 - 7.0 standard drinks of alcohol    Types: 4 - 7 Glasses of wine per week    Comment: Just weekends     Social History     Substance and Sexual Activity   Drug Use No     E-Cigarette/Vaping    E-Cigarette Use Never User      E-Cigarette/Vaping Substances    Nicotine No     THC No     CBD No     Flavoring No     Other No     Unknown No      Social History     Tobacco Use   Smoking Status Never   Smokeless Tobacco Never     Family History: History reviewed. No pertinent family history.    Review of previous medical records was completed.    Meds/Allergies   current meds:   Current Facility-Administered Medications   Medication Dose Route Frequency    acetaminophen (TYLENOL) tablet 650 mg  650 mg Oral Q4H PRN    atorvastatin (LIPITOR) tablet 20 mg  20 mg Oral Daily With Dinner     "busPIRone (BUSPAR) tablet 15 mg  15 mg Oral Daily    DULoxetine (CYMBALTA) delayed release capsule 60 mg  60 mg Oral Daily    enoxaparin (LOVENOX) subcutaneous injection 40 mg  40 mg Subcutaneous Daily    methocarbamol (ROBAXIN) tablet 500 mg  500 mg Oral 4x Daily    midodrine (PROAMATINE) tablet 5 mg  5 mg Oral TID AC    oxyCODONE (ROXICODONE) IR tablet 5 mg  5 mg Oral Q4H PRN    Or    oxyCODONE (ROXICODONE) immediate release tablet 10 mg  10 mg Oral Q4H PRN    sodium chloride 0.9 % infusion  75 mL/hr Intravenous Continuous    and PTA meds:   Prior to Admission Medications   Prescriptions Last Dose Informant Patient Reported? Taking?   ALPRAZolam (XANAX) 0.5 mg tablet   No No   Sig: Take 1 tablet (0.5 mg total) by mouth daily at bedtime as needed for anxiety   Cholecalciferol 50 MCG (2000 UT) TABS  Self Yes No   Si mcg   DULoxetine (CYMBALTA) 60 mg delayed release capsule   No Yes   Sig: Take 1 capsule (60 mg total) by mouth daily   Elastic Bandages & Supports (Wrap/Multipurpose 2.75\"x21.4yd) MISC  Self No No   Sig: Use in the morning Please dispense lymphedema wrap and wrap from hand up to the proximal shoulder   NON FORMULARY  Self Yes No   Sig: COMPRILAN BANDAGE [id number]   Patient not taking: Reported on 11/10/2023   atorvastatin (LIPITOR) 40 mg tablet  Self Yes No   Sig: TAKE 20MG (ONE-HALF TABLET) BY MOUTH EVERY DAY FOR CHOLESTEROL   bisacodyl (DULCOLAX) 10 mg suppository  Self No No   Sig: Insert 1 suppository (10 mg total) into the rectum daily as needed for constipation   Patient not taking: Reported on 5/15/2024   busPIRone (BUSPAR) 15 mg tablet   Yes Yes   Sig: Take 15 mg by mouth in the morning   clobetasol (TEMOVATE) 0.05 % external solution  Self Yes No   Patient not taking: Reported on 5/15/2024   docusate sodium (COLACE) 100 mg capsule  Self No No   Sig: Take 1 capsule (100 mg total) by mouth 2 (two) times a day   Patient not taking: Reported on 2024   fluocinonide (LIDEX) 0.05 % " cream  Self Yes No   folic acid (FOLVITE) 1 mg tablet  Self Yes No   Sig: Take 1 tablet (1mg) by mouth Once Daily.   ibuprofen (MOTRIN) 400 mg tablet  Self No No   Sig: Take 1 tablet (400 mg total) by mouth every 6 (six) hours as needed for mild pain   ibuprofen (MOTRIN) 600 mg tablet  Self No No   Sig: Take 1 tablet (600 mg total) by mouth every 6 (six) hours as needed for mild pain or moderate pain   ketoconazole (NIZORAL) 2 % shampoo  Self Yes No   lidocaine (LIDODERM) 5 %  Self Yes No   Sig: APPLY 2 PATCH TOPICALLY DAILY FOR PAIN (REMOVE PATCH AFTER 12 HOURS; 12 HOURS ON AND 12 HOURS OFF)   Patient not taking: Reported on 11/10/2023   meloxicam (MOBIC) 15 mg tablet  Self Yes No   Si.5 mg   Patient not taking: Reported on 2024   methocarbamol (ROBAXIN) 500 mg tablet   No No   Sig: Take 1 tablet (500 mg total) by mouth 4 (four) times a day   midodrine (PROAMATINE) 5 mg tablet  Self No No   Sig: Take 1 tablet (5 mg total) by mouth 3 (three) times a day before meals   naloxone (NARCAN) 4 mg/0.1 mL nasal spray  Self No No   Sig: Administer 1 spray into a nostril. If no response after 2-3 minutes, give another dose in the other nostril using a new spray.   nystatin powder  Self Yes No   Patient not taking: Reported on 2024   oxyCODONE (ROXICODONE) 5 immediate release tablet  Self No No   Sig: Take 1 tablet (5 mg) for moderate pain every 6 hours as needed or take 2 tablets (10 mg) for severe pain every 6 hours as needed.   oxyCODONE (Roxicodone) 5 immediate release tablet  Self No No   Sig: Take 1 tablet (5 mg total) by mouth every 4 (four) hours as needed for moderate pain Max Daily Amount: 30 mg   sildenafil (VIAGRA) 100 mg tablet  Self Yes No   Sig: Take 100 mg by mouth As directed   testosterone cypionate (DEPO-TESTOSTERONE) 200 mg/mL SOLN  Self Yes No   Sig: inject 2 MILLILITERS intramuscularly every 14 days      Facility-Administered Medications: None       No Known Allergies    Objective    Vitals:Blood pressure 119/68, pulse 81, temperature 97.7 °F (36.5 °C), temperature source Oral, resp. rate 18, height 6' (1.829 m), weight 114 kg (251 lb 5.2 oz), SpO2 91%.,Body mass index is 34.09 kg/m².    Intake/Output Summary (Last 24 hours) at 5/17/2024 0825  Last data filed at 5/16/2024 2201  Gross per 24 hour   Intake 480 ml   Output --   Net 480 ml       Invasive Devices:   Invasive Devices       Peripheral Intravenous Line  Duration             Peripheral IV 05/16/24 Left Antecubital <1 day                    Physical Exam  Constitutional:       Appearance: Normal appearance.   HENT:      Head: Normocephalic and atraumatic.   Eyes:      Extraocular Movements: Extraocular movements intact and EOM normal.      Conjunctiva/sclera: Conjunctivae normal.      Pupils: Pupils are equal, round, and reactive to light.   Cardiovascular:      Rate and Rhythm: Normal rate.   Pulmonary:      Effort: Pulmonary effort is normal.   Abdominal:      General: There is no distension.   Musculoskeletal:      Cervical back: Normal range of motion and neck supple.      Right lower leg: Edema present.      Left lower leg: Edema present.      Comments: Edematous lower extremities, slight upper extremities as well, discolored distal extremities   Neurological:      Mental Status: He is alert and oriented to person, place, and time.      Coordination: Finger-Nose-Finger Test normal.      Deep Tendon Reflexes:      Reflex Scores:       Bicep reflexes are 2+ on the right side and 2+ on the left side.       Brachioradialis reflexes are 2+ on the right side and 2+ on the left side.       Patellar reflexes are 2+ on the right side and 2+ on the left side.       Achilles reflexes are 1+ on the right side and 1+ on the left side.  Psychiatric:         Speech: Speech normal.       Neurologic Exam     Mental Status   Oriented to person, place, and time.   Follows 2 step commands.   Attention: normal. Concentration: normal.   Speech: speech  is normal   Normal comprehension.     Cranial Nerves     CN II   Visual fields full to confrontation.     CN III, IV, VI   Pupils are equal, round, and reactive to light.  Extraocular motions are normal.     CN V   Facial sensation intact.     CN VII   Facial expression full, symmetric.     CN VIII   CN VIII normal.     CN IX, X   CN IX normal.   CN X normal.     CN XI   CN XI normal.     CN XII   CN XII normal.     Motor Exam   Right arm tone: increased  Left arm tone: increased  Right leg tone: increased  Left leg tone: increasedIncreased tone throughout, lowers worse than uppers.    At least 4/5  strength/bicep/tricep bilaterally maintains deltoid/upper extremities antigravity.  Although limited range of motion with cervical injury.    Hip abduction/abduction is at least 3/5.  Hip flexion approximately 2/5 right leg slightly stronger than left.    Bilateral dorsiflexion approximately 1-2/5, plantarflexion 3/5 with best effort.      Sensory Exam     Impaired proprioception in the left toe compared to right, sharp pinprick sensation throughout the proximal lower extremities, slightly diminished distally towards the feet/ankle.    Vibration diminished in the distal lower extremities bilaterally.  Temperature sensation cool throughout.       Gait, Coordination, and Reflexes     Coordination   Finger to nose coordination: normal    Tremor   Resting tremor: absent  Intention tremor: absent    Reflexes   Right brachioradialis: 2+  Left brachioradialis: 2+  Right biceps: 2+  Left biceps: 2+  Right patellar: 2+  Left patellar: 2+  Right achilles: 1+  Left achilles: 1+  Right plantar: upgoing  Left plantar: upgoing  Gait cannot be safely assessed.        Lab Results: CBC:   Results from last 7 days   Lab Units 05/17/24  0614 05/16/24  1428   WBC Thousand/uL 8.29 6.97   RBC Million/uL 5.46 5.88*   HEMOGLOBIN g/dL 17.1* 18.8*   HEMATOCRIT % 51.7* 56.8*   MCV fL 95 97   PLATELETS Thousands/uL 144* 182   , BMP/CMP:    Results from last 7 days   Lab Units 05/17/24  0614 05/16/24  1428   SODIUM mmol/L 134* 133*   POTASSIUM mmol/L 4.0 3.8   CHLORIDE mmol/L 98 96   CO2 mmol/L 30 31   BUN mg/dL 15 17   CREATININE mg/dL 0.64 0.77   CALCIUM mg/dL 8.5 8.9   AST U/L 16 21   ALT U/L 15 19   ALK PHOS U/L 55 74   EGFR ml/min/1.73sq m 102 95   , Vitamin B12:   , HgBA1C:   , TSH:   Results from last 7 days   Lab Units 05/16/24  1428   TSH 3RD GENERATON uIU/mL 5.396*   , Coagulation:   , Lipid Profile:   , Ammonia:   , Urinalysis:   Results from last 7 days   Lab Units 05/16/24  1404   COLOR UA  Yellow   CLARITY UA  Clear   SPEC GRAV UA  1.024   PH UA  7.0   LEUKOCYTES UA  Negative   NITRITE UA  Negative   GLUCOSE UA mg/dl Negative   KETONES UA mg/dl 20 (1+)*   BILIRUBIN UA  Negative   BLOOD UA  Negative     Imaging Studies: I have personally reviewed pertinent films in PACS  CT head without contrast   Final Result by Bandar Vega MD (05/16 1621)      No acute intracranial abnormality.                  Workstation performed: [id number]             EKG, Pathology, and Other Studies: I have personally reviewed pertinent reports.      VTE Prophylaxis: Sequential compression device (Venodyne)  and Enoxaparin (Lovenox)    Code Status: Level 1 - Full Code  Please see attending's attestation for total time spent/billing.     Please note dictation software was used in the formulation of this note.  Please keep that in mind in light of any grammatical errors.

## 2024-05-17 NOTE — PLAN OF CARE
Problem: PHYSICAL THERAPY ADULT  Goal: Performs mobility at highest level of function for planned discharge setting.  See evaluation for individualized goals.  Description: Treatment/Interventions: Functional transfer training, LE strengthening/ROM, Therapeutic exercise, Patient/family training, Equipment eval/education, Bed mobility, Gait training, Compensatory technique education, Continued evaluation, Spoke to nursing, OT, Family          See flowsheet documentation for full assessment, interventions and recommendations.  Note: Prognosis: Fair  Problem List: Decreased strength, Impaired balance, Decreased mobility, Decreased safety awareness, Impaired tone, Obesity, Pain  Assessment: Jos Hawk is a 63 y.o. male admitted to [facility] on 5/16/[facility]24 for Weakness. PT was consulted and pt was seen on 5/17/2024 for mobility assessment and d/c planning. Pt presents w fall risk, multiple lines, pain. At baseline gets A for ADLs, IADLs and mobility. Reports normally able to walk a few feet around the house however lately reliant soley on wc. Pt is currently functioning at a mod-dep Ax2 for bed mobility and transfers, with functional assist provided by caregiver and family friend per patient request. Therapists present for safety and additional assist prn. Pt demonstrated impaired mobility secondary to pain and increased tone. Tolerated static standing well but decline gait trial at this time. Pt will benefit from continued skilled IP PT to address the above mentioned impairments  in order to maximize recovery and increase functional independence when completing mobility and ADLs. The patient's AM-PAC Basic Mobility Inpatient Short Form Raw Score is 6. A Raw score of less than or equal to 16 suggests the patient may benefit from discharge to post-acute rehabilitation services. Pt does have adequate social support at home (from caregiver and addition of son and friend if needed).  Barriers to Discharge: None     Rehab  Resource Intensity Level, PT: II (Moderate Resource Intensity)    See flowsheet documentation for full assessment.

## 2024-05-18 LAB
ALBUMIN SERPL BCP-MCNC: 3.5 G/DL (ref 3.5–5)
ALP SERPL-CCNC: 55 U/L (ref 34–104)
ALT SERPL W P-5'-P-CCNC: 14 U/L (ref 7–52)
ANION GAP SERPL CALCULATED.3IONS-SCNC: 5 MMOL/L (ref 4–13)
AST SERPL W P-5'-P-CCNC: 14 U/L (ref 13–39)
BILIRUB SERPL-MCNC: 0.79 MG/DL (ref 0.2–1)
BUN SERPL-MCNC: 10 MG/DL (ref 5–25)
CALCIUM SERPL-MCNC: 8.6 MG/DL (ref 8.4–10.2)
CHLORIDE SERPL-SCNC: 102 MMOL/L (ref 96–108)
CO2 SERPL-SCNC: 32 MMOL/L (ref 21–32)
CREAT SERPL-MCNC: 0.67 MG/DL (ref 0.6–1.3)
ERYTHROCYTE [DISTWIDTH] IN BLOOD BY AUTOMATED COUNT: 13.2 % (ref 11.6–15.1)
GFR SERPL CREATININE-BSD FRML MDRD: 100 ML/MIN/1.73SQ M
GLUCOSE SERPL-MCNC: 78 MG/DL (ref 65–140)
HCT VFR BLD AUTO: 51.2 % (ref 36.5–49.3)
HGB BLD-MCNC: 17.5 G/DL (ref 12–17)
MAGNESIUM SERPL-MCNC: 1.8 MG/DL (ref 1.9–2.7)
MCH RBC QN AUTO: 32.3 PG (ref 26.8–34.3)
MCHC RBC AUTO-ENTMCNC: 34.2 G/DL (ref 31.4–37.4)
MCV RBC AUTO: 95 FL (ref 82–98)
PLATELET # BLD AUTO: 161 THOUSANDS/UL (ref 149–390)
PMV BLD AUTO: 10.9 FL (ref 8.9–12.7)
POTASSIUM SERPL-SCNC: 4.3 MMOL/L (ref 3.5–5.3)
PROT SERPL-MCNC: 5.5 G/DL (ref 6.4–8.4)
RBC # BLD AUTO: 5.41 MILLION/UL (ref 3.88–5.62)
SODIUM SERPL-SCNC: 139 MMOL/L (ref 135–147)
WBC # BLD AUTO: 7.05 THOUSAND/UL (ref 4.31–10.16)

## 2024-05-18 PROCEDURE — 80053 COMPREHEN METABOLIC PANEL: CPT | Performed by: PHYSICIAN ASSISTANT

## 2024-05-18 PROCEDURE — 85027 COMPLETE CBC AUTOMATED: CPT | Performed by: PHYSICIAN ASSISTANT

## 2024-05-18 PROCEDURE — 83735 ASSAY OF MAGNESIUM: CPT | Performed by: PHYSICIAN ASSISTANT

## 2024-05-18 PROCEDURE — 99232 SBSQ HOSP IP/OBS MODERATE 35: CPT | Performed by: PHYSICIAN ASSISTANT

## 2024-05-18 RX ORDER — SODIUM PHOSPHATE,MONO-DIBASIC 19G-7G/118
1 ENEMA (ML) RECTAL DAILY PRN
Status: DISCONTINUED | OUTPATIENT
Start: 2024-05-18 | End: 2024-05-23 | Stop reason: HOSPADM

## 2024-05-18 RX ORDER — MAGNESIUM SULFATE HEPTAHYDRATE 40 MG/ML
2 INJECTION, SOLUTION INTRAVENOUS ONCE
Status: COMPLETED | OUTPATIENT
Start: 2024-05-18 | End: 2024-05-18

## 2024-05-18 RX ORDER — BUSPIRONE HYDROCHLORIDE 5 MG/1
5 TABLET ORAL 2 TIMES DAILY
Status: DISCONTINUED | OUTPATIENT
Start: 2024-05-18 | End: 2024-05-23 | Stop reason: HOSPADM

## 2024-05-18 RX ORDER — BUSPIRONE HYDROCHLORIDE 5 MG/1
5 TABLET ORAL 3 TIMES DAILY
Status: DISCONTINUED | OUTPATIENT
Start: 2024-05-18 | End: 2024-05-18

## 2024-05-18 RX ORDER — HYDROXYZINE HYDROCHLORIDE 25 MG/1
25 TABLET, FILM COATED ORAL ONCE
Status: COMPLETED | OUTPATIENT
Start: 2024-05-18 | End: 2024-05-18

## 2024-05-18 RX ADMIN — ATORVASTATIN CALCIUM 20 MG: 20 TABLET, FILM COATED ORAL at 15:59

## 2024-05-18 RX ADMIN — METHOCARBAMOL 500 MG: 500 TABLET ORAL at 18:09

## 2024-05-18 RX ADMIN — BUSPIRONE HYDROCHLORIDE 5 MG: 5 TABLET ORAL at 18:09

## 2024-05-18 RX ADMIN — OXYCODONE HYDROCHLORIDE 10 MG: 10 TABLET ORAL at 10:39

## 2024-05-18 RX ADMIN — BUSPIRONE HYDROCHLORIDE 5 MG: 5 TABLET ORAL at 08:59

## 2024-05-18 RX ADMIN — SODIUM PHOSPHATE 1 ENEMA: 7; 19 ENEMA RECTAL at 16:01

## 2024-05-18 RX ADMIN — HYDROXYZINE HYDROCHLORIDE 25 MG: 25 TABLET ORAL at 00:39

## 2024-05-18 RX ADMIN — METHOCARBAMOL 500 MG: 500 TABLET ORAL at 11:16

## 2024-05-18 RX ADMIN — BISACODYL 10 MG: 10 SUPPOSITORY RECTAL at 13:55

## 2024-05-18 RX ADMIN — METHOCARBAMOL 500 MG: 500 TABLET ORAL at 08:58

## 2024-05-18 RX ADMIN — MIDODRINE HYDROCHLORIDE 5 MG: 5 TABLET ORAL at 11:16

## 2024-05-18 RX ADMIN — OXYCODONE HYDROCHLORIDE 10 MG: 10 TABLET ORAL at 20:08

## 2024-05-18 RX ADMIN — MAGNESIUM SULFATE HEPTAHYDRATE 2 G: 40 INJECTION, SOLUTION INTRAVENOUS at 09:06

## 2024-05-18 RX ADMIN — METHOCARBAMOL 500 MG: 500 TABLET ORAL at 21:18

## 2024-05-18 RX ADMIN — OXYCODONE HYDROCHLORIDE 10 MG: 10 TABLET ORAL at 16:02

## 2024-05-18 RX ADMIN — MIDODRINE HYDROCHLORIDE 5 MG: 5 TABLET ORAL at 06:23

## 2024-05-18 RX ADMIN — OXYCODONE HYDROCHLORIDE 10 MG: 10 TABLET ORAL at 03:26

## 2024-05-18 RX ADMIN — MIDODRINE HYDROCHLORIDE 5 MG: 5 TABLET ORAL at 15:59

## 2024-05-18 RX ADMIN — HYDROXYZINE HYDROCHLORIDE 25 MG: 25 TABLET ORAL at 22:05

## 2024-05-18 RX ADMIN — ENOXAPARIN SODIUM 40 MG: 40 INJECTION SUBCUTANEOUS at 08:59

## 2024-05-18 NOTE — ASSESSMENT & PLAN NOTE
Takes midodrine 5 mg 3 times daily.  Placed hold parameters for SBP greater than 110.  BP significantly elevated at time of admission, question secondary to increased serotonin activity  BP stable since admission

## 2024-05-18 NOTE — PROGRESS NOTES
[facility]  Progress Note  Name: Jos Hawk I  MRN: [id number]  Unit/Bed#: E5 -01 I Date of Admission: 5/16/2024   Date of Service: 5/18/2024 I Hospital Day: 2    Assessment & Plan   * Weakness  Assessment & Plan  Patient with history of cervical spinal injury after a fall in 2018 resulting in quadriparesis  Recent MRI cervical spine severe cord atrophy with myomalacia related to signal abnormality at C3-4, prior decompressive laminectomies at C3-6, multilevel foraminal stenosis  Following outpatient with physiatry at [facility] and neurology and at [facility] neurology on the day prior to admission, changes noted: Duloxetine 60 mg a day and taper of BuSpar to 15 mg a day, methocarbamol 500 mg up to 4 times a day.  Did have Botox completed in February, reports no improvement, did have dental work done, placed on Percocet with improvement of his pain but no change in his progressive weakness  PT/OT consults, recommending level 2 rehab, does have home care and multiple outpatient therapies scheduled  Follow-up neurology evaluations-question component of increased serotonin activity with BuSpar and Cymbalta, discontinued Cymbalta, restart BuSpar at lower dose, 5 mg twice daily  Patient denies any improvement in with Botox injections, per neurology, may have induced weakness in the muscles injected decreasing his ability to work against his spasticity  Rule out metabolic causes, blood and urine cultures negative to date  Received IV fluid hydration overnight    Neurogenic orthostatic hypotension (HCC)  Assessment & Plan  Takes midodrine 5 mg 3 times daily.  Placed hold parameters for SBP greater than 110.  BP significantly elevated at time of admission, question secondary to increased serotonin activity  BP stable since admission    Quadriparesis (HCC)  Assessment & Plan  Chronic spinal disease status post spinal cord stimulator.  PT/OT consults, recommending rehab at time  of discharge  See above plan for weakness    BPH associated with nocturia  Assessment & Plan  Beard catheter placed in the emergency room for concern of urinary retention  Urine culture negative  Will attempt voiding trial tomorrow AM as long as he has a BM today              VTE Pharmacologic Prophylaxis:   High Risk (Score >/= 5) - Pharmacological DVT Prophylaxis Ordered: enoxaparin (Lovenox). Sequential Compression Devices Ordered.    Mobility:   Basic Mobility Inpatient Raw Score: 6  JH-HLM Goal: 2: Bed activities/Dependent transfer  JH-HLM Achieved: 2: Bed activities/Dependent transfer  JH-HLM Goal achieved. Continue to encourage appropriate mobility.    Patient Centered Rounds: I performed bedside rounds with nursing staff today.   Discussions with Specialists or Other Care Team Provider: None    Total Time Spent on Date of Encounter in care of patient: This time was spent on one or more of the following: performing physical exam; counseling and coordination of care; obtaining or reviewing history; documenting in the medical record; reviewing/ordering tests, medications or procedures; communicating with other healthcare professionals and discussing with patient's family/caregivers.    Current Length of Stay: 2 day(s)  Current Patient Status: Inpatient   Certification Statement: The patient will continue to require additional inpatient hospital stay due to muscle weakness  Discharge Plan: Anticipate discharge in 24-48 hrs to home.    Code Status: Level 1 - Full Code    Subjective:   Patient reports he continues to feel poorly.  Does report some improvement in his hot flashes and chills but continues to have significant muscle spasms.  Does report he has not had a bowel movement in 4 days.    Objective:     Vitals:   Temp (24hrs), Av.8 °F (36.6 °C), Min:97.6 °F (36.4 °C), Max:98.2 °F (36.8 °C)    Temp:  [97.6 °F (36.4 °C)-98.2 °F (36.8 °C)] 97.6 °F (36.4 °C)  HR:  [69-82] 69  Resp:  [18-19] 19  BP:  (115-136)/(75-84) 133/80  SpO2:  [95 %-99 %] 95 %  Body mass index is 34.09 kg/m².     Input and Output Summary (last 24 hours):     Intake/Output Summary (Last 24 hours) at 5/18/2024 1024  Last data filed at 5/18/2024 0933  Gross per 24 hour   Intake 360 ml   Output 3675 ml   Net -3315 ml       Physical Exam:   Physical Exam  Vitals reviewed.   Constitutional:       General: He is not in acute distress.  HENT:      Head: Normocephalic and atraumatic.   Eyes:      General: No scleral icterus.     Conjunctiva/sclera: Conjunctivae normal.   Cardiovascular:      Rate and Rhythm: Normal rate and regular rhythm.      Heart sounds: No murmur heard.  Pulmonary:      Effort: Pulmonary effort is normal.      Breath sounds: Normal breath sounds.   Abdominal:      General: Bowel sounds are normal. There is no distension.      Palpations: Abdomen is soft.      Tenderness: There is no abdominal tenderness.   Musculoskeletal:      Cervical back: Neck supple.      Right lower leg: No edema.      Left lower leg: No edema.   Skin:     General: Skin is warm and dry.   Neurological:      Mental Status: He is alert and oriented to person, place, and time.      Comments: Muscle weakness, muscle spasms noted throughout encounter   Psychiatric:         Mood and Affect: Mood normal.         Behavior: Behavior normal.          Additional Data:     Labs:  Results from last 7 days   Lab Units 05/18/24  0540 05/17/24  0614   WBC Thousand/uL 7.05 8.29   HEMOGLOBIN g/dL 17.5* 17.1*   HEMATOCRIT % 51.2* 51.7*   PLATELETS Thousands/uL 161 144*   SEGS PCT %  --  74   LYMPHO PCT %  --  12*   MONO PCT %  --  11   EOS PCT %  --  1     Results from last 7 days   Lab Units 05/18/24  0540   SODIUM mmol/L 139   POTASSIUM mmol/L 4.3   CHLORIDE mmol/L 102   CO2 mmol/L 32   BUN mg/dL 10   CREATININE mg/dL 0.67   ANION GAP mmol/L 5   CALCIUM mg/dL 8.6   ALBUMIN g/dL 3.5   TOTAL BILIRUBIN mg/dL 0.79   ALK PHOS U/L 55   ALT U/L 14   AST U/L 14   GLUCOSE RANDOM  mg/dL 78                 Results from last 7 days   Lab Units 05/17/24  0614 05/16/24  1428   LACTIC ACID mmol/L  --  1.0   PROCALCITONIN ng/ml <0.05  --        Lines/Drains:  Invasive Devices       Peripheral Intravenous Line  Duration             Peripheral IV 05/16/24 Left Antecubital 1 day    Peripheral IV 05/17/24 Dorsal (posterior);Left Forearm <1 day              Drain  Duration             Urethral Catheter Latex;Straight-tip 16 Fr. 1 day                  Urinary Catheter:  Goal for removal: Voiding trial when ambulation improves               Imaging: No pertinent imaging reviewed.    Recent Cultures (last 7 days):   Results from last 7 days   Lab Units 05/16/24  1436 05/16/24  1428 05/16/24  1404   BLOOD CULTURE  No Growth at 24 hrs. No Growth at 24 hrs.  --    URINE CULTURE   --   --  No Growth <1000 cfu/mL       Last 24 Hours Medication List:   Current Facility-Administered Medications   Medication Dose Route Frequency Provider Last Rate    acetaminophen  650 mg Oral Q4H PRN Jose Pruett MD      atorvastatin  20 mg Oral Daily With Dinner Jose Pruett MD      bisacodyl  10 mg Rectal Daily PRN Tati Paige PA-C      busPIRone  5 mg Oral BID Tati Paige PA-C      enoxaparin  40 mg Subcutaneous Daily Jose Pruett MD      magnesium sulfate  2 g Intravenous Once Tati Paige PA-C 2 g (05/18/24 0906)    methocarbamol  500 mg Oral 4x Daily Jose Pruett MD      midodrine  5 mg Oral TID AC Jose Pruett MD      oxyCODONE  5 mg Oral Q4H PRN Jose Pruett MD      Or    oxyCODONE  10 mg Oral Q4H PRN Jose Pruett MD      sodium chloride  75 mL/hr Intravenous Continuous Tati Paige PA-C 75 mL/hr (05/17/24 8832)        Today, Patient Was Seen By: Tati Paige PA-C    **Please Note: This note may have been constructed using a voice recognition system.**

## 2024-05-18 NOTE — ASSESSMENT & PLAN NOTE
Chronic spinal disease status post spinal cord stimulator.  PT/OT consults, recommending rehab at time of discharge  See above plan for weakness

## 2024-05-18 NOTE — ASSESSMENT & PLAN NOTE
Beard catheter placed in the emergency room for concern of urinary retention  Urine culture negative  Will attempt voiding trial tomorrow AM as long as he has a BM today

## 2024-05-18 NOTE — ASSESSMENT & PLAN NOTE
Patient with history of cervical spinal injury after a fall in 2018 resulting in quadriparesis  Recent MRI cervical spine severe cord atrophy with myomalacia related to signal abnormality at C3-4, prior decompressive laminectomies at C3-6, multilevel foraminal stenosis  Following outpatient with physiatry at [facility] and neurology and at [facility] neurology on the day prior to admission, changes noted: Duloxetine 60 mg a day and taper of BuSpar to 15 mg a day, methocarbamol 500 mg up to 4 times a day.  Did have Botox completed in February, reports no improvement, did have dental work done, placed on Percocet with improvement of his pain but no change in his progressive weakness  PT/OT consults, recommending level 2 rehab, does have home care and multiple outpatient therapies scheduled  Follow-up neurology evaluations-question component of increased serotonin activity with BuSpar and Cymbalta, discontinued Cymbalta, restart BuSpar at lower dose, 5 mg twice daily  Patient denies any improvement in with Botox injections, per neurology, may have induced weakness in the muscles injected decreasing his ability to work against his spasticity  Rule out metabolic causes, blood and urine cultures negative to date  Received IV fluid hydration overnight

## 2024-05-19 ENCOUNTER — APPOINTMENT (INPATIENT)
Dept: NON INVASIVE DIAGNOSTICS | Facility: HOSPITAL | Age: 64
End: 2024-05-19
Payer: COMMERCIAL

## 2024-05-19 ENCOUNTER — APPOINTMENT (INPATIENT)
Dept: CT IMAGING | Facility: HOSPITAL | Age: 64
End: 2024-05-19
Payer: COMMERCIAL

## 2024-05-19 PROBLEM — R65.10 SIRS (SYSTEMIC INFLAMMATORY RESPONSE SYNDROME) (HCC): Status: ACTIVE | Noted: 2024-05-19

## 2024-05-19 PROBLEM — R50.9 LOW GRADE FEVER: Status: ACTIVE | Noted: 2024-05-19

## 2024-05-19 PROCEDURE — 71275 CT ANGIOGRAPHY CHEST: CPT

## 2024-05-19 PROCEDURE — 93306 TTE W/DOPPLER COMPLETE: CPT

## 2024-05-19 PROCEDURE — 93306 TTE W/DOPPLER COMPLETE: CPT | Performed by: INTERNAL MEDICINE

## 2024-05-19 PROCEDURE — 99232 SBSQ HOSP IP/OBS MODERATE 35: CPT | Performed by: INTERNAL MEDICINE

## 2024-05-19 RX ORDER — FUROSEMIDE 10 MG/ML
20 INJECTION INTRAMUSCULAR; INTRAVENOUS ONCE
Status: COMPLETED | OUTPATIENT
Start: 2024-05-19 | End: 2024-05-19

## 2024-05-19 RX ORDER — HYDROXYZINE HYDROCHLORIDE 25 MG/1
25 TABLET, FILM COATED ORAL
Status: DISCONTINUED | OUTPATIENT
Start: 2024-05-19 | End: 2024-05-23 | Stop reason: HOSPADM

## 2024-05-19 RX ADMIN — METHOCARBAMOL 500 MG: 500 TABLET ORAL at 21:32

## 2024-05-19 RX ADMIN — BUSPIRONE HYDROCHLORIDE 5 MG: 5 TABLET ORAL at 17:29

## 2024-05-19 RX ADMIN — OXYCODONE HYDROCHLORIDE 10 MG: 10 TABLET ORAL at 22:50

## 2024-05-19 RX ADMIN — MIDODRINE HYDROCHLORIDE 5 MG: 5 TABLET ORAL at 05:25

## 2024-05-19 RX ADMIN — FUROSEMIDE 20 MG: 10 INJECTION, SOLUTION INTRAMUSCULAR; INTRAVENOUS at 11:40

## 2024-05-19 RX ADMIN — MORPHINE SULFATE 2 MG: 2 INJECTION, SOLUTION INTRAMUSCULAR; INTRAVENOUS at 07:49

## 2024-05-19 RX ADMIN — METHOCARBAMOL 500 MG: 500 TABLET ORAL at 17:29

## 2024-05-19 RX ADMIN — ENOXAPARIN SODIUM 40 MG: 40 INJECTION SUBCUTANEOUS at 09:22

## 2024-05-19 RX ADMIN — OXYCODONE HYDROCHLORIDE 10 MG: 10 TABLET ORAL at 05:25

## 2024-05-19 RX ADMIN — OXYCODONE HYDROCHLORIDE 10 MG: 10 TABLET ORAL at 19:27

## 2024-05-19 RX ADMIN — MORPHINE SULFATE 2 MG: 2 INJECTION, SOLUTION INTRAMUSCULAR; INTRAVENOUS at 11:46

## 2024-05-19 RX ADMIN — ATORVASTATIN CALCIUM 20 MG: 20 TABLET, FILM COATED ORAL at 16:37

## 2024-05-19 RX ADMIN — MORPHINE SULFATE 2 MG: 2 INJECTION, SOLUTION INTRAMUSCULAR; INTRAVENOUS at 16:41

## 2024-05-19 RX ADMIN — ACETAMINOPHEN 650 MG: 325 TABLET, FILM COATED ORAL at 07:40

## 2024-05-19 RX ADMIN — MORPHINE SULFATE 2 MG: 2 INJECTION, SOLUTION INTRAMUSCULAR; INTRAVENOUS at 21:32

## 2024-05-19 RX ADMIN — METHOCARBAMOL 500 MG: 500 TABLET ORAL at 11:40

## 2024-05-19 RX ADMIN — OXYCODONE HYDROCHLORIDE 10 MG: 10 TABLET ORAL at 14:56

## 2024-05-19 RX ADMIN — BUSPIRONE HYDROCHLORIDE 5 MG: 5 TABLET ORAL at 09:21

## 2024-05-19 RX ADMIN — METHOCARBAMOL 500 MG: 500 TABLET ORAL at 09:22

## 2024-05-19 RX ADMIN — IOHEXOL 85 ML: 350 INJECTION, SOLUTION INTRAVENOUS at 09:11

## 2024-05-19 RX ADMIN — OXYCODONE HYDROCHLORIDE 10 MG: 10 TABLET ORAL at 09:22

## 2024-05-19 RX ADMIN — OXYCODONE HYDROCHLORIDE 10 MG: 10 TABLET ORAL at 00:44

## 2024-05-19 NOTE — TREATMENT PLAN
CT chest noted with interstitial edema.  Will order 1 dose of Lasix.  Will delay Beard catheter removal until later today.  Keep Beard catheter for accurate I's and O's.  Will order echocardiogram.  Last echocardiogram in 2020.  If he does have a change in ejection fraction, this may explain his decline in function.

## 2024-05-19 NOTE — ASSESSMENT & PLAN NOTE
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  BuSpar restarted at a lower dose  Will consult PMR  He will probably need inpatient rehab

## 2024-05-19 NOTE — ASSESSMENT & PLAN NOTE
Beard catheter placed in the emergency room for concern of urinary retention  Patient reportedly had good bowel movements after Fleet enema yesterday  Will DC Beard and perform void trial

## 2024-05-19 NOTE — ASSESSMENT & PLAN NOTE
Patient with low-grade fever 100.8 associated with pulse of 98 and O2 sat of 88%  He also complains of chest pain on deep inspiration  He has high risk for PE  CT chest stat ordered rule out PE, atelectasis, infiltrate  Recent UA and blood cultures not consistent with infection

## 2024-05-19 NOTE — PROGRESS NOTES
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  BuSpar restarted at a lower dose  Will consult PMR  He will probably need inpatient rehab    Low grade fever  Assessment & Plan  Patient with low-grade fever 100.8 associated with pulse of 98 and O2 sat of 88%  He also complains of chest pain on deep inspiration  He has high risk for PE  CT chest stat ordered rule out PE, atelectasis, infiltrate  Recent UA and blood cultures not consistent with infection    Neurogenic orthostatic hypotension (HCC)  Assessment & Plan  Continue midodrine 5 mg 3 times daily    BPH associated with nocturia  Assessment & Plan  Beard catheter placed in the emergency room for concern of urinary retention  Patient reportedly had good bowel movements after Fleet enema yesterday  Will DC Beard and perform void trial             VTE Pharmacologic Prophylaxis: VTE Score: 4 Moderate Risk (Score 3-4) - Pharmacological DVT Prophylaxis Ordered: enoxaparin (Lovenox).    Patient Centered Rounds: Discussed with his nurse  Discussions with Specialists or Other Care Team Provider: Hospital course reviewed    Current Length of Stay: 3 day(s)  Current Patient Status: Inpatient   Certification Statement: The patient will continue to require additional inpatient hospital stay due to weakness, low-grade fever, borderline SIRS  Discharge Plan: Anticipate discharge in 48 hrs to rehab facility.    Code Status: Level 1 - Full Code    Subjective:   Seen and examined during rounds today.  He had  low back pain and leg spasms this morning  He also started having chest pain on deep inhalation last night  He had a fever 100.8  He told me that a few weeks ago he was able to walk up to 800 feet with a walker.  He also was able to run on a treadmill in the water at [facility].  He currently is unable to do those.    Objective:     Vitals:   Temp (24hrs), Av.3 °F (37.4 °C), Min:98.1 °F (36.7 °C), Max:100.8 °F (38.2 °C)    Temp:  [98.1 °F (36.7 °C)-100.8 °F (38.2 °C)] 100.8 °F (38.2 °C)  HR:  [70-98] 98  Resp:  [18-20] 18  BP: (116-129)/(61-81) 129/81  SpO2:  [88 %-95 %] 88 %  Body mass index is 34.09 kg/m².     Input and Output Summary (last 24 hours):     Intake/Output Summary (Last 24 hours) at 2024 0953  Last data filed at 2024 0749  Gross per 24 hour   Intake 240 ml   Output 2275 ml   Net -2035 ml       Physical Exam:   Physical Exam  Vitals reviewed.   Constitutional:       Appearance: He is not ill-appearing.   HENT:      Head: Normocephalic and atraumatic.      Nose: No congestion or rhinorrhea.   Eyes:      General: No scleral icterus.  Cardiovascular:      Rate and Rhythm: Normal rate and regular rhythm.   Abdominal:      Palpations: Abdomen is soft.      Tenderness: There is no abdominal tenderness. There is no guarding.   Musculoskeletal:      Cervical back: Neck supple.      Right lower leg: Edema present.      Left lower leg: Edema present.   Neurological:      Comments: Awake alert fluent speech.   Psychiatric:         Mood and Affect: Mood normal.         Behavior: Behavior normal.         Additional Data:     Labs:  Results from last 7 days   Lab Units 24  0540 24  0614   WBC Thousand/uL 7.05 8.29   HEMOGLOBIN g/dL 17.5* 17.1*   HEMATOCRIT % 51.2* 51.7*   PLATELETS Thousands/uL 161 144*   SEGS PCT %  --  74   LYMPHO PCT %  --  12*   MONO PCT %  --  11   EOS PCT %  --  1     Results from last 7 days   Lab Units 24  0540   SODIUM mmol/L 139   POTASSIUM mmol/L 4.3    CHLORIDE mmol/L 102   CO2 mmol/L 32   BUN mg/dL 10   CREATININE mg/dL 0.67   ANION GAP mmol/L 5   CALCIUM mg/dL 8.6   ALBUMIN g/dL 3.5   TOTAL BILIRUBIN mg/dL 0.79   ALK PHOS U/L 55   ALT U/L 14   AST U/L 14   GLUCOSE RANDOM mg/dL 78                 Results from last 7 days   Lab Units 05/17/24  0614 05/16/24  1428   LACTIC ACID mmol/L  --  1.0   PROCALCITONIN ng/ml <0.05  --        Lines/Drains:  Invasive Devices       Peripheral Intravenous Line  Duration             Peripheral IV 05/16/24 Left Antecubital 2 days    Peripheral IV 05/17/24 Dorsal (posterior);Left Forearm 1 day              Drain  Duration             Urethral Catheter Latex;Straight-tip 16 Fr. 2 days                  Urinary Catheter:  Goal for removal: No longer needed. Will place order to discontinue               Imaging: CT chest pending    Recent Cultures (last 7 days):   Results from last 7 days   Lab Units 05/16/24  1436 05/16/24  1428 05/16/24  1404   BLOOD CULTURE  No Growth at 48 hrs. No Growth at 48 hrs.  --    URINE CULTURE   --   --  No Growth <1000 cfu/mL       Last 24 Hours Medication List:   Current Facility-Administered Medications   Medication Dose Route Frequency Provider Last Rate    acetaminophen  650 mg Oral Q4H PRN Jose Pruett MD      atorvastatin  20 mg Oral Daily With Dinner Jose Pruett MD      bisacodyl  10 mg Rectal Daily PRN Tati Paige PA-C      busPIRone  5 mg Oral BID Tati Paige PA-C      enoxaparin  40 mg Subcutaneous Daily Jose Pruett MD      methocarbamol  500 mg Oral 4x Daily Jose Pruett MD      midodrine  5 mg Oral TID AC Jose Pruett MD      morphine injection  2 mg Intravenous Q4H PRN Kurt Hooper MD      oxyCODONE  5 mg Oral Q4H PRN Jose Pruett MD      Or    oxyCODONE  10 mg Oral Q4H PRN Jose Pruett MD      sodium phosphate-biphosphate  1 enema Rectal Daily PRN Tati Paige PA-C          Today, Patient Was Seen By: Kurt Hooper MD    **Please  Note: This note may have been constructed using a voice recognition system.**

## 2024-05-20 PROBLEM — R33.9 URINARY RETENTION: Status: ACTIVE | Noted: 2024-05-20

## 2024-05-20 LAB
AORTIC ROOT: 3.9 CM
APICAL FOUR CHAMBER EJECTION FRACTION: 51 %
BSA FOR ECHO PROCEDURE: 2.35 M2
E WAVE DECELERATION TIME: 174 MS
E/A RATIO: 1.15
FRACTIONAL SHORTENING: 25 (ref 28–44)
INTERVENTRICULAR SEPTUM IN DIASTOLE (PARASTERNAL SHORT AXIS VIEW): 1.3 CM
INTERVENTRICULAR SEPTUM: 1.3 CM (ref 0.6–1.1)
LAAS-AP2: 15.9 CM2
LAAS-AP4: 17.5 CM2
LEFT ATRIUM SIZE: 3.8 CM
LEFT ATRIUM VOLUME (MOD BIPLANE): 48 ML
LEFT ATRIUM VOLUME INDEX (MOD BIPLANE): 20.4 ML/M2
LEFT INTERNAL DIMENSION IN SYSTOLE: 4 CM (ref 2.1–4)
LEFT VENTRICULAR INTERNAL DIMENSION IN DIASTOLE: 5.3 CM (ref 3.5–6)
LEFT VENTRICULAR POSTERIOR WALL IN END DIASTOLE: 1.3 CM
LEFT VENTRICULAR STROKE VOLUME: 63 ML
LVSV (TEICH): 63 ML
MV E'TISSUE VEL-LAT: 6 CM/S
MV E'TISSUE VEL-SEP: 6 CM/S
MV PEAK A VEL: 0.55 M/S
MV PEAK E VEL: 63 CM/S
MV STENOSIS PRESSURE HALF TIME: 51 MS
MV VALVE AREA P 1/2 METHOD: 4.31
RIGHT ATRIAL 2D VOLUME: 34 ML
RIGHT ATRIUM AREA SYSTOLE A4C: 14.4 CM2
RIGHT VENTRICLE ID DIMENSION: 3.7 CM
SL CV LEFT ATRIUM LENGTH A2C: 4.8 CM
SL CV LV EF: 50
SL CV PED ECHO LEFT VENTRICLE DIASTOLIC VOLUME (MOD BIPLANE) 2D: 135 ML
SL CV PED ECHO LEFT VENTRICLE SYSTOLIC VOLUME (MOD BIPLANE) 2D: 72 ML
TRICUSPID ANNULAR PLANE SYSTOLIC EXCURSION: 2.4 CM

## 2024-05-20 PROCEDURE — 87040 BLOOD CULTURE FOR BACTERIA: CPT | Performed by: INTERNAL MEDICINE

## 2024-05-20 PROCEDURE — 97530 THERAPEUTIC ACTIVITIES: CPT

## 2024-05-20 PROCEDURE — NC001 PR NO CHARGE: Performed by: PHYSICAL MEDICINE & REHABILITATION

## 2024-05-20 PROCEDURE — 97110 THERAPEUTIC EXERCISES: CPT

## 2024-05-20 PROCEDURE — 99232 SBSQ HOSP IP/OBS MODERATE 35: CPT | Performed by: INTERNAL MEDICINE

## 2024-05-20 RX ADMIN — MORPHINE SULFATE 2 MG: 2 INJECTION, SOLUTION INTRAMUSCULAR; INTRAVENOUS at 22:13

## 2024-05-20 RX ADMIN — HYDROXYZINE HYDROCHLORIDE 25 MG: 25 TABLET ORAL at 03:28

## 2024-05-20 RX ADMIN — BUSPIRONE HYDROCHLORIDE 5 MG: 5 TABLET ORAL at 08:40

## 2024-05-20 RX ADMIN — METHOCARBAMOL 500 MG: 500 TABLET ORAL at 22:13

## 2024-05-20 RX ADMIN — METHOCARBAMOL 500 MG: 500 TABLET ORAL at 08:40

## 2024-05-20 RX ADMIN — HYDROXYZINE HYDROCHLORIDE 25 MG: 25 TABLET ORAL at 23:12

## 2024-05-20 RX ADMIN — MORPHINE SULFATE 2 MG: 2 INJECTION, SOLUTION INTRAMUSCULAR; INTRAVENOUS at 11:56

## 2024-05-20 RX ADMIN — OXYCODONE HYDROCHLORIDE 10 MG: 10 TABLET ORAL at 14:55

## 2024-05-20 RX ADMIN — OXYCODONE 5 MG: 5 TABLET ORAL at 03:31

## 2024-05-20 RX ADMIN — BUSPIRONE HYDROCHLORIDE 5 MG: 5 TABLET ORAL at 17:14

## 2024-05-20 RX ADMIN — ENOXAPARIN SODIUM 40 MG: 40 INJECTION SUBCUTANEOUS at 08:40

## 2024-05-20 RX ADMIN — MORPHINE SULFATE 2 MG: 2 INJECTION, SOLUTION INTRAMUSCULAR; INTRAVENOUS at 06:19

## 2024-05-20 RX ADMIN — OXYCODONE HYDROCHLORIDE 10 MG: 10 TABLET ORAL at 08:41

## 2024-05-20 RX ADMIN — MORPHINE SULFATE 2 MG: 2 INJECTION, SOLUTION INTRAMUSCULAR; INTRAVENOUS at 01:24

## 2024-05-20 RX ADMIN — METHOCARBAMOL 500 MG: 500 TABLET ORAL at 11:56

## 2024-05-20 RX ADMIN — MORPHINE SULFATE 2 MG: 2 INJECTION, SOLUTION INTRAMUSCULAR; INTRAVENOUS at 17:14

## 2024-05-20 RX ADMIN — METHOCARBAMOL 500 MG: 500 TABLET ORAL at 17:14

## 2024-05-20 RX ADMIN — OXYCODONE HYDROCHLORIDE 10 MG: 10 TABLET ORAL at 19:56

## 2024-05-20 RX ADMIN — ACETAMINOPHEN 650 MG: 325 TABLET, FILM COATED ORAL at 08:40

## 2024-05-20 RX ADMIN — ATORVASTATIN CALCIUM 20 MG: 20 TABLET, FILM COATED ORAL at 17:14

## 2024-05-20 NOTE — CASE MANAGEMENT
Case Management Assessment & Discharge Planning Note    Patient name Jos Hawk  Location [facility] /E5 -* MRN [id number]  : 1960 Date 2024       Current Admission Date: 2024  Current Admission Diagnosis:Quadriparesis (HCC)   Patient Active Problem List    Diagnosis Date Noted Date Diagnosed    Low grade fever 2024     Weakness 2024     Neurogenic orthostatic hypotension (HCC) 2024     Thrombophlebitis arm 2023     Septic olecranon bursitis 2023     Arm swelling 2023     Myelomalacia of cervical cord (HCC) 2022     Quadriparesis (HCC) 10/19/2022     Slow transit constipation 2022     Weakness of right lower extremity 2022     Ambulatory dysfunction 2022     Status post lumbar spinal fusion 2021     S/P insertion of spinal cord stimulator 06/10/2021     Hydrocele of testis 2020     Chest discomfort 2020     Hx of colonoscopy 2019     Cauda equina syndrome (HCC) 2018     Spinal stenosis of thoracolumbar region 2018     Erectile dysfunction 2018     Hypogonadism in male 2018     BPH associated with nocturia 2018       LOS (days): 4  Geometric Mean LOS (GMLOS) (days): 3.9  Days to GMLOS:0.1     OBJECTIVE:    Risk of Unplanned Readmission Score: 13.34         Current admission status: Inpatient       Preferred Pharmacy:   [id number] [place] [id number] [address] [address]  Phone: [phone] Fax: [phone]    [place], [place] - [address]  [address]  [facility] [address]  Phone: [phone] Fax: [phone]    Primary Care Provider: Demarco Tirado DO    Primary Insurance: VETERANS  Secondary Insurance: MEDICARE    ASSESSMENT:  Active Health Care Proxies    There are no active Health Care Proxies on file.       Advance Directives  Does patient have a Health Care POA?: Yes  Does  patient have Advance Directives?: Yes  Advance Directives: Living will, Power of  for health care, Power of  for finance  Primary Contact: maria g Arguello   [phone]    Readmission Root Cause  30 Day Readmission: No    Patient Information  Admitted from:: Home  Mental Status: Alert  During Assessment patient was accompanied by: Other-Comment (private caregiver at bedside)  Assessment information provided by:: Patient  Primary Caregiver: Self  Support Systems: Self, Son, Home care staff  County of Residence: [place]  What city do you live in?: [place]  Home entry access options. Select all that apply.: No steps to enter home  Type of Current Residence: 2 story home  Upon entering residence, is there a bedroom on the main floor (no further steps)?: Yes  Upon entering residence, is there a bathroom on the main floor (no further steps)?: Yes  Living Arrangements: Lives Alone  Is patient a ?: Yes  Is patient active with VA ([employer] [facility])?: Yes  Is patient service connected?: Yes    Activities of Daily Living Prior to Admission  Functional Status: Assistance  Completes ADLs independently?: No  Level of ADL dependence: Assistance  Ambulates independently?: No  Level of ambulatory dependence: Assistance  Does patient use assisted devices?: Yes  Assisted Devices (DME) used: Straight Cane, Walker  Does patient currently own DME?: Yes  What DME does the patient currently own?: Straight Cane, Walker  Does patient have a history of Outpatient Therapy (PT/OT)?: Yes  Does the patient have a history of Short-Term Rehab?: Yes  Does patient have a history of HHC?: Yes  Does patient currently have HHC?: Yes    Current Home Health Care  Type of Current Home Care Services: Home PT, Attendant, Home health aide  Current Home Health Agency:: Other (please enter name in comment) ([facility] and private home health aid, gets 50 hrs per week)  Current Home Health Follow-Up Provider:: PCP    Patient  Information Continued  Income Source: SSI/SSD  Does patient have prescription coverage?: Yes  Does patient receive dialysis treatments?: No  Does patient have a history of substance abuse?: No  Does patient have a history of Mental Health Diagnosis?: No    Means of Transportation  Means of Transport to \[facility] [place] [facility]\"":: Other (Comment) (home health aide transports patient)      Social Determinants of Health (SDOH)      Flowsheet Row Most Recent Value   Housing Stability    In the last 12 months, was there a time when you were not able to pay the mortgage or rent on time? N   At any time in the past 12 months, were you homeless or living in a shelter (including now)? N   Transportation Needs    In the past 12 months, has lack of transportation kept you from medical appointments or from getting medications? no   In the past 12 months, has lack of transportation kept you from meetings, work, or from getting things needed for daily living? No   Food Insecurity    Within the past 12 months, you worried that your food would run out before you got the money to buy more. Never true   Within the past 12 months, the food you bought just didn't last and you didn't have money to get more. Never true   Utilities    In the past 12 months has the electric, gas, oil, or water company threatened to shut off services in your home? No            DISCHARGE DETAILS:    Discharge planning discussed with:: patient  Freedom of Choice: Yes  Comments - Freedom of Choice: PT OT recommending STR, patient would like [facility]  CM contacted family/caregiver?: No- see comments  Were Treatment Team discharge recommendations reviewed with patient/caregiver?: Yes  Did patient/caregiver verbalize understanding of patient care needs?: N/A- going to facility  Were patient/caregiver advised of the risks associated with not following Treatment Team discharge recommendations?: Yes    Contacts  Patient Contacts: Jos Hawk (Son) [phone] (M)  Relationship to  Patient:: Family  Contact Method: Phone  Phone Number: [phone]  Reason/Outcome: Emergency Contact    Requested Home Health Care         Is the patient interested in HHC at discharge?: No    DME Referral Provided  Referral made for DME?: No    Other Referral/Resources/Interventions Provided:  Interventions: Short Term Rehab    Treatment Team Recommendation: Short Term Rehab, Acute Rehab  Discharge Destination Plan:: Short Term Rehab  Transport at Discharge : BLS Ambulance, Stretcher van     Additional Comments: Patient is from home, lives in a 2SH 0 katie. Patient has [facility] set up. Patient has HHA's [facility] hrs[facility]week [facility]/ [facility] and a private care giver. PT OT recommending STR, patient would like [facility] and is agreeable to using his Medicare coverage.

## 2024-05-20 NOTE — OCCUPATIONAL THERAPY NOTE
"  Occupational Therapy Progress Note  TIME ERROR: 13:51-14:46     Patient Name: Jos Hawk  Today's Date: 5/20/2024  Problem List  Principal Problem:    Quadriparesis (HCC)  Active Problems:    BPH associated with nocturia    Neurogenic orthostatic hypotension (HCC)    Weakness    Low grade fever       05/20/24 1357   OT Last Visit   OT Visit Date 05/20/24   Note Type   Note Type Treatment   Pain Assessment   Pain Assessment Tool 0-10   Pain Score 10 - Worst Possible Pain   Pain Location/Orientation Location: [facility]   Hospital Pain Intervention(s) Repositioned;Ambulation/increased activity;Emotional support;Rest   Restrictions/Precautions   Other Precautions Bed Alarm;Pain;Multiple lines   Bed Mobility   Supine to Sit 1  Dependent   Additional items Assist x 2   Sit to Supine 2  Maximal assistance   Additional items Assist x 2   Additional Comments Seated balance Fair w/ UE support PRN. Reports dizziness upon sitting EOB - BP: 144/77, O2 93, HR 86.   Transfers   Sit to Stand 3  Moderate assistance   Additional items Assist x 2;Increased time required;Verbal cues;Other  (RW, personal gait belt use)   Stand to Sit 3  Moderate assistance   Additional items Assist x 2;Increased time required;Verbal cues;Other  (RW)   Functional Mobility   Additional Comments Attempted side stepping. Pt able to weight shift but not effective to lift LEs for advancement of foot. Would rec kristal for OOB w/ nursing as pt able to tolerate. Pt very anxious w/ mobility.   Subjective   Subjective Agreeable to OT/PT co-treatment. \"I'm really fearful of falling.\"   Cognition   Overall Cognitive Status WFL   Arousal/Participation Alert;Responsive   Memory Within functional limits   Following Commands Follows all commands and directions without difficulty   Activity Tolerance   Activity Tolerance Patient tolerated treatment well;Patient limited by pain;Treatment limited secondary to medical complications (Comment);Other (Comment)  (fear " of falling, (+) dizziness)   Medical Staff Made Aware PTA, RN, PCA   Assessment   Assessment Pt participated in skilled OT f/u tx session this date w/ focus on safe transfer technique/functional transfers, functional standing tolerance to increase ADL function and decrease anxiety related to OOB, and education on rehab recommendation. Pt w/ high anxiety related to EOB/OOB activity - he prefers to have his caregiver (A) w/ bed mobility/transfers/mobility. Max/depAx2 for bed mobility completed. Gait belt applied by caregiver and utilized for sit>stand w/ maxAx2 and third person to hold down RW (per pt preference). He is able to stand for 15 1/2 min w/ b/l UE support - episode of knee buckling. Pt w/ increased tone t/o - does complete mini squats which seems to assist w managing tone. Noted discoloration in b/l LEs - rec vascular consult? Pt unable to move LLE out to side to increase base of support w/o assistance w/ physically moving his leg. Discussed his impaired proprioception in his b/l LE impact on balance, increasing fall risk. Did discuss therapy rec - level I as pt requiring more than 1 person to (A) w/ all cares - pt is agreeable. Did notify CM via TT. Pt left supine in bed w/ caregiver and son at bedside, needs met.   Plan   Treatment Interventions ADL retraining;Functional transfer training;UE strengthening/ROM;Endurance training;Cognitive reorientation;Patient/family training;Equipment evaluation/education;Neuromuscular reeducation;Compensatory technique education;Continued evaluation;Energy conservation;Activityengagement   Goal Expiration Date 05/31/24   OT Treatment Day 1   OT Frequency 3-5x/wk;2-3x/wk   Discharge Recommendation   Rehab Resource Intensity Level, OT I (Maximum Resource Intensity)   AM-PAC Daily Activity Inpatient   Lower Body Dressing 1   Bathing 2   Toileting 1   Upper Body Dressing 2   Grooming 3   Eating 3   Daily Activity Raw Score 12   Daily Activity Standardized Score (Calc for Raw  Score >=11) 30.6   AM-PAC Applied Cognition Inpatient   Following a Speech/Presentation 4   Understanding Ordinary Conversation 4   Taking Medications 4   Remembering Where Things Are Placed or Put Away 4   Remembering List of 4-5 Errands 4   Taking Care of Complicated Tasks 4   Applied Cognition Raw Score 24   Applied Cognition Standardized Score 62.21     Janelle Richardson

## 2024-05-20 NOTE — ASSESSMENT & PLAN NOTE
Patient with low-grade fever 100.8 associated with pulse of 98 and O2 sat of 88%  He also complains of chest pain on deep inspiration  He has high risk for PE  CT chest negative  Recent UA and blood cultures not consistent with infection

## 2024-05-20 NOTE — PLAN OF CARE
Problem: PHYSICAL THERAPY ADULT  Goal: Performs mobility at highest level of function for planned discharge setting.  See evaluation for individualized goals.  Description: Treatment/Interventions: Functional transfer training, LE strengthening/ROM, Therapeutic exercise, Patient/family training, Equipment eval/education, Bed mobility, Gait training, Compensatory technique education, Continued evaluation, Spoke to nursing, OT, Family          See flowsheet documentation for full assessment, interventions and recommendations.  Outcome: Progressing  Note: Prognosis: Fair  Problem List: Decreased strength, Decreased range of motion, Decreased endurance, Impaired balance, Decreased mobility, Impaired judgement, Impaired tone, Obesity, Pain  Assessment: Pt seen for PT treatment session this date with interventions consisting of bed mobility, transfer training, and HEP, and education provided as needed for safety and direction to improve functional mobility, safety awareness, and activity tolerance. Pt agreeable to PT treatment session upon arrival, pt found supine in bed . At end of session, pt left  supine in bed with all needs in reach. In comparison to previous session, pt with improvement in activity tolerance, endurance, standing balance, b/l le strength, AM- pac score, and functional mobility. Pt is showing progress with improvements as noted. Pt  is functioning at dependant assist x2 for supine to sit, max assist x2 for sit to supine,mod assist x2 for sit to stand with use of gait belt and mod assist x2  and 3rd person to steady walker, stand to sit without use of gait belt with mod assist x2, static standing tolerance and balance activities with mod assist x2 progressing to close supervision- cg assist x2 and 3rd person ,at times, to steady walker. Standing tolerance 15 minutes with heavy use of ue's for support on walker.  Pt able to perform weightshifting however unable to lift and advance either le.  Pt  required mod assist to lift and advance L le to widen base of support.  Pt unable to take step backward toward bed. And required bed to be brought closer to pt  prior to sitting onto bed.overall improved sitting, and standing tolerance and sitting and standing balance.   Pt  performs arom to b/l le's 3-10 reps to tolerance. Pt  reported dizziness upon sitting EOB bp / 77 spo2 93% and HR 86 bpm.  Due to increased assistance required for bed mobility, transfers and inability to lift and advance le's to take forward steps, decreased activity tolerance, functional endurance, and overall strength and mobility, recommend  level I maximal rehab resource intensity  at time of d/c in order to maximize pt's functional independence and safety w/ mobility. Pt continues to be functioning below baseline level. PT will continue to see pt while here in order to address the deficits listed above and provide interventions consistent w/ POC in effort to achieve STGs.    The patient's AM-PAC Basic Mobility Inpatient Short Form Raw Score is 7. A raw score less than 16 suggests the patient may benefit from discharge to post-acute rehabilitation services. Please also refer to the recommendation of the Physical Therapist for safe discharge planning.  Barriers to Discharge: None     Rehab Resource Intensity Level, PT: I (Maximum Resource Intensity)    See flowsheet documentation for full assessment.

## 2024-05-20 NOTE — PLAN OF CARE
Problem: OCCUPATIONAL THERAPY ADULT  Goal: Performs self-care activities at highest level of function for planned discharge setting.  See evaluation for individualized goals.  Description: Treatment Interventions: ADL retraining, Functional transfer training, UE strengthening/ROM, Endurance training, Cognitive reorientation, Patient/family training, Equipment evaluation/education, Neuromuscular reeducation, Compensatory technique education, Continued evaluation, Energy conservation, Activityengagement          See flowsheet documentation for full assessment, interventions and recommendations.   Outcome: Progressing  Note: Limitation: Decreased ADL status, Decreased UE strength, Decreased Safe judgement during ADL, Decreased endurance, Decreased high-level ADLs, Decreased self-care trans, Decreased fine motor control, Decreased sensation, Decreased UE ROM (dec balance, functional reach)  Prognosis: Fair  Assessment: Pt participated in skilled OT f/u tx session this date w/ focus on safe transfer technique/functional transfers, functional standing tolerance to increase ADL function and decrease anxiety related to OOB, and education on rehab recommendation. Pt w/ high anxiety related to EOB/OOB activity - he prefers to have his caregiver (A) w/ bed mobility/transfers/mobility. Max/depAx2 for bed mobility completed. Gait belt applied by caregiver and utilized for sit>stand w/ maxAx2 and third person to hold down RW (per pt preference). He is able to stand for 15 1/2 min w/ b/l UE support - episode of knee buckling. Pt w/ increased tone t/o - does complete mini squats which seems to assist w managing tone. Noted discoloration in b/l LEs - rec vascular consult? Pt unable to move LLE out to side to increase base of support w/o assistance w/ physically moving his leg. Discussed his impaired proprioception in his b/l LE impact on balance, increasing fall risk. Did discuss therapy rec - level I as pt requiring more than 1  person to (A) w/ all cares - pt is agreeable. Did notify CM via TT. Pt left supine in bed w/ caregiver and son at bedside, needs met.     Rehab Resource Intensity Level, OT: I (Maximum Resource Intensity)

## 2024-05-20 NOTE — CONSULTS
PHYSICAL MEDICINE AND REHABILITATION CONSULT NOTE  Jos Hawk 63 y.o. male MRN: [id number]  Unit/Bed#: E5 -01 Encounter: [id number]    e-Consult (IPC)         Reason for Consult / Principal Problem: assess rehab needs  Inpatient consult to Physical Medicine Rehab  Consult performed by: Maryuri Sue MD  Consult ordered by: Kurt Hooper MD        05/20/24        Assessment:  Rehabilitation Diagnosis:   Baseline incomplete cervical SCI with tetraparesis with recent functional decline (in Jan 2024 was ambulating household distance and now relying more on wheelchair)  Baseline lumbar spinal stenosis with multiple past surgeries and spinal stimulator in place for pain management  Impaired mobility and self care  Spasticity  Neurogenic bladder/bowel - (does not catheterize at baseline, and is continent per records).  Ok to leave rodriguez catheter in place for now and can be removed again in rehab if patient prefers.  Last BM 5/18/24  EMG diagnosed polyneuropathy  Known orthostatic hypotension - managed on Midodrine 5 mg TID    Recommendations:  Rehabilitation Plan:  Continue PT/OT  while on acute care.  Patient with recent functional decline.    Patient would be a good candidate for a SCI specialized acute inpatient rehabilitation program such as [facility] to maximize his function.   May also be a candidate for a power wheelchair evaluation.           Thank you for this consultation.  Do not hesitate to contact service with further questions.      Maryuri Sue MD  PM&R      I have spent a total time of 75 minutes on 05/20/24 in caring for this patient including Impressions, Documenting in the medical record, Reviewing / ordering tests, medicine, procedures  , and Obtaining or reviewing history  .        History of Present Illness:  Jos Hawk is a 63 y.o. male with  has a past medical history of Balance problems, BPH (benign prostatic hyperplasia), Chronic back pain, Chronic pain  disorder, COVID-19, Erectile dysfunction, Nocturia, OAB (overactive bladder), Testicular hypogonadism, Urinary frequency, and Urinary urgency..  Patient presented to the [facility] on 5/16/2024 with shortness of breath, decreased urine output, worsening functional decline for several weeks-months.  Per records, patient was ambulating household distances back in January 2024 but has had a steady functional decline and is now a primary wheelchair user.  Patient did see his outpatient neurologist and had some changes made in his medications.  As an outpatient, patient was started on duloxetine 60 mg daily and tapered off BuSpar.  Patient also discontinued from Zanaflex and started on methocarbamol.  In the hospital, patient was seen by neurology in consultation and was discontinued from Cymbalta and reintroduced to BuSpar half his previous dose.  Additionally patient was recommended to hold off on Botox for the next several months.  Imaging was not repeated as MRI cervical and lumbar spines were completed on 5/3/2024.  5/3: MRI cervical spine: Stable Short segment severe cord atrophy with myelomalacia at C3-4; prior decompressive laminectomies at C3-C6  5/3: MRI lumbar spine: Suboptimal assessment due to motion and surgical hardware given thoracic spinal cord stimulator, stable postsurgical changes at L2-S1  Beard catheter was placed due to decreased urine output.  Urine culture obtained which was negative.  Patient had chest CT completed due to shortness of breath showing interstitial edema and is status post 1 dose Lasix.  Echocardiogram pending.  Physiatry consulted for rehabilitation recommendations.      Review of Systems: 10 point ROS negative except for what is noted in HPI    Function:  Prior level of function and living situation:  Patient lives alone in a two-story home with 0 steps to enter, first-floor set up.  He has a supportive family.  Additionally he has home health aides for  50 hours a week and a private caregiver.    Current level of function:  Physical Therapy: Dependent x 2 for bed mobility, dependent for transfers  Occupational Therapy: Supervision eating, min assist grooming, moderate-max assist upper body ADLs, max-total assist lower body ADLs, total assist toileting      Physical Exam:  /82   Pulse 71   Temp 97.9 °F (36.6 °C)   Resp 18   Ht 6' (1.829 m)   Wt 114 kg (251 lb)   SpO2 95%   BMI 34.04 kg/m²        Intake/Output Summary (Last 24 hours) at 5/20/2024 1400  Last data filed at 5/20/2024 0741  Gross per 24 hour   Intake 240 ml   Output 2550 ml   Net -2310 ml       Body mass index is 34.04 kg/m².      Past Medical History:   Diagnosis Date    Balance problems     BPH (benign prostatic hyperplasia)     Chronic back pain     Chronic pain disorder     COVID-19     2/2021-asymptomatic     Erectile dysfunction     Nocturia     OAB (overactive bladder)     Testicular hypogonadism     Urinary frequency     Urinary urgency        Past Surgical History:   Procedure Laterality Date    BACK SURGERY  05/18/2016    with hardware    BACK SURGERY      2 back surgery unable to remove hardware    COLONOSCOPY  11/2004    Dr Taylor. tubular adenoma polyp removed    COLONOSCOPY  12/2007    Dr Cameron. Normal    COLONOSCOPY  07/2013    Dr Cameron. Normal.     COLONOSCOPY  06/2019    Dr Cameron. Hemorrhoids, normal.     NECK SURGERY      decompression of neck     NECK SURGERY Right     scar tissues removed    SD EXCISION HYDROCELE UNILATERAL Right 06/10/2021    Procedure: HYDROCELECTOMY;  Surgeon: Dereck Goddard DO;  Location: [facility];  Service: Urology    SPINAL CORD STIMULATOR IMPLANT  10/10/2019    WOUND DEBRIDEMENT Right 08/10/2023    Procedure: DEBRIDEMENT UPPER EXTREMITY (WASH OUT) - elbow;  Surgeon: Jesse Avendaño MD;  Location: [facility];  Service: Orthopedics       Imaging reviewed    Echo complete w/ contrast if indicated    Result Date: 5/20/2024  Narrative:    Left Ventricle: Left ventricular cavity size is normal. Wall thickness is normal. The left ventricular ejection fraction is 50%. Systolic function is low normal. Wall motion is normal. Diastolic function is mildly abnormal, consistent with grade I (abnormal) relaxation.   Aorta: The aortic root is mildly dilated. The aortic root is 3.90 cm.     CTA chest pe study    Result Date: 5/19/2024  Narrative: CTA - CHEST WITH IV CONTRAST - PULMONARY ANGIOGRAM INDICATION:   r/o PE. CP, tachycardia, limited mobility. Per my review of the medical record, history of quadriparesis due to spinal cord injury. Low-grade fever. Chest pain on deep inhalation last night. COMPARISON: Chest CT 5/4/2024, CXR 3/11/2024. TECHNIQUE: CT angiogram timed for optimal opacification of the pulmonary arteries.  Axial, sagittal, and coronal 2D reformats created from source data.  Coronal 3D MIP postprocessing on the acquisition scanner. Radiation dose length product (DLP):  491 mGy-cm .  Radiation dose exposure minimized using iterative reconstruction and automated exposure control. IV Contrast:  85 mL of iohexol (OMNIPAQUE) FINDINGS: PULMONARY ARTERIES: No definite acute pulmonary emboli but sensitivity is limited by moderate motion artifact and artifact from the patient's arms at his side. LUNGS: Mild septal thickening due to interstitial edema. Mild benign multifocal subsegmental atelectasis. Benign calcified granulomas. AIRWAYS: No significant filling defects. Mucus in the distal trachea and right main bronchus. PLEURA:  Unremarkable. HEART/GREAT VESSELS: Mild cardiomegaly. Mild coronary artery calcification indicating atherosclerotic heart disease. MEDIASTINUM AND BHARAT:  Unremarkable. CHEST WALL AND LOWER NECK: Unremarkable. UPPER ABDOMEN: Redemonstration of moderate elevation of the right diaphragm.. Mild splenomegaly at 14 cm. OSSEOUS STRUCTURES: Moderate degenerative disease in the spine. Neurostimulator in the spinal canal.     Impression:  No definite acute pulmonary emboli but sensitivity is limited by moderate motion artifact and artifact from the patient's arms at his side. Mild septal thickening due to interstitial edema. Mild splenomegaly at 14 cm. Workstation performed: [id number]     CT head without contrast    Result Date: 5/16/2024  Narrative: CT BRAIN - WITHOUT CONTRAST INDICATION:   Weakness. COMPARISON: CT head 4/30/2024 TECHNIQUE:  CT examination of the brain was performed.  Multiplanar 2D reformatted images were created from the source data. Radiation dose length product (DLP) for this visit:  892 mGy-cm .  This examination, like all CT scans performed in the [facility], was performed utilizing techniques to minimize radiation dose exposure, including the use of iterative reconstruction and automated exposure control. IMAGE QUALITY:  Diagnostic. FINDINGS: PARENCHYMA:  No intracranial mass, mass effect or midline shift. No CT signs of acute infarction.  No acute parenchymal hemorrhage. VENTRICLES AND EXTRA-AXIAL SPACES:  Normal for the patient's age. VISUALIZED ORBITS: Normal visualized orbits. PARANASAL SINUSES: Normal visualized paranasal sinuses. CALVARIUM AND EXTRACRANIAL SOFT TISSUES:  Normal.     Impression: No acute intracranial abnormality. Workstation performed: [id number]     MRI lumbar spine wo contrast    Result Date: 5/8/2024  Narrative: MRI LUMBAR SPINE WITHOUT CONTRAST INDICATION: R29.898: Other symptoms and signs involving the musculoskeletal system. COMPARISON: MRI lumbar spine 1/18/2020 466 TECHNIQUE:  Multiplanar, multisequence imaging of the lumbar spine was performed. . IMAGE QUALITY:  Diagnostic FINDINGS: VERTEBRAL BODIES:  There are 5 lumbar type vertebral bodies. There is preserved normal lumbar lordosis. No significant spondylolisthesis.. Again noted postsurgical changes from prior posterior decompression with pedicle screws and connecting rods spanning L2-S1 (there is no pedicle screw in the right  L2). There is interbody disc spacer at L4-5. Vertebral heights are grossly maintained. No apparent bone marrow signal abnormality. SACRUM:  Normal signal within the sacrum. No evidence of insufficiency or stress fracture. DISTAL CORD AND CONUS:  Normal size and signal within the distal cord and conus. PARASPINAL SOFT TISSUES: Postsurgical change in the paraspinal and subcutaneous soft tissue. LOWER THORACIC DISC SPACES: Minimal disc bulge at T11-12 without significant canal stenosis. LUMBAR DISC SPACES: L1-L2: No significant disc bulge. Moderate facet arthropathy. No canal stenosis. No high-grade foraminal stenosis. L2-L3: Stable postsurgical change. No canal stenosis. Cannot reliably evaluate neural foramina due to limited imaging and artifact from surgical hardware. L3-L4: Stable postsurgical change. No canal stenosis.Cannot reliably evaluate neural foramina due to limited imaging and artifact from surgical hardware. L4-L5: Stable postsurgical change. No canal stenosis.Cannot reliably evaluate neural foramina due to limited imaging and artifact from surgical hardware. L5-S1: Stable postsurgical change. No canal stenosis.Cannot reliably evaluate neural foramina due to limited imaging and artifact from surgical hardware. OTHER FINDINGS: Tiny well-defined T2 hyperintense lesions in the inferior spleen likely representing benign cysts.     Impression: MR protocol tailored to shorter scan time(limited PRISCILA) due to presence of thoracic spinal cord stimulator. Suboptimal assessment due to limited images, and artifacts from surgical hardware and motion. Stable postsurgical appearance at levels L2-S1. Surgically patent canal. Cannot reliably evaluate neuroforamina due to limited images and artifact from surgical hardware. Workstation performed: [id number]     MRI cervical spine wo contrast    Result Date: 5/8/2024  Narrative: MRI CERVICAL SPINE WITHOUT CONTRAST INDICATION: R29.898: Other symptoms and signs involving the  musculoskeletal system. COMPARISON: MRI cervical spine 8/3/2023 TECHNIQUE:  Multiplanar, multisequence imaging of the cervical spine was performed. . IMAGE QUALITY:  Diagnostic FINDINGS: ALIGNMENT: There is mild reversal of normal cervical lordosis. MARROW SIGNAL: Stable short-segment severe cord atrophy with myelomalacia related increased T2 signal at level C3-4. CERVICAL AND VISUALIZED THORACIC CORD:  Normal signal within the visualized cord. PREVERTEBRAL AND PARASPINAL SOFT TISSUES:  Normal. VISUALIZED POSTERIOR FOSSA:  The visualized posterior fossa demonstrates no abnormal signal. CERVICAL DISC SPACES: C2-C3: Minimal disc bulge. Bilateral uncovertebral spurring. Mild canal stenosis. Mild right foraminal narrowing. C3-C4: Changes of decompressive laminectomies. Disc bulge. Bilateral uncovertebral spurring. Bilateral facet arthropathy. Surgically patent canal. Moderate to severe left and moderate right foraminal stenosis. C4-C5:. Changes of decompressive laminectomies. Disc osteophyte complex. Bilateral uncovertebral spurring. Severe left and mild right facet arthropathy. Surgically patent canal. Moderate bilateral foraminal stenosis. C5-C6: Changes of decompressive laminectomies. Disc osteophyte complex and bilateral uncovertebral spurring. Mild facet arthropathy. Surgically patent canal. Moderate bilateral foraminal stenosis. C6-C7: Changes of decompressive laminectomies. Right eccentric disc osteophyte complex and uncovertebral spurring. Surgically patent canal. Mild to moderate right and mild left foraminal stenosis. C7-T1: Disc osteophyte complex and uncovertebral spurring. Mild canal narrowing. Moderate right foraminal narrowing. UPPER THORACIC DISC SPACES:  Normal. OTHER FINDINGS:  None.     Impression: Somewhat limited exam related to a tailored protocol due to spinal cord stimulator. 1.  Stable short segment severe cord atrophy with myelomalacia related signal abnormality at level C3-4. 2.   Redemonstrated prior decompressive laminectomies at levels C3-C6. 3.  Mild degenerative canal narrowing at level C7-T1. Surgically patent canal in the remainder levels. 4.  Multilevel foraminal stenosis as described; moderate to severe at left C3-4. Workstation performed: [id number]      VAS VENOUS DUPLEX - LOWER LIMB BILATERAL    Result Date: 5/5/2024  Narrative:  [facility] REPORT CLINICAL: Indications: Patient presents with bilateral lower extremity pain and discoloration x 4 days. Operative History: Patient denies any cardiovascular procedures. Risk Factors The patient has history of Obesity.   CONCLUSION:  Impression: RIGHT LOWER LIMB: No evidence of acute or chronic deep vein thrombosis. No evidence of superficial thrombophlebitis noted. Doppler evaluation shows a normal response to augmentation maneuvers.. Popliteal, posterior tibial and anterior tibial arterial Doppler waveform's are triphasic.  LEFT LOWER LIMB: No evidence of acute or chronic deep vein thrombosis. No evidence of superficial thrombophlebitis noted. Doppler evaluation shows a normal response to augmentation maneuvers. Popliteal, posterior tibial and anterior tibial arterial Doppler waveform's are triphasic.  Technical findings were given to Gonzalez Greer.  SIGNATURE: Electronically Signed by: CELESTINO DECKER DO, RPVI on 2024-05-05 01:30:15 PM    PE Study with CT Abdomen and Pelvis with contrast    Result Date: 5/4/2024  Narrative: CT PULMONARY ANGIOGRAM OF THE CHEST AND CT ABDOMEN AND PELVIS WITH INTRAVENOUS CONTRAST INDICATION: dyspnea, leg swelling. COMPARISON: Multiple priors most recently 3/11/2024 TECHNIQUE: CT examination of the chest, abdomen and pelvis was performed. Thin section CT angiographic technique was used in the chest in order to evaluate for pulmonary embolus and coronal 3D MIP postprocessing was performed on the acquisition scanner. Multiplanar 2D reformatted images were created from the source data. This  examination, like all CT scans performed in the [facility], was performed utilizing techniques to minimize radiation dose exposure, including the use of iterative reconstruction and automated exposure control. Radiation dose length product (DLP) for this visit: 1782.38 mGy-cm IV Contrast: 100 mL of iohexol (OMNIPAQUE) Enteric Contrast: Not administered. FINDINGS: CHEST PULMONARY ARTERIAL TREE: No pulmonary embolus is seen. LUNGS: Elevation of the right hemidiaphragm with resultant partial right middle and lower lobe atelectasis. PLEURA: Unremarkable. HEART/AORTA: Atherosclerotic coronary artery calcification. No thoracic aortic aneurysm. MEDIASTINUM AND BHARAT: Few small partially calcified lymph nodes, compatible with sequela of granulomatous disease CHEST WALL AND LOWER NECK: Unremarkable. ABDOMEN LIVER/BILIARY TREE: Unremarkable. GALLBLADDER: No calcified gallstones. No pericholecystic inflammatory change. SPLEEN: Unremarkable. PANCREAS: Unremarkable. ADRENAL GLANDS: Unremarkable. KIDNEYS/URETERS: Simple renal cyst(s). Otherwise unremarkable kidneys. No hydronephrosis. STOMACH AND BOWEL: Colonic diverticulosis with a small focus of inflammatory change in the left lower quadrant (2, 409) APPENDIX: Normal appendix. ABDOMINOPELVIC CAVITY: No ascites. No pneumoperitoneum. No lymphadenopathy. VESSELS: Infrarenal abdominal aortic aneurysm measuring up to 3.2 cm. Aneurysmal dilation of the common iliac arteries, measuring up to 2.5 cm on the left and 2 cm on the right. PELVIS REPRODUCTIVE ORGANS: Unremarkable for patient's age. URINARY BLADDER: Unremarkable. ABDOMINAL WALL/INGUINAL REGIONS: Left flank spinal stimulator. BONES: No acute fracture or suspicious osseous lesion. Spinal degenerative changes. Posterior lumbosacral fusion and decompression. Screws stabilizing both femoral necks. Hemilaminectomies of the upper cervical spine.     Impression: No acute pulmonary embolus. Possible mild acute sigmoid  diverticulitis without complication 3.2 cm infrarenal abdominal aortic aneurysm. Bilateral common iliac artery aneurysms. The study was marked in EPIC for immediate notification. Workstation performed: [id number]     CT spine lumbar without contrast    Result Date: 4/30/2024  Narrative: CT SPINE LUMBAR WO CONTRAST INDICATION:   fall, weakness. COMPARISON: 1/18/2024; 10/2/2020 TECHNIQUE:  Contiguous axial images through the lumbar spine were obtained. Sagittal and coronal reconstructions were performed. IV Contrast: Not administered Radiation dose length product (DLP) for this visit:  834 mGy-cm .  This examination, like all CT scans performed in the [facility], was performed utilizing techniques to minimize radiation dose exposure, including the use of iterative reconstruction and automated exposure control. IMAGE QUALITY:  Diagnostic. FINDINGS: ALIGNMENT:  There are 5 lumbar type vertebral bodies. Minimal levoscoliosis of the mid lumbar spine centered at the L4 level. VERTEBRAE: No acute fracture. Chronic appearing healed fracture deformity of the posterior superior aspect of L2 is retrospectively unchanged from the prior outside CT from 2020. Previously present right-sided L2 pedicle screw has been removed. Left L2 pedicle screw is intact. Decompressive laminectomies L2-L5 redemonstrated. Intact bilateral pedicle screws at L3 and L4. Intervertebral cages L4-5 are intact. Bilateral L5 pedicle screws are intact. Bilateral S1 pedicle screws demonstrate loosening, similar to the prior outside CT from 2020. The appearance of the spinal construct other than the removed right L2 pedicle screw is stable. DEGENERATIVE CHANGES: Surgically capacious central canal at the laminectomy levels. Scattered mild to moderate spondylotic changes noted. Prominent multilevel endplate osteophytes anteriorly especially at the thoracolumbar junction. PARASPINAL SOFT TISSUES: Atherosclerotic calcifications noted. Aneurysmal  dilatation of the left greater than right common iliac arteries noted, stable from the prior 2020 CT. The left common iliac artery measuring up to 2.7 cm transverse dimension. Scattered atherosclerotic vascular calcifications noted.  view demonstrates partially imaged left-sided spinal cord stimulator device and bilateral femoral neck pins. OTHER: Unremarkable     Impression: 1. No acute fracture or subluxation. 2. This spinal construct is grossly stable to the prior 2020 outside CT with the exception of the right L2 pedicle screw which has been removed. Chronic loosening of bilateral S1 pedicle screws is also unchanged from 2020. 3. Common iliac artery aneurysm is redemonstrated, similar to 2020. Further clinical surveillance and follow-up advised. Consider abdominal ultrasound or CTA of the abdominal aorta in 6 months to assess for stability. Workstation performed: [id number]     CT head without contrast    Result Date: 4/30/2024  Narrative: CT BRAIN - WITHOUT CONTRAST INDICATION:   fall. COMPARISON: CT head without contrast on 9/21/2022 TECHNIQUE:  CT examination of the brain was performed.  Multiplanar 2D reformatted images were created from the source data. Radiation dose length product (DLP) for this visit:  870 mGy-cm .  This examination, like all CT scans performed in the [facility], was performed utilizing techniques to minimize radiation dose exposure, including the use of iterative reconstruction and automated exposure control. IMAGE QUALITY:  Diagnostic. FINDINGS: PARENCHYMA: Minimal decreased attenuation is noted in periventricular and subcortical white matter demonstrating an appearance that is statistically most likely to represent mild microangiopathic change. No CT signs of acute infarction.  No intracranial mass, mass effect or midline shift.  No acute parenchymal hemorrhage. VENTRICLES AND EXTRA-AXIAL SPACES:  Normal for the patient's age. VISUALIZED ORBITS: Normal visualized  orbits. PARANASAL SINUSES: Normal visualized paranasal sinuses. CALVARIUM AND EXTRACRANIAL SOFT TISSUES:  Normal.     Impression: No acute intracranial abnormality. Workstation performed: [id number]

## 2024-05-20 NOTE — PROGRESS NOTES
[facility]  Progress Note  Name: Jos Hawk I  MRN: [id number]  Unit/Bed#: E5 -01 I Date of Admission: 5/16/2024   Date of Service: 5/20/2024 I Hospital Day: 4    Assessment & Plan   Weakness  Assessment & Plan  The patient has had worsening weakness since he had Botox injections.  He is also concerned about 2 episodes of fever that he had over the past couple weeks.  The patient did have a staph infection of his olecranon bursa in the relatively recent past.  Thus far, evaluation has been unrevealing.  Additional blood cultures will be obtained to more definitively rule out an infectious process.    Low grade fever  Assessment & Plan  Patient with low-grade fever 100.8 associated with pulse of 98 and O2 sat of 88%  He also complains of chest pain on deep inspiration  He has high risk for PE  CT chest negative  Recent UA and blood cultures not consistent with infection    * Quadriparesis (HCC)  Assessment & Plan  Significant decline in function over the past few weeks  Chronic quadriparesis resis due to spinal cord injury  Possibly related to  serotoninergic medications, Cymbalta and BuSpar or progression of his disease, per neurology evaluation  Cymbalta discontinued.  BuSpar restarted at a lower dose  Will consult PMR  He will probably need inpatient rehab    BPH associated with nocturia  Assessment & Plan  Beard catheter placed in the emergency room for concern of urinary retention  Patient reportedly had good bowel movements after Fleet enema yesterday      Neurogenic orthostatic hypotension (HCC)  Assessment & Plan  Continue midodrine 5 mg 3 times daily               Subjective:  The patient continues to feel weak.  He has some discomfort with deep inspiration.  His eyes are red and irritated.  He has been having increased drainage for the past week or 2.  He is concerned that some of the way that he feels is similar to when he had a staph olecranon bursitis.  He denies  any cough or sputum.  Has no abdominal pain, nausea, or vomiting.    Physical Exam:   Temp:  [97.9 °F (36.6 °C)-98.4 °F (36.9 °C)] 97.9 °F (36.6 °C)  HR:  [60-76] 71  Resp:  [18] 18  BP: ()/(60-91) 141/82    Gen: Well-developed, well-nourished, in no acute distress.  Eyes: The conjunctive are injected.  The pupils are equal, round, and reactive to light.  Extraocular movements are intact.  Neck: Supple.  There is no lymphadenopathy or goiter.  Heart: Regular rhythm.  I heard no murmur, gallop, or rub.  Lungs: Clear to auscultation and percussion.  No wheezing, rales, or rhonchi.  Abd: Soft with active bowel sounds.  No mass, tenderness, organomegaly.  cyanosis, or edema.    Extremities: No clubbing, cyanosis, or edema.  No calf tenderness.  Neuro: Alert and oriented.  Quadriparesis is unchanged.  Skin: Warm and dry.      LABS: No new labs      VTE Pharmacologic Prophylaxis: Enoxaparin (Lovenox)  VTE Mechanical Prophylaxis: sequential compression device

## 2024-05-20 NOTE — ASSESSMENT & PLAN NOTE
The patient has had worsening weakness since he had Botox injections.  He is also concerned about 2 episodes of fever that he had over the past couple weeks.  The patient did have a staph infection of his olecranon bursa in the relatively recent past.  Thus far, evaluation has been unrevealing.  Additional blood cultures will be obtained to more definitively rule out an infectious process.

## 2024-05-20 NOTE — TELEPHONE ENCOUNTER
Patient was seen by Dr Leyva in office on 5/15/24 for an appt. And provider reviewed with patient his decline and anxiety. No further follow up is needed at this time.

## 2024-05-20 NOTE — PHYSICAL THERAPY NOTE
PHYSICAL THERAPY NOTE          Patient Name: Jos Hawk  Today's Date: 5/20/2024 05/20/24 2898   Note Type   Note Type Treatment   Pain Assessment   Pain Assessment Tool 0-10   Pain Score 10 - Worst Possible Pain   Pain Location/Orientation Orientation: Lower;Location: Back;Location: Leg  (low back and b/l legs)   Hospital Pain Intervention(s) Repositioned;Ambulation/increased activity;Emotional support;Rest   Restrictions/Precautions   Other Precautions Chair Alarm;Bed Alarm;Fall Risk;Pain;Multiple lines   General   Chart Reviewed Yes   Family/Caregiver Present Yes  (caregiver and son present)   Cognition   Overall Cognitive Status WFL   Arousal/Participation Alert;Responsive;Cooperative   Attention Within functional limits   Orientation Level Oriented X4   Memory Within functional limits   Following Commands Follows one step commands without difficulty   Subjective   Subjective I wan to wait for my caregiver before we do anything.   Bed Mobility   Supine to Sit 1  Dependent   Additional items Increased time required   Sit to Supine 2  Maximal assistance   Additional items Assist x 2;Increased time required;Verbal cues;LE management   Transfers   Sit to Stand 3  Moderate assistance   Additional items Assist x 2;Increased time required;Verbal cues  (with use of gait belt and standby of third and 4th to steady walker, bed height elevated)   Stand to Sit 3  Moderate assistance   Additional items Assist x 2;Increased time required;Verbal cues   Additional Comments gait belt cues, bed height elevated,, standign trials x 15 minutes with support of Rw and modassist x2 initially progresing to close supevision- cg assist x2 and 3rd to steady walker.  standing tolerance 15 mintues with heavy support of b/l ue's.  working on standing tolerance, le weightbearing, weighthsifting,  mini squats.  several attempts to lift and advance L le  and to widen base of support, hover pt unable to actively performs and requires mod assist to lift Lle and widen base of support.  ateempted abckward stepping, however pt unable to perform requiring bed to brought up to pt  for stand to sit.  noted b/l toed outward stance.   Exercises   Heelslides Supine;AROM;Bilateral  (x 3 reps)   Ankle Pumps Supine;5 reps;AROM;Bilateral   Squat Standing;Bilateral  (partial mini squats x 25 reps, heavy use of ue's/ ue support on walker)   Marching Sitting;10 reps;AROM;Bilateral   Balance training  sitting balance activities eob with and wihtout b/l ue support with close supervision,  pt  performed anterior weightshfts with supervsiison and forwrd scoot with supervision.  sitting tolerance EOB x 8-10 minutes total.   Assessment   Prognosis Fair   Problem List Decreased strength;Decreased range of motion;Decreased endurance;Impaired balance;Decreased mobility;Impaired judgement;Impaired tone;Obesity;Pain   Assessment Pt seen for PT treatment session this date with interventions consisting of bed mobility, transfer training, and HEP, and education provided as needed for safety and direction to improve functional mobility, safety awareness, and activity tolerance. Pt agreeable to PT treatment session upon arrival, pt found supine in bed . At end of session, pt left  supine in bed with all needs in reach. In comparison to previous session, pt with improvement in activity tolerance, endurance, standing balance, b/l le strength, AM- pac score, and functional mobility. Pt is showing progress with improvements as noted. Pt  is functioning at dependant assist x2 for supine to sit, max assist x2 for sit to supine,mod assist x2 for sit to stand with use of gait belt and mod assist x2  and 3rd person to steady walker, stand to sit without use of gait belt with mod assist x2, static standing tolerance and balance activities with mod assist x2 progressing to close supervision- cg assist x2 and  3rd person ,at times, to steady walker. Standing tolerance 15 minutes with heavy use of ue's for support on walker.  Pt able to perform weightshifting however unable to lift and advance either le.  Pt required mod assist to lift and advance L le to widen base of support.  Pt unable to take step backward toward bed. And required bed to be brought closer to pt  prior to sitting onto bed.overall improved sitting, and standing tolerance and sitting and standing balance.   Pt  performs arom to b/l le's 3-10 reps to tolerance. Pt  reported dizziness upon sitting EOB bp / 77 spo2 93% and HR 86 bpm.  Due to increased assistance required for bed mobility, transfers and inability to lift and advance le's to take forward steps, decreased activity tolerance, functional endurance, and overall strength and mobility, recommend  level I maximal rehab resource intensity  at time of d/c in order to maximize pt's functional independence and safety w/ mobility. Pt continues to be functioning below baseline level. PT will continue to see pt while here in order to address the deficits listed above and provide interventions consistent w/ POC in effort to achieve STGs.    The patient's [facility] Basic Mobility Inpatient Short Form Raw Score is 7. A raw score less than 16 suggests the patient may benefit from discharge to post-acute rehabilitation services. Please also refer to the recommendation of the Physical Therapist for safe discharge planning.   Goals   Patient Goals To get back to being I.   STG Expiration Date 05/27/24   PT Treatment Day 1   Plan   Treatment/Interventions Functional transfer training;Therapeutic exercise;Endurance training;Patient/family training;Equipment eval/education;Bed mobility;Gait training;Spoke to case management;OT;Family  (caregiver)   Progress Progressing toward goals   PT Frequency 3-5x/wk   Discharge Recommendation   Rehab Resource Intensity Level, PT I (Maximum Resource Intensity)   AM-PAC Basic  Mobility Inpatient   Turning in Flat Bed Without Bedrails 2   Lying on Back to Sitting on Edge of Flat Bed Without Bedrails 1   Moving Bed to Chair 1   Standing Up From Chair Using Arms 1   Walk in Room 1   Climb 3-5 Stairs With Railing 1   Basic Mobility Inpatient Raw Score 7   Turning Head Towards Sound 4   Follow Simple Instructions 4   Low Function Basic Mobility Raw Score  15   Low Function Basic Mobility Standardized Score  23.9   [facility] Highest Level Of Mobility   -[facility] Goal 2: Bed activities/Dependent transfer   -[facility] Achieved 5: Stand (1 or more minutes)   Education   Education Provided Mobility training;Home exercise program;Assistive device   Patient Reinforcement needed   End of Consult   Patient Position at End of Consult Supine;Bed/Chair alarm activated;All needs within reach     Sanjuanita Waddell, PTA

## 2024-05-20 NOTE — NURSING NOTE
Per SLIM order, RN attempted to remove patients rodriguez and proceed with a voiding trial for the a.m. Upon explanation, patient refused to let RN pull out rodriguez catheter and asked to speak to the day shift SLIM team first before removing. RN educated patient on reason why the order was placed and explained the use of a urinal to continue to measure urine. Patient verbalized understanding but still wishes to speak to the SLIM team during morning rounds. RN reached out to overnight SL and made aware. No new orders at this time, call bell within reach, no further concerns verbalized by patient at this time.

## 2024-05-20 NOTE — ASSESSMENT & PLAN NOTE
Beard catheter placed in the emergency room for concern of urinary retention  Patient reportedly had good bowel movements after Fleet enema yesterday

## 2024-05-21 LAB
BACTERIA BLD CULT: NORMAL
BACTERIA BLD CULT: NORMAL

## 2024-05-21 PROCEDURE — 99232 SBSQ HOSP IP/OBS MODERATE 35: CPT | Performed by: INTERNAL MEDICINE

## 2024-05-21 RX ORDER — TOBRAMYCIN AND DEXAMETHASONE 3; 1 MG/ML; MG/ML
1 SUSPENSION/ DROPS OPHTHALMIC EVERY 6 HOURS SCHEDULED
Status: DISCONTINUED | OUTPATIENT
Start: 2024-05-21 | End: 2024-05-23 | Stop reason: HOSPADM

## 2024-05-21 RX ADMIN — MORPHINE SULFATE 2 MG: 2 INJECTION, SOLUTION INTRAMUSCULAR; INTRAVENOUS at 11:24

## 2024-05-21 RX ADMIN — BUSPIRONE HYDROCHLORIDE 5 MG: 5 TABLET ORAL at 09:07

## 2024-05-21 RX ADMIN — MORPHINE SULFATE 2 MG: 2 INJECTION, SOLUTION INTRAMUSCULAR; INTRAVENOUS at 15:45

## 2024-05-21 RX ADMIN — OXYCODONE HYDROCHLORIDE 10 MG: 10 TABLET ORAL at 22:25

## 2024-05-21 RX ADMIN — OXYCODONE HYDROCHLORIDE 10 MG: 10 TABLET ORAL at 18:23

## 2024-05-21 RX ADMIN — MORPHINE SULFATE 2 MG: 2 INJECTION, SOLUTION INTRAMUSCULAR; INTRAVENOUS at 20:06

## 2024-05-21 RX ADMIN — BISACODYL 10 MG: 10 SUPPOSITORY RECTAL at 14:12

## 2024-05-21 RX ADMIN — METHOCARBAMOL 500 MG: 500 TABLET ORAL at 15:45

## 2024-05-21 RX ADMIN — MORPHINE SULFATE 2 MG: 2 INJECTION, SOLUTION INTRAMUSCULAR; INTRAVENOUS at 05:55

## 2024-05-21 RX ADMIN — TOBRAMYCIN AND DEXAMETHASONE 1 DROP: 3; 1 SUSPENSION/ DROPS OPHTHALMIC at 15:45

## 2024-05-21 RX ADMIN — METHOCARBAMOL 500 MG: 500 TABLET ORAL at 21:48

## 2024-05-21 RX ADMIN — OXYCODONE HYDROCHLORIDE 10 MG: 10 TABLET ORAL at 04:01

## 2024-05-21 RX ADMIN — TOBRAMYCIN AND DEXAMETHASONE 1 DROP: 3; 1 SUSPENSION/ DROPS OPHTHALMIC at 11:26

## 2024-05-21 RX ADMIN — OXYCODONE HYDROCHLORIDE 10 MG: 10 TABLET ORAL at 14:12

## 2024-05-21 RX ADMIN — METHOCARBAMOL 500 MG: 500 TABLET ORAL at 09:07

## 2024-05-21 RX ADMIN — ENOXAPARIN SODIUM 40 MG: 40 INJECTION SUBCUTANEOUS at 09:07

## 2024-05-21 RX ADMIN — OXYCODONE HYDROCHLORIDE 10 MG: 10 TABLET ORAL at 09:07

## 2024-05-21 RX ADMIN — BUSPIRONE HYDROCHLORIDE 5 MG: 5 TABLET ORAL at 15:45

## 2024-05-21 RX ADMIN — TOBRAMYCIN AND DEXAMETHASONE 1 DROP: 3; 1 SUSPENSION/ DROPS OPHTHALMIC at 23:10

## 2024-05-21 RX ADMIN — ATORVASTATIN CALCIUM 20 MG: 20 TABLET, FILM COATED ORAL at 15:45

## 2024-05-21 RX ADMIN — HYDROXYZINE HYDROCHLORIDE 25 MG: 25 TABLET ORAL at 23:10

## 2024-05-21 RX ADMIN — METHOCARBAMOL 500 MG: 500 TABLET ORAL at 11:24

## 2024-05-21 NOTE — PROGRESS NOTES
[facility]  Progress Note  Name: Jos Hawk I  MRN: [id number]  Unit/Bed#: E5 -01 I Date of Admission: 5/16/2024   Date of Service: 5/21/2024 I Hospital Day: 5    Assessment & Plan   Weakness  Assessment & Plan  The patient has had worsening weakness since he had Botox injections.  He is also concerned about 2 episodes of fever that he had over the past couple weeks.  The patient did have a staph infection of his olecranon bursa in the relatively recent past.  Thus far, evaluation has been unrevealing.  Additional blood cultures will be obtained to more definitively rule out an infectious process.    Low grade fever  Assessment & Plan  Patient with low-grade fever 100.8 associated with pulse of 98 and O2 sat of 88%  He also complains of chest pain on deep inspiration  He has high risk for PE  CT chest negative  Recent UA and blood cultures not consistent with infection    * Quadriparesis (HCC)  Assessment & Plan  Significant decline in function over the past few weeks  Chronic quadriparesis due to spinal cord injury  Possibly related to  serotoninergic medications, Cymbalta and BuSpar or progression of his disease, per neurology evaluation  Cymbalta discontinued.  BuSpar restarted at a lower dose  Will consult PMR  He will need inpatient rehab  Per case management, Dr. Blue [facility] would like a neurosurgical evaluation.  I have left a message for him to call me directly to further discuss this.  I did speak with neurosurgery and they will review the patient's current imaging.    BPH associated with nocturia  Assessment & Plan  Beard catheter placed in the emergency room for concern of urinary retention  Patient reportedly had good bowel movements after Fleet enema yesterday      Neurogenic orthostatic hypotension (HCC)  Assessment & Plan  Continue midodrine 5 mg 3 times daily           Subjective:  The patient remains somewhat weakened.  His eye irritation is better.   He has no chest pain, shortness of breath, or abdominal pain.  He remains somewhat constipated and just had a suppository.    Physical Exam:   Temp:  [97.9 °F (36.6 °C)] 97.9 °F (36.6 °C)  HR:  [69-87] 86  Resp:  [18] 18  BP: (124-147)/(78-94) 141/94    Gen: Well-developed, well-nourished, in no distress.  Neck: Supple.  No lymphadenopathy or goiter.  Heart: Regular rhythm.  I heard no murmur, gallop, or rub.  Lungs: Clear to auscultation and percussion.  No wheezing, rales, or rhonchi.  Abd: Soft with active bowel sounds.  No mass, tenderness, organomegaly.  Extremities: No clubbing, cyanosis, or edema.  No calf tenderness.  Neuro: Alert and oriented.  Diffusely weak.  Skin: Warm and dry.      LABS: Repeat blood cultures are pending.          VTE Pharmacologic Prophylaxis: Enoxaparin (Lovenox)  VTE Mechanical Prophylaxis: sequential compression device

## 2024-05-21 NOTE — RESTORATIVE TECHNICIAN NOTE
Restorative Technician Note      Patient Name: Jos Hawk     Restorative Tech Visit Date: 05/21/24  Note Type: Mobility  Patient Position Upon Consult: Supine  Activity Performed: Repositioned  Patient Position at End of Consult: Supine; All needs within reach; Bed/Chair alarm activated

## 2024-05-21 NOTE — ASSESSMENT & PLAN NOTE
Significant decline in function over the past few weeks  Chronic quadriparesis due to spinal cord injury  Possibly related to  serotoninergic medications, Cymbalta and BuSpar or progression of his disease, per neurology evaluation  Cymbalta discontinued.  BuSpar restarted at a lower dose  Will consult PMR  He will need inpatient rehab  Per case management, Dr. Blue [facility] would like a neurosurgical evaluation.  I have left a message for him to call me directly to further discuss this.  I did speak with neurosurgery and they will review the patient's current imaging.

## 2024-05-22 LAB
ANION GAP SERPL CALCULATED.3IONS-SCNC: 3 MMOL/L (ref 4–13)
BUN SERPL-MCNC: 11 MG/DL (ref 5–25)
CALCIUM SERPL-MCNC: 8.9 MG/DL (ref 8.4–10.2)
CHLORIDE SERPL-SCNC: 96 MMOL/L (ref 96–108)
CO2 SERPL-SCNC: 34 MMOL/L (ref 21–32)
CREAT SERPL-MCNC: 0.7 MG/DL (ref 0.6–1.3)
GFR SERPL CREATININE-BSD FRML MDRD: 99 ML/MIN/1.73SQ M
GLUCOSE SERPL-MCNC: 106 MG/DL (ref 65–140)
MAGNESIUM SERPL-MCNC: 1.9 MG/DL (ref 1.9–2.7)
POTASSIUM SERPL-SCNC: 4.4 MMOL/L (ref 3.5–5.3)
SODIUM SERPL-SCNC: 133 MMOL/L (ref 135–147)

## 2024-05-22 PROCEDURE — 83735 ASSAY OF MAGNESIUM: CPT | Performed by: INTERNAL MEDICINE

## 2024-05-22 PROCEDURE — 80048 BASIC METABOLIC PNL TOTAL CA: CPT | Performed by: INTERNAL MEDICINE

## 2024-05-22 PROCEDURE — 99232 SBSQ HOSP IP/OBS MODERATE 35: CPT | Performed by: INTERNAL MEDICINE

## 2024-05-22 PROCEDURE — NC001 PR NO CHARGE: Performed by: STUDENT IN AN ORGANIZED HEALTH CARE EDUCATION/TRAINING PROGRAM

## 2024-05-22 RX ORDER — BACLOFEN 10 MG/1
10 TABLET ORAL ONCE
Status: COMPLETED | OUTPATIENT
Start: 2024-05-22 | End: 2024-05-22

## 2024-05-22 RX ADMIN — METHOCARBAMOL 500 MG: 500 TABLET ORAL at 11:18

## 2024-05-22 RX ADMIN — MORPHINE SULFATE 2 MG: 2 INJECTION, SOLUTION INTRAMUSCULAR; INTRAVENOUS at 21:30

## 2024-05-22 RX ADMIN — BUSPIRONE HYDROCHLORIDE 5 MG: 5 TABLET ORAL at 17:19

## 2024-05-22 RX ADMIN — ATORVASTATIN CALCIUM 20 MG: 20 TABLET, FILM COATED ORAL at 15:51

## 2024-05-22 RX ADMIN — OXYCODONE HYDROCHLORIDE 10 MG: 10 TABLET ORAL at 09:51

## 2024-05-22 RX ADMIN — BACLOFEN 10 MG: 10 TABLET ORAL at 13:05

## 2024-05-22 RX ADMIN — MORPHINE SULFATE 2 MG: 2 INJECTION, SOLUTION INTRAMUSCULAR; INTRAVENOUS at 00:13

## 2024-05-22 RX ADMIN — METHOCARBAMOL 500 MG: 500 TABLET ORAL at 22:13

## 2024-05-22 RX ADMIN — TOBRAMYCIN AND DEXAMETHASONE 1 DROP: 3; 1 SUSPENSION/ DROPS OPHTHALMIC at 17:24

## 2024-05-22 RX ADMIN — MORPHINE SULFATE 2 MG: 2 INJECTION, SOLUTION INTRAMUSCULAR; INTRAVENOUS at 11:55

## 2024-05-22 RX ADMIN — TOBRAMYCIN AND DEXAMETHASONE 1 DROP: 3; 1 SUSPENSION/ DROPS OPHTHALMIC at 11:18

## 2024-05-22 RX ADMIN — BUSPIRONE HYDROCHLORIDE 5 MG: 5 TABLET ORAL at 09:51

## 2024-05-22 RX ADMIN — OXYCODONE HYDROCHLORIDE 10 MG: 10 TABLET ORAL at 15:24

## 2024-05-22 RX ADMIN — HYDROXYZINE HYDROCHLORIDE 25 MG: 25 TABLET ORAL at 22:51

## 2024-05-22 RX ADMIN — OXYCODONE HYDROCHLORIDE 10 MG: 10 TABLET ORAL at 02:58

## 2024-05-22 RX ADMIN — MORPHINE SULFATE 2 MG: 2 INJECTION, SOLUTION INTRAMUSCULAR; INTRAVENOUS at 17:19

## 2024-05-22 RX ADMIN — OXYCODONE HYDROCHLORIDE 10 MG: 10 TABLET ORAL at 20:46

## 2024-05-22 RX ADMIN — METHOCARBAMOL 500 MG: 500 TABLET ORAL at 09:51

## 2024-05-22 RX ADMIN — MORPHINE SULFATE 2 MG: 2 INJECTION, SOLUTION INTRAMUSCULAR; INTRAVENOUS at 06:09

## 2024-05-22 RX ADMIN — ENOXAPARIN SODIUM 40 MG: 40 INJECTION SUBCUTANEOUS at 09:51

## 2024-05-22 RX ADMIN — METHOCARBAMOL 500 MG: 500 TABLET ORAL at 17:19

## 2024-05-22 RX ADMIN — TOBRAMYCIN AND DEXAMETHASONE 1 DROP: 3; 1 SUSPENSION/ DROPS OPHTHALMIC at 23:03

## 2024-05-22 RX ADMIN — TOBRAMYCIN AND DEXAMETHASONE 1 DROP: 3; 1 SUSPENSION/ DROPS OPHTHALMIC at 06:09

## 2024-05-22 RX ADMIN — ACETAMINOPHEN 650 MG: 325 TABLET, FILM COATED ORAL at 17:19

## 2024-05-22 NOTE — PROGRESS NOTES
[facility]  Progress Note  Name: Jos Hawk I  MRN: [id number]  Unit/Bed#: E5 -01 I Date of Admission: 5/16/2024   Date of Service: 5/22/2024 I Hospital Day: 6    Assessment & Plan   Weakness  Assessment & Plan  The patient has had worsening weakness since he had Botox injections.  He is also concerned about 2 episodes of fever that he had over the past couple weeks.  The patient did have a staph infection of his olecranon bursa in the relatively recent past.  Thus far, evaluation has been unrevealing.  Additional blood cultures will be obtained to more definitively rule out an infectious process.    Low grade fever  Assessment & Plan  Fever has resolved.    * Quadriparesis (HCC)  Assessment & Plan  Significant decline in function over the past few weeks  Chronic quadriparesis due to spinal cord injury  Possibly related to  serotoninergic medications, Cymbalta and BuSpar or progression of his disease, per neurology evaluation  Cymbalta discontinued.  BuSpar restarted at a lower dose  Will consult PMR  He will need inpatient rehab  I discussed the situation with neurosurgery and with Dr. Blue of [facility].  An MRI of the thoracic spine will be requested.  In addition, a repeat lumbar spine MRI with gadolinium is thought appropriate.    BPH associated with nocturia  Assessment & Plan  Beard catheter placed in the emergency room for concern of urinary retention  Patient reportedly had good bowel movements after Fleet enema yesterday      Neurogenic orthostatic hypotension (HCC)  Assessment & Plan  Continue midodrine 5 mg 3 times daily               Subjective:  The patient had some increase in spasms today.  He received 1 dose of baclofen with some benefit.  He remains generally weak.  He has had no chest pain, shortness of breath, abdominal pain, nausea, or vomiting.    Physical Exam:   Temp:  [97.7 °F (36.5 °C)-98.2 °F (36.8 °C)] 98 °F (36.7 °C)  HR:  [55-84] 76  Resp:   [14-18] 16  BP: (110-137)/(69-86) 128/83    Gen: Well-developed, well-nourished, in no distress.  Neck: Supple.  No lymphadenopathy, goiter, or bruit.  Heart: Regular rhythm.  No murmur, gallop, or rub.  Lungs: Clear to auscultation and percussion.  No wheezing, rales, or rhonchi.  Abd: Soft with active bowel sounds.  No mass, tenderness, organomegaly.  Extremities: No clubbing, cyanosis, or edema.  No calf tenderness.  Neuro: Alert and oriented.  Diffusely weak.  Skin: Warm and dry.      LABS:   CMP:   Lab Results   Component Value Date    SODIUM 133 (L) 05/22/2024    K 4.4 05/22/2024    CL 96 05/22/2024    CO2 34 (H) 05/22/2024    BUN 11 05/22/2024    CREATININE 0.70 05/22/2024    CALCIUM 8.9 05/22/2024    EGFR 99 05/22/2024           VTE Pharmacologic Prophylaxis: Enoxaparin (Lovenox)  VTE Mechanical Prophylaxis: sequential compression device

## 2024-05-22 NOTE — CONSULTS
e-Consult (IPC)     Inpatient consult to Neurosurgery  Consult performed by: Zonia Palacios PA-C  Consult ordered by: Jorge Warren MD           Contacted by Jorge Warren MD.    Jos Hawk 63 y.o. male MRN: [id number]  Unit/Bed#: E5 -01 Encounter: [id number]    Reason for Consult    Per provider report, patient presents with progressive LE weakness and ambulatory dysfunction over the past several months. He has a history of C3-4 SCI after a fall in 2022  which resulted in quadriparesis.  He also has a history of L2-S1 fusion 2016 and spinal cord stimulator placement in 2018.  He does outpatient therapy with [facility] and follows closely with neurology for management of quadriparesis and autonomic dysfunction.  He has been getting Botox injections, last injections in January.  Per record review, his decline started sometime after most recent Botox injections.  His physiatrist at [facility] encouraged him to go to the ED given his continued decline further evaluation.  He last had an MRI of his cervical and lumbar spine on 5/3/24 which showed stable findings of severe cord atrophy at C3-4 and postop changes from decompressive laminectomies at C3-6.  He also has a history of L2-S1 fusion in the past.  He has a thoracic spinal cord stimulator which has limited his MRI capability.  He has not had an MRI thoracic spine recently.  After discussion with Dr. Warren, it sounds like rehab will not accept him until he has exhausted all imaging studies to rule out any need for surgical intervention. Available past medical history,social history, surgical history, medication list, drug allergies and review of systems were reviewed.    /69 (BP Location: Right arm)   Pulse 80   Temp 98 °F (36.7 °C) (Oral)   Resp 16   Ht 6' (1.829 m)   Wt 114 kg (251 lb)   SpO2 94%   BMI 34.04 kg/m²      Clinical exam per provider report:  Increased tone, BLE > BUE  3-4/5 BUE with limited ROM  BHF 2/5, right  stronger than left  BDF 1/5, PF 3/5  Sharp PP sensation intact BLE    Imaging personally reviewed.   MRI cervical and lumbar spine w/o, 5/3/24: Stable short segment severe cord atrophy with myelomalacia related signal abnormality at level C3-4. Redemonstrated prior decompressive laminectomies at levels C3-C6. Stable postsurgical appearance at levels L2-S1. Surgically patent canal     Assessment and Recommendations    1.  Per discussion with Dr. Warren and Dr. Blue at [facility], it sounds like MRI cervical, thoracic, lumbar spine with and without contrast was recommended by [facility].  If further evaluation is warranted, I would consider MRI thoracic spine as well as MRI lumbar spine with and without contrast given history of surgery and fusion.  I do not feel repeat MRI cervical spine is indicated at this time.  2.  Continue workup of metabolic abnormalities  3.  Continue aggressive therapies  4.  It is unlikely that further surgical intervention will be indicated in the acute setting given chronicity of symptoms  5.  Neurosurgery will be available to review further imaging if obtained during this admission.  Otherwise, patient can discharge to rehab at any time and follow-up in the outpatient setting for the studies.    All questions answered. Provider is in agreement with the course of action. 31 + minutes, >50% of the total time devoted to medical consultative verbal/EMR discussion between providers. Written report will be generated in the EMR.

## 2024-05-22 NOTE — ASSESSMENT & PLAN NOTE
Significant decline in function over the past few weeks  Chronic quadriparesis due to spinal cord injury  Possibly related to  serotoninergic medications, Cymbalta and BuSpar or progression of his disease, per neurology evaluation  Cymbalta discontinued.  BuSpar restarted at a lower dose  Will consult PMR  He will need inpatient rehab  I discussed the situation with neurosurgery and with Dr. Blue of [facility].  An MRI of the thoracic spine will be requested.  In addition, a repeat lumbar spine MRI with gadolinium is thought appropriate.

## 2024-05-23 ENCOUNTER — HOSPITAL ENCOUNTER (INPATIENT)
Facility: HOSPITAL | Age: 64
LOS: 11 days | DRG: 052 | End: 2024-06-03
Attending: FAMILY MEDICINE | Admitting: FAMILY MEDICINE
Payer: MEDICARE

## 2024-05-23 VITALS
HEIGHT: 72 IN | OXYGEN SATURATION: 93 % | HEART RATE: 81 BPM | TEMPERATURE: 98.4 F | SYSTOLIC BLOOD PRESSURE: 124 MMHG | DIASTOLIC BLOOD PRESSURE: 83 MMHG | BODY MASS INDEX: 34 KG/M2 | RESPIRATION RATE: 20 BRPM | WEIGHT: 251 LBS

## 2024-05-23 DIAGNOSIS — G82.50 QUADRIPARESIS (HCC): ICD-10-CM

## 2024-05-23 DIAGNOSIS — R53.1 WEAKNESS: Primary | ICD-10-CM

## 2024-05-23 DIAGNOSIS — R25.2 SPASTICITY: ICD-10-CM

## 2024-05-23 DIAGNOSIS — Z96.89 SPINAL CORD STIMULATOR STATUS: ICD-10-CM

## 2024-05-23 PROCEDURE — 99223 1ST HOSP IP/OBS HIGH 75: CPT | Performed by: FAMILY MEDICINE

## 2024-05-23 PROCEDURE — 97530 THERAPEUTIC ACTIVITIES: CPT | Performed by: PHYSICAL THERAPIST

## 2024-05-23 PROCEDURE — NC001 PR NO CHARGE: Performed by: INTERNAL MEDICINE

## 2024-05-23 RX ORDER — OXYCODONE HYDROCHLORIDE 5 MG/1
5 TABLET ORAL EVERY 4 HOURS PRN
Status: DISCONTINUED | OUTPATIENT
Start: 2024-05-23 | End: 2024-05-28

## 2024-05-23 RX ORDER — HYDROXYZINE HYDROCHLORIDE 25 MG/1
25 TABLET, FILM COATED ORAL
Status: CANCELLED | OUTPATIENT
Start: 2024-05-23

## 2024-05-23 RX ORDER — ATORVASTATIN CALCIUM 20 MG/1
20 TABLET, FILM COATED ORAL
Status: DISCONTINUED | OUTPATIENT
Start: 2024-05-24 | End: 2024-06-03 | Stop reason: HOSPADM

## 2024-05-23 RX ORDER — BUSPIRONE HYDROCHLORIDE 5 MG/1
5 TABLET ORAL 2 TIMES DAILY
Status: DISCONTINUED | OUTPATIENT
Start: 2024-05-24 | End: 2024-06-03 | Stop reason: HOSPADM

## 2024-05-23 RX ORDER — TOBRAMYCIN AND DEXAMETHASONE 3; 1 MG/ML; MG/ML
1 SUSPENSION/ DROPS OPHTHALMIC EVERY 6 HOURS SCHEDULED
Status: DISCONTINUED | OUTPATIENT
Start: 2024-05-24 | End: 2024-05-31

## 2024-05-23 RX ORDER — MIDODRINE HYDROCHLORIDE 5 MG/1
5 TABLET ORAL
Status: DISCONTINUED | OUTPATIENT
Start: 2024-05-24 | End: 2024-06-03 | Stop reason: HOSPADM

## 2024-05-23 RX ORDER — BISACODYL 10 MG
10 SUPPOSITORY, RECTAL RECTAL DAILY PRN
Status: DISCONTINUED | OUTPATIENT
Start: 2024-05-23 | End: 2024-06-01

## 2024-05-23 RX ORDER — BUSPIRONE HYDROCHLORIDE 5 MG/1
5 TABLET ORAL 2 TIMES DAILY
Status: CANCELLED | OUTPATIENT
Start: 2024-05-23

## 2024-05-23 RX ORDER — TOBRAMYCIN AND DEXAMETHASONE 3; 1 MG/ML; MG/ML
1 SUSPENSION/ DROPS OPHTHALMIC EVERY 6 HOURS SCHEDULED
Status: CANCELLED | OUTPATIENT
Start: 2024-05-23

## 2024-05-23 RX ORDER — ATORVASTATIN CALCIUM 20 MG/1
20 TABLET, FILM COATED ORAL
Status: CANCELLED | OUTPATIENT
Start: 2024-05-24

## 2024-05-23 RX ORDER — OXYCODONE HYDROCHLORIDE 10 MG/1
10 TABLET ORAL EVERY 4 HOURS PRN
Status: DISCONTINUED | OUTPATIENT
Start: 2024-05-23 | End: 2024-05-28

## 2024-05-23 RX ORDER — BISACODYL 10 MG
10 SUPPOSITORY, RECTAL RECTAL DAILY PRN
Status: CANCELLED | OUTPATIENT
Start: 2024-05-23

## 2024-05-23 RX ORDER — OXYCODONE HYDROCHLORIDE 5 MG/1
5 TABLET ORAL EVERY 4 HOURS PRN
Status: CANCELLED | OUTPATIENT
Start: 2024-05-23

## 2024-05-23 RX ORDER — ENOXAPARIN SODIUM 100 MG/ML
40 INJECTION SUBCUTANEOUS DAILY
Status: DISCONTINUED | OUTPATIENT
Start: 2024-05-24 | End: 2024-06-03 | Stop reason: HOSPADM

## 2024-05-23 RX ORDER — ACETAMINOPHEN 325 MG/1
650 TABLET ORAL EVERY 4 HOURS PRN
Status: CANCELLED | OUTPATIENT
Start: 2024-05-23

## 2024-05-23 RX ORDER — HYDROXYZINE HYDROCHLORIDE 25 MG/1
25 TABLET, FILM COATED ORAL
Status: DISCONTINUED | OUTPATIENT
Start: 2024-05-23 | End: 2024-06-03 | Stop reason: HOSPADM

## 2024-05-23 RX ORDER — METHOCARBAMOL 500 MG/1
500 TABLET, FILM COATED ORAL 4 TIMES DAILY
Status: CANCELLED | OUTPATIENT
Start: 2024-05-23

## 2024-05-23 RX ORDER — SODIUM PHOSPHATE,MONO-DIBASIC 19G-7G/118
1 ENEMA (ML) RECTAL DAILY PRN
Status: CANCELLED | OUTPATIENT
Start: 2024-05-23

## 2024-05-23 RX ORDER — ACETAMINOPHEN 325 MG/1
650 TABLET ORAL EVERY 4 HOURS PRN
Status: DISCONTINUED | OUTPATIENT
Start: 2024-05-23 | End: 2024-05-25

## 2024-05-23 RX ORDER — METHOCARBAMOL 500 MG/1
500 TABLET, FILM COATED ORAL 4 TIMES DAILY
Status: DISCONTINUED | OUTPATIENT
Start: 2024-05-23 | End: 2024-05-27

## 2024-05-23 RX ORDER — ENOXAPARIN SODIUM 100 MG/ML
40 INJECTION SUBCUTANEOUS DAILY
Status: CANCELLED | OUTPATIENT
Start: 2024-05-24

## 2024-05-23 RX ORDER — OXYCODONE HYDROCHLORIDE 10 MG/1
10 TABLET ORAL EVERY 4 HOURS PRN
Status: CANCELLED | OUTPATIENT
Start: 2024-05-23

## 2024-05-23 RX ORDER — MIDODRINE HYDROCHLORIDE 5 MG/1
5 TABLET ORAL
Status: CANCELLED | OUTPATIENT
Start: 2024-05-24

## 2024-05-23 RX ORDER — SODIUM PHOSPHATE,MONO-DIBASIC 19G-7G/118
1 ENEMA (ML) RECTAL DAILY PRN
Status: DISCONTINUED | OUTPATIENT
Start: 2024-05-23 | End: 2024-05-23

## 2024-05-23 RX ADMIN — TOBRAMYCIN AND DEXAMETHASONE 1 DROP: 3; 1 SUSPENSION/ DROPS OPHTHALMIC at 11:42

## 2024-05-23 RX ADMIN — ENOXAPARIN SODIUM 40 MG: 40 INJECTION SUBCUTANEOUS at 08:36

## 2024-05-23 RX ADMIN — METHOCARBAMOL 500 MG: 500 TABLET ORAL at 22:01

## 2024-05-23 RX ADMIN — MORPHINE SULFATE 2 MG: 2 INJECTION, SOLUTION INTRAMUSCULAR; INTRAVENOUS at 01:28

## 2024-05-23 RX ADMIN — HYDROXYZINE HYDROCHLORIDE 25 MG: 25 TABLET ORAL at 20:43

## 2024-05-23 RX ADMIN — BUSPIRONE HYDROCHLORIDE 5 MG: 5 TABLET ORAL at 08:49

## 2024-05-23 RX ADMIN — TOBRAMYCIN AND DEXAMETHASONE 1 DROP: 3; 1 SUSPENSION/ DROPS OPHTHALMIC at 23:37

## 2024-05-23 RX ADMIN — MORPHINE SULFATE 2 MG: 2 INJECTION, SOLUTION INTRAMUSCULAR; INTRAVENOUS at 08:32

## 2024-05-23 RX ADMIN — METHOCARBAMOL 500 MG: 500 TABLET ORAL at 17:28

## 2024-05-23 RX ADMIN — ATORVASTATIN CALCIUM 20 MG: 20 TABLET, FILM COATED ORAL at 17:28

## 2024-05-23 RX ADMIN — OXYCODONE HYDROCHLORIDE 10 MG: 10 TABLET ORAL at 00:52

## 2024-05-23 RX ADMIN — MORPHINE SULFATE 2 MG: 2 INJECTION, SOLUTION INTRAMUSCULAR; INTRAVENOUS at 15:13

## 2024-05-23 RX ADMIN — METHOCARBAMOL 500 MG: 500 TABLET ORAL at 11:42

## 2024-05-23 RX ADMIN — OXYCODONE HYDROCHLORIDE 10 MG: 10 TABLET ORAL at 14:19

## 2024-05-23 RX ADMIN — METHOCARBAMOL 500 MG: 500 TABLET ORAL at 08:49

## 2024-05-23 RX ADMIN — OXYCODONE HYDROCHLORIDE 10 MG: 10 TABLET ORAL at 22:29

## 2024-05-23 RX ADMIN — OXYCODONE HYDROCHLORIDE 10 MG: 10 TABLET ORAL at 18:45

## 2024-05-23 RX ADMIN — MORPHINE SULFATE 2 MG: 2 INJECTION, SOLUTION INTRAMUSCULAR; INTRAVENOUS at 19:56

## 2024-05-23 RX ADMIN — OXYCODONE HYDROCHLORIDE 10 MG: 10 TABLET ORAL at 06:16

## 2024-05-23 RX ADMIN — TOBRAMYCIN AND DEXAMETHASONE 1 DROP: 3; 1 SUSPENSION/ DROPS OPHTHALMIC at 17:27

## 2024-05-23 RX ADMIN — BUSPIRONE HYDROCHLORIDE 5 MG: 5 TABLET ORAL at 17:28

## 2024-05-23 RX ADMIN — MORPHINE SULFATE 2 MG: 2 INJECTION, SOLUTION INTRAMUSCULAR; INTRAVENOUS at 23:37

## 2024-05-23 RX ADMIN — TOBRAMYCIN AND DEXAMETHASONE 1 DROP: 3; 1 SUSPENSION/ DROPS OPHTHALMIC at 06:16

## 2024-05-23 NOTE — RESTORATIVE TECHNICIAN NOTE
Restorative Technician Note      Patient Name: Jos Hawk     Restorative Tech Visit Date: 05/23/24  Note Type: Mobility  Patient Position Upon Consult: Supine  Mobility / Activity Provided: assisted PT  Activity Performed: Stood; Dangled; Repositioned  Assistive Device: Roller walker  Patient Position at End of Consult: Supine; All needs within reach; Bed/Chair alarm activated

## 2024-05-23 NOTE — DISCHARGE SUMMARY
Discharge Summary - Jos Hawk, 1960, [id number]        Admission Date: 5/16/2024  Discharge Date: 5/23/2024      Discharge Diagnosis:   1.  Worsening weakness  2.  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  History of lumbar spine disease, status post spinal fusion  4.  Status post spinal cord stimulator  5.  Recent fever, now resolved  6.  BPH  7.  Neurogenic orthostatic hypotension    Consulting Physicians:  Dr. Danielle, neurology  Dr. Yi, neurosurgery  Dr. Sue, physiatry    Procedures Performed:   None    HPI: The patient is a 63-year-old man who presented to the emergency room with worsening generalized weakness and diminished functional capacity.  Some years ago he suffered a spinal cord injury of the cervical spine.  He has had quadriparesis since that time.  He has been followed by Dr. Blue, a physiatrist at [facility].  In January he had Botox injections to his hamstrings.  He has been deteriorating over this past several months.  Because of his worsening functional status with no good explanation, he was advised to come to the emergency room for further evaluation.    Hospital Course: The patient was admitted to the hospital and observe carefully.  Baseline laboratory data was unrevealing.  He had cultures which were also unrevealing.  He was evaluated by neurology.  It was thought that possibly some of his symptoms were related to medications and these were adjusted.  Unfortunately no change in the patient's clinical status resulted.  The patient had recent imaging of his cervical spine and lumbar spine which showed revealed no explanation for his symptoms.  His imaging studies were reviewed by neurosurgery.  After discussing the situation with neurosurgery and with physiatry, it was decided that the patient should undergo imaging of his thoracic spine and a repeat MRI of the lumbar spine with gadolinium.  Unfortunately, because of the presence of a spinal cord stimulator,  this could not be completed at [facility].  The patient was transferred to [facility] to facilitate this.  It was thought best to have the patient actually admitted to [facility] rather than going for his MRIs and then returning to [place] to allow in person evaluation by neurosurgery.    On the day of discharge the patient was feeling reasonably well.  He remained weak.  Vital signs were stable.  Lungs were clear.  Cardiac exam revealed regular rhythm.  The abdomen was soft and nontender.  There was no edema.  Quadriparesis was noted.    Disposition: The patient was transferred to [facility] on May 23.  Diet and activity will be as tolerated.  He will be under the care of the [facility] while in [facility].  It is anticipated that when his evaluation is complete, that he will be transferred for inpatient rehabilitation to [facility].  When he is discharged from [facility] he will reestablish primary care with Dr. Demarco Tirado.    The patient was discharged with a Beard catheter.  This was placed in the emergency room because of urinary retention.  The patient was reluctant to undergo a trial of voiding given his current clinical status.    Discharge instructions/Information to patient and family:   See after visit summary for information provided to patient and family.      Provisions for Follow-Up Care:  See after visit summary for information related to follow-up care and any pertinent home health orders.      Planned Readmission: Yes [facility]    Discharge Statement   I spent 40 minutes discharging the patient. This time was spent on the day of discharge. I had direct contact with the patient on the day of discharge.     Discharge Medications:  See after visit summary for reconciled discharge medications provided to patient and family.

## 2024-05-23 NOTE — PHYSICAL THERAPY NOTE
PT PROGRESS NOTE    Name: Jos Hawk  AGE: 63 y.o.  MRN: [id number]  LENGTH OF STAY: 7     05/23/24 1503   PT Last Visit   PT Visit Date 05/23/24   Note Type   Note Type Treatment   Pain Assessment   Pain Assessment Tool 0-10   Pain Score 8   Pain Location/Orientation Location: Back;Location: Generalized   Hospital Pain Intervention(s) Repositioned;Ambulation/increased activity;Elevated;Emotional support;Rest   Restrictions/Precautions   Weight Bearing Precautions Per Order No   Other Precautions Multiple lines;Fall Risk;Pain   General   Chart Reviewed Yes   Response to Previous Treatment Patient with no complaints from previous session.   Family/Caregiver Present Yes  (son and caregiver)   Cognition   Overall Cognitive Status WFL   Arousal/Participation Alert   Attention Within functional limits   Orientation Level Oriented X4   Following Commands Follows all commands and directions without difficulty   Subjective   Subjective I did not have to wear socks previously   Bed Mobility   Rolling R 2  Maximal assistance   Additional items Assist x 1;Bedrails;Increased time required;Verbal cues;LE management   Rolling L 2  Maximal assistance   Additional items Assist x 1;Bedrails;Increased time required;Verbal cues;LE management   Supine to Sit 2  Maximal assistance   Additional items Assist x 2;Bedrails;Increased time required;Verbal cues;LE management   Sit to Supine 1  Dependent   Additional items   (x4)   Additional Comments Able to tolerate sitting EOB ~5 minutes with mild dizziness; /85. Use of unilateral UE support for upright trunk posture   Transfers   Sit to Stand 2  Maximal assistance   Additional items Assist x 2;Increased time required;Verbal cues  (w/ RW)   Additional Comments (S)  use of gait belt for sit to stand. Pt able to tolerate static standing ~7 minutes with significant reliance on UE support on RW. CGA on each side of pt for improved safety. Restorative Komal behind pt and bed, pts son  in front of pt while standing. pt began to perform mini squats with heavy UE support when bilateral LE contracted with muscle spams causing bilateral knee buckling; dependentAx4 to lower to bed and lay supine position for safety.   Ambulation/Elevation   Gait pattern Not appropriate   Balance   Static Sitting Poor +   Dynamic Sitting Poor   Static Standing Fair -  (w/ RW)   Activity Tolerance   Activity Tolerance Treatment limited secondary to medical complications (Comment);Patient limited by pain  (inc tone)   Medical Staff Made Aware Restorative Komal   Nurse Made Aware RN yes   Assessment   Prognosis Fair   Problem List Decreased strength;Decreased range of motion;Decreased endurance;Impaired balance;Decreased mobility;Impaired tone;Pain   Assessment Patient was seen today per POC. Pain 8/10 in low back. Educated pt on use of hospital socks for improved safety and pt agreeable. Significant tone in bilateral LE. Required maxAx2 for rolling L and R, supine to sit, sit to stand and dependentx4 for transfer back to supine. Able to tolerate sitting EOB ~5 minutes with mild dizziness; /85. Use of unilateral UE support for upright trunk posture. Use of gait belt for sit to stand. Pt able to tolerate static standing ~7 minutes with significant reliance on UE support on RW. CGA on each side of pt for improved safety. Restorative Komal behind pt and bed, pts son in front of pt while standing. pt began to perform mini squats with heavy UE support when bilateral LE contracted with muscle spams causing bilateral knee buckling. Pt required dependentAx4 to lower to bed and lay supine position. RN notified. Will continue to see pt per POC as tolerated. From PT standpoint continued recommendation for level I, (maximum resource intensity) at D/C when medically cleared based current function. The patient's AM-PAC Basic Mobility Inpatient Short Form Raw Score is 7. A Raw score of less than or equal to 16 suggests the patient  may benefit from discharge to post-acute rehabilitation services. Please also refer to the recommendation of the Physical Therapist for safe discharge planning. Pt requesting to be seen after 11 am daily to assist with standing.   Goals   Patient Goals to figure out why he is having spasms   STG Expiration Date 05/27/24   PT Treatment Day 2   Plan   Treatment/Interventions Functional transfer training;LE strengthening/ROM;Therapeutic exercise;Endurance training;Patient/family training;Bed mobility;Spoke to nursing;OT   Progress Slow progress, decreased activity tolerance   PT Frequency 3-5x/wk   Discharge Recommendation   Rehab Resource Intensity Level, PT I (Maximum Resource Intensity)   AM-PAC Basic Mobility Inpatient   Turning in Flat Bed Without Bedrails 2   Lying on Back to Sitting on Edge of Flat Bed Without Bedrails 1   Moving Bed to Chair 1   Standing Up From Chair Using Arms 1   Walk in Room 1   Climb 3-5 Stairs With Railing 1   Basic Mobility Inpatient Raw Score 7   [facility] Highest Level Of Mobility   -HL Goal 2: Bed activities/Dependent transfer   -HLM Achieved 5: Stand (1 or more minutes)   Education   Education Provided Mobility training;Assistive device   Patient Reinforcement needed   End of Consult   Patient Position at End of Consult Supine;All needs within reach   End of Consult Comments (S)  Pt requesting to be seen after 11 am daily to assist with standing.     Elsy Hernández, PT

## 2024-05-23 NOTE — PLAN OF CARE
Problem: PHYSICAL THERAPY ADULT  Goal: Performs mobility at highest level of function for planned discharge setting.  See evaluation for individualized goals.  Description: Treatment/Interventions: Functional transfer training, LE strengthening/ROM, Therapeutic exercise, Patient/family training, Equipment eval/education, Bed mobility, Gait training, Compensatory technique education, Continued evaluation, Spoke to nursing, OT, Family          See flowsheet documentation for full assessment, interventions and recommendations.  Note: Prognosis: Fair  Problem List: Decreased strength, Decreased range of motion, Decreased endurance, Impaired balance, Decreased mobility, Impaired tone, Pain  Assessment: Patient was seen today per POC. Pain 8/10 in low back. Educated pt on use of hospital socks for improved safety and pt agreeable. Significant tone in bilateral LE. Required maxAx2 for rolling L and R, supine to sit, sit to stand and dependentx4 for transfer back to supine. Able to tolerate sitting EOB ~5 minutes with mild dizziness; /85. Use of unilateral UE support for upright trunk posture. Use of gait belt for sit to stand. Pt able to tolerate static standing ~7 minutes with significant reliance on UE support on RW. CGA on each side of pt for improved safety. Restorative Komal behind pt and bed, pts son in front of pt while standing. pt began to perform mini squats with heavy UE support when bilateral LE contracted with muscle spams causing bilateral knee buckling. Pt required dependentAx4 to lower to bed and lay supine position. RN notified. Will continue to see pt per POC as tolerated. From PT standpoint continued recommendation for level I, (maximum resource intensity) at D/C when medically cleared based current function. The patient's AM-PAC Basic Mobility Inpatient Short Form Raw Score is 7. A Raw score of less than or equal to 16 suggests the patient may benefit from discharge to post-acute rehabilitation  services. Please also refer to the recommendation of the Physical Therapist for safe discharge planning. Pt requesting to be seen after 11 am daily to assist with standing.  Barriers to Discharge: None     Rehab Resource Intensity Level, PT: I (Maximum Resource Intensity)    See flowsheet documentation for full assessment.

## 2024-05-23 NOTE — RESTORATIVE TECHNICIAN NOTE
Restorative Technician Note      Patient Name: Jos Hawk     Restorative Tech Visit Date: 05/23/24  Note Type: Mobility  Patient Position Upon Consult: Supine  Mobility / Activity Provided: assisted RN  Activity Performed: Repositioned  Patient Position at End of Consult: Supine; All needs within reach; Bed/Chair alarm activated

## 2024-05-24 ENCOUNTER — APPOINTMENT (OUTPATIENT)
Dept: RADIOLOGY | Facility: HOSPITAL | Age: 64
DRG: 052 | End: 2024-05-24
Payer: MEDICARE

## 2024-05-24 ENCOUNTER — APPOINTMENT (INPATIENT)
Dept: RADIOLOGY | Facility: HOSPITAL | Age: 64
DRG: 052 | End: 2024-05-24
Attending: FAMILY MEDICINE
Payer: MEDICARE

## 2024-05-24 PROBLEM — E87.1 HYPONATREMIA: Status: ACTIVE | Noted: 2024-05-24

## 2024-05-24 PROCEDURE — 72158 MRI LUMBAR SPINE W/O & W/DYE: CPT

## 2024-05-24 PROCEDURE — 97163 PT EVAL HIGH COMPLEX 45 MIN: CPT

## 2024-05-24 PROCEDURE — 99223 1ST HOSP IP/OBS HIGH 75: CPT | Performed by: STUDENT IN AN ORGANIZED HEALTH CARE EDUCATION/TRAINING PROGRAM

## 2024-05-24 PROCEDURE — 99232 SBSQ HOSP IP/OBS MODERATE 35: CPT | Performed by: INTERNAL MEDICINE

## 2024-05-24 PROCEDURE — A9585 GADOBUTROL INJECTION: HCPCS | Performed by: FAMILY MEDICINE

## 2024-05-24 PROCEDURE — 97530 THERAPEUTIC ACTIVITIES: CPT

## 2024-05-24 PROCEDURE — 97167 OT EVAL HIGH COMPLEX 60 MIN: CPT

## 2024-05-24 RX ORDER — GADOBUTROL 604.72 MG/ML
11 INJECTION INTRAVENOUS
Status: COMPLETED | OUTPATIENT
Start: 2024-05-24 | End: 2024-05-24

## 2024-05-24 RX ORDER — LORAZEPAM 2 MG/ML
0.5 INJECTION INTRAMUSCULAR ONCE
Status: COMPLETED | OUTPATIENT
Start: 2024-05-24 | End: 2024-05-24

## 2024-05-24 RX ADMIN — OXYCODONE HYDROCHLORIDE 10 MG: 10 TABLET ORAL at 06:18

## 2024-05-24 RX ADMIN — MORPHINE SULFATE 2 MG: 2 INJECTION, SOLUTION INTRAMUSCULAR; INTRAVENOUS at 12:25

## 2024-05-24 RX ADMIN — TOBRAMYCIN AND DEXAMETHASONE 1 DROP: 3; 1 SUSPENSION/ DROPS OPHTHALMIC at 17:34

## 2024-05-24 RX ADMIN — HYDROXYZINE HYDROCHLORIDE 25 MG: 25 TABLET, FILM COATED ORAL at 21:30

## 2024-05-24 RX ADMIN — TOBRAMYCIN AND DEXAMETHASONE 1 DROP: 3; 1 SUSPENSION/ DROPS OPHTHALMIC at 06:19

## 2024-05-24 RX ADMIN — BUSPIRONE HYDROCHLORIDE 5 MG: 5 TABLET ORAL at 09:15

## 2024-05-24 RX ADMIN — ENOXAPARIN SODIUM 40 MG: 40 INJECTION SUBCUTANEOUS at 09:15

## 2024-05-24 RX ADMIN — LORAZEPAM 0.5 MG: 2 INJECTION INTRAMUSCULAR; INTRAVENOUS at 12:38

## 2024-05-24 RX ADMIN — MORPHINE SULFATE 2 MG: 2 INJECTION, SOLUTION INTRAMUSCULAR; INTRAVENOUS at 18:44

## 2024-05-24 RX ADMIN — BUSPIRONE HYDROCHLORIDE 5 MG: 5 TABLET ORAL at 17:34

## 2024-05-24 RX ADMIN — METHOCARBAMOL 500 MG: 500 TABLET ORAL at 09:15

## 2024-05-24 RX ADMIN — OXYCODONE HYDROCHLORIDE 10 MG: 10 TABLET ORAL at 17:33

## 2024-05-24 RX ADMIN — MIDODRINE HYDROCHLORIDE 5 MG: 5 TABLET ORAL at 06:18

## 2024-05-24 RX ADMIN — METHOCARBAMOL 500 MG: 500 TABLET ORAL at 21:24

## 2024-05-24 RX ADMIN — METHOCARBAMOL 500 MG: 500 TABLET ORAL at 17:33

## 2024-05-24 RX ADMIN — TOBRAMYCIN AND DEXAMETHASONE 1 DROP: 3; 1 SUSPENSION/ DROPS OPHTHALMIC at 12:18

## 2024-05-24 RX ADMIN — OXYCODONE HYDROCHLORIDE 10 MG: 10 TABLET ORAL at 10:16

## 2024-05-24 RX ADMIN — OXYCODONE HYDROCHLORIDE 10 MG: 10 TABLET ORAL at 21:24

## 2024-05-24 RX ADMIN — GADOBUTROL 11 ML: 604.72 INJECTION INTRAVENOUS at 14:19

## 2024-05-24 RX ADMIN — ATORVASTATIN CALCIUM 20 MG: 20 TABLET, FILM COATED ORAL at 17:33

## 2024-05-24 RX ADMIN — OXYCODONE HYDROCHLORIDE 10 MG: 10 TABLET ORAL at 02:03

## 2024-05-24 RX ADMIN — MORPHINE SULFATE 2 MG: 2 INJECTION, SOLUTION INTRAMUSCULAR; INTRAVENOUS at 03:47

## 2024-05-24 RX ADMIN — MORPHINE SULFATE 2 MG: 2 INJECTION, SOLUTION INTRAMUSCULAR; INTRAVENOUS at 22:43

## 2024-05-24 RX ADMIN — METHOCARBAMOL 500 MG: 500 TABLET ORAL at 12:16

## 2024-05-24 RX ADMIN — MIDODRINE HYDROCHLORIDE 5 MG: 5 TABLET ORAL at 12:16

## 2024-05-24 RX ADMIN — MORPHINE SULFATE 2 MG: 2 INJECTION, SOLUTION INTRAMUSCULAR; INTRAVENOUS at 09:09

## 2024-05-24 NOTE — ASSESSMENT & PLAN NOTE
Patient with history of L2-S1 fusion surgery,  s/p spinal cord stimulator in 2018, cervical spinal cord injury with residual quadriparesis 2022, now with progressive weakness after recent Botox injection. significant decline in function over the past few weeks.  Initially thought to be possibly related to  serotoninergic medications, Cymbalta and BuSpar or progression of his disease, per neurology evaluation  Cymbalta discontinued.  BuSpar restarted at a lower dose  Patient will need MRI of thoracic spine and lumbar spine with gadolinium however due to spinal cord stimulator, need to be transferred to [facility].  Also consult neurosurgery  Will need rehab on discharge, good Pedraza

## 2024-05-24 NOTE — PROGRESS NOTES
[facility]  Progress Note  Name: Jos Hawk I  MRN: [id number]  Unit/Bed#: [facility] 610-01 I Date of Admission: 5/23/2024   Date of Service: 5/24/2024 I Hospital Day: 1    Assessment & Plan   * Quadriparesis (HCC)  Assessment & Plan  Patient with history of L2-S1 fusion surgery,  s/p spinal cord stimulator in 2018, C3-4 SCI injury with residual quadriparesis 2022 after a fall, now with progressive weakness after recent Botox injection. With significant decline in function over the past few weeks.  Initially thought to be possibly related to  serotoninergic medications, Cymbalta and BuSpar or progression of his disease, per neurology evaluation while at [facility] (5/17)  Cymbalta discontinued.  BuSpar restarted at a lower dose  Patient will need MRI of thoracic spine and lumbar spine with gadolinium however due to spinal cord stimulator, needed to be transferred from [facility] to [facility].  Neurosurgery following, input appreciated   Will need rehab on discharge, good Pedraza    S/P insertion of spinal cord stimulator  Assessment & Plan  Noted    Hyponatremia  Assessment & Plan  Chronic, baseline appears around 131-133  Trend BMP    Neurogenic orthostatic hypotension (HCC)  Assessment & Plan  Continue midodrine 5 mg 3 times daily    BPH associated with nocturia  Assessment & Plan  Beard catheter placed in the emergency room upon admission to [facility] on 5/16 for concern of urinary retention  Void trial as able            VTE Pharmacologic Prophylaxis:   Moderate Risk (Score 3-4) - Pharmacological DVT Prophylaxis Ordered: enoxaparin (Lovenox).    Mobility:   Basic Mobility Inpatient Raw Score: 7  JH-HLM Goal: 2: Bed activities/Dependent transfer  JH-HLM Achieved: 2: Bed activities/Dependent transfer  JH-HLM Goal achieved. Continue to encourage appropriate mobility.    Patient Centered Rounds: I performed bedside rounds with nursing staff today.   Discussions with  Specialists or Other Care Team Provider: brayden sneed CM Nsx    Education and Discussions with Family / Patient:  updated caregiver at bedside with patient permission .     Total Time Spent on Date of Encounter in care of patient: 35 mins. This time was spent on one or more of the following: performing physical exam; counseling and coordination of care; obtaining or reviewing history; documenting in the medical record; reviewing/ordering tests, medications or procedures; communicating with other healthcare professionals and discussing with patient's family/caregivers.    Current Length of Stay: 1 day(s)  Current Patient Status: Inpatient   Certification Statement: The patient will continue to require additional inpatient hospital stay due to MRIs  Discharge Plan: Anticipate discharge in >72 hrs to rehab facility.    Code Status: Level 1 - Full Code    Subjective:   Pt feels okay today. Wants to know what is going on. Hopeful MRI will provide answers. Tolerating rodriguez. Has had constipation.     Objective:     Vitals:   Temp (24hrs), Av.6 °F (37 °C), Min:98.4 °F (36.9 °C), Max:99 °F (37.2 °C)    Temp:  [98.4 °F (36.9 °C)-99 °F (37.2 °C)] 98.4 °F (36.9 °C)  HR:  [] 79  Resp:  [18-20] 18  BP: (124-144)/(81-86) 133/86  SpO2:  [91 %-95 %] 93 %  Body mass index is 33.16 kg/m².     Input and Output Summary (last 24 hours):     Intake/Output Summary (Last 24 hours) at 2024 1054  Last data filed at 2024 0601  Gross per 24 hour   Intake --   Output 775 ml   Net -775 ml       Physical Exam:   Physical Exam  Vitals and nursing note reviewed.   Constitutional:       General: He is not in acute distress.  Cardiovascular:      Rate and Rhythm: Normal rate and regular rhythm.   Pulmonary:      Effort: No respiratory distress.      Breath sounds: Normal breath sounds. No wheezing.   Abdominal:      General: There is no distension.      Palpations: Abdomen is soft.   Musculoskeletal:         General: Swelling  present.   Skin:     Coloration: Skin is pale.   Neurological:      Mental Status: He is alert and oriented to person, place, and time.      Comments: Diffuse weakness, LE > UE   Psychiatric:         Mood and Affect: Mood normal.          Additional Data:     Labs:  Results from last 7 days   Lab Units 05/18/24  0540   WBC Thousand/uL 7.05   HEMOGLOBIN g/dL 17.5*   HEMATOCRIT % 51.2*   PLATELETS Thousands/uL 161     Results from last 7 days   Lab Units 05/22/24  1424 05/18/24  0540   SODIUM mmol/L 133* 139   POTASSIUM mmol/L 4.4 4.3   CHLORIDE mmol/L 96 102   CO2 mmol/L 34* 32   BUN mg/dL 11 10   CREATININE mg/dL 0.70 0.67   ANION GAP mmol/L 3* 5   CALCIUM mg/dL 8.9 8.6   ALBUMIN g/dL  --  3.5   TOTAL BILIRUBIN mg/dL  --  0.79   ALK PHOS U/L  --  55   ALT U/L  --  14   AST U/L  --  14   GLUCOSE RANDOM mg/dL 106 78                       Lines/Drains:  Invasive Devices       Peripheral Intravenous Line  Duration             Peripheral IV 05/23/24 Dorsal (posterior);Left Forearm 1 day              Drain  Duration             Urethral Catheter Latex;Straight-tip 16 Fr. 7 days                  Urinary Catheter:  Goal for removal: Voiding trial when ambulation improves               Imaging: Reviewed radiology reports from this admission including: CTA PE Study     Recent Cultures (last 7 days):   Results from last 7 days   Lab Units 05/20/24  1756 05/20/24  1749   BLOOD CULTURE  No Growth at 72 hrs. No Growth at 72 hrs.       Last 24 Hours Medication List:   Current Facility-Administered Medications   Medication Dose Route Frequency Provider Last Rate    acetaminophen  650 mg Oral Q4H PRN Bharti Jensen MD      atorvastatin  20 mg Oral Daily With Dinner Bharti Jensen MD      bisacodyl  10 mg Rectal Daily PRN Bharti Jensen MD      busPIRone  5 mg Oral BID Bharti Jensen MD      enoxaparin  40 mg Subcutaneous Daily Bharti Jensen MD      hydrOXYzine HCL  25 mg Oral HS PRN Bharti Jensen MD      LORazepam  0.5 mg Intravenous Once  Shahla Naidu PA-C      methocarbamol  500 mg Oral 4x Daily Bharti eJnsen MD      midodrine  5 mg Oral TID AC Bharti Jensen MD      morphine injection  2 mg Intravenous Q4H PRN Bharti Jensen MD      oxyCODONE  5 mg Oral Q4H PRN Bharti Jensen MD      Or    oxyCODONE  10 mg Oral Q4H PRN Bharti Jensen MD      tobramycin-dexamethasone  1 drop Both Eyes Q6H [facility] Bharti Jensen MD          Today, Patient Was Seen By: Shahla Naidu PA-C    **Please Note: This note may have been constructed using a voice recognition system.**

## 2024-05-24 NOTE — CASE MANAGEMENT
Case Management Assessment & Discharge Planning Note    Patient name Jos Hawk  Location [facility] MRN [id number]  : 1960 Date 2024       Current Admission Date: 2024  Current Admission Diagnosis:Quadriparesis (HCC)   Patient Active Problem List    Diagnosis Date Noted Date Diagnosed    Hyponatremia 2024     Urinary retention 2024     Low grade fever 2024     Weakness 2024     Neurogenic orthostatic hypotension (HCC) 2024     Thrombophlebitis arm 2023     Septic olecranon bursitis 2023     Arm swelling 2023     Myelomalacia of cervical cord (HCC) 2022     Quadriparesis (HCC) 10/19/2022     Slow transit constipation 2022     Weakness of right lower extremity 2022     Ambulatory dysfunction 2022     Status post lumbar spinal fusion 2021     S/P insertion of spinal cord stimulator 06/10/2021     Hydrocele of testis 2020     Chest discomfort 2020     Hx of colonoscopy 2019     Cauda equina syndrome (HCC) 2018     Spinal stenosis of thoracolumbar region 2018     Erectile dysfunction 2018     Hypogonadism in male 2018     BPH associated with nocturia 2018       LOS (days): 1  Geometric Mean LOS (GMLOS) (days):   Days to GMLOS:     OBJECTIVE:    Risk of Unplanned Readmission Score: 13.68         Current admission status: Inpatient       Preferred Pharmacy:   [id number] [place] [id number] [address]  Phone: [phone] Fax: [phone]    [place], [place] - [address]  [address]  [facility] [address]  Phone: [phone] Fax: [phone]    Primary Care Provider: Demarco Tirado DO    Primary Insurance: VETERANS  Secondary Insurance: MEDICARE    ASSESSMENT:  Active Health Care Proxies    There are no active Health Care Proxies on file.       Patient  Information  Mental Status: Alert    Please see complete CM assessment done at [facility] on 5/20/24      DISCHARGE DETAILS:    Discharge planning discussed with:: Patient  Freedom of Choice: Yes  Comments - Freedom of Choice: Discussed FOC  CM contacted family/caregiver?: No- see comments (Declined)  Were Treatment Team discharge recommendations reviewed with patient/caregiver?: Yes  Did patient/caregiver verbalize understanding of patient care needs?: N/A- going to facility  Were patient/caregiver advised of the risks associated with not following Treatment Team discharge recommendations?: Yes      Other Referral/Resources/Interventions Provided:  Interventions: Short Term Rehab  Referral Comments: This CM discussed STR with patient, patient requesting referral to [facility], entered in [facility]

## 2024-05-24 NOTE — PLAN OF CARE
Problem: PHYSICAL THERAPY ADULT  Goal: Performs mobility at highest level of function for planned discharge setting.  See evaluation for individualized goals.  Description: Treatment/Interventions: ADL retraining, Functional transfer training, Therapeutic exercise, Endurance training, Cognitive reorientation, Patient/family training, Equipment eval/education, Bed mobility          See flowsheet documentation for full assessment, interventions and recommendations.  Outcome: Progressing  Note: Prognosis: Fair  Problem List: Decreased strength, Decreased range of motion, Decreased endurance, Impaired balance, Decreased mobility, Decreased coordination, Impaired sensation, Impaired tone, Obesity, Pain  Assessment: Time taken this PM to assist pt with positioning upon return from testing, utilizing Ax2 persons & bed controls to assist with mobilty & preventing slouched posture & breaking LE extensor tone that contributes to pt's poor positioning.  pt unable to participate in rolling to either side for linen adjustment dring this time.  At conclusion of adjustments, pt remained upright in bed with knees bent slightly, foot board extended & hips in pelvic neutral .  Briefly discused role of PT & plan to continue mobility until medically cleared to d/c hospital setting .  Pt in agreement to the same at this time.        Rehab Resource Intensity Level, PT: I (Maximum Resource Intensity)    See flowsheet documentation for full assessment.

## 2024-05-24 NOTE — PLAN OF CARE
Problem: Prexisting or High Potential for Compromised Skin Integrity  Goal: Skin integrity is maintained or improved  Description: INTERVENTIONS:  - Identify patients at risk for skin breakdown  - Assess and monitor skin integrity  - Assess and monitor nutrition and hydration status  - Monitor labs   - Assess for incontinence   - Turn and reposition patient  - Assist with mobility/ambulation  - Relieve pressure over bony prominences  - Avoid friction and shearing  - Provide appropriate hygiene as needed including keeping skin clean and dry  - Evaluate need for skin moisturizer/barrier cream  - Collaborate with interdisciplinary team   - Patient/family teaching  - Consider wound care consult   Outcome: Progressing      ambulatory

## 2024-05-24 NOTE — CONSULTS
[facility]  Consult  Name: Jos Hawk 63 y.o. male I MRN: [id number]  Unit/Bed#: [facility] 610-01 I Date of Admission: 5/23/2024   Date of Service: 5/24/2024 I Hospital Day: 1    Inpatient consult to Neurosurgery  Consult performed by: Zonia Palacios PA-C  Consult ordered by: Bharti Jensen MD      Imaging personally reviewed with attending 5/22/24   Patient examined at bedside after transfer to [facility] 5/24/24 at 11:00am    Assessment & Plan   * Quadriparesis (HCC)  Assessment & Plan  Patient presents with rapidly worsening BLE spasticity and ambulatory dysfunction for 6 weeks  History of C3-4 SCI in 1998 due to a fall, s/p posterior decompression without fusion  Also h/o lumbar revision fusion in 2016 at OSH, SCS placement 2019 at OSH  Follows with physiatry at [facility], therapy 3x week  Also s/p botox injections in low back, quads, hamstrings end of February with no improvement   On exam, BLE > BUE spasticity. Decreased sensation medial LUE from elbow to 5th finger. Significant BLE weakness, proximal > distal    Imaging:  No recent imaging to review. MRI cervical and lumbar spine from 5/3/24 with no significant changes compared to previous imaging    Plan:  Continue to monitor neurological exam  Given worsening spasticity over a short period of time, resulting in inability to ambulate, recommend MRI of the cervical and thoracic spine without contrast to evaluate for worsening spinal cord injury or new disc herniation causing cord compression.  I agree with MRI lumbar spine with and without contrast but feel that this will be more low yield as his symptoms are secondary to spasticity and not pain or weakness.  Would prioritize cervical and thoracic imaging  Consider PMR consult for their input regarding progression of spinal cord injury (Dr DePadua if available)  Medical management per primary team  PT OT evaluation, mobilize as tolerated  DVT ppx: lovenox  No neurosurgical  intervention is anticipated at this time.    Neurosurgery will follow from the periphery and review imaging as it becomes available.  Please call questions or concerns.    S/P insertion of spinal cord stimulator  Assessment & Plan  Fair thoracic SCS paddle electrode, placed by Dr Brooks in 2019  Has been nonfunctional for several years as it provide no relief  Patient with remote in room           History of Present Illness     HPI: Jos Hawk is a 63 y.o. year old male with PMH including C3-4 spinal cord injury s/p posterior laminectomy in 1998 s/p fall, h/o lumbar fusion in the 1990s with revision fusion in 2018 by outside physician, [place] paddle SCS placed in 2019 by Dr Brooks who presents with complaint of rapidly progressive BLE > BUE spasticity resulting in worsening ambulatory dysfunction, worse over the past 6 weeks.     He states that at the end of February he received Botox injections for spasticity in his low back, quadriceps, and hamstrings.  Approximately 3 to 4 weeks later, he started to develop worsening spasticity rather than improvement in his legs.  He works very closely with physical therapy and rehab at [facility] in the outpatient setting.  He states that he does physical therapy multiple times a week in addition to the pool.  He had been able to ambulate with a walker approximately 500 feet.  Over the past 6 weeks this has declined with each therapy session.  His most recent therapy session last week, he could only ambulate for 75 feet and this was extremely difficult.  He states that the difficulty is due to spasticity, not necessarily pain or weakness.  He is also having worsening spasticity in his arms, although this is not nearly as severe.  He feels that this is largely attributed to his therapist having arm surgery recently and being unable to have adequate therapy.  He denies any new or worsening numbness, tingling, or weakness.  He denies any difficulty with bowel or  bladder.  He does use stool softeners for chronic constipation but has no difficulty with urinating.  He has chronic ejaculatory dysfunction.    MRI of his cervical and lumbar spine without contrast in the beginning of May was largely stable.  However, due to lack of contrast and an adequate appearance of scans, it is difficult to determine if there is any worsening of his cord injury.      Review of Systems   Constitutional:  Negative for chills and fever.   HENT:  Negative for ear pain and sore throat.    Eyes:  Negative for pain and visual disturbance.   Respiratory:  Negative for cough and shortness of breath.    Cardiovascular:  Negative for chest pain and palpitations.   Gastrointestinal:  Negative for abdominal pain and vomiting.   Genitourinary:  Negative for dysuria and hematuria.   Musculoskeletal:  Positive for back pain and gait problem. Negative for arthralgias.   Skin:  Negative for color change and rash.   Neurological:  Positive for weakness and numbness. Negative for seizures and syncope.   All other systems reviewed and are negative.      Historical Information   Past Medical History:   Diagnosis Date    Balance problems     BPH (benign prostatic hyperplasia)     Chronic back pain     Chronic pain disorder     COVID-19     2/2021-asymptomatic     Erectile dysfunction     Nocturia     OAB (overactive bladder)     Testicular hypogonadism     Urinary frequency     Urinary urgency      Past Surgical History:   Procedure Laterality Date    BACK SURGERY  05/18/2016    with hardware    BACK SURGERY      2 back surgery unable to remove hardware    COLONOSCOPY  11/2004    Dr Taylor. tubular adenoma polyp removed    COLONOSCOPY  12/2007    Dr Cameron. Normal    COLONOSCOPY  07/2013    Dr Cameron. Normal.     COLONOSCOPY  06/2019    Dr Cameron. Hemorrhoids, normal.     NECK SURGERY      decompression of neck     NECK SURGERY Right     scar tissues removed    ME EXCISION HYDROCELE UNILATERAL Right 06/10/2021     Procedure: HYDROCELECTOMY;  Surgeon: Dereck Goddard DO;  Location: [facility];  Service: Urology    SPINAL CORD STIMULATOR IMPLANT  10/10/2019    WOUND DEBRIDEMENT Right 08/10/2023    Procedure: DEBRIDEMENT UPPER EXTREMITY (WASH OUT) - elbow;  Surgeon: Jesse Avendaño MD;  Location: [facility];  Service: Orthopedics     Social History     Substance and Sexual Activity   Alcohol Use Not Currently    Alcohol/week: 4.0 - 7.0 standard drinks of alcohol    Types: 4 - 7 Glasses of wine per week    Comment: Just weekends     Social History     Substance and Sexual Activity   Drug Use No     Social History     Tobacco Use   Smoking Status Never   Smokeless Tobacco Never     No family history on file.    Meds/Allergies   all current active meds have been reviewed, current meds:   Current Facility-Administered Medications   Medication Dose Route Frequency    acetaminophen (TYLENOL) tablet 650 mg  650 mg Oral Q4H PRN    atorvastatin (LIPITOR) tablet 20 mg  20 mg Oral Daily With Dinner    bisacodyl (DULCOLAX) rectal suppository 10 mg  10 mg Rectal Daily PRN    busPIRone (BUSPAR) tablet 5 mg  5 mg Oral BID    enoxaparin (LOVENOX) subcutaneous injection 40 mg  40 mg Subcutaneous Daily    hydrOXYzine HCL (ATARAX) tablet 25 mg  25 mg Oral HS PRN    LORazepam (ATIVAN) injection 0.5 mg  0.5 mg Intravenous Once    methocarbamol (ROBAXIN) tablet 500 mg  500 mg Oral 4x Daily    midodrine (PROAMATINE) tablet 5 mg  5 mg Oral TID AC    morphine injection 2 mg  2 mg Intravenous Q4H PRN    oxyCODONE (ROXICODONE) IR tablet 5 mg  5 mg Oral Q4H PRN    Or    oxyCODONE (ROXICODONE) immediate release tablet 10 mg  10 mg Oral Q4H PRN    tobramycin-dexamethasone (TOBRADEX) 0.3-0.1 % ophthalmic suspension 1 drop  1 drop Both Eyes Q6H NICK   , and PTA meds:   Prior to Admission Medications   Prescriptions Last Dose Informant Patient Reported? Taking?   ALPRAZolam (XANAX) 0.5 mg tablet   No No   Sig: Take 1 tablet (0.5 mg total) by mouth daily at  "bedtime as needed for anxiety   Cholecalciferol 50 MCG (2000 UT) TABS  Self Yes No   Si mcg   DULoxetine (CYMBALTA) 60 mg delayed release capsule   No No   Sig: Take 1 capsule (60 mg total) by mouth daily   Elastic Bandages & Supports (Wrap/Multipurpose 2.75\"x21.4yd) MISC  Self No No   Sig: Use in the morning Please dispense lymphedema wrap and wrap from hand up to the proximal shoulder   NON FORMULARY  Self Yes No   Sig: COMPRILAN BANDAGE [id number]   Patient not taking: Reported on 11/10/2023   atorvastatin (LIPITOR) 40 mg tablet  Self Yes No   Sig: TAKE 20MG (ONE-HALF TABLET) BY MOUTH EVERY DAY FOR CHOLESTEROL   bisacodyl (DULCOLAX) 10 mg suppository  Self No No   Sig: Insert 1 suppository (10 mg total) into the rectum daily as needed for constipation   Patient not taking: Reported on 5/15/2024   busPIRone (BUSPAR) 15 mg tablet   Yes No   Sig: Take 15 mg by mouth in the morning   clobetasol (TEMOVATE) 0.05 % external solution  Self Yes No   Patient not taking: Reported on 5/15/2024   docusate sodium (COLACE) 100 mg capsule  Self No No   Sig: Take 1 capsule (100 mg total) by mouth 2 (two) times a day   Patient not taking: Reported on 2024   fluocinonide (LIDEX) 0.05 % cream  Self Yes No   folic acid (FOLVITE) 1 mg tablet  Self Yes No   Sig: Take 1 tablet (1mg) by mouth Once Daily.   ibuprofen (MOTRIN) 400 mg tablet  Self No No   Sig: Take 1 tablet (400 mg total) by mouth every 6 (six) hours as needed for mild pain   ibuprofen (MOTRIN) 600 mg tablet  Self No No   Sig: Take 1 tablet (600 mg total) by mouth every 6 (six) hours as needed for mild pain or moderate pain   ketoconazole (NIZORAL) 2 % shampoo  Self Yes No   lidocaine (LIDODERM) 5 %  Self Yes No   Sig: APPLY 2 PATCH TOPICALLY DAILY FOR PAIN (REMOVE PATCH AFTER 12 HOURS; 12 HOURS ON AND 12 HOURS OFF)   Patient not taking: Reported on 11/10/2023   meloxicam (MOBIC) 15 mg tablet  Self Yes No   Si.5 mg   Patient not taking: Reported on " 4/30/2024   methocarbamol (ROBAXIN) 500 mg tablet   No No   Sig: Take 1 tablet (500 mg total) by mouth 4 (four) times a day   midodrine (PROAMATINE) 5 mg tablet  Self No No   Sig: Take 1 tablet (5 mg total) by mouth 3 (three) times a day before meals   naloxone (NARCAN) 4 mg/0.1 mL nasal spray  Self No No   Sig: Administer 1 spray into a nostril. If no response after 2-3 minutes, give another dose in the other nostril using a new spray.   nystatin powder  Self Yes No   Patient not taking: Reported on 4/30/2024   oxyCODONE (ROXICODONE) 5 immediate release tablet  Self No No   Sig: Take 1 tablet (5 mg) for moderate pain every 6 hours as needed or take 2 tablets (10 mg) for severe pain every 6 hours as needed.   oxyCODONE (Roxicodone) 5 immediate release tablet  Self No No   Sig: Take 1 tablet (5 mg total) by mouth every 4 (four) hours as needed for moderate pain Max Daily Amount: 30 mg   sildenafil (VIAGRA) 100 mg tablet  Self Yes No   Sig: Take 100 mg by mouth As directed   testosterone cypionate (DEPO-TESTOSTERONE) 200 mg/mL SOLN  Self Yes No   Sig: inject 2 MILLILITERS intramuscularly every 14 days      Facility-Administered Medications: None     No Known Allergies    Objective   I/O         05/22 0701  05/23 0700 05/23 0701  05/24 0700 05/24 0701  05/25 0700    Urine (mL/kg/hr)  775     Total Output  775     Net  -775                    Physical Exam  Vitals and nursing note reviewed.   Constitutional:       Appearance: Normal appearance. He is well-developed and normal weight.   HENT:      Head: Normocephalic and atraumatic.   Eyes:      Extraocular Movements: Extraocular movements intact.      Pupils: Pupils are equal, round, and reactive to light.   Cardiovascular:      Rate and Rhythm: Normal rate.   Pulmonary:      Effort: Pulmonary effort is normal. No respiratory distress.   Abdominal:      Palpations: Abdomen is soft.   Musculoskeletal:      Cervical back: Normal range of motion. No tenderness.      Right  lower leg: Edema present.      Left lower leg: Edema present.   Skin:     General: Skin is warm and dry.   Neurological:      Mental Status: He is alert and oriented to person, place, and time.      Cranial Nerves: Cranial nerves 2-12 are intact.      Deep Tendon Reflexes:      Reflex Scores:       Bicep reflexes are 1+ on the right side and 1+ on the left side.       Brachioradialis reflexes are 1+ on the right side and 1+ on the left side.       Patellar reflexes are 3+ on the right side and 1+ on the left side.  Psychiatric:         Mood and Affect: Mood normal.         Speech: Speech normal.         Behavior: Behavior normal.         Thought Content: Thought content normal.         Judgment: Judgment normal.       Neurologic Exam     Mental Status   Oriented to person, place, and time.   Follows 2 step commands.   Attention: normal. Concentration: normal.   Speech: speech is normal   Level of consciousness: alert  Knowledge: good.   Able to repeat. Normal comprehension.     Cranial Nerves   Cranial nerves II through XII intact.     CN III, IV, VI   Pupils are equal, round, and reactive to light.    Motor Exam   Muscle bulk: normal  Overall muscle tone: increased  Right arm tone: increased  Left arm tone: increased  Right leg tone: spastic  Left leg tone: spastic    Strength   Right deltoid: 5/5  Left deltoid: 5/5  Right biceps: 5/5  Left biceps: 5/5  Right triceps: 5/5  Left triceps: 5/5  Right wrist flexion: 5/5  Left wrist flexion: 5/5  Right wrist extension: 5/5  Left wrist extension: 5/5  Right interossei: 5/5  Left interossei: 5/5  Right iliopsoas: 1/5  Left iliopsoas: 1/5  Right quadriceps: 1/5  Left quadriceps: 1/5  Right hamstring: 3/5  Left hamstrin/5  Right anterior tibial: 4/5  Left anterior tibial: 2/5  Right posterior tibial: 4/5  Left posterior tibial: 2/5       Sensory Exam   Right arm light touch: normal  Left arm light touch: decreased from elbow (decreased sensation to LT meedial elbow  "through 5th finger)  Right leg light touch: normal  Left leg light touch: normal  Pinprick normal.   DST and JPS intact bilaterally     Gait, Coordination, and Reflexes     Tremor   Resting tremor: absent    Reflexes   Right brachioradialis: 1+  Left brachioradialis: 1+  Right biceps: 1+  Left biceps: 1+  Right patellar: 3+  Left patellar: 1+  Right John: absent  Left John: absent  Right ankle clonus: absent  Left ankle clonus: absent      Vitals:Blood pressure 133/86, pulse 79, temperature 98.4 °F (36.9 °C), resp. rate 18, height 6' (1.829 m), weight 111 kg (244 lb 7.8 oz), SpO2 93%.,Body mass index is 33.16 kg/m².     Lab Results:   Results from last 7 days   Lab Units 05/18/24  0540   WBC Thousand/uL 7.05   HEMOGLOBIN g/dL 17.5*   HEMATOCRIT % 51.2*   PLATELETS Thousands/uL 161     Results from last 7 days   Lab Units 05/22/24  1424 05/18/24  0540   POTASSIUM mmol/L 4.4 4.3   CHLORIDE mmol/L 96 102   CO2 mmol/L 34* 32   BUN mg/dL 11 10   CREATININE mg/dL 0.70 0.67   CALCIUM mg/dL 8.9 8.6   ALK PHOS U/L  --  55   ALT U/L  --  14   AST U/L  --  14     Results from last 7 days   Lab Units 05/22/24  1424 05/18/24  0540   MAGNESIUM mg/dL 1.9 1.8*             No results found for: \"TROPONINT\"  ABG:No results found for: \"PHART\", \"GGL8TNC\", \"PO2ART\", \"RQO4JCW\", \"C7OMJWFA\", \"BEART\", \"SOURCE\"    Imaging Studies: I have personally reviewed pertinent reports.   and I have personally reviewed pertinent films in PACS    Echo complete w/ contrast if indicated    Result Date: 5/20/2024  Narrative:   Left Ventricle: Left ventricular cavity size is normal. Wall thickness is normal. The left ventricular ejection fraction is 50%. Systolic function is low normal. Wall motion is normal. Diastolic function is mildly abnormal, consistent with grade I (abnormal) relaxation.   Aorta: The aortic root is mildly dilated. The aortic root is 3.90 cm.     CTA chest pe study    Result Date: 5/19/2024  Narrative: CTA - CHEST WITH IV " CONTRAST - PULMONARY ANGIOGRAM INDICATION:   r/o PE. CP, tachycardia, limited mobility. Per my review of the medical record, history of quadriparesis due to spinal cord injury. Low-grade fever. Chest pain on deep inhalation last night. COMPARISON: Chest CT 5/4/2024, CXR 3/11/2024. TECHNIQUE: CT angiogram timed for optimal opacification of the pulmonary arteries.  Axial, sagittal, and coronal 2D reformats created from source data.  Coronal 3D MIP postprocessing on the acquisition scanner. Radiation dose length product (DLP):  491 mGy-cm .  Radiation dose exposure minimized using iterative reconstruction and automated exposure control. IV Contrast:  85 mL of iohexol (OMNIPAQUE) FINDINGS: PULMONARY ARTERIES: No definite acute pulmonary emboli but sensitivity is limited by moderate motion artifact and artifact from the patient's arms at his side. LUNGS: Mild septal thickening due to interstitial edema. Mild benign multifocal subsegmental atelectasis. Benign calcified granulomas. AIRWAYS: No significant filling defects. Mucus in the distal trachea and right main bronchus. PLEURA:  Unremarkable. HEART/GREAT VESSELS: Mild cardiomegaly. Mild coronary artery calcification indicating atherosclerotic heart disease. MEDIASTINUM AND BHARAT:  Unremarkable. CHEST WALL AND LOWER NECK: Unremarkable. UPPER ABDOMEN: Redemonstration of moderate elevation of the right diaphragm.. Mild splenomegaly at 14 cm. OSSEOUS STRUCTURES: Moderate degenerative disease in the spine. Neurostimulator in the spinal canal.     Impression: No definite acute pulmonary emboli but sensitivity is limited by moderate motion artifact and artifact from the patient's arms at his side. Mild septal thickening due to interstitial edema. Mild splenomegaly at 14 cm. Workstation performed: [id number]     CT head without contrast    Result Date: 5/16/2024  Narrative: CT BRAIN - WITHOUT CONTRAST INDICATION:   Weakness. COMPARISON: CT head 4/30/2024 TECHNIQUE:  CT  examination of the brain was performed.  Multiplanar 2D reformatted images were created from the source data. Radiation dose length product (DLP) for this visit:  892 mGy-cm .  This examination, like all CT scans performed in the [facility], was performed utilizing techniques to minimize radiation dose exposure, including the use of iterative reconstruction and automated exposure control. IMAGE QUALITY:  Diagnostic. FINDINGS: PARENCHYMA:  No intracranial mass, mass effect or midline shift. No CT signs of acute infarction.  No acute parenchymal hemorrhage. VENTRICLES AND EXTRA-AXIAL SPACES:  Normal for the patient's age. VISUALIZED ORBITS: Normal visualized orbits. PARANASAL SINUSES: Normal visualized paranasal sinuses. CALVARIUM AND EXTRACRANIAL SOFT TISSUES:  Normal.     Impression: No acute intracranial abnormality. Workstation performed: [id number]     MRI lumbar spine wo contrast    Result Date: 5/8/2024  Narrative: MRI LUMBAR SPINE WITHOUT CONTRAST INDICATION: R29.898: Other symptoms and signs involving the musculoskeletal system. COMPARISON: MRI lumbar spine 1/18/2020 466 TECHNIQUE:  Multiplanar, multisequence imaging of the lumbar spine was performed. . IMAGE QUALITY:  Diagnostic FINDINGS: VERTEBRAL BODIES:  There are 5 lumbar type vertebral bodies. There is preserved normal lumbar lordosis. No significant spondylolisthesis.. Again noted postsurgical changes from prior posterior decompression with pedicle screws and connecting rods spanning L2-S1 (there is no pedicle screw in the right L2). There is interbody disc spacer at L4-5. Vertebral heights are grossly maintained. No apparent bone marrow signal abnormality. SACRUM:  Normal signal within the sacrum. No evidence of insufficiency or stress fracture. DISTAL CORD AND CONUS:  Normal size and signal within the distal cord and conus. PARASPINAL SOFT TISSUES: Postsurgical change in the paraspinal and subcutaneous soft tissue. LOWER THORACIC DISC  SPACES: Minimal disc bulge at T11-12 without significant canal stenosis. LUMBAR DISC SPACES: L1-L2: No significant disc bulge. Moderate facet arthropathy. No canal stenosis. No high-grade foraminal stenosis. L2-L3: Stable postsurgical change. No canal stenosis. Cannot reliably evaluate neural foramina due to limited imaging and artifact from surgical hardware. L3-L4: Stable postsurgical change. No canal stenosis.Cannot reliably evaluate neural foramina due to limited imaging and artifact from surgical hardware. L4-L5: Stable postsurgical change. No canal stenosis.Cannot reliably evaluate neural foramina due to limited imaging and artifact from surgical hardware. L5-S1: Stable postsurgical change. No canal stenosis.Cannot reliably evaluate neural foramina due to limited imaging and artifact from surgical hardware. OTHER FINDINGS: Tiny well-defined T2 hyperintense lesions in the inferior spleen likely representing benign cysts.     Impression: MR protocol tailored to shorter scan time(limited PRISCILA) due to presence of thoracic spinal cord stimulator. Suboptimal assessment due to limited images, and artifacts from surgical hardware and motion. Stable postsurgical appearance at levels L2-S1. Surgically patent canal. Cannot reliably evaluate neuroforamina due to limited images and artifact from surgical hardware. Workstation performed: [id number]     MRI cervical spine wo contrast    Result Date: 5/8/2024  Narrative: MRI CERVICAL SPINE WITHOUT CONTRAST INDICATION: R29.898: Other symptoms and signs involving the musculoskeletal system. COMPARISON: MRI cervical spine 8/3/2023 TECHNIQUE:  Multiplanar, multisequence imaging of the cervical spine was performed. . IMAGE QUALITY:  Diagnostic FINDINGS: ALIGNMENT: There is mild reversal of normal cervical lordosis. MARROW SIGNAL: Stable short-segment severe cord atrophy with myelomalacia related increased T2 signal at level C3-4. CERVICAL AND VISUALIZED THORACIC CORD:  Normal  signal within the visualized cord. PREVERTEBRAL AND PARASPINAL SOFT TISSUES:  Normal. VISUALIZED POSTERIOR FOSSA:  The visualized posterior fossa demonstrates no abnormal signal. CERVICAL DISC SPACES: C2-C3: Minimal disc bulge. Bilateral uncovertebral spurring. Mild canal stenosis. Mild right foraminal narrowing. C3-C4: Changes of decompressive laminectomies. Disc bulge. Bilateral uncovertebral spurring. Bilateral facet arthropathy. Surgically patent canal. Moderate to severe left and moderate right foraminal stenosis. C4-C5:. Changes of decompressive laminectomies. Disc osteophyte complex. Bilateral uncovertebral spurring. Severe left and mild right facet arthropathy. Surgically patent canal. Moderate bilateral foraminal stenosis. C5-C6: Changes of decompressive laminectomies. Disc osteophyte complex and bilateral uncovertebral spurring. Mild facet arthropathy. Surgically patent canal. Moderate bilateral foraminal stenosis. C6-C7: Changes of decompressive laminectomies. Right eccentric disc osteophyte complex and uncovertebral spurring. Surgically patent canal. Mild to moderate right and mild left foraminal stenosis. C7-T1: Disc osteophyte complex and uncovertebral spurring. Mild canal narrowing. Moderate right foraminal narrowing. UPPER THORACIC DISC SPACES:  Normal. OTHER FINDINGS:  None.     Impression: Somewhat limited exam related to a tailored protocol due to spinal cord stimulator. 1.  Stable short segment severe cord atrophy with myelomalacia related signal abnormality at level C3-4. 2.  Redemonstrated prior decompressive laminectomies at levels C3-C6. 3.  Mild degenerative canal narrowing at level C7-T1. Surgically patent canal in the remainder levels. 4.  Multilevel foraminal stenosis as described; moderate to severe at left C3-4. Workstation performed: [id number]      VAS VENOUS DUPLEX - LOWER LIMB BILATERAL    Result Date: 5/5/2024  Narrative:  [facility] REPORT CLINICAL: Indications: Patient  presents with bilateral lower extremity pain and discoloration x 4 days. Operative History: Patient denies any cardiovascular procedures. Risk Factors The patient has history of Obesity.   CONCLUSION:  Impression: RIGHT LOWER LIMB: No evidence of acute or chronic deep vein thrombosis. No evidence of superficial thrombophlebitis noted. Doppler evaluation shows a normal response to augmentation maneuvers.. Popliteal, posterior tibial and anterior tibial arterial Doppler waveform's are triphasic.  LEFT LOWER LIMB: No evidence of acute or chronic deep vein thrombosis. No evidence of superficial thrombophlebitis noted. Doppler evaluation shows a normal response to augmentation maneuvers. Popliteal, posterior tibial and anterior tibial arterial Doppler waveform's are triphasic.  Technical findings were given to Gonzalez Greer.  SIGNATURE: Electronically Signed by: CELESTINO DECKER DO, RPVI on 2024-05-05 01:30:15 PM    PE Study with CT Abdomen and Pelvis with contrast    Result Date: 5/4/2024  Narrative: CT PULMONARY ANGIOGRAM OF THE CHEST AND CT ABDOMEN AND PELVIS WITH INTRAVENOUS CONTRAST INDICATION: dyspnea, leg swelling. COMPARISON: Multiple priors most recently 3/11/2024 TECHNIQUE: CT examination of the chest, abdomen and pelvis was performed. Thin section CT angiographic technique was used in the chest in order to evaluate for pulmonary embolus and coronal 3D MIP postprocessing was performed on the acquisition scanner. Multiplanar 2D reformatted images were created from the source data. This examination, like all CT scans performed in the [facility], was performed utilizing techniques to minimize radiation dose exposure, including the use of iterative reconstruction and automated exposure control. Radiation dose length product (DLP) for this visit: 1782.38 mGy-cm IV Contrast: 100 mL of iohexol (OMNIPAQUE) Enteric Contrast: Not administered. FINDINGS: CHEST PULMONARY ARTERIAL TREE: No pulmonary  embolus is seen. LUNGS: Elevation of the right hemidiaphragm with resultant partial right middle and lower lobe atelectasis. PLEURA: Unremarkable. HEART/AORTA: Atherosclerotic coronary artery calcification. No thoracic aortic aneurysm. MEDIASTINUM AND BHARAT: Few small partially calcified lymph nodes, compatible with sequela of granulomatous disease CHEST WALL AND LOWER NECK: Unremarkable. ABDOMEN LIVER/BILIARY TREE: Unremarkable. GALLBLADDER: No calcified gallstones. No pericholecystic inflammatory change. SPLEEN: Unremarkable. PANCREAS: Unremarkable. ADRENAL GLANDS: Unremarkable. KIDNEYS/URETERS: Simple renal cyst(s). Otherwise unremarkable kidneys. No hydronephrosis. STOMACH AND BOWEL: Colonic diverticulosis with a small focus of inflammatory change in the left lower quadrant (2, 409) APPENDIX: Normal appendix. ABDOMINOPELVIC CAVITY: No ascites. No pneumoperitoneum. No lymphadenopathy. VESSELS: Infrarenal abdominal aortic aneurysm measuring up to 3.2 cm. Aneurysmal dilation of the common iliac arteries, measuring up to 2.5 cm on the left and 2 cm on the right. PELVIS REPRODUCTIVE ORGANS: Unremarkable for patient's age. URINARY BLADDER: Unremarkable. ABDOMINAL WALL/INGUINAL REGIONS: Left flank spinal stimulator. BONES: No acute fracture or suspicious osseous lesion. Spinal degenerative changes. Posterior lumbosacral fusion and decompression. Screws stabilizing both femoral necks. Hemilaminectomies of the upper cervical spine.     Impression: No acute pulmonary embolus. Possible mild acute sigmoid diverticulitis without complication 3.2 cm infrarenal abdominal aortic aneurysm. Bilateral common iliac artery aneurysms. The study was marked in EPIC for immediate notification. Workstation performed: [id number]     CT spine lumbar without contrast    Result Date: 4/30/2024  Narrative: CT SPINE LUMBAR WO CONTRAST INDICATION:   fall, weakness. COMPARISON: 1/18/2024; 10/2/2020 TECHNIQUE:  Contiguous axial images through the  lumbar spine were obtained. Sagittal and coronal reconstructions were performed. IV Contrast: Not administered Radiation dose length product (DLP) for this visit:  834 mGy-cm .  This examination, like all CT scans performed in the [facility], was performed utilizing techniques to minimize radiation dose exposure, including the use of iterative reconstruction and automated exposure control. IMAGE QUALITY:  Diagnostic. FINDINGS: ALIGNMENT:  There are 5 lumbar type vertebral bodies. Minimal levoscoliosis of the mid lumbar spine centered at the L4 level. VERTEBRAE: No acute fracture. Chronic appearing healed fracture deformity of the posterior superior aspect of L2 is retrospectively unchanged from the prior outside CT from 2020. Previously present right-sided L2 pedicle screw has been removed. Left L2 pedicle screw is intact. Decompressive laminectomies L2-L5 redemonstrated. Intact bilateral pedicle screws at L3 and L4. Intervertebral cages L4-5 are intact. Bilateral L5 pedicle screws are intact. Bilateral S1 pedicle screws demonstrate loosening, similar to the prior outside CT from 2020. The appearance of the spinal construct other than the removed right L2 pedicle screw is stable. DEGENERATIVE CHANGES: Surgically capacious central canal at the laminectomy levels. Scattered mild to moderate spondylotic changes noted. Prominent multilevel endplate osteophytes anteriorly especially at the thoracolumbar junction. PARASPINAL SOFT TISSUES: Atherosclerotic calcifications noted. Aneurysmal dilatation of the left greater than right common iliac arteries noted, stable from the prior 2020 CT. The left common iliac artery measuring up to 2.7 cm transverse dimension. Scattered atherosclerotic vascular calcifications noted.  view demonstrates partially imaged left-sided spinal cord stimulator device and bilateral femoral neck pins. OTHER: Unremarkable     Impression: 1. No acute fracture or subluxation. 2.  This spinal construct is grossly stable to the prior 2020 outside CT with the exception of the right L2 pedicle screw which has been removed. Chronic loosening of bilateral S1 pedicle screws is also unchanged from 2020. 3. Common iliac artery aneurysm is redemonstrated, similar to 2020. Further clinical surveillance and follow-up advised. Consider abdominal ultrasound or CTA of the abdominal aorta in 6 months to assess for stability. Workstation performed: [id number]     CT head without contrast    Result Date: 4/30/2024  Narrative: CT BRAIN - WITHOUT CONTRAST INDICATION:   fall. COMPARISON: CT head without contrast on 9/21/2022 TECHNIQUE:  CT examination of the brain was performed.  Multiplanar 2D reformatted images were created from the source data. Radiation dose length product (DLP) for this visit:  870 mGy-cm .  This examination, like all CT scans performed in the [facility], was performed utilizing techniques to minimize radiation dose exposure, including the use of iterative reconstruction and automated exposure control. IMAGE QUALITY:  Diagnostic. FINDINGS: PARENCHYMA: Minimal decreased attenuation is noted in periventricular and subcortical white matter demonstrating an appearance that is statistically most likely to represent mild microangiopathic change. No CT signs of acute infarction.  No intracranial mass, mass effect or midline shift.  No acute parenchymal hemorrhage. VENTRICLES AND EXTRA-AXIAL SPACES:  Normal for the patient's age. VISUALIZED ORBITS: Normal visualized orbits. PARANASAL SINUSES: Normal visualized paranasal sinuses. CALVARIUM AND EXTRACRANIAL SOFT TISSUES:  Normal.     Impression: No acute intracranial abnormality. Workstation performed: [id number]     EKG, Pathology, and Other Studies: I have personally reviewed pertinent reports.      VTE Prophylaxis: Sequential compression device (Venodyne)     Code Status: Level 1 - Full Code  Advance Directive and Living Will:       Power of :    POLST:      Counseling / Coordination of Care  I spent 45 minutes with the patient.

## 2024-05-24 NOTE — PLAN OF CARE
Problem: PHYSICAL THERAPY ADULT  Goal: Performs mobility at highest level of function for planned discharge setting.  See evaluation for individualized goals.  Description: Treatment/Interventions: ADL retraining, Functional transfer training, Therapeutic exercise, Endurance training, Cognitive reorientation, Patient/family training, Equipment eval/education, Bed mobility          See flowsheet documentation for full assessment, interventions and recommendations.  Note: Prognosis: Fair  Problem List: Decreased strength, Decreased range of motion, Decreased endurance, Impaired balance, Decreased mobility, Decreased coordination, Impaired sensation, Impaired tone, Obesity, Pain  Assessment: Pt is 63 y.o. male seen for PT evaluation s/p admit to [facility] on 5/23/2024  w/ Quadriparesis (formerly [facility]).Pt initially admitted to [facility] difficulty urinating and burning - has had recent decline in functional abilities and [facility] 2* SC stimulator present . Patient has a past medical history of Balance problems, BPH (benign prostatic hyperplasia), Chronic back pain, Chronic pain disorder, COVID-19, Erectile dysfunction, Nocturia, OAB (overactive bladder), Testicular hypogonadism, Urinary frequency, and Urinary urgency.Quadriparesis 2* cspine injury in 2018 At baseline pt was completing adls with assist from caretakers - transferred to w/c with assist from caretaker - reports he was able to ambulate short distances until recently- caretakers manage iadls. Pt lives alone in multilevel home with FFSU - has home aides 10-11 hrs/day otherwise is home alone (home aides put him to bed before they leave and he stays in bed until they return in am) Pt has hx of OPPT at [facility] and 2 acute rehab stays at [facility].  Currently,  pt  is requiring maxA x2 rolling repositioning; for supine> sit transitions.  DEPX2-3 for attempt to stand x2 w/ gt belt and B/L knee block. (+) LE spasm at times throughout. Pt unable to clear  buttocks from bed and OOB xfer deferred 2* pt refusing until post MRI.   Pt presents functioning below baseline and currently w/ overall mobility deficits 2* to: decreased LE strength/AROM; lE hypertonic / spasms;  limited flexibility;  generalized weakness/ deconditioning; decreased endurance; decreased activity tolerance; decreased coordination; impaired balance;pain; anxiety/ fear of falling; bed/ chair alarms; multiple lines; sensory impairment .  Pt currently at risk for falls.  (Please find additional objective findings from PT assessment regarding body systems outlined above.) Pt will continue to benefit from skilled PT interventions to address stated impairments; to maximize functional potential; for ongoing pt/ family training; and DME needs.  PT is recommending PT Resource Intensity Level  1 on d/c.      PT will follow for STG as outlined on eval. Pt/ family agreeable to PT POC and goals as stated.        Rehab Resource Intensity Level, PT: I (Maximum Resource Intensity)    See flowsheet documentation for full assessment.

## 2024-05-24 NOTE — ASSESSMENT & PLAN NOTE
Fair thoracic SCS paddle electrode, placed by Dr Brooks in 2019  Has been nonfunctional for several years as it provide no relief  Patient with remote in room

## 2024-05-24 NOTE — NURSING NOTE
Report called to receiving facility, [facility]. Patient Aox4 at DC, IV 22 L FA intact, rodriguez intact via stat-lock. Belongings taken with and caregiver to transport wheelchair.

## 2024-05-24 NOTE — OCCUPATIONAL THERAPY NOTE
Occupational Therapy Evaluation     Patient Name: Jos Hawk  Today's Date: 5/24/2024  Problem List  Principal Problem:    Quadriparesis (HCC)  Active Problems:    BPH associated with nocturia    S/P insertion of spinal cord stimulator    Neurogenic orthostatic hypotension (HCC)    Hyponatremia    Past Medical History  Past Medical History:   Diagnosis Date    Balance problems     BPH (benign prostatic hyperplasia)     Chronic back pain     Chronic pain disorder     COVID-19     2/2021-asymptomatic     Erectile dysfunction     Nocturia     OAB (overactive bladder)     Testicular hypogonadism     Urinary frequency     Urinary urgency      Past Surgical History  Past Surgical History:   Procedure Laterality Date    BACK SURGERY  05/18/2016    with hardware    BACK SURGERY      2 back surgery unable to remove hardware    COLONOSCOPY  11/2004    Dr Taylor. tubular adenoma polyp removed    COLONOSCOPY  12/2007    Dr Cameron. Normal    COLONOSCOPY  07/2013    Dr Cameron. Normal.     COLONOSCOPY  06/2019    Dr Cameron. Hemorrhoids, normal.     NECK SURGERY      decompression of neck     NECK SURGERY Right     scar tissues removed    OR EXCISION HYDROCELE UNILATERAL Right 06/10/2021    Procedure: HYDROCELECTOMY;  Surgeon: Dereck Goddard DO;  Location: [facility];  Service: Urology    SPINAL CORD STIMULATOR IMPLANT  10/10/2019    WOUND DEBRIDEMENT Right 08/10/2023    Procedure: DEBRIDEMENT UPPER EXTREMITY (WASH OUT) - elbow;  Surgeon: Jesse Avendaño MD;  Location: [facility];  Service: Orthopedics         05/24/24 1150   OT Last Visit   OT Visit Date 05/24/24   Note Type   Note type Evaluation   Pain Assessment   Pain Assessment Tool 0-10   Pain Score 8   Pain Location/Orientation Location: Back   Hospital Pain Intervention(s) Repositioned;Ambulation/increased activity;Emotional support;Relaxation technique   Restrictions/Precautions   Weight Bearing Precautions Per Order No   Braces or Orthoses   (pt prefers  "to utilize his gait belt from home for transfers)   Other Precautions Fall Risk;Pain   Home Living   Type of Home House   Home Layout Multi-level;Able to live on main level with bedroom/bathroom   Bathroom Shower/Tub Walk-in shower   Bathroom Toilet Raised   Bathroom Equipment Grab bars in shower;Shower chair;Grab bars around toilet   Home Equipment Walker;Cane;Wheelchair-manual;Hospital bed   Additional Comments sleeps in adjustable bed   Prior Function   Level of [place] Needs assistance with ADLs;Needs assistance with IADLS;Needs assistance with functional mobility   Lives With Alone   Receives Help From Personal care attendant;Outpatient therapy   IADLs Family/Friend/Other provides transportation;Family/Friend/Other provides meals;Family/Friend/Other provides medication management   Falls in the last 6 months 1 to 4   Vocational Full time employment   Comments has home aides M-F 7am to 8pm and Sat/Sun 8am to 6pm otherwise is home alone and in bed   Lifestyle   [employer] home aides assist with all aspects of adls, reports he was able to walk short distances with a walker in past but has become more reliant on w/c recently- home aides assist with all iadls   Reciprocal Relationships supportive family and caregivers   Service to Others 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   Intrinsic Gratification enjoys going to [employer] and working in pool   General   Family/Caregiver Present Yes   Subjective   Subjective \"I guess I would just burn\" when asked what he would do in the case of an emergency (ie: house fire) after caretakers leave for the day   ADL   Eating Assistance 5  Supervision/Setup   Grooming Assistance 4  Minimal Assistance   UB Bathing Assistance 2  Maximal Assistance   LB Bathing Assistance 1  Total Assistance   UB Dressing Assistance 2  Maximal Assistance   LB Dressing Assistance 1  Total Assistance   Toileting Assistance  1  Total Assistance   Bed Mobility   Supine to Sit 2  Maximal assistance " "  Additional items Assist x 2   Sit to Supine 2  Maximal assistance   Additional items Assist x 2   Additional Comments tolerated sitting on EOB w/o support (static sitting)   Transfers   Sit to Stand Unable to assess   Additional Comments agreeable to attempt to stand however unsuccessful with height of bed elevated, use of gaitbelt and assist x 2-3 pt was barely able to clear buttocks from bed surface - pt very adament about how he wants to complete transfers, wanting caretaker to do it instead of therapy because \"they know how to do it\" and is particular in how he wants things done telling therapists to use gait belt and \"Pull me up\"   Functional Mobility   Additional Comments refused to attempt any mobility 2* recent fall with therapy at [facility] and wanting to wait for MRI to be completed   Balance   Static Sitting Fair   Dynamic Sitting Poor   Static Standing Zero   Activity Tolerance   Activity Tolerance Patient limited by fatigue;Patient limited by pain   Medical Staff Made Aware PT present for co-eval 2* medical complexity, comorbidities and limited overall tolerance to activities   RUE Assessment   RUE Assessment X   Edema   RUE Edema   (grossly 3/5 strength with ~100 degress of flexion)   LUE Assessment   LUE Assessment X   Edema   LUE Edema   (grossly 3/5 with approximately 100* of shld flexion)   Hand Function   Gross Motor Coordination Impaired   Fine Motor Coordination Impaired   Cognition   Overall Cognitive Status WFL   Assessment   Limitation Decreased ADL status;Decreased UE strength;Decreased endurance;Decreased sensation;Decreased fine motor control;Decreased self-care trans;Decreased high-level ADLs   Prognosis Fair   Assessment Pt is a 63 y.o. male who was admitted to [facility] on 5/23/2024 with Quadriparesis (HCC)- pt initially admitted to [facility] difficulty urinating and burning - has had recent decline in functional abilities and was [facility] [place] [facility] 2* SC stimulator present . " Patient  has a past medical history of Balance problems, BPH (benign prostatic hyperplasia), Chronic back pain, Chronic pain disorder, COVID-19, Erectile dysfunction, Nocturia, OAB (overactive bladder), Testicular hypogonadism, Urinary frequency, and Urinary urgency.Quadriparesis 2* cspine injury in 2018   At baseline pt was completing adls with assist from caretakers - transferred to w/c with assist from caretaker - reports he was able to ambulate short distances until recently- caretakers manage iadls. Pt lives alone in multilevel home with FFSU - has home aides 10-11 hrs/day otherwise is home alone (home aides put him to bed before they leave and he stays in bed until they return in am). Currently pt requires mod to max assist for overall ADLS and max a x 2  for functional mobility/transfers. Pt currently presents with impairments in the following categories -limited home support, difficulty performing ADLS, decreased initiation and engagement , and health management  activity tolerance, endurance, standing balance/tolerance, sitting balance/tolerance, UE strength, and FMC. These impairments, as well as pt's fatigue, pain, spinal precautions, decreased caregiver support, and risk for falls  limit pt's ability to safely engage in all baseline areas of occupation, includinggrooming, bathing, dressing, toileting, functional mobility/transfers, work/volunteer work , social participation , and leisure activities  From OT standpoint, recommend Level I resources upon D/C. OT will continue to follow to address the below stated goals.   Goals   Patient Goals be able to do what I was able to do before   LTG Time Frame 10-14 -refer to established goals below    Plan   Treatment Interventions ADL retraining;Functional transfer training;UE strengthening/ROM;Endurance training;Patient/family training;Equipment evaluation/education;Compensatory technique education;Activityengagement   Goal Expiration Date 06/07/24   OT  Frequency 2-3x/wk   Discharge Recommendation   Rehab Resource Intensity Level, OT I (Maximum Resource Intensity)   AM-PAC Daily Activity Inpatient   Lower Body Dressing 2   Bathing 2   Toileting 2   Upper Body Dressing 2   Grooming 3   Eating 3   Daily Activity Raw Score 14   Daily Activity Standardized Score (Calc for Raw Score >=11) 33.39   AM-PAC Applied Cognition Inpatient   Following a Speech/Presentation 4   Understanding Ordinary Conversation 4   Taking Medications 4   Remembering Where Things Are Placed or Put Away 4   Remembering List of 4-5 Errands 4   Taking Care of Complicated Tasks 4   Applied Cognition Raw Score 24   Applied Cognition Standardized Score 62.21   End of Consult   Education Provided Yes;Family or social support of family present for education by provider   Patient Position at End of Consult Supine;All needs within reach   Nurse Communication Nurse aware of consult       OCCUPATIONAL THERAPY GOALS:    *S feeding/grooming after setup  *Mod a adls after setup with use of compensatory strategies PRN  *Mod a bed mobility with fair to fair+ sitting balance on EOB to engage in adls and enjoyable activities  *Mod a transfers bed to chair   *Demonstrate good carryover with safe use of AD during adls and functional transfers   *Assess DME needs   *Increase activity tolerance to 35-40 minutes for participation in adls and enjoyable activities  *Assist with safe d/c recommendations         The patient's raw score on the AM-PAC Daily Activity Inpatient Short Form is 14. A raw score of less than 19 suggests the patient may benefit from discharge to post-acute rehabilitation services. Please refer to the recommendation of the Occupational Therapist for safe discharge planning.      Documentation Completed By:    MAURO Medrano/L  [employer] Certified - [id number]

## 2024-05-24 NOTE — ASSESSMENT & PLAN NOTE
Beard catheter placed in the emergency room upon admission to [facility] on 5/16 for concern of urinary retention  Void trial as able

## 2024-05-24 NOTE — ASSESSMENT & PLAN NOTE
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 Decreased sensation medial LUE from elbow to 5th finger. Significant BLE weakness, proximal > distal    Imaging:  No recent imaging to review. MRI cervical and lumbar spine from 5/3/24 with no significant changes compared to previous imaging    Plan:  Continue to monitor neurological exam  Given worsening spasticity over a short period of time, resulting in inability to ambulate, recommend MRI of the cervical and thoracic spine without contrast to evaluate for worsening spinal cord injury or new disc herniation causing cord compression.  I agree with MRI lumbar spine with and without contrast but feel that this will be more low yield as his symptoms are secondary to spasticity and not pain or weakness.  Would prioritize cervical and thoracic imaging  Consider PMR consult for their input regarding progression of spinal cord injury (Dr DePadua if available)  Medical management per primary team  PT OT evaluation, mobilize as tolerated  DVT ppx: lovenox  No neurosurgical intervention is anticipated at this time.    Neurosurgery will follow from the periphery and review imaging as it becomes available.  Please call questions or concerns.

## 2024-05-24 NOTE — PHYSICAL THERAPY NOTE
PHYSICAL THERAPY EVALUATION  NAME:  Jos Hawk  DATE: 05/24/24    AGE:   63 y.o.  Mrn:   [id number]  ADMIT DX:  Quadriparesis (HCC)    Past Medical History:   Diagnosis Date    Balance problems     BPH (benign prostatic hyperplasia)     Chronic back pain     Chronic pain disorder     COVID-19     2/2021-asymptomatic     Erectile dysfunction     Nocturia     OAB (overactive bladder)     Testicular hypogonadism     Urinary frequency     Urinary urgency      Past Surgical History:   Procedure Laterality Date    BACK SURGERY  05/18/2016    with hardware    BACK SURGERY      2 back surgery unable to remove hardware    COLONOSCOPY  11/2004    Dr Taylor. tubular adenoma polyp removed    COLONOSCOPY  12/2007    Dr Cameron. Normal    COLONOSCOPY  07/2013    Dr Cameron. Normal.     COLONOSCOPY  06/2019    Dr Cameron. Hemorrhoids, normal.     NECK SURGERY      decompression of neck     NECK SURGERY Right     scar tissues removed    WI EXCISION HYDROCELE UNILATERAL Right 06/10/2021    Procedure: HYDROCELECTOMY;  Surgeon: Dereck Goddard DO;  Location: [facility];  Service: Urology    SPINAL CORD STIMULATOR IMPLANT  10/10/2019    WOUND DEBRIDEMENT Right 08/10/2023    Procedure: DEBRIDEMENT UPPER EXTREMITY (WASH OUT) - elbow;  Surgeon: Jesse Avendaño MD;  Location: [facility];  Service: Orthopedics       Length Of Stay: 1  PHYSICAL THERAPY EVALUATION :             05/24/24 1151   PT Last Visit   PT Visit Date 05/24/24   Note Type   Note type Evaluation   End of Consult   Patient Position at End of Consult Supine;All needs within reach;Bed/Chair alarm activated   Pain Assessment   Pain Assessment Tool 0-10   Pain Score 8   Pain Location/Orientation Location: Back   Pain Radiating Towards buttock/ legs   Pain Onset/Description Onset: Ongoing   Effect of Pain on Daily Activities limited mobility adn upright   Patient's Stated Pain Goal No pain   Hospital Pain Intervention(s) Repositioned   Restrictions/Precautions    Weight Bearing Precautions Per Order No   Braces or Orthoses   (pt prefers to utilize his gait belt from home for transfers)   Other Precautions Pain;Fall Risk  (hx of quadriplegia from c spine injury s/p fall 2018)   Home Living   Type of Home House   Home Layout Multi-level;Able to live on main level with bedroom/bathroom   Bathroom Shower/Tub Walk-in shower   Bathroom Toilet Raised   Bathroom Equipment Grab bars in shower   Home Equipment Walker;Cane;Wheelchair-manual;Hospital bed  (roho cushion/ gait belt)   Additional Comments slleps in adjustible bed   Prior Function   Level of [place] Needs assistance with ADLs;Needs assistance with IADLS;Needs assistance with functional mobility   Lives With Alone   Receives Help From Personal care attendant;Outpatient therapy   IADLs Family/Friend/Other provides transportation;Family/Friend/Other provides meals;Family/Friend/Other provides medication management   Falls in the last 6 months 1 to 4   Vocational Full time employment   Comments has home aides M-F 7am to 8pm and Sat/Sun 8am to 6pm otherwise is home alone and in bed- aides A w/ all transfers and IADL's ADL's Ax1; pt is able to self propel w/c. - pt has A w/ bowel program reports no issues w/ urinating at baseline? pt reports he was able to stand and walk w/ RW + PT assist as recent as a few weeks ago   General   Family/Caregiver Present Yes  (caregiver present)   Cognition   Overall Cognitive Status WFL   Arousal/Participation Arousable   RUE Assessment   RUE Assessment X   LUE Assessment   LUE Assessment X   RLE Assessment   RLE Assessment X  (spasms/ hypertonic/ ext tone B/L)   LLE Assessment   LLE Assessment X  (spasms/ hypertonic/ ext tone B/L)   Coordination   Movements are Fluid and Coordinated 0   Sensation X   Light Touch   RLE Light Touch Impaired   LLE Light Touch Impaired   Proprioception   RLE Proprioception Impaired   LLE Proprioception Impaired   Bed Mobility   Rolling R 2  Maximal assistance  "  Additional items Assist x 2   Rolling L 2  Maximal assistance   Additional items Assist x 2   Supine to Sit 2  Maximal assistance   Additional items Assist x 2   Sit to Supine 2  Maximal assistance   Additional items Assist x 2   Additional Comments able to sit EOB once positioned w/ close SBA/ B/L UE support.   Transfers   Sit to Stand 1  Dependent   Additional items Assist x 2;Assist x 3  (for attempt- unable to clear buttocks.)   Sit pivot Unable to assess  (max A x2 lateral scoot HOB + for sit> supine. (OOB deferred 2* MRI pending in < 30 min)   Additional Comments attempts to stand unsuccessful w/ Ax2-3/ use of gait belt + knee block and height of bed elevated- pt adament of attempting his way and wanting caretaker to assist rather than PT/OT bc \"they know how to do it\" .   Ambulation/Elevation   Gait pattern Not appropriate   Balance   Static Sitting Fair   Dynamic Sitting Poor   Static Standing Zero   Activity Tolerance   Activity Tolerance Patient limited by fatigue;Patient limited by pain   Medical Staff Made Aware Pt was seen in conjunction w/ OT 2* unstable presentation; medical complexity; poly- traumatic injuries; new precautions/ limitations + limited activity tolerance and regression from baseline functional mobility.   Nurse Made Aware yes   Assessment   Prognosis Fair   Problem List Decreased strength;Decreased range of motion;Decreased endurance;Impaired balance;Decreased mobility;Decreased coordination;Impaired sensation;Impaired tone;Obesity;Pain   Assessment Pt is 63 y.o. male seen for PT evaluation s/p admit to [facility] on 5/23/2024  w/ Quadriparesis (HCC).Pt initially admitted to [facility] difficulty urinating and burning - has had recent decline in functional abilities and was transferred to [facility] for MRI 2* SC stimulator present . Patient has a past medical history of Balance problems, BPH (benign prostatic hyperplasia), Chronic back pain, Chronic pain disorder, COVID-19, Erectile " dysfunction, Nocturia, OAB (overactive bladder), Testicular hypogonadism, Urinary frequency, and Urinary urgency.Quadriparesis 2* cspine injury in 2018 At baseline pt was completing adls with assist from caretakers - transferred to w/c with assist from caretaker - reports he was able to ambulate short distances until recently- caretakers manage iadls. Pt lives alone in multilevel home with FFSU - has home aides 10-11 hrs/day otherwise is home alone (home aides put him to bed before they leave and he stays in bed until they return in am) Pt has hx of OPPT at [facility] and 2 acute rehab stays at [facility].  Currently,  pt  is requiring maxA x2 rolling repositioning; for supine> sit transitions.  DEPX2-3 for attempt to stand x2 w/ gt belt and B/L knee block. (+) LE spasm at times throughout. Pt unable to clear buttocks from bed and OOB xfer deferred 2* pt refusing until post MRI.   Pt presents functioning below baseline and currently w/ overall mobility deficits 2* to: decreased LE strength/AROM; lE hypertonic / spasms;  limited flexibility;  generalized weakness/ deconditioning; decreased endurance; decreased activity tolerance; decreased coordination; impaired balance;pain; anxiety/ fear of falling; bed/ chair alarms; multiple lines; sensory impairment .  Pt currently at risk for falls.  (Please find additional objective findings from PT assessment regarding body systems outlined above.) Pt will continue to benefit from skilled PT interventions to address stated impairments; to maximize functional potential; for ongoing pt/ family training; and DME needs.  PT is recommending PT Resource Intensity Level  1 on d/c.      PT will follow for STG as outlined on eval. Pt/ family agreeable to PT POC and goals as stated.   Goals   Patient Goals to do what I was doing before   STG Expiration Date 06/07/24   Short Term Goal #1 In 14 days pt will:  Complete bed mobility skills with modAAx1 to facilitate safe return to previous living  environment and decrease burden on caregivers.  Perform all functional transfers with modAx1  to facilitate safe return to previous living environment.   W/c mobility management skills level tile surfaces '; Actively participate w/ PT for ongoing pt and family education; DME needs and D/C planning to promote highest level of function in least restrictive environment.   PT Treatment Day 0   Plan   Treatment/Interventions ADL retraining;Functional transfer training;Therapeutic exercise;Endurance training;Cognitive reorientation;Patient/family training;Equipment eval/education;Bed mobility   PT Frequency 3-5x/wk   Discharge Recommendation   Rehab Resource Intensity Level, PT I (Maximum Resource Intensity)   AM-PAC Basic Mobility Inpatient   Turning in Flat Bed Without Bedrails 2   Lying on Back to Sitting on Edge of Flat Bed Without Bedrails 1   Moving Bed to Chair 1   Standing Up From Chair Using Arms 1   Walk in Room 1   Climb 3-5 Stairs With Railing 1   Basic Mobility Inpatient Raw Score 7   Turning Head Towards Sound 4   Follow Simple Instructions 3   Low Function Basic Mobility Raw Score  14   Low Function Basic Mobility Standardized Score  22.01   [facility] Highest Level Of Mobility   -HLM Goal 2: Bed activities/Dependent transfer   -HLM Achieved 3: Sit at edge of bed       The patient's AM-PAC Basic Mobility Inpatient Short Form Raw Score is 7. A Raw score of less than or equal to 16 suggests the patient may benefit from discharge to post-acute rehabilitation services. Please also refer to the recommendation of the Physical Therapist for safe discharge planning.

## 2024-05-24 NOTE — PLAN OF CARE
Problem: OCCUPATIONAL THERAPY ADULT  Goal: Performs self-care activities at highest level of function for planned discharge setting.  See evaluation for individualized goals.  Description: Treatment Interventions: ADL retraining, Functional transfer training, UE strengthening/ROM, Endurance training, Patient/family training, Equipment evaluation/education, Compensatory technique education, Activityengagement          See flowsheet documentation for full assessment, interventions and recommendations.   Note: Limitation: Decreased ADL status, Decreased UE strength, Decreased endurance, Decreased sensation, Decreased fine motor control, Decreased self-care trans, Decreased high-level ADLs  Prognosis: Fair  Assessment: Pt is a 63 y.o. male who was admitted to [facility] on 5/23/2024 with Quadriparesis (HCC)- pt initially admitted to [facility] difficulty urinating and burning - has had recent decline in functional abilities and was transferred to [facility] for MRI 2* SC stimulator present . Patient  has a past medical history of Balance problems, BPH (benign prostatic hyperplasia), Chronic back pain, Chronic pain disorder, COVID-19, Erectile dysfunction, Nocturia, OAB (overactive bladder), Testicular hypogonadism, Urinary frequency, and Urinary urgency.Quadriparesis 2* cspine injury in 2018   At baseline pt was completing adls with assist from caretakers - transferred to / with assist from caretaker - reports he was able to ambulate short distances until recently- caretakers manage iadls. Pt lives alone in multilevel home with FFSU - has home aides 10-11 hrs/day otherwise is home alone (home aides put him to bed before they leave and he stays in bed until they return in am). Currently pt requires mod to max assist for overall ADLS and max a x 2  for functional mobility/transfers. Pt currently presents with impairments in the following categories -limited home support, difficulty performing ADLS, decreased initiation  and engagement , and health management  activity tolerance, endurance, standing balance/tolerance, sitting balance/tolerance, UE strength, and FMC. These impairments, as well as pt's fatigue, pain, spinal precautions, decreased caregiver support, and risk for falls  limit pt's ability to safely engage in all baseline areas of occupation, includinggrooming, bathing, dressing, toileting, functional mobility/transfers, work/volunteer work , social participation , and leisure activities  From OT standpoint, recommend Level I resources upon D/C. OT will continue to follow to address the below stated goals.     Rehab Resource Intensity Level, OT: I (Maximum Resource Intensity)

## 2024-05-24 NOTE — ASSESSMENT & PLAN NOTE
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 With significant decline in function over the past few weeks.  Initially thought to be possibly related to  serotoninergic medications, Cymbalta and BuSpar or progression of his disease, per neurology evaluation while at [facility] (5/17)  Cymbalta discontinued.  BuSpar restarted at a lower dose  Patient will need MRI of thoracic spine and lumbar spine with gadolinium however due to spinal cord stimulator, needed to be transferred from [facility] to [facility].  Neurosurgery following, input appreciated   Will need rehab on discharge, good [place]

## 2024-05-24 NOTE — PHYSICAL THERAPY NOTE
Physical Therapy Progress Note     05/24/24 1450   PT Last Visit   PT Visit Date 05/24/24   Note Type   Note Type Treatment   Pain Assessment   Pain Score No Pain   Restrictions/Precautions   Other Precautions Fall Risk;Pain   Subjective   Subjective Pt encountered returning to room on stretcher from MRI.  Assisted transport staff with lateral slide back to bed.  Pt reports no new complaints when being assisted by staff.  Is particular with positioning due to LE tone that leads to him sliding down in bed.   Bed Mobility   Rolling R 2  Maximal assistance   Additional items Assist x 1   Rolling L 2  Maximal assistance   Additional items Assist x 1   Additional Comments Ax2 for boosting to HOB x 2 trials   Activity Tolerance   Activity Tolerance Patient tolerated treatment well   Nurse Made Aware yes   Exercises   Hip Flexion Supine;AAROM;Bilateral;5 reps   Hip Abduction Supine;5 reps;AAROM;Bilateral   Assessment   Prognosis Fair   Problem List Decreased strength;Decreased range of motion;Decreased endurance;Impaired balance;Decreased mobility;Decreased coordination;Impaired sensation;Impaired tone;Obesity;Pain   Assessment Time taken this PM to assist pt with positioning upon return from testing, utilizing Ax2 persons & bed controls to assist with mobilty & preventing slouched posture & breaking LE extensor tone that contributes to pt's poor positioning.  pt unable to participate in rolling to either side for linen adjustment dring this time.  At conclusion of adjustments, pt remained upright in bed with knees bent slightly, foot board extended & hips in pelvic neutral .  Briefly discused role of PT & plan to continue mobility until medically cleared to d/c hospital setting .  Pt in agreement to the same at this time.   Goals   Patient Goals to be able to walk   STG Expiration Date 06/07/24   PT Treatment Day 1   Plan   Treatment/Interventions Functional transfer training;LE strengthening/ROM;Therapeutic  exercise;Endurance training;Patient/family training;Equipment eval/education;Bed mobility   Progress Progressing toward goals   PT Frequency 3-5x/wk   Discharge Recommendation   Rehab Resource Intensity Level, PT I (Maximum Resource Intensity)   AM-PAC Basic Mobility Inpatient   Turning in Flat Bed Without Bedrails 2   Lying on Back to Sitting on Edge of Flat Bed Without Bedrails 1   Moving Bed to Chair 1   Standing Up From Chair Using Arms 1   Walk in Room 1   Climb 3-5 Stairs With Railing 1   Basic Mobility Inpatient Raw Score 7   Turning Head Towards Sound 4   Follow Simple Instructions 4   Low Function Basic Mobility Raw Score  15   Low Function Basic Mobility Standardized Score  23.9   [facility] Highest Level Of Mobility   -HL Goal 2: Bed activities/Dependent transfer   -HL Achieved 2: Bed activities/Dependent transfer       Nabor Bennett PTA    An [facility] Basic Mobility Raw Score less than 17 suggests pt would benefit from post acute rehab.  Please also refer to the recommendation of the Physical Therapist for safe discharge planning.

## 2024-05-24 NOTE — H&P
[facility]  H&P  Name: Jos Hawk 63 y.o. male I MRN: [id number]  Unit/Bed#: 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 significant decline in function over the past few weeks.  Initially thought to be possibly related to  serotoninergic medications, Cymbalta and BuSpar or progression of his disease, per neurology evaluation  Cymbalta discontinued.  BuSpar restarted at a lower dose  Patient will need MRI of thoracic spine and lumbar spine with gadolinium however due to spinal cord stimulator, need to be transferred to [facility].  Also consult neurosurgery  Will need rehab on discharge, good Pedraza    Neurogenic orthostatic hypotension (HCC)  Assessment & Plan  Continue midodrine 5 mg 3 times daily    S/P insertion of spinal cord stimulator  Assessment & Plan  Noted    BPH associated with nocturia  Assessment & Plan  Beard catheter placed in the emergency room for concern of urinary retention         VTE Prophylaxis: Enoxaparin (Lovenox)  / sequential compression device   Code Status: full code      Anticipated Length of Stay:  Patient will be admitted on an Inpatient basis with an anticipated length of stay of  > 2 midnights.   Justification for Hospital Stay: pendign MRI    Total Time for Visit, including Counseling / Coordination of Care: 60 minutes.  Greater than 50% of this total time spent on direct patient counseling and coordination of care.    Chief Complaint:   weakness    History of Present Illness:    Jos Hawk is a 63 y.o. male Patient with history of L2-S1 fusion surgery,  s/p spinal cord stimulator in 2018, cervical  spinal cord injury with residual quadriparesis 2022, now with progressive weakness after recent Botox injection. significant decline in function over the past few weeks.  Neurology consulted, who recommended to discontinue Cymbalta and reduce dose of BuSpar.  Neurosurgery was consulted.  Subsequently patient transferred to [facility] for MRI of thoracic spine and lumbar spine due to presence of spinal cord stimulator.    Review of Systems:    Review of Systems   Constitutional:  Negative for chills and fever.   HENT:  Negative for ear pain and sore throat.    Eyes:  Negative for pain and visual disturbance.   Respiratory:  Negative for cough and shortness of breath.    Cardiovascular:  Negative for chest pain and palpitations.   Gastrointestinal:  Negative for abdominal pain and vomiting.   Genitourinary:  Negative for dysuria and hematuria.   Musculoskeletal:  Positive for gait problem. Negative for arthralgias and back pain.   Skin:  Negative for color change and rash.   Neurological:  Positive for weakness and numbness. Negative for seizures and syncope.   All other systems reviewed and are negative.      Past Medical and Surgical History:     Past Medical History:   Diagnosis Date    Balance problems     BPH (benign prostatic hyperplasia)     Chronic back pain     Chronic pain disorder     COVID-19     2/2021-asymptomatic     Erectile dysfunction     Nocturia     OAB (overactive bladder)     Testicular hypogonadism     Urinary frequency     Urinary urgency        Past Surgical History:   Procedure Laterality Date    BACK SURGERY  05/18/2016    with hardware    BACK SURGERY      2 back surgery unable to remove hardware    COLONOSCOPY  11/2004    Dr Taylor. tubular adenoma polyp removed    COLONOSCOPY  12/2007    Dr Cameron. Normal    COLONOSCOPY  07/2013    Dr Cameron. Normal.     COLONOSCOPY  06/2019    Dr Cameron. Hemorrhoids, normal.     NECK SURGERY      decompression of neck     NECK SURGERY Right      "scar tissues removed    KS EXCISION HYDROCELE UNILATERAL Right 06/10/2021    Procedure: HYDROCELECTOMY;  Surgeon: Dereck Goddard DO;  Location: [facility];  Service: Urology    SPINAL CORD STIMULATOR IMPLANT  10/10/2019    WOUND DEBRIDEMENT Right 08/10/2023    Procedure: DEBRIDEMENT UPPER EXTREMITY (WASH OUT) - elbow;  Surgeon: Jesse Avendaño MD;  Location: [facility];  Service: Orthopedics       Meds/Allergies:    Prior to Admission medications    Medication Sig Start Date End Date Taking? Authorizing Provider   ALPRAZolam (XANAX) 0.5 mg tablet Take 1 tablet (0.5 mg total) by mouth daily at bedtime as needed for anxiety 5/15/24   Debra Leyva DO   atorvastatin (LIPITOR) 40 mg tablet TAKE 20MG (ONE-HALF TABLET) BY MOUTH EVERY DAY FOR CHOLESTEROL 11/20/23   Historical Provider, MD   bisacodyl (DULCOLAX) 10 mg suppository Insert 1 suppository (10 mg total) into the rectum daily as needed for constipation  Patient not taking: Reported on 5/15/2024 8/15/23   Meño Betancourt MD   busPIRone (BUSPAR) 15 mg tablet Take 15 mg by mouth in the morning    Historical Provider, MD   Cholecalciferol 50 MCG (2000 UT) TABS 50 mcg 10/11/23   Historical Provider, MD   clobetasol (TEMOVATE) 0.05 % external solution  5/10/23   Historical Provider, MD   docusate sodium (COLACE) 100 mg capsule Take 1 capsule (100 mg total) by mouth 2 (two) times a day  Patient not taking: Reported on 4/30/2024 8/15/23   Meño Betancourt MD   DULoxetine (CYMBALTA) 60 mg delayed release capsule Take 1 capsule (60 mg total) by mouth daily 5/15/24   Debra Leyva DO   Elastic Bandages & Supports (Wrap/Multipurpose 2.75\"x21.4yd) MISC Use in the morning Please dispense lymphedema wrap and wrap from hand up to the proximal shoulder 8/25/23   King Forrester PA-C   fluocinonide (LIDEX) 0.05 % cream  5/10/23   Historical Provider, MD   folic acid (FOLVITE) 1 mg tablet Take 1 tablet (1mg) by mouth Once Daily. 2/16/24   Historical Provider, " MD   ibuprofen (MOTRIN) 400 mg tablet Take 1 tablet (400 mg total) by mouth every 6 (six) hours as needed for mild pain 9/22/22   STACEY Cho   ibuprofen (MOTRIN) 600 mg tablet Take 1 tablet (600 mg total) by mouth every 6 (six) hours as needed for mild pain or moderate pain 4/30/24   Gonzalez Greer MD   ketoconazole (NIZORAL) 2 % shampoo  5/10/23   Historical Provider, MD   lidocaine (LIDODERM) 5 % APPLY 2 PATCH TOPICALLY DAILY FOR PAIN (REMOVE PATCH AFTER 12 HOURS; 12 HOURS ON AND 12 HOURS OFF)  Patient not taking: Reported on 11/10/2023 7/14/23   Historical Provider, MD   meloxicam (MOBIC) 15 mg tablet 7.5 mg  Patient not taking: Reported on 4/30/2024 9/6/23   Historical Provider, MD   methocarbamol (ROBAXIN) 500 mg tablet Take 1 tablet (500 mg total) by mouth 4 (four) times a day 5/15/24   Debra Leyva DO   midodrine (PROAMATINE) 5 mg tablet Take 1 tablet (5 mg total) by mouth 3 (three) times a day before meals 11/21/23   Jesse Weeks DO   naloxone (NARCAN) 4 mg/0.1 mL nasal spray Administer 1 spray into a nostril. If no response after 2-3 minutes, give another dose in the other nostril using a new spray. 1/17/24 1/16/25  Debra Leyva DO   NON FORMULARY COMPRILAN BANDAGE [id number]  Patient not taking: Reported on 11/10/2023 8/25/23   Historical Provider, MD   nystatin powder  5/10/23   Historical Provider, MD   oxyCODONE (ROXICODONE) 5 immediate release tablet Take 1 tablet (5 mg) for moderate pain every 6 hours as needed or take 2 tablets (10 mg) for severe pain every 6 hours as needed. 8/15/23   Meño Betancourt MD   oxyCODONE (Roxicodone) 5 immediate release tablet Take 1 tablet (5 mg total) by mouth every 4 (four) hours as needed for moderate pain Max Daily Amount: 30 mg 5/4/24   Gonzalez Greer MD   sildenafil (VIAGRA) 100 mg tablet Take 100 mg by mouth As directed 9/11/20   Historical Provider, MD   testosterone cypionate (DEPO-TESTOSTERONE) 200 mg/mL SOLN  inject 2 MILLILITERS intramuscularly every 14 days 4/30/24   Historical Provider, MD     I have reviewed home medications using allscripts.    Allergies: No Known Allergies    Social History:     Marital Status: /Civil Union      Substance Use History:   Social History     Substance and Sexual Activity   Alcohol Use Not Currently    Alcohol/week: 4.0 - 7.0 standard drinks of alcohol    Types: 4 - 7 Glasses of wine per week    Comment: Just weekends     Social History     Tobacco Use   Smoking Status Never   Smokeless Tobacco Never     Social History     Substance and Sexual Activity   Drug Use No       Family History:    No family history on file.    Physical Exam:     Vitals:        Physical Exam  Vitals and nursing note reviewed.   Constitutional:       General: He is not in acute distress.     Appearance: He is well-developed.   HENT:      Head: Normocephalic and atraumatic.   Eyes:      Conjunctiva/sclera: Conjunctivae normal.   Cardiovascular:      Rate and Rhythm: Normal rate and regular rhythm.      Heart sounds: No murmur heard.  Pulmonary:      Effort: Pulmonary effort is normal. No respiratory distress.      Breath sounds: Normal breath sounds.   Abdominal:      Palpations: Abdomen is soft.      Tenderness: There is no abdominal tenderness.   Musculoskeletal:         General: No swelling.      Cervical back: Neck supple.   Skin:     General: Skin is warm and dry.      Capillary Refill: Capillary refill takes less than 2 seconds.   Neurological:      Mental Status: He is alert and oriented to person, place, and time.      Sensory: Sensory deficit present.      Motor: Weakness present.            Additional Data:     Lab Results: I have personally reviewed pertinent reports.      Results from last 7 days   Lab Units 05/18/24  0540 05/17/24  0614   WBC Thousand/uL 7.05 8.29   HEMOGLOBIN g/dL 17.5* 17.1*   HEMATOCRIT % 51.2* 51.7*   PLATELETS Thousands/uL 161 144*   SEGS PCT %  --  74   LYMPHO PCT %  --   12*   MONO PCT %  --  11   EOS PCT %  --  1     Results from last 7 days   Lab Units 05/22/24  1424 05/18/24  0540   SODIUM mmol/L 133* 139   POTASSIUM mmol/L 4.4 4.3   CHLORIDE mmol/L 96 102   CO2 mmol/L 34* 32   BUN mg/dL 11 10   CREATININE mg/dL 0.70 0.67   ANION GAP mmol/L 3* 5   CALCIUM mg/dL 8.9 8.6   ALBUMIN g/dL  --  3.5   TOTAL BILIRUBIN mg/dL  --  0.79   ALK PHOS U/L  --  55   ALT U/L  --  14   AST U/L  --  14   GLUCOSE RANDOM mg/dL 106 78                 Results from last 7 days   Lab Units 05/17/24  0614   PROCALCITONIN ng/ml <0.05       Imaging: I have personally reviewed pertinent reports.      No orders to display       EKG, Pathology, and Other Studies Reviewed on Admission:   EKG: Sinus rhythm with frequent PVC    Allscripts / Epic Records Reviewed: Yes     ** Please Note: This note has been constructed using a voice recognition system. **

## 2024-05-25 LAB
ANION GAP SERPL CALCULATED.3IONS-SCNC: 4 MMOL/L (ref 4–13)
BACTERIA BLD CULT: NORMAL
BACTERIA BLD CULT: NORMAL
BASOPHILS # BLD AUTO: 0.06 THOUSANDS/ÂΜL (ref 0–0.1)
BASOPHILS NFR BLD AUTO: 1 % (ref 0–1)
BUN SERPL-MCNC: 14 MG/DL (ref 5–25)
CALCIUM SERPL-MCNC: 9 MG/DL (ref 8.4–10.2)
CHLORIDE SERPL-SCNC: 96 MMOL/L (ref 96–108)
CO2 SERPL-SCNC: 33 MMOL/L (ref 21–32)
CREAT SERPL-MCNC: 0.56 MG/DL (ref 0.6–1.3)
EOSINOPHIL # BLD AUTO: 0.32 THOUSAND/ÂΜL (ref 0–0.61)
EOSINOPHIL NFR BLD AUTO: 4 % (ref 0–6)
ERYTHROCYTE [DISTWIDTH] IN BLOOD BY AUTOMATED COUNT: 13.1 % (ref 11.6–15.1)
GFR SERPL CREATININE-BSD FRML MDRD: 108 ML/MIN/1.73SQ M
GLUCOSE SERPL-MCNC: 94 MG/DL (ref 65–140)
HCT VFR BLD AUTO: 53 % (ref 36.5–49.3)
HGB BLD-MCNC: 17.1 G/DL (ref 12–17)
IMM GRANULOCYTES # BLD AUTO: 0.09 THOUSAND/UL (ref 0–0.2)
IMM GRANULOCYTES NFR BLD AUTO: 1 % (ref 0–2)
LYMPHOCYTES # BLD AUTO: 0.99 THOUSANDS/ÂΜL (ref 0.6–4.47)
LYMPHOCYTES NFR BLD AUTO: 12 % (ref 14–44)
MCH RBC QN AUTO: 31.1 PG (ref 26.8–34.3)
MCHC RBC AUTO-ENTMCNC: 32.3 G/DL (ref 31.4–37.4)
MCV RBC AUTO: 97 FL (ref 82–98)
MONOCYTES # BLD AUTO: 0.91 THOUSAND/ÂΜL (ref 0.17–1.22)
MONOCYTES NFR BLD AUTO: 11 % (ref 4–12)
NEUTROPHILS # BLD AUTO: 6.21 THOUSANDS/ÂΜL (ref 1.85–7.62)
NEUTS SEG NFR BLD AUTO: 71 % (ref 43–75)
NRBC BLD AUTO-RTO: 0 /100 WBCS
PLATELET # BLD AUTO: 223 THOUSANDS/UL (ref 149–390)
PMV BLD AUTO: 9.9 FL (ref 8.9–12.7)
POTASSIUM SERPL-SCNC: 4.4 MMOL/L (ref 3.5–5.3)
RBC # BLD AUTO: 5.49 MILLION/UL (ref 3.88–5.62)
SODIUM SERPL-SCNC: 133 MMOL/L (ref 135–147)
WBC # BLD AUTO: 8.58 THOUSAND/UL (ref 4.31–10.16)

## 2024-05-25 PROCEDURE — 85025 COMPLETE CBC W/AUTO DIFF WBC: CPT | Performed by: INTERNAL MEDICINE

## 2024-05-25 PROCEDURE — 80048 BASIC METABOLIC PNL TOTAL CA: CPT | Performed by: INTERNAL MEDICINE

## 2024-05-25 PROCEDURE — 99232 SBSQ HOSP IP/OBS MODERATE 35: CPT | Performed by: INTERNAL MEDICINE

## 2024-05-25 RX ORDER — LORAZEPAM 2 MG/ML
0.5 INJECTION INTRAMUSCULAR DAILY PRN
Status: DISCONTINUED | OUTPATIENT
Start: 2024-05-25 | End: 2024-06-03 | Stop reason: HOSPADM

## 2024-05-25 RX ORDER — ACETAMINOPHEN 325 MG/1
975 TABLET ORAL EVERY 8 HOURS SCHEDULED
Status: DISCONTINUED | OUTPATIENT
Start: 2024-05-25 | End: 2024-06-03 | Stop reason: HOSPADM

## 2024-05-25 RX ADMIN — METHOCARBAMOL 500 MG: 500 TABLET ORAL at 21:33

## 2024-05-25 RX ADMIN — TOBRAMYCIN AND DEXAMETHASONE 1 DROP: 3; 1 SUSPENSION/ DROPS OPHTHALMIC at 05:04

## 2024-05-25 RX ADMIN — ATORVASTATIN CALCIUM 20 MG: 20 TABLET, FILM COATED ORAL at 17:41

## 2024-05-25 RX ADMIN — ACETAMINOPHEN 975 MG: 325 TABLET, FILM COATED ORAL at 21:33

## 2024-05-25 RX ADMIN — METHOCARBAMOL 500 MG: 500 TABLET ORAL at 08:24

## 2024-05-25 RX ADMIN — MORPHINE SULFATE 2 MG: 2 INJECTION, SOLUTION INTRAMUSCULAR; INTRAVENOUS at 19:45

## 2024-05-25 RX ADMIN — TOBRAMYCIN AND DEXAMETHASONE 1 DROP: 3; 1 SUSPENSION/ DROPS OPHTHALMIC at 11:26

## 2024-05-25 RX ADMIN — OXYCODONE HYDROCHLORIDE 10 MG: 10 TABLET ORAL at 03:14

## 2024-05-25 RX ADMIN — BUSPIRONE HYDROCHLORIDE 5 MG: 5 TABLET ORAL at 08:24

## 2024-05-25 RX ADMIN — METHOCARBAMOL 500 MG: 500 TABLET ORAL at 17:41

## 2024-05-25 RX ADMIN — OXYCODONE HYDROCHLORIDE 10 MG: 10 TABLET ORAL at 08:24

## 2024-05-25 RX ADMIN — MORPHINE SULFATE 2 MG: 2 INJECTION, SOLUTION INTRAMUSCULAR; INTRAVENOUS at 15:30

## 2024-05-25 RX ADMIN — MORPHINE SULFATE 2 MG: 2 INJECTION, SOLUTION INTRAMUSCULAR; INTRAVENOUS at 23:42

## 2024-05-25 RX ADMIN — BUSPIRONE HYDROCHLORIDE 5 MG: 5 TABLET ORAL at 17:41

## 2024-05-25 RX ADMIN — TOBRAMYCIN AND DEXAMETHASONE 1 DROP: 3; 1 SUSPENSION/ DROPS OPHTHALMIC at 00:13

## 2024-05-25 RX ADMIN — OXYCODONE HYDROCHLORIDE 10 MG: 10 TABLET ORAL at 21:35

## 2024-05-25 RX ADMIN — TOBRAMYCIN AND DEXAMETHASONE 1 DROP: 3; 1 SUSPENSION/ DROPS OPHTHALMIC at 23:42

## 2024-05-25 RX ADMIN — OXYCODONE HYDROCHLORIDE 10 MG: 10 TABLET ORAL at 17:41

## 2024-05-25 RX ADMIN — TOBRAMYCIN AND DEXAMETHASONE 1 DROP: 3; 1 SUSPENSION/ DROPS OPHTHALMIC at 17:41

## 2024-05-25 RX ADMIN — MORPHINE SULFATE 2 MG: 2 INJECTION, SOLUTION INTRAMUSCULAR; INTRAVENOUS at 05:03

## 2024-05-25 RX ADMIN — OXYCODONE HYDROCHLORIDE 10 MG: 10 TABLET ORAL at 13:38

## 2024-05-25 RX ADMIN — ACETAMINOPHEN 975 MG: 325 TABLET, FILM COATED ORAL at 15:29

## 2024-05-25 RX ADMIN — ENOXAPARIN SODIUM 40 MG: 40 INJECTION SUBCUTANEOUS at 08:24

## 2024-05-25 RX ADMIN — HYDROXYZINE HYDROCHLORIDE 25 MG: 25 TABLET, FILM COATED ORAL at 21:35

## 2024-05-25 RX ADMIN — MORPHINE SULFATE 2 MG: 2 INJECTION, SOLUTION INTRAMUSCULAR; INTRAVENOUS at 10:11

## 2024-05-25 RX ADMIN — METHOCARBAMOL 500 MG: 500 TABLET ORAL at 11:26

## 2024-05-25 NOTE — ASSESSMENT & PLAN NOTE
Beard catheter placed in the emergency room upon admission to [facility] on 5/16 for concern of urinary retention  Void trial as able

## 2024-05-25 NOTE — PROGRESS NOTES
[facility]  Progress Note  Name: Jos Hawk I  MRN: [id number]  Unit/Bed#: [facility] 610-01 I Date of Admission: 5/23/2024   Date of Service: 5/25/2024  Hospital Day: 2    Assessment & Plan   * Quadriparesis (HCC)  Assessment & Plan  Patient with history of C3-4 SCI in 1998 due to a fall, s/p posterior decompression without fusion. Also hx of lumbar revision fusion in 2016 at outside hospital and SCS placement 2019 at outside hospital. Presented to [facility] with progressive weakness after recent Botox injection. With significant decline in function over the past few weeks.  Initially thought to be possibly related to  serotoninergic medications, Cymbalta and BuSpar or progression of his disease, per neurology evaluation while at [facility] (5/17)  Cymbalta discontinued.  BuSpar restarted at a lower dose  Patient will need MRI of thoracic spine and lumbar spine with gadolinium however due to spinal cord stimulator, needed to be transferred from [facility] to [facility]  MRI L spine done on 5/24, limited study due to artifact  MRI C spine and T spine still pending--can not be done til Tuesday  Neurosurgery following, input appreciated   Will need rehab on discharge, good Pedraza    S/P insertion of spinal cord stimulator  Assessment & Plan  Noted    Hyponatremia  Assessment & Plan  Chronic, baseline appears around 131-133  Trend BMP    Neurogenic orthostatic hypotension (HCC)  Assessment & Plan  Continue midodrine 5 mg 3 times daily    BPH associated with nocturia  Assessment & Plan  Beard catheter placed in the emergency room upon admission to [facility] on 5/16 for concern of urinary retention  Void trial as able                VTE Pharmacologic Prophylaxis:   Moderate Risk (Score 3-4) - Pharmacological DVT Prophylaxis Ordered: enoxaparin (Lovenox).    Mobility:   Basic Mobility Inpatient Raw Score: 7  -HL Goal: 2: Bed activities/Dependent transfer  -HL Achieved: 2:  Bed activities/Dependent transfer  -[facility] Goal achieved. Continue to encourage appropriate mobility.    Patient Centered Rounds: I performed bedside rounds with nursing staff today.   Discussions with Specialists or Other Care Team Provider: appreciate neurosurgery input     Education and Discussions with Family / Patient: Patient declined call to .     Total Time Spent on Date of Encounter in care of patient: 35 mins. This time was spent on one or more of the following: performing physical exam; counseling and coordination of care; obtaining or reviewing history; documenting in the medical record; reviewing/ordering tests, medications or procedures; communicating with other healthcare professionals and discussing with patient's family/caregivers.    Current Length of Stay: 2 day(s)  Current Patient Status: Inpatient   Certification Statement: The patient will continue to require additional inpatient hospital stay due to further MRI imaging, pain control   Discharge Plan: Anticipate discharge in >72 hrs to rehab facility.    Code Status: Level 1 - Full Code    Subjective:   Pt frustrated that his MRI L spine did not yield answers and that he must wait few more days for additional imaging. He is constipated today. Wants RN to help him get repositioned.     Objective:     Vitals:   Temp (24hrs), Av.1 °F (36.7 °C), Min:97.9 °F (36.6 °C), Max:98.4 °F (36.9 °C)    Temp:  [97.9 °F (36.6 °C)-98.4 °F (36.9 °C)] 98 °F (36.7 °C)  HR:  [74-88] 74  Resp:  [16-18] 16  BP: (135-141)/(77-86) 141/77  SpO2:  [88 %-99 %] 99 %  Body mass index is 33.16 kg/m².     Input and Output Summary (last 24 hours):     Intake/Output Summary (Last 24 hours) at 2024 1025  Last data filed at 2024 0511  Gross per 24 hour   Intake --   Output 1025 ml   Net -1025 ml       Physical Exam:   Physical Exam  Vitals and nursing note reviewed.   Constitutional:       General: He is not in acute distress.  Cardiovascular:      Rate  and Rhythm: Normal rate and regular rhythm.   Pulmonary:      Effort: No respiratory distress.   Abdominal:      General: There is no distension.   Genitourinary:     Comments: Beard in place   Musculoskeletal:         General: Swelling present.      Comments: Intermittent LLE spasms noted    Neurological:      Mental Status: He is oriented to person, place, and time.      Comments: Strength intact bilateral upper extremities, weakness b/l lower extremities    Psychiatric:         Mood and Affect: Mood normal.          Additional Data:     Labs:      Results from last 7 days   Lab Units 05/22/24  1424   SODIUM mmol/L 133*   POTASSIUM mmol/L 4.4   CHLORIDE mmol/L 96   CO2 mmol/L 34*   BUN mg/dL 11   CREATININE mg/dL 0.70   ANION GAP mmol/L 3*   CALCIUM mg/dL 8.9   GLUCOSE RANDOM mg/dL 106                       Lines/Drains:  Invasive Devices       Peripheral Intravenous Line  Duration             Peripheral IV 05/23/24 Dorsal (posterior);Left Forearm 2 days              Drain  Duration             Urethral Catheter Latex;Straight-tip 16 Fr. 8 days                  Urinary Catheter:  Goal for removal: Voiding trial when ambulation improves               Imaging: Reviewed radiology reports from this admission including: MRI L spine     Recent Cultures (last 7 days):   Results from last 7 days   Lab Units 05/20/24  1756 05/20/24  1749   BLOOD CULTURE  No Growth After 4 Days. No Growth After 4 Days.       Last 24 Hours Medication List:   Current Facility-Administered Medications   Medication Dose Route Frequency Provider Last Rate    acetaminophen  650 mg Oral Q4H PRN Bharti Jensen MD      atorvastatin  20 mg Oral Daily With Dinner Bharti Jensen MD      bisacodyl  10 mg Rectal Daily PRN Bharti Jensen MD      busPIRone  5 mg Oral BID Bharti Jensen MD      enoxaparin  40 mg Subcutaneous Daily Bharti Jensen MD      hydrOXYzine HCL  25 mg Oral HS PRN Bharti Jensen MD      LORazepam  0.5 mg Intravenous Daily PRN Shahla Naidu PA-C       methocarbamol  500 mg Oral 4x Daily Bharti Jensen MD      midodrine  5 mg Oral TID AC Bharti Jensen MD      morphine injection  2 mg Intravenous Q4H PRN Bharti Jensen MD      oxyCODONE  5 mg Oral Q4H PRN Bharti Jensen MD      Or    oxyCODONE  10 mg Oral Q4H PRN Bharti Jensen MD      tobramycin-dexamethasone  1 drop Both Eyes Q6H [facility] Bharti Jensen MD          Today, Patient Was Seen By: Shahla Naidu PA-C    **Please Note: This note may have been constructed using a voice recognition system.**

## 2024-05-25 NOTE — PLAN OF CARE
Problem: Prexisting or High Potential for Compromised Skin Integrity  Goal: Skin integrity is maintained or improved  Description: INTERVENTIONS:  - Identify patients at risk for skin breakdown  - Assess and monitor skin integrity  - Assess and monitor nutrition and hydration status  - Monitor labs   - Assess for incontinence   - Turn and reposition patient  - Assist with mobility/ambulation  - Relieve pressure over bony prominences  - Avoid friction and shearing  - Provide appropriate hygiene as needed including keeping skin clean and dry  - Evaluate need for skin moisturizer/barrier cream  - Collaborate with interdisciplinary team   - Patient/family teaching  - Consider wound care consult   Outcome: Progressing     Problem: PAIN - ADULT  Goal: Verbalizes/displays adequate comfort level or baseline comfort level  Description: Interventions:  - Encourage patient to monitor pain and request assistance  - Assess pain using appropriate pain scale  - Administer analgesics based on type and severity of pain and evaluate response  - Implement non-pharmacological measures as appropriate and evaluate response  - Consider cultural and social influences on pain and pain management  - Notify physician/advanced practitioner if interventions unsuccessful or patient reports new pain  Outcome: Progressing     Problem: INFECTION - ADULT  Goal: Absence or prevention of progression during hospitalization  Description: INTERVENTIONS:  - Assess and monitor for signs and symptoms of infection  - Monitor lab/diagnostic results  - Monitor all insertion sites, i.e. indwelling lines, tubes, and drains  - Monitor endotracheal if appropriate and nasal secretions for changes in amount and color  - Dalton appropriate cooling/warming therapies per order  - Administer medications as ordered  - Instruct and encourage patient and family to use good hand hygiene technique  - Identify and instruct in appropriate isolation precautions for  identified infection/condition  Outcome: Progressing     Problem: DISCHARGE PLANNING  Goal: Discharge to home or other facility with appropriate resources  Description: INTERVENTIONS:  - Identify barriers to discharge w/patient and caregiver  - Arrange for needed discharge resources and transportation as appropriate  - Identify discharge learning needs (meds, wound care, etc.)  - Arrange for interpretive services to assist at discharge as needed  - Refer to Case Management Department for coordinating discharge planning if the patient needs post-hospital services based on physician/advanced practitioner order or complex needs related to functional status, cognitive ability, or social support system  Outcome: Progressing     Problem: Knowledge Deficit  Goal: Patient/family/caregiver demonstrates understanding of disease process, treatment plan, medications, and discharge instructions  Description: Complete learning assessment and assess knowledge base.  Interventions:  - Provide teaching at level of understanding  - Provide teaching via preferred learning methods  Outcome: Progressing     Problem: Nutrition/Hydration-ADULT  Goal: Nutrient/Hydration intake appropriate for improving, restoring or maintaining nutritional needs  Description: Monitor and assess patient's nutrition/hydration status for malnutrition. Collaborate with interdisciplinary team and initiate plan and interventions as ordered.  Monitor patient's weight and dietary intake as ordered or per policy. Utilize nutrition screening tool and intervene as necessary. Determine patient's food preferences and provide high-protein, high-caloric foods as appropriate.     INTERVENTIONS:  - Monitor oral intake, urinary output, labs, and treatment plans  - Assess nutrition and hydration status and recommend course of action  - Evaluate amount of meals eaten  - Assist patient with eating if necessary   - Allow adequate time for meals  - Recommend/ encourage  appropriate diets, oral nutritional supplements, and vitamin/mineral supplements  - Order, calculate, and assess calorie counts as needed  - Recommend, monitor, and adjust tube feedings and TPN/PPN based on assessed needs  - Assess need for intravenous fluids  - Provide specific nutrition/hydration education as appropriate  - Include patient/family/caregiver in decisions related to nutrition  Outcome: Progressing

## 2024-05-25 NOTE — ASSESSMENT & PLAN NOTE
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 Also hx of lumbar revision fusion in 2016 at outside hospital and SCS placement 2019 at outside hospital. Presented to [facility] with progressive weakness after recent Botox injection. With significant decline in function over the past few weeks.  Initially thought to be possibly related to  serotoninergic medications, Cymbalta and BuSpar or progression of his disease, per neurology evaluation while at [facility] (5/17)  Cymbalta discontinued.  BuSpar restarted at a lower dose  Patient will need MRI of thoracic spine and lumbar spine with gadolinium however due to spinal cord stimulator, needed to be transferred from [facility] to [facility]  MRI L spine done on 5/24, limited study due to artifact  MRI C spine and T spine still pending--can not be done til Tuesday  Neurosurgery following, input appreciated   Will need rehab on discharge, good Pedraza

## 2024-05-26 PROBLEM — D75.1 ERYTHROCYTOSIS: Status: ACTIVE | Noted: 2024-05-26

## 2024-05-26 PROBLEM — K59.00 CONSTIPATION: Status: ACTIVE | Noted: 2024-05-26

## 2024-05-26 PROCEDURE — 99232 SBSQ HOSP IP/OBS MODERATE 35: CPT | Performed by: INTERNAL MEDICINE

## 2024-05-26 RX ADMIN — TOBRAMYCIN AND DEXAMETHASONE 1 DROP: 3; 1 SUSPENSION/ DROPS OPHTHALMIC at 17:14

## 2024-05-26 RX ADMIN — METHOCARBAMOL 500 MG: 500 TABLET ORAL at 11:43

## 2024-05-26 RX ADMIN — METHOCARBAMOL 500 MG: 500 TABLET ORAL at 07:34

## 2024-05-26 RX ADMIN — BUSPIRONE HYDROCHLORIDE 5 MG: 5 TABLET ORAL at 07:34

## 2024-05-26 RX ADMIN — TOBRAMYCIN AND DEXAMETHASONE 1 DROP: 3; 1 SUSPENSION/ DROPS OPHTHALMIC at 05:16

## 2024-05-26 RX ADMIN — ENOXAPARIN SODIUM 40 MG: 40 INJECTION SUBCUTANEOUS at 07:34

## 2024-05-26 RX ADMIN — BISACODYL 10 MG: 10 SUPPOSITORY RECTAL at 13:12

## 2024-05-26 RX ADMIN — ACETAMINOPHEN 975 MG: 325 TABLET, FILM COATED ORAL at 13:12

## 2024-05-26 RX ADMIN — ACETAMINOPHEN 975 MG: 325 TABLET, FILM COATED ORAL at 21:17

## 2024-05-26 RX ADMIN — OXYCODONE HYDROCHLORIDE 10 MG: 10 TABLET ORAL at 11:43

## 2024-05-26 RX ADMIN — ACETAMINOPHEN 975 MG: 325 TABLET, FILM COATED ORAL at 05:15

## 2024-05-26 RX ADMIN — MORPHINE SULFATE 2 MG: 2 INJECTION, SOLUTION INTRAMUSCULAR; INTRAVENOUS at 17:14

## 2024-05-26 RX ADMIN — METHOCARBAMOL 500 MG: 500 TABLET ORAL at 21:17

## 2024-05-26 RX ADMIN — HYDROXYZINE HYDROCHLORIDE 25 MG: 25 TABLET, FILM COATED ORAL at 23:29

## 2024-05-26 RX ADMIN — OXYCODONE HYDROCHLORIDE 10 MG: 10 TABLET ORAL at 07:33

## 2024-05-26 RX ADMIN — ATORVASTATIN CALCIUM 20 MG: 20 TABLET, FILM COATED ORAL at 17:14

## 2024-05-26 RX ADMIN — METHOCARBAMOL 500 MG: 500 TABLET ORAL at 17:14

## 2024-05-26 RX ADMIN — MORPHINE SULFATE 2 MG: 2 INJECTION, SOLUTION INTRAMUSCULAR; INTRAVENOUS at 09:29

## 2024-05-26 RX ADMIN — MORPHINE SULFATE 2 MG: 2 INJECTION, SOLUTION INTRAMUSCULAR; INTRAVENOUS at 05:15

## 2024-05-26 RX ADMIN — OXYCODONE HYDROCHLORIDE 10 MG: 10 TABLET ORAL at 15:46

## 2024-05-26 RX ADMIN — MORPHINE SULFATE 2 MG: 2 INJECTION, SOLUTION INTRAMUSCULAR; INTRAVENOUS at 21:17

## 2024-05-26 RX ADMIN — TOBRAMYCIN AND DEXAMETHASONE 1 DROP: 3; 1 SUSPENSION/ DROPS OPHTHALMIC at 11:44

## 2024-05-26 RX ADMIN — BUSPIRONE HYDROCHLORIDE 5 MG: 5 TABLET ORAL at 17:14

## 2024-05-26 RX ADMIN — TOBRAMYCIN AND DEXAMETHASONE 1 DROP: 3; 1 SUSPENSION/ DROPS OPHTHALMIC at 23:29

## 2024-05-26 RX ADMIN — OXYCODONE HYDROCHLORIDE 10 MG: 10 TABLET ORAL at 19:46

## 2024-05-26 RX ADMIN — OXYCODONE HYDROCHLORIDE 10 MG: 10 TABLET ORAL at 02:17

## 2024-05-26 NOTE — ASSESSMENT & PLAN NOTE
Beard catheter placed in the emergency room upon admission to [facility] on 5/16 for concern of urinary retention  Void trial as able

## 2024-05-26 NOTE — PLAN OF CARE
Problem: Prexisting or High Potential for Compromised Skin Integrity  Goal: Skin integrity is maintained or improved  Description: INTERVENTIONS:  - Identify patients at risk for skin breakdown  - Assess and monitor skin integrity  - Assess and monitor nutrition and hydration status  - Monitor labs   - Assess for incontinence   - Turn and reposition patient  - Assist with mobility/ambulation  - Relieve pressure over bony prominences  - Avoid friction and shearing  - Provide appropriate hygiene as needed including keeping skin clean and dry  - Evaluate need for skin moisturizer/barrier cream  - Collaborate with interdisciplinary team   - Patient/family teaching  - Consider wound care consult   Outcome: Progressing     Problem: PAIN - ADULT  Goal: Verbalizes/displays adequate comfort level or baseline comfort level  Description: Interventions:  - Encourage patient to monitor pain and request assistance  - Assess pain using appropriate pain scale  - Administer analgesics based on type and severity of pain and evaluate response  - Implement non-pharmacological measures as appropriate and evaluate response  - Consider cultural and social influences on pain and pain management  - Notify physician/advanced practitioner if interventions unsuccessful or patient reports new pain  Outcome: Progressing     Problem: INFECTION - ADULT  Goal: Absence or prevention of progression during hospitalization  Description: INTERVENTIONS:  - Assess and monitor for signs and symptoms of infection  - Monitor lab/diagnostic results  - Monitor all insertion sites, i.e. indwelling lines, tubes, and drains  - Monitor endotracheal if appropriate and nasal secretions for changes in amount and color  - [place] appropriate cooling/warming therapies per order  - Administer medications as ordered  - Instruct and encourage patient and family to use good hand hygiene technique  - Identify and instruct in appropriate isolation precautions for  identified infection/condition  Outcome: Progressing     Problem: SAFETY ADULT  Goal: Patient will remain free of falls  Description: INTERVENTIONS:  - Educate patient/family on patient safety including physical limitations  - Instruct patient to call for assistance with activity   - Consult OT/PT to assist with strengthening/mobility   - Keep Call bell within reach  - Keep bed low and locked with side rails adjusted as appropriate  - Keep care items and personal belongings within reach  - Initiate and maintain comfort rounds  - Make Fall Risk Sign visible to staff  - Offer Toileting every  Hours, in advance of need  - Initiate/Maintain alarm  - Obtain necessary fall risk management equipment:   - Apply yellow socks and bracelet for high fall risk patients  - Consider moving patient to room near nurses station  Outcome: Progressing  Goal: Maintain or return to baseline ADL function  Description: INTERVENTIONS:  -  Assess patient's ability to carry out ADLs; assess patient's baseline for ADL function and identify physical deficits which impact ability to perform ADLs (bathing, care of mouth/teeth, toileting, grooming, dressing, etc.)  - Assess/evaluate cause of self-care deficits   - Assess range of motion  - Assess patient's mobility; develop plan if impaired  - Assess patient's need for assistive devices and provide as appropriate  - Encourage maximum independence but intervene and supervise when necessary  - Involve family in performance of ADLs  - Assess for home care needs following discharge   - Consider OT consult to assist with ADL evaluation and planning for discharge  - Provide patient education as appropriate  Outcome: Progressing  Goal: Maintains/Returns to pre admission functional level  Description: INTERVENTIONS:  - Perform AM-PAC 6 Click Basic Mobility/ Daily Activity assessment daily.  - Set and communicate daily mobility goal to care team and patient/family/caregiver.   - Collaborate with  rehabilitation services on mobility goals if consulted  - Perform Range of Motion  times a day.  - Reposition patient every  hours.  - Dangle patient  times a day  - Stand patient  times a day  - Ambulate patient  times a day  - Out of bed to chair  times a day   - Out of bed for meals times a day  - Out of bed for toileting  - Record patient progress and toleration of activity level   Outcome: Progressing     Problem: DISCHARGE PLANNING  Goal: Discharge to home or other facility with appropriate resources  Description: INTERVENTIONS:  - Identify barriers to discharge w/patient and caregiver  - Arrange for needed discharge resources and transportation as appropriate  - Identify discharge learning needs (meds, wound care, etc.)  - Arrange for interpretive services to assist at discharge as needed  - Refer to Case Management Department for coordinating discharge planning if the patient needs post-hospital services based on physician/advanced practitioner order or complex needs related to functional status, cognitive ability, or social support system  Outcome: Progressing     Problem: Knowledge Deficit  Goal: Patient/family/caregiver demonstrates understanding of disease process, treatment plan, medications, and discharge instructions  Description: Complete learning assessment and assess knowledge base.  Interventions:  - Provide teaching at level of understanding  - Provide teaching via preferred learning methods  Outcome: Progressing     Problem: Nutrition/Hydration-ADULT  Goal: Nutrient/Hydration intake appropriate for improving, restoring or maintaining nutritional needs  Description: Monitor and assess patient's nutrition/hydration status for malnutrition. Collaborate with interdisciplinary team and initiate plan and interventions as ordered.  Monitor patient's weight and dietary intake as ordered or per policy. Utilize nutrition screening tool and intervene as necessary. Determine patient's food preferences and  provide high-protein, high-caloric foods as appropriate.     INTERVENTIONS:  - Monitor oral intake, urinary output, labs, and treatment plans  - Assess nutrition and hydration status and recommend course of action  - Evaluate amount of meals eaten  - Assist patient with eating if necessary   - Allow adequate time for meals  - Recommend/ encourage appropriate diets, oral nutritional supplements, and vitamin/mineral supplements  - Order, calculate, and assess calorie counts as needed  - Recommend, monitor, and adjust tube feedings and TPN/PPN based on assessed needs  - Assess need for intravenous fluids  - Provide specific nutrition/hydration education as appropriate  - Include patient/family/caregiver in decisions related to nutrition  Outcome: Progressing

## 2024-05-26 NOTE — PROGRESS NOTES
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 Also hx of lumbar revision fusion in 2016 at outside hospital and SCS placement 2019 at outside hospital. Presented to [facility] with progressive weakness after recent Botox injection. With significant decline in function over the past few weeks.  Initially thought to be possibly related to  serotoninergic medications, Cymbalta and BuSpar or progression of his disease, per neurology evaluation while at [facility] (5/17)  Cymbalta discontinued.  BuSpar restarted at a lower dose  Patient will need MRI of thoracic spine and lumbar spine with gadolinium however due to spinal cord stimulator, needed to be transferred from [facility] to [facility]  MRI L spine done on 5/24, limited study due to artifact  MRI C spine and T spine still pending--can not be done til Tuesday  Neurosurgery following, input appreciated   Continue pain control with scheduled tylenol, robaxin, PRN oxycodone and IV morphine for breakthrough (patient prefers morphine)  Offered to trial rotating opioids or changing robaxin to flexeril, wants to hold off for now  Will need rehab on discharge, [facility]    S/P insertion of spinal cord stimulator  Assessment & Plan  Noted    Constipation  Assessment & Plan  Likely 2/2 narcotics  Continue bowel regimen  Consider florastor    Erythrocytosis  Assessment & Plan  Dating back few months ago Hgb high 16 range, upon admission to [facility] was 18k  Continue to monitor    Hyponatremia  Assessment & Plan  Chronic, baseline appears around 131-133  Trend BMP    Neurogenic orthostatic hypotension (HCC)  Assessment & Plan  Continue midodrine 5  mg 3 times daily    BPH associated with nocturia  Assessment & Plan  Beard catheter placed in the emergency room upon admission to [facility] on  for concern of urinary retention  Void trial as able                VTE Pharmacologic Prophylaxis:   Moderate Risk (Score 3-4) - Pharmacological DVT Prophylaxis Ordered: enoxaparin (Lovenox).    Mobility:   Basic Mobility Inpatient Raw Score: 7  JH-HLM Goal: 2: Bed activities/Dependent transfer  JH-HLM Achieved: 2: Bed activities/Dependent transfer  JH-HLM Goal achieved. Continue to encourage appropriate mobility.    Patient Centered Rounds: I performed bedside rounds with nursing staff today.   Discussions with Specialists or Other Care Team Provider: d/w Nsx AP (Luis Enrique)    Education and Discussions with Family / Patient: Patient declined call to .     Total Time Spent on Date of Encounter in care of patient: 35 mins. This time was spent on one or more of the following: performing physical exam; counseling and coordination of care; obtaining or reviewing history; documenting in the medical record; reviewing/ordering tests, medications or procedures; communicating with other healthcare professionals and discussing with patient's family/caregivers.    Current Length of Stay: 3 day(s)  Current Patient Status: Inpatient   Certification Statement: The patient will continue to require additional inpatient hospital stay due to MRIs, Nsx plan   Discharge Plan: Anticipate discharge in >72 hrs to rehab facility.    Code Status: Level 1 - Full Code    Subjective:   Pt feels okay. He is anxious about what is going on and wants the MRIs. His spasms seem to be getting worse. He has some questions for Nsx team, informed him I will talk with AP to see if they can stop by.     Objective:     Vitals:   Temp (24hrs), Av.7 °F (36.5 °C), Min:97.4 °F (36.3 °C), Max:98.2 °F (36.8 °C)    Temp:  [97.4 °F (36.3 °C)-98.2 °F (36.8 °C)] 97.6 °F (36.4 °C)  HR:  [68-81] 68  Resp:  [16]  16  BP: (119-147)/(66-93) 131/80  SpO2:  [89 %-96 %] 95 %  Body mass index is 33.16 kg/m².     Input and Output Summary (last 24 hours):     Intake/Output Summary (Last 24 hours) at 5/26/2024 0823  Last data filed at 5/26/2024 0501  Gross per 24 hour   Intake 1200 ml   Output 2000 ml   Net -800 ml       Physical Exam:   Physical Exam  Vitals and nursing note reviewed.   Constitutional:       General: He is not in acute distress.     Comments: On RA    Cardiovascular:      Rate and Rhythm: Normal rate and regular rhythm.   Pulmonary:      Effort: No respiratory distress.      Breath sounds: Normal breath sounds. No wheezing.   Abdominal:      General: Bowel sounds are normal. There is no distension.      Palpations: Abdomen is soft.   Genitourinary:     Comments: Beard noted   Musculoskeletal:         General: Swelling present.   Neurological:      Mental Status: He is alert and oriented to person, place, and time.      Comments: Strength intact bilateral upper extremities, weakness b/l lower extremities,intermittent b/ LE spasms noted    Psychiatric:         Mood and Affect: Mood normal.          Additional Data:     Labs:  Results from last 7 days   Lab Units 05/25/24  1041   WBC Thousand/uL 8.58   HEMOGLOBIN g/dL 17.1*   HEMATOCRIT % 53.0*   PLATELETS Thousands/uL 223   SEGS PCT % 71   LYMPHO PCT % 12*   MONO PCT % 11   EOS PCT % 4     Results from last 7 days   Lab Units 05/25/24  1041   SODIUM mmol/L 133*   POTASSIUM mmol/L 4.4   CHLORIDE mmol/L 96   CO2 mmol/L 33*   BUN mg/dL 14   CREATININE mg/dL 0.56*   ANION GAP mmol/L 4   CALCIUM mg/dL 9.0   GLUCOSE RANDOM mg/dL 94                       Lines/Drains:  Invasive Devices       Peripheral Intravenous Line  Duration             Peripheral IV 05/23/24 Dorsal (posterior);Left Forearm 3 days              Drain  Duration             Urethral Catheter Latex;Straight-tip 16 Fr. 9 days                  Urinary Catheter:  Goal for removal: Voiding trial when ambulation  improves               Imaging: No pertinent imaging reviewed.    Recent Cultures (last 7 days):   Results from last 7 days   Lab Units 05/20/24  1756 05/20/24  1749   BLOOD CULTURE  No Growth After 5 Days. No Growth After 5 Days.       Last 24 Hours Medication List:   Current Facility-Administered Medications   Medication Dose Route Frequency Provider Last Rate    acetaminophen  975 mg Oral Q8H NICK Naidu PA-C      atorvastatin  20 mg Oral Daily With Dinner Bharti Jensen MD      bisacodyl  10 mg Rectal Daily PRN Bharti Jensen MD      busPIRone  5 mg Oral BID Bharti Jensen MD      enoxaparin  40 mg Subcutaneous Daily Bharti Jensen MD      hydrOXYzine HCL  25 mg Oral HS PRN Bharti Jensen MD      LORazepam  0.5 mg Intravenous Daily PRN Shahla Naidu PA-C      methocarbamol  500 mg Oral 4x Daily Bharti Jensen MD      midodrine  5 mg Oral TID AC Bharti Jensen MD      morphine injection  2 mg Intravenous Q4H PRN Bharti Jensen MD      oxyCODONE  5 mg Oral Q4H PRN Bharti Jensen MD      Or    oxyCODONE  10 mg Oral Q4H PRN Bharti Jensen MD      tobramycin-dexamethasone  1 drop Both Eyes Q6H NICK Bharti Jensen MD          Today, Patient Was Seen By: Shahla Naidu PA-C    **Please Note: This note may have been constructed using a voice recognition system.**

## 2024-05-26 NOTE — ASSESSMENT & PLAN NOTE
Dating back few months ago Hgb high 16 range, upon admission to [facility] was 18k  Continue to monitor

## 2024-05-26 NOTE — ASSESSMENT & PLAN NOTE
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 Also hx of lumbar revision fusion in 2016 at outside hospital and SCS placement 2019 at outside hospital. Presented to [facility] with progressive weakness after recent Botox injection. With significant decline in function over the past few weeks.  Initially thought to be possibly related to  serotoninergic medications, Cymbalta and BuSpar or progression of his disease, per neurology evaluation while at [facility] (5/17)  Cymbalta discontinued.  BuSpar restarted at a lower dose  Patient will need MRI of thoracic spine and lumbar spine with gadolinium however due to spinal cord stimulator, needed to be transferred from [facility] to [facility]  MRI L spine done on 5/24, limited study due to artifact  MRI C spine and T spine still pending--can not be done til Tuesday  Neurosurgery following, input appreciated   Continue pain control with scheduled tylenol, robaxin, PRN oxycodone and IV morphine for breakthrough (patient prefers morphine)  Offered to trial rotating opioids or changing robaxin to flexeril, wants to hold off for now  Will need rehab on discharge, [facility]

## 2024-05-27 PROCEDURE — 99232 SBSQ HOSP IP/OBS MODERATE 35: CPT | Performed by: NURSE PRACTITIONER

## 2024-05-27 RX ORDER — BACLOFEN 10 MG/1
10 TABLET ORAL 3 TIMES DAILY
Status: DISCONTINUED | OUTPATIENT
Start: 2024-05-27 | End: 2024-05-31

## 2024-05-27 RX ORDER — DIAZEPAM 5 MG/ML
2.5 INJECTION, SOLUTION INTRAMUSCULAR; INTRAVENOUS ONCE
Status: COMPLETED | OUTPATIENT
Start: 2024-05-27 | End: 2024-05-27

## 2024-05-27 RX ORDER — DIAZEPAM 2 MG/1
2 TABLET ORAL EVERY 6 HOURS PRN
Status: DISCONTINUED | OUTPATIENT
Start: 2024-05-27 | End: 2024-06-03 | Stop reason: HOSPADM

## 2024-05-27 RX ADMIN — BACLOFEN 10 MG: 10 TABLET ORAL at 12:48

## 2024-05-27 RX ADMIN — ACETAMINOPHEN 975 MG: 325 TABLET, FILM COATED ORAL at 14:27

## 2024-05-27 RX ADMIN — ACETAMINOPHEN 975 MG: 325 TABLET, FILM COATED ORAL at 21:21

## 2024-05-27 RX ADMIN — TOBRAMYCIN AND DEXAMETHASONE 1 DROP: 3; 1 SUSPENSION/ DROPS OPHTHALMIC at 12:48

## 2024-05-27 RX ADMIN — DIAZEPAM 2.5 MG: 10 INJECTION, SOLUTION INTRAMUSCULAR; INTRAVENOUS at 09:52

## 2024-05-27 RX ADMIN — OXYCODONE HYDROCHLORIDE 10 MG: 10 TABLET ORAL at 19:42

## 2024-05-27 RX ADMIN — BACLOFEN 10 MG: 10 TABLET ORAL at 16:04

## 2024-05-27 RX ADMIN — LORAZEPAM 0.5 MG: 2 INJECTION INTRAMUSCULAR; INTRAVENOUS at 08:39

## 2024-05-27 RX ADMIN — OXYCODONE HYDROCHLORIDE 10 MG: 10 TABLET ORAL at 02:35

## 2024-05-27 RX ADMIN — DIAZEPAM 2 MG: 2 TABLET ORAL at 16:04

## 2024-05-27 RX ADMIN — DIAZEPAM 2 MG: 2 TABLET ORAL at 23:10

## 2024-05-27 RX ADMIN — ATORVASTATIN CALCIUM 20 MG: 20 TABLET, FILM COATED ORAL at 16:04

## 2024-05-27 RX ADMIN — BACLOFEN 10 MG: 10 TABLET ORAL at 20:03

## 2024-05-27 RX ADMIN — ACETAMINOPHEN 975 MG: 325 TABLET, FILM COATED ORAL at 05:24

## 2024-05-27 RX ADMIN — MORPHINE SULFATE 2 MG: 2 INJECTION, SOLUTION INTRAMUSCULAR; INTRAVENOUS at 12:48

## 2024-05-27 RX ADMIN — TOBRAMYCIN AND DEXAMETHASONE 1 DROP: 3; 1 SUSPENSION/ DROPS OPHTHALMIC at 05:48

## 2024-05-27 RX ADMIN — TOBRAMYCIN AND DEXAMETHASONE 1 DROP: 3; 1 SUSPENSION/ DROPS OPHTHALMIC at 17:51

## 2024-05-27 RX ADMIN — METHOCARBAMOL 500 MG: 500 TABLET ORAL at 05:46

## 2024-05-27 RX ADMIN — MORPHINE SULFATE 2 MG: 2 INJECTION, SOLUTION INTRAMUSCULAR; INTRAVENOUS at 21:22

## 2024-05-27 RX ADMIN — MORPHINE SULFATE 2 MG: 2 INJECTION, SOLUTION INTRAMUSCULAR; INTRAVENOUS at 17:51

## 2024-05-27 RX ADMIN — ENOXAPARIN SODIUM 40 MG: 40 INJECTION SUBCUTANEOUS at 09:50

## 2024-05-27 RX ADMIN — OXYCODONE HYDROCHLORIDE 10 MG: 10 TABLET ORAL at 14:27

## 2024-05-27 RX ADMIN — HYDROXYZINE HYDROCHLORIDE 25 MG: 25 TABLET, FILM COATED ORAL at 21:37

## 2024-05-27 RX ADMIN — BUSPIRONE HYDROCHLORIDE 5 MG: 5 TABLET ORAL at 17:51

## 2024-05-27 RX ADMIN — BUSPIRONE HYDROCHLORIDE 5 MG: 5 TABLET ORAL at 09:50

## 2024-05-27 RX ADMIN — MIDODRINE HYDROCHLORIDE 5 MG: 5 TABLET ORAL at 09:50

## 2024-05-27 RX ADMIN — MORPHINE SULFATE 2 MG: 2 INJECTION, SOLUTION INTRAMUSCULAR; INTRAVENOUS at 05:24

## 2024-05-27 RX ADMIN — OXYCODONE HYDROCHLORIDE 5 MG: 5 TABLET ORAL at 09:50

## 2024-05-27 RX ADMIN — TOBRAMYCIN AND DEXAMETHASONE 1 DROP: 3; 1 SUSPENSION/ DROPS OPHTHALMIC at 23:10

## 2024-05-27 NOTE — ASSESSMENT & PLAN NOTE
Dating back few months ago Hgb high 16 range, upon admission to [facility] was 18k  Continue to monitor

## 2024-05-27 NOTE — PROGRESS NOTES
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 Also hx of lumbar revision fusion in 2016 at outside hospital and SCS placement 2019 at outside hospital. Presented to [facility] with progressive weakness after recent Botox injection. With significant decline in function over the past few weeks.  Initially thought to be possibly related to  serotoninergic medications, Cymbalta and BuSpar or progression of his disease, per neurology evaluation while at [facility] (5/17)  Cymbalta discontinued.  BuSpar restarted at a lower dose  Patient will need MRI of thoracic spine and lumbar spine with gadolinium however due to spinal cord stimulator, needed to be transferred from [facility] to [facility]  MRI L spine done on 5/24, limited study due to artifact  MRI C spine and T spine still pending--can not be done til Tuesday  Neurosurgery following, input appreciated   Continue pain control with scheduled tylenol,  PRN oxycodone and IV morphine for breakthrough (patient prefers morphine)  Due to acute spasms \"Black horse\" over last 2 hrs will give 1 x dose of valium iv now  Transition robaxin to baclofen for now and monitor results and mental status   Pt reports having used it in the past along with gabapentin. Did not really feel it helped . We will trial 1 at time to k side affects if none and helps would consider gabapentin  Offered to trial rotating opioids or changing robaxin to baclofen agreeable   Will need rehab on discharge, [facility]    Constipation  Assessment & Plan  Likely 2/2 narcotics  Continue bowel regimen  Consider florastor    Erythrocytosis  Assessment & Plan  Dating back " few months ago Hgb high 16 range, upon admission to [facility] was 18k  Continue to monitor    Hyponatremia  Assessment & Plan  Chronic, baseline appears around 131-133  Trend BMP - stable     Neurogenic orthostatic hypotension (HCC)  Assessment & Plan  Continue midodrine 5 mg 3 times daily    S/P insertion of spinal cord stimulator  Assessment & Plan  Noted.    BPH associated with nocturia  Assessment & Plan  Beard catheter placed in the emergency room upon admission to [facility] on 5/16 for concern of urinary retention  Void trial as able              VTE Pharmacologic Prophylaxis:   High Risk (Score >/= 5) - Pharmacological DVT Prophylaxis Ordered: enoxaparin (Lovenox). Sequential Compression Devices Ordered.    Mobility:   Basic Mobility Inpatient Raw Score: 7  JH-HLM Goal: 2: Bed activities/Dependent transfer  JH-HLM Achieved: 2: Bed activities/Dependent transfer  JH-HLM Goal achieved. Continue to encourage appropriate mobility.    Patient Centered Rounds: I performed bedside rounds with nursing staff today.   Discussions with Specialists or Other Care Team Provider: nursing and Luis Enrique with neurosurgery     Education and Discussions with Family / Patient: Updated  (care giver ) at bedside. Pt reports ok to update caregiver if unable to reach may call pts son    Total Time Spent on Date of Encounter in care of patient: 45 mins. This time was spent on one or more of the following: performing physical exam; counseling and coordination of care; obtaining or reviewing history; documenting in the medical record; reviewing/ordering tests, medications or procedures; communicating with other healthcare professionals and discussing with patient's family/caregivers.    Current Length of Stay: 4 day(s)  Current Patient Status: Inpatient   Certification Statement: The patient will continue to require additional inpatient hospital stay due to pending MRI worsening spasms and symptoms   Discharge Plan:  will likely [facility] to  [facility]     Code Status: Level 1 - Full Code    Subjective:   This morning patient began to panic as he felt like he was having severe spasms and inability to straighten his legs out.  Nursing discussed with me.  Upon entry to room patient is, status post low-dose IV Ativan.  However he still continues to spasm bilaterally in lower extremities while talking with him.  He states he has had what feels like charley horses for the last 2-1/2 hours.  Reports he is able to have bowel movements as needed.  Beard catheter in place.    Objective:     Vitals:   Temp (24hrs), Av °F (36.7 °C), Min:98 °F (36.7 °C), Max:98 °F (36.7 °C)    Temp:  [98 °F (36.7 °C)] 98 °F (36.7 °C)  HR:  [69-83] 74  BP: (101-143)/(60-96) 108/64  SpO2:  [90 %-94 %] 93 %  Body mass index is 33.16 kg/m².     Input and Output Summary (last 24 hours):     Intake/Output Summary (Last 24 hours) at 2024 0936  Last data filed at 2024 0601  Gross per 24 hour   Intake 298 ml   Output 1550 ml   Net -1252 ml       Physical Exam:   Physical Exam  Constitutional:       General: He is not in acute distress.     Appearance: He is obese. He is not ill-appearing, toxic-appearing or diaphoretic.   HENT:      Head: Normocephalic and atraumatic.   Cardiovascular:      Rate and Rhythm: Normal rate.      Heart sounds: No murmur heard.     No friction rub. No gallop.   Pulmonary:      Effort: No respiratory distress.      Breath sounds: No stridor. No wheezing, rhonchi or rales.   Chest:      Chest wall: No tenderness.   Abdominal:      General: There is no distension.      Palpations: There is no mass.      Tenderness: There is no abdominal tenderness. There is no guarding or rebound.      Hernia: No hernia is present.   Genitourinary:     Comments: Beard in place   Musculoskeletal:         General: Swelling (bl lower extremeties) present. No tenderness, deformity or signs of injury.      Right lower leg: No edema.      Left lower leg: No edema.    Skin:     Coloration: Skin is not jaundiced or pale.      Findings: No bruising, erythema, lesion or rash.   Neurological:      Mental Status: He is alert and oriented to person, place, and time.   Psychiatric:         Behavior: Behavior normal.          Additional Data:     Labs:  Results from last 7 days   Lab Units 05/25/24  1041   WBC Thousand/uL 8.58   HEMOGLOBIN g/dL 17.1*   HEMATOCRIT % 53.0*   PLATELETS Thousands/uL 223   SEGS PCT % 71   LYMPHO PCT % 12*   MONO PCT % 11   EOS PCT % 4     Results from last 7 days   Lab Units 05/25/24  1041   SODIUM mmol/L 133*   POTASSIUM mmol/L 4.4   CHLORIDE mmol/L 96   CO2 mmol/L 33*   BUN mg/dL 14   CREATININE mg/dL 0.56*   ANION GAP mmol/L 4   CALCIUM mg/dL 9.0   GLUCOSE RANDOM mg/dL 94                       Lines/Drains:  Invasive Devices       Peripheral Intravenous Line  Duration             Peripheral IV 05/23/24 Dorsal (posterior);Left Forearm 4 days              Drain  Duration             Urethral Catheter Latex;Straight-tip 16 Fr. 10 days                  Urinary Catheter:  Goal for removal: Voiding trial when ambulation improves               Imaging: Reviewed radiology reports from this admission including: MRI spine    Recent Cultures (last 7 days):   Results from last 7 days   Lab Units 05/20/24  1756 05/20/24  1749   BLOOD CULTURE  No Growth After 5 Days. No Growth After 5 Days.       Last 24 Hours Medication List:   Current Facility-Administered Medications   Medication Dose Route Frequency Provider Last Rate    acetaminophen  975 mg Oral Q8H NICK Naidu PA-C      atorvastatin  20 mg Oral Daily With Dinner Bharti Jensen MD      baclofen  10 mg Oral TID STACEY Crowley      bisacodyl  10 mg Rectal Daily PRN Bharti Jensen MD      busPIRone  5 mg Oral BID Bharti Jensen MD      diazepam  2.5 mg Intravenous Once STACEY Crowley      enoxaparin  40 mg Subcutaneous Daily Bharti Jensen MD      hydrOXYzine HCL  25 mg Oral HS PRN Bharti Jensen MD       LORazepam  0.5 mg Intravenous Daily PRN Shahla Naidu PA-C      midodrine  5 mg Oral TID AC Bharti Jensen MD      morphine injection  2 mg Intravenous Q4H PRN Bharti Jensen MD      oxyCODONE  5 mg Oral Q4H PRN Bharti Jensen MD      Or    oxyCODONE  10 mg Oral Q4H PRN Bharti Jensen MD      tobramycin-dexamethasone  1 drop Both Eyes Q6H [facility] Bharti Jensen MD          Today, Patient Was Seen By: STACEY Crowley    **Please Note: This note may have been constructed using a voice recognition system.**

## 2024-05-27 NOTE — ASSESSMENT & PLAN NOTE
Beard catheter placed in the emergency room upon admission to [facility] on 5/16 for concern of urinary retention  Void trial as able

## 2024-05-27 NOTE — ASSESSMENT & PLAN NOTE
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 Also hx of lumbar revision fusion in 2016 at outside hospital and SCS placement 2019 at outside hospital. Presented to [facility] with progressive weakness after recent Botox injection. With significant decline in function over the past few weeks.  Initially thought to be possibly related to  serotoninergic medications, Cymbalta and BuSpar or progression of his disease, per neurology evaluation while at [facility] (5/17)  Cymbalta discontinued.  BuSpar restarted at a lower dose  Patient will need MRI of thoracic spine and lumbar spine with gadolinium however due to spinal cord stimulator, needed to be transferred from [facility] to [facility]  MRI L spine done on 5/24, limited study due to artifact  MRI C spine and T spine still pending--can not be done til Tuesday  Neurosurgery following, input appreciated   Continue pain control with scheduled tylenol,  PRN oxycodone and IV morphine for breakthrough (patient prefers morphine)  Due to acute spasms \"Black horse\" over last 2 hrs will give 1 x dose of valium iv now  Transition robaxin to baclofen for now and monitor results and mental status   Pt reports having used it in the past along with gabapentin. Did not really feel it helped . We will trial 1 at time to chk side affects if none and helps would consider gabapentin  Offered to trial rotating opioids or changing robaxin to baclofen agreeable   Will need rehab on discharge, good Pedraza  "

## 2024-05-28 LAB
ALBUMIN SERPL BCP-MCNC: 3.3 G/DL (ref 3.5–5)
ALP SERPL-CCNC: 61 U/L (ref 34–104)
ALT SERPL W P-5'-P-CCNC: 22 U/L (ref 7–52)
ANION GAP SERPL CALCULATED.3IONS-SCNC: 7 MMOL/L (ref 4–13)
AST SERPL W P-5'-P-CCNC: 17 U/L (ref 13–39)
BASOPHILS # BLD AUTO: 0.06 THOUSANDS/ÂΜL (ref 0–0.1)
BASOPHILS NFR BLD AUTO: 1 % (ref 0–1)
BILIRUB SERPL-MCNC: 0.45 MG/DL (ref 0.2–1)
BUN SERPL-MCNC: 11 MG/DL (ref 5–25)
CALCIUM ALBUM COR SERPL-MCNC: 8.9 MG/DL (ref 8.3–10.1)
CALCIUM SERPL-MCNC: 8.3 MG/DL (ref 8.4–10.2)
CHLORIDE SERPL-SCNC: 98 MMOL/L (ref 96–108)
CO2 SERPL-SCNC: 33 MMOL/L (ref 21–32)
CREAT SERPL-MCNC: 0.59 MG/DL (ref 0.6–1.3)
EOSINOPHIL # BLD AUTO: 0.32 THOUSAND/ÂΜL (ref 0–0.61)
EOSINOPHIL NFR BLD AUTO: 5 % (ref 0–6)
ERYTHROCYTE [DISTWIDTH] IN BLOOD BY AUTOMATED COUNT: 12.8 % (ref 11.6–15.1)
GFR SERPL CREATININE-BSD FRML MDRD: 106 ML/MIN/1.73SQ M
GLUCOSE SERPL-MCNC: 91 MG/DL (ref 65–140)
HCT VFR BLD AUTO: 54.1 % (ref 36.5–49.3)
HGB BLD-MCNC: 17.2 G/DL (ref 12–17)
IMM GRANULOCYTES # BLD AUTO: 0.11 THOUSAND/UL (ref 0–0.2)
IMM GRANULOCYTES NFR BLD AUTO: 2 % (ref 0–2)
LYMPHOCYTES # BLD AUTO: 1.39 THOUSANDS/ÂΜL (ref 0.6–4.47)
LYMPHOCYTES NFR BLD AUTO: 21 % (ref 14–44)
MCH RBC QN AUTO: 31.4 PG (ref 26.8–34.3)
MCHC RBC AUTO-ENTMCNC: 31.8 G/DL (ref 31.4–37.4)
MCV RBC AUTO: 99 FL (ref 82–98)
MONOCYTES # BLD AUTO: 0.8 THOUSAND/ÂΜL (ref 0.17–1.22)
MONOCYTES NFR BLD AUTO: 12 % (ref 4–12)
NEUTROPHILS # BLD AUTO: 4 THOUSANDS/ÂΜL (ref 1.85–7.62)
NEUTS SEG NFR BLD AUTO: 59 % (ref 43–75)
NRBC BLD AUTO-RTO: 0 /100 WBCS
PLATELET # BLD AUTO: 229 THOUSANDS/UL (ref 149–390)
PMV BLD AUTO: 10.8 FL (ref 8.9–12.7)
POTASSIUM SERPL-SCNC: 4.4 MMOL/L (ref 3.5–5.3)
PROT SERPL-MCNC: 5.5 G/DL (ref 6.4–8.4)
RBC # BLD AUTO: 5.48 MILLION/UL (ref 3.88–5.62)
SODIUM SERPL-SCNC: 138 MMOL/L (ref 135–147)
WBC # BLD AUTO: 6.68 THOUSAND/UL (ref 4.31–10.16)

## 2024-05-28 PROCEDURE — 99232 SBSQ HOSP IP/OBS MODERATE 35: CPT | Performed by: INTERNAL MEDICINE

## 2024-05-28 PROCEDURE — 80053 COMPREHEN METABOLIC PANEL: CPT | Performed by: NURSE PRACTITIONER

## 2024-05-28 PROCEDURE — 85025 COMPLETE CBC W/AUTO DIFF WBC: CPT | Performed by: NURSE PRACTITIONER

## 2024-05-28 RX ORDER — OXYCODONE HYDROCHLORIDE 10 MG/1
10 TABLET ORAL EVERY 4 HOURS PRN
Status: DISCONTINUED | OUTPATIENT
Start: 2024-05-28 | End: 2024-06-01

## 2024-05-28 RX ADMIN — MIDODRINE HYDROCHLORIDE 5 MG: 5 TABLET ORAL at 05:02

## 2024-05-28 RX ADMIN — TOBRAMYCIN AND DEXAMETHASONE 1 DROP: 3; 1 SUSPENSION/ DROPS OPHTHALMIC at 12:33

## 2024-05-28 RX ADMIN — TOBRAMYCIN AND DEXAMETHASONE 1 DROP: 3; 1 SUSPENSION/ DROPS OPHTHALMIC at 05:02

## 2024-05-28 RX ADMIN — OXYCODONE HYDROCHLORIDE 10 MG: 10 TABLET ORAL at 09:47

## 2024-05-28 RX ADMIN — BACLOFEN 10 MG: 10 TABLET ORAL at 22:06

## 2024-05-28 RX ADMIN — OXYCODONE HYDROCHLORIDE 10 MG: 10 TABLET ORAL at 01:14

## 2024-05-28 RX ADMIN — DIAZEPAM 2 MG: 2 TABLET ORAL at 18:06

## 2024-05-28 RX ADMIN — ATORVASTATIN CALCIUM 20 MG: 20 TABLET, FILM COATED ORAL at 16:39

## 2024-05-28 RX ADMIN — HYDROXYZINE HYDROCHLORIDE 25 MG: 25 TABLET, FILM COATED ORAL at 23:57

## 2024-05-28 RX ADMIN — MIDODRINE HYDROCHLORIDE 5 MG: 5 TABLET ORAL at 12:33

## 2024-05-28 RX ADMIN — ACETAMINOPHEN 975 MG: 325 TABLET, FILM COATED ORAL at 14:37

## 2024-05-28 RX ADMIN — TOBRAMYCIN AND DEXAMETHASONE 1 DROP: 3; 1 SUSPENSION/ DROPS OPHTHALMIC at 23:53

## 2024-05-28 RX ADMIN — BUSPIRONE HYDROCHLORIDE 5 MG: 5 TABLET ORAL at 08:46

## 2024-05-28 RX ADMIN — MORPHINE SULFATE 2 MG: 2 INJECTION, SOLUTION INTRAMUSCULAR; INTRAVENOUS at 06:30

## 2024-05-28 RX ADMIN — BACLOFEN 10 MG: 10 TABLET ORAL at 15:20

## 2024-05-28 RX ADMIN — ACETAMINOPHEN 975 MG: 325 TABLET, FILM COATED ORAL at 22:06

## 2024-05-28 RX ADMIN — MORPHINE SULFATE 2 MG: 2 INJECTION, SOLUTION INTRAMUSCULAR; INTRAVENOUS at 19:02

## 2024-05-28 RX ADMIN — OXYCODONE HYDROCHLORIDE 15 MG: 5 TABLET ORAL at 16:43

## 2024-05-28 RX ADMIN — MORPHINE SULFATE 2 MG: 2 INJECTION, SOLUTION INTRAMUSCULAR; INTRAVENOUS at 11:01

## 2024-05-28 RX ADMIN — MIDODRINE HYDROCHLORIDE 5 MG: 5 TABLET ORAL at 15:20

## 2024-05-28 RX ADMIN — OXYCODONE HYDROCHLORIDE 15 MG: 5 TABLET ORAL at 22:06

## 2024-05-28 RX ADMIN — OXYCODONE HYDROCHLORIDE 10 MG: 10 TABLET ORAL at 05:02

## 2024-05-28 RX ADMIN — ACETAMINOPHEN 975 MG: 325 TABLET, FILM COATED ORAL at 05:02

## 2024-05-28 RX ADMIN — MORPHINE SULFATE 2 MG: 2 INJECTION, SOLUTION INTRAMUSCULAR; INTRAVENOUS at 15:01

## 2024-05-28 RX ADMIN — BUSPIRONE HYDROCHLORIDE 5 MG: 5 TABLET ORAL at 17:00

## 2024-05-28 RX ADMIN — ENOXAPARIN SODIUM 40 MG: 40 INJECTION SUBCUTANEOUS at 08:46

## 2024-05-28 RX ADMIN — OXYCODONE HYDROCHLORIDE 15 MG: 5 TABLET ORAL at 12:36

## 2024-05-28 RX ADMIN — BACLOFEN 10 MG: 10 TABLET ORAL at 08:46

## 2024-05-28 RX ADMIN — DIAZEPAM 2 MG: 2 TABLET ORAL at 08:48

## 2024-05-28 RX ADMIN — TOBRAMYCIN AND DEXAMETHASONE 1 DROP: 3; 1 SUSPENSION/ DROPS OPHTHALMIC at 18:11

## 2024-05-28 NOTE — PROGRESS NOTES
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 Also hx of lumbar revision fusion in 2016 at outside hospital and SCS placement 2019 at outside hospital. [facility] after recent Botox injection. With significant decline in function over the past few weeks.  Initially thought to be possibly related to  serotoninergic medications, Cymbalta and BuSpar or progression of his disease, per neurology evaluation while at [facility] (5/17)  Cymbalta discontinued.  BuSpar restarted at a lower dose  Patient needed MRIs of spine however due to SCS, needed to be transferred from [facility] to [facility]  MRI L spine done on 5/24, limited study due to artifact  MRI C spine and T spine still pending  Neurosurgery following, input appreciated   Continue pain control with scheduled tylenol,  PRN oxycodone and IV morphine for breakthrough (patient prefers morphine)  Due to worsening spasms on 5/27, transitioned robaxin to baclofen with PRN Valium  Offered to trial rotating opioids, patient prefers to hold off  Will need rehab on discharge, Good Pedraza    S/P insertion of spinal cord stimulator  Assessment & Plan  Noted.    Constipation  Assessment & Plan  Likely 2/2 narcotics  Continue bowel regimen    Erythrocytosis  Assessment & Plan  Dating back few months ago Hgb high 16 range, upon admission to [facility] was 18k  Continue to monitor    Hyponatremia  Assessment & Plan  Chronic, baseline appears around 131-133  Trend BMP - stable     Neurogenic orthostatic hypotension (HCC)  Assessment & Plan  Continue midodrine 5 mg 3 times daily    BPH associated with nocturia  Assessment & Plan  Beard  catheter placed in the emergency room upon admission to [facility] on  for concern of urinary retention  Void trial as able              VTE Pharmacologic Prophylaxis:   Moderate Risk (Score 3-4) - Pharmacological DVT Prophylaxis Ordered: enoxaparin (Lovenox).    Mobility:   Basic Mobility Inpatient Raw Score: 7  -HLM Goal: 2: Bed activities/Dependent transfer  JH-HLM Achieved: 1: Laying in bed  JH-HLM Goal NOT achieved. Continue with multidisciplinary rounding and encourage appropriate mobility to improve upon -HLM goals.    Patient Centered Rounds: I performed bedside rounds with nursing staff today.   Discussions with Specialists or Other Care Team Provider: appreciate Nsx input     Education and Discussions with Family / Patient: Updated  (caregiver) at bedside.    Total Time Spent on Date of Encounter in care of patient: 45 mins. This time was spent on one or more of the following: performing physical exam; counseling and coordination of care; obtaining or reviewing history; documenting in the medical record; reviewing/ordering tests, medications or procedures; communicating with other healthcare professionals and discussing with patient's family/caregivers.    Current Length of Stay: 5 day(s)  Current Patient Status: Inpatient   Certification Statement: The patient will continue to require additional inpatient hospital stay due to ongoing pain, spasms,MRI  Discharge Plan: Anticipate discharge in >72 hrs to rehab facility.    Code Status: Level 1 - Full Code    Subjective:   Doing okay today.  Patient feels his spasms may be a little bit better after the medication adjustment yesterday, but it is harder for him to say.  His constipation has resolved.  He is overall anxious regarding what is happening in his hoping to get an MRI soon for answers.     Objective:     Vitals:   Temp (24hrs), Av.3 °F (36.8 °C), Min:98.3 °F (36.8 °C), Max:98.3 °F (36.8 °C)    Temp:  [98.3 °F (36.8 °C)] 98.3 °F  (36.8 °C)  HR:  [58-81] 58  Resp:  [16-18] 18  BP: (123-130)/(67-78) 123/67  SpO2:  [92 %-98 %] 98 %  Body mass index is 33.16 kg/m².     Input and Output Summary (last 24 hours):     Intake/Output Summary (Last 24 hours) at 5/28/2024 1454  Last data filed at 5/28/2024 0501  Gross per 24 hour   Intake --   Output 2150 ml   Net -2150 ml       Physical Exam:   Physical Exam  Vitals and nursing note reviewed.   Constitutional:       General: He is not in acute distress.  Cardiovascular:      Rate and Rhythm: Normal rate and regular rhythm.   Pulmonary:      Effort: No respiratory distress.   Abdominal:      General: There is no distension.      Palpations: Abdomen is soft.   Genitourinary:     Comments: Beard noted  Musculoskeletal:         General: Swelling present.   Neurological:      Mental Status: He is alert and oriented to person, place, and time.      Motor: Weakness (lower extremities) present.          Additional Data:     Labs:  Results from last 7 days   Lab Units 05/28/24  0503   WBC Thousand/uL 6.68   HEMOGLOBIN g/dL 17.2*   HEMATOCRIT % 54.1*   PLATELETS Thousands/uL 229   SEGS PCT % 59   LYMPHO PCT % 21   MONO PCT % 12   EOS PCT % 5     Results from last 7 days   Lab Units 05/28/24  0649   SODIUM mmol/L 138   POTASSIUM mmol/L 4.4   CHLORIDE mmol/L 98   CO2 mmol/L 33*   BUN mg/dL 11   CREATININE mg/dL 0.59*   ANION GAP mmol/L 7   CALCIUM mg/dL 8.3*   ALBUMIN g/dL 3.3*   TOTAL BILIRUBIN mg/dL 0.45   ALK PHOS U/L 61   ALT U/L 22   AST U/L 17   GLUCOSE RANDOM mg/dL 91                       Lines/Drains:  Invasive Devices       Peripheral Intravenous Line  Duration             Peripheral IV 05/27/24 Left;Lower Forearm 1 day              Drain  Duration             Urethral Catheter Latex;Straight-tip 16 Fr. 12 days                  Urinary Catheter:  Goal for removal: Voiding trial when ambulation improves               Imaging: No pertinent imaging reviewed.    Recent Cultures (last 7 days):         Last 24  Hours Medication List:   Current Facility-Administered Medications   Medication Dose Route Frequency Provider Last Rate    acetaminophen  975 mg Oral Q8H NICK Shahla Naidu PA-C      atorvastatin  20 mg Oral Daily With Dinner Bharti Jensen MD      baclofen  10 mg Oral TID STACEY Crowley      bisacodyl  10 mg Rectal Daily PRN Bharti Jensen MD      busPIRone  5 mg Oral BID Bharti Jensen MD      diazepam  2 mg Oral Q6H PRN STACEY Crowley      enoxaparin  40 mg Subcutaneous Daily Bharti Jensen MD      hydrOXYzine HCL  25 mg Oral HS PRN Bharti Jensen MD      LORazepam  0.5 mg Intravenous Daily PRN Shahla Naidu PA-C      midodrine  5 mg Oral TID AC Bharti Jensen MD      morphine injection  2 mg Intravenous Q4H PRN Bharti Jensen MD      oxyCODONE  10 mg Oral Q4H PRN Shahla Naidu PA-C      Or    oxyCODONE  15 mg Oral Q4H PRN Shahla Naidu PA-C      tobramycin-dexamethasone  1 drop Both Eyes Q6H [facility] Bharti Jensen MD          Today, Patient Was Seen By: Shahla Naidu PA-C    **Please Note: This note may have been constructed using a voice recognition system.**

## 2024-05-28 NOTE — ASSESSMENT & PLAN NOTE
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 Also hx of lumbar revision fusion in 2016 at outside hospital and SCS placement 2019 at outside hospital. Presented to  [facility] with progressive weakness after recent Botox injection. With significant decline in function over the past few weeks.  Initially thought to be possibly related to  serotoninergic medications, Cymbalta and BuSpar or progression of his disease, per neurology evaluation while at [facility] (5/17)  Cymbalta discontinued.  BuSpar restarted at a lower dose  Patient needed MRIs of spine however due to SCS, needed to be transferred from [facility] to \[facility]\""  MRI L spine done on 5/24, limited study due to artifact  MRI C spine and T spine still pending  Neurosurgery following, input appreciated   Continue pain control with scheduled tylenol,  PRN oxycodone and IV morphine for breakthrough (patient prefers morphine)  Due to worsening spasms on 5/27, transitioned robaxin to baclofen with PRN Valium  Offered to trial rotating opioids, patient prefers to hold off  Will need rehab on discharge, Good Pedraza

## 2024-05-28 NOTE — PLAN OF CARE
Problem: Prexisting or High Potential for Compromised Skin Integrity  Goal: Skin integrity is maintained or improved  Description: INTERVENTIONS:  - Identify patients at risk for skin breakdown  - Assess and monitor skin integrity  - Assess and monitor nutrition and hydration status  - Monitor labs   - Assess for incontinence   - Turn and reposition patient  - Assist with mobility/ambulation  - Relieve pressure over bony prominences  - Avoid friction and shearing  - Provide appropriate hygiene as needed including keeping skin clean and dry  - Evaluate need for skin moisturizer/barrier cream  - Collaborate with interdisciplinary team   - Patient/family teaching  - Consider wound care consult   Outcome: Progressing     Problem: PAIN - ADULT  Goal: Verbalizes/displays adequate comfort level or baseline comfort level  Description: Interventions:  - Encourage patient to monitor pain and request assistance  - Assess pain using appropriate pain scale  - Administer analgesics based on type and severity of pain and evaluate response  - Implement non-pharmacological measures as appropriate and evaluate response  - Consider cultural and social influences on pain and pain management  - Notify physician/advanced practitioner if interventions unsuccessful or patient reports new pain  Outcome: Progressing     Problem: SAFETY ADULT  Goal: Patient will remain free of falls  Description: INTERVENTIONS:  - Educate patient/family on patient safety including physical limitations  - Instruct patient to call for assistance with activity   - Consult OT/PT to assist with strengthening/mobility   - Keep Call bell within reach  - Keep bed low and locked with side rails adjusted as appropriate  - Keep care items and personal belongings within reach  - Initiate and maintain comfort rounds  - Make Fall Risk Sign visible to staff  - Offer Toileting every  Hours, in advance of need  - Initiate/Maintain alarm  - Obtain necessary fall risk  management equipment:   - Apply yellow socks and bracelet for high fall risk patients  - Consider moving patient to room near nurses station  Outcome: Progressing  Goal: Maintain or return to baseline ADL function  Description: INTERVENTIONS:  -  Assess patient's ability to carry out ADLs; assess patient's baseline for ADL function and identify physical deficits which impact ability to perform ADLs (bathing, care of mouth/teeth, toileting, grooming, dressing, etc.)  - Assess/evaluate cause of self-care deficits   - Assess range of motion  - Assess patient's mobility; develop plan if impaired  - Assess patient's need for assistive devices and provide as appropriate  - Encourage maximum independence but intervene and supervise when necessary  - Involve family in performance of ADLs  - Assess for home care needs following discharge   - Consider OT consult to assist with ADL evaluation and planning for discharge  - Provide patient education as appropriate  Outcome: Progressing  Goal: Maintains/Returns to pre admission functional level  Description: INTERVENTIONS:  - Perform AM-PAC 6 Click Basic Mobility/ Daily Activity assessment daily.  - Set and communicate daily mobility goal to care team and patient/family/caregiver.   - Collaborate with rehabilitation services on mobility goals if consulted  - Perform Range of Motion times a day.  - Reposition patient every  hours.  - Dangle patient  times a day  - Stand patient  times a day  - Ambulate patient  times a day  - Out of bed to chair  times a day   - Out of bed for meals times a day  - Out of bed for toileting  - Record patient progress and toleration of activity level   Outcome: Progressing     Problem: Nutrition/Hydration-ADULT  Goal: Nutrient/Hydration intake appropriate for improving, restoring or maintaining nutritional needs  Description: Monitor and assess patient's nutrition/hydration status for malnutrition. Collaborate with interdisciplinary team and initiate  plan and interventions as ordered.  Monitor patient's weight and dietary intake as ordered or per policy. Utilize nutrition screening tool and intervene as necessary. Determine patient's food preferences and provide high-protein, high-caloric foods as appropriate.     INTERVENTIONS:  - Monitor oral intake, urinary output, labs, and treatment plans  - Assess nutrition and hydration status and recommend course of action  - Evaluate amount of meals eaten  - Assist patient with eating if necessary   - Allow adequate time for meals  - Recommend/ encourage appropriate diets, oral nutritional supplements, and vitamin/mineral supplements  - Order, calculate, and assess calorie counts as needed  - Recommend, monitor, and adjust tube feedings and TPN/PPN based on assessed needs  - Assess need for intravenous fluids  - Provide specific nutrition/hydration education as appropriate  - Include patient/family/caregiver in decisions related to nutrition  Outcome: Progressing     Problem: NEUROSENSORY - ADULT  Goal: Achieves stable or improved neurological status  Description: INTERVENTIONS  - Monitor and report changes in neurological status  - Monitor vital signs such as temperature, blood pressure, glucose, and any other labs ordered   - Initiate measures to prevent increased intracranial pressure  - Monitor for seizure activity and implement precautions if appropriate      Outcome: Progressing  Goal: Remains free of injury related to seizures activity  Description: INTERVENTIONS  - Maintain airway, patient safety  and administer oxygen as ordered  - Monitor patient for seizure activity, document and report duration and description of seizure to physician/advanced practitioner  - If seizure occurs,  ensure patient safety during seizure  - Reorient patient post seizure  - Seizure pads on all 4 side rails  - Instruct patient/family to notify RN of any seizure activity including if an aura is experienced  - Instruct patient/family  to call for assistance with activity based on nursing assessment  - Administer anti-seizure medications if ordered    Outcome: Progressing  Goal: Achieves maximal functionality and self care  Description: INTERVENTIONS  - Monitor swallowing and airway patency with patient fatigue and changes in neurological status  - Encourage and assist patient to increase activity and self care.   - Encourage visually impaired, hearing impaired and aphasic patients to use assistive/communication devices  Outcome: Progressing     Problem: MUSCULOSKELETAL - ADULT  Goal: Maintain or return mobility to safest level of function  Description: INTERVENTIONS:  - Assess patient's ability to carry out ADLs; assess patient's baseline for ADL function and identify physical deficits which impact ability to perform ADLs (bathing, care of mouth/teeth, toileting, grooming, dressing, etc.)  - Assess/evaluate cause of self-care deficits   - Assess range of motion  - Assess patient's mobility  - Assess patient's need for assistive devices and provide as appropriate  - Encourage maximum independence but intervene and supervise when necessary  - Involve family in performance of ADLs  - Assess for home care needs following discharge   - Consider OT consult to assist with ADL evaluation and planning for discharge  - Provide patient education as appropriate  Outcome: Progressing  Goal: Maintain proper alignment of affected body part  Description: INTERVENTIONS:  - Support, maintain and protect limb and body alignment  - Provide patient/ family with appropriate education  Outcome: Progressing

## 2024-05-28 NOTE — ASSESSMENT & PLAN NOTE
Dating back few months ago Hgb high 16 range, upon admission to [facility] was 18k  Continue to monitor

## 2024-05-28 NOTE — ASSESSMENT & PLAN NOTE
Beard catheter placed in the emergency room upon admission to [facility] on 5/16 for concern of urinary retention  Void trial as able

## 2024-05-29 PROCEDURE — 97112 NEUROMUSCULAR REEDUCATION: CPT

## 2024-05-29 PROCEDURE — 99232 SBSQ HOSP IP/OBS MODERATE 35: CPT | Performed by: INTERNAL MEDICINE

## 2024-05-29 PROCEDURE — 99232 SBSQ HOSP IP/OBS MODERATE 35: CPT | Performed by: PHYSICIAN ASSISTANT

## 2024-05-29 PROCEDURE — 99223 1ST HOSP IP/OBS HIGH 75: CPT

## 2024-05-29 PROCEDURE — 97530 THERAPEUTIC ACTIVITIES: CPT

## 2024-05-29 RX ADMIN — ACETAMINOPHEN 975 MG: 325 TABLET, FILM COATED ORAL at 21:49

## 2024-05-29 RX ADMIN — BACLOFEN 10 MG: 10 TABLET ORAL at 15:38

## 2024-05-29 RX ADMIN — OXYCODONE HYDROCHLORIDE 15 MG: 5 TABLET ORAL at 10:42

## 2024-05-29 RX ADMIN — OXYCODONE HYDROCHLORIDE 15 MG: 5 TABLET ORAL at 05:07

## 2024-05-29 RX ADMIN — ATORVASTATIN CALCIUM 20 MG: 20 TABLET, FILM COATED ORAL at 15:37

## 2024-05-29 RX ADMIN — ACETAMINOPHEN 975 MG: 325 TABLET, FILM COATED ORAL at 13:35

## 2024-05-29 RX ADMIN — OXYCODONE HYDROCHLORIDE 15 MG: 5 TABLET ORAL at 23:38

## 2024-05-29 RX ADMIN — MIDODRINE HYDROCHLORIDE 5 MG: 5 TABLET ORAL at 15:37

## 2024-05-29 RX ADMIN — DIAZEPAM 2 MG: 2 TABLET ORAL at 23:23

## 2024-05-29 RX ADMIN — BACLOFEN 10 MG: 10 TABLET ORAL at 08:25

## 2024-05-29 RX ADMIN — MORPHINE SULFATE 2 MG: 2 INJECTION, SOLUTION INTRAMUSCULAR; INTRAVENOUS at 21:54

## 2024-05-29 RX ADMIN — BUSPIRONE HYDROCHLORIDE 5 MG: 5 TABLET ORAL at 15:37

## 2024-05-29 RX ADMIN — OXYCODONE HYDROCHLORIDE 15 MG: 5 TABLET ORAL at 15:37

## 2024-05-29 RX ADMIN — HYDROXYZINE HYDROCHLORIDE 25 MG: 25 TABLET, FILM COATED ORAL at 21:55

## 2024-05-29 RX ADMIN — MORPHINE SULFATE 2 MG: 2 INJECTION, SOLUTION INTRAMUSCULAR; INTRAVENOUS at 01:06

## 2024-05-29 RX ADMIN — TOBRAMYCIN AND DEXAMETHASONE 1 DROP: 3; 1 SUSPENSION/ DROPS OPHTHALMIC at 23:39

## 2024-05-29 RX ADMIN — MIDODRINE HYDROCHLORIDE 5 MG: 5 TABLET ORAL at 05:08

## 2024-05-29 RX ADMIN — BACLOFEN 10 MG: 10 TABLET ORAL at 21:49

## 2024-05-29 RX ADMIN — ENOXAPARIN SODIUM 40 MG: 40 INJECTION SUBCUTANEOUS at 08:25

## 2024-05-29 RX ADMIN — BUSPIRONE HYDROCHLORIDE 5 MG: 5 TABLET ORAL at 08:25

## 2024-05-29 RX ADMIN — TOBRAMYCIN AND DEXAMETHASONE 1 DROP: 3; 1 SUSPENSION/ DROPS OPHTHALMIC at 05:08

## 2024-05-29 RX ADMIN — MIDODRINE HYDROCHLORIDE 5 MG: 5 TABLET ORAL at 10:42

## 2024-05-29 RX ADMIN — ACETAMINOPHEN 975 MG: 325 TABLET, FILM COATED ORAL at 05:08

## 2024-05-29 RX ADMIN — TOBRAMYCIN AND DEXAMETHASONE 1 DROP: 3; 1 SUSPENSION/ DROPS OPHTHALMIC at 15:38

## 2024-05-29 RX ADMIN — MORPHINE SULFATE 2 MG: 2 INJECTION, SOLUTION INTRAMUSCULAR; INTRAVENOUS at 13:35

## 2024-05-29 RX ADMIN — TOBRAMYCIN AND DEXAMETHASONE 1 DROP: 3; 1 SUSPENSION/ DROPS OPHTHALMIC at 12:14

## 2024-05-29 RX ADMIN — OXYCODONE HYDROCHLORIDE 15 MG: 5 TABLET ORAL at 19:38

## 2024-05-29 RX ADMIN — MORPHINE SULFATE 2 MG: 2 INJECTION, SOLUTION INTRAMUSCULAR; INTRAVENOUS at 08:23

## 2024-05-29 NOTE — PHYSICAL THERAPY NOTE
Physical Therapy Progress Note     05/29/24 1551   PT Last Visit   PT Visit Date 05/29/24   Note Type   Note Type Treatment   Pain Assessment   Pain Assessment Tool 0-10   Pain Score 10 - Worst Possible Pain   Pain Location/Orientation Location: Back   Hospital Pain Intervention(s) Medication (See MAR);Repositioned;Ambulation/increased activity;Rest   Restrictions/Precautions   Other Precautions Pain;Fall Risk   Subjective   Subjective pt encountered supine in bed, pleasant and motivated to participate in PT.  Reports no new complaints with activity, but does have ongoing spastic tone bilaterally with various movements.   Exercises   Heelslides Supine;10 reps;AAROM;Bilateral   Hip Abduction Supine;10 reps;AAROM;Bilateral   Hip Adduction Supine;10 reps;AAROM;Bilateral  (clamshells)   Ankle Pumps Supine;10 reps;AAROM;Bilateral   Assessment   Prognosis Fair   Problem List Decreased strength;Decreased range of motion;Decreased endurance;Impaired balance;Decreased mobility;Decreased coordination;Impaired sensation;Impaired tone;Obesity;Pain   Assessment pt participated in session consisting primarily of LE exercises to address strength & ongoing tonal deficits that impair his ability to perform functional mobiilty tasks in recent weeks.  Pt demosntrates functional vs spastic tone bilaterally, but with asymmetric patterns, primarily with LLE flexor tone & RLE extnsion tone that require physical assist to overcome so pt could participate in opposing muscle movement.  Pt fatigued quickly with each exercise, requiring rest midway through session to recover.  Once completed, pt then required total assist to reposition in bed multiple times to prevent slouching & improve his subjective comfort.  Extensive time taken to discuss his condition & role of therapy at this point in hospital while we await possible further imaging or changes to medical POC prior to d/c.  PT POC & d/c recommendation remain appropriate at this time.   Plan to resume EOB & possible OOB transfers in upcomig sessions if requisite physical support is available in upcoming sessions.   Goals   Patient Goals to be able to walk again   STG Expiration Date 06/07/24   PT Treatment Day 2   Plan   Treatment/Interventions Functional transfer training;LE strengthening/ROM;Therapeutic exercise;Endurance training;Patient/family training;Equipment eval/education;Bed mobility   Progress Progressing toward goals   PT Frequency 3-5x/wk   Discharge Recommendation   Rehab Resource Intensity Level, PT I (Maximum Resource Intensity)   Equipment Recommended   (pt owns DME)   AM-PAC Basic Mobility Inpatient   Turning in Flat Bed Without Bedrails 2   Lying on Back to Sitting on Edge of Flat Bed Without Bedrails 1   Moving Bed to Chair 1   Standing Up From Chair Using Arms 1   Walk in Room 1   Climb 3-5 Stairs With Railing 1   Basic Mobility Inpatient Raw Score 7   Turning Head Towards Sound 4   Follow Simple Instructions 4   Low Function Basic Mobility Raw Score  15   Low Function Basic Mobility Standardized Score  23.9   [facility] Highest Level Of Mobility   -HL Goal 2: Bed activities/Dependent transfer   -HL Achieved 2: Bed activities/Dependent transfer       Nabor Bennett PTA    An [facility] Basic Mobility Raw Score less than 17 suggests pt would benefit from post acute rehab.  Please also refer to the recommendation of the Physical Therapist for safe discharge planning.

## 2024-05-29 NOTE — PROGRESS NOTES
[facility]  Progress Note  Name: Jos Hawk I  MRN: [id number]  Unit/Bed#: [facility]PHP 610-01 I Date of Admission: 5/23/2024   Date of Service: 5/29/2024 I Hospital Day: 6      Dw Nsx at bedside, pending MRIs could ask for PMR consult again to re-eval and see if additional recommendations are made for spasticity.     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 Also hx of lumbar revision fusion in 2016 at outside hospital and SCS placement 2019 at outside hospital. Presented to  [place] with progressive weakness after recent Botox injection. With significant decline in function over the past few weeks.  Initially thought to be possibly related to  serotoninergic medications, Cymbalta and BuSpar or progression of his disease, per neurology evaluation while at [facility] (5/17)  Cymbalta discontinued.  BuSpar restarted at a lower dose  Patient needed MRIs of spine however due to SCS, needed to be transferred from [facility] to [facility]  MRI L spine done on 5/24, limited study due to artifact  MRI C spine and T spine still pending, (requires rep for SCS)  Neurosurgery following, input appreciated   Continue pain control with scheduled tylenol,  PRN oxycodone and IV morphine for breakthrough (patient prefers morphine)  Due to worsening spasms on 5/27, transitioned robaxin to baclofen with PRN Valium  Offered to trial rotating opioids, patient prefers to hold off  Will need rehab on discharge, [facility] Pedraza    S/P insertion of spinal cord stimulator  Assessment & Plan  Noted.    Constipation  Assessment & Plan  Likely 2/2 narcotics  Continue bowel regimen    Erythrocytosis  Assessment & Plan  Dating back few months ago Hgb high 16 range, upon admission to [facility] was 18k  Continue to monitor    Hyponatremia  Assessment & Plan  Chronic, baseline appears around 131-133  Trend BMP -  stable     Neurogenic orthostatic hypotension (HCC)  Assessment & Plan  Continue midodrine 5 mg 3 times daily    BPH associated with nocturia  Assessment & Plan  Beard catheter placed in the emergency room upon admission to [facility] on  for concern of urinary retention  Void trial as able              VTE Pharmacologic Prophylaxis:   Moderate Risk (Score 3-4) - Pharmacological DVT Prophylaxis Ordered: enoxaparin (Lovenox).    Mobility:   Basic Mobility Inpatient Raw Score: 7  JH-HLM Goal: 2: Bed activities/Dependent transfer  JH-HLM Achieved: 1: Laying in bed  JH-HLM Goal NOT achieved. Continue with multidisciplinary rounding and encourage appropriate mobility to improve upon JH-HLM goals.    Patient Centered Rounds: I performed bedside rounds with nursing staff today.   Discussions with Specialists or Other Care Team Provider: d/w Neurosurgery ([place])    Education and Discussions with Family / Patient: Patient declined call to .     Total Time Spent on Date of Encounter in care of patient: 35 mins. This time was spent on one or more of the following: performing physical exam; counseling and coordination of care; obtaining or reviewing history; documenting in the medical record; reviewing/ordering tests, medications or procedures; communicating with other healthcare professionals and discussing with patient's family/caregivers.    Current Length of Stay: 6 day(s)  Current Patient Status: Inpatient   Certification Statement: The patient will continue to require additional inpatient hospital stay due to NEEDS MRIs and Nsx plan  Discharge Plan: Anticipate discharge in >72 hrs to rehab facility.    Code Status: Level 1 - Full Code    Subjective:   Pt remains frustrated due to his spasms and pending MRI for so many days.     Objective:     Vitals:   Temp (24hrs), Av.3 °F (36.8 °C), Min:98 °F (36.7 °C), Max:98.8 °F (37.1 °C)    Temp:  [98 °F (36.7 °C)-98.8 °F (37.1 °C)] 98.1 °F (36.7 °C)  HR:  [76-89]  76  Resp:  [16] 16  BP: (111-123)/(67-80) 111/67  SpO2:  [91 %-93 %] 91 %  Body mass index is 33.16 kg/m².     Input and Output Summary (last 24 hours):     Intake/Output Summary (Last 24 hours) at 5/29/2024 0921  Last data filed at 5/29/2024 0514  Gross per 24 hour   Intake --   Output 1475 ml   Net -1475 ml       Physical Exam:   Physical Exam  Vitals and nursing note reviewed.   Constitutional:       Appearance: He is obese.   Cardiovascular:      Rate and Rhythm: Normal rate and regular rhythm.   Pulmonary:      Effort: No respiratory distress.   Abdominal:      General: Bowel sounds are normal. There is no distension.      Palpations: Abdomen is soft.   Genitourinary:     Comments: Beard in place   Musculoskeletal:      Comments: B/l lower extremity spasms noted, weakness    Skin:     Coloration: Skin is pale.   Neurological:      Mental Status: He is alert and oriented to person, place, and time.   Psychiatric:         Mood and Affect: Mood normal.          Additional Data:     Labs:  Results from last 7 days   Lab Units 05/28/24  0503   WBC Thousand/uL 6.68   HEMOGLOBIN g/dL 17.2*   HEMATOCRIT % 54.1*   PLATELETS Thousands/uL 229   SEGS PCT % 59   LYMPHO PCT % 21   MONO PCT % 12   EOS PCT % 5     Results from last 7 days   Lab Units 05/28/24  0649   SODIUM mmol/L 138   POTASSIUM mmol/L 4.4   CHLORIDE mmol/L 98   CO2 mmol/L 33*   BUN mg/dL 11   CREATININE mg/dL 0.59*   ANION GAP mmol/L 7   CALCIUM mg/dL 8.3*   ALBUMIN g/dL 3.3*   TOTAL BILIRUBIN mg/dL 0.45   ALK PHOS U/L 61   ALT U/L 22   AST U/L 17   GLUCOSE RANDOM mg/dL 91                       Lines/Drains:  Invasive Devices       Peripheral Intravenous Line  Duration             Peripheral IV 05/27/24 Left;Lower Forearm 2 days              Drain  Duration             Urethral Catheter Latex;Straight-tip 16 Fr. 12 days                  Urinary Catheter:  Goal for removal: Voiding trial when ambulation improves               Imaging: No pertinent imaging  reviewed.    Recent Cultures (last 7 days):         Last 24 Hours Medication List:   Current Facility-Administered Medications   Medication Dose Route Frequency Provider Last Rate    acetaminophen  975 mg Oral Q8H NICK Shahla Naidu PA-C      atorvastatin  20 mg Oral Daily With Dinner Bharti Jensen MD      baclofen  10 mg Oral TID STACEY Crowley      bisacodyl  10 mg Rectal Daily PRN Bharti Jensen MD      busPIRone  5 mg Oral BID Bharti Jensen MD      diazepam  2 mg Oral Q6H PRN STACEY Crowley      enoxaparin  40 mg Subcutaneous Daily Bharti Jensen MD      hydrOXYzine HCL  25 mg Oral HS PRN Bharti Jensen MD      LORazepam  0.5 mg Intravenous Daily PRN Shahla Naidu PA-C      midodrine  5 mg Oral TID AC Bharti Jensen MD      morphine injection  2 mg Intravenous Q4H PRN Bharti Jensen MD      oxyCODONE  10 mg Oral Q4H PRN Shahla Naidu PA-C      Or    oxyCODONE  15 mg Oral Q4H PRN Shahla Naidu PA-C      tobramycin-dexamethasone  1 drop Both Eyes Q6H [facility] Bharti Jensen MD          Today, Patient Was Seen By: Shahla Naidu PA-C    **Please Note: This note may have been constructed using a voice recognition system.**

## 2024-05-29 NOTE — ASSESSMENT & PLAN NOTE
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 Also hx of lumbar revision fusion in 2016 at outside hospital and SCS placement 2019 at outside hospital. Presented to  [place] with progressive weakness after recent Botox injection. With significant decline in function over the past few weeks.  Initially thought to be possibly related to  serotoninergic medications, Cymbalta and BuSpar or progression of his disease, per neurology evaluation while at [facility] (5/17)  Cymbalta discontinued.  BuSpar restarted at a lower dose  Patient needed MRIs of spine however due to SCS, needed to be transferred from [facility] to [facility]  MRI L spine done on 5/24, limited study due to artifact  MRI C spine and T spine still pending, (requires rep for SCS)  Neurosurgery following, input appreciated   Continue pain control with scheduled tylenol,  PRN oxycodone and IV morphine for breakthrough (patient prefers morphine)  Due to worsening spasms on 5/27, transitioned robaxin to baclofen with PRN Valium  Offered to trial rotating opioids, patient prefers to hold off  Will need rehab on discharge, [facility]

## 2024-05-29 NOTE — PROGRESS NOTES
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 Decreased sensation medial LUE from elbow to 5th finger. Significant BLE weakness, proximal > distal    Imaging:  MRI lumbar spine w/wo, 5/24/24: Study severely limited secondary to limited MRI protocol required for the patient's surgical hardware. The canal is widely patent. Foramina cannot be adequately evaluated secondary to extensive metallic hardware. Linear postcontrast enhancement along the dorsal dural surface at L4-5 is potentially postoperative scarring    Plan:  Continue to monitor neurological exam  Given worsening spasticity over a short period of time, resulting in inability to ambulate, recommend MRI of the cervical and thoracic spine without contrast to evaluate for worsening spinal cord injury or new disc herniation causing cord compression.   MRI lumbar spine without significant changes or findings; can consider CT myelogram outpatient if other imaging is negative  Consider PMR consult for their input regarding progression of spinal cord injury (Dr DePadua if available)  Appreciate recommendations on medication  / intervention for worsening spasticity  Medical management per primary team  PT OT evaluation, mobilize as tolerated  DVT ppx: lovenox  No neurosurgical intervention is anticipated at  "this time.    Neurosurgery will follow from the periphery and review imaging as it becomes available.  Please call questions or concerns.    S/P insertion of spinal cord stimulator  Assessment & Plan  Fair thoracic SCS paddle electrode, placed by Dr Brooks in 2019  Has been nonfunctional for several years as it provide no relief  Patient with remote in room                     Subjective/Objective   Chief Complaint: \"My left leg is is spasm\"    Subjective: Patient complaining of worsening spasms in his legs, particularly his left leg.  He has been largely bedbound this admission.  He is unable to flex his foot on the left side.  He states that by the end of the day his spasms improve slightly but it is very severe in the morning.  He is getting frustrated that he is not getting his MRIs in a timely fashion.    Objective: Patient sitting in bed in no acute distress.  Vital signs stable.        Invasive Devices       Peripheral Intravenous Line  Duration             Peripheral IV 05/27/24 Left;Lower Forearm 2 days              Drain  Duration             Urethral Catheter Latex;Straight-tip 16 Fr. 12 days                    Vitals: Blood pressure 111/67, pulse 76, temperature 98.1 °F (36.7 °C), resp. rate 16, height 6' (1.829 m), weight 111 kg (244 lb 7.8 oz), SpO2 91%.,Body mass index is 33.16 kg/m².    General appearance: alert, appears stated age, cooperative and no distress  Head: Normocephalic, without obvious abnormality, atraumatic  Eyes: tracks appropriately, conjugate gaze  Neck: supple, symmetrical, trachea midline  Lungs: non labored breathing  Heart: regular heart rate  Neurologic:   Neuro exam deferred today   BLE edematous, increased tone    Lab Results: I have personally reviewed pertinent results.      Results from last 7 days   Lab Units 05/28/24  0503 05/25/24  1041   WBC Thousand/uL 6.68 8.58   HEMOGLOBIN g/dL 17.2* 17.1*   HEMATOCRIT % 54.1* 53.0*   PLATELETS Thousands/uL 229 223   SEGS PCT % " "59 71   MONO PCT % 12 11   EOS PCT % 5 4     Results from last 7 days   Lab Units 05/28/24  0649 05/25/24  1041 05/22/24  1424   POTASSIUM mmol/L 4.4 4.4 4.4   CHLORIDE mmol/L 98 96 96   CO2 mmol/L 33* 33* 34*   BUN mg/dL 11 14 11   CREATININE mg/dL 0.59* 0.56* 0.70   CALCIUM mg/dL 8.3* 9.0 8.9   ALK PHOS U/L 61  --   --    ALT U/L 22  --   --    AST U/L 17  --   --      Results from last 7 days   Lab Units 05/22/24  1424   MAGNESIUM mg/dL 1.9             No results found for: \"TROPONINT\"  ABG:No results found for: \"PHART\", \"ZEK9HBK\", \"PO2ART\", \"WAC7UKG\", \"O4BMKUOZ\", \"BEART\", \"SOURCE\"    Imaging Studies: I have personally reviewed pertinent reports.   and I have personally reviewed pertinent films in PACS    XR follow up    Result Date: 5/25/2024  Narrative: XR FOLLOW UP (NO CHARGE) INDICATION: Z96.89: Presence of other specified functional implants. COMPARISON: None FINDINGS: Left flank subcutaneous spinal stimulator device with intact appearing leads extending into the lower thoracic canal. Partially image lumbosacral fusion hardware. Fractures of the right sided fixation daniel.     Impression: Intact spinal stimulator leads. Workstation performed: [id number]     MRI lumbar spine w wo contrast    Result Date: 5/24/2024  Narrative: MRI LUMBAR SPINE WITH AND WITHOUT CONTRAST INDICATION: Weakness. COMPARISON: Prior lumbar MRI May 3, 2024 TECHNIQUE:  Multiplanar, multisequence imaging of the lumbar spine was performed before and after gadolinium administration. . IV Contrast:  11 mL of Gadobutrol injection (SINGLE-DOSE) IMAGE QUALITY: Image protocol limited by requirements for imaging spinal cord stimulator device. FINDINGS: VERTEBRAL BODIES: Patient status post L2-S1 pedicle screw fixation. Posterior decompressive laminectomy defects are noted. No significant spondylolisthesis. Normal marrow signal is identified within the visualized bony structures.  No discrete marrow lesion. SACRUM:  Normal signal within the sacrum. " No evidence of insufficiency or stress fracture. DISTAL CORD AND CONUS:  Normal size and signal within the distal cord and conus. PARASPINAL SOFT TISSUES:  Paraspinal soft tissues are unremarkable. LOWER THORACIC DISC SPACES:  Normal disc height and signal.  No disc herniation, canal stenosis or foraminal narrowing. LUMBAR DISC SPACES: No central stenosis identified. Foramina cannot be adequately evaluated secondary to extensive metallic hardware and imaging limitations regard to the patient's spinal cord stimulator. POSTCONTRAST IMAGING: There is postcontrast enhancement along the dural surface dorsally at the L4-5 level of uncertain etiology. Axial images not obtained through these levels secondary to limitations based on imaging protocol. The postcontrast enhancement may represent postoperative dural enhancement/scarring. OTHER FINDINGS:  None.     Impression: Study severely limited secondary to limited MRI protocol required for the patient's surgical hardware. The canal is widely patent. Foramina cannot be adequately evaluated secondary to extensive metallic hardware. Linear postcontrast enhancement along the dorsal dural surface at L4-5 is potentially postoperative scarring Workstation performed: [id number]     Echo complete w/ contrast if indicated    Result Date: 5/20/2024  Narrative:   Left Ventricle: Left ventricular cavity size is normal. Wall thickness is normal. The left ventricular ejection fraction is 50%. Systolic function is low normal. Wall motion is normal. Diastolic function is mildly abnormal, consistent with grade I (abnormal) relaxation.   Aorta: The aortic root is mildly dilated. The aortic root is 3.90 cm.     CTA chest pe study    Result Date: 5/19/2024  Narrative: CTA - CHEST WITH IV CONTRAST - PULMONARY ANGIOGRAM INDICATION:   r/o PE. CP, tachycardia, limited mobility. Per my review of the medical record, history of quadriparesis due to spinal cord injury. Low-grade fever. Chest pain on  deep inhalation last night. COMPARISON: Chest CT 5/4/2024, CXR 3/11/2024. TECHNIQUE: CT angiogram timed for optimal opacification of the pulmonary arteries.  Axial, sagittal, and coronal 2D reformats created from source data.  Coronal 3D MIP postprocessing on the acquisition scanner. Radiation dose length product (DLP):  491 mGy-cm .  Radiation dose exposure minimized using iterative reconstruction and automated exposure control. IV Contrast:  85 mL of iohexol (OMNIPAQUE) FINDINGS: PULMONARY ARTERIES: No definite acute pulmonary emboli but sensitivity is limited by moderate motion artifact and artifact from the patient's arms at his side. LUNGS: Mild septal thickening due to interstitial edema. Mild benign multifocal subsegmental atelectasis. Benign calcified granulomas. AIRWAYS: No significant filling defects. Mucus in the distal trachea and right main bronchus. PLEURA:  Unremarkable. HEART/GREAT VESSELS: Mild cardiomegaly. Mild coronary artery calcification indicating atherosclerotic heart disease. MEDIASTINUM AND BHARAT:  Unremarkable. CHEST WALL AND LOWER NECK: Unremarkable. UPPER ABDOMEN: Redemonstration of moderate elevation of the right diaphragm.. Mild splenomegaly at 14 cm. OSSEOUS STRUCTURES: Moderate degenerative disease in the spine. Neurostimulator in the spinal canal.     Impression: No definite acute pulmonary emboli but sensitivity is limited by moderate motion artifact and artifact from the patient's arms at his side. Mild septal thickening due to interstitial edema. Mild splenomegaly at 14 cm. Workstation performed: [id number]     CT head without contrast    Result Date: 5/16/2024  Narrative: CT BRAIN - WITHOUT CONTRAST INDICATION:   Weakness. COMPARISON: CT head 4/30/2024 TECHNIQUE:  CT examination of the brain was performed.  Multiplanar 2D reformatted images were created from the source data. Radiation dose length product (DLP) for this visit:  892 mGy-cm .  This examination, like all CT scans  performed in the [facility], was performed utilizing techniques to minimize radiation dose exposure, including the use of iterative reconstruction and automated exposure control. IMAGE QUALITY:  Diagnostic. FINDINGS: PARENCHYMA:  No intracranial mass, mass effect or midline shift. No CT signs of acute infarction.  No acute parenchymal hemorrhage. VENTRICLES AND EXTRA-AXIAL SPACES:  Normal for the patient's age. VISUALIZED ORBITS: Normal visualized orbits. PARANASAL SINUSES: Normal visualized paranasal sinuses. CALVARIUM AND EXTRACRANIAL SOFT TISSUES:  Normal.     Impression: No acute intracranial abnormality. Workstation performed: [id number]     MRI lumbar spine wo contrast    Result Date: 5/8/2024  Narrative: MRI LUMBAR SPINE WITHOUT CONTRAST INDICATION: R29.898: Other symptoms and signs involving the musculoskeletal system. COMPARISON: MRI lumbar spine 1/18/2020 466 TECHNIQUE:  Multiplanar, multisequence imaging of the lumbar spine was performed. . IMAGE QUALITY:  Diagnostic FINDINGS: VERTEBRAL BODIES:  There are 5 lumbar type vertebral bodies. There is preserved normal lumbar lordosis. No significant spondylolisthesis.. Again noted postsurgical changes from prior posterior decompression with pedicle screws and connecting rods spanning L2-S1 (there is no pedicle screw in the right L2). There is interbody disc spacer at L4-5. Vertebral heights are grossly maintained. No apparent bone marrow signal abnormality. SACRUM:  Normal signal within the sacrum. No evidence of insufficiency or stress fracture. DISTAL CORD AND CONUS:  Normal size and signal within the distal cord and conus. PARASPINAL SOFT TISSUES: Postsurgical change in the paraspinal and subcutaneous soft tissue. LOWER THORACIC DISC SPACES: Minimal disc bulge at T11-12 without significant canal stenosis. LUMBAR DISC SPACES: L1-L2: No significant disc bulge. Moderate facet arthropathy. No canal stenosis. No high-grade foraminal stenosis.  L2-L3: Stable postsurgical change. No canal stenosis. Cannot reliably evaluate neural foramina due to limited imaging and artifact from surgical hardware. L3-L4: Stable postsurgical change. No canal stenosis.Cannot reliably evaluate neural foramina due to limited imaging and artifact from surgical hardware. L4-L5: Stable postsurgical change. No canal stenosis.Cannot reliably evaluate neural foramina due to limited imaging and artifact from surgical hardware. L5-S1: Stable postsurgical change. No canal stenosis.Cannot reliably evaluate neural foramina due to limited imaging and artifact from surgical hardware. OTHER FINDINGS: Tiny well-defined T2 hyperintense lesions in the inferior spleen likely representing benign cysts.     Impression: MR protocol tailored to shorter scan time(limited PRISCILA) due to presence of thoracic spinal cord stimulator. Suboptimal assessment due to limited images, and artifacts from surgical hardware and motion. Stable postsurgical appearance at levels L2-S1. Surgically patent canal. Cannot reliably evaluate neuroforamina due to limited images and artifact from surgical hardware. Workstation performed: [id number]     MRI cervical spine wo contrast    Result Date: 5/8/2024  Narrative: MRI CERVICAL SPINE WITHOUT CONTRAST INDICATION: R29.898: Other symptoms and signs involving the musculoskeletal system. COMPARISON: MRI cervical spine 8/3/2023 TECHNIQUE:  Multiplanar, multisequence imaging of the cervical spine was performed. . IMAGE QUALITY:  Diagnostic FINDINGS: ALIGNMENT: There is mild reversal of normal cervical lordosis. MARROW SIGNAL: Stable short-segment severe cord atrophy with myelomalacia related increased T2 signal at level C3-4. CERVICAL AND VISUALIZED THORACIC CORD:  Normal signal within the visualized cord. PREVERTEBRAL AND PARASPINAL SOFT TISSUES:  Normal. VISUALIZED POSTERIOR FOSSA:  The visualized posterior fossa demonstrates no abnormal signal. CERVICAL DISC SPACES: C2-C3:  Minimal disc bulge. Bilateral uncovertebral spurring. Mild canal stenosis. Mild right foraminal narrowing. C3-C4: Changes of decompressive laminectomies. Disc bulge. Bilateral uncovertebral spurring. Bilateral facet arthropathy. Surgically patent canal. Moderate to severe left and moderate right foraminal stenosis. C4-C5:. Changes of decompressive laminectomies. Disc osteophyte complex. Bilateral uncovertebral spurring. Severe left and mild right facet arthropathy. Surgically patent canal. Moderate bilateral foraminal stenosis. C5-C6: Changes of decompressive laminectomies. Disc osteophyte complex and bilateral uncovertebral spurring. Mild facet arthropathy. Surgically patent canal. Moderate bilateral foraminal stenosis. C6-C7: Changes of decompressive laminectomies. Right eccentric disc osteophyte complex and uncovertebral spurring. Surgically patent canal. Mild to moderate right and mild left foraminal stenosis. C7-T1: Disc osteophyte complex and uncovertebral spurring. Mild canal narrowing. Moderate right foraminal narrowing. UPPER THORACIC DISC SPACES:  Normal. OTHER FINDINGS:  None.     Impression: Somewhat limited exam related to a tailored protocol due to spinal cord stimulator. 1.  Stable short segment severe cord atrophy with myelomalacia related signal abnormality at level C3-4. 2.  Redemonstrated prior decompressive laminectomies at levels C3-C6. 3.  Mild degenerative canal narrowing at level C7-T1. Surgically patent canal in the remainder levels. 4.  Multilevel foraminal stenosis as described; moderate to severe at left C3-4. Workstation performed: [id number]      VAS VENOUS DUPLEX - LOWER LIMB BILATERAL    Result Date: 5/5/2024  Narrative:  [facility] REPORT CLINICAL: Indications: Patient presents with bilateral lower extremity pain and discoloration x 4 days. Operative History: Patient denies any cardiovascular procedures. Risk Factors The patient has history of Obesity.   CONCLUSION:   Impression: RIGHT LOWER LIMB: No evidence of acute or chronic deep vein thrombosis. No evidence of superficial thrombophlebitis noted. Doppler evaluation shows a normal response to augmentation maneuvers.. Popliteal, posterior tibial and anterior tibial arterial Doppler waveform's are triphasic.  LEFT LOWER LIMB: No evidence of acute or chronic deep vein thrombosis. No evidence of superficial thrombophlebitis noted. Doppler evaluation shows a normal response to augmentation maneuvers. Popliteal, posterior tibial and anterior tibial arterial Doppler waveform's are triphasic.  Technical findings were given to Gonzalez Greer.  SIGNATURE: Electronically Signed by: CELESTINO DECKER DO, RPVI on 2024-05-05 01:30:15 PM    PE Study with CT Abdomen and Pelvis with contrast    Result Date: 5/4/2024  Narrative: CT PULMONARY ANGIOGRAM OF THE CHEST AND CT ABDOMEN AND PELVIS WITH INTRAVENOUS CONTRAST INDICATION: dyspnea, leg swelling. COMPARISON: Multiple priors most recently 3/11/2024 TECHNIQUE: CT examination of the chest, abdomen and pelvis was performed. Thin section CT angiographic technique was used in the chest in order to evaluate for pulmonary embolus and coronal 3D MIP postprocessing was performed on the acquisition scanner. Multiplanar 2D reformatted images were created from the source data. This examination, like all CT scans performed in the [facility], was performed utilizing techniques to minimize radiation dose exposure, including the use of iterative reconstruction and automated exposure control. Radiation dose length product (DLP) for this visit: 1782.38 mGy-cm IV Contrast: 100 mL of iohexol (OMNIPAQUE) Enteric Contrast: Not administered. FINDINGS: CHEST PULMONARY ARTERIAL TREE: No pulmonary embolus is seen. LUNGS: Elevation of the right hemidiaphragm with resultant partial right middle and lower lobe atelectasis. PLEURA: Unremarkable. HEART/AORTA: Atherosclerotic coronary artery calcification.  No thoracic aortic aneurysm. MEDIASTINUM AND BHARAT: Few small partially calcified lymph nodes, compatible with sequela of granulomatous disease CHEST WALL AND LOWER NECK: Unremarkable. ABDOMEN LIVER/BILIARY TREE: Unremarkable. GALLBLADDER: No calcified gallstones. No pericholecystic inflammatory change. SPLEEN: Unremarkable. PANCREAS: Unremarkable. ADRENAL GLANDS: Unremarkable. KIDNEYS/URETERS: Simple renal cyst(s). Otherwise unremarkable kidneys. No hydronephrosis. STOMACH AND BOWEL: Colonic diverticulosis with a small focus of inflammatory change in the left lower quadrant (2, 409) APPENDIX: Normal appendix. ABDOMINOPELVIC CAVITY: No ascites. No pneumoperitoneum. No lymphadenopathy. VESSELS: Infrarenal abdominal aortic aneurysm measuring up to 3.2 cm. Aneurysmal dilation of the common iliac arteries, measuring up to 2.5 cm on the left and 2 cm on the right. PELVIS REPRODUCTIVE ORGANS: Unremarkable for patient's age. URINARY BLADDER: Unremarkable. ABDOMINAL WALL/INGUINAL REGIONS: Left flank spinal stimulator. BONES: No acute fracture or suspicious osseous lesion. Spinal degenerative changes. Posterior lumbosacral fusion and decompression. Screws stabilizing both femoral necks. Hemilaminectomies of the upper cervical spine.     Impression: No acute pulmonary embolus. Possible mild acute sigmoid diverticulitis without complication 3.2 cm infrarenal abdominal aortic aneurysm. Bilateral common iliac artery aneurysms. The study was marked in EPIC for immediate notification. Workstation performed: [id number]     CT spine lumbar without contrast    Result Date: 4/30/2024  Narrative: CT SPINE LUMBAR WO CONTRAST INDICATION:   fall, weakness. COMPARISON: 1/18/2024; 10/2/2020 TECHNIQUE:  Contiguous axial images through the lumbar spine were obtained. Sagittal and coronal reconstructions were performed. IV Contrast: Not administered Radiation dose length product (DLP) for this visit:  834 mGy-cm .  This examination, like all CT  scans performed in the [facility], was performed utilizing techniques to minimize radiation dose exposure, including the use of iterative reconstruction and automated exposure control. IMAGE QUALITY:  Diagnostic. FINDINGS: ALIGNMENT:  There are 5 lumbar type vertebral bodies. Minimal levoscoliosis of the mid lumbar spine centered at the L4 level. VERTEBRAE: No acute fracture. Chronic appearing healed fracture deformity of the posterior superior aspect of L2 is retrospectively unchanged from the prior outside CT from 2020. Previously present right-sided L2 pedicle screw has been removed. Left L2 pedicle screw is intact. Decompressive laminectomies L2-L5 redemonstrated. Intact bilateral pedicle screws at L3 and L4. Intervertebral cages L4-5 are intact. Bilateral L5 pedicle screws are intact. Bilateral S1 pedicle screws demonstrate loosening, similar to the prior outside CT from 2020. The appearance of the spinal construct other than the removed right L2 pedicle screw is stable. DEGENERATIVE CHANGES: Surgically capacious central canal at the laminectomy levels. Scattered mild to moderate spondylotic changes noted. Prominent multilevel endplate osteophytes anteriorly especially at the thoracolumbar junction. PARASPINAL SOFT TISSUES: Atherosclerotic calcifications noted. Aneurysmal dilatation of the left greater than right common iliac arteries noted, stable from the prior 2020 CT. The left common iliac artery measuring up to 2.7 cm transverse dimension. Scattered atherosclerotic vascular calcifications noted.  view demonstrates partially imaged left-sided spinal cord stimulator device and bilateral femoral neck pins. OTHER: Unremarkable     Impression: 1. No acute fracture or subluxation. 2. This spinal construct is grossly stable to the prior 2020 outside CT with the exception of the right L2 pedicle screw which has been removed. Chronic loosening of bilateral S1 pedicle screws is also unchanged  from 2020. 3. Common iliac artery aneurysm is redemonstrated, similar to 2020. Further clinical surveillance and follow-up advised. Consider abdominal ultrasound or CTA of the abdominal aorta in 6 months to assess for stability. Workstation performed: [id number]     CT head without contrast    Result Date: 4/30/2024  Narrative: CT BRAIN - WITHOUT CONTRAST INDICATION:   fall. COMPARISON: CT head without contrast on 9/21/2022 TECHNIQUE:  CT examination of the brain was performed.  Multiplanar 2D reformatted images were created from the source data. Radiation dose length product (DLP) for this visit:  870 mGy-cm .  This examination, like all CT scans performed in the [facility], was performed utilizing techniques to minimize radiation dose exposure, including the use of iterative reconstruction and automated exposure control. IMAGE QUALITY:  Diagnostic. FINDINGS: PARENCHYMA: Minimal decreased attenuation is noted in periventricular and subcortical white matter demonstrating an appearance that is statistically most likely to represent mild microangiopathic change. No CT signs of acute infarction.  No intracranial mass, mass effect or midline shift.  No acute parenchymal hemorrhage. VENTRICLES AND EXTRA-AXIAL SPACES:  Normal for the patient's age. VISUALIZED ORBITS: Normal visualized orbits. PARANASAL SINUSES: Normal visualized paranasal sinuses. CALVARIUM AND EXTRACRANIAL SOFT TISSUES:  Normal.     Impression: No acute intracranial abnormality. Workstation performed: [id number]       EKG, Pathology, and Other Studies: I have personally reviewed pertinent reports.      VTE Pharmacologic Prophylaxis: Enoxaparin (Lovenox)    VTE Mechanical Prophylaxis: sequential compression device

## 2024-05-29 NOTE — ASSESSMENT & PLAN NOTE
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, therapy 3x week  Also s/p botox injections in low back, quads, hamstrings end of February with no improvement   On presenting exam, BLE > BUE spasticity. Decreased sensation medial LUE from elbow to 5th finger. Significant BLE weakness, proximal > distal    Imaging:  MRI lumbar spine w/wo, 5/24/24: Study severely limited secondary to limited MRI protocol required for the patient's surgical hardware. The canal is widely patent. Foramina cannot be adequately evaluated secondary to extensive metallic hardware. Linear postcontrast enhancement along the dorsal dural surface at L4-5 is potentially postoperative scarring    Plan:  Continue to monitor neurological exam  Given worsening spasticity over a short period of time, resulting in inability to ambulate, recommend MRI of the cervical and thoracic spine without contrast to evaluate for worsening spinal cord injury or new disc herniation causing cord compression.   MRI lumbar spine without significant changes or findings; can consider CT myelogram outpatient if other imaging is negative  Consider PMR consult for their input regarding progression of spinal cord injury (Dr DePadua if available)  Appreciate recommendations on medication  / intervention for worsening spasticity  Medical management per primary team  PT OT evaluation, mobilize as tolerated  DVT ppx: lovenox  No neurosurgical intervention is anticipated at this time.    Neurosurgery will follow from the periphery and review imaging as it becomes available.  Please call questions or concerns.

## 2024-05-29 NOTE — ASSESSMENT & PLAN NOTE
Dating back few months ago Hgb high 16 range, upon admission to [facility] was 18k  Continue to monitor

## 2024-05-29 NOTE — PLAN OF CARE
Problem: PAIN - ADULT  Goal: Verbalizes/displays adequate comfort level or baseline comfort level  Description: Interventions:  - Encourage patient to monitor pain and request assistance  - Assess pain using appropriate pain scale  - Administer analgesics based on type and severity of pain and evaluate response  - Implement non-pharmacological measures as appropriate and evaluate response  - Consider cultural and social influences on pain and pain management  - Notify physician/advanced practitioner if interventions unsuccessful or patient reports new pain  5/29/2024 1239 by Mela Malik RN  Outcome: Progressing  5/29/2024 1239 by Mela Malik RN  Outcome: Progressing     Problem: INFECTION - ADULT  Goal: Absence or prevention of progression during hospitalization  Description: INTERVENTIONS:  - Assess and monitor for signs and symptoms of infection  - Monitor lab/diagnostic results  - Monitor all insertion sites, i.e. indwelling lines, tubes, and drains  - Monitor endotracheal if appropriate and nasal secretions for changes in amount and color  - [place] appropriate cooling/warming therapies per order  - Administer medications as ordered  - Instruct and encourage patient and family to use good hand hygiene technique  - Identify and instruct in appropriate isolation precautions for identified infection/condition  5/29/2024 1239 by Mela Malik RN  Outcome: Progressing  5/29/2024 1239 by Mela Malik RN  Outcome: Progressing

## 2024-05-29 NOTE — PLAN OF CARE
Problem: PHYSICAL THERAPY ADULT  Goal: Performs mobility at highest level of function for planned discharge setting.  See evaluation for individualized goals.  Description: Treatment/Interventions: ADL retraining, Functional transfer training, Therapeutic exercise, Endurance training, Cognitive reorientation, Patient/family training, Equipment eval/education, Bed mobility          See flowsheet documentation for full assessment, interventions and recommendations.  Outcome: Progressing  Note: Prognosis: Fair  Problem List: Decreased strength, Decreased range of motion, Decreased endurance, Impaired balance, Decreased mobility, Decreased coordination, Impaired sensation, Impaired tone, Obesity, Pain  Assessment: pt participated in session consisting primarily of LE exercises to address strength & ongoing tonal deficits that impair his ability to perform functional mobiilty tasks in recent weeks.  Pt demosntrates functional vs spastic tone bilaterally, but with asymmetric patterns, primarily with LLE flexor tone & RLE extnsion tone that require physical assist to overcome so pt could participate in opposing muscle movement.  Pt fatigued quickly with each exercise, requiring rest midway through session to recover.  Once completed, pt then required total assist to reposition in bed multiple times to prevent slouching & improve his subjective comfort.  Extensive time taken to discuss his condition & role of therapy at this point in hospital while we await possible further imaging or changes to medical POC prior to d/c.  PT POC & d/c recommendation remain appropriate at this time.  Plan to resume EOB & possible OOB transfers in upcomig sessions if requisite physical support is available in upcoming sessions.        Rehab Resource Intensity Level, PT: I (Maximum Resource Intensity)    See flowsheet documentation for full assessment.

## 2024-05-29 NOTE — CONSULTS
PHYSICAL MEDICINE AND REHABILITATION CONSULT NOTE  Jos Hawk 63 y.o. male MRN: [id number]  Unit/Bed#: [facility] 610-01 Encounter: [id number]    Requested by:   Shahla Naidu PA-C   Reason for Consultation:    Quadriparesis, Spasticity   Rehabilitation medicine evaluation and assistance with appropriate disposition.  Primary insurance listed on facesheet:  Medicare     Primary Rehabilitation Diagnosis:   Spinal Cord Dysfunction:  Non-Traumatic:  04.120  Quadriplegia, Unspecified  Etiologic Diagnosis:  SCI - cervical spastic tetraplegia (with chronic cervical myelomalacia with marked cord atrophy at C3-C4) and lumbar paraplegia     Assessment and Recommendations:  63-year-old male with a past medical history of SCI - cervical spastic tetraplegia (with chronic cervical myelomalacia with marked cord atrophy at C4) and lumbar paraplegia - multiple spinal surgeries (lumbar lami 1994, cervical laminectomy C3-C6 1998), including lumbar spinal stenosis s/p decompression and fusion of L2-S1 in 2016 who developed increased numbness in b/l thighs, perineum and saddle region with progressive ambulatory dysfunction, RLE>LLE weakness as well as sexual dysfunction and some urinary changes classified as L2 PROSPER D SCI by SCI PMR 2017,  cauda equina syndrome by neuro 2/2021, (had attempted hardware removal 6/22 [place] but only able to remove 1 screw), fall in [place] 9/2022 with head impact and transient seconds paralysis, and more recently in 1/2023 subacute onset of SCI C4-C5 and C5-C6 spastic tetraplegia with central cord syndrome affecting both upper and lower extremities (per Dr Blue, [facility], SCI PMR).     Patient had functional decline and decline in strength in 2023 and found to eventual have septic R elbow s/p washout and debridement 8/2023.  He did have functional/strength improvements after but did not return to prior function/strength since prior to infection per patient.      From OP SCI PMR note from 11/2023 he was still  "quite active but was frustrated with decreasing function with some falls with transfers OOB when no CG present, using stand pivot transfer with CG.  Once standing he was only able to ambulate a few hundred feet at a time but was less than normal with worsening stamina, endurance, hand function/ROM, increased muscle tightness and spasticity.       Patient underwent botox injections in BLE and paraspinals on 2/16/2024.  He felt this did not improve tightness and then felt like strength worsened and legs were not working as well.       Due to worsening leg/arm function, spasticity, and increased pain he has been having ongoing OP work-up.    He apparently had recent EMG/NCS.  Patient had MRI C and L spine without contrast 5/2024.  MRI C spine showed  stable (from 8/2023) short segment severe cord atrophy with myelomalacia related signal abnormality at level C3-4.   Redemonstrated prior decompressive laminectomies at levels C3-C6.  Mild degenerative canal narrowing at level C7-T1. Surgically patent canal in the remainder levels.   Multilevel foraminal stenosis as described; moderate to severe at left C3-4.  MRI L spine was limited due to SCS but showed stable postsurgical appearance at levels L2-S1. Surgically patent canal. Cannot reliably evaluate neuroforamina due to limited images and artifact from surgical hardware.   He underwent BLE Venous doppler for BLE pain and discoloration on 5/4 which was negative for DVTs as well as CTPE on 5/4 for SOB and leg swelling which did not show acute PE but showed possible mild acute sigmoid diverticulitis without complication.      Due to even further worsening of strength, function, and severe pain from \"love handles\" downward he presented to [facility] on [facility].  CTH was unremarkable.  Additional CT chest PE on 5/19 showed no definite acute pulmonary emboli but was limited by some motion.  Patient was transferred to [facility] on 5/23 for neurosurgical evaluation and " additional workup.  MRI L-spine with and without contrast obtained but was severely limited due to surgical hardware but was commented as canal is widely patent and foramen cannot be adequately evaluated secondary to extensive metallic hardware.  Linear postcontrast enhancement along the dorsal dural surface at L4/5 is potentially postoperative scarring.  Patient is ordered also MRI cervical and thoracic spine without contrast which is pending.    Patient has had multiple adjustments and muscle spasm/spasticity medications as well as opiate and nonopiate analgesics to control pain.    Course also notable for urinary retention with Rodriguez placement.    Jos Hawk was evaluated by PT, OT and now by PMR physician and found to have new and significant deficits in ADLs and mobility as well as significant increase in pain.      The patient with following culmination of conditions is expected to significantly benefit from direct rehabilitation physician oversight to optimally monitor and manage in post-acute rehab setting.  Optimal functional outcomes expected using coordinated interdisciplinary team approach (PMR MD, skilled nursing, PT, OT) while providing intensive inpatient rehabilitation.   SCI spastic tetraplegia, lumbar paraplegia  Spasticity  Pain   Neurogenic bladder with acute urinary retention and indwelling rodriguez in place   Constipation, risk of neurogenic bowel  Anxiety, difficulty coping     Disposition recommendation:  Dedicated SCI [facility]  Patient is expected to be good candidate for transfer to [facility] pending:    - Completion of medical work-up  - Medical clearance/stability  - Facility acceptance, insurance authorization, and bed availability    SCI - cervical spastic tetraplegia (with chronic cervical myelomalacia with marked cord atrophy at C3-C4) and lumbar paraplegia  - with worsening strength, pain/spasticity, function, urinary function of uncertain etiology  - if full work-up negative could  be natural progression of chronic complex SCI presentation but increased intensity of pain and fairly acute on chronic decline may be attributed to other more acute issue as well   - would recommend close OP follow-up with SCI PMR GS Dr Blue, neurology, and neurosx   - No clear causes noted on OP MRI C and L spine early 5/2024; MRI L spine with contrast 5/24 limited but canal patent; linear postcontrast enhancement along the dorsal dural surface at L4-5 is potentially postoperative scarring and would defer to neuroSx regarding potentially post-op scarring?risk of arachnoiditis?  - Co-mgmt with neuroSx who is consulted  - Await MRI C spine and T spine (consider adding contrast) - risk of worsening cord pathology/syrinx although not seen on recent MRI C spine without contrast 5/3  - Apparently had recent EMG/NCS - may be beneficial to obtain results  - Consider IP neurology consult as well  - Patient had MRI C and L spine without contrast 5/2024.  MRI C spine showed  stable (from 8/2023) short segment severe cord atrophy with myelomalacia related signal abnormality at level C3-4.  Redemonstrated prior decompressive laminectomies at levels C3-C6.  Mild degenerative canal narrowing at level C7-T1. Surgically patent canal in the remainder levels.  Multilevel foraminal stenosis as described; moderate to severe at left C3-4.  MRI L spine was limited due to SCS but showed stable postsurgical appearance at levels L2-S1. Surgically patent canal. Cannot reliably evaluate neuroforamina due to limited images and artifact from surgical hardware.    - Continue PT and OT to improve functional mobility, transfers, upper and lower body strengthening, conditioning, balance, ROM exercises    - s/p Botox injections 2/16/2024  - He states he did not feel like it help spasms much but in OP PMR note they felt like it did help tightness; it appears his strength and function have been worsening over past year but seemed to be more acutely  worsening since injection - Bot toxin can cause worsening strength and potentially function if spasticity was useful - this typically wears off in about 3 months but occasionally can last longer but should wear off and he did have some increased tightness and pain on presentation which would argue against still active botox  - was undergoing extensive OP work-up prior to hospitalization and now extensive IP work-up    - UA without micro 5/16 negative for UTI   - Had some acute urinary retention with rodriguez in place which should relieve retention - he denied any retention at home so I do not feel retention was primary cause of symptoms; could consider evaluation for renal stones but recent UA without even microscopic hematuria   - CTA PE and recent BLE dopplers without clear signs of VTE  - No s/s of skin breakdown  - He had R septic elbow in past that worsened SCI symptoms - no current signs of septic arthritis or other infection but continue to monitor for indolent infections  - There was a note of possible mild acute sigmoid diverticulitis without complication on CTPE/A/P study on 5/4 - pt currently afebrile without leukocytosis or significant abdominal complaints/diarrhea - he could have altered sensation and symptoms compared to someone without SCI - if concerns for potential diverticulitis in future would repeat CT A/P    Pain/Spasticity  - S/p Botox injections 2/16/2024 - would hold botox injections for now as function seemed to decline afterward; in future could reconsider but adjusting location and dosing appropriately  - Spasticity appears to be adequately controlled on Baclofen 10mg TID and PRN low dose valium 2mg Q6H PRN - would continue this regimen for now  - He could not tolerate tizanidine in past so would hold  - Ideally wean down oxy as soon as possible  - He felt gabapentin did not help in past but may reconsider for adjuvant pain control and possible neuropathic pain components  - Consider Cymbalta  instead of or in addition to Buspar for mood/pain   - Close OP PMR follow-up     Anxiety, difficulty coping  - Supportive counseling  - Buspar > consider cymbalta or other pharm mgmt     Cardiovascular:  Relative hypotension expected with cervical SCI; avoid excessive hypotension (symptomatic or SBP<90)  - Currently on midodrin 5mg TID AC  -Ensure adequate hydration  -Recommend orthostatics when mobilizing patient > provide abdominal binder and SKYE hose if needed prior to mobilization, when sitting at EOB, and when OOB  -Autonomic dysreflexia awareness/management:   AD is a medical emergency! If unrecognized or not treated promptly the BP may rise to dangerously high levels and lead to ICH, encephalopathy, seizures, cardiac arrhythmia, or death;     S/S: sudden hypertension (SBP increase acutely >20-40 mmHg from baseline), pounding headache, bradycardia, flushing of skin and profuse sweating above level of injury, skin pallor and piloerection below level of SCI, chills without fever, nasal congestion, blurred vision, dilation of pupils, SOB, sense of anxiety or apprehension, irritability or combative behavior (in pts with limited cognitive or communication skills.   Common causes: distended bladder/retention/UTI even with rodriguez; constipation, impactions, enema, manual evacuation, hemorrhoids, pressure area, tight clothing/devices, ingrown toenail, any irritating stimulus   Management:  (Check BP and pulse rate every 2-5 minutes until episode resolves)  1. Sit patient as upright as possible - Elevate patient's head and lower legs to lower BP and ask patient what they think is wrong  2. Loosen constrictive clothing or devices (such as abdominal binder, skye hose, tight lines)  3. (If rodriguez present) Check bladder drainage equipment for kinks or other obstruction of flow> Correct if present or exchange catheter after using lidocaine gel  (if NO rodriguez present and retention is possible) Use generous amount of lidocaine gel  2% on urethra, wait 2 minutes and pass catheter  > re-check BP as sudden hypotension can occur due to rapid draining of over-distended bladder  4. If BP remains elevated providing short-acting anti-hypertensive should be considered.  (NTG spray, 1.2 NTG tab under tongue, or NTG transdermal patch to chest or upper arm (remove after HTN resolved)) > NTG contra-indicated if ED meds given without 1-3 days afterwards.  Captopril 25mg SL is an alternative short-acting agent.    5. If BP remains elevated, suspect fecal impaction/significant constipation.  >Use generous amount of lidocaine gel into rectum, wait about 5 mins.  >If BP still fairly significantly elevated consider short-acting BP med prior to ND assessment  >If necessary perform ND manual evac using generously lubricated gloved finger and gently remove any stool present  >>If S/S of AD worsen, stop manual evac, administer oral anti-HTN med and instill additional Lidocaine into rectum, wait about 20 mins and repeat manual evac  6. If no stool in rectum, commence systematic survey for other causes of AD  - Pressure area, post-op irritation or pain, ingrown toe nail, burn, fracture  - Provide appropriate analgesia if persisting painful stimulus found  7. If no cause found and symptoms persist, consider IV BP meds and contact PM&R specialist on-call.  8. AD episode resolved when - cause identified, BP restored to normal for that individual, pulse returned to normal, patient comfortable with no S/S of AD.   Monitor BP and pulse for 4 hours post episode     GI/Bowel: Constipation -   - Monitor closely for signs of obstruction.  If concerned obtain KUB with serial abdominal exam and contact MD for likely adjustments in management.    - Recommend daily senna, daily miralax   - Dulcolax supp daily for now   - If no BM in >48 hours and no signs of obstruction > goal is to have BM as soon as possible > Consider Mag Citrate and serial enemas if no contra-indications (ie  significant kidney disease)    Urology/Bladder: Neurogenic bladder risk  - He typically urinates fine on his own but had some retention on admission  - Can continue rodriguez for now but would consider voiding trial after improvement of constipation   - If removing rodriguez would scan bladder Q4H and SC PRN to start  - Need to avoid excessively high retention as this can lead to infection, kidney injury, bladder stretch injury (avoid bladder volumes >400cc) or autonomic dysreflexia     Anxiety, difficulty coping  - Supportive counseling  - Buspar > consider cymbalta or other pharm mgmt    Pain/Spasticity  - S/p Botox injections 2/16/2024 - would hold botox injections for now as function seemed to decline afterward; in future could reconsider but adjusting location and dosing appropriately  - Spasticity appears to be adequately controlled on Baclofen 10mg TID and PRN low dose valium 2mg Q6H PRN - would continue this regimen for now  - He could not tolerate tizanidine in past so would hold  - Ideally wean down oxy as soon as possible  - He felt gabapentin did not help in past but may reconsider for adjuvant pain control and possible neuropathic pain components  - Consider Cymbalta instead of or in addition to Buspar for mood/pain      VTE prophylaxis   As outlined by primary team      Other medical issues:     Per primary service (and relevant consultants if applicable)    Prior Level of Function, Current level of function and Social history:    Patient lives on main level with bedroom and bathroom with O SETH; he has CG from 7a-8pm.  He goes to PT multiple times per week and prefers to be very active.  He can call son or CG overnight in an emergency.      Patient was able to do 1 person transfer with slight assist up until last month and now much heavier assist; he was able to ambulate short distances with AD up until last month; apparently could ambulate several hundred feet even a few months ago in PT but now dependent on  WC  Needed slight assist for bathing, dressing but not needs considerable more heavier assist.     ==================================================================    Chief Complaint:  Worried about functional/strength decline and pain     History of Present Illness:   63-year-old male with a past medical history of SCI - cervical spastic tetraplegia (with chronic cervical myelomalacia with marked cord atrophy at C4) and lumbar paraplegia - multiple spinal surgeries (lumbar lami 1994, cervical laminectomy C3-C6 1998), including lumbar spinal stenosis s/p decompression and fusion of L2-S1 in 2016 who developed increased numbness in b/l thighs, perineum and saddle region with progressive ambulatory dysfunction, RLE>LLE weakness as well as sexual dysfunction and some urinary changes classified as L2 PROSPER D SCI by SCI PMR 2017,  cauda equina syndrome by neuro 2/2021, (had attempted hardware removal 6/22 in [place] but only able to remove 1 screw), fall in [place] 9/2022 with head impact and transient seconds paralysis, and more recently in 1/2023 subacute onset of SCI C4-C5 and C5-C6 spastic tetraplegia with central cord syndrome affecting both upper and lower extremities (per Dr Blue, [facility], SCI PMR).     Patient had functional decline and decline in strength in 2023 and found to eventual have septic R elbow s/p washout and debridement 8/2023.  He did have functional/strength improvements after but did not return to prior function/strength since prior to infection per patient.      From OP SCI PMR note from 11/2023 he was still quite active but was frustrated with decreasing function with some falls with transfers OOB when no CG present, using stand pivot transfer with CG.  Once standing he was only able to ambulate a few hundred feet at a time but was less than normal with worsening stamina, endurance, hand function/ROM, increased muscle tightness and spasticity.       Patient underwent botox injections in [facility] and  "paraspinals on 2/16/2024.  He felt this did not improve tightness and then felt like strength worsened and legs were not working as well.       Due to worsening leg/arm function, spasticity, and increased pain he has been having ongoing OP work-up.    He apparently had recent EMG/NCS.  Patient had MRI C and L spine without contrast 5/2024.  MRI C spine showed  stable (from 8/2023) short segment severe cord atrophy with myelomalacia related signal abnormality at level C3-4.   Redemonstrated prior decompressive laminectomies at levels C3-C6.  Mild degenerative canal narrowing at level C7-T1. Surgically patent canal in the remainder levels.   Multilevel foraminal stenosis as described; moderate to severe at left C3-4.  MRI L spine was limited due to SCS but showed stable postsurgical appearance at levels L2-S1. Surgically patent canal. Cannot reliably evaluate neuroforamina due to limited images and artifact from surgical hardware.   He underwent BLE Venous doppler for BLE pain and discoloration on 5/4 which was negative for DVTs as well as CTPE on 5/4 for SOB and leg swelling which did not show acute PE but showed possible mild acute sigmoid diverticulitis without complication.      Due to even further worsening of strength, function, and severe pain from \"love handles\" downward he presented to [facility] on [facility].  CTH was unremarkable.  Additional CT chest PE on 5/19 showed no definite acute pulmonary emboli but was limited by some motion.  Patient was transferred to [facility] on 5/23 for neurosurgical evaluation and additional workup.  MRI L-spine with and without contrast obtained but was severely limited due to surgical hardware but was commented as canal is widely patent and foramen cannot be adequately evaluated secondary to extensive metallic hardware.  Linear postcontrast enhancement along the dorsal dural surface at L4/5 is potentially postoperative scarring.  Patient is ordered also MRI cervical and " thoracic spine without contrast which is pending.    Patient has had multiple adjustments and muscle spasm/spasticity medications as well as opiate and nonopiate analgesics to control pain.    Course also notable for urinary retention with Beard placement.    On evaluation, patient reports overall significant frustration with worsening strength and mobility in legs right greater than left and legs worse than arms.  He also noted presenting to the hospital with tremendous pain from his left handles down his legs with severe muscle spasms and tightness.  Patient states he was not taking any opiates except for a brief course of Norco once a day over the past month prior to hospitalization.  Patient states now with the baclofen 10 mg 3 times a day, as needed Valium and as needed oxycodone 10 to 15 mg as needed he can achieve fair pain and spasm control but does not want to be on all these medications and wants to know what is going on.  He noted brief episode of lightheadedness earlier but not recently.  Patient states he urinates well at home without incontinence or recent UTIs.  Patient states occasionally he will use a condom catheter but not typically.  Patient states that he was found to have a little bit of retention when he first came to the hospital and a Beard was inserted and has remained in.  Patient reports stooling about every other day at home without significant neurogenic bowel.  He states he will occasionally increase his bowel regimen and occasionally give suppository if he has not had a bowel movement 2 to 3 days.  He reports last bowel movement about 2 to 3 days and plans to have a suppository enema soon if he needs to.  Patient denies fever, chills, sweats, abdominal pain, nausea, vomiting, skin breakdown or skin wounds, or other new complaints.    Review of Systems:     Complete review of systems obtained.    Please see HPI for details with other significant symptoms or history listed here:     Otherwise, 14 point review of systems completed and was otherwise unremarkable.    No Known Allergies    Current Facility-Administered Medications:     acetaminophen (TYLENOL) tablet 975 mg, 975 mg, Oral, Q8H NICK, Shahla Naidu PA-C, 975 mg at 05/29/24 1335    atorvastatin (LIPITOR) tablet 20 mg, 20 mg, Oral, Daily With Dinner, Bharti Jensen MD, 20 mg at 05/29/24 1537    baclofen tablet 10 mg, 10 mg, Oral, TID, STACEY Crowley, 10 mg at 05/29/24 1538    bisacodyl (DULCOLAX) rectal suppository 10 mg, 10 mg, Rectal, Daily PRN, Bharti Jensen MD, 10 mg at 05/26/24 1312    busPIRone (BUSPAR) tablet 5 mg, 5 mg, Oral, BID, Bharti Jensen MD, 5 mg at 05/29/24 1537    diazepam (VALIUM) tablet 2 mg, 2 mg, Oral, Q6H PRN, STACEY Crowley, 2 mg at 05/28/24 1806    enoxaparin (LOVENOX) subcutaneous injection 40 mg, 40 mg, Subcutaneous, Daily, Bharti Jensen MD, 40 mg at 05/29/24 0825    hydrOXYzine HCL (ATARAX) tablet 25 mg, 25 mg, Oral, HS PRN, Bharti Jensen MD, 25 mg at 05/28/24 2357    LORazepam (ATIVAN) injection 0.5 mg, 0.5 mg, Intravenous, Daily PRN, Shahla Naidu PA-C, 0.5 mg at 05/27/24 0839    midodrine (PROAMATINE) tablet 5 mg, 5 mg, Oral, TID AC, Bharti Jensen MD, 5 mg at 05/29/24 1537    morphine injection 2 mg, 2 mg, Intravenous, Q4H PRN, Bharti Jensen MD, 2 mg at 05/29/24 1335    oxyCODONE (ROXICODONE) immediate release tablet 10 mg, 10 mg, Oral, Q4H PRN **OR** oxyCODONE (ROXICODONE) IR tablet 15 mg, 15 mg, Oral, Q4H PRN, Shahla Naidu PA-C, 15 mg at 05/29/24 1537    tobramycin-dexamethasone (TOBRADEX) 0.3-0.1 % ophthalmic suspension 1 drop, 1 drop, Both Eyes, Q6H Bharti ROMERO MD, 1 drop at 05/29/24 1538    Past Medical History:   Diagnosis Date    Balance problems     BPH (benign prostatic hyperplasia)     Chronic back pain     Chronic pain disorder     COVID-19     2/2021-asymptomatic     Erectile dysfunction     Nocturia     OAB (overactive bladder)     Testicular hypogonadism     Urinary frequency      Urinary urgency        Past Surgical History:   Procedure Laterality Date    BACK SURGERY  05/18/2016    with hardware    BACK SURGERY      2 back surgery unable to remove hardware    COLONOSCOPY  11/2004    Dr Taylor. tubular adenoma polyp removed    COLONOSCOPY  12/2007    Dr Cameron. Normal    COLONOSCOPY  07/2013    Dr Cameron. Normal.     COLONOSCOPY  06/2019    Dr Cameron. Hemorrhoids, normal.     NECK SURGERY      decompression of neck     NECK SURGERY Right     scar tissues removed    WA EXCISION HYDROCELE UNILATERAL Right 06/10/2021    Procedure: HYDROCELECTOMY;  Surgeon: Dereck Goddard DO;  [facility];  Service: Urology    SPINAL CORD STIMULATOR IMPLANT  10/10/2019    WOUND DEBRIDEMENT Right 08/10/2023    Procedure: DEBRIDEMENT UPPER EXTREMITY (WASH OUT) - elbow;  Surgeon: Jesse Avendaño MD;  Location[facility];  Service: Orthopedics      No family history on file.    Social History available currently in EMR:  (See additional SH as outlined above)   Social History     Socioeconomic History    Marital status: /Civil Union     Spouse name: Not on file    Number of children: Not on file    Years of education: Not on file    Highest education level: Not on file   Occupational History    Occupation:  First Generation   Tobacco Use    Smoking status: Never    Smokeless tobacco: Never   Vaping Use    Vaping status: Never Used   Substance and Sexual Activity    Alcohol use: Not Currently     Alcohol/week: 4.0 - 7.0 standard drinks of alcohol     Types: 4 - 7 Glasses of wine per week     Comment: Just weekends    Drug use: No    Sexual activity: Yes     Partners: Female     Birth control/protection: Other     Comment: vasectomy   Other Topics Concern    Not on file   Social History Narrative    Not on file     Social Determinants of Health     Financial Resource Strain: Not on file   Food Insecurity: No Food Insecurity (5/20/2024)    Hunger Vital Sign     Worried About Running Out of  Food in the Last Year: Never true     Ran Out of Food in the Last Year: Never true   Transportation Needs: No Transportation Needs (5/20/2024)    PRAPARE - Transportation     Lack of Transportation (Medical): No     Lack of Transportation (Non-Medical): No   Physical Activity: Not on file   Stress: Not on file   Social Connections: Not on file   Intimate Partner Violence: Unknown (2/18/2021)    [facility]    Intimate Partner Violence     Within the last year, have you been afraid of your partner, ex-partner or family member?: Not on file     Within the last year, have you been humiliated or emotionally abused in other ways by your partner, ex-partner or family member?: Not on file     Within the last year, have you been kicked, hit, slapped, or otherwise physically hurt by your partner, ex-partner or family member?: Not on file     Within the last year, have you been raped or forced to have any kind of sexual activity by your partner, ex-partner or family member?: Not on file   Housing Stability: Unknown (5/20/2024)    Housing Stability Vital Sign     Unable to Pay for Housing in the Last Year: No     Number of Times Moved in the Last Year: Not on file     Homeless in the Last Year: No       Physical Examination:   Temp:  [98 °F (36.7 °C)-98.5 °F (36.9 °C)] 98.5 °F (36.9 °C)  HR:  [73-89] 73  Resp:  [16-18] 18  BP: (111-145)/(67-84) 145/84  General: Awake, alert in NAD  HENT: NCAT, MMM  Neck: Supple, no lymphadenopathy  Respiratory: Unlabored breathing, breath sounds equal, Lungs CTA, no wheezes, rales, or rhonchi  Cardiovascular: Regular rate and rhythm, no murmurs, rubs, or gallops  Gastrointestinal: Soft, non-tender, non-distended, normoactive bowel sounds  Genitourinary: No rodriguez  SkiN/MSK/Extremities:  Some b/l pedal greater than mild calf edema without calf TTP; no cyanosis   Neurologic/Psych:   MENTAL STATUS: awake, oriented to person, place, time, and  situation  Affect: Concerned at times   CN II-XII: grossly intact   Strength/MMT:    Right  Left  Site  Right  Left  Site    5 5  S Ab: Shoulder Abductors  1  5  HF: Hip Flexors    5 5  EF: Elbow Flexors         5  5  EE: Elbow Extensors  1  1+  KE: Knee Extensors    5-  5  WE: Wrist Extensors  2  2+  DR: Dorsi Flexors    5- 5  FF: Finger Flexors  3  4  PF: Plantar Flexors    4+ 5  HI: Hand Intrinsics        Sensation: decreased slightly to light touch b/l legs not chest or abdomen   Modified avinash about 2 in B EF/EF 1+ WE, KF/KE about 2    Data:  Lab Results   Component Value Date    HGB 17.2 (H) 05/28/2024    HCT 54.1 (H) 05/28/2024    WBC 6.68 05/28/2024    K 4.4 05/28/2024    K 4.3 09/21/2021    CL 98 05/28/2024     09/21/2021    CREATININE 0.59 (L) 05/28/2024    CREATININE 0.53 09/21/2021    BUN 11 05/28/2024    BUN 16 09/21/2021         XR follow up    Result Date: 5/25/2024  Impression: Intact spinal stimulator leads. Workstation performed: [id number]     MRI lumbar spine w wo contrast    Result Date: 5/24/2024  Impression: Study severely limited secondary to limited MRI protocol required for the patient's surgical hardware. The canal is widely patent. Foramina cannot be adequately evaluated secondary to extensive metallic hardware. Linear postcontrast enhancement along the dorsal dural surface at L4-5 is potentially postoperative scarring Workstation performed: [id number]       Pertinent labs and imaging reviewed.      Patient seen on date of service listed.    80 minutes or greater spent for this encounter which included a combination of face-to-face time with patient and non-face-to-face time which in part specifically includes management of SCI/spasticity/pain.  Face-to-face time included extended discussion with patient regarding current condition, medical history, mood, medical/rehabilitation management, and disposition.  Non face-to-face time included coordination of care with patient's  co-managing AP and/or physician(s) thru communication and/or review of their recent documentation as well as reviewing vitals, bowel/bladder function, recent labs, diagnostic imaging, and notes from therapy, CM, and nursing.      Thank you for allowing the PM&R service to participate in the care of this patient. We will continue to follow Jos Hawk's progress with you. Please do not hesitate to call with questions or concerns.    Austin Bryan MD, MS  [facility]  Physical Medicine and Rehabilitation  Brain Injury Medicine

## 2024-05-30 ENCOUNTER — APPOINTMENT (OUTPATIENT)
Dept: RADIOLOGY | Facility: HOSPITAL | Age: 64
DRG: 052 | End: 2024-05-30
Payer: MEDICARE

## 2024-05-30 PROCEDURE — 72141 MRI NECK SPINE W/O DYE: CPT

## 2024-05-30 PROCEDURE — 99232 SBSQ HOSP IP/OBS MODERATE 35: CPT | Performed by: INTERNAL MEDICINE

## 2024-05-30 RX ADMIN — BACLOFEN 10 MG: 10 TABLET ORAL at 22:44

## 2024-05-30 RX ADMIN — MIDODRINE HYDROCHLORIDE 5 MG: 5 TABLET ORAL at 06:25

## 2024-05-30 RX ADMIN — MORPHINE SULFATE 2 MG: 2 INJECTION, SOLUTION INTRAMUSCULAR; INTRAVENOUS at 13:50

## 2024-05-30 RX ADMIN — MORPHINE SULFATE 2 MG: 2 INJECTION, SOLUTION INTRAMUSCULAR; INTRAVENOUS at 17:53

## 2024-05-30 RX ADMIN — OXYCODONE HYDROCHLORIDE 15 MG: 5 TABLET ORAL at 10:24

## 2024-05-30 RX ADMIN — BUSPIRONE HYDROCHLORIDE 5 MG: 5 TABLET ORAL at 09:00

## 2024-05-30 RX ADMIN — ENOXAPARIN SODIUM 40 MG: 40 INJECTION SUBCUTANEOUS at 09:00

## 2024-05-30 RX ADMIN — ACETAMINOPHEN 975 MG: 325 TABLET, FILM COATED ORAL at 13:50

## 2024-05-30 RX ADMIN — BUSPIRONE HYDROCHLORIDE 5 MG: 5 TABLET ORAL at 17:49

## 2024-05-30 RX ADMIN — OXYCODONE HYDROCHLORIDE 15 MG: 5 TABLET ORAL at 15:45

## 2024-05-30 RX ADMIN — MORPHINE SULFATE 2 MG: 2 INJECTION, SOLUTION INTRAMUSCULAR; INTRAVENOUS at 21:52

## 2024-05-30 RX ADMIN — OXYCODONE HYDROCHLORIDE 15 MG: 5 TABLET ORAL at 19:41

## 2024-05-30 RX ADMIN — DIAZEPAM 2 MG: 2 TABLET ORAL at 09:00

## 2024-05-30 RX ADMIN — BACLOFEN 10 MG: 10 TABLET ORAL at 15:46

## 2024-05-30 RX ADMIN — MIDODRINE HYDROCHLORIDE 5 MG: 5 TABLET ORAL at 15:45

## 2024-05-30 RX ADMIN — TOBRAMYCIN AND DEXAMETHASONE 1 DROP: 3; 1 SUSPENSION/ DROPS OPHTHALMIC at 17:52

## 2024-05-30 RX ADMIN — ATORVASTATIN CALCIUM 20 MG: 20 TABLET, FILM COATED ORAL at 15:46

## 2024-05-30 RX ADMIN — ACETAMINOPHEN 975 MG: 325 TABLET, FILM COATED ORAL at 22:43

## 2024-05-30 RX ADMIN — LORAZEPAM 0.5 MG: 2 INJECTION INTRAMUSCULAR; INTRAVENOUS at 12:37

## 2024-05-30 RX ADMIN — BACLOFEN 10 MG: 10 TABLET ORAL at 06:25

## 2024-05-30 RX ADMIN — MORPHINE SULFATE 2 MG: 2 INJECTION, SOLUTION INTRAMUSCULAR; INTRAVENOUS at 09:00

## 2024-05-30 RX ADMIN — OXYCODONE HYDROCHLORIDE 15 MG: 5 TABLET ORAL at 05:11

## 2024-05-30 RX ADMIN — ACETAMINOPHEN 975 MG: 325 TABLET, FILM COATED ORAL at 05:12

## 2024-05-30 NOTE — PLAN OF CARE
Problem: PAIN - ADULT  Goal: Verbalizes/displays adequate comfort level or baseline comfort level  Description: Interventions:  - Encourage patient to monitor pain and request assistance  - Assess pain using appropriate pain scale  - Administer analgesics based on type and severity of pain and evaluate response  - Implement non-pharmacological measures as appropriate and evaluate response  - Consider cultural and social influences on pain and pain management  - Notify physician/advanced practitioner if interventions unsuccessful or patient reports new pain  5/30/2024 1042 by Mela Malik RN  Outcome: Progressing  5/30/2024 1042 by Mela Malik RN  Outcome: Progressing  5/30/2024 1037 by Mela Malik RN  Outcome: Progressing     Problem: INFECTION - ADULT  Goal: Absence or prevention of progression during hospitalization  Description: INTERVENTIONS:  - Assess and monitor for signs and symptoms of infection  - Monitor lab/diagnostic results  - Monitor all insertion sites, i.e. indwelling lines, tubes, and drains  - Monitor endotracheal if appropriate and nasal secretions for changes in amount and color  - [place] appropriate cooling/warming therapies per order  - Administer medications as ordered  - Instruct and encourage patient and family to use good hand hygiene technique  - Identify and instruct in appropriate isolation precautions for identified infection/condition  5/30/2024 1042 by Mela Malik RN  Outcome: Progressing  5/30/2024 1042 by Mela Malik RN  Outcome: Progressing  5/30/2024 1037 by Mela Malik RN  Outcome: Progressing

## 2024-05-30 NOTE — ASSESSMENT & PLAN NOTE
Dating back few months ago Hgb high 16 range, upon admission to [facility] was 18k  Continue to monitor

## 2024-05-30 NOTE — PLAN OF CARE
Problem: SAFETY ADULT  Goal: Patient will remain free of falls  Description: INTERVENTIONS:  - Educate patient/family on patient safety including physical limitations  - Instruct patient to call for assistance with activity   - Consult OT/PT to assist with strengthening/mobility   - Keep Call bell within reach  - Keep bed low and locked with side rails adjusted as appropriate  - Keep care items and personal belongings within reach  - Initiate and maintain comfort rounds  - Make Fall Risk Sign visible to staff    - Consider moving patient to room near nurses station  Outcome: Progressing

## 2024-05-30 NOTE — PROGRESS NOTES
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 Also hx of lumbar revision fusion in 2016 at outside hospital and SCS placement 2019 at outside hospital. Presented to [facility] with progressive weakness after recent Botox injection. With significant decline in function over the past few weeks.  Initially thought to be possibly related to  serotoninergic medications, Cymbalta and BuSpar or progression of his disease, per neurology evaluation while at [facility] (5/17)  Cymbalta discontinued.  BuSpar restarted at a lower dose  Patient needed MRIs of spine however due to SCS, needed to be transferred from [facility] to [facility]  MRI L spine done on 5/24, limited study due to artifact  MRI C spine and T spine still pending, (requires rep for SCS), have escalated delay to    Neurosurgery following, input appreciated   Continue pain control with scheduled tylenol,  PRN oxycodone and IV morphine for breakthrough (patient prefers morphine)  Due to worsening spasms on 5/27, transitioned robaxin to baclofen with PRN Valium  Offered to trial rotating opioids, patient prefers to hold off  PMR consulted for additional recs, could consider adding gabapentin and low dose Cymbalta  Will need rehab on discharge, Good Pedraza    S/P insertion of spinal cord stimulator  Assessment & Plan  Noted.    Constipation  Assessment & Plan  Likely 2/2 narcotics  Continue bowel regimen    Erythrocytosis  Assessment & Plan  Dating back few months ago Hgb high 16 range, upon admission to [facility] was 18k  Continue to monitor    Hyponatremia  Assessment & Plan  Chronic, baseline appears around 131-133  Trend BMP - stable      Neurogenic orthostatic hypotension (HCC)  Assessment & Plan  Continue midodrine 5 mg 3 times daily    BPH associated with nocturia  Assessment & Plan  Beard catheter placed in the emergency room upon admission to [facility] on  for concern of urinary retention  Void trial as able                VTE Pharmacologic Prophylaxis:   Moderate Risk (Score 3-4) - Pharmacological DVT Prophylaxis Ordered: enoxaparin (Lovenox).    Mobility:   Basic Mobility Inpatient Raw Score: 7  JH-HLM Goal: 2: Bed activities/Dependent transfer  JH-HLM Achieved: 2: Bed activities/Dependent transfer  JH-HLM Goal achieved. Continue to encourage appropriate mobility.    Patient Centered Rounds: I performed bedside rounds with nursing staff today.   Discussions with Specialists or Other Care Team Provider: Patient care manager, Nsx     Education and Discussions with Family / Patient: Updated  (caregiver) at bedside.    Total Time Spent on Date of Encounter in care of patient: 45 mins. This time was spent on one or more of the following: performing physical exam; counseling and coordination of care; obtaining or reviewing history; documenting in the medical record; reviewing/ordering tests, medications or procedures; communicating with other healthcare professionals and discussing with patient's family/caregivers.    Current Length of Stay: 7 day(s)  Current Patient Status: Inpatient   Certification Statement: The patient will continue to require additional inpatient hospital stay due to pending MRIs and plans   Discharge Plan: Anticipate discharge in >72 hrs to rehab facility.    Code Status: Level 1 - Full Code    Subjective:   Seen post MRI. Concerned they didnt use contrast. Informed him I double checked with Nsx this AM that they were ordered correctly.     Objective:     Vitals:   Temp (24hrs), Av.2 °F (36.8 °C), Min:97.8 °F (36.6 °C), Max:98.5 °F (36.9 °C)    Temp:  [97.8 °F (36.6 °C)-98.5 °F (36.9 °C)] 97.8 °F (36.6  °C)  HR:  [73-89] 82  Resp:  [16-20] 20  BP: (112-146)/(70-86) 125/81  SpO2:  [91 %-98 %] 93 %  Body mass index is 33.16 kg/m².     Input and Output Summary (last 24 hours):     Intake/Output Summary (Last 24 hours) at 5/30/2024 1334  Last data filed at 5/30/2024 1200  Gross per 24 hour   Intake 1051 ml   Output 1250 ml   Net -199 ml       Physical Exam:   Physical Exam  Vitals and nursing note reviewed.   Constitutional:       General: He is not in acute distress.     Appearance: He is obese.   Cardiovascular:      Rate and Rhythm: Normal rate and regular rhythm.   Pulmonary:      Effort: No respiratory distress.   Abdominal:      General: Bowel sounds are normal.      Palpations: Abdomen is soft.   Musculoskeletal:      Comments: Gen weakness    Neurological:      Mental Status: He is oriented to person, place, and time.          Additional Data:     Labs:  Results from last 7 days   Lab Units 05/28/24  0503   WBC Thousand/uL 6.68   HEMOGLOBIN g/dL 17.2*   HEMATOCRIT % 54.1*   PLATELETS Thousands/uL 229   SEGS PCT % 59   LYMPHO PCT % 21   MONO PCT % 12   EOS PCT % 5     Results from last 7 days   Lab Units 05/28/24  0649   SODIUM mmol/L 138   POTASSIUM mmol/L 4.4   CHLORIDE mmol/L 98   CO2 mmol/L 33*   BUN mg/dL 11   CREATININE mg/dL 0.59*   ANION GAP mmol/L 7   CALCIUM mg/dL 8.3*   ALBUMIN g/dL 3.3*   TOTAL BILIRUBIN mg/dL 0.45   ALK PHOS U/L 61   ALT U/L 22   AST U/L 17   GLUCOSE RANDOM mg/dL 91                       Lines/Drains:  Invasive Devices       Peripheral Intravenous Line  Duration             Peripheral IV 05/29/24 Left;Upper;Ventral (anterior) Arm <1 day              Drain  Duration             Urethral Catheter Latex;Straight-tip 16 Fr. 13 days                  Urinary Catheter:  Goal for removal: Voiding trial when ambulation improves               Imaging: No pertinent imaging reviewed.    Recent Cultures (last 7 days):         Last 24 Hours Medication List:   Current Facility-Administered  Medications   Medication Dose Route Frequency Provider Last Rate    acetaminophen  975 mg Oral Q8H NICK Shahla Naidu PA-C      atorvastatin  20 mg Oral Daily With Dinner Bharti Jensen MD      baclofen  10 mg Oral TID STACEY Crowley      bisacodyl  10 mg Rectal Daily PRN Bharti Jensen MD      busPIRone  5 mg Oral BID Bharti Jensen MD      diazepam  2 mg Oral Q6H PRN STACEY Crowley      enoxaparin  40 mg Subcutaneous Daily Bharti Jensen MD      hydrOXYzine HCL  25 mg Oral HS PRN Bharti Jensen MD      LORazepam  0.5 mg Intravenous Daily PRN Shahla Naidu PA-C      midodrine  5 mg Oral TID AC Bharti Jensen MD      morphine injection  2 mg Intravenous Q4H PRN Bharti Jensen MD      oxyCODONE  10 mg Oral Q4H PRN Shahla Naidu PA-C      Or    oxyCODONE  15 mg Oral Q4H PRN Shahla Naidu PA-C      tobramycin-dexamethasone  1 drop Both Eyes Q6H [facility] Bharti Jensen MD          Today, Patient Was Seen By: Shahla Naidu PA-C    **Please Note: This note may have been constructed using a voice recognition system.**

## 2024-05-30 NOTE — PLAN OF CARE
Problem: PAIN - ADULT  Goal: Verbalizes/displays adequate comfort level or baseline comfort level  Description: Interventions:  - Encourage patient to monitor pain and request assistance  - Assess pain using appropriate pain scale  - Administer analgesics based on type and severity of pain and evaluate response  - Implement non-pharmacological measures as appropriate and evaluate response  - Consider cultural and social influences on pain and pain management  - Notify physician/advanced practitioner if interventions unsuccessful or patient reports new pain  Outcome: Progressing     Problem: INFECTION - ADULT  Goal: Absence or prevention of progression during hospitalization  Description: INTERVENTIONS:  - Assess and monitor for signs and symptoms of infection  - Monitor lab/diagnostic results  - Monitor all insertion sites, i.e. indwelling lines, tubes, and drains  - Monitor endotracheal if appropriate and nasal secretions for changes in amount and color  - [place] appropriate cooling/warming therapies per order  - Administer medications as ordered  - Instruct and encourage patient and family to use good hand hygiene technique  - Identify and instruct in appropriate isolation precautions for identified infection/condition  Outcome: Progressing

## 2024-05-30 NOTE — ASSESSMENT & PLAN NOTE
Beard catheter placed in the emergency room upon admission to [facility] on 5/16 for concern of urinary retention  Void trial as able

## 2024-05-30 NOTE — PLAN OF CARE
Problem: PAIN - ADULT  Goal: Verbalizes/displays adequate comfort level or baseline comfort level  Description: Interventions:  - Encourage patient to monitor pain and request assistance  - Assess pain using appropriate pain scale  - Administer analgesics based on type and severity of pain and evaluate response  - Implement non-pharmacological measures as appropriate and evaluate response  - Consider cultural and social influences on pain and pain management  - Notify physician/advanced practitioner if interventions unsuccessful or patient reports new pain  5/30/2024 1042 by Mela Malik RN  Outcome: Progressing  5/30/2024 1037 by Mela Malik RN  Outcome: Progressing     Problem: INFECTION - ADULT  Goal: Absence or prevention of progression during hospitalization  Description: INTERVENTIONS:  - Assess and monitor for signs and symptoms of infection  - Monitor lab/diagnostic results  - Monitor all insertion sites, i.e. indwelling lines, tubes, and drains  - Monitor endotracheal if appropriate and nasal secretions for changes in amount and color  - [place] appropriate cooling/warming therapies per order  - Administer medications as ordered  - Instruct and encourage patient and family to use good hand hygiene technique  - Identify and instruct in appropriate isolation precautions for identified infection/condition  5/30/2024 1042 by Mela Malik RN  Outcome: Progressing  5/30/2024 1037 by Mela Malik RN  Outcome: Progressing

## 2024-05-30 NOTE — ASSESSMENT & PLAN NOTE
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 Also hx of lumbar revision fusion in 2016 at outside hospital and SCS placement 2019 at outside hospital. Presented to  [facility] with progressive weakness after recent Botox injection. With significant decline in function over the past few weeks.  Initially thought to be possibly related to  serotoninergic medications, Cymbalta and BuSpar or progression of his disease, per neurology evaluation while at [facility] (5/17)  Cymbalta discontinued.  BuSpar restarted at a lower dose  Patient needed MRIs of spine however due to SCS, needed to be transferred from [facility] to \[facility]""  MRI L spine done on 5/24, limited study due to artifact  MRI C spine and T spine still pending, (requires rep for SCS), have escalated delay to    Neurosurgery following, input appreciated   Continue pain control with scheduled tylenol,  PRN oxycodone and IV morphine for breakthrough (patient prefers morphine)  Due to worsening spasms on 5/27, transitioned robaxin to baclofen with PRN Valium  Offered to trial rotating opioids, patient prefers to hold off  PMR consulted for additional recs, could consider adding gabapentin and low dose Cymbalta  Will need rehab on discharge, [facility] Pedraza

## 2024-05-31 ENCOUNTER — APPOINTMENT (OUTPATIENT)
Dept: RADIOLOGY | Facility: HOSPITAL | Age: 64
DRG: 052 | End: 2024-05-31
Payer: MEDICARE

## 2024-05-31 LAB
ANION GAP SERPL CALCULATED.3IONS-SCNC: 6 MMOL/L (ref 4–13)
BASOPHILS # BLD AUTO: 0.06 THOUSANDS/ÂΜL (ref 0–0.1)
BASOPHILS NFR BLD AUTO: 1 % (ref 0–1)
BUN SERPL-MCNC: 17 MG/DL (ref 5–25)
CALCIUM SERPL-MCNC: 8.5 MG/DL (ref 8.4–10.2)
CHLORIDE SERPL-SCNC: 94 MMOL/L (ref 96–108)
CO2 SERPL-SCNC: 35 MMOL/L (ref 21–32)
CREAT SERPL-MCNC: 0.71 MG/DL (ref 0.6–1.3)
EOSINOPHIL # BLD AUTO: 0.46 THOUSAND/ÂΜL (ref 0–0.61)
EOSINOPHIL NFR BLD AUTO: 6 % (ref 0–6)
ERYTHROCYTE [DISTWIDTH] IN BLOOD BY AUTOMATED COUNT: 13 % (ref 11.6–15.1)
GFR SERPL CREATININE-BSD FRML MDRD: 98 ML/MIN/1.73SQ M
GLUCOSE SERPL-MCNC: 89 MG/DL (ref 65–140)
HCT VFR BLD AUTO: 50.1 % (ref 36.5–49.3)
HGB BLD-MCNC: 16 G/DL (ref 12–17)
IMM GRANULOCYTES # BLD AUTO: 0.1 THOUSAND/UL (ref 0–0.2)
IMM GRANULOCYTES NFR BLD AUTO: 1 % (ref 0–2)
LYMPHOCYTES # BLD AUTO: 1.26 THOUSANDS/ÂΜL (ref 0.6–4.47)
LYMPHOCYTES NFR BLD AUTO: 16 % (ref 14–44)
MCH RBC QN AUTO: 31.2 PG (ref 26.8–34.3)
MCHC RBC AUTO-ENTMCNC: 31.9 G/DL (ref 31.4–37.4)
MCV RBC AUTO: 98 FL (ref 82–98)
MONOCYTES # BLD AUTO: 0.86 THOUSAND/ÂΜL (ref 0.17–1.22)
MONOCYTES NFR BLD AUTO: 11 % (ref 4–12)
NEUTROPHILS # BLD AUTO: 5.17 THOUSANDS/ÂΜL (ref 1.85–7.62)
NEUTS SEG NFR BLD AUTO: 65 % (ref 43–75)
NRBC BLD AUTO-RTO: 0 /100 WBCS
PLATELET # BLD AUTO: 209 THOUSANDS/UL (ref 149–390)
PMV BLD AUTO: 9.8 FL (ref 8.9–12.7)
POTASSIUM SERPL-SCNC: 4.7 MMOL/L (ref 3.5–5.3)
RBC # BLD AUTO: 5.13 MILLION/UL (ref 3.88–5.62)
SODIUM SERPL-SCNC: 135 MMOL/L (ref 135–147)
WBC # BLD AUTO: 7.91 THOUSAND/UL (ref 4.31–10.16)

## 2024-05-31 PROCEDURE — 99232 SBSQ HOSP IP/OBS MODERATE 35: CPT | Performed by: NURSE PRACTITIONER

## 2024-05-31 PROCEDURE — 80048 BASIC METABOLIC PNL TOTAL CA: CPT | Performed by: INTERNAL MEDICINE

## 2024-05-31 PROCEDURE — 72146 MRI CHEST SPINE W/O DYE: CPT

## 2024-05-31 PROCEDURE — 97530 THERAPEUTIC ACTIVITIES: CPT

## 2024-05-31 PROCEDURE — 85025 COMPLETE CBC W/AUTO DIFF WBC: CPT | Performed by: INTERNAL MEDICINE

## 2024-05-31 RX ORDER — BACLOFEN 10 MG/1
10 TABLET ORAL 3 TIMES DAILY
Status: DISCONTINUED | OUTPATIENT
Start: 2024-05-31 | End: 2024-06-01

## 2024-05-31 RX ORDER — DIAZEPAM 5 MG/ML
2.5 INJECTION, SOLUTION INTRAMUSCULAR; INTRAVENOUS ONCE
Status: COMPLETED | OUTPATIENT
Start: 2024-05-31 | End: 2024-05-31

## 2024-05-31 RX ADMIN — DIAZEPAM 2 MG: 2 TABLET ORAL at 14:57

## 2024-05-31 RX ADMIN — MORPHINE SULFATE 2 MG: 2 INJECTION, SOLUTION INTRAMUSCULAR; INTRAVENOUS at 11:59

## 2024-05-31 RX ADMIN — ACETAMINOPHEN 975 MG: 325 TABLET, FILM COATED ORAL at 05:07

## 2024-05-31 RX ADMIN — OXYCODONE HYDROCHLORIDE 15 MG: 5 TABLET ORAL at 12:55

## 2024-05-31 RX ADMIN — DEXTRAN 70, GLYCERIN, HYPROMELLOSE 2 DROP: 1; 2; 3 SOLUTION/ DROPS OPHTHALMIC at 11:58

## 2024-05-31 RX ADMIN — TOBRAMYCIN AND DEXAMETHASONE 1 DROP: 3; 1 SUSPENSION/ DROPS OPHTHALMIC at 00:12

## 2024-05-31 RX ADMIN — LORAZEPAM 0.5 MG: 2 INJECTION INTRAMUSCULAR; INTRAVENOUS at 12:47

## 2024-05-31 RX ADMIN — BACLOFEN 10 MG: 10 TABLET ORAL at 08:55

## 2024-05-31 RX ADMIN — BACLOFEN 10 MG: 10 TABLET ORAL at 21:27

## 2024-05-31 RX ADMIN — OXYCODONE HYDROCHLORIDE 15 MG: 5 TABLET ORAL at 17:57

## 2024-05-31 RX ADMIN — MORPHINE SULFATE 2 MG: 2 INJECTION, SOLUTION INTRAMUSCULAR; INTRAVENOUS at 20:47

## 2024-05-31 RX ADMIN — BUSPIRONE HYDROCHLORIDE 5 MG: 5 TABLET ORAL at 08:55

## 2024-05-31 RX ADMIN — OXYCODONE HYDROCHLORIDE 10 MG: 10 TABLET ORAL at 09:26

## 2024-05-31 RX ADMIN — DIAZEPAM 2 MG: 2 TABLET ORAL at 23:21

## 2024-05-31 RX ADMIN — DEXTRAN 70, GLYCERIN, HYPROMELLOSE 2 DROP: 1; 2; 3 SOLUTION/ DROPS OPHTHALMIC at 15:49

## 2024-05-31 RX ADMIN — DIAZEPAM 2.5 MG: 5 INJECTION INTRAMUSCULAR; INTRAVENOUS at 10:08

## 2024-05-31 RX ADMIN — ACETAMINOPHEN 975 MG: 325 TABLET, FILM COATED ORAL at 21:27

## 2024-05-31 RX ADMIN — HYDROXYZINE HYDROCHLORIDE 25 MG: 25 TABLET, FILM COATED ORAL at 00:07

## 2024-05-31 RX ADMIN — MIDODRINE HYDROCHLORIDE 5 MG: 5 TABLET ORAL at 15:41

## 2024-05-31 RX ADMIN — OXYCODONE HYDROCHLORIDE 15 MG: 5 TABLET ORAL at 22:36

## 2024-05-31 RX ADMIN — ATORVASTATIN CALCIUM 20 MG: 20 TABLET, FILM COATED ORAL at 15:40

## 2024-05-31 RX ADMIN — BACLOFEN 10 MG: 10 TABLET ORAL at 15:41

## 2024-05-31 RX ADMIN — DIAZEPAM 2 MG: 2 TABLET ORAL at 05:07

## 2024-05-31 RX ADMIN — HYDROXYZINE HYDROCHLORIDE 25 MG: 25 TABLET, FILM COATED ORAL at 21:27

## 2024-05-31 RX ADMIN — ACETAMINOPHEN 975 MG: 325 TABLET, FILM COATED ORAL at 13:06

## 2024-05-31 RX ADMIN — ENOXAPARIN SODIUM 40 MG: 40 INJECTION SUBCUTANEOUS at 08:55

## 2024-05-31 RX ADMIN — OXYCODONE HYDROCHLORIDE 15 MG: 5 TABLET ORAL at 00:07

## 2024-05-31 RX ADMIN — MORPHINE SULFATE 2 MG: 2 INJECTION, SOLUTION INTRAMUSCULAR; INTRAVENOUS at 16:00

## 2024-05-31 RX ADMIN — OXYCODONE HYDROCHLORIDE 15 MG: 5 TABLET ORAL at 05:07

## 2024-05-31 RX ADMIN — BUSPIRONE HYDROCHLORIDE 5 MG: 5 TABLET ORAL at 15:40

## 2024-05-31 NOTE — ASSESSMENT & PLAN NOTE
Patient presents with rapidly worsening BLE spasticity and ambulatory dysfunction for 6 weeks  History of C3-4 SCI in 1998 due to a fall, s/p posterior decompression without fusion  Also h/o lumbar revision fusion in [facility], SCS placement 2019 at [facility]  Follows with physiatry at [facility], therapy 3x week  Also s/p botox injections in low back, quads, hamstrings end of February with no improvement (bilateral, 18 injections in total)  On presenting exam, BLE > BUE spasticity. Decreased sensation medial LUE from elbow to 5th finger. Significant BLE weakness, proximal > distal    Imaging:  MRI lumbar spine w/wo, 5/24/24: Study severely limited secondary to limited MRI protocol required for the patient's surgical hardware. The canal is widely patent. Foramina cannot be adequately evaluated secondary to extensive metallic hardware. Linear postcontrast enhancement along the dorsal dural surface at L4-5 is potentially postoperative scarring  MRI cervical spine w/o, 5/30/24: No change. Stable cord atrophy and myomalacia at C3-4. Decompressive laminectomies from C3-C6. Mild canal stenosis at C2-3 and C7-T1. Multilevel neuroforaminal stenosis most pronounced at C3-4   MRI thoracic spine w/o, 5/31/24: Final read pending. Per my interpretation no evidence of central stenosis / pathology causing cord compression    Plan:  Continue to monitor neurological exam  MRI lumbar spine without significant changes or findings; can consider CT myelogram outpatient if other imaging is negative  MRI cervical and thoracic spine without evidence of new or worsening cord compression  At this time there are no imaging findings to explain patient's worsening spasticity  Medical management per primary team  PT OT evaluation, mobilize as tolerated  DVT ppx: lovenox  No neurosurgical intervention is anticipated at this time.    Imaging reviewed in detail with Dr Noel. There are no findings indicative of new or worsening compression on imaging. The  patient's worsening spasticity is likely a result of his recent botox injections, and will hopefully improve over the course of time. I recommend admission to acute SCI rehab as soon as feasible to begin aggressive therapies to prevent further deconditioning.     Neurosurgery will sign off at this time. Follow up in our office as needed.

## 2024-05-31 NOTE — ASSESSMENT & PLAN NOTE
Beard catheter placed in the emergency room upon admission to [facility] on 5/16 for concern of urinary retention  Void trial as able

## 2024-05-31 NOTE — PLAN OF CARE
Problem: PHYSICAL THERAPY ADULT  Goal: Performs mobility at highest level of function for planned discharge setting.  See evaluation for individualized goals.  Description: Treatment/Interventions: ADL retraining, Functional transfer training, Therapeutic exercise, Endurance training, Cognitive reorientation, Patient/family training, Equipment eval/education, Bed mobility          See flowsheet documentation for full assessment, interventions and recommendations.  Outcome: Progressing  Note: Prognosis: Fair  Problem List: Decreased strength, Decreased range of motion, Decreased endurance, Impaired balance, Decreased mobility, Decreased coordination, Impaired sensation, Impaired tone, Obesity, Pain  Assessment: pt encountered for anticipated session consisting of LE mobiilty tasks, but pt unfortunately deferred due to upcoming phone meeting.  Time taken to discuss his current functional status, role of rehab, and various aspects of ongoing PT management of his situation.  Time taken to educate pt on POC & focus on restoring function to baseline mobiilty to maximize his quality of life & resume premorbid activities.  Time taken to answer questions regarding the same to pt's satisfaction.  LE exercises deferred, but will be resumed along with mobiilty as appropriate in upcoming PT sessions to promote return to PLOF.  PT POC & d/c recommendation remain appropriate at this time.        Rehab Resource Intensity Level, PT: I (Maximum Resource Intensity)    See flowsheet documentation for full assessment.

## 2024-05-31 NOTE — PROGRESS NOTES
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 Decreased sensation medial LUE from elbow to 5th finger. Significant BLE weakness, proximal > distal    Imaging:  MRI lumbar spine w/wo, 5/24/24: Study severely limited secondary to limited MRI protocol required for the patient's surgical hardware. The canal is widely patent. Foramina cannot be adequately evaluated secondary to extensive metallic hardware. Linear postcontrast enhancement along the dorsal dural surface at L4-5 is potentially postoperative scarring  MRI cervical spine w/o, 5/30/24: No change. Stable cord atrophy and myomalacia at C3-4. Decompressive laminectomies from C3-C6. Mild canal stenosis at C2-3 and C7-T1. Multilevel neuroforaminal stenosis most pronounced at C3-4   MRI thoracic spine w/o, 5/31/24: Final read pending. Per my interpretation no evidence of central stenosis / pathology causing cord compression    Plan:  Continue to monitor neurological exam  MRI lumbar spine without significant changes or findings; can consider CT myelogram outpatient if other imaging is negative  MRI cervical and thoracic spine without evidence of new or worsening cord compression  At this time there are no imaging findings to explain patient's worsening spasticity  Medical  "management per primary team  PT OT evaluation, mobilize as tolerated  DVT ppx: lovenox  No neurosurgical intervention is anticipated at this time.    Imaging reviewed in detail with Dr Noel. There are no findings indicative of new or worsening compression on imaging. The patient's worsening spasticity is likely a result of his recent botox injections, and will hopefully improve over the course of time. I recommend admission to acute SCI rehab as soon as feasible to begin aggressive therapies to prevent further deconditioning.     Neurosurgery will sign off at this time. Follow up in our office as needed.     S/P insertion of spinal cord stimulator  Assessment & Plan  Fair thoracic SCS paddle electrode, placed by Dr Brooks in 2019  Has been nonfunctional for several years as it provide no relief  Patient with remote in room             Subjective/Objective   Chief Complaint: \"My legs are still in spasm\"    Subjective: Patient with no acute events overnight.  He finally got his MRI thoracic spine today.  I spent time going over his MRI cervical and thoracic spine today.  We discussed that there is no new pathology that could be contributing to his worsening spasticity.  Overall his MRIs are stable compared to prior imaging.  From a neurosurgery standpoint, there is no surgery to be done at this time.  He is cleared to discharge to [facility] whenever he is medically cleared and there is a bed available.    Objective: Patient laying in bed no acute distress.  Vital signs stable.    Intake/Output                   05/31/24 0701 - 06/01/24 0700     [id number] Total              Intake    Total Intake -- -- --       Output    Urine  900  -- 900    Output (mL) (Urethral Catheter Latex;Straight-tip 16 Fr.) 900 -- 900    Total Output 900 -- 900       Net I/O     -900 -- -900            Invasive Devices       Peripheral Intravenous Line  Duration             Peripheral IV 05/29/24 Left;Upper;Ventral " "(anterior) Arm 1 day              Drain  Duration             Urethral Catheter Latex;Straight-tip 16 Fr. 15 days                    Vitals: Blood pressure 132/87, pulse 65, temperature 98 °F (36.7 °C), resp. rate 16, height 6' (1.829 m), weight 111 kg (244 lb 7.8 oz), SpO2 90%.,Body mass index is 33.16 kg/m².    General appearance: alert, appears stated age, cooperative and no distress  Head: Normocephalic, without obvious abnormality, atraumatic  Eyes: tracks appropriately, conjugate gaze  Neck: supple, symmetrical, trachea midline   Lungs: non labored breathing  Heart: regular heart rate  Neurologic:   Mental status: Alert, oriented, thought content appropriate, speech clear and fluent  Cranial nerves: grossly intact (Cranial nerves II-XII)  Sensory: normal to LT  Motor: increased tone BLE resulting in extremely limited mobility; strength difficult to assess    Lab Results: I have personally reviewed pertinent results.      Results from last 7 days   Lab Units 05/31/24  0503 05/28/24  0503 05/25/24  1041   WBC Thousand/uL 7.91 6.68 8.58   HEMOGLOBIN g/dL 16.0 17.2* 17.1*   HEMATOCRIT % 50.1* 54.1* 53.0*   PLATELETS Thousands/uL 209 229 223   SEGS PCT % 65 59 71   MONO PCT % 11 12 11   EOS PCT % 6 5 4     Results from last 7 days   Lab Units 05/31/24  0503 05/28/24  0649 05/25/24  1041   POTASSIUM mmol/L 4.7 4.4 4.4   CHLORIDE mmol/L 94* 98 96   CO2 mmol/L 35* 33* 33*   BUN mg/dL 17 11 14   CREATININE mg/dL 0.71 0.59* 0.56*   CALCIUM mg/dL 8.5 8.3* 9.0   ALK PHOS U/L  --  61  --    ALT U/L  --  22  --    AST U/L  --  17  --                  No results found for: \"TROPONINT\"  ABG:No results found for: \"PHART\", \"QUW9EJF\", \"PO2ART\", \"NXQ5BSX\", \"N3YKFBAM\", \"BEART\", \"SOURCE\"    Imaging Studies: I have personally reviewed pertinent reports.   and I have personally reviewed pertinent films in PACS    MRI cervical spine wo contrast    Result Date: 5/30/2024  Narrative: MRI CERVICAL SPINE WITHOUT CONTRAST INDICATION: " Progressive weakness and spasticity, remote h/o spinal cord injury. COMPARISON: MRI dated 5/3/2024 TECHNIQUE:  Multiplanar, multisequence imaging of the cervical spine was performed. . IMAGE QUALITY: Limited due to protocol modifications required by the patient's spinal cord stimulator and motion. FINDINGS: ALIGNMENT: Reversal of the normal cervical lordosis. No subluxation. Vertebral height maintained. Mild endplate degenerative changes. MARROW SIGNAL: No suspicious marrow signal abnormality. CERVICAL AND VISUALIZED THORACIC CORD: Stable focal severe cord atrophy and myomalacia at C3-4 PREVERTEBRAL AND PARASPINAL SOFT TISSUES:  Normal. VISUALIZED POSTERIOR FOSSA:  The visualized posterior fossa demonstrates no abnormal signal. CERVICAL DISC SPACES: C2-C3: Small disc bulge and uncovertebral hypertrophy. Mild canal stenosis and mild right foraminal stenosis. C3-C4: Disc osteophyte complex. Facet and uncovertebral hypertrophy. Decompressive laminectomy. Moderate to severe right and severe left foraminal stenosis. C4-C5: Disc osteophyte complex, facet and uncovertebral hypertrophy. Decompressive laminectomy. Moderate bilateral foraminal stenosis C5-C6: Disc osteophyte complex and uncovertebral hypertrophy. Decompressive laminectomy. Moderate foraminal stenosis. C6-C7: Disc osteophyte complex and uncovertebral hypertrophy. Decompressive laminectomy. Mild to moderate right and to mild left foraminal stenosis . C7-T1: Disc osteophyte complex and uncovertebral hypertrophy. Mild canal stenosis. Moderate foraminal stenosis UPPER THORACIC DISC SPACES:  Normal. OTHER FINDINGS:  None.     Impression: No change. Stable cord atrophy and myomalacia at C3-4. Decompressive laminectomies from C3-C6. Mild canal stenosis at C2-3 and C7-T1. Multilevel neuroforaminal stenosis most pronounced at C3-4. Workstation performed: [id number]     XR follow up    Result Date: 5/25/2024  Narrative: XR FOLLOW UP (NO CHARGE) INDICATION: Z96.89:  Presence of other specified functional implants. COMPARISON: None FINDINGS: Left flank subcutaneous spinal stimulator device with intact appearing leads extending into the lower thoracic canal. Partially image lumbosacral fusion hardware. Fractures of the right sided fixation daniel.     Impression: Intact spinal stimulator leads. Workstation performed: [id number]     MRI lumbar spine w wo contrast    Result Date: 5/24/2024  Narrative: MRI LUMBAR SPINE WITH AND WITHOUT CONTRAST INDICATION: Weakness. COMPARISON: Prior lumbar MRI May 3, 2024 TECHNIQUE:  Multiplanar, multisequence imaging of the lumbar spine was performed before and after gadolinium administration. . IV Contrast:  11 mL of Gadobutrol injection (SINGLE-DOSE) IMAGE QUALITY: Image protocol limited by requirements for imaging spinal cord stimulator device. FINDINGS: VERTEBRAL BODIES: Patient status post L2-S1 pedicle screw fixation. Posterior decompressive laminectomy defects are noted. No significant spondylolisthesis. Normal marrow signal is identified within the visualized bony structures.  No discrete marrow lesion. SACRUM:  Normal signal within the sacrum. No evidence of insufficiency or stress fracture. DISTAL CORD AND CONUS:  Normal size and signal within the distal cord and conus. PARASPINAL SOFT TISSUES:  Paraspinal soft tissues are unremarkable. LOWER THORACIC DISC SPACES:  Normal disc height and signal.  No disc herniation, canal stenosis or foraminal narrowing. LUMBAR DISC SPACES: No central stenosis identified. Foramina cannot be adequately evaluated secondary to extensive metallic hardware and imaging limitations regard to the patient's spinal cord stimulator. POSTCONTRAST IMAGING: There is postcontrast enhancement along the dural surface dorsally at the L4-5 level of uncertain etiology. Axial images not obtained through these levels secondary to limitations based on imaging protocol. The postcontrast enhancement may represent postoperative dural  enhancement/scarring. OTHER FINDINGS:  None.     Impression: Study severely limited secondary to limited MRI protocol required for the patient's surgical hardware. The canal is widely patent. Foramina cannot be adequately evaluated secondary to extensive metallic hardware. Linear postcontrast enhancement along the dorsal dural surface at L4-5 is potentially postoperative scarring Workstation performed: [id number]     Echo complete w/ contrast if indicated    Result Date: 5/20/2024  Narrative:   Left Ventricle: Left ventricular cavity size is normal. Wall thickness is normal. The left ventricular ejection fraction is 50%. Systolic function is low normal. Wall motion is normal. Diastolic function is mildly abnormal, consistent with grade I (abnormal) relaxation.   Aorta: The aortic root is mildly dilated. The aortic root is 3.90 cm.     CTA chest pe study    Result Date: 5/19/2024  Narrative: CTA - CHEST WITH IV CONTRAST - PULMONARY ANGIOGRAM INDICATION:   r/o PE. CP, tachycardia, limited mobility. Per my review of the medical record, history of quadriparesis due to spinal cord injury. Low-grade fever. Chest pain on deep inhalation last night. COMPARISON: Chest CT 5/4/2024, CXR 3/11/2024. TECHNIQUE: CT angiogram timed for optimal opacification of the pulmonary arteries.  Axial, sagittal, and coronal 2D reformats created from source data.  Coronal 3D MIP postprocessing on the acquisition scanner. Radiation dose length product (DLP):  491 mGy-cm .  Radiation dose exposure minimized using iterative reconstruction and automated exposure control. IV Contrast:  85 mL of iohexol (OMNIPAQUE) FINDINGS: PULMONARY ARTERIES: No definite acute pulmonary emboli but sensitivity is limited by moderate motion artifact and artifact from the patient's arms at his side. LUNGS: Mild septal thickening due to interstitial edema. Mild benign multifocal subsegmental atelectasis. Benign calcified granulomas. AIRWAYS: No significant filling  defects. Mucus in the distal trachea and right main bronchus. PLEURA:  Unremarkable. HEART/GREAT VESSELS: Mild cardiomegaly. Mild coronary artery calcification indicating atherosclerotic heart disease. MEDIASTINUM AND BHARAT:  Unremarkable. CHEST WALL AND LOWER NECK: Unremarkable. UPPER ABDOMEN: Redemonstration of moderate elevation of the right diaphragm.. Mild splenomegaly at 14 cm. OSSEOUS STRUCTURES: Moderate degenerative disease in the spine. Neurostimulator in the spinal canal.     Impression: No definite acute pulmonary emboli but sensitivity is limited by moderate motion artifact and artifact from the patient's arms at his side. Mild septal thickening due to interstitial edema. Mild splenomegaly at 14 cm. Workstation performed: [id number]     CT head without contrast    Result Date: 5/16/2024  Narrative: CT BRAIN - WITHOUT CONTRAST INDICATION:   Weakness. COMPARISON: CT head 4/30/2024 TECHNIQUE:  CT examination of the brain was performed.  Multiplanar 2D reformatted images were created from the source data. Radiation dose length product (DLP) for this visit:  892 mGy-cm .  This examination, like all CT scans performed in the [facility], was performed utilizing techniques to minimize radiation dose exposure, including the use of iterative reconstruction and automated exposure control. IMAGE QUALITY:  Diagnostic. FINDINGS: PARENCHYMA:  No intracranial mass, mass effect or midline shift. No CT signs of acute infarction.  No acute parenchymal hemorrhage. VENTRICLES AND EXTRA-AXIAL SPACES:  Normal for the patient's age. VISUALIZED ORBITS: Normal visualized orbits. PARANASAL SINUSES: Normal visualized paranasal sinuses. CALVARIUM AND EXTRACRANIAL SOFT TISSUES:  Normal.     Impression: No acute intracranial abnormality. Workstation performed: [id number]     MRI lumbar spine wo contrast    Result Date: 5/8/2024  Narrative: MRI LUMBAR SPINE WITHOUT CONTRAST INDICATION: R29.898: Other symptoms and signs  involving the musculoskeletal system. COMPARISON: MRI lumbar spine 1/18/2020 466 TECHNIQUE:  Multiplanar, multisequence imaging of the lumbar spine was performed. . IMAGE QUALITY:  Diagnostic FINDINGS: VERTEBRAL BODIES:  There are 5 lumbar type vertebral bodies. There is preserved normal lumbar lordosis. No significant spondylolisthesis.. Again noted postsurgical changes from prior posterior decompression with pedicle screws and connecting rods spanning L2-S1 (there is no pedicle screw in the right L2). There is interbody disc spacer at L4-5. Vertebral heights are grossly maintained. No apparent bone marrow signal abnormality. SACRUM:  Normal signal within the sacrum. No evidence of insufficiency or stress fracture. DISTAL CORD AND CONUS:  Normal size and signal within the distal cord and conus. PARASPINAL SOFT TISSUES: Postsurgical change in the paraspinal and subcutaneous soft tissue. LOWER THORACIC DISC SPACES: Minimal disc bulge at T11-12 without significant canal stenosis. LUMBAR DISC SPACES: L1-L2: No significant disc bulge. Moderate facet arthropathy. No canal stenosis. No high-grade foraminal stenosis. L2-L3: Stable postsurgical change. No canal stenosis. Cannot reliably evaluate neural foramina due to limited imaging and artifact from surgical hardware. L3-L4: Stable postsurgical change. No canal stenosis.Cannot reliably evaluate neural foramina due to limited imaging and artifact from surgical hardware. L4-L5: Stable postsurgical change. No canal stenosis.Cannot reliably evaluate neural foramina due to limited imaging and artifact from surgical hardware. L5-S1: Stable postsurgical change. No canal stenosis.Cannot reliably evaluate neural foramina due to limited imaging and artifact from surgical hardware. OTHER FINDINGS: Tiny well-defined T2 hyperintense lesions in the inferior spleen likely representing benign cysts.     Impression: MR protocol tailored to shorter scan time(limited PRISCILA) due to presence of  thoracic spinal cord stimulator. Suboptimal assessment due to limited images, and artifacts from surgical hardware and motion. Stable postsurgical appearance at levels L2-S1. Surgically patent canal. Cannot reliably evaluate neuroforamina due to limited images and artifact from surgical hardware. Workstation performed: [id number]     MRI cervical spine wo contrast    Result Date: 5/8/2024  Narrative: MRI CERVICAL SPINE WITHOUT CONTRAST INDICATION: R29.898: Other symptoms and signs involving the musculoskeletal system. COMPARISON: MRI cervical spine 8/3/2023 TECHNIQUE:  Multiplanar, multisequence imaging of the cervical spine was performed. . IMAGE QUALITY:  Diagnostic FINDINGS: ALIGNMENT: There is mild reversal of normal cervical lordosis. MARROW SIGNAL: Stable short-segment severe cord atrophy with myelomalacia related increased T2 signal at level C3-4. CERVICAL AND VISUALIZED THORACIC CORD:  Normal signal within the visualized cord. PREVERTEBRAL AND PARASPINAL SOFT TISSUES:  Normal. VISUALIZED POSTERIOR FOSSA:  The visualized posterior fossa demonstrates no abnormal signal. CERVICAL DISC SPACES: C2-C3: Minimal disc bulge. Bilateral uncovertebral spurring. Mild canal stenosis. Mild right foraminal narrowing. C3-C4: Changes of decompressive laminectomies. Disc bulge. Bilateral uncovertebral spurring. Bilateral facet arthropathy. Surgically patent canal. Moderate to severe left and moderate right foraminal stenosis. C4-C5:. Changes of decompressive laminectomies. Disc osteophyte complex. Bilateral uncovertebral spurring. Severe left and mild right facet arthropathy. Surgically patent canal. Moderate bilateral foraminal stenosis. C5-C6: Changes of decompressive laminectomies. Disc osteophyte complex and bilateral uncovertebral spurring. Mild facet arthropathy. Surgically patent canal. Moderate bilateral foraminal stenosis. C6-C7: Changes of decompressive laminectomies. Right eccentric disc osteophyte complex and  uncovertebral spurring. Surgically patent canal. Mild to moderate right and mild left foraminal stenosis. C7-T1: Disc osteophyte complex and uncovertebral spurring. Mild canal narrowing. Moderate right foraminal narrowing. UPPER THORACIC DISC SPACES:  Normal. OTHER FINDINGS:  None.     Impression: Somewhat limited exam related to a tailored protocol due to spinal cord stimulator. 1.  Stable short segment severe cord atrophy with myelomalacia related signal abnormality at level C3-4. 2.  Redemonstrated prior decompressive laminectomies at levels C3-C6. 3.  Mild degenerative canal narrowing at level C7-T1. Surgically patent canal in the remainder levels. 4.  Multilevel foraminal stenosis as described; moderate to severe at left C3-4. Workstation performed: [id number]      VAS VENOUS DUPLEX - LOWER LIMB BILATERAL    Result Date: 5/5/2024  Narrative:  [facility] REPORT CLINICAL: Indications: Patient presents with bilateral lower extremity pain and discoloration x 4 days. Operative History: Patient denies any cardiovascular procedures. Risk Factors The patient has history of Obesity.   CONCLUSION:  Impression: RIGHT LOWER LIMB: No evidence of acute or chronic deep vein thrombosis. No evidence of superficial thrombophlebitis noted. Doppler evaluation shows a normal response to augmentation maneuvers.. Popliteal, posterior tibial and anterior tibial arterial Doppler waveform's are triphasic.  LEFT LOWER LIMB: No evidence of acute or chronic deep vein thrombosis. No evidence of superficial thrombophlebitis noted. Doppler evaluation shows a normal response to augmentation maneuvers. Popliteal, posterior tibial and anterior tibial arterial Doppler waveform's are triphasic.  Technical findings were given to Gonzalez Greer.  SIGNATURE: Electronically Signed by: CELESTINO DECKER DO, RPVI on 2024-05-05 01:30:15 PM    PE Study with CT Abdomen and Pelvis with contrast    Result Date: 5/4/2024  Narrative: CT PULMONARY  ANGIOGRAM OF THE CHEST AND CT ABDOMEN AND PELVIS WITH INTRAVENOUS CONTRAST INDICATION: dyspnea, leg swelling. COMPARISON: Multiple priors most recently 3/11/2024 TECHNIQUE: CT examination of the chest, abdomen and pelvis was performed. Thin section CT angiographic technique was used in the chest in order to evaluate for pulmonary embolus and coronal 3D MIP postprocessing was performed on the acquisition scanner. Multiplanar 2D reformatted images were created from the source data. This examination, like all CT scans performed in the [facility], was performed utilizing techniques to minimize radiation dose exposure, including the use of iterative reconstruction and automated exposure control. Radiation dose length product (DLP) for this visit: 1782.38 mGy-cm IV Contrast: 100 mL of iohexol (OMNIPAQUE) Enteric Contrast: Not administered. FINDINGS: CHEST PULMONARY ARTERIAL TREE: No pulmonary embolus is seen. LUNGS: Elevation of the right hemidiaphragm with resultant partial right middle and lower lobe atelectasis. PLEURA: Unremarkable. HEART/AORTA: Atherosclerotic coronary artery calcification. No thoracic aortic aneurysm. MEDIASTINUM AND BHARAT: Few small partially calcified lymph nodes, compatible with sequela of granulomatous disease CHEST WALL AND LOWER NECK: Unremarkable. ABDOMEN LIVER/BILIARY TREE: Unremarkable. GALLBLADDER: No calcified gallstones. No pericholecystic inflammatory change. SPLEEN: Unremarkable. PANCREAS: Unremarkable. ADRENAL GLANDS: Unremarkable. KIDNEYS/URETERS: Simple renal cyst(s). Otherwise unremarkable kidneys. No hydronephrosis. STOMACH AND BOWEL: Colonic diverticulosis with a small focus of inflammatory change in the left lower quadrant (2, 409) APPENDIX: Normal appendix. ABDOMINOPELVIC CAVITY: No ascites. No pneumoperitoneum. No lymphadenopathy. VESSELS: Infrarenal abdominal aortic aneurysm measuring up to 3.2 cm. Aneurysmal dilation of the common iliac arteries, measuring up  to 2.5 cm on the left and 2 cm on the right. PELVIS REPRODUCTIVE ORGANS: Unremarkable for patient's age. URINARY BLADDER: Unremarkable. ABDOMINAL WALL/INGUINAL REGIONS: Left flank spinal stimulator. BONES: No acute fracture or suspicious osseous lesion. Spinal degenerative changes. Posterior lumbosacral fusion and decompression. Screws stabilizing both femoral necks. Hemilaminectomies of the upper cervical spine.     Impression: No acute pulmonary embolus. Possible mild acute sigmoid diverticulitis without complication 3.2 cm infrarenal abdominal aortic aneurysm. Bilateral common iliac artery aneurysms. The study was marked in EPIC for immediate notification. Workstation performed: [id number]     EKG, Pathology, and Other Studies: I have personally reviewed pertinent reports.      VTE Pharmacologic Prophylaxis: Enoxaparin (Lovenox)    VTE Mechanical Prophylaxis: sequential compression device

## 2024-05-31 NOTE — ASSESSMENT & PLAN NOTE
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 Also hx of lumbar revision fusion in 2016 at outside hospital and SCS placement 2019 at outside hospital. Presented to  [facility] with progressive weakness after recent Botox injection. With significant decline in function over the past few weeks.  Initially thought to be possibly related to  serotoninergic medications, Cymbalta and BuSpar or progression of his disease, per neurology evaluation while at [facility] (5/17).  Neuro also advised holding off on any further botox injections for at least the next several months to see if patient regains any additional strength.   Cymbalta discontinued.  BuSpar restarted at a lower dose  Patient needed MRIs of spine however due to SCS, needed to be transferred from [facility] to [facility]  MRI L spine done on 5/24, limited study due to artifact  MRI C spine showed stable cord atrophy and myomalacia at C3/4. Decompressive laminectomies from C3-C6. Mild canal stenosis at C2-3 and C7-T1. Multilevel neuroforaminal stenosis most pronounced at C3-4.   T spine still pending, (requires rep for SCS), have escalated delay to    Neurosurgery following, input appreciated   Continue pain control with scheduled tylenol,  PRN oxycodone and IV morphine for breakthrough (patient prefers morphine)  Due to worsening spasms on 5/27, transitioned robaxin to baclofen with PRN Valium, doing well on this regimen.   Offered to trial rotating opioids, patient refused   PMR consulted for additional recs. Will need rehab on discharge, [facility]

## 2024-05-31 NOTE — PLAN OF CARE
Problem: PAIN - ADULT  Goal: Verbalizes/displays adequate comfort level or baseline comfort level  Description: Interventions:  - Encourage patient to monitor pain and request assistance  - Assess pain using appropriate pain scale  - Administer analgesics based on type and severity of pain and evaluate response  - Implement non-pharmacological measures as appropriate and evaluate response  - Consider cultural and social influences on pain and pain management  - Notify physician/advanced practitioner if interventions unsuccessful or patient reports new pain  Outcome: Progressing     Problem: INFECTION - ADULT  Goal: Absence or prevention of progression during hospitalization  Description: INTERVENTIONS:  - Assess and monitor for signs and symptoms of infection  - Monitor lab/diagnostic results  - Monitor all insertion sites, i.e. indwelling lines, tubes, and drains  - Monitor endotracheal if appropriate and nasal secretions for changes in amount and color  - [place] appropriate cooling/warming therapies per order  - Administer medications as ordered  - Instruct and encourage patient and family to use good hand hygiene technique  - Identify and instruct in appropriate isolation precautions for identified infection/condition  Outcome: Progressing

## 2024-05-31 NOTE — ASSESSMENT & PLAN NOTE
Dating back few months ago Hgb high 16 range, upon admission to [facility] was 18k  Continue to monitor

## 2024-05-31 NOTE — PLAN OF CARE
Problem: SAFETY ADULT  Goal: Patient will remain free of falls  Description: INTERVENTIONS:  - Educate patient/family on patient safety including physical limitations  - Instruct patient to call for assistance with activity   - Consult OT/PT to assist with strengthening/mobility   - Keep Call bell within reach  - Keep bed low and locked with side rails adjusted as appropriate  - Keep care items and personal belongings within reach  - Initiate and maintain comfort rounds  - Make Fall Risk Sign visible to staff    - Apply yellow socks and bracelet for high fall risk patients  - Consider moving patient to room near nurses station  Outcome: Progressing     Problem: PAIN - ADULT  Goal: Verbalizes/displays adequate comfort level or baseline comfort level  Description: Interventions:  - Encourage patient to monitor pain and request assistance  - Assess pain using appropriate pain scale  - Administer analgesics based on type and severity of pain and evaluate response  - Implement non-pharmacological measures as appropriate and evaluate response  - Consider cultural and social influences on pain and pain management  - Notify physician/advanced practitioner if interventions unsuccessful or patient reports new pain  Outcome: Progressing     Problem: Prexisting or High Potential for Compromised Skin Integrity  Goal: Skin integrity is maintained or improved  Description: INTERVENTIONS:  - Identify patients at risk for skin breakdown  - Assess and monitor skin integrity  - Assess and monitor nutrition and hydration status  - Monitor labs   - Assess for incontinence   - Turn and reposition patient  - Assist with mobility/ambulation  - Relieve pressure over bony prominences  - Avoid friction and shearing  - Provide appropriate hygiene as needed including keeping skin clean and dry  - Evaluate need for skin moisturizer/barrier cream  - Collaborate with interdisciplinary team   - Patient/family teaching  - Consider wound care  consult   Outcome: Progressing

## 2024-05-31 NOTE — CASE MANAGEMENT
Case Management Discharge Planning Note    Patient name Jos Hawk  [facility] MRN [id number]  : 1960 Date 2024       Current Admission Date: 2024  Current Admission Diagnosis:Quadriparesis (HCC)   Patient Active Problem List    Diagnosis Date Noted Date Diagnosed    Erythrocytosis 2024     Constipation 2024     Hyponatremia 2024     Urinary retention 2024     Neurogenic orthostatic hypotension (HCC) 2024     Septic olecranon bursitis 2023     Myelomalacia of cervical cord (HCC) 2022     Quadriparesis (HCC) 10/19/2022     Weakness of right lower extremity 2022     Status post lumbar spinal fusion 2021     S/P insertion of spinal cord stimulator 06/10/2021     Hydrocele of testis 2020     Chest discomfort 2020     Cauda equina syndrome (HCC) 2018     Spinal stenosis of thoracolumbar region 2018     Erectile dysfunction 2018     Hypogonadism in male 2018     BPH associated with nocturia 2018       LOS (days): 8  Geometric Mean LOS (GMLOS) (days): 4.6  Days to GMLOS:-3     OBJECTIVE:  Risk of Unplanned Readmission Score: 17.63     Current admission status: Inpatient   Preferred Pharmacy:   [id number] [place] [id number] [address] [address]  Phone: [phone] Fax: [phone]    [place], [place] - [address]  [address]  [facility] [address]  Phone: [phone] Fax: [phone]    Primary Care Provider: Demarco Tirado DO    Primary Insurance: MEDICARE  Secondary Insurance: [employer]    DISCHARGE DETAILS:    Other Referral/Resources/Interventions Provided:  Referral Comments: pt pending MRI, possibly stable for D/C over the weekend, messege sent to [facility] via aidin, inquiring if they can accept.

## 2024-05-31 NOTE — PROGRESS NOTES
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 Also hx of lumbar revision fusion in 2016 at outside hospital and SCS placement 2019 at outside hospital. Presented to [facility] with progressive weakness after recent Botox injection. With significant decline in function over the past few weeks.  Initially thought to be possibly related to  serotoninergic medications, Cymbalta and BuSpar or progression of his disease, per neurology evaluation while at [facility] (5/17).  Neuro also advised holding off on any further botox injections for at least the next several months to see if patient regains any additional strength.   Cymbalta discontinued.  BuSpar restarted at a lower dose  Patient needed MRIs of spine however due to SCS, needed to be transferred from [facility] to [facility]  MRI L spine done on 5/24, limited study due to artifact  MRI C spine showed stable cord atrophy and myomalacia at C3/4. Decompressive laminectomies from C3-C6. Mild canal stenosis at C2-3 and C7-T1. Multilevel neuroforaminal stenosis most pronounced at C3-4.   T spine still pending, (requires rep for SCS), have escalated delay to    Neurosurgery following, input appreciated   Continue pain control with scheduled tylenol,  PRN oxycodone and IV morphine for breakthrough (patient prefers morphine)  Due to worsening spasms on 5/27, transitioned robaxin to baclofen with PRN Valium, doing well on this regimen.   Offered to trial rotating opioids, patient refused   PMR consulted for additional recs. Will need rehab on discharge, [facility]    S/P insertion of spinal cord stimulator  Assessment & Plan  Noted.    Neurogenic  orthostatic hypotension (HCC)  Assessment & Plan  Continue midodrine 5 mg 3 times daily    Hyponatremia  Assessment & Plan  Chronic, baseline appears around 131-133, stable.   Can monitor PRN    Erythrocytosis  Assessment & Plan  Dating back few months ago Hgb high 16 range, upon admission to [facility] was 18k  Continue to monitor    Constipation  Assessment & Plan  Likely 2/2 narcotics  Continue bowel regimen and monitor stool output     BPH associated with nocturia  Assessment & Plan  Beard catheter placed in the emergency room upon admission to [facility] on 5/16 for concern of urinary retention  Void trial as able            VTE Pharmacologic Prophylaxis:   Moderate Risk (Score 3-4) - Pharmacological DVT Prophylaxis Ordered: enoxaparin (Lovenox).    Mobility:   Basic Mobility Inpatient Raw Score: 7  JH-HLM Goal: 2: Bed activities/Dependent transfer  JH-HLM Achieved: 2: Bed activities/Dependent transfer  JH-HLM Goal achieved. Continue to encourage appropriate mobility.    Patient Centered Rounds: I performed bedside rounds with nursing staff today.   Discussions with Specialists or Other Care Team Provider: nursing, case management, nurse manager     Education and Discussions with Family / Patient: Patient declined call to .     Total Time Spent on Date of Encounter in care of patient: 25 mins. This time was spent on one or more of the following: performing physical exam; counseling and coordination of care; obtaining or reviewing history; documenting in the medical record; reviewing/ordering tests, medications or procedures; communicating with other healthcare professionals and discussing with patient's family/caregivers.    Current Length of Stay: 8 day(s)  Current Patient Status: Inpatient   Certification Statement: The patient will continue to require additional inpatient hospital stay due to MRI thoracic spine, discharge planning   Discharge Plan: Anticipate discharge in 24-48 hrs to rehab facility.    Code  Status: Level 1 - Full Code    Subjective:   Continues to complain of bilateral leg spasms. Otherwise, no complaints.     Objective:     Vitals:   Temp (24hrs), Av.4 °F (36.9 °C), Min:98 °F (36.7 °C), Max:98.6 °F (37 °C)    Temp:  [98 °F (36.7 °C)-98.6 °F (37 °C)] 98.6 °F (37 °C)  HR:  [72-96] 84  Resp:  [16] 16  BP: (118-162)/(80-98) 118/80  SpO2:  [86 %-93 %] 92 %  Body mass index is 33.16 kg/m².     Input and Output Summary (last 24 hours):     Intake/Output Summary (Last 24 hours) at 2024 1020  Last data filed at 2024 0501  Gross per 24 hour   Intake 708 ml   Output 2350 ml   Net -1642 ml       Physical Exam:   Physical Exam  Vitals and nursing note reviewed.   Constitutional:       General: He is not in acute distress.     Appearance: He is obese.   Cardiovascular:      Rate and Rhythm: Normal rate.   Pulmonary:      Effort: No respiratory distress.   Abdominal:      Tenderness: There is no abdominal tenderness.   Musculoskeletal:         General: Swelling present.      Comments: Generalized weakness   Skin:     General: Skin is warm.   Neurological:      Mental Status: He is alert and oriented to person, place, and time. Mental status is at baseline.   Psychiatric:         Mood and Affect: Mood normal.          Additional Data:     Labs:  Results from last 7 days   Lab Units 24  0503   WBC Thousand/uL 7.91   HEMOGLOBIN g/dL 16.0   HEMATOCRIT % 50.1*   PLATELETS Thousands/uL 209   SEGS PCT % 65   LYMPHO PCT % 16   MONO PCT % 11   EOS PCT % 6     Results from last 7 days   Lab Units 24  0503 24  0649   SODIUM mmol/L 135 138   POTASSIUM mmol/L 4.7 4.4   CHLORIDE mmol/L 94* 98   CO2 mmol/L 35* 33*   BUN mg/dL 17 11   CREATININE mg/dL 0.71 0.59*   ANION GAP mmol/L 6 7   CALCIUM mg/dL 8.5 8.3*   ALBUMIN g/dL  --  3.3*   TOTAL BILIRUBIN mg/dL  --  0.45   ALK PHOS U/L  --  61   ALT U/L  --  22   AST U/L  --  17   GLUCOSE RANDOM mg/dL 89 91                       Lines/Drains:  Invasive  Devices       Peripheral Intravenous Line  Duration             Peripheral IV 05/29/24 Left;Upper;Ventral (anterior) Arm 1 day              Drain  Duration             Urethral Catheter Latex;Straight-tip 16 Fr. 14 days                  Urinary Catheter:  Goal for removal: Voiding trial when ambulation improves               Imaging: Reviewed radiology reports from this admission including: MRI spine    Recent Cultures (last 7 days):         Last 24 Hours Medication List:   Current Facility-Administered Medications   Medication Dose Route Frequency Provider Last Rate    acetaminophen  975 mg Oral Q8H NICK Shahla Naidu PA-C      Artificial Tears  2 drop Both Eyes TID STACEY Sanchez      atorvastatin  20 mg Oral Daily With Dinner Bharti Jensen MD      baclofen  10 mg Oral TID STACEY Sanchez      bisacodyl  10 mg Rectal Daily PRN Bharti Jensen MD      busPIRone  5 mg Oral BID Bharti Jensen MD      diazepam  2 mg Oral Q6H PRN STACEY Crowley      enoxaparin  40 mg Subcutaneous Daily Bharti Jensen MD      hydrOXYzine HCL  25 mg Oral HS PRN Bharti Jensen MD      LORazepam  0.5 mg Intravenous Daily PRN Shahla Naidu PA-C      midodrine  5 mg Oral TID AC Bharti Jensen MD      morphine injection  2 mg Intravenous Q4H PRN Bharti Jensen MD      oxyCODONE  10 mg Oral Q4H PRN Shahla Naidu PA-C      Or    oxyCODONE  15 mg Oral Q4H PRN Shahla Naidu PA-C          Today, Patient Was Seen By: STACEY Sanchez    **Please Note: This note may have been constructed using a voice recognition system.**

## 2024-05-31 NOTE — PHYSICAL THERAPY NOTE
Physical Therapy Progress Note     05/31/24 1530   PT Last Visit   PT Visit Date 05/31/24   Note Type   Note Type Treatment   Pain Assessment   Pain Score No Pain   Restrictions/Precautions   Other Precautions Pain;Fall Risk   Subjective   Subjective pt encountered supine in bed, reports no new complaints.   Assessment   Prognosis Fair   Problem List Decreased strength;Decreased range of motion;Decreased endurance;Impaired balance;Decreased mobility;Decreased coordination;Impaired sensation;Impaired tone;Obesity;Pain   Assessment pt encountered for anticipated session consisting of LE mobiilty tasks, but pt unfortunately deferred due to upcoming phone meeting.  Time taken to discuss his current functional status, role of rehab, and various aspects of ongoing PT management of his situation.  Time taken to educate pt on POC & focus on restoring function to baseline mobiilty to maximize his quality of life & resume premorbid activities.  Time taken to answer questions regarding the same to pt's satisfaction.  LE exercises deferred, but will be resumed along with mobiilty as appropriate in upcoming PT sessions to promote return to PLOF.  PT POC & d/c recommendation remain appropriate at this time.   Goals   Patient Goals to be able to walk again   STG Expiration Date 06/07/24   PT Treatment Day 3   Plan   Treatment/Interventions Functional transfer training;LE strengthening/ROM;Therapeutic exercise;Endurance training;Patient/family training;Equipment eval/education;Bed mobility   Progress Progressing toward goals   PT Frequency 3-5x/wk   Discharge Recommendation   Rehab Resource Intensity Level, PT I (Maximum Resource Intensity)   Equipment Recommended   (pt owns DME)   AM-PAC Basic Mobility Inpatient   Turning in Flat Bed Without Bedrails 2   Lying on Back to Sitting on Edge of Flat Bed Without Bedrails 1   Moving Bed to Chair 1   Standing Up From Chair Using Arms 1   Walk in Room 1   Climb 3-5 Stairs With Railing 1    Basic Mobility Inpatient Raw Score 7   Turning Head Towards Sound 4   Follow Simple Instructions 4   Low Function Basic Mobility Raw Score  15   Low Function Basic Mobility Standardized Score  23.9   [facility] Highest Level Of Mobility   -HLM Goal 2: Bed activities/Dependent transfer   -HL Achieved 1: Laying in bed       Nabor Bennett PTA    An [facility] Basic Mobility Raw Score less than 17 suggests pt would benefit from post acute rehab.  Please also refer to the recommendation of the Physical Therapist for safe discharge planning.

## 2024-06-01 PROCEDURE — 99232 SBSQ HOSP IP/OBS MODERATE 35: CPT | Performed by: NURSE PRACTITIONER

## 2024-06-01 PROCEDURE — 97535 SELF CARE MNGMENT TRAINING: CPT

## 2024-06-01 RX ORDER — BACLOFEN 10 MG/1
15 TABLET ORAL 3 TIMES DAILY
Status: CANCELLED | OUTPATIENT
Start: 2024-06-01

## 2024-06-01 RX ORDER — BACLOFEN 10 MG/1
15 TABLET ORAL 3 TIMES DAILY
Status: DISCONTINUED | OUTPATIENT
Start: 2024-06-01 | End: 2024-06-01

## 2024-06-01 RX ORDER — BISACODYL 10 MG
10 SUPPOSITORY, RECTAL RECTAL DAILY
Status: DISCONTINUED | OUTPATIENT
Start: 2024-06-01 | End: 2024-06-03 | Stop reason: HOSPADM

## 2024-06-01 RX ORDER — BACLOFEN 10 MG/1
15 TABLET ORAL 3 TIMES DAILY
Status: DISCONTINUED | OUTPATIENT
Start: 2024-06-01 | End: 2024-06-03

## 2024-06-01 RX ORDER — OXYCODONE HYDROCHLORIDE 10 MG/1
20 TABLET ORAL EVERY 4 HOURS PRN
Status: DISCONTINUED | OUTPATIENT
Start: 2024-06-01 | End: 2024-06-03 | Stop reason: HOSPADM

## 2024-06-01 RX ADMIN — MORPHINE SULFATE 2 MG: 2 INJECTION, SOLUTION INTRAMUSCULAR; INTRAVENOUS at 08:31

## 2024-06-01 RX ADMIN — OXYCODONE HYDROCHLORIDE 20 MG: 10 TABLET ORAL at 11:02

## 2024-06-01 RX ADMIN — MORPHINE SULFATE 2 MG: 2 INJECTION, SOLUTION INTRAMUSCULAR; INTRAVENOUS at 13:04

## 2024-06-01 RX ADMIN — ACETAMINOPHEN 975 MG: 325 TABLET, FILM COATED ORAL at 13:04

## 2024-06-01 RX ADMIN — BUSPIRONE HYDROCHLORIDE 5 MG: 5 TABLET ORAL at 08:32

## 2024-06-01 RX ADMIN — ATORVASTATIN CALCIUM 20 MG: 20 TABLET, FILM COATED ORAL at 16:40

## 2024-06-01 RX ADMIN — BACLOFEN 10 MG: 10 TABLET ORAL at 05:00

## 2024-06-01 RX ADMIN — BACLOFEN 15 MG: 10 TABLET ORAL at 13:04

## 2024-06-01 RX ADMIN — OXYCODONE HYDROCHLORIDE 15 MG: 5 TABLET ORAL at 04:55

## 2024-06-01 RX ADMIN — BUSPIRONE HYDROCHLORIDE 5 MG: 5 TABLET ORAL at 16:40

## 2024-06-01 RX ADMIN — DEXTRAN 70, GLYCERIN, HYPROMELLOSE 2 DROP: 1; 2; 3 SOLUTION/ DROPS OPHTHALMIC at 15:46

## 2024-06-01 RX ADMIN — OXYCODONE HYDROCHLORIDE 20 MG: 10 TABLET ORAL at 15:45

## 2024-06-01 RX ADMIN — DIAZEPAM 2 MG: 2 TABLET ORAL at 09:42

## 2024-06-01 RX ADMIN — DEXTRAN 70, GLYCERIN, HYPROMELLOSE 2 DROP: 1; 2; 3 SOLUTION/ DROPS OPHTHALMIC at 08:33

## 2024-06-01 RX ADMIN — ACETAMINOPHEN 975 MG: 325 TABLET, FILM COATED ORAL at 23:06

## 2024-06-01 RX ADMIN — ACETAMINOPHEN 975 MG: 325 TABLET, FILM COATED ORAL at 05:00

## 2024-06-01 RX ADMIN — BACLOFEN 15 MG: 10 TABLET ORAL at 20:15

## 2024-06-01 RX ADMIN — DIAZEPAM 2 MG: 2 TABLET ORAL at 16:40

## 2024-06-01 RX ADMIN — OXYCODONE HYDROCHLORIDE 20 MG: 10 TABLET ORAL at 20:14

## 2024-06-01 RX ADMIN — ENOXAPARIN SODIUM 40 MG: 40 INJECTION SUBCUTANEOUS at 08:32

## 2024-06-01 RX ADMIN — DIAZEPAM 2 MG: 2 TABLET ORAL at 23:06

## 2024-06-01 NOTE — ASSESSMENT & PLAN NOTE
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 Also hx of lumbar revision fusion in 2016 at outside hospital and SCS placement 2019 at outside hospital. Presented to  [facility] with progressive weakness after recent Botox injection. With significant decline in function over the past few weeks.  Patient needed MRIs of spine however due to SCS, needed to be transferred from [facility] to [facility]  MRI L spine done on 5/24, limited study due to artifact  MRI C spine showed stable cord atrophy and myomalacia at C3/4. Decompressive laminectomies from C3-C6. Mild canal stenosis at C2-3 and C7-T1. Multilevel neuroforaminal stenosis most pronounced at C3-4.   MRI T spine Stable treatment related changes status post spinal cord stimulator lead placement T9-T10. Minimal spondylosis without cord compression or cord signal abnormality. There is no focal disk herniation, central canal or neural foraminal narrowing.   Neurosurgery now signed off. There are no findings indicative of new or worsening compression on imaging. The patient's worsening spasticity is likely a result of his recent botox injections, and will hopefully improve over the course of time   Per neurology, Cymbalta discontinued.  BuSpar restarted at a lower dose.  Main complaint at this time continues to be extreme spasticity of his lower extremities which is resulting in significant pain.  Will increase baclofen to 15 mg 3 times daily and continue with as needed Valium.  Will also increase his oxycodone to 15 to 20 mg as needed for moderate to severe pain.  Will wean off IV morphine. Could possibly trial gabapentin (ineffective for him in the past) but patient wanted to hold off for now but would reconsider in the future.   PMR consulted for additional recs. Will need rehab on discharge, [facility]. CM following.

## 2024-06-01 NOTE — ASSESSMENT & PLAN NOTE
Dating back few months ago Hgb high 16 range, upon admission to [facility] was 18k  Monitor as needed

## 2024-06-01 NOTE — PLAN OF CARE
Problem: OCCUPATIONAL THERAPY ADULT  Goal: Performs self-care activities at highest level of function for planned discharge setting.  See evaluation for individualized goals.  Description: Treatment Interventions: ADL retraining, Functional transfer training, UE strengthening/ROM, Endurance training, Patient/family training, Equipment evaluation/education, Compensatory technique education, Activityengagement          See flowsheet documentation for full assessment, interventions and recommendations.   Outcome: Progressing  Note: Limitation: Decreased ADL status, Decreased UE strength, Decreased endurance, Decreased sensation, Decreased fine motor control, Decreased self-care trans, Decreased high-level ADLs  Prognosis: Fair  Assessment: Pt seen for Occupational Therapy session with focus on activity tolerance for pt engagement in UB self-care tasks/self-feeding. Pt cleared by RN/Marcia for pt participated in OT session. Pt presented supine/HOB raised pt awake/alert and agreeable to participate in therapy following pt identifiers confirmed. Pt reported his therapy goal to go to rehab at [facility]. Pt reported that nursing staff have been feeding him and discussed strategies for self-feeding with therapist. Pt agreeable to trial self-feeding and was able to complete independently with increased time allowed this session.   Pt remains appropriate for I (Maximum Resource Intensity). Pt remained in bed post session bed alarm activated and all needs within reach.     Rehab Resource Intensity Level, OT: I (Maximum Resource Intensity)

## 2024-06-01 NOTE — OCCUPATIONAL THERAPY NOTE
"  Occupational Therapy Progress Note     Patient Name: Jos Hawk  Today's Date: 6/1/2024  Problem List  Principal Problem:    Quadriparesis (HCC)  Active Problems:    BPH associated with nocturia    S/P insertion of spinal cord stimulator    Neurogenic orthostatic hypotension (HCC)    Hyponatremia    Erythrocytosis    Constipation         Occupational Therapy Treatment Note     06/01/24 1300   OT Last Visit   OT Visit Date 06/01/24   Note Type   Note Type Treatment   Pain Assessment   Pain Assessment Tool 0-10   Restrictions/Precautions   Weight Bearing Precautions Per Order No   Braces or Orthoses   (pt prefers to utilize his gait belt from home for transfers)   Lifestyle   [facility] aides assist with all aspects of adls, reports he was able to walk short distances with a walker in past but has become more reliant on w/c recently- home aides assist with all iadls   Reciprocal Relationships supportive family and caregivers   Service to Others 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   Intrinsic Gratification enjoys going to oupt and working in pool   ADL   Eating Assistance 5  Supervision/Setup   Eating Deficit Setup;Increased time to complete;Bringing food to mouth assist   Eating Comments pt was offered built up handle howerver declined due to pt wishing to increased ahdn strength   Grooming Assistance 5  Supervision/Setup   UB Bathing Assistance 4  Minimal Assistance   LB Bathing Assistance 2  Maximal Assistance   UB Dressing Assistance 3  Moderate Assistance   LB Dressing Assistance 2  Maximal Assistance   Bed Mobility   Rolling R 2  Maximal assistance   Rolling L 2  Maximal assistance   Supine to Sit 2  Maximal assistance   Sit to Supine 2  Maximal assistance   Subjective   Subjective pt stated \" You might want to come back later, I am having spasms\"   Cognition   Overall Cognitive Status WFL   Arousal/Participation Alert   Attention Within functional limits   Orientation Level Oriented X4 "   Memory Within functional limits   Following Commands Follows all commands and directions without difficulty   Activity Tolerance   Activity Tolerance Patient tolerated treatment well;Patient limited by pain;Treatment limited secondary to medical complications (Comment);Other (Comment)  (fear of falling, (+) dizziness)   Assessment   Assessment Pt seen for Occupational Therapy session with focus on activity tolerance for pt engagement in UB self-care tasks/self-feeding. Pt cleared by RN/Marcia for pt participated in OT session. Pt presented supine/HOB raised pt awake/alert and agreeable to participate in therapy following pt identifiers confirmed. Pt reported his therapy goal to go to rehab at [facility]. Pt reported that nursing staff have been feeding him and discussed strategies for self-feeding with therapist. Pt agreeable to trial self-feeding and was able to complete independently with increased time allowed this session.   Pt remains appropriate for I (Maximum Resource Intensity). Pt remained in bed post session bed alarm activated and all needs within reach.   Plan   Treatment Interventions ADL retraining   Goal Expiration Date 06/07/24   Discharge Recommendation   Rehab Resource Intensity Level, OT I (Maximum Resource Intensity)   AM-PAC Daily Activity Inpatient   Lower Body Dressing 2   Bathing 3   Toileting 2   Upper Body Dressing 2   Grooming 3   Eating 2   Daily Activity Raw Score 14   Daily Activity Standardized Score (Calc for Raw Score >=11) 33.39   AM-PAC Applied Cognition Inpatient   Following a Speech/Presentation 4   Understanding Ordinary Conversation 4   Taking Medications 4   Remembering Where Things Are Placed or Put Away 4   Remembering List of 4-5 Errands 4   Taking Care of Complicated Tasks 4   Applied Cognition Raw Score 24   Applied Cognition Standardized Score 62.21         Leigh Ann DOE

## 2024-06-01 NOTE — ASSESSMENT & PLAN NOTE
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 Also hx of lumbar revision fusion in 2016 at outside hospital and SCS placement 2019 at outside hospital. Presented to  [facility] with progressive weakness after recent Botox injection. With significant decline in function over the past few weeks.  Patient needed MRIs of spine however due to SCS, needed to be transferred [facility] [place]  MRI L spine done on 5/24, limited study due to artifact  MRI C spine showed stable cord atrophy and myomalacia at C3/4. Decompressive laminectomies from C3-C6. Mild canal stenosis at C2-3 and C7-T1. Multilevel neuroforaminal stenosis most pronounced at C3-4.   MRI T spine Stable treatment related changes status post spinal cord stimulator lead placement T9-T10. Minimal spondylosis without cord compression or cord signal abnormality. There is no focal disk herniation, central canal or neural foraminal narrowing.   Neurosurgery now signed off. There are no findings indicative of new or worsening compression on imaging. The patient's worsening spasticity is likely a result of his recent botox injections, and will hopefully improve over the course of time   Per neurology, Cymbalta discontinued.  BuSpar restarted at a lower dose.  Main complaint at this time continues to be extreme spasticity of his lower extremities which is resulting in significant pain.  Will increase baclofen to 15 mg 3 times daily and continue with as needed Valium.  Will also increase his oxycodone to 15/20 mg as needed for moderate to severe pain.  D/c'd IV morphine. Could possibly trial gabapentin (ineffective for him in the past) but patient wanted to hold off for now but would reconsider in the future.   PMR consulted for additional recs. Will need rehab on discharge, [facility]. CM following.   With dependent edema, give IV lasix 20 mg x 1

## 2024-06-01 NOTE — CASE MANAGEMENT
Case Management Discharge Planning Note    Patient name Jos Hawk  [id number]  : 1960 Date 2024       Current Admission Date: 2024  Current Admission Diagnosis:Quadriparesis (HCC)   Patient Active Problem List    Diagnosis Date Noted Date Diagnosed    Erythrocytosis 2024     Constipation 2024     Hyponatremia 2024     Urinary retention 2024     Neurogenic orthostatic hypotension (HCC) 2024     Septic olecranon bursitis 2023     Myelomalacia of cervical cord (HCC) 2022     Quadriparesis (HCC) 10/19/2022     Weakness of right lower extremity 2022     Status post lumbar spinal fusion 2021     S/P insertion of spinal cord stimulator 06/10/2021     Hydrocele of testis 2020     Chest discomfort 2020     Cauda equina syndrome (HCC) 2018     Spinal stenosis of thoracolumbar region 2018     Erectile dysfunction 2018     Hypogonadism in male 2018     BPH associated with nocturia 2018       LOS (days): 9  Geometric Mean LOS (GMLOS) (days): 4.6  Days to GMLOS:-4     OBJECTIVE:  Risk of Unplanned Readmission Score: 17.84         Current admission status: Inpatient   Preferred Pharmacy:   [id number] [address] [address]  Phone: [phone] Fax: [phone]    [place], [place] - [address]  [address]  [facility] [address]  Phone: [phone] Fax: [phone]    Primary Care Provider: Demarco Tirado DO    Primary Insurance: MEDICARE  Secondary Insurance: VETERANS    DISCHARGE DETAILS:    Additional Comments: Message sent to [facility] via [employer] requesting confirmation of ability to accept pt, awaiting determination.  department to follow.

## 2024-06-01 NOTE — PLAN OF CARE
Problem: Prexisting or High Potential for Compromised Skin Integrity  Goal: Skin integrity is maintained or improved  Description: INTERVENTIONS:  - Identify patients at risk for skin breakdown  - Assess and monitor skin integrity  - Assess and monitor nutrition and hydration status  - Monitor labs   - Assess for incontinence   - Turn and reposition patient  - Assist with mobility/ambulation  - Relieve pressure over bony prominences  - Avoid friction and shearing  - Provide appropriate hygiene as needed including keeping skin clean and dry  - Evaluate need for skin moisturizer/barrier cream  - Collaborate with interdisciplinary team   - Patient/family teaching  - Consider wound care consult   Outcome: Progressing     Problem: PAIN - ADULT  Goal: Verbalizes/displays adequate comfort level or baseline comfort level  Description: Interventions:  - Encourage patient to monitor pain and request assistance  - Assess pain using appropriate pain scale  - Administer analgesics based on type and severity of pain and evaluate response  - Implement non-pharmacological measures as appropriate and evaluate response  - Consider cultural and social influences on pain and pain management  - Notify physician/advanced practitioner if interventions unsuccessful or patient reports new pain  Outcome: Progressing     Problem: INFECTION - ADULT  Goal: Absence or prevention of progression during hospitalization  Description: INTERVENTIONS:  - Assess and monitor for signs and symptoms of infection  - Monitor lab/diagnostic results  - Monitor all insertion sites, i.e. indwelling lines, tubes, and drains  - Monitor endotracheal if appropriate and nasal secretions for changes in amount and color  - [place] appropriate cooling/warming therapies per order  - Administer medications as ordered  - Instruct and encourage patient and family to use good hand hygiene technique  - Identify and instruct in appropriate isolation precautions for  identified infection/condition  Outcome: Progressing     Problem: SAFETY ADULT  Goal: Patient will remain free of falls  Description: INTERVENTIONS:  - Educate patient/family on patient safety including physical limitations  - Instruct patient to call for assistance with activity   - Consult OT/PT to assist with strengthening/mobility   - Keep Call bell within reach  - Keep bed low and locked with side rails adjusted as appropriate  - Keep care items and personal belongings within reach  - Initiate and maintain comfort rounds  - Make Fall Risk Sign visible to staff  - Offer Toileting every 2 Hours, in advance of need  - Initiate/Maintain alarm  - Obtain necessary fall risk management equipment:   - Apply yellow socks and bracelet for high fall risk patients  - Consider moving patient to room near nurses station  Outcome: Progressing  Goal: Maintain or return to baseline ADL function  Description: INTERVENTIONS:  -  Assess patient's ability to carry out ADLs; assess patient's baseline for ADL function and identify physical deficits which impact ability to perform ADLs (bathing, care of mouth/teeth, toileting, grooming, dressing, etc.)  - Assess/evaluate cause of self-care deficits   - Assess range of motion  - Assess patient's mobility; develop plan if impaired  - Assess patient's need for assistive devices and provide as appropriate  - Encourage maximum independence but intervene and supervise when necessary  - Involve family in performance of ADLs  - Assess for home care needs following discharge   - Consider OT consult to assist with ADL evaluation and planning for discharge  - Provide patient education as appropriate  Outcome: Progressing  Goal: Maintains/Returns to pre admission functional level  Description: INTERVENTIONS:  - Perform AM-PAC 6 Click Basic Mobility/ Daily Activity assessment daily.  - Set and communicate daily mobility goal to care team and patient/family/caregiver.   - Collaborate with  rehabilitation services on mobility goals if consulted  - Perform Range of Motion 3 times a day.  - Reposition patient every 2 hours.  - Dangle patient 3 times a day  - Stand patient 3 times a day  - Ambulate patient 3 times a day  - Out of bed to chair 3 times a day   - Out of bed for meals 3 times a day  - Out of bed for toileting  - Record patient progress and toleration of activity level   Outcome: Progressing     Problem: DISCHARGE PLANNING  Goal: Discharge to home or other facility with appropriate resources  Description: INTERVENTIONS:  - Identify barriers to discharge w/patient and caregiver  - Arrange for needed discharge resources and transportation as appropriate  - Identify discharge learning needs (meds, wound care, etc.)  - Arrange for interpretive services to assist at discharge as needed  - Refer to Case Management Department for coordinating discharge planning if the patient needs post-hospital services based on physician/advanced practitioner order or complex needs related to functional status, cognitive ability, or social support system  Outcome: Progressing     Problem: Knowledge Deficit  Goal: Patient/family/caregiver demonstrates understanding of disease process, treatment plan, medications, and discharge instructions  Description: Complete learning assessment and assess knowledge base.  Interventions:  - Provide teaching at level of understanding  - Provide teaching via preferred learning methods  Outcome: Progressing     Problem: Nutrition/Hydration-ADULT  Goal: Nutrient/Hydration intake appropriate for improving, restoring or maintaining nutritional needs  Description: Monitor and assess patient's nutrition/hydration status for malnutrition. Collaborate with interdisciplinary team and initiate plan and interventions as ordered.  Monitor patient's weight and dietary intake as ordered or per policy. Utilize nutrition screening tool and intervene as necessary. Determine patient's food  preferences and provide high-protein, high-caloric foods as appropriate.     INTERVENTIONS:  - Monitor oral intake, urinary output, labs, and treatment plans  - Assess nutrition and hydration status and recommend course of action  - Evaluate amount of meals eaten  - Assist patient with eating if necessary   - Allow adequate time for meals  - Recommend/ encourage appropriate diets, oral nutritional supplements, and vitamin/mineral supplements  - Order, calculate, and assess calorie counts as needed  - Recommend, monitor, and adjust tube feedings and TPN/PPN based on assessed needs  - Assess need for intravenous fluids  - Provide specific nutrition/hydration education as appropriate  - Include patient/family/caregiver in decisions related to nutrition  Outcome: Progressing     Problem: NEUROSENSORY - ADULT  Goal: Achieves stable or improved neurological status  Description: INTERVENTIONS  - Monitor and report changes in neurological status  - Monitor vital signs such as temperature, blood pressure, glucose, and any other labs ordered   - Initiate measures to prevent increased intracranial pressure  - Monitor for seizure activity and implement precautions if appropriate      Outcome: Progressing  Goal: Remains free of injury related to seizures activity  Description: INTERVENTIONS  - Maintain airway, patient safety  and administer oxygen as ordered  - Monitor patient for seizure activity, document and report duration and description of seizure to physician/advanced practitioner  - If seizure occurs,  ensure patient safety during seizure  - Reorient patient post seizure  - Seizure pads on all 4 side rails  - Instruct patient/family to notify RN of any seizure activity including if an aura is experienced  - Instruct patient/family to call for assistance with activity based on nursing assessment  - Administer anti-seizure medications if ordered    Outcome: Progressing  Goal: Achieves maximal functionality and self  care  Description: INTERVENTIONS  - Monitor swallowing and airway patency with patient fatigue and changes in neurological status  - Encourage and assist patient to increase activity and self care.   - Encourage visually impaired, hearing impaired and aphasic patients to use assistive/communication devices  Outcome: Progressing     Problem: MUSCULOSKELETAL - ADULT  Goal: Maintain or return mobility to safest level of function  Description: INTERVENTIONS:  - Assess patient's ability to carry out ADLs; assess patient's baseline for ADL function and identify physical deficits which impact ability to perform ADLs (bathing, care of mouth/teeth, toileting, grooming, dressing, etc.)  - Assess/evaluate cause of self-care deficits   - Assess range of motion  - Assess patient's mobility  - Assess patient's need for assistive devices and provide as appropriate  - Encourage maximum independence but intervene and supervise when necessary  - Involve family in performance of ADLs  - Assess for home care needs following discharge   - Consider OT consult to assist with ADL evaluation and planning for discharge  - Provide patient education as appropriate  Outcome: Progressing  Goal: Maintain proper alignment of affected body part  Description: INTERVENTIONS:  - Support, maintain and protect limb and body alignment  - Provide patient/ family with appropriate education  Outcome: Progressing

## 2024-06-01 NOTE — ASSESSMENT & PLAN NOTE
Beard catheter placed in the emergency room upon admission to [facility] on 5/16 for urinary retention  Void trial at rehab when mobility improved

## 2024-06-01 NOTE — PROGRESS NOTES
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 Also hx of lumbar revision fusion in 2016 at outside hospital and SCS placement 2019 at outside hospital. Presented to [facility] with progressive weakness after recent Botox injection. With significant decline in function over the past few weeks.  Patient needed MRIs of spine however due to SCS, needed to be transferred from [facility] to [facility]  MRI L spine done on 5/24, limited study due to artifact  MRI C spine showed stable cord atrophy and myomalacia at C3/4. Decompressive laminectomies from C3-C6. Mild canal stenosis at C2-3 and C7-T1. Multilevel neuroforaminal stenosis most pronounced at C3-4.   MRI T spine Stable treatment related changes status post spinal cord stimulator lead placement T9-T10. Minimal spondylosis without cord compression or cord signal abnormality. There is no focal disk herniation, central canal or neural foraminal narrowing.   Neurosurgery now signed off. There are no findings indicative of new or worsening compression on imaging. The patient's worsening spasticity is likely a result of his recent botox injections, and will hopefully improve over the course of time   Per neurology, Cymbalta discontinued.  BuSpar restarted at a lower dose.  Main complaint at this time continues to be extreme spasticity of his lower extremities which is resulting in significant pain.  Will increase baclofen to 15 mg 3 times daily and continue with as needed Valium.  Will also increase his oxycodone to 15 to 20 mg as needed for moderate to severe pain.  Will wean off IV morphine. Could possibly trial gabapentin (ineffective for him in the past)  but patient wanted to hold off for now but would reconsider in the future.   PMR consulted for additional recs. Will need rehab on discharge, [facility]. CM following.     Neurogenic orthostatic hypotension (HCC)  Assessment & Plan  Continue midodrine 5 mg 3 times daily    Hyponatremia  Assessment & Plan  Chronic, baseline appears around 131-133, stable.   Can monitor PRN    Erythrocytosis  Assessment & Plan  Dating back few months ago Hgb high 16 range, upon admission to [facility] was 18k  Monitor as needed     Constipation  Assessment & Plan  Likely 2/2 narcotics  Continue bowel regimen and monitor stool output     BPH associated with nocturia  Assessment & Plan  Beard catheter placed in the emergency room upon admission to [facility] on 5/16 for urinary retention  Void trial at rehab when mobility improved              VTE Pharmacologic Prophylaxis:   Moderate Risk (Score 3-4) - Pharmacological DVT Prophylaxis Ordered: enoxaparin (Lovenox).    Mobility:   Basic Mobility Inpatient Raw Score: 7  JH-HLM Goal: 2: Bed activities/Dependent transfer  JH-HLM Achieved: 1: Laying in bed  JH-HLM Goal NOT achieved. Continue with multidisciplinary rounding and encourage appropriate mobility to improve upon JH-HLM goals.    Patient Centered Rounds: I performed bedside rounds with nursing staff today.   Discussions with Specialists or Other Care Team Provider: nursing, case management     Education and Discussions with Family / Patient: Patient declined call to .     Total Time Spent on Date of Encounter in care of patient: 30 mins. This time was spent on one or more of the following: performing physical exam; counseling and coordination of care; obtaining or reviewing history; documenting in the medical record; reviewing/ordering tests, medications or procedures; communicating with other healthcare professionals and discussing with patient's family/caregivers.    Current Length of Stay: 9 day(s)  Current Patient Status:  "Inpatient   Certification Statement: The patient will continue to require additional inpatient hospital stay due to pending discharge plan   Discharge Plan: Anticipate discharge in 24-48 hrs to rehab facility.    Code Status: Level 1 - Full Code    Subjective:   Continues to complain of extreme spasticity of his bilateral lower extremities which is resulting in pain.  Describes the sensation as his lower extremities being in a \"vice .\"  We discussed adjustments made to muscle relaxers/pain medications in detail.  Discussed possibility of trialing gabapentin however patient wanted to hold off at this time.    Objective:     Vitals:   Temp (24hrs), Av.2 °F (36.8 °C), Min:98 °F (36.7 °C), Max:98.3 °F (36.8 °C)    Temp:  [98 °F (36.7 °C)-98.3 °F (36.8 °C)] 98.3 °F (36.8 °C)  HR:  [60-85] 60  Resp:  [18] 18  BP: (126-136)/(72-87) 126/74  SpO2:  [90 %-94 %] 94 %  Body mass index is 33.16 kg/m².     Input and Output Summary (last 24 hours):     Intake/Output Summary (Last 24 hours) at 2024 1052  Last data filed at 2024 2236  Gross per 24 hour   Intake 50 ml   Output 1800 ml   Net -1750 ml       Physical Exam:   Physical Exam  Vitals and nursing note reviewed.   Constitutional:       General: He is not in acute distress.     Appearance: He is obese.   Cardiovascular:      Rate and Rhythm: Normal rate.   Pulmonary:      Breath sounds: Normal breath sounds.   Abdominal:      Tenderness: There is no abdominal tenderness.   Genitourinary:     Comments: Beard catheter draining clear yellow urine  Musculoskeletal:         General: Swelling (mild BLLE, likely dependent in setting of immobility) present.   Skin:     General: Skin is warm.      Coloration: Skin is pale.   Neurological:      Mental Status: He is alert and oriented to person, place, and time. Mental status is at baseline.      Comments: Can wiggle toes slightly, but no significant strength/movement of BLLE. Has limited movement of UE. Sensation " intact.    Psychiatric:         Mood and Affect: Mood normal.          Additional Data:     Labs:  Results from last 7 days   Lab Units 05/31/24  0503   WBC Thousand/uL 7.91   HEMOGLOBIN g/dL 16.0   HEMATOCRIT % 50.1*   PLATELETS Thousands/uL 209   SEGS PCT % 65   LYMPHO PCT % 16   MONO PCT % 11   EOS PCT % 6     Results from last 7 days   Lab Units 05/31/24  0503 05/28/24  0649   SODIUM mmol/L 135 138   POTASSIUM mmol/L 4.7 4.4   CHLORIDE mmol/L 94* 98   CO2 mmol/L 35* 33*   BUN mg/dL 17 11   CREATININE mg/dL 0.71 0.59*   ANION GAP mmol/L 6 7   CALCIUM mg/dL 8.5 8.3*   ALBUMIN g/dL  --  3.3*   TOTAL BILIRUBIN mg/dL  --  0.45   ALK PHOS U/L  --  61   ALT U/L  --  22   AST U/L  --  17   GLUCOSE RANDOM mg/dL 89 91                       Lines/Drains:  Invasive Devices       Peripheral Intravenous Line  Duration             Peripheral IV 05/29/24 Left;Upper;Ventral (anterior) Arm 2 days              Drain  Duration             Urethral Catheter Latex;Straight-tip 16 Fr. 15 days                  Urinary Catheter:  Goal for removal: Voiding trial when ambulation improves               Imaging: Reviewed radiology reports from this admission including: MRI spine    Recent Cultures (last 7 days):         Last 24 Hours Medication List:   Current Facility-Administered Medications   Medication Dose Route Frequency Provider Last Rate    acetaminophen  975 mg Oral Q8H NICK Shahla Naidu PA-C      Artificial Tears  2 drop Both Eyes TID STACEY Sanchez      atorvastatin  20 mg Oral Daily With Dinner Bharti Jensen MD      baclofen  15 mg Oral TID STACEY Sanchez      bisacodyl  10 mg Rectal Daily PRN Bharti Jensen MD      busPIRone  5 mg Oral BID Bharti Jensen MD      diazepam  2 mg Oral Q6H PRN STACEY Crowley      enoxaparin  40 mg Subcutaneous Daily Bharti Jensen MD      hydrOXYzine HCL  25 mg Oral HS PRN Bharti Jensen MD      LORazepam  0.5 mg Intravenous Daily PRN Shahla Naidu PA-C      midodrine  5 mg Oral TID AC  Bharti Jensen MD      morphine injection  2 mg Intravenous Q4H PRN STACEY Sanchez      oxyCODONE  15 mg Oral Q4H PRN STACEY Sanchez      Or    oxyCODONE  20 mg Oral Q4H PRN STACEY Sanchez          Today, Patient Was Seen By: STACEY Sanchez    **Please Note: This note may have been constructed using a voice recognition system.**

## 2024-06-02 PROCEDURE — 99232 SBSQ HOSP IP/OBS MODERATE 35: CPT | Performed by: PHYSICIAN ASSISTANT

## 2024-06-02 RX ORDER — FUROSEMIDE 10 MG/ML
20 INJECTION INTRAMUSCULAR; INTRAVENOUS ONCE
Status: COMPLETED | OUTPATIENT
Start: 2024-06-02 | End: 2024-06-02

## 2024-06-02 RX ADMIN — OXYCODONE HYDROCHLORIDE 20 MG: 10 TABLET ORAL at 04:26

## 2024-06-02 RX ADMIN — ACETAMINOPHEN 975 MG: 325 TABLET, FILM COATED ORAL at 05:42

## 2024-06-02 RX ADMIN — DEXTRAN 70, GLYCERIN, HYPROMELLOSE 2 DROP: 1; 2; 3 SOLUTION/ DROPS OPHTHALMIC at 08:26

## 2024-06-02 RX ADMIN — ACETAMINOPHEN 975 MG: 325 TABLET, FILM COATED ORAL at 21:27

## 2024-06-02 RX ADMIN — ATORVASTATIN CALCIUM 20 MG: 20 TABLET, FILM COATED ORAL at 17:28

## 2024-06-02 RX ADMIN — DIAZEPAM 2 MG: 2 TABLET ORAL at 12:20

## 2024-06-02 RX ADMIN — FUROSEMIDE 20 MG: 10 INJECTION, SOLUTION INTRAMUSCULAR; INTRAVENOUS at 13:29

## 2024-06-02 RX ADMIN — BACLOFEN 15 MG: 10 TABLET ORAL at 13:29

## 2024-06-02 RX ADMIN — ENOXAPARIN SODIUM 40 MG: 40 INJECTION SUBCUTANEOUS at 08:26

## 2024-06-02 RX ADMIN — ACETAMINOPHEN 975 MG: 325 TABLET, FILM COATED ORAL at 13:29

## 2024-06-02 RX ADMIN — BUSPIRONE HYDROCHLORIDE 5 MG: 5 TABLET ORAL at 17:28

## 2024-06-02 RX ADMIN — BACLOFEN 15 MG: 10 TABLET ORAL at 21:27

## 2024-06-02 RX ADMIN — BUSPIRONE HYDROCHLORIDE 5 MG: 5 TABLET ORAL at 08:22

## 2024-06-02 RX ADMIN — OXYCODONE HYDROCHLORIDE 20 MG: 10 TABLET ORAL at 08:29

## 2024-06-02 RX ADMIN — BISACODYL 10 MG: 10 SUPPOSITORY RECTAL at 10:42

## 2024-06-02 RX ADMIN — OXYCODONE HYDROCHLORIDE 20 MG: 10 TABLET ORAL at 21:28

## 2024-06-02 RX ADMIN — DIAZEPAM 2 MG: 2 TABLET ORAL at 18:36

## 2024-06-02 RX ADMIN — OXYCODONE HYDROCHLORIDE 20 MG: 10 TABLET ORAL at 17:28

## 2024-06-02 RX ADMIN — DEXTRAN 70, GLYCERIN, HYPROMELLOSE 2 DROP: 1; 2; 3 SOLUTION/ DROPS OPHTHALMIC at 17:28

## 2024-06-02 RX ADMIN — DIAZEPAM 2 MG: 2 TABLET ORAL at 05:50

## 2024-06-02 RX ADMIN — OXYCODONE HYDROCHLORIDE 20 MG: 10 TABLET ORAL at 13:29

## 2024-06-02 RX ADMIN — BACLOFEN 15 MG: 10 TABLET ORAL at 08:22

## 2024-06-02 NOTE — PLAN OF CARE
Problem: Prexisting or High Potential for Compromised Skin Integrity  Goal: Skin integrity is maintained or improved  Description: INTERVENTIONS:  - Identify patients at risk for skin breakdown  - Assess and monitor skin integrity  - Assess and monitor nutrition and hydration status  - Monitor labs   - Assess for incontinence   - Turn and reposition patient  - Assist with mobility/ambulation  - Relieve pressure over bony prominences  - Avoid friction and shearing  - Provide appropriate hygiene as needed including keeping skin clean and dry  - Evaluate need for skin moisturizer/barrier cream  - Collaborate with interdisciplinary team   - Patient/family teaching  - Consider wound care consult   Outcome: Progressing     Problem: PAIN - ADULT  Goal: Verbalizes/displays adequate comfort level or baseline comfort level  Description: Interventions:  - Encourage patient to monitor pain and request assistance  - Assess pain using appropriate pain scale  - Administer analgesics based on type and severity of pain and evaluate response  - Implement non-pharmacological measures as appropriate and evaluate response  - Consider cultural and social influences on pain and pain management  - Notify physician/advanced practitioner if interventions unsuccessful or patient reports new pain  Outcome: Progressing     Problem: INFECTION - ADULT  Goal: Absence or prevention of progression during hospitalization  Description: INTERVENTIONS:  - Assess and monitor for signs and symptoms of infection  - Monitor lab/diagnostic results  - Monitor all insertion sites, i.e. indwelling lines, tubes, and drains  - Monitor endotracheal if appropriate and nasal secretions for changes in amount and color  - [place] appropriate cooling/warming therapies per order  - Administer medications as ordered  - Instruct and encourage patient and family to use good hand hygiene technique  - Identify and instruct in appropriate isolation precautions for  identified infection/condition  Outcome: Progressing     Problem: SAFETY ADULT  Goal: Patient will remain free of falls  Description: INTERVENTIONS:  - Educate patient/family on patient safety including physical limitations  - Instruct patient to call for assistance with activity   - Consult OT/PT to assist with strengthening/mobility   - Keep Call bell within reach  - Keep bed low and locked with side rails adjusted as appropriate  - Keep care items and personal belongings within reach  - Initiate and maintain comfort rounds  - Make Fall Risk Sign visible to staff  - Offer Toileting every 2 Hours, in advance of need  - Initiate/Maintain bed alarm  - Obtain necessary fall risk management equipment:   - Apply yellow socks and bracelet for high fall risk patients  - Consider moving patient to room near nurses station  Outcome: Progressing  Goal: Maintain or return to baseline ADL function  Description: INTERVENTIONS:  -  Assess patient's ability to carry out ADLs; assess patient's baseline for ADL function and identify physical deficits which impact ability to perform ADLs (bathing, care of mouth/teeth, toileting, grooming, dressing, etc.)  - Assess/evaluate cause of self-care deficits   - Assess range of motion  - Assess patient's mobility; develop plan if impaired  - Assess patient's need for assistive devices and provide as appropriate  - Encourage maximum independence but intervene and supervise when necessary  - Involve family in performance of ADLs  - Assess for home care needs following discharge   - Consider OT consult to assist with ADL evaluation and planning for discharge  - Provide patient education as appropriate  Outcome: Progressing  Goal: Maintains/Returns to pre admission functional level  Description: INTERVENTIONS:  - Perform AM-PAC 6 Click Basic Mobility/ Daily Activity assessment daily.  - Set and communicate daily mobility goal to care team and patient/family/caregiver.   - Collaborate with  rehabilitation services on mobility goals if consulted  - Perform Range of Motion 3 times a day.  - Reposition patient every 2 hours.  - Dangle patient 3 times a day  - Stand patient 3 times a day  - Ambulate patient 3 times a day  - Out of bed to chair 3 times a day   - Out of bed for meals 3 times a day  - Out of bed for toileting  - Record patient progress and toleration of activity level   Outcome: Progressing     Problem: DISCHARGE PLANNING  Goal: Discharge to home or other facility with appropriate resources  Description: INTERVENTIONS:  - Identify barriers to discharge w/patient and caregiver  - Arrange for needed discharge resources and transportation as appropriate  - Identify discharge learning needs (meds, wound care, etc.)  - Arrange for interpretive services to assist at discharge as needed  - Refer to Case Management Department for coordinating discharge planning if the patient needs post-hospital services based on physician/advanced practitioner order or complex needs related to functional status, cognitive ability, or social support system  Outcome: Progressing     Problem: Knowledge Deficit  Goal: Patient/family/caregiver demonstrates understanding of disease process, treatment plan, medications, and discharge instructions  Description: Complete learning assessment and assess knowledge base.  Interventions:  - Provide teaching at level of understanding  - Provide teaching via preferred learning methods  Outcome: Progressing     Problem: Nutrition/Hydration-ADULT  Goal: Nutrient/Hydration intake appropriate for improving, restoring or maintaining nutritional needs  Description: Monitor and assess patient's nutrition/hydration status for malnutrition. Collaborate with interdisciplinary team and initiate plan and interventions as ordered.  Monitor patient's weight and dietary intake as ordered or per policy. Utilize nutrition screening tool and intervene as necessary. Determine patient's food  preferences and provide high-protein, high-caloric foods as appropriate.     INTERVENTIONS:  - Monitor oral intake, urinary output, labs, and treatment plans  - Assess nutrition and hydration status and recommend course of action  - Evaluate amount of meals eaten  - Assist patient with eating if necessary   - Allow adequate time for meals  - Recommend/ encourage appropriate diets, oral nutritional supplements, and vitamin/mineral supplements  - Order, calculate, and assess calorie counts as needed  - Recommend, monitor, and adjust tube feedings and TPN/PPN based on assessed needs  - Assess need for intravenous fluids  - Provide specific nutrition/hydration education as appropriate  - Include patient/family/caregiver in decisions related to nutrition  Outcome: Progressing     Problem: NEUROSENSORY - ADULT  Goal: Achieves stable or improved neurological status  Description: INTERVENTIONS  - Monitor and report changes in neurological status  - Monitor vital signs such as temperature, blood pressure, glucose, and any other labs ordered   - Initiate measures to prevent increased intracranial pressure  - Monitor for seizure activity and implement precautions if appropriate      Outcome: Progressing  Goal: Remains free of injury related to seizures activity  Description: INTERVENTIONS  - Maintain airway, patient safety  and administer oxygen as ordered  - Monitor patient for seizure activity, document and report duration and description of seizure to physician/advanced practitioner  - If seizure occurs,  ensure patient safety during seizure  - Reorient patient post seizure  - Seizure pads on all 4 side rails  - Instruct patient/family to notify RN of any seizure activity including if an aura is experienced  - Instruct patient/family to call for assistance with activity based on nursing assessment  - Administer anti-seizure medications if ordered    Outcome: Progressing  Goal: Achieves maximal functionality and self  care  Description: INTERVENTIONS  - Monitor swallowing and airway patency with patient fatigue and changes in neurological status  - Encourage and assist patient to increase activity and self care.   - Encourage visually impaired, hearing impaired and aphasic patients to use assistive/communication devices  Outcome: Progressing     Problem: MUSCULOSKELETAL - ADULT  Goal: Maintain or return mobility to safest level of function  Description: INTERVENTIONS:  - Assess patient's ability to carry out ADLs; assess patient's baseline for ADL function and identify physical deficits which impact ability to perform ADLs (bathing, care of mouth/teeth, toileting, grooming, dressing, etc.)  - Assess/evaluate cause of self-care deficits   - Assess range of motion  - Assess patient's mobility  - Assess patient's need for assistive devices and provide as appropriate  - Encourage maximum independence but intervene and supervise when necessary  - Involve family in performance of ADLs  - Assess for home care needs following discharge   - Consider OT consult to assist with ADL evaluation and planning for discharge  - Provide patient education as appropriate  Outcome: Progressing  Goal: Maintain proper alignment of affected body part  Description: INTERVENTIONS:  - Support, maintain and protect limb and body alignment  - Provide patient/ family with appropriate education  Outcome: Progressing

## 2024-06-02 NOTE — PLAN OF CARE
Problem: Prexisting or High Potential for Compromised Skin Integrity  Goal: Skin integrity is maintained or improved  Description: INTERVENTIONS:  - Identify patients at risk for skin breakdown  - Assess and monitor skin integrity  - Assess and monitor nutrition and hydration status  - Monitor labs   - Assess for incontinence   - Turn and reposition patient  - Assist with mobility/ambulation  - Relieve pressure over bony prominences  - Avoid friction and shearing  - Provide appropriate hygiene as needed including keeping skin clean and dry  - Evaluate need for skin moisturizer/barrier cream  - Collaborate with interdisciplinary team   - Patient/family teaching  - Consider wound care consult   Outcome: Progressing     Problem: PAIN - ADULT  Goal: Verbalizes/displays adequate comfort level or baseline comfort level  Description: Interventions:  - Encourage patient to monitor pain and request assistance  - Assess pain using appropriate pain scale  - Administer analgesics based on type and severity of pain and evaluate response  - Implement non-pharmacological measures as appropriate and evaluate response  - Consider cultural and social influences on pain and pain management  - Notify physician/advanced practitioner if interventions unsuccessful or patient reports new pain  Outcome: Progressing     Problem: INFECTION - ADULT  Goal: Absence or prevention of progression during hospitalization  Description: INTERVENTIONS:  - Assess and monitor for signs and symptoms of infection  - Monitor lab/diagnostic results  - Monitor all insertion sites, i.e. indwelling lines, tubes, and drains  - Monitor endotracheal if appropriate and nasal secretions for changes in amount and color  - [place] appropriate cooling/warming therapies per order  - Administer medications as ordered  - Instruct and encourage patient and family to use good hand hygiene technique  - Identify and instruct in appropriate isolation precautions for  identified infection/condition  Outcome: Progressing     Problem: SAFETY ADULT  Goal: Patient will remain free of falls  Description: INTERVENTIONS:  - Educate patient/family on patient safety including physical limitations  - Instruct patient to call for assistance with activity   - Consult OT/PT to assist with strengthening/mobility   - Keep Call bell within reach  - Keep bed low and locked with side rails adjusted as appropriate  - Keep care items and personal belongings within reach  - Initiate and maintain comfort rounds  - Make Fall Risk Sign visible to staff  - Offer Toileting every 2 Hours, in advance of need  - Initiate/Maintain alarm  - Obtain necessary fall risk management equipment:   - Apply yellow socks and bracelet for high fall risk patients  - Consider moving patient to room near nurses station  Outcome: Progressing  Goal: Maintain or return to baseline ADL function  Description: INTERVENTIONS:  -  Assess patient's ability to carry out ADLs; assess patient's baseline for ADL function and identify physical deficits which impact ability to perform ADLs (bathing, care of mouth/teeth, toileting, grooming, dressing, etc.)  - Assess/evaluate cause of self-care deficits   - Assess range of motion  - Assess patient's mobility; develop plan if impaired  - Assess patient's need for assistive devices and provide as appropriate  - Encourage maximum independence but intervene and supervise when necessary  - Involve family in performance of ADLs  - Assess for home care needs following discharge   - Consider OT consult to assist with ADL evaluation and planning for discharge  - Provide patient education as appropriate  Outcome: Progressing  Goal: Maintains/Returns to pre admission functional level  Description: INTERVENTIONS:  - Perform AM-PAC 6 Click Basic Mobility/ Daily Activity assessment daily.  - Set and communicate daily mobility goal to care team and patient/family/caregiver.   - Collaborate with  rehabilitation services on mobility goals if consulted  - Perform Range of Motion 3 times a day.  - Reposition patient every 2 hours.  - Dangle patient 3 times a day  - Stand patient 3 times a day  - Ambulate patient 3 times a day  - Out of bed to chair 3 times a day   - Out of bed for meals 3 times a day  - Out of bed for toileting  - Record patient progress and toleration of activity level   Outcome: Progressing     Problem: DISCHARGE PLANNING  Goal: Discharge to home or other facility with appropriate resources  Description: INTERVENTIONS:  - Identify barriers to discharge w/patient and caregiver  - Arrange for needed discharge resources and transportation as appropriate  - Identify discharge learning needs (meds, wound care, etc.)  - Arrange for interpretive services to assist at discharge as needed  - Refer to Case Management Department for coordinating discharge planning if the patient needs post-hospital services based on physician/advanced practitioner order or complex needs related to functional status, cognitive ability, or social support system  Outcome: Progressing     Problem: Knowledge Deficit  Goal: Patient/family/caregiver demonstrates understanding of disease process, treatment plan, medications, and discharge instructions  Description: Complete learning assessment and assess knowledge base.  Interventions:  - Provide teaching at level of understanding  - Provide teaching via preferred learning methods  Outcome: Progressing     Problem: Nutrition/Hydration-ADULT  Goal: Nutrient/Hydration intake appropriate for improving, restoring or maintaining nutritional needs  Description: Monitor and assess patient's nutrition/hydration status for malnutrition. Collaborate with interdisciplinary team and initiate plan and interventions as ordered.  Monitor patient's weight and dietary intake as ordered or per policy. Utilize nutrition screening tool and intervene as necessary. Determine patient's food  preferences and provide high-protein, high-caloric foods as appropriate.     INTERVENTIONS:  - Monitor oral intake, urinary output, labs, and treatment plans  - Assess nutrition and hydration status and recommend course of action  - Evaluate amount of meals eaten  - Assist patient with eating if necessary   - Allow adequate time for meals  - Recommend/ encourage appropriate diets, oral nutritional supplements, and vitamin/mineral supplements  - Order, calculate, and assess calorie counts as needed  - Recommend, monitor, and adjust tube feedings and TPN/PPN based on assessed needs  - Assess need for intravenous fluids  - Provide specific nutrition/hydration education as appropriate  - Include patient/family/caregiver in decisions related to nutrition  Outcome: Progressing     Problem: NEUROSENSORY - ADULT  Goal: Achieves stable or improved neurological status  Description: INTERVENTIONS  - Monitor and report changes in neurological status  - Monitor vital signs such as temperature, blood pressure, glucose, and any other labs ordered   - Initiate measures to prevent increased intracranial pressure  - Monitor for seizure activity and implement precautions if appropriate      Outcome: Progressing  Goal: Remains free of injury related to seizures activity  Description: INTERVENTIONS  - Maintain airway, patient safety  and administer oxygen as ordered  - Monitor patient for seizure activity, document and report duration and description of seizure to physician/advanced practitioner  - If seizure occurs,  ensure patient safety during seizure  - Reorient patient post seizure  - Seizure pads on all 4 side rails  - Instruct patient/family to notify RN of any seizure activity including if an aura is experienced  - Instruct patient/family to call for assistance with activity based on nursing assessment  - Administer anti-seizure medications if ordered    Outcome: Progressing  Goal: Achieves maximal functionality and self  care  Description: INTERVENTIONS  - Monitor swallowing and airway patency with patient fatigue and changes in neurological status  - Encourage and assist patient to increase activity and self care.   - Encourage visually impaired, hearing impaired and aphasic patients to use assistive/communication devices  Outcome: Progressing     Problem: MUSCULOSKELETAL - ADULT  Goal: Maintain or return mobility to safest level of function  Description: INTERVENTIONS:  - Assess patient's ability to carry out ADLs; assess patient's baseline for ADL function and identify physical deficits which impact ability to perform ADLs (bathing, care of mouth/teeth, toileting, grooming, dressing, etc.)  - Assess/evaluate cause of self-care deficits   - Assess range of motion  - Assess patient's mobility  - Assess patient's need for assistive devices and provide as appropriate  - Encourage maximum independence but intervene and supervise when necessary  - Involve family in performance of ADLs  - Assess for home care needs following discharge   - Consider OT consult to assist with ADL evaluation and planning for discharge  - Provide patient education as appropriate  Outcome: Progressing  Goal: Maintain proper alignment of affected body part  Description: INTERVENTIONS:  - Support, maintain and protect limb and body alignment  - Provide patient/ family with appropriate education  Outcome: Progressing

## 2024-06-02 NOTE — PROGRESS NOTES
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 Also hx of lumbar revision fusion in 2016 at outside hospital and SCS placement 2019 at outside hospital. Presented to [facility] with progressive weakness after recent Botox injection. With significant decline in function over the past few weeks.  Patient needed MRIs of spine however due to SCS, needed to be transferred from [facility] to [facility]  MRI L spine done on 5/24, limited study due to artifact  MRI C spine showed stable cord atrophy and myomalacia at C3/4. Decompressive laminectomies from C3-C6. Mild canal stenosis at C2-3 and C7-T1. Multilevel neuroforaminal stenosis most pronounced at C3-4.   MRI T spine Stable treatment related changes status post spinal cord stimulator lead placement T9-T10. Minimal spondylosis without cord compression or cord signal abnormality. There is no focal disk herniation, central canal or neural foraminal narrowing.   Neurosurgery now signed off. There are no findings indicative of new or worsening compression on imaging. The patient's worsening spasticity is likely a result of his recent botox injections, and will hopefully improve over the course of time   Per neurology, Cymbalta discontinued.  BuSpar restarted at a lower dose.  Main complaint at this time continues to be extreme spasticity of his lower extremities which is resulting in significant pain.  Will increase baclofen to 15 mg 3 times daily and continue with as needed Valium.  Will also increase his oxycodone to 15/20 mg as needed for moderate to severe pain.  D/c'd IV morphine. Could possibly trial gabapentin (ineffective for him in the past) but patient  wanted to hold off for now but would reconsider in the future.   PMR consulted for additional recs. Will need rehab on discharge, [facility]. CM following.   With dependent edema, give IV lasix 20 mg x 1     Constipation  Assessment & Plan  Likely 2/2 narcotics  Continue bowel regimen and monitor stool output     Erythrocytosis  Assessment & Plan  Dating back few months ago Hgb high 16 range, upon admission to [facility] was 18k  Monitor as needed     Hyponatremia  Assessment & Plan  Chronic, baseline appears around 131-133, stable.   Can monitor PRN    Neurogenic orthostatic hypotension (HCC)  Assessment & Plan  Continue midodrine 5 mg 3 times daily    S/P insertion of spinal cord stimulator  Assessment & Plan  Noted.    BPH associated with nocturia  Assessment & Plan  Beard catheter placed in the emergency room upon admission to [facility] on 5/16 for urinary retention  Void trial at rehab when mobility improved                VTE Pharmacologic Prophylaxis:   Moderate Risk (Score 3-4) - Pharmacological DVT Prophylaxis Ordered: enoxaparin (Lovenox).    Mobility:   Basic Mobility Inpatient Raw Score: 7  JH-HLM Goal: 2: Bed activities/Dependent transfer  JH-HLM Achieved: 1: Laying in bed  JH-HLM Goal NOT achieved. Continue with multidisciplinary rounding and encourage appropriate mobility to improve upon JH-HLM goals.    Patient Centered Rounds: I performed bedside rounds with nursing staff today.   Discussions with Specialists or Other Care Team Provider:     Education and Discussions with Family / Patient: Patient declined call to .     Total Time Spent on Date of Encounter in care of patient: 20 mins. This time was spent on one or more of the following: performing physical exam; counseling and coordination of care; obtaining or reviewing history; documenting in the medical record; reviewing/ordering tests, medications or procedures; communicating with other healthcare professionals and discussing with patient's  family/caregivers.    Current Length of Stay: 10 day(s)  Current Patient Status: Inpatient   Certification Statement: The patient will continue to require additional inpatient hospital stay due to safe dc planning for rehab  Discharge Plan: Anticipate discharge in 24-48 hrs to rehab facility.    Code Status: Level 1 - Full Code    Subjective:   No acute complaints today, feels like he is getting some mobility back and doing better on current pain regimen.  Does feel his extremities are a little more swollen than usual.     Objective:     Vitals:   Temp (24hrs), Av °F (37.2 °C), Min:97.9 °F (36.6 °C), Max:100.5 °F (38.1 °C)    Temp:  [97.9 °F (36.6 °C)-100.5 °F (38.1 °C)] 98.5 °F (36.9 °C)  HR:  [78-96] 78  Resp:  [16-18] 16  BP: (111-131)/(61-85) 129/82  SpO2:  [92 %-95 %] 95 %  Body mass index is 33.16 kg/m².     Input and Output Summary (last 24 hours):     Intake/Output Summary (Last 24 hours) at 2024 1441  Last data filed at 2024 1122  Gross per 24 hour   Intake 821 ml   Output 2200 ml   Net -1379 ml       Physical Exam:   Physical Exam  Constitutional:       General: He is not in acute distress.     Appearance: He is obese. He is not toxic-appearing.   HENT:      Head: Normocephalic and atraumatic.   Pulmonary:      Effort: Pulmonary effort is normal. No respiratory distress.   Genitourinary:     Comments: Beard clear yellow urine  Musculoskeletal:         General: Swelling present.      Comments: Limited ROM.    Neurological:      General: No focal deficit present.      Mental Status: He is alert and oriented to person, place, and time.   Psychiatric:         Mood and Affect: Mood normal.         Behavior: Behavior normal.         Thought Content: Thought content normal.          Additional Data:     Labs:  Results from last 7 days   Lab Units 24  0503   WBC Thousand/uL 7.91   HEMOGLOBIN g/dL 16.0   HEMATOCRIT % 50.1*   PLATELETS Thousands/uL 209   SEGS PCT % 65   LYMPHO PCT % 16   MONO PCT %  11   EOS PCT % 6     Results from last 7 days   Lab Units 05/31/24  0503 05/28/24  0649   SODIUM mmol/L 135 138   POTASSIUM mmol/L 4.7 4.4   CHLORIDE mmol/L 94* 98   CO2 mmol/L 35* 33*   BUN mg/dL 17 11   CREATININE mg/dL 0.71 0.59*   ANION GAP mmol/L 6 7   CALCIUM mg/dL 8.5 8.3*   ALBUMIN g/dL  --  3.3*   TOTAL BILIRUBIN mg/dL  --  0.45   ALK PHOS U/L  --  61   ALT U/L  --  22   AST U/L  --  17   GLUCOSE RANDOM mg/dL 89 91                       Lines/Drains:  Invasive Devices       Peripheral Intravenous Line  Duration             Peripheral IV 05/29/24 Left;Upper;Ventral (anterior) Arm 3 days              Drain  Duration             Urethral Catheter Latex;Straight-tip 16 Fr. 17 days                  Urinary Catheter:  Goal for removal: N/A- Discharging with Beard               Imaging: No pertinent imaging reviewed.    Recent Cultures (last 7 days):         Last 24 Hours Medication List:   Current Facility-Administered Medications   Medication Dose Route Frequency Provider Last Rate    acetaminophen  975 mg Oral Q8H NICK Shahla Naidu PA-C      Artificial Tears  2 drop Both Eyes TID STACEY Sanchez      atorvastatin  20 mg Oral Daily With Dinner Bharti Jensen MD      baclofen  15 mg Oral TID STACEY Sanchez      bisacodyl  10 mg Rectal Daily STACEY Sanchez      busPIRone  5 mg Oral BID Bharti Jensen MD      diazepam  2 mg Oral Q6H PRN STACEY Crowlye      enoxaparin  40 mg Subcutaneous Daily Bharti Jensen MD      hydrOXYzine HCL  25 mg Oral HS PRN Bharti Jensen MD      LORazepam  0.5 mg Intravenous Daily PRN Shahla Naidu PA-C      midodrine  5 mg Oral TID AC Bharti Jensen MD      oxyCODONE  15 mg Oral Q4H PRN STACEY Sanchez      Or    oxyCODONE  20 mg Oral Q4H PRN STACEY Sanchez          Today, Patient Was Seen By: Mary Jo Quinteros PA-C    **Please Note: This note may have been constructed using a voice recognition system.**

## 2024-06-02 NOTE — ASSESSMENT & PLAN NOTE
Dating back few months ago Hgb high 16 range, upon admission to [facility] was 18k  Monitor as needed

## 2024-06-03 VITALS
DIASTOLIC BLOOD PRESSURE: 78 MMHG | HEIGHT: 72 IN | BODY MASS INDEX: 33.12 KG/M2 | TEMPERATURE: 98.4 F | HEART RATE: 74 BPM | RESPIRATION RATE: 18 BRPM | OXYGEN SATURATION: 95 % | SYSTOLIC BLOOD PRESSURE: 137 MMHG | WEIGHT: 244.49 LBS

## 2024-06-03 LAB
ANION GAP SERPL CALCULATED.3IONS-SCNC: 6 MMOL/L (ref 4–13)
BASOPHILS # BLD AUTO: 0.05 THOUSANDS/ÂΜL (ref 0–0.1)
BASOPHILS NFR BLD AUTO: 1 % (ref 0–1)
BUN SERPL-MCNC: 18 MG/DL (ref 5–25)
CALCIUM SERPL-MCNC: 8.5 MG/DL (ref 8.4–10.2)
CHLORIDE SERPL-SCNC: 95 MMOL/L (ref 96–108)
CO2 SERPL-SCNC: 35 MMOL/L (ref 21–32)
CREAT SERPL-MCNC: 0.58 MG/DL (ref 0.6–1.3)
EOSINOPHIL # BLD AUTO: 0.28 THOUSAND/ÂΜL (ref 0–0.61)
EOSINOPHIL NFR BLD AUTO: 4 % (ref 0–6)
ERYTHROCYTE [DISTWIDTH] IN BLOOD BY AUTOMATED COUNT: 13 % (ref 11.6–15.1)
GFR SERPL CREATININE-BSD FRML MDRD: 106 ML/MIN/1.73SQ M
GLUCOSE SERPL-MCNC: 99 MG/DL (ref 65–140)
HCT VFR BLD AUTO: 47.9 % (ref 36.5–49.3)
HGB BLD-MCNC: 15.4 G/DL (ref 12–17)
IMM GRANULOCYTES # BLD AUTO: 0.1 THOUSAND/UL (ref 0–0.2)
IMM GRANULOCYTES NFR BLD AUTO: 1 % (ref 0–2)
LYMPHOCYTES # BLD AUTO: 1.09 THOUSANDS/ÂΜL (ref 0.6–4.47)
LYMPHOCYTES NFR BLD AUTO: 15 % (ref 14–44)
MCH RBC QN AUTO: 31.4 PG (ref 26.8–34.3)
MCHC RBC AUTO-ENTMCNC: 32.2 G/DL (ref 31.4–37.4)
MCV RBC AUTO: 98 FL (ref 82–98)
MONOCYTES # BLD AUTO: 0.98 THOUSAND/ÂΜL (ref 0.17–1.22)
MONOCYTES NFR BLD AUTO: 13 % (ref 4–12)
NEUTROPHILS # BLD AUTO: 4.88 THOUSANDS/ÂΜL (ref 1.85–7.62)
NEUTS SEG NFR BLD AUTO: 66 % (ref 43–75)
NRBC BLD AUTO-RTO: 0 /100 WBCS
PLATELET # BLD AUTO: 219 THOUSANDS/UL (ref 149–390)
PMV BLD AUTO: 10.2 FL (ref 8.9–12.7)
POTASSIUM SERPL-SCNC: 4 MMOL/L (ref 3.5–5.3)
RBC # BLD AUTO: 4.91 MILLION/UL (ref 3.88–5.62)
SODIUM SERPL-SCNC: 136 MMOL/L (ref 135–147)
WBC # BLD AUTO: 7.38 THOUSAND/UL (ref 4.31–10.16)

## 2024-06-03 PROCEDURE — 85025 COMPLETE CBC W/AUTO DIFF WBC: CPT | Performed by: PHYSICIAN ASSISTANT

## 2024-06-03 PROCEDURE — 80048 BASIC METABOLIC PNL TOTAL CA: CPT | Performed by: PHYSICIAN ASSISTANT

## 2024-06-03 PROCEDURE — 99239 HOSP IP/OBS DSCHRG MGMT >30: CPT | Performed by: PHYSICIAN ASSISTANT

## 2024-06-03 RX ORDER — ACETAMINOPHEN 325 MG/1
975 TABLET ORAL EVERY 8 HOURS SCHEDULED
Start: 2024-06-03

## 2024-06-03 RX ORDER — DIAZEPAM 2 MG/1
2 TABLET ORAL EVERY 6 HOURS PRN
Qty: 10 TABLET | Refills: 0 | Status: SHIPPED | OUTPATIENT
Start: 2024-06-03 | End: 2024-06-13

## 2024-06-03 RX ORDER — BACLOFEN 10 MG/1
10 TABLET ORAL 3 TIMES DAILY
Start: 2024-06-03

## 2024-06-03 RX ORDER — OXYCODONE HYDROCHLORIDE 10 MG/1
TABLET ORAL
Qty: 15 TABLET | Refills: 0 | Status: SHIPPED | OUTPATIENT
Start: 2024-06-03

## 2024-06-03 RX ORDER — BACLOFEN 10 MG/1
10 TABLET ORAL 3 TIMES DAILY
Status: DISCONTINUED | OUTPATIENT
Start: 2024-06-03 | End: 2024-06-03 | Stop reason: HOSPADM

## 2024-06-03 RX ORDER — BUSPIRONE HYDROCHLORIDE 5 MG/1
5 TABLET ORAL 2 TIMES DAILY
Start: 2024-06-03

## 2024-06-03 RX ADMIN — OXYCODONE HYDROCHLORIDE 20 MG: 10 TABLET ORAL at 10:31

## 2024-06-03 RX ADMIN — ACETAMINOPHEN 975 MG: 325 TABLET, FILM COATED ORAL at 12:07

## 2024-06-03 RX ADMIN — ENOXAPARIN SODIUM 40 MG: 40 INJECTION SUBCUTANEOUS at 08:07

## 2024-06-03 RX ADMIN — MIDODRINE HYDROCHLORIDE 5 MG: 5 TABLET ORAL at 06:05

## 2024-06-03 RX ADMIN — BACLOFEN 15 MG: 10 TABLET ORAL at 12:07

## 2024-06-03 RX ADMIN — DIAZEPAM 2 MG: 2 TABLET ORAL at 08:06

## 2024-06-03 RX ADMIN — DIAZEPAM 2 MG: 2 TABLET ORAL at 00:20

## 2024-06-03 RX ADMIN — OXYCODONE HYDROCHLORIDE 20 MG: 10 TABLET ORAL at 06:05

## 2024-06-03 RX ADMIN — BACLOFEN 10 MG: 10 TABLET ORAL at 06:05

## 2024-06-03 RX ADMIN — DEXTRAN 70, GLYCERIN, HYPROMELLOSE 2 DROP: 1; 2; 3 SOLUTION/ DROPS OPHTHALMIC at 08:06

## 2024-06-03 RX ADMIN — OXYCODONE HYDROCHLORIDE 20 MG: 10 TABLET ORAL at 14:22

## 2024-06-03 NOTE — ASSESSMENT & PLAN NOTE
Dating back few months ago Hgb high 16 range, upon admission to [facility] was 18k  Monitor as needed

## 2024-06-03 NOTE — PLAN OF CARE
Problem: Prexisting or High Potential for Compromised Skin Integrity  Goal: Skin integrity is maintained or improved  Description: INTERVENTIONS:  - Identify patients at risk for skin breakdown  - Assess and monitor skin integrity  - Assess and monitor nutrition and hydration status  - Monitor labs   - Assess for incontinence   - Turn and reposition patient  - Assist with mobility/ambulation  - Relieve pressure over bony prominences  - Avoid friction and shearing  - Provide appropriate hygiene as needed including keeping skin clean and dry  - Evaluate need for skin moisturizer/barrier cream  - Collaborate with interdisciplinary team   - Patient/family teaching  - Consider wound care consult   Outcome: Progressing     Problem: PAIN - ADULT  Goal: Verbalizes/displays adequate comfort level or baseline comfort level  Description: Interventions:  - Encourage patient to monitor pain and request assistance  - Assess pain using appropriate pain scale  - Administer analgesics based on type and severity of pain and evaluate response  - Implement non-pharmacological measures as appropriate and evaluate response  - Consider cultural and social influences on pain and pain management  - Notify physician/advanced practitioner if interventions unsuccessful or patient reports new pain  Outcome: Progressing     Problem: INFECTION - ADULT  Goal: Absence or prevention of progression during hospitalization  Description: INTERVENTIONS:  - Assess and monitor for signs and symptoms of infection  - Monitor lab/diagnostic results  - Monitor all insertion sites, i.e. indwelling lines, tubes, and drains  - Monitor endotracheal if appropriate and nasal secretions for changes in amount and color  - [place] appropriate cooling/warming therapies per order  - Administer medications as ordered  - Instruct and encourage patient and family to use good hand hygiene technique  - Identify and instruct in appropriate isolation precautions for  identified infection/condition  Outcome: Progressing     Problem: DISCHARGE PLANNING  Goal: Discharge to home or other facility with appropriate resources  Description: INTERVENTIONS:  - Identify barriers to discharge w/patient and caregiver  - Arrange for needed discharge resources and transportation as appropriate  - Identify discharge learning needs (meds, wound care, etc.)  - Arrange for interpretive services to assist at discharge as needed  - Refer to Case Management Department for coordinating discharge planning if the patient needs post-hospital services based on physician/advanced practitioner order or complex needs related to functional status, cognitive ability, or social support system  Outcome: Progressing

## 2024-06-03 NOTE — CASE MANAGEMENT
Case Management Discharge Planning Note    Patient name Jos Hawk  [facility] [id number]  : 1960 Date 6/3/2024       Current Admission Date: 2024  Current Admission Diagnosis:Quadriparesis (HCC)   Patient Active Problem List    Diagnosis Date Noted Date Diagnosed    Erythrocytosis 2024     Constipation 2024     Hyponatremia 2024     Urinary retention 2024     Neurogenic orthostatic hypotension (HCC) 2024     Septic olecranon bursitis 2023     Myelomalacia of cervical cord (HCC) 2022     Quadriparesis (HCC) 10/19/2022     Weakness of right lower extremity 2022     Status post lumbar spinal fusion 2021     S/P insertion of spinal cord stimulator 06/10/2021     Hydrocele of testis 2020     Chest discomfort 2020     Cauda equina syndrome (HCC) 2018     Spinal stenosis of thoracolumbar region 2018     Erectile dysfunction 2018     Hypogonadism in male 2018     BPH associated with nocturia 2018       LOS (days): 11  Geometric Mean LOS (GMLOS) (days): 4.6  Days to GMLOS:-6     OBJECTIVE:  Risk of Unplanned Readmission Score: 18.21   Current admission status: Inpatient   Preferred Pharmacy:   [id number] [address] [address]  Phone: [phone] Fax: [phone]    [place], [place] - [address]  [address]  [facility] [address]  Phone: [phone] Fax: [phone]  Primary Care Provider: Demarco Tirado DO  Primary Insurance: MEDICARE  Secondary Insurance: VETERANS    DISCHARGE DETAILS:  Discharge planning discussed with:: Pateint  Freedom of Choice: Yes  Comments - Freedom of Choice: Discussed FOC  CM contacted family/caregiver?: No- see comments (Declined)  Were Treatment Team discharge recommendations reviewed with patient/caregiver?: Yes  Did patient/caregiver verbalize  understanding of patient care needs?: N/A- going to facility  Were patient/caregiver advised of the risks associated with not following Treatment Team discharge recommendations?: Yes    Other Referral/Resources/Interventions Provided:  Interventions: Short Term Rehab  Referral Comments: [facility] able to accept the pt for today. Pt in agreement with DCP    Discharge Destination Plan:: Short Term Rehab  Transport at Discharge : [facility]  Dispatcher Contacted: Yes  Number/Name of Dispatcher: [facility]  Transported by (Company and Unit #): [facility]  ETA of Transport (Date): 06/03/24  ETA of Transport (Time): 1500    Accepting Facility Name, City & State : [facility]  Receiving Facility/Agency Phone Number: [phone]  Facility/Agency Fax Number: [phone]

## 2024-06-03 NOTE — DISCHARGE SUMMARY
[facility]  Discharge- Jos Hawk 1960, 63 y.o. 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 Also hx of lumbar revision fusion in 2016 at outside hospital and SCS placement 2019 at outside hospital. Presented to [facility] with progressive weakness after recent Botox injection. With significant decline in function over the past few weeks.  Patient needed MRIs of spine however due to SCS, needed to be transferred from [facility] to [facility]  MRI L spine done on 5/24, limited study due to artifact  MRI C spine showed stable cord atrophy and myomalacia at C3/4. Decompressive laminectomies from C3-C6. Mild canal stenosis at C2-3 and C7-T1. Multilevel neuroforaminal stenosis most pronounced at C3-4.   MRI T spine Stable treatment related changes status post spinal cord stimulator lead placement T9-T10. Minimal spondylosis without cord compression or cord signal abnormality. There is no focal disk herniation, central canal or neural foraminal narrowing.   Neurosurgery now signed off. There are no findings indicative of new or worsening compression on imaging. The patient's worsening spasticity is likely a result of his recent botox injections, and will hopefully improve over the course of time   Per neurology, Cymbalta discontinued.  BuSpar restarted at a lower dose.  Main complaint at this time continues to be extreme spasticity of his lower extremities which is resulting in significant pain.  At discharge, recommend baclofen 10 mg 3 times daily and continue with as needed Valium.  Will also increase his oxycodone to 15/20 mg as needed for moderate to severe pain.  D/c'd IV morphine. Could possibly trial gabapentin  (ineffective for him in the past) but patient wanted to hold off for now but would reconsider in the future.   PMR consulted for additional recs. Will need rehab on discharge, [facility]. CM following.  Dc to rehab on 6/3.  Can wean meds and have void trial at dc   With dependent edema, gave IV lasix 20 mg x 1 on 6/2    Constipation  Assessment & Plan  Likely 2/2 narcotics  Continue bowel regimen    Erythrocytosis  Assessment & Plan  Dating back few months ago Hgb high 16 range, upon admission to [facility] was 18k  Monitor as needed     Hyponatremia  Assessment & Plan  Chronic, baseline appears around 131-133, stable.   Can monitor PRN    Neurogenic orthostatic hypotension (HCC)  Assessment & Plan  Continue midodrine 5 mg 3 times daily    S/P insertion of spinal cord stimulator  Assessment & Plan  Noted.    BPH associated with nocturia  Assessment & Plan  Beard catheter placed in the emergency room upon admission to [facility] on 5/16 for urinary retention  Void trial at rehab when mobility improved         Medical Problems       Resolved Problems  Date Reviewed: 6/3/2024   None       Discharging Physician / Practitioner: Mary Jo Quinteros PA-C  PCP: Demarco Tirado DO  Admission Date:   Admission Orders (From admission, onward)       Ordered        05/23/24 2136  INPATIENT ADMISSION  Once                          Discharge Date: 06/03/24    Consultations During Hospital Stay:  Neurology   PMR  Neurosurgery     Procedures Performed:   None    Significant Findings / Test Results:   MRI C-spine w/o contrast: no change from prior imaging   MRI Thoracic Spine w/o contrast: stable changes s/p SCS lead placement T9-T10 without concern for disc herniation, central canal or neural foraminal narrowing  MRI lumbar spine severely limited due to artifact     Incidental Findings:   none    Test Results Pending at Discharge (will require follow up):   none     Outpatient Tests Requested:  none    Complications:  none    Reason for Admission:  Muscle weakness    Hospital Course:   Jos Hawk is a 63 y.o. male patient with PMH SCS placement in 2018 and cervical spinal cord injury with residual quadriparesis in 2022 who was transferred to hospitals on 5/23/2024 from [facility] due to progressive weakness. Pt notes extremity weakness that has progressively worsened since Botox injections 3 months ago. During initial workup at [facility], weakness was initially thought to be medication related, and pt's Cymbalta was discontinued and Buspar dosing was reduced.  Neurosurgery requested MRI of spine, however due to SCS, required transfer to hospitals. During his stay at hospitals, pt began reporting worsening muscle spasms of the lower extremities on 5/27 and was placed on multimodal pain regimen. Pt had neurosurgical evaluation and underwent MRI of cervical and thoracic spine, which revealed stable post-operative changes without focal disc herniation, central canal or neural foraminal narrowing. Neurosurgery felt medical management appropriate and believed spasticity to be a byproduct of botox injection. Pt reports improved pain and spasticity and will be discharged with the following pain regimen: oxycodone 15,g and 20mg q4hr for moderate and severe pain respectively, and Baclofen 10mg TID. Pt to be discharged to [facility] for rehabilitation services.     Please see above list of diagnoses and related plan for additional information.     Condition at Discharge: fair    Discharge Day Visit / Exam:   Subjective:  Pt reports feeling improved with a quality night of sleep for the first time in years, per pt. He notes improved muscle spasticity and pain with current pain regimen and would like to reduce Baclofen dose to 10mg. Otherwise he denies fevers, chills, chest pain, SOB, new limb weakness, or edema.  Vitals: Blood Pressure: 137/78 (06/03/24 1038)  Pulse: 74 (06/03/24 1038)  Temperature: 98.4 °F (36.9 °C) (06/02/24 2210)  Temp Source: Oral (06/02/24 2210)  Respirations: 18  (06/03/24 0647)  Height: 6' (182.9 cm) (05/23/24 2142)  Weight - Scale: 111 kg (244 lb 7.8 oz) (05/23/24 2142)  SpO2: 95 % (06/03/24 1038)  Exam:   Physical Exam  Constitutional:       General: He is not in acute distress.     Appearance: Normal appearance. He is obese.   Eyes:      Pupils: Pupils are equal, round, and reactive to light.   Pulmonary:      Effort: Pulmonary effort is normal. No respiratory distress.   Musculoskeletal:         General: No tenderness.      Right lower leg: Edema present.      Left lower leg: Edema present.      Comments: Decreased ROM    Skin:     General: Skin is warm and dry.   Neurological:      General: No focal deficit present.      Mental Status: He is alert and oriented to person, place, and time.   Psychiatric:         Mood and Affect: Mood normal.         Behavior: Behavior normal.          Discussion with Family: Updated  (friend) at bedside.    Discharge instructions/Information to patient and family:   See after visit summary for information provided to patient and family.      Provisions for Follow-Up Care:  See after visit summary for information related to follow-up care and any pertinent home health orders.      Mobility at time of Discharge:   Basic Mobility Inpatient Raw Score: 7  JH-HLM Goal: 2: Bed activities/Dependent transfer  JH-HLM Achieved: 1: Laying in bed  HLM Goal NOT achieved. Continue to encourage mobility in post discharge setting.     Disposition:   Acute Rehab at [facility]    Planned Readmission: no     Discharge Statement:  I spent 40 minutes discharging the patient. This time was spent on the day of discharge. I had direct contact with the patient on the day of discharge. Greater than 50% of the total time was spent examining patient, answering all patient questions, arranging and discussing plan of care with patient as well as directly providing post-discharge instructions.  Additional time then spent on discharge activities.    Discharge  Medications:  See after visit summary for reconciled discharge medications provided to patient and/or family.      **Please Note: This note may have been constructed using a voice recognition system**

## 2024-06-03 NOTE — CASE MANAGEMENT
Case Management Discharge Planning Note    Patient name Jos Hawk  [facility] MRN [id number]  : 1960 Date 6/3/2024       Current Admission Date: 2024  Current Admission Diagnosis:Quadriparesis (HCC)   Patient Active Problem List    Diagnosis Date Noted Date Diagnosed    Erythrocytosis 2024     Constipation 2024     Hyponatremia 2024     Urinary retention 2024     Neurogenic orthostatic hypotension (HCC) 2024     Septic olecranon bursitis 2023     Myelomalacia of cervical cord (HCC) 2022     Quadriparesis (HCC) 10/19/2022     Weakness of right lower extremity 2022     Status post lumbar spinal fusion 2021     S/P insertion of spinal cord stimulator 06/10/2021     Hydrocele of testis 2020     Chest discomfort 2020     Cauda equina syndrome (HCC) 2018     Spinal stenosis of thoracolumbar region 2018     Erectile dysfunction 2018     Hypogonadism in male 2018     BPH associated with nocturia 2018       LOS (days): 11  Geometric Mean LOS (GMLOS) (days): 4.6  Days to GMLOS:-5.9     OBJECTIVE:  Risk of Unplanned Readmission Score: 18.17         Current admission status: Inpatient   Preferred Pharmacy:   [id number] [address] [address]  Phone: [phone] Fax: [phone]    [place], [place] - [address]  [address]  [facility] [address]  Phone: [phone] Fax: [phone]    Primary Care Provider: Demarco Tirado DO    Primary Insurance: MEDICARE  Secondary Insurance: [employer]    DISCHARGE DETAILS:    Other Referral/Resources/Interventions Provided:  Interventions: Short Term Rehab  Referral Comments: Message sent to [facility], inquiring as to whether they can accept, as patient is ready for DC, awaiting response

## 2024-06-03 NOTE — ASSESSMENT & PLAN NOTE
Beard catheter placed in the emergency room upon admission to [facility] on 5/16 for urinary retention  Void trial at rehab when mobility improved

## 2024-06-03 NOTE — ASSESSMENT & PLAN NOTE
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 Also hx of lumbar revision fusion in 2016 at outside hospital and SCS placement 2019 at outside hospital. Presented to  [facility] with progressive weakness after recent Botox injection. With significant decline in function over the past few weeks.  Patient needed MRIs of spine however due to SCS, needed to be transferred from [facility] to [facility]  MRI L spine done on 5/24, limited study due to artifact  MRI C spine showed stable cord atrophy and myomalacia at C3/4. Decompressive laminectomies from C3-C6. Mild canal stenosis at C2-3 and C7-T1. Multilevel neuroforaminal stenosis most pronounced at C3-4.   MRI T spine Stable treatment related changes status post spinal cord stimulator lead placement T9-T10. Minimal spondylosis without cord compression or cord signal abnormality. There is no focal disk herniation, central canal or neural foraminal narrowing.   Neurosurgery now signed off. There are no findings indicative of new or worsening compression on imaging. The patient's worsening spasticity is likely a result of his recent botox injections, and will hopefully improve over the course of time   Per neurology, Cymbalta discontinued.  BuSpar restarted at a lower dose.  Main complaint at this time continues to be extreme spasticity of his lower extremities which is resulting in significant pain.  At discharge, recommend baclofen 10 mg 3 times daily and continue with as needed Valium.  Will also increase his oxycodone to 15/20 mg as needed for moderate to severe pain.  D/c'd IV morphine. Could possibly trial gabapentin (ineffective for him in the past) but patient wanted to hold off for now but would reconsider in the future.   PMR consulted for additional recs. Will need rehab on discharge, [facility]. CM following.  Dc to rehab on 6/3.  Can wean meds and have void trial at dc   With dependent edema, gave IV lasix 20 mg x 1  on 6/2

## 2024-06-03 NOTE — PROGRESS NOTES
Patient:  TAJ CANDELARIO    MRN:  [id number]    [facility]idin Request ID:  [id number]    Level of care reserved:  Inpatient Rehab Facility    Partner Reserved:  [place] [id number] [phone]    Clinical needs requested:    Geography searched:  20 miles around [id number]    Start of Service:    Request sent:  11:07am EDT on 5/24/2024 by Nathaly Ramos    Partner reserved:  11:41am EDT on 6/3/2024 by Nathaly Ramos    Choice list shared:  11:39am EDT on 6/3/2024 by Nathaly Ramos

## 2024-06-03 NOTE — DISCHARGE INSTR - AVS FIRST PAGE
Take Baclofen 10 mg three times daily.  Take Oxycodone 15 mg every 4 hours as needed for moderate pain, 20 mg every 4 hours as needed for severe pain.  Keep rodriguez at discharge which can be removed once rehab feels you are appropriate for void trial.  Follow up with primary care on discharge

## 2024-06-21 ENCOUNTER — TELEPHONE (OUTPATIENT)
Dept: NEUROLOGY | Facility: CLINIC | Age: 64
End: 2024-06-21

## 2024-06-21 NOTE — TELEPHONE ENCOUNTER
Received via [employer] request for medical records from [facility].  Request scanned into  and faxed to [facility].

## 2024-07-22 NOTE — TELEPHONE ENCOUNTER
Pt called and states that while I was leaving him a message she was already talking w/ Dr Massiel Lenz  Pt is very appreciative of everything that we're doing  "You guys have a great organization"  Thanking Dr Massiel Lenz for taking the time to call him  "No other doctors ever calls me"  Confirmed f/u appt 1/16/19 @ noon  \"Have you been to the ER, urgent care clinic since your last visit?  Hospitalized since your last visit?\"    NO    “Have you seen or consulted any other health care providers outside of [facility] since your last visit?”    NO            Click Here for Release of Records Request

## 2024-08-08 ENCOUNTER — TELEPHONE (OUTPATIENT)
Dept: NEUROLOGY | Facility: CLINIC | Age: 64
End: 2024-08-08

## 2024-08-08 NOTE — TELEPHONE ENCOUNTER
Made an appt reminder call no answer lmom. Appt with dr márquez on 8/14/24  @2:30pm  in [facility].

## 2024-08-14 ENCOUNTER — OFFICE VISIT (OUTPATIENT)
Dept: NEUROLOGY | Facility: CLINIC | Age: 64
End: 2024-08-14
Payer: MEDICARE

## 2024-08-14 ENCOUNTER — TELEPHONE (OUTPATIENT)
Age: 64
End: 2024-08-14

## 2024-08-14 VITALS
HEART RATE: 66 BPM | TEMPERATURE: 97.6 F | DIASTOLIC BLOOD PRESSURE: 62 MMHG | BODY MASS INDEX: 33.05 KG/M2 | RESPIRATION RATE: 16 BRPM | SYSTOLIC BLOOD PRESSURE: 98 MMHG | OXYGEN SATURATION: 98 % | WEIGHT: 244 LBS | HEIGHT: 72 IN

## 2024-08-14 DIAGNOSIS — M48.05 SPINAL STENOSIS OF THORACOLUMBAR REGION: Primary | ICD-10-CM

## 2024-08-14 PROCEDURE — 99214 OFFICE O/P EST MOD 30 MIN: CPT | Performed by: PSYCHIATRY & NEUROLOGY

## 2024-08-14 RX ORDER — ATORVASTATIN CALCIUM 20 MG/1
20 TABLET, FILM COATED ORAL
COMMUNITY
Start: 2024-07-05

## 2024-08-14 RX ORDER — TIZANIDINE 2 MG/1
TABLET ORAL
COMMUNITY
Start: 2024-07-05 | End: 2024-08-14

## 2024-08-14 RX ORDER — SULFAMETHOXAZOLE/TRIMETHOPRIM 800-160 MG
1 TABLET ORAL 2 TIMES DAILY
COMMUNITY
Start: 2024-07-05

## 2024-08-14 RX ORDER — METHYLPREDNISOLONE 4 MG
TABLET, DOSE PACK ORAL
COMMUNITY
Start: 2024-07-09

## 2024-08-14 RX ORDER — BETHANECHOL CHLORIDE 10 MG/1
1 TABLET ORAL 3 TIMES DAILY
COMMUNITY
Start: 2024-07-31

## 2024-08-14 RX ORDER — TORSEMIDE 10 MG/1
TABLET ORAL
COMMUNITY
Start: 2024-07-31

## 2024-08-14 RX ORDER — TAMSULOSIN HYDROCHLORIDE 0.4 MG/1
1 CAPSULE ORAL
COMMUNITY
Start: 2024-07-31

## 2024-08-14 RX ORDER — OXYCODONE HYDROCHLORIDE 5 MG/1
TABLET ORAL
COMMUNITY
Start: 2024-07-07 | End: 2024-08-14

## 2024-08-14 NOTE — ASSESSMENT & PLAN NOTE
This is a 63-year-old patient with spinal disease.  He was last evaluated in May.  Since that time his hospitalized at [facility] and underwent repeat MRI imaging studies.  He then was transferred to [facility] where he did have extensive physical therapy now as an outpatient he is receiving physical therapy for as well as therapy for his training.  We did review his hospitalization and at address many questions.  He is no longer on many the medications that were prescribed.  He is no longer on BuSpar Cymbalta Valium baclofen or pain meds.  He does utilize Zanaflex 3 tablets at bedtime as well as midodrine.  He will attempt to try lower doses of Zanaflex and decrease the tablet by 1/week and slowly discontinue it.  [facility] will [facility] for approximately 5 days.  At that time they will then discuss with them potential diagnosis and recovery.  He also has a follow-up appointment with Dr. Blue from [facility] in several weeks.    He does have an autonomic dysfunction and an utilizes midodrine 5 mg 3 times a day.  This has helped his blood pressure.      He can return to our offices in several months but keep us informed if there is any other new information.

## 2024-08-14 NOTE — PROGRESS NOTES
Patient ID: Jos Hawk is a 64 y.o. male.    Assessment/Plan:    Spinal stenosis of thoracolumbar region  This is a 63-year-old patient with spinal disease.  He was last evaluated in May.  Since that time his hospitalized at [facility] and underwent repeat MRI imaging studies.  He then was transferred to [facility] where he did have extensive physical therapy now as an outpatient he is receiving physical therapy for as well as therapy for his training.  We did review his hospitalization and at address many questions.  He is no longer on many the medications that were prescribed.  He is no longer on BuSpar Cymbalta Valium baclofen or pain meds.  He does utilize Zanaflex 3 tablets at bedtime as well as midodrine.  He will attempt to try lower doses of Zanaflex and decrease the tablet by 1/week and slowly discontinue it.  [facility] will [facility] for approximately 5 days.  At that time they will then discuss with them potential diagnosis and recovery.  He also has a follow-up appointment with Dr. Blue from [facility] in several weeks.    He does have an autonomic dysfunction and an utilizes midodrine 5 mg 3 times a day.  This has helped his blood pressure.      He can return to our offices in several months but keep us informed if there is any other new information.       Diagnoses and all orders for this visit:    Spinal stenosis of thoracolumbar region    Other orders  -     bethanechol (URECHOLINE) 10 mg tablet; Take 1 tablet by mouth 3 (three) times a day  -     hypromellose (GENTEAL SEVERE) 0.3 % ophthalmic gel; Apply to eye (Patient not taking: Reported on 8/14/2024)  -     methylPREDNISolone 4 MG tablet therapy pack; take AS PRESCRIBED  -     sulfamethoxazole-trimethoprim (BACTRIM DS) 800-160 mg per tablet; Take 1 tablet by mouth 2 (two) times a day (Patient not taking: Reported on 8/14/2024)  -     tamsulosin (FLOMAX)  0.4 mg; Take 1 capsule by mouth daily at bedtime  -     Discontinue: tiZANidine (ZANAFLEX) 2 mg tablet; take 1 tablet by mouth three times a day if needed for SPASM, NO MORE THAN 3 TABLETS DAILY (Patient not taking: Reported on 8/14/2024)  -     torsemide (DEMADEX) 10 mg tablet; Take 1 tablet twice a day by oral route. (Patient not taking: Reported on 8/14/2024)  -     atorvastatin (LIPITOR) 20 mg tablet; Take 20 mg by mouth daily with dinner  -     Discontinue: oxyCODONE (ROXICODONE) 5 immediate release tablet; TAKE 1 TABLET BY MOUTH DAILY AS NEEDED FOR SEVERE PAIN. (Patient not taking: Reported on 8/14/2024)       Subjective:           This is a 63-year-old gentleman with a history of lumbar disc disease.  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 Also hx of lumbar revision fusion in 2016 at outside hospital and SCS placement 2019 at outside hospital.with recent scar removal in [place] in 2022.  In September 2022 while on vacation in [place] after a fall he developed weakness in his upper and lower extremities and was noted to have quadriparesis.  His MRI imaging did demonstrate cervical myelomalacia at C4 and he has been undergoing aggressive physical therapy.  Was last evaluated here in January he was doing well and improving.  Did receive Botox of the quads hamstrings which provided us with some benefit but after 5 weeks he did notice an increase in tone pain and more dysfunction in his ambulation.  He describes increased tone with spasms in his legs and lower back.  He is now on pain meds.  He has been taking Zanaflex 2 mg 3 times a day with benefit but this does result in cognitive slowing during the day.  He does report a great deal of anxiety due to his medical issues.  [facility] who started him on BuSpar 10 mg twice a day and increase the dose to 15 twice a day.  Despite this he continues to have anxiety  regarding his symptoms and ability to walk.  He describes no significant change with the BuSpar.  Was noted he had a component of depression and he was started on Cymbalta with reducing doses of BuSpar.  He developed elevated blood pressure and was admitted to the hospital at [facility] from 5/16/2024 to 5/23/2024.  Medications  were adjusted.  He was then transferred to [facility] for a MRI imaging study as he did have a spinal cord stimulator.      MRI L spine done on 5/24, limited study due to artifact  º MRI C spine showed stable cord atrophy and myomalacia at C3/4. Decompressive laminectomies from C3-C6. Mild canal stenosis at C2-3 and C7-T1. Multilevel neuroforaminal stenosis most pronounced at C3-4.   º MRI T spine Stable treatment related changes status post spinal cord stimulator lead placement T9-T10. Minimal spondylosis without cord compression or cord signal abnormality. There is no focal disk herniation, central canal or neural foraminal narrowing.       Neurosurgery  was evaluated the patient and felt that there was no need for intervention then discharged to [facility] on [address] where he stayed for 30 to 40 days.  He did receive extensive physical therapy and by the time he was discharged to home he was able to stand.  He has not been receiving extensive physical therapy at home with massage therapy.  He had been placed on Valium pain meds baclofen which has since been all discontinued the BuSpar was also discontinued.  He does utilize 3 of the Zanaflex at bedtime with management for hypotension.             Prior history:. He did  complainin of stiffness and heaviness in his legs with progressive weakness .  MRI did show foraminal stenosis a left L5-S1 as well as impingement on the left L3 L4 region.  He was seen by dr. Huddleston who  suggested surgery but he and then sought a 2nd opinion and underwent fusion with placement of  L3-S1 in May of 2016 by Dr. Hi ,  the head of  Neurosurgery at [facility].  After the surgery in May of 2016 he noted increasing numbness in his bilateral thighs, in the saddle regions and numbness in the perineum.  The spinal cord stimulator was placed in 2018.  Hardware was removed in 2021 in [place]        For the last week he has noted some improvement in his ability to ambulate.  He does have difficulty standing from a sitting position but ambulates short distances with a walker with to assist.  This is improved from the past.  He does have an appointment in 2 weeks [facility]    Concerns include increased phlegm congestion.  He does have an ENT evaluations set up in approximately 2 weeks    Today's questions were regarding his muscle relaxants potentially interacting with his medications..  Also discusses difficulty in movement and requiring special emphasis to move certain muscles.                                                The following portions of the patient's history were reviewed and updated as appropriate: He  has a past medical history of Balance problems, BPH (benign prostatic hyperplasia), Chronic back pain, Chronic pain disorder, COVID-19, Erectile dysfunction, Nocturia, OAB (overactive bladder), Testicular hypogonadism, Urinary frequency, and Urinary urgency.  He  has a past surgical history that includes Spinal cord stimulator implant (10/10/2019); Colonoscopy (11/2004); Colonoscopy (12/2007); Colonoscopy (07/2013); Colonoscopy (06/2019); Back surgery (05/18/2016); Neck surgery; pr excision hydrocele unilateral (Right, 06/10/2021); Neck surgery (Right); Back surgery; and Wound debridement (Right, 08/10/2023).  His family history is not on file.  He  reports that he has never smoked. He has never used smokeless tobacco. He reports that he does not currently use alcohol after a past usage of about 4.0 - 7.0 standard drinks of alcohol per week. He reports that he does not use drugs.  Current  "Outpatient Medications   Medication Sig Dispense Refill    acetaminophen (TYLENOL) 325 mg tablet Take 3 tablets (975 mg total) by mouth every 8 (eight) hours      atorvastatin (LIPITOR) 20 mg tablet Take 20 mg by mouth daily with dinner      bethanechol (URECHOLINE) 10 mg tablet Take 1 tablet by mouth 3 (three) times a day      Cholecalciferol 50 MCG (2000 UT) TABS 50 mcg      fluocinonide (LIDEX) 0.05 % cream       folic acid (FOLVITE) 1 mg tablet Take 1 tablet (1mg) by mouth Once Daily.      ibuprofen (MOTRIN) 400 mg tablet Take 1 tablet (400 mg total) by mouth every 6 (six) hours as needed for mild pain  0    ketoconazole (NIZORAL) 2 % shampoo       methylPREDNISolone 4 MG tablet therapy pack take AS PRESCRIBED      midodrine (PROAMATINE) 5 mg tablet Take 1 tablet (5 mg total) by mouth 3 (three) times a day before meals 270 tablet 3    naloxone (NARCAN) 4 mg/0.1 mL nasal spray Administer 1 spray into a nostril. If no response after 2-3 minutes, give another dose in the other nostril using a new spray. 1 each 1    sildenafil (VIAGRA) 100 mg tablet Take 100 mg by mouth As directed      tamsulosin (FLOMAX) 0.4 mg Take 1 capsule by mouth daily at bedtime      bisacodyl (DULCOLAX) 10 mg suppository Insert 1 suppository (10 mg total) into the rectum daily as needed for constipation (Patient not taking: Reported on 8/14/2024) 12 suppository 0    diazepam (VALIUM) 2 mg tablet Take 1 tablet (2 mg total) by mouth every 6 (six) hours as needed for anxiety or muscle spasms for up to 10 days 10 tablet 0    Elastic Bandages & Supports (Wrap/Multipurpose 2.75\"x21.4yd) MISC Use in the morning Please dispense lymphedema wrap and wrap from hand up to the proximal shoulder (Patient not taking: Reported on 8/14/2024) 1 each 0    hypromellose (GENTEAL SEVERE) 0.3 % ophthalmic gel Apply to eye (Patient not taking: Reported on 8/14/2024)      sulfamethoxazole-trimethoprim (BACTRIM DS) 800-160 mg per tablet Take 1 tablet by mouth 2 " (two) times a day (Patient not taking: Reported on 8/14/2024)      testosterone cypionate (DEPO-TESTOSTERONE) 200 mg/mL SOLN inject 2 MILLILITERS intramuscularly every 14 days (Patient not taking: Reported on 8/14/2024)      torsemide (DEMADEX) 10 mg tablet Take 1 tablet twice a day by oral route. (Patient not taking: Reported on 8/14/2024)       No current facility-administered medications for this visit.     He has No Known Allergies..         Objective:    Blood pressure 98/62, pulse 66, temperature 97.6 °F (36.4 °C), temperature source Temporal, resp. rate 16, height 6' (1.829 m), weight 111 kg (244 lb), SpO2 98%.    Physical Exam  Eyes:      General: Lids are normal.      Extraocular Movements: Extraocular movements intact.      Pupils: Pupils are equal, round, and reactive to light.   Neurological:      Deep Tendon Reflexes:      Reflex Scores:       Patellar reflexes are 1+ on the right side and 1+ on the left side.        Neurological Exam  Mental Status  Awake, alert and oriented to person, place and time. Oriented to person, place and time. Language is fluent with no aphasia.    Cranial Nerves  CN II: Visual acuity is normal. Visual fields full to confrontation.  CN III, IV, VI: Extraocular movements intact bilaterally. Normal lids and orbits bilaterally. Pupils equal round and reactive to light bilaterally.  CN V: Facial sensation is normal.  CN VII: Full and symmetric facial movement.  CN VIII: Hearing is normal.  CN IX, X: Palate elevates symmetrically. Normal gag reflex.  CN XI: Shoulder shrug strength is normal.  CN XII: Tongue midline without atrophy or fasciculations.    Motor    Strength testing in upper extremities was a  4+  proximally.  It was a 3 out of 5 wrist flexion and wrist extension was a 3.  He had atrophy of the intrinsics he had numbness and tingling in the left upper extremity.  In the lower extremity there is a discrete increased tone in the paraspinals and in the lower extremities  this is worse on the right than the left.  Hip flexion was a 2 out of 5 ankle dorsiflexion and plantarflexion was a 0 out of 5..  Knee flexion on the right was a 2 out of 5 knee flexion on the left was a 2+ out of 5.    Sensory  Attenuation sensation in the lower extremities with discoloration in his legs.  He did have some intact pinprick but had dysesthesias noted with exam..    Reflexes                                            Right                      Left  Patellar                                1+                         1+  Achilles                                Tr                         Tr  Right Plantar: downgoing  Left Plantar: downgoing    Right pathological reflexes: Domenica's absent.  Left pathological reflexes: Domenica's absent.    Coordination  Right: Finger-to-nose normal.Left: Finger-to-nose normal.    Gait   Unable to rise from chair without using arms.  Evaluated in a wheelchair he was unable to stand without assistance of 2..    Review of systems obtained by the medical assistant as below was reviewed with the patient at today's visit    ROS:    Review of Systems   Constitutional:  Negative for appetite change, fatigue and fever.   HENT: Negative.  Negative for hearing loss, tinnitus, trouble swallowing and voice change.    Eyes: Negative.  Negative for photophobia, pain and visual disturbance.   Respiratory: Negative.  Negative for shortness of breath.    Cardiovascular: Negative.  Negative for palpitations.   Gastrointestinal: Negative.  Negative for nausea and vomiting.   Endocrine: Negative.  Negative for cold intolerance.   Genitourinary: Negative.  Negative for dysuria, frequency and urgency.   Musculoskeletal:  Positive for back pain and joint swelling. Negative for gait problem, myalgias, neck pain and neck stiffness.   Skin: Negative.  Negative for rash.   Allergic/Immunologic: Negative.    Neurological:  Positive for dizziness, tremors, weakness, light-headedness and numbness.  Negative for seizures, syncope, facial asymmetry, speech difficulty and headaches.   Hematological:  Bruises/bleeds easily.   Psychiatric/Behavioral:  Positive for sleep disturbance. Negative for confusion and hallucinations.    All other systems reviewed and are negative.      I have spent a total time of 37 minutes in caring for this patient on the day of the visit/encounter including Impressions, Counseling / Coordination of care, Documenting in the medical record, Reviewing / ordering tests, medicine, procedures  , and Obtaining or reviewing history  .

## 2024-08-14 NOTE — TELEPHONE ENCOUNTER
Geraldo calling from [facility] Medical Records as they received the request of Medical Records for May and June, including Meds, Allergies and notes per Dr Leyva. Geraldo requesting if provider can be more specific is it the last Therapy notes or last progress notes that she is requesting,  as pt has many notes. Or is it every note in those months. Pt was also inpt and D/C in July, Geraldo asking if our provider would like that d/c summary also?     Routed to provider to advise.

## 2024-08-15 NOTE — TELEPHONE ENCOUNTER
Debra Leyva, DO        I resolved in the office today and I discussed with him his workup.    It was a time sensitive matter as I wanted to review the records before his visit earlier today    Please contact [facility] and see if they can send discharge summary , med list, any consults from 2024.          ---------------------------------------      I spoke to Geraldo from [facility]. I informed to please send D/c Summary, med list and any consults from 2024 as this was a time sensitive matter as pt was seen yesterday. Geraldo, will pull those right away and fax those to our office ATTN Dr Leyva.

## 2024-08-19 NOTE — TELEPHONE ENCOUNTER
Called and spoke to patient  Patient confirmed upcoming apt with Dr Krysetn Khan on 10/19/22 @ 10 am  While speaking with the patient he stated he called here 2 times and spoke with someone and left a message with them and they told him that someone would call him back and he never received a call back  Patient stated he has been in the hospital and is completely paralyzed  He stated he does not understand where the disconnect happened and why he never received a call back 
Called pt, Left message on his voice mail     Will call him tomorrow
I faxed a record release to [facility] per Dr Laz Bhardwaj for patients medical records for patients inpatient stay from 9/23/22 thru 10/8/22  I scanned the release in the patients chart 
The Mary left message on voice mail    Will try later
Yes

## 2024-10-01 ENCOUNTER — TELEPHONE (OUTPATIENT)
Age: 64
End: 2024-10-01

## 2024-10-01 NOTE — TELEPHONE ENCOUNTER
Per Dr. Leyva ,     I personally spoke to Tony.    Since his last visit [facility] no other cause of his [facility].  They did refer him to a neurosurgeon at [facility] who suggested that the spinal cord stimulator was not causing artifact but the metallic areas after his prior lumbar spinal surgery was contributing.  They suggested a neurological evaluation to rule out any type of other etiologies for spinal cord issues such as MS or ALS.  [facility] also suggested the same thing.  In the interim I spoke to Tony informed him that this has previously been ruled out .  However they do require documentation of this . he has no evidence of ALS as this is diagnosed on EMG studies.  Prior exam was not consistent with clinical presentation of ALS.  He did have an EMG study at [facility] and there is no evidence of motor neuron disease.  He has had an extensive workup in the past for other type of neurological conditions but I will certainly review the chart more in detail to determine if there is any other workup that needs to be performed.    He will be dropping off records from the [facility],  [facility] and his prior EMG study for me to review prior to his visit in November.  I will contact  him if there is any other type of workup that needs to be performed prior to his appointment        I did speak to Dr. Garcia  regarding this case and she agreed that there is no need for him to see a different neurologist.  There is no additional information that she could provide.    He was agreeable . I will  review everything with him at his upcoming appointment.

## 2024-10-01 NOTE — TELEPHONE ENCOUNTER
Patient called to inform us that they were given and we were sent a new referral from [facility] to see Dr. Garcia    Patient is currently a patient with Dr. Adrian       Patient insist that they understand that both Doctors see for the same issues  and they are happy with Dr. Adrian, But feel that they need to be seen sooner and that they need to know if they have a disease that will kill them before they get to see the provider on 11/6/24        Patient insisting that they be seen sooner to determine if they have a disease or if other test can be ordered for them to determine what is wrong     Patient not comprehending that they are getting the care they need with the current provider

## 2024-10-04 ENCOUNTER — APPOINTMENT (OUTPATIENT)
Dept: LAB | Facility: MEDICAL CENTER | Age: 64
End: 2024-10-04
Payer: COMMERCIAL

## 2024-10-04 DIAGNOSIS — G82.52 QUADRIPLEGIA, C1-C4, INCOMPLETE (HCC): ICD-10-CM

## 2024-10-04 LAB
ANA SER QL IA: NEGATIVE
CRP SERPL HS-MCNC: 3.18 MG/L
ERYTHROCYTE [SEDIMENTATION RATE] IN BLOOD: 17 MM/HOUR (ref 0–19)
FOLATE SERPL-MCNC: >22.3 NG/ML
LH SERPL-ACNC: 10.6 MIU/ML
T4 FREE SERPL-MCNC: 1.05 NG/DL (ref 0.61–1.12)
TREPONEMA PALLIDUM IGG+IGM AB [PRESENCE] IN SERUM OR PLASMA BY IMMUNOASSAY: NORMAL
TSH SERPL DL<=0.05 MIU/L-ACNC: 2.65 UIU/ML (ref 0.45–4.5)
VIT B12 SERPL-MCNC: 517 PG/ML (ref 180–914)

## 2024-10-04 PROCEDURE — 85652 RBC SED RATE AUTOMATED: CPT

## 2024-10-04 PROCEDURE — 82746 ASSAY OF FOLIC ACID SERUM: CPT

## 2024-10-04 PROCEDURE — 84443 ASSAY THYROID STIM HORMONE: CPT

## 2024-10-04 PROCEDURE — 86141 C-REACTIVE PROTEIN HS: CPT

## 2024-10-04 PROCEDURE — 36415 COLL VENOUS BLD VENIPUNCTURE: CPT

## 2024-10-04 PROCEDURE — 86780 TREPONEMA PALLIDUM: CPT

## 2024-10-04 PROCEDURE — 82607 VITAMIN B-12: CPT

## 2024-10-04 PROCEDURE — 84439 ASSAY OF FREE THYROXINE: CPT

## 2024-10-04 PROCEDURE — 86038 ANTINUCLEAR ANTIBODIES: CPT

## 2024-10-04 NOTE — TELEPHONE ENCOUNTER
Called patient to relay the providers message     Patient stated provider called and spoke to them concerning the referral and answered the patients questions     Patient thanked us for calling back to make sure they received the providers message

## 2024-10-23 ENCOUNTER — APPOINTMENT (OUTPATIENT)
Dept: LAB | Facility: MEDICAL CENTER | Age: 64
End: 2024-10-23

## 2024-10-23 ENCOUNTER — TELEPHONE (OUTPATIENT)
Dept: NEUROLOGY | Facility: CLINIC | Age: 64
End: 2024-10-23

## 2024-10-28 NOTE — TELEPHONE ENCOUNTER
It only includes the Emg .       Can you see if we can get the Records from the [facility] , only info in epic is meds,  , no d/c summary  or progress notes . Please try to obtain

## 2024-11-05 DIAGNOSIS — G82.50 QUADRIPARESIS (HCC): ICD-10-CM

## 2024-11-05 RX ORDER — DIAZEPAM 2 MG/1
TABLET ORAL
Qty: 10 TABLET | Refills: 0 | Status: SHIPPED | OUTPATIENT
Start: 2024-11-05

## 2024-11-05 NOTE — TELEPHONE ENCOUNTER
Made call see if he has any [facility] or progress note. He stated he will bring in tomorrow what he has.   Also asked for med refill of valium he has an mri appt tomorrow before he comes in for his appt. There is pended order for the medication.   Please advise any further recommendations. Thankyou!

## 2024-11-06 ENCOUNTER — OFFICE VISIT (OUTPATIENT)
Dept: NEUROLOGY | Facility: CLINIC | Age: 64
End: 2024-11-06
Payer: OTHER GOVERNMENT

## 2024-11-06 VITALS — HEIGHT: 72 IN | OXYGEN SATURATION: 95 % | HEART RATE: 77 BPM | TEMPERATURE: 96.2 F | BODY MASS INDEX: 29.84 KG/M2

## 2024-11-06 DIAGNOSIS — M48.05 SPINAL STENOSIS OF THORACOLUMBAR REGION: Primary | ICD-10-CM

## 2024-11-06 PROCEDURE — 99215 OFFICE O/P EST HI 40 MIN: CPT | Performed by: PSYCHIATRY & NEUROLOGY

## 2024-11-06 NOTE — PROGRESS NOTES
Ambulatory Visit  Name: Jos Hawk      : 1960      MRN: [id number]  Encounter Provider: Debra Leyva DO  Encounter Date: 2024   Encounter department: [facility] [place]    Assessment & Plan  Spinal stenosis of thoracolumbar region  Is a 64-year-old patient with chronic lumbar stenosis status post multiple surgeries including a spinal cord stimulator.  Since his last visit in August he has had improvement of the strength in the upper extremities but he has spasticity in the lower extremities with increased tone.  He is currently on Zanaflex which does help.  He is also proceeding with stretching techniques.  He is questioning if there is any other cause of the symptoms.  I explained to him this seems to be a reaction to a chronic entrapment of nerve roots.   given the fact that he has reflexes are decreased , he has increased tone this is suggestive of more of a lower motor neuron etiology.  There is no clinical or EMG abnormalities consistent with motor neuron disease.  His symptoms have recurred with trauma and resulting in myelomalacia.  This is not consistent with an MS.    I found no evidence of a neurodegenerative neuromuscular disorder.    Recommended that he contact  [facility] rehab about the potential baclofen pump which may need to reduce the intensity of the spasms and help his ability to ambulate.    He is for MRIs of the brain,  cervical spine.  thoracic spine and lumbar spine.    We also discussed other options to determine if he if he has any type of infectious or inflammatory etiology such as a CSF examination.  I am concerned regarding his numerous interventions in the lumbar spine about aggravating his symptoms.  We will discuss this more in detail in the future.      He is to remain off of BuSpar and Effexor or Valium for now.    Discussed the treatment for inflammatory process which can include steroids.  However steroids have been tried in  the past and recently last year he developed cellulitis for with long-term use of steroids.  This could be discussed again in the future if needed.             HPI:     This is a 64 -year-old patient with spinal disease.  He was last evaluated in August of 2024 .  He had been evaluated after his hospitalization at [facility].  During his hospitalization BuSpar, duloxetine Valium baclofen and pain meds were all discontinued.  He was continued on Zanaflex 2 mg tablets and asked to slowly reduce the dose.  After his visit with me in August [facility] [place] [facility] he informs me he was evaluated by numerous physicians while there and eventually referred to [facility] about his spinal cord stimulator.  It was felt that the spinal cord stimulator was not causing his symptoms in his legs.  It was felt that his cord itself was intact.  In the interim he reports improvement of his strength in the upper extremities.  Unfortunately he has had continued issues with weakness in the lower extremities over the last year.  He has increase in spasms occurring more in the evening and early morning.  He does utilize Zanaflex 6 mg at bedtime and 6 mg at 2 or 3 am  in the morning.  He does not utilize any during the day as he is concerned about cognitive sedation.      Today he presents for follow-up visit.  He was concerned about potential neuromuscular condition.  I did have a long discussion with him that his exam history EMG study is not consistent with ALS or MS.       He does have a history of chronic cervical C3-C4 myelomalacia.  He is status post decompression in 2016 for left leg weakness he has a history of lumbar spinal stenosis with cauda equina syndrome status post multiple lumbar surgeries including L2-S1 spinal fusion in 2016 and anterior L4-L5 decompression with subsequent fusion.  He was doing well until approximately 2 years ago and has had gradual decline in his overall function.   He now requires assistance to ambulate.  He is treated with water therapy outpatient physical therapy and a .         He does have an autonomic dysfunction and an utilizes midodrine 5 mg 3 times a day.  This has helped his blood pressure.        He can return to our offices in several months but keep us informed if there is any other new information.      He did have a EMG/NCV procedure performed in April 2024 which showed a large fiber sensory polyneuropathy and acute on chronic L5-S1 lumbar sacral  radiculopathy bilaterally on needle testing.      Major issues today include continued spasticity of his lower extremities despite the use of exercise program muscle relaxants stretching techniques.  This can awaken him in the middle of the night.  He denies any spasms during the day however he reports increase in tone.  Overall his strength in the upper extremities has since improved.  He does require for a lift at times for movement in his bed.        He underwent fusion with placement of  L3-S1 in May of 2016 by Dr. Hi ,  the head of Neurosurgery at [facility].  After the surgery in May of 2016 he noted increasing numbness in his bilateral thighs, in the saddle regions and numbness in the perineum.  The spinal cord stimulator was placed in 2018.  Hardware was removed in 2021 in [place] he has tried numerous different muscle relaxants but unfortunately all of them resulted in sedation.                Review of Systems   Constitutional: Negative.    HENT: Negative.     Eyes: Negative.    Respiratory: Negative.     Cardiovascular: Negative.    Gastrointestinal: Negative.    Endocrine: Negative.    Genitourinary: Negative.    Musculoskeletal: Negative.    Skin: Negative.    Allergic/Immunologic: Negative.    Neurological: Negative.    Hematological: Negative.    Psychiatric/Behavioral: Negative.     All other systems reviewed and are negative.    I have personally reviewed the MA's  "review of systems and made changes as necessary.    Medical History Reviewed by provider this encounter:  Meds       Current Outpatient Medications on File Prior to Visit   Medication Sig Dispense Refill    atorvastatin (LIPITOR) 20 mg tablet Take 20 mg by mouth daily with dinner      Cholecalciferol 50 MCG (2000 UT) TABS 50 mcg      diazepam (VALIUM) 2 mg tablet 1 po 30 mins prior to procedure . Pt can repeat in 2 hours if necessary 10 tablet 0    fluocinonide (LIDEX) 0.05 % cream       folic acid (FOLVITE) 1 mg tablet Take 1 tablet (1mg) by mouth Once Daily.      ibuprofen (MOTRIN) 400 mg tablet Take 1 tablet (400 mg total) by mouth every 6 (six) hours as needed for mild pain  0    ketoconazole (NIZORAL) 2 % shampoo       midodrine (PROAMATINE) 5 mg tablet Take 1 tablet (5 mg total) by mouth 3 (three) times a day before meals 270 tablet 3    naloxone (NARCAN) 4 mg/0.1 mL nasal spray Administer 1 spray into a nostril. If no response after 2-3 minutes, give another dose in the other nostril using a new spray. 1 each 1    sildenafil (VIAGRA) 100 mg tablet Take 100 mg by mouth As directed      tamsulosin (FLOMAX) 0.4 mg Take 1 capsule by mouth daily at bedtime      acetaminophen (TYLENOL) 325 mg tablet Take 3 tablets (975 mg total) by mouth every 8 (eight) hours (Patient not taking: Reported on 11/6/2024)      bethanechol (URECHOLINE) 10 mg tablet Take 1 tablet by mouth 3 (three) times a day (Patient not taking: Reported on 11/6/2024)      bisacodyl (DULCOLAX) 10 mg suppository Insert 1 suppository (10 mg total) into the rectum daily as needed for constipation (Patient not taking: Reported on 8/14/2024) 12 suppository 0    Elastic Bandages & Supports (Wrap/Multipurpose 2.75\"x21.4yd) MISC Use in the morning Please dispense lymphedema wrap and wrap from hand up to the proximal shoulder (Patient not taking: Reported on 8/14/2024) 1 each 0    hypromellose (GENTEAL SEVERE) 0.3 % ophthalmic gel Apply to eye (Patient not " taking: Reported on 8/14/2024)      methylPREDNISolone 4 MG tablet therapy pack take AS PRESCRIBED (Patient not taking: Reported on 10/17/2024)      sulfamethoxazole-trimethoprim (BACTRIM DS) 800-160 mg per tablet Take 1 tablet by mouth 2 (two) times a day (Patient not taking: Reported on 8/14/2024)      testosterone cypionate (DEPO-TESTOSTERONE) 200 mg/mL SOLN inject 2 MILLILITERS intramuscularly every 14 days (Patient not taking: Reported on 8/14/2024)      torsemide (DEMADEX) 10 mg tablet Take 1 tablet twice a day by oral route. (Patient not taking: Reported on 8/14/2024)       No current facility-administered medications on file prior to visit.      Social History     Tobacco Use    Smoking status: Never    Smokeless tobacco: Never   Vaping Use    Vaping status: Never Used   Substance and Sexual Activity    Alcohol use: Not Currently     Alcohol/week: 4.0 - 7.0 standard drinks of alcohol     Types: 4 - 7 Glasses of wine per week     Comment: Just weekends    Drug use: No    Sexual activity: Yes     Partners: Female     Birth control/protection: Other     Comment: vasectomy         Pulse 77   Temp (!) 96.2 °F (35.7 °C) (Temporal)   Ht 6' (1.829 m)   SpO2 95%   BMI 29.84 kg/m²     Physical Exam  Eyes:      Pupils: Pupils are equal, round, and reactive to light.   Neurological:      Mental Status: He is oriented to person, place, and time.      Cranial Nerves: Cranial nerves 2-12 are intact.       Neurologic Exam     Mental Status   Oriented to person, place, and time.   Level of consciousness: alert  Knowledge: good.     Cranial Nerves   Cranial nerves II through XII intact.     CN II   Visual fields full to confrontation.     CN III, IV, VI   Pupils are equal, round, and reactive to light.    CN V   Facial sensation intact.     CN VII   Facial expression full, symmetric.     CN VIII   CN VIII normal.     Motor Exam Strength testing in upper extremities was a 5 out of 5 in the upper extremities except for the  handgrip which was a 4 out of 5.  There was atrophy of the intrinsic palmar muscles.  In the lower extremity there is a , increased tone in the paraspinals quadriceps and hamstrings.  This is worse on the right than the left.  He had slight increase strength in the right lower extremity at a 4 out of 5 in the hamstrings compared to the left which was a 4 -.  Hip flexion was a 3 out of 5.  Ankle dorsiflexion was a 3+.          Sensory Exam   There is no sensory level.  He had intact temperature in the stomach at T12.     Gait, Coordination, and Reflexes He presented in a wheelchair.  He was unable to stand independently.       I spent 50 minutes with the patient discussing him symptoms examining the patient and formulating a plan.  I also reviewed [facility]      Return as scheduled in February.

## 2024-11-06 NOTE — ASSESSMENT & PLAN NOTE
Is a 64-year-old patient with chronic lumbar stenosis status post multiple surgeries including a spinal cord stimulator.  Since his last visit in August he has had improvement of the strength in the upper extremities but he has spasticity in the lower extremities with increased tone.  He is currently on Zanaflex which does help.  He is also proceeding with stretching techniques.  He is questioning if there is any other cause of the symptoms.  I explained to him this seems to be a reaction to a chronic entrapment of nerve roots.   given the fact that he has reflexes are decreased , he has increased tone this is suggestive of more of a lower motor neuron etiology.  There is no clinical or EMG abnormalities consistent with motor neuron disease.  His symptoms have recurred with trauma and resulting in myelomalacia.  This is not consistent with an MS.    I found no evidence of a neurodegenerative neuromuscular disorder.    Recommended that he contact  [facility] about the potential baclofen pump which may need to reduce the intensity of the spasms and help his ability to ambulate.    He is for MRIs of the brain,  cervical spine.  thoracic spine and lumbar spine.    We also discussed other options to determine if he if he has any type of infectious or inflammatory etiology such as a CSF examination.  I am concerned regarding his numerous interventions in the lumbar spine about aggravating his symptoms.  We will discuss this more in detail in the future.      He is to remain off of BuSpar and Effexor or Valium for now.    Discussed the treatment for inflammatory process which can include steroids.  However steroids have been tried in the past and recently last year he developed cellulitis for with long-term use of steroids.  This could be discussed again in the future if needed.

## 2024-11-18 ENCOUNTER — TELEPHONE (OUTPATIENT)
Age: 64
End: 2024-11-18

## 2024-11-18 NOTE — TELEPHONE ENCOUNTER
Janelle Cabrera, patients caregiver called to inform that all four MRIs have been completed and to request Dr. Leyva to please review and call back to discuss.  Janelle stated that patient would be available this Friday 11/22/24 anytime after 3 PM if possible.  Advised request would be forwarded.    Please assist,    Thank you

## 2024-11-22 ENCOUNTER — TELEPHONE (OUTPATIENT)
Dept: NEUROLOGY | Facility: CLINIC | Age: 64
End: 2024-11-22

## 2024-11-22 NOTE — TELEPHONE ENCOUNTER
Caregiver Janelle called back in about virtual appt to go over MRIs that were received on 11/18      They would love a call back asap to go over results.    Please advise  Thank you

## 2024-11-22 NOTE — TELEPHONE ENCOUNTER
I  spoke to Bill today regarding his MRIs.  The MRIs were performed through [facility] in the second week of November and had been ordered by Dr. Blue the PMNR physician from [facility].  We reviewed the results of the MRI of the brain and cervical spine thoracic spine and lumbar spine.  The MRI of the brain shows mild microangiopathy but otherwise were normal.  The MRI of the cervical spine shows multilevel cervical spondylosis with myelomalacia at C4 C3 with no significant spinal cord enhancement     The MRI of the lumbar and thoracic spines were abnormal but limited due to artifact from his spinal cord stimulator.  There is a questionable severe right T7-T8 and left T8-T9 neural foraminal narrowing.  The MRI of the lumbar spine showed postop changes L2-S1 with decompression laminectomy and posterior fusion there is evidence of a severe left L5-S1 foraminal stenosis.        We discussed that the MRIs are not consistent with MS or any type of demyelinating disease.  The  the majority of his symptoms occur in the lumbar spine.  He did have surgery in May 2016 and a spinal cord stimulator was placed in 2018.     We discussed that his symptoms are not due to a primary neurological condition there is no evidence of neuromuscular disease MS ALS.     He does not require a CSF examination.     He is contemplating a consultation at [facility] to discuss further options regarding his continued weakness and ambulatory dysfunction.  We discussed that they may consider a myelogram but ultimately that would be their choice regarding recommendations.  We discussed further many other surgeons are available for the [facility] but since he has had  many surgeries in his lower back would recommend a spinal center such as [facility] for an extensive evaluation.     I also suggest that he continue to follow-up with Dr. Blue at [facility] rehab     I did not offer him any further changes in his medications.  We will discuss  this in further at his visit in February.

## 2024-11-22 NOTE — TELEPHONE ENCOUNTER
I spoke to Bill today regarding his MRIs.  The MRIs were performed through [facility] in the second week of November and had been ordered by Dr. Blue the PMNR physician from [facility].  We reviewed the results of the MRI of the brain and cervical spine thoracic spine and lumbar spine.  The MRI of the brain shows mild microangiopathy but otherwise were normal.  The MRI of the cervical spine shows multilevel cervical spondylosis with myelomalacia at C4 C3 with no significant spinal cord enhancement    The MRI of the lumbar and thoracic spines were abnormal but limited due to artifact from his spinal cord stimulator.  There is a questionable severe right T7-T8 and left T8-T9 neural foraminal narrowing.  The MRI of the lumbar spine showed postop changes L2-S1 with decompression laminectomy and posterior fusion there is evidence of a severe left L5-S1 foraminal stenosis.      We discussed that the MRIs are not consistent with MS or any type of demyelinating disease.  The  the majority of his symptoms occur in the lumbar spine.  He did have surgery in May 2016 and a spinal cord stimulator was placed in 2018.    We discussed that his symptoms are not due to a primary neurological condition there is no evidence of neuromuscular disease MS ALS.    He does not require a CSF examination.    He is contemplating a consultation at [facility] to discuss further options regarding his continued weakness and ambulatory dysfunction.  We discussed that they may consider a myelogram but ultimately that would be their choice regarding recommendations.  We discussed further many other surgeons are available for the [facility] but since he has had  many surgeries in his lower back would recommend a spinal center such as [facility] for an extensive evaluation.    I also suggest that he continue to follow-up with Dr. Blue at [facility] rehab    I did not offer him any further changes in his medications.  We will discuss this in  further at his visit in February.

## 2024-11-22 NOTE — TELEPHONE ENCOUNTER
Inbound call received from Patient returning Dr. Leyva's call. Patient said Dr. Leyva left a message asking him to call the office and have the office call her at home and tell her to call him back. Patient confirmed [phone] is the best call back number and we may leave a detailed voicemail. Katt disconnected call before branded closing could be given.     Message sent via Epic Secure Chat to Dr. Leyva.     Dr. Leyva please call patient back if agreeable, thank you!

## 2024-12-02 ENCOUNTER — DOCUMENTATION (OUTPATIENT)
Dept: NEUROSURGERY | Facility: CLINIC | Age: 64
End: 2024-12-02

## 2024-12-11 NOTE — ASSESSMENT & PLAN NOTE
Seen for evaluation of LBP and BLE pain  Seen in the hospital 5/2024 for rapidly worsening BLE spasticity and ambulatory dysfunction. Recommendation at that time was to continue conservatively as there were no findings indicative of new or worsening compression on imaging.   Hx of multiple prior cervical and lumbar surgeries.   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  In 2023, he had difficulty with ambulation and was found to have bacteremia also causing him to decline.  In February 2024, he was noting increased tone in his legs and back and proceeded with Botox injections.  These ended up making his spasms worse leading him to more ambulatory dysfunction which landed him in the hospital where he was seen by our team.  After his hospitalization, he was discharged to [facility] for intensive rehab where he has made progress.  Follows with physiatry at [facility] and is very proactive with both land and aqua therapy, massage therapy,     Imaging:  MRI lumbar 11/15/24: 1. Severely motion limited examination. The majority of the sequences are nondiagnostic. 2. No definite acute or enhancing abnormality within the limitations of motion. 3. Postoperative changes of L2-S1 posterior fusion with decompression laminectomy. 4. There is no high-grade central stenosis. Combination of disc bulging and facet disease results in possible severe left L5-S1 foraminal stenosis.   MRI thoracic 11/13/24: 1.   Evaluation of the study is limited secondary to motion artifact. 2.  Magnetic susceptibility artifact noted from the spinal stimulator along the dorsal margin of the spinal cord at the level of the T9-T10 vertebral body. 3.  Within the limitations of this examination, there is no significant spinal canal stenosis. Questionable severe right T7-T8 and severe left T8-T9 neural foraminal narrowing. 4.  No abnormal enhancement.   MRI cervical 11/11/24: 1. Multilevel cervical spondylosis in this patient status post multilevel decompression. There is severe neural foraminal narrowing at several levels as discussed above. 2. Myelomalacia with diminutive cord caliber at C3-C4 and C5- 3. No significant spinal canal enhancement appreciated. 4. STIR signal hyperintensity (edema) affecting the left C4-C5 facet joint, typical of active degenerative facet arthropathy/inflammation     Plan:  Reviewed current symptoms with patient and aide as well as imaging with patient, aide, and Dr. Noel. Unfortunately his most recent MRI, especially of the lumbar spine, was suboptimal due to hardware artifact and with significant motion artifact.   Since the MRI was nondiagnostic, the MRI lumbar spine needs to be repeated with [place] protocol.  Offered patient sedation, he would like to try with just Xanax. The cervical and thoracic MRIs do not need to be repeated.  Will also obtain upright lumbar spine XR to evaluate his hardware.  Continue to follow with [facility].  Xanax e-prescribed to pharmacy.  Follow-up with Dr. Noel once imaging completed.    Call sooner with any questions or concerns.    Orders:    Creatinine, serum; Future    BUN; Future    MRI lumbar spine w wo contrast; Future    XR spine lumbar 2 or 3 views injury; Future    ALPRAZolam (XANAX) 0.5 mg tablet; Take 1 tab 2 hours prior to MRI. May take another tab 30 minutes prior if needed.

## 2024-12-12 ENCOUNTER — OFFICE VISIT (OUTPATIENT)
Dept: NEUROSURGERY | Facility: CLINIC | Age: 64
End: 2024-12-12
Payer: MEDICARE

## 2024-12-12 ENCOUNTER — TELEPHONE (OUTPATIENT)
Dept: NEUROSURGERY | Facility: CLINIC | Age: 64
End: 2024-12-12

## 2024-12-12 VITALS
TEMPERATURE: 98.3 F | BODY MASS INDEX: 29.8 KG/M2 | OXYGEN SATURATION: 97 % | RESPIRATION RATE: 18 BRPM | WEIGHT: 220 LBS | HEART RATE: 79 BPM | SYSTOLIC BLOOD PRESSURE: 128 MMHG | HEIGHT: 72 IN | DIASTOLIC BLOOD PRESSURE: 76 MMHG

## 2024-12-12 DIAGNOSIS — Z98.1 STATUS POST LUMBAR SPINAL FUSION: Primary | ICD-10-CM

## 2024-12-12 PROCEDURE — 99215 OFFICE O/P EST HI 40 MIN: CPT | Performed by: PHYSICIAN ASSISTANT

## 2024-12-12 RX ORDER — ALPRAZOLAM 0.5 MG
TABLET ORAL
Qty: 2 TABLET | Refills: 0 | Status: SHIPPED | OUTPATIENT
Start: 2024-12-12

## 2024-12-12 NOTE — PROGRESS NOTES
Name: Jos Hawk      : 1960      MRN: [id number]  Encounter Provider: Elin Lombardi PA-C  Encounter Date: 2024   Encounter department: [facility]  :  Assessment & Plan  Status post lumbar spinal fusion  Seen for evaluation of LBP and BLE pain  Seen in the hospital 2024 for rapidly worsening BLE spasticity and ambulatory dysfunction. Recommendation at that time was to continue conservatively as there were no findings indicative of new or worsening compression on imaging.   Hx of multiple prior cervical and lumbar surgeries.   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  In , he had difficulty with ambulation and was found to have bacteremia also causing him to decline.  In 2024, he was noting increased tone in his legs and back and proceeded with Botox injections.  These ended up making his spasms worse leading him to more ambulatory dysfunction which landed him in the hospital where he was seen by our team.  After his hospitalization, he was discharged to [facility] for intensive rehab where he has made progress.  Follows with physiatry at [facility] and is very proactive with both land and aqua therapy, massage therapy,     Imaging:  MRI lumbar 11/15/24: 1. Severely motion limited examination. The majority of the sequences are nondiagnostic. 2. No definite acute or enhancing abnormality within the limitations of  motion. 3. Postoperative changes of L2-S1 posterior fusion with decompression laminectomy. 4. There is no high-grade central stenosis. Combination of disc bulging and facet disease results in possible severe left L5-S1 foraminal stenosis.   MRI thoracic 11/13/24: 1.  Evaluation of the study is limited secondary to motion artifact. 2.  Magnetic susceptibility artifact noted from the spinal stimulator along the dorsal margin of the spinal cord at the level of the T9-T10 vertebral body. 3.  Within the limitations of this examination, there is no significant spinal canal stenosis. Questionable severe right T7-T8 and severe left T8-T9 neural foraminal narrowing. 4.  No abnormal enhancement.   MRI cervical 11/11/24: 1. Multilevel cervical spondylosis in this patient status post multilevel decompression. There is severe neural foraminal narrowing at several levels as discussed above. 2. Myelomalacia with diminutive cord caliber at C3-C4 and C5- 3. No significant spinal canal enhancement appreciated. 4. STIR signal hyperintensity (edema) affecting the left C4-C5 facet joint, typical of active degenerative facet arthropathy/inflammation     Plan:  Reviewed current symptoms with patient and aide as well as imaging with patient, aide, and Dr. Noel. Unfortunately his most recent MRI, especially of the lumbar spine, was suboptimal due to hardware artifact and with significant motion artifact.   Since the MRI was nondiagnostic, the MRI lumbar spine needs to be repeated with [place] protocol.  Offered patient sedation, he would like to try with just Xanax. The cervical and thoracic MRIs do not need to be repeated.  Will also obtain upright lumbar spine XR to evaluate his hardware.  Continue to follow with [facility].  Xanax e-prescribed to pharmacy.  Follow-up with Dr. Noel once imaging completed.    Call sooner with any questions or concerns.    Orders:    Creatinine, serum; Future    BUN; Future    MRI lumbar spine w  wo contrast; Future    XR spine lumbar 2 or 3 views injury; Future    ALPRAZolam (XANAX) 0.5 mg tablet; Take 1 tab 2 hours prior to MRI. May take another tab 30 minutes prior if needed.        History of Present Illness   64-year-old seen for evaluation of his back and ambulatory dysfunction.  He has a history of multiple cervical and lumbar surgeries including history of C3-4 SCI in 1998 due to a fall, s/p posterior decompression without fusion, then in June 2022 he had minimally invasive surgery to remove scar tissue in his cervical spine as well as minimally invasive but failed attempt to remove fusion hardware in his lumbar spine-reports that 1 screw was removed.  In the lumbar spine, he underwent laminectomy in 1994, lumbar spine fusion in 2001 with revision fusion in 2016 at [facility], Fair SCS placement 2019 at [facility].  States that things started to decline after a fall in [place] in 2022 causing additional numbness and weakness, suspect likely SCI at that time.  In 2023, he was bacteremic and had further progression in symptoms.  Reports that of February 2024, he was noting increased tone in his legs and back and proceeded with Botox injections. These ended up making his spasms worse leading him to more ambulatory dysfunction which landed him in the hospital several months later where he was seen by our team. He was seen in the hospital 5/2024 for rapidly worsening BLE spasticity and ambulatory dysfunction with HTN and fluid retention. Recommendation at that time was to continue conservatively as there were no findings indicative of new or worsening compression on imaging.  After the spasms eased off, he was discharged to [facility] for intensive rehab where he has made progress.  States that he has a constant tension in his lower back and legs.  Since the hospitalization in May, he is unable to stand up on his own.  He needs assistance getting up with a Jose F lift to get out of bed in the morning.  He is now able  to get into bed with a sit to stand.  At [facility], he is able to use a large walker and a gait belt. When the tone increases in his legs he is unable to support himself.  Notes that prior to May, he could get up on his own, but since then he cannot even despite rehab.  Patient denies shooting pain down his legs, but has a constant throbbing pain/numbness as well as spasms that have increased in the last month or so.  He is a particularly bad between the hours of 4-9 AM.  From the waist down he feels like there is a band tightening around him.  Going from sitting to reclined is painful and takes his breath away.  Feels that he cannot roll over in bed.  Patient utilizes a motorized wheelchair.  Denies BBI.  He does have some sexual dysfunction, unable to orgasm.  Patient follows with physiatry at [facility] and is very proactive with both land and aqua therapy, massage therapy, .  He is interested to see if his lumbar spine hardware can be removed/redone, stenosis addressed.      Review of Systems   Constitutional: Negative.    HENT: Negative.     Eyes: Negative.    Respiratory: Negative.     Cardiovascular: Negative.    Gastrointestinal: Negative.    Endocrine: Negative.    Genitourinary: Negative.    Musculoskeletal:  Positive for back pain (lower back and hips), gait problem (unable to wlk without asst) and myalgias (spasms started in May then stopped and picked back up in september and are worse now).        6 months ago was walking ok but now he needs help, can't use walker that much  Daily PT, uses device to help with walking      Skin: Negative.    Allergic/Immunologic: Negative.    Neurological:  Positive for tremors (in the left foot and has to move leg for it to stop, became worse in september), weakness (in the legs) and numbness (in the legs and tingling in the left foot and cause foot to spasm).        Sensitive to to touch    Hematological: Negative.    Psychiatric/Behavioral:  Positive  for sleep disturbance (hasn't slept well in 2 years spasma are very bad).     I have personally reviewed the MA's review of systems and made changes as necessary.    Past Medical History   Past Medical History:   Diagnosis Date    Balance problems     BPH (benign prostatic hyperplasia)     Chronic back pain     Chronic pain disorder     COVID-19     2/2021-asymptomatic     Erectile dysfunction     Hyperlipidemia     IBS (irritable bowel syndrome)     Nocturia     OAB (overactive bladder)     Spinal stenosis     cervical and lumbar    Testicular hypogonadism     Urinary frequency     Urinary urgency      Past Surgical History:   Procedure Laterality Date    BACK SURGERY  05/18/2016    with hardware    BACK SURGERY      2 back surgery unable to remove hardware    COLONOSCOPY  11/2004    Dr Taylor. tubular adenoma polyp removed    COLONOSCOPY  12/2007    Dr Cameron. Normal    COLONOSCOPY  07/2013    Dr Cameron. Normal.     COLONOSCOPY  06/2019    Dr Cameron. Hemorrhoids, normal.     NECK SURGERY      decompression of neck     NECK SURGERY Right     scar tissues removed    MA EXCISION HYDROCELE UNILATERAL Right 06/10/2021    Procedure: HYDROCELECTOMY;  Surgeon: Dereck Goddard DO;  Location: [facility];  Service: Urology    SPINAL CORD STIMULATOR IMPLANT  10/10/2019    WOUND DEBRIDEMENT Right 08/10/2023    Procedure: DEBRIDEMENT UPPER EXTREMITY (WASH OUT) - elbow;  Surgeon: Jesse Avendaño MD;  Location: [facility];  Service: Orthopedics     History reviewed. No pertinent family history.   reports that he has never smoked. He has never used smokeless tobacco. He reports that he does not currently use alcohol after a past usage of about 4.0 - 7.0 standard drinks of alcohol per week. He reports that he does not use drugs.  Current Outpatient Medications on File Prior to Visit   Medication Sig Dispense Refill    atorvastatin (LIPITOR) 20 mg tablet Take 20 mg by mouth daily with dinner      diazepam (VALIUM) 2 mg tablet  "1 po 30 mins prior to procedure . Pt can repeat in 2 hours if necessary (Patient taking differently: if needed 1 po 30 mins prior to procedure . Pt can repeat in 2 hours if necessary) 10 tablet 0    fluocinonide (LIDEX) 0.05 % cream       folic acid (FOLVITE) 1 mg tablet Take 1 tablet (1mg) by mouth Once Daily.      ibuprofen (MOTRIN) 400 mg tablet Take 1 tablet (400 mg total) by mouth every 6 (six) hours as needed for mild pain  0    ketoconazole (NIZORAL) 2 % shampoo       midodrine (PROAMATINE) 5 mg tablet Take 1 tablet (5 mg total) by mouth 3 (three) times a day before meals (Patient taking differently: Take 5 mg by mouth if needed) 270 tablet 3    tamsulosin (FLOMAX) 0.4 mg Take 1 capsule by mouth daily at bedtime      acetaminophen (TYLENOL) 325 mg tablet Take 3 tablets (975 mg total) by mouth every 8 (eight) hours (Patient not taking: Reported on 11/6/2024)      bethanechol (URECHOLINE) 10 mg tablet Take 1 tablet by mouth 3 (three) times a day (Patient not taking: Reported on 11/6/2024)      bisacodyl (DULCOLAX) 10 mg suppository Insert 1 suppository (10 mg total) into the rectum daily as needed for constipation (Patient not taking: Reported on 8/14/2024) 12 suppository 0    Cholecalciferol 50 MCG (2000 UT) TABS 50 mcg      Elastic Bandages & Supports (Wrap/Multipurpose 2.75\"x21.4yd) MISC Use in the morning Please dispense lymphedema wrap and wrap from hand up to the proximal shoulder (Patient not taking: Reported on 8/14/2024) 1 each 0    hypromellose (GENTEAL SEVERE) 0.3 % ophthalmic gel Apply to eye (Patient not taking: Reported on 8/14/2024)      methylPREDNISolone 4 MG tablet therapy pack take AS PRESCRIBED (Patient not taking: Reported on 10/17/2024)      naloxone (NARCAN) 4 mg/0.1 mL nasal spray Administer 1 spray into a nostril. If no response after 2-3 minutes, give another dose in the other nostril using a new spray. 1 each 1    sildenafil (VIAGRA) 100 mg tablet Take 100 mg by mouth As directed      " sulfamethoxazole-trimethoprim (BACTRIM DS) 800-160 mg per tablet Take 1 tablet by mouth 2 (two) times a day (Patient not taking: Reported on 8/14/2024)      testosterone cypionate (DEPO-TESTOSTERONE) 200 mg/mL SOLN inject 2 MILLILITERS intramuscularly every 14 days (Patient not taking: Reported on 8/14/2024)      torsemide (DEMADEX) 10 mg tablet Take 1 tablet twice a day by oral route. (Patient not taking: Reported on 8/14/2024)       No current facility-administered medications on file prior to visit.   No Known Allergies   Social History     Tobacco Use    Smoking status: Never    Smokeless tobacco: Never   Vaping Use    Vaping status: Never Used   Substance and Sexual Activity    Alcohol use: Not Currently     Alcohol/week: 4.0 - 7.0 standard drinks of alcohol     Types: 4 - 7 Glasses of wine per week     Comment: Just weekends    Drug use: No    Sexual activity: Yes     Partners: Female     Birth control/protection: Other     Comment: vasectomy        Objective   /76 (BP Location: Left arm, Patient Position: Sitting, Cuff Size: Adult)   Pulse 79   Temp 98.3 °F (36.8 °C) (Temporal)   Resp 18   Ht 6' (1.829 m)   Wt 99.8 kg (220 lb)   SpO2 97%   BMI 29.84 kg/m²     Physical Exam  Vitals reviewed.   Constitutional:       General: He is awake.      Appearance: Normal appearance.   HENT:      Head: Normocephalic and atraumatic.   Eyes:      Conjunctiva/sclera: Conjunctivae normal.   Neck:      Comments: Healed posterior neck incisions  Cardiovascular:      Rate and Rhythm: Normal rate.   Pulmonary:      Effort: Pulmonary effort is normal.   Musculoskeletal:      Comments: Healed spinal incisions   Skin:     General: Skin is warm and dry.   Neurological:      Mental Status: He is alert.      Deep Tendon Reflexes:      Reflex Scores:       Bicep reflexes are 1+ on the right side and 1+ on the left side.       Brachioradialis reflexes are 1+ on the right side and 1+ on the left side.       Patellar reflexes  are 1+ on the right side and 1+ on the left side.  Psychiatric:         Attention and Perception: Attention and perception normal.         Mood and Affect: Mood and affect normal.         Speech: Speech normal.         Behavior: Behavior normal. Behavior is cooperative.         Thought Content: Thought content normal.         Cognition and Memory: Cognition and memory normal.         Judgment: Judgment normal.       Neurological Exam  Mental Status  Awake and alert. Memory is normal. Speech is normal. Language is fluent with no aphasia. Attention and concentration are normal.    Motor                                               Right                     Left  Hip flexion                              4-                          4-  Plantarflexion                         4+                          4+  Dorsiflexion                            3                          4-                                             Right                     Left  Deltoid                                   5                          5   Biceps                                   5                          5   Triceps                                  5                          5   Wrist flexor                            5                          5   Wrist extensor                       5                          5   Finger flexor                          4                          4    Sensory  Diminished medial left forearm. .     Reflexes                                            Right                      Left  Brachioradialis                    1+                         1+  Biceps                                 1+                         1+  Patellar                                1+                         1+    Right pathological reflexes: Domenica's absent.  Left pathological reflexes: Domenica's absent.    Coordination  Right: Rapid alternating movement normal.Left: Rapid alternating movement normal.    Gait    In motorized  scooter.

## 2024-12-12 NOTE — TELEPHONE ENCOUNTER
Pt using his m/c and [employer] as his insurance, not [employer], have referral current and valid if pt decides to use it. So will bill out claims with m/c as primary

## 2024-12-12 NOTE — TELEPHONE ENCOUNTER
Please let patient know that I spoke with Dr. Noel regarding his imaging.    He only needs the MRI lumbar spine to be repeated, Dr. Noel is okay with the cervical and thoracic.  Please cancel the thoracic spine MRI.    He would however like the patient to have a lumbar spine x-ray which will better evaluate his hardware.    I have ordered this for him.  So patient will need an x-ray and lumbar spine MRI prior to seeing Dr. Noel in the office.

## 2024-12-12 NOTE — TELEPHONE ENCOUNTER
12/12/24 contacted central scheduling spoke to santa and she will cancel the mri thoracic as per adriel harris advised patient of new orders and I also let procedures know that he needs a kristal lift advised santa via naresh Harris before he comes back with DKO     Elin Harris PA-C routed conversation to [facility] [place] Clerical2 hours ago (1:19 PM)     MAYRA MattaC2 hours ago (1:19 PM)       Please let patient know that I spoke with Dr. Noel regarding his imaging.     He only needs the MRI lumbar spine to be repeated, Dr. Noel is okay with the cervical and thoracic.  Please cancel the thoracic spine MRI.     He would however like the patient to have a lumbar spine x-ray which will better evaluate his hardware.     I have ordered this for him.  So patient will need an x-ray and lumbar spine MRI prior to seeing Dr. Noel in the office.             Patient has a stimulator and will be contacted by central scheduling to schedule loraine lumbar and  thoracic he will call to be scheduled with Dr. Carrillo for his follow up once he hears back from procedures he is aware that he should call us for his follow up appointment with Lorena.

## 2024-12-18 ENCOUNTER — TELEPHONE (OUTPATIENT)
Dept: NEUROSURGERY | Facility: CLINIC | Age: 64
End: 2024-12-18

## 2024-12-18 DIAGNOSIS — Z98.1 STATUS POST LUMBAR SPINAL FUSION: Primary | ICD-10-CM

## 2024-12-18 NOTE — TELEPHONE ENCOUNTER
Patient phoned VM reached and message left with provider response on not needing thoracic imaging.  Patient encouraged to phone back to [phone] option 3 clinical line with any additional questions.

## 2024-12-18 NOTE — TELEPHONE ENCOUNTER
Patient care taker, Janelle Cabrera phoned and inquired about the patients LOV.  She stated that PUNEET Lombardi was very thorough and had spent a lot of time with them and they were under the impression that there would need to be a thoracic x ray.      This RN reviewed the LOV notes which do not note the need for thoracic xray.  Janelle requested that this RN please double check.  Janelle stated that this RN can then call back to patient with response.

## 2024-12-20 ENCOUNTER — TELEPHONE (OUTPATIENT)
Age: 64
End: 2024-12-20

## 2024-12-20 DIAGNOSIS — R29.898 WEAKNESS OF RIGHT LOWER EXTREMITY: Primary | ICD-10-CM

## 2024-12-20 DIAGNOSIS — G95.89 MYELOMALACIA OF CERVICAL CORD (HCC): ICD-10-CM

## 2024-12-20 RX ORDER — PREDNISONE 10 MG/1
5 TABLET ORAL DAILY
Qty: 15 TABLET | Refills: 0 | Status: SHIPPED | OUTPATIENT
Start: 2024-12-20

## 2024-12-20 NOTE — TELEPHONE ENCOUNTER
I spoke to patient and he informed he use to take Prednisone 10 mg daily, However, due to an infection he stopped taking it for some time. However, he was seen in Nov per pt by Dr Leyva and the prednisone was discussed at that time, but pt was not ready to resume taking that medication. However, he recently seen he had Methylprednisolone pk refill on file at his pharmacy. He picked it up and took it the past 3 days (12/17-12/20). He informed he seen a significant progress since he took it for just those 3 days. His spasms slowed down, able to walk with a walker, almost stood up by himself, he still just need very little guidance.    Patient requesting provider to send Prednisone 10 mg to his [facility] in [place].  He informed if medication is sent, his pharmacy will call him once med is ready for .    Routed to provider.

## 2024-12-20 NOTE — TELEPHONE ENCOUNTER
Patient calling to let doctor know that 10mg prednisone works for him and would like a prescription called in.  Please call back to discuss his results on prescription.

## 2024-12-23 NOTE — TELEPHONE ENCOUNTER
Debra Leyva, DO       This is not something I recommend on a daily basis due to the high risk of  infection and  other side effects . It can affect healing and any sustained recovery . Will place him on 5 mg daily only for next month only . Will d/w more when I return to the office     ______________________    I called pt and left a generic msg per communication consent, unable to left a detailed msg.     When pt calls back please review msg above from Dr Leyva and inform Prednisone 5 mg tab take daily is prescribed for 1 month only.

## 2025-01-08 ENCOUNTER — HOSPITAL ENCOUNTER (OUTPATIENT)
Dept: RADIOLOGY | Facility: HOSPITAL | Age: 65
Discharge: HOME | End: 2025-01-08
Payer: MEDICARE

## 2025-01-08 ENCOUNTER — TELEPHONE (OUTPATIENT)
Dept: NEUROSURGERY | Facility: CLINIC | Age: 65
End: 2025-01-08

## 2025-01-08 ENCOUNTER — HOSPITAL ENCOUNTER (OUTPATIENT)
Dept: MRI IMAGING | Facility: HOSPITAL | Age: 65
Discharge: HOME | End: 2025-01-08
Payer: MEDICARE

## 2025-01-08 DIAGNOSIS — Z98.1 STATUS POST LUMBAR SPINAL FUSION: ICD-10-CM

## 2025-01-08 DIAGNOSIS — Z96.89 SPINAL CORD STIMULATOR STATUS: ICD-10-CM

## 2025-01-08 PROCEDURE — 72148 MRI LUMBAR SPINE W/O DYE: CPT

## 2025-01-08 NOTE — TELEPHONE ENCOUNTER
Called patient to remind him of an XR lumbar that needs to be completed and he stated that someone from our office told him he did not need it, he also stated he is not going back to the hospital to get it since he already left and that he will see us tomorrow and hung up.

## 2025-01-09 ENCOUNTER — TELEPHONE (OUTPATIENT)
Dept: NEUROSURGERY | Facility: CLINIC | Age: 65
End: 2025-01-09

## 2025-01-09 ENCOUNTER — OFFICE VISIT (OUTPATIENT)
Dept: NEUROSURGERY | Facility: CLINIC | Age: 65
End: 2025-01-09
Payer: MEDICARE

## 2025-01-09 VITALS
OXYGEN SATURATION: 95 % | TEMPERATURE: 97.5 F | DIASTOLIC BLOOD PRESSURE: 82 MMHG | SYSTOLIC BLOOD PRESSURE: 124 MMHG | RESPIRATION RATE: 18 BRPM | HEART RATE: 57 BPM | WEIGHT: 220 LBS | HEIGHT: 72 IN | BODY MASS INDEX: 29.8 KG/M2

## 2025-01-09 DIAGNOSIS — G62.9 NEUROPATHY: ICD-10-CM

## 2025-01-09 DIAGNOSIS — R25.2 SPASTICITY: Primary | ICD-10-CM

## 2025-01-09 DIAGNOSIS — G82.50 QUADRIPARESIS (HCC): ICD-10-CM

## 2025-01-09 PROCEDURE — 99215 OFFICE O/P EST HI 40 MIN: CPT | Performed by: NEUROLOGICAL SURGERY

## 2025-01-09 NOTE — ASSESSMENT & PLAN NOTE
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  He has had a stepwise functional reduction in his ability to perform activities.  His imaging studies are substandard.  I have recommended a CT myelogram of the entire neural axis to understand better the anatomical association of the cord and spine.  He also appears to have a myofascial aberration which needs to be better characterized.  Certainly posttraumatic myofascial disorders are explained by his history but inflammatory components are not fully.  He has been on steroids and improves on steroids and so understanding this further is critically important.  Will plan on seeing him back after the completion of the studies.  Orders:    FL myelogram cervical; Future    FL myelogram 2 or more regions; Future    CT cervical spine with contrast; Future    CT thoracic spine with and without contrast; Future    CBC and Platelet; Future    Comprehensive Metabolic Panel; Future    RF Screen w/ Reflex to Titer; Future    Cyclic citrul peptide antibody, IgG; Future    Sjogren's Antibodies; Future    Uric acid; Future    Lyme Total AB W Reflex to IGM/IGG; Future    C-reactive protein; Future    Sedimentation rate, automated; Future
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
## 2025-01-09 NOTE — TELEPHONE ENCOUNTER
Patient ct thoracic and cervical spine was scheduled for patient on 2/6/25 and follow up on 2/11/25 advised central scheduling that patient needs a kristal lift.

## 2025-01-28 ENCOUNTER — TELEPHONE (OUTPATIENT)
Dept: NEUROLOGY | Facility: CLINIC | Age: 65
End: 2025-01-28

## 2025-01-28 NOTE — TELEPHONE ENCOUNTER
made a call for appt reminder with Dr. Leyva 2/5/25  @2:30 pm.  and also mentioned [place] office address and phone number as well. informed arrive 10 to 15 mins early for the appt.

## 2025-01-31 NOTE — NURSING NOTE
Received email from patient, see below      From: Tony Hawk <[email or web address]>   Sent: Friday, January 31, 2025 1:12 PM  To: Mila Gustafson <[email or web address]  Subject: [EXTERNAL] Re: Upcoming Radiology appointment at [facility]    Mila, have you verified with Dr. Noel that they will be doing lumbar, thoracic, and cervical. I specifically recall all three of them were to be done and I am not going to go do this another time please verify this is very important to me. Thank you so much for your help.    Tony Manmody    My response to the patient  Good afternoon Mr. Hawk,     I sent a message to your ordering MD Bert but have yet to hear a response, I attempted another method but he has his service as Do Not Disturb. I would recommend you calling his office and also asking for the lumbar. I had the technologist look into the orders and only a  cervical and thoracic are ordered by Dr. Noel, the lumbar CT order was already used when you had your CT on 4/30/24 at the [facility].   Dr. Noel office number is [phone].     Have a good weekend,   Mila Parr RN  [facility] Radiology RN  [address] [place] [address], [place] [address]  [phone] (Office)  [phone] (Fax)  [email or web address]

## 2025-02-04 ENCOUNTER — DOCUMENTATION (OUTPATIENT)
Age: 65
End: 2025-02-04

## 2025-02-04 DIAGNOSIS — Z98.1 STATUS POST LUMBAR SPINAL FUSION: ICD-10-CM

## 2025-02-04 RX ORDER — ALPRAZOLAM 0.5 MG
TABLET ORAL
Qty: 2 TABLET | Refills: 0 | Status: CANCELLED | OUTPATIENT
Start: 2025-02-04

## 2025-02-04 NOTE — TELEPHONE ENCOUNTER
Response regarding pt request is that while we do order for claustrophobia form MRI because pt is enclosed, that is not the case for t myelogram which uses CT not MRI; noting that we do not just treat underlying anxiety.      This RN phoned patient and left VM with response and suggestion to reach out to PCP for this request, perhaps they would be able to assist.

## 2025-02-04 NOTE — TELEPHONE ENCOUNTER
Patient care taker and Pt phoned back and stated that it is not for anxiety that pt has massive spasms and that if they are going to be placing a needle in his spine he is afraid to move. Pt stated that DKO said he could have this script for the procedure.     This RN stated that in previously conversation the patient only mentioned the anxiety and the wheel chair noting.  The massive spasms and that DKO stated he would prescribe were not notes.       and this RN again reviewed the prior feedback to the response and stated that that this RN would re-route request to DKO himself and take it from there.

## 2025-02-04 NOTE — TELEPHONE ENCOUNTER
Patient scheduled Myelogram and phoned in to request something for anxiety as he has need that in the past for imaging studies and now that he has become wheel chair bound he is dealing even more with the lossof control and anxiety.   Pt Myelogram is scheduled for this Thursday 2/6/2025.  This RN will reach out to team for consideration and then update patient.     Pt stated that he was going to PT and that if he did not answer it was okay to leave a Voice Mail.    Pt pharmacy verified, PDMP ran.

## 2025-02-05 ENCOUNTER — OFFICE VISIT (OUTPATIENT)
Dept: NEUROLOGY | Facility: CLINIC | Age: 65
End: 2025-02-05
Payer: MEDICARE

## 2025-02-05 VITALS
HEART RATE: 55 BPM | SYSTOLIC BLOOD PRESSURE: 132 MMHG | WEIGHT: 236 LBS | HEIGHT: 72 IN | TEMPERATURE: 97.8 F | DIASTOLIC BLOOD PRESSURE: 70 MMHG | OXYGEN SATURATION: 95 % | BODY MASS INDEX: 31.97 KG/M2

## 2025-02-05 DIAGNOSIS — G82.50 QUADRIPARESIS (HCC): ICD-10-CM

## 2025-02-05 DIAGNOSIS — R29.898 WEAKNESS OF RIGHT LOWER EXTREMITY: ICD-10-CM

## 2025-02-05 DIAGNOSIS — Z98.1 STATUS POST LUMBAR SPINAL FUSION: ICD-10-CM

## 2025-02-05 DIAGNOSIS — G95.89 MYELOMALACIA OF CERVICAL CORD (HCC): ICD-10-CM

## 2025-02-05 PROCEDURE — 99215 OFFICE O/P EST HI 40 MIN: CPT | Performed by: PSYCHIATRY & NEUROLOGY

## 2025-02-05 RX ORDER — LIDOCAINE 50 MG/G
PATCH TOPICAL
COMMUNITY
Start: 2025-01-10

## 2025-02-05 RX ORDER — PREDNISONE 10 MG/1
10 TABLET ORAL DAILY
Qty: 30 TABLET | Refills: 5 | Status: SHIPPED | OUTPATIENT
Start: 2025-02-05

## 2025-02-05 RX ORDER — CLOBETASOL PROPIONATE 0.5 MG/ML
SOLUTION TOPICAL
COMMUNITY

## 2025-02-05 RX ORDER — DIAZEPAM 2 MG/1
TABLET ORAL
Qty: 10 TABLET | Refills: 0 | Status: SHIPPED | OUTPATIENT
Start: 2025-02-05

## 2025-02-05 NOTE — ASSESSMENT & PLAN NOTE
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

## 2025-02-05 NOTE — PATIENT INSTRUCTIONS
Labs  prior to Ct myelogram      Start prednisone after test and labs           Watch for interaction  with zanflex  and valium

## 2025-02-05 NOTE — ASSESSMENT & PLAN NOTE
Orders:    diazepam (VALIUM) 2 mg tablet; 1 po 30 mins prior to procedure . Pt can repeat in 2 hours if necessary

## 2025-02-05 NOTE — PROGRESS NOTES
Name: Jos Hawk      : 1960      MRN: [id number]  Encounter Provider: Debra Leyva DO  Encounter Date: 2025   Encounter department: [facility] [place]  :  Assessment & Plan  Weakness of right lower extremity    Orders:    predniSONE 10 mg tablet; Take 1 tablet (10 mg total) by mouth daily    Myelomalacia of cervical cord (HCC)    Orders:    predniSONE 10 mg tablet; Take 1 tablet (10 mg total) by mouth daily  Myelomalacia of the spinal cord stemming from his injury in [place].  Status post lumbar spinal fusion       Status post spinal fusions  Quadriparesis (HCC)    Orders:    diazepam (VALIUM) 2 mg tablet; 1 po 30 mins prior to procedure . Pt can repeat in 2 hours if necessary        Today Bill and I had a long discussion regarding the use of steroids.  Long-term use is steroids is contraindicated in this case.  We discussed that it can be helpful however the benefit does wane.  He is adamant about this being useful for his inflammatory process.  We discussed the potential side effects of infection, osteopenia, osteoporosis, Cushing's, weight gain, diabetes, falls and increased risk of fractures.  I  will no longer prescribe IV steroids.  The maximum dose of steroids could be 20 mg only for short period of time.  I did acquiesce and did agree to prescribe 10 mg of prednisone if after 2 weeks it is helpful then we can reduce to 5 and ultimately discontinue.  He would like to continue this on a regular basis for several months.  Again we discussed that after being on this dose usually higher doses are required to obtain the same benefit.  This could potentially affect the ability for appropriate healing.  We discussed the potential of an treating a unknown inflammatory etiology is with risks.  I Additional information does need to be obtained.  He does have laboratory studies that were ordered by Dr. Noel.  This should be obtained prior to the initiation of  steroids.  He did accept all of the potential side effects of the steroids and would like to return to the dose of 10 mg a day.    Did provide him a prescription for Valium to be taken prior to his procedure tomorrow.  This can be utilized with Zanaflex but I discussed with him regarding the high risk of muscle relaxation which could affect his ability to ambulate.  He cannot utilize both Valium and Ativan simultaneously.  I have only provided him with 10 tablets to begin for for severe anxiety.      History of Present Illness    This is a 64 -year-old patient with spinal disease.  He was last evaluated in August of 2024 .  He had been evaluated after his hospitalization at [facility].  During his hospitalization BuSpar, duloxetine Valium baclofen and pain meds were all discontinued.  He was continued on Zanaflex 2 mg tablets and asked to slowly reduce the dose.  After his visit with me in August [facility] [place] [facility] he informs me he was evaluated by numerous physicians while there and eventually referred to [facility] about his spinal cord stimulator.  It was felt that the spinal cord stimulator was not causing his symptoms in his legs.  It was felt that his cord itself was intact.  In the interim he reports improvement of his strength in the upper extremities.  Unfortunately he has had continued issues with weakness in the lower extremities over the last year.  He has increase in spasms occurring more in the evening and early morning.  He does utilize Zanaflex milligrams at bedtime.  He does not utilize any Zanaflex during the day as it can result in cognitive side effects.  He had an MRI of the brain on 11/6/2024 which showed some mild microangiopathic changes.      His main complaints include spasms and increased tone.  Recently he did utilize a Medrol Dosepak which improved his symptomatology.  Approxi-1 year ago( August of 2023)  he had been admitted to the hospital with  cellulitis in the in the right elbow prompted by use of steroids.  He was treated with antibiotics at that time.  We have had numerous discussions regarding the use of high-dose steroids  and  long-term use of steroids.  He has been on steroids at various doses with intermittent benefit.  Today he request to return to the use of steroids.    He does have an appointment with Dr. Noel next week and he is for CT myelogram tomorrow.  He does report anxiety prior to the CT myelogram and did request the use of Valium.  This had been given to them prior to the MRIs.  He does not have a PCP.  I reluctantly gave him prescription for Valium to utilize for spasms as well as for anxiety.  This is only for short period of time.     He does have a history of chronic cervical C3-C4 myelomalacia.  He is status post decompression in 2016 for left leg weakness he has a history of lumbar spinal stenosis with cauda equina syndrome status post multiple lumbar surgeries including L2-S1 spinal fusion in 2016 and anterior L4-L5 decompression with subsequent fusion.  He was doing well until approximately 2 years ago and has had gradual decline in his overall function.       Extensive surgical evaluation  1995 Laminectomy,   1998 Neck surgery, 2  2001 LSPINE fusion,   2016 Spinal fusion, 2  2018 SCS implanted       He does have an autonomic dysfunction is utilizing midodrine he has access to midodrine to take on a very intermittent basis.  This has helped his blood pressure.           He did have a EMG/NCV procedure performed in April 2024 which showed a large fiber sensory polyneuropathy and acute on chronic L5-S1 lumbar sacral  radiculopathy bilaterally on needle testing.        Major issues today include continued spasticity of his lower extremities despite the use of exercise program muscle relaxants stretching techniques.  This can awaken him in the middle of the night.  He denies any spasms during the day however he reports  increase in tone.  Overall his strength in the upper extremities has since improved.  He does require for a lift at times for movement in his bed.  Day he reports ability to walk with a exoskeleton or 160 feet.  He does require assistance of 2 to stand.  Overall his strength in the upper extremities has improved.  Does need a Jose F  iftt to transfer.                                Review of Systems   Constitutional: Negative.    HENT: Negative.     Eyes: Negative.    Respiratory: Negative.     Cardiovascular: Negative.    Gastrointestinal: Negative.    Endocrine: Negative.    Genitourinary: Negative.    Musculoskeletal: Negative.    Skin: Negative.    Allergic/Immunologic: Negative.    Neurological:  Positive for tremors, weakness and numbness.   Hematological: Negative.    Psychiatric/Behavioral: Negative.        I have personally reviewed the MA's review of systems and made changes as necessary.           Objective   /70 (BP Location: Right arm, Patient Position: Sitting, Cuff Size: Standard)   Pulse 55   Temp 97.8 °F (36.6 °C) (Temporal)   Ht 6' (1.829 m)   Wt 107 kg (236 lb)   SpO2 95%   BMI 32.01 kg/m²     Physical Exam  Cardiovascular:      Rate and Rhythm: Normal rate and regular rhythm.   Musculoskeletal:      Cervical back: Normal range of motion.   Neurological:      Mental Status: He is alert.      Deep Tendon Reflexes:      Reflex Scores:       Patellar reflexes are 1+ on the right side and 2+ on the left side.      Neurological Exam  Mental Status  Alert.    Motor    Strength testing in upper extremities was a 5 out of 5 in the upper extremities except for the handgrip which was a 4 out of 5.  There was atrophy of the intrinsic palmar muscles.  In the lower extremity there is a , increased tone in the paraspinals quadriceps and hamstrings.  This is worse on the right than the left.  He had slight increase strength in the right lower extremity at a 4 out of 5 in the hamstrings compared to the  left which was a 4 -.  Hip flexion was a  4 out of 5.  Ankle dorsiflexion was a 3+.           .    Sensory  Decrease in sensation in his lower extremities..    Reflexes                                            Right                      Left  Patellar                                1+                         2+  Right Plantar: mute  Left Plantar: mute    Right pathological reflexes: Domenica's absent.  Left pathological reflexes: Domenica's absent.  Decreased reflexes due to inability to relax..      Presented in a wheelchair and was unable to stand independently.      Return to our offices in several months.      I have spent a total time of 45 minutes in caring for this patient on the day of the visit/encounter including Risks and benefits of tx options, Counseling / Coordination of care, Documenting in the medical record, Reviewing / ordering tests, medicine, procedures  , and Obtaining or reviewing history  .

## 2025-02-06 ENCOUNTER — HOSPITAL ENCOUNTER (OUTPATIENT)
Dept: RADIOLOGY | Facility: HOSPITAL | Age: 65
Discharge: HOME | End: 2025-02-06
Attending: NEUROLOGICAL SURGERY
Payer: MEDICARE

## 2025-02-06 VITALS
DIASTOLIC BLOOD PRESSURE: 73 MMHG | WEIGHT: 236 LBS | BODY MASS INDEX: 31.97 KG/M2 | HEIGHT: 72 IN | SYSTOLIC BLOOD PRESSURE: 137 MMHG | TEMPERATURE: 97.5 F | OXYGEN SATURATION: 97 % | RESPIRATION RATE: 18 BRPM | HEART RATE: 53 BPM

## 2025-02-06 DIAGNOSIS — R25.2 SPASTICITY: ICD-10-CM

## 2025-02-06 DIAGNOSIS — G62.9 NEUROPATHY: ICD-10-CM

## 2025-02-06 DIAGNOSIS — G82.50 QUADRIPARESIS (HCC): ICD-10-CM

## 2025-02-06 LAB
ALBUMIN SERPL BCG-MCNC: 3.7 G/DL (ref 3.5–5)
ALP SERPL-CCNC: 89 U/L (ref 34–104)
ALT SERPL W P-5'-P-CCNC: 19 U/L (ref 7–52)
ANION GAP SERPL CALCULATED.3IONS-SCNC: 6 MMOL/L (ref 4–13)
APTT PPP: 28 SECONDS (ref 23–34)
AST SERPL W P-5'-P-CCNC: 17 U/L (ref 13–39)
B BURGDOR IGG+IGM SER QL IA: NEGATIVE
BILIRUB SERPL-MCNC: 0.89 MG/DL (ref 0.2–1)
BUN SERPL-MCNC: 12 MG/DL (ref 5–25)
CALCIUM SERPL-MCNC: 8.8 MG/DL (ref 8.4–10.2)
CHLORIDE SERPL-SCNC: 94 MMOL/L (ref 96–108)
CO2 SERPL-SCNC: 32 MMOL/L (ref 21–32)
CREAT SERPL-MCNC: 0.73 MG/DL (ref 0.6–1.3)
CRP SERPL QL: 4.5 MG/L
ERYTHROCYTE [DISTWIDTH] IN BLOOD BY AUTOMATED COUNT: 14.7 % (ref 11.6–15.1)
ERYTHROCYTE [SEDIMENTATION RATE] IN BLOOD: 14 MM/HOUR (ref 0–19)
GFR SERPL CREATININE-BSD FRML MDRD: 96 ML/MIN/1.73SQ M
GLUCOSE P FAST SERPL-MCNC: 80 MG/DL (ref 65–99)
GLUCOSE SERPL-MCNC: 80 MG/DL (ref 65–140)
HCT VFR BLD AUTO: 53.7 % (ref 36.5–49.3)
HGB BLD-MCNC: 17.4 G/DL (ref 12–17)
INR PPP: 1 (ref 0.85–1.19)
MCH RBC QN AUTO: 31.6 PG (ref 26.8–34.3)
MCHC RBC AUTO-ENTMCNC: 32.4 G/DL (ref 31.4–37.4)
MCV RBC AUTO: 98 FL (ref 82–98)
PLATELET # BLD AUTO: 157 THOUSANDS/UL (ref 149–390)
PMV BLD AUTO: 10 FL (ref 8.9–12.7)
POTASSIUM SERPL-SCNC: 4.2 MMOL/L (ref 3.5–5.3)
PROT SERPL-MCNC: 6 G/DL (ref 6.4–8.4)
PROTHROMBIN TIME: 13.5 SECONDS (ref 12.3–15)
RBC # BLD AUTO: 5.51 MILLION/UL (ref 3.88–5.62)
SODIUM SERPL-SCNC: 132 MMOL/L (ref 135–147)
URATE SERPL-MCNC: 4.8 MG/DL (ref 3.5–8.5)
WBC # BLD AUTO: 5.9 THOUSAND/UL (ref 4.31–10.16)

## 2025-02-06 PROCEDURE — 86235 NUCLEAR ANTIGEN ANTIBODY: CPT | Performed by: NEUROLOGICAL SURGERY

## 2025-02-06 PROCEDURE — 80053 COMPREHEN METABOLIC PANEL: CPT | Performed by: NEUROLOGICAL SURGERY

## 2025-02-06 PROCEDURE — 72132 CT LUMBAR SPINE W/DYE: CPT

## 2025-02-06 PROCEDURE — 85027 COMPLETE CBC AUTOMATED: CPT | Performed by: NEUROLOGICAL SURGERY

## 2025-02-06 PROCEDURE — 72129 CT CHEST SPINE W/DYE: CPT

## 2025-02-06 PROCEDURE — 85730 THROMBOPLASTIN TIME PARTIAL: CPT | Performed by: NEUROLOGICAL SURGERY

## 2025-02-06 PROCEDURE — 86140 C-REACTIVE PROTEIN: CPT | Performed by: NEUROLOGICAL SURGERY

## 2025-02-06 PROCEDURE — 84550 ASSAY OF BLOOD/URIC ACID: CPT | Performed by: NEUROLOGICAL SURGERY

## 2025-02-06 PROCEDURE — 85610 PROTHROMBIN TIME: CPT | Performed by: NEUROLOGICAL SURGERY

## 2025-02-06 PROCEDURE — 86618 LYME DISEASE ANTIBODY: CPT | Performed by: NEUROLOGICAL SURGERY

## 2025-02-06 PROCEDURE — 86430 RHEUMATOID FACTOR TEST QUAL: CPT | Performed by: NEUROLOGICAL SURGERY

## 2025-02-06 PROCEDURE — 86200 CCP ANTIBODY: CPT | Performed by: NEUROLOGICAL SURGERY

## 2025-02-06 PROCEDURE — 85652 RBC SED RATE AUTOMATED: CPT | Performed by: NEUROLOGICAL SURGERY

## 2025-02-06 PROCEDURE — 72126 CT NECK SPINE W/DYE: CPT

## 2025-02-06 RX ORDER — ALPRAZOLAM 0.5 MG
TABLET ORAL
Qty: 2 TABLET | Refills: 0 | Status: SHIPPED | OUTPATIENT
Start: 2025-02-06 | End: 2025-02-06 | Stop reason: ALTCHOICE

## 2025-02-06 RX ADMIN — IOHEXOL 10 ML: 300 INJECTION, SOLUTION INTRAVENOUS at 15:21

## 2025-02-06 NOTE — TELEPHONE ENCOUNTER
Patient phoned and informed that his script was approved by Dr. Noel and sent to your pharmacy.  Pt stated that he is preparing to depart for the hospital now as he needs to be there for 1100.  He is hopeful that his pharmacy will message him shortly to be able to obtain this script. Pt appreciative for assistance.

## 2025-02-07 ENCOUNTER — TELEPHONE (OUTPATIENT)
Age: 65
End: 2025-02-07

## 2025-02-07 LAB — RHEUMATOID FACT SER QL LA: NEGATIVE

## 2025-02-07 NOTE — TELEPHONE ENCOUNTER
Phone call received, this RN was on speaker phone with pt and his care taker.  Patient had CT myelogram yesterday and discharge instructions stated no submersion for 24 hours and patient has aqua therapy today scheduled for 1500 which reportedly would be the 25th hour.  Patient and caretaker wanting to verify pt can go to aqua therapy today or if he should skip it.  This RN to check with AP team and then give a call back.

## 2025-02-07 NOTE — TELEPHONE ENCOUNTER
This RN consulted with PUNEET Bernard regarding patient aqua therapy session today at 1500.  Recommendation to skip today's appointment to be on the side of caution.      Patient asked is he could put one of those plastic things on it and patient was informed that should he be adamant about going to aqua therapy today that would be more appropriate than not covering it.      Patient stated appreciation for follow up and stated he will make a decision later as he had missed his other appointment for aqua therapy this week.

## 2025-02-11 ENCOUNTER — OFFICE VISIT (OUTPATIENT)
Dept: NEUROSURGERY | Facility: CLINIC | Age: 65
End: 2025-02-11
Payer: MEDICARE

## 2025-02-11 ENCOUNTER — TELEPHONE (OUTPATIENT)
Dept: NEUROSURGERY | Facility: CLINIC | Age: 65
End: 2025-02-11

## 2025-02-11 VITALS
HEART RATE: 61 BPM | BODY MASS INDEX: 31.97 KG/M2 | DIASTOLIC BLOOD PRESSURE: 72 MMHG | RESPIRATION RATE: 18 BRPM | TEMPERATURE: 98 F | SYSTOLIC BLOOD PRESSURE: 130 MMHG | WEIGHT: 236 LBS | OXYGEN SATURATION: 98 % | HEIGHT: 72 IN

## 2025-02-11 DIAGNOSIS — R25.2 SPASTICITY: ICD-10-CM

## 2025-02-11 DIAGNOSIS — G82.50 QUADRIPARESIS (HCC): ICD-10-CM

## 2025-02-11 DIAGNOSIS — G62.9 NEUROPATHY: ICD-10-CM

## 2025-02-11 DIAGNOSIS — Z98.1 STATUS POST LUMBAR SPINAL FUSION: Primary | ICD-10-CM

## 2025-02-11 PROCEDURE — 99213 OFFICE O/P EST LOW 20 MIN: CPT | Performed by: NEUROLOGICAL SURGERY

## 2025-02-11 NOTE — PROGRESS NOTES
Name: Jos Hawk      : 1960      MRN: [id number]  Encounter Provider: Jeramy Noel MD  Encounter Date: 2025   Encounter department: [facility] [place]  :  Assessment & Plan  Spasticity  I believe this is the patient's primary problems.  I will be in contact with Dr. Blue in an effort to optimize strategies for doing a baclofen trial for him.  Consideration for intrathecal baclofen (Lioresal) may also be a consideration if peripheral side effects predominate.  I do not see any impingement on the spinal cord or nerve roots which is contributing to his spasticity.  The patient had a myriad of questions all of which were answered.       Quadriparesis (HCC)  Secondary to an old injury which is probably exacerbated by increased microvascular problems from his chronic hospitalization       Status post lumbar spinal fusion  There are fractured rods and loose screws however I do not believe that this is a primary problem.  Pain is not a main modulator of his discomfort.  Instead I believe spasticity is the main modulator in his functional impairment.  I have recommended addressing this lumbar instrumentation only if he does not have a functional improvement with his baclofen trial.       Neuropathy             History of Present Illness   He complains of tone that is predominantly over all of his other problems.  He has difficulty in initiating and continuing functional changes in position because of this high tone.  He says that he develops spasms in his latissimus dorsi which is so severe that it causes him to have difficulty taking a deep breath at times.  He does not report pain as being a main modulator of his discomfort.      Review of Systems   Constitutional: Negative.    HENT: Negative.     Eyes: Negative.    Respiratory: Negative.     Cardiovascular: Negative.    Gastrointestinal: Negative.    Endocrine: Negative.    Genitourinary: Negative.     Musculoskeletal:  Positive for back pain (constant back pain, bilateral leg pain 7/10), gait problem (unable to stand alone) and myalgias (have improved).        Left arm pain, and stiffness in both arms and loss of ROM    Skin: Negative.    Allergic/Immunologic: Negative.    Neurological:  Positive for weakness (in both legs) and numbness (numbness in both legs).   Hematological: Negative.    Psychiatric/Behavioral: Negative.      I have personally reviewed the MA's review of systems and made changes as necessary.         Objective   /72 (BP Location: Left arm, Patient Position: Sitting, Cuff Size: Standard)   Pulse 61   Temp 98 °F (36.7 °C) (Temporal)   Resp 18   Ht 6' (1.829 m)   Wt 107 kg (236 lb)   SpO2 98%   BMI 32.01 kg/m²     Physical Exam  Constitutional:       Appearance: Normal appearance.   Neurological:      Mental Status: He is alert. Mental status is at baseline.      Comments: Seated in a wheelchair.  He has high tone in his lower extremities and can move them into different positions but with difficulty because of this high tone.   Psychiatric:         Mood and Affect: Mood normal.         Behavior: Behavior normal.       Neurological Exam  Mental Status  Alert.  Seated in a wheelchair.  He has high tone in his lower extremities and can move them into different positions but with difficulty because of this high tone..      Radiology Results Review: I personally reviewed the following image studies in PACS and associated radiology reports: CT myelogram cervical, thoracic, lumbar. My interpretation of the radiology images/reports is: A CT myelogram is carefully reviewed.  In the cervical spine thinning of the cord is obvious from a remote injury.  There is no continued compression centrally.  There is some neuroforaminal narrowing.  In the thoracic spine there are degenerative changes with bridging osteophytes but again no cord compression.  In the lumbar spine there are multiple  problems with the lumbosacral instrumentation.  There is fracture of the rods and loose screws.  Again I do not see darcy compression of the thecal sac..

## 2025-02-11 NOTE — ASSESSMENT & PLAN NOTE
Secondary to an old injury which is probably exacerbated by increased microvascular problems from his chronic hospitalization

## 2025-02-11 NOTE — TELEPHONE ENCOUNTER
2/11/25 Patient checked out and provided Information for Physical Therapist Bebo Chatman at [facility] at [phone] advised .

## 2025-02-11 NOTE — ASSESSMENT & PLAN NOTE
There are fractured rods and loose screws however I do not believe that this is a primary problem.  Pain is not a main modulator of his discomfort.  Instead I believe spasticity is the main modulator in his functional impairment.  I have recommended addressing this lumbar instrumentation only if he does not have a functional improvement with his baclofen trial.

## 2025-02-11 NOTE — ASSESSMENT & PLAN NOTE
I believe this is the patient's primary problems.  I will be in contact with Dr. Blue in an effort to optimize strategies for doing a baclofen trial for him.  Consideration for intrathecal baclofen (Lioresal) may also be a consideration if peripheral side effects predominate.  I do not see any impingement on the spinal cord or nerve roots which is contributing to his spasticity.  The patient had a myriad of questions all of which were answered.

## 2025-02-12 LAB
CCP AB SER IA-ACNC: 0.7 (ref ?–10)
ENA SS-A AB SER IA-ACNC: <0.5 U/ML (ref ?–10)
ENA SS-B IGG SER IA-ACNC: <0.6 U/ML (ref ?–10)

## 2025-02-12 NOTE — PLAN OF CARE
Problem: PHYSICAL THERAPY ADULT  Goal: Performs mobility at highest level of function for planned discharge setting. See evaluation for individualized goals. Description: Treatment/Interventions: Functional transfer training, LE strengthening/ROM, Therapeutic exercise, Endurance training, Patient/family training, Equipment eval/education, Bed mobility, OT (sliding board when able to lateral scoot in bed.)          See flowsheet documentation for full assessment, interventions and recommendations. Note: Prognosis: Fair  Problem List: Decreased strength, Decreased range of motion, Decreased endurance, Impaired balance, Decreased mobility, Decreased safety awareness, Impaired sensation, Impaired tone, Obesity, Pain  Assessment: pt admitted with complaint of R elbow pain and swelling. pt dx with cullulitis and arm swelling. pt referred to PT. pt has history of cervical spinal cord malacia, quadriparesis, cauda equinal syndrome and spinal cord stimulator. pt referred to PT. pt was living in multi level home on first floor. pt was receiving OPPT services at [facility] for PT, OT, aquatic therapy and home massage, . pt was using rw for distance amb 300-600' with chair follow. pt reports gradual decline over last month, now unable to stand. pt relies on caregivers for all other functions. pt  demonstrated a dependent functional level. pt has limjitations in bue strength and rom, ble strength and tone, pain in generalized fashion and was unable to stand or amb. pt noted to have increased extensor tone ble. pt did needed max assist of 2 for rolling, supine to and from sitting, attempt at standing. pt reported 8/10 pain generalized. pt has current deficits in rom, strength, tone, balance, locomotion, pain control, sensation, activity tolerance. pt will need skilled PT and rehab.   Barriers to Discharge: None     PT Discharge Recommendation: Post acute rehabilitation services    See flowsheet documentation for full assessment. [FreeTextEntry1] :  Hospital Course: Discharge Date	28-Nov-2023 Admission Date	21-Nov-2023 21:02 Reason for Admission	left groin draining wound Hospital Course	 74 y/o F  with a history of   TEVAR for (TAAA) and AAA s/p EVAR on 10/19 , History of left le Ischemia ( acutely occluded left iliac graft) resulting in Right to left femoral ptfe bypass and left lower ext fasciotomies 11/5, and ORIF for left hip fracture on 11/7.  Patient sent to the ED for evaluation for suspected left groin wound infection.    - s/p left groin washout and sartorius muscle flap >>  Old and acute hematomas found in deep and superficial tissue, serosanguinous drainage -  OR cultures swab and tissue + PsA, f/u (S) cefepime - bcx ngtd - dc zosyn-->cefepime 2g IV q8h - d/w Dr Peter, concern for graft infection which cannot be removed - discharged on cefepime 2 g iv q8h via picc with weekly CBc CMp ESr CRP end 1/19/24.  - she completed abx - had f/u CTA which reported persistent fluid collection adjacent to graft anastomosis in left groin - ESr and crp had improved  patient is doing well and has no fever she has a wound on LLE for which she is getting wound care and was told she may need a vac she was sent here by vascular surgery to discuss chronic antibiotic suppression  Interval f/u 2/7/24- pt is tolerating cipro started probiotics feels some numbness at left groin, no pain  Interval f/u 3/11/24- pt is doing well no diarrhea no symptoms LLE wound healed -has a scab planning to go to [place] beginning of may having f/u Ct after that   Interval f/u 4/15/24 pt here for f/u doing well no diarrhea no fever on probiotics  xarelto changed to plavix doing acupuncture almost done with PT notes some pain at distal lle calf medial scar, states may have banged it going for cruise next month   Interval f/u 6/5/24- doing well no diarrhea saw Dr Peter had Ct- reviewed no signs of infection no fever did note some hip pain has to see [facility] tolerating cipro noted skin peeling only over arms and lower legs x 1 month ? photosensitivity  arms and calf with hypopigmented scars did note weight gain-using probiotics and yogurt not eating much  Interval f/u 11/18/24- pt underwent CTA found to have ongoing endoleaks and growth in descending thoracic aneursym she is being evaluated for TAMBE graft procedure likely mid winter-spring she is very nervous about complications and will be seeing Dr Loo again Friday to discuss compliant with cipro, not happy about the weight gain which she attributes to it no fever no diarrhea found to have PE and DVT, on eliquis  Interval f/u 2/10/25- pt is here for f/u visit tolerating cipro no diarrhea still has some pain in LLE , worse since stopping PT

## 2025-02-13 ENCOUNTER — TELEPHONE (OUTPATIENT)
Dept: NEUROSURGERY | Facility: CLINIC | Age: 65
End: 2025-02-13

## 2025-02-13 NOTE — TELEPHONE ENCOUNTER
Call received from patient and care giver stating at patient's last office appt on 2/11 with Dr Noel they discussed a baclofen trial to be done with Dr Blue. They stated when they reached out to Dr Helm, Dr. Noel has not yet discussed with him. Patient is requesting this RN route a message looking for an update. They also asked that this RN add Dr Blue will be out of the office tomorrow 2/14 until Monday 2/17.     Advised this RN will route a message to Dr Noel for update. They were appreciative

## 2025-02-13 NOTE — TELEPHONE ENCOUNTER
Called and discussed patient with Dr Blue as well as Bebo Chatman (PT).  We spoke about an baclofen trial either oral or intrathecal.  Believe his primary problem is that of increased tone.  He will communicate this through his team.  Bebo Chatman and he were very happy with a communication

## 2025-02-14 ENCOUNTER — TELEPHONE (OUTPATIENT)
Dept: NEUROSURGERY | Facility: CLINIC | Age: 65
End: 2025-02-14

## 2025-02-14 NOTE — TELEPHONE ENCOUNTER
Received the following message to the clinical voicemail:     So what's your cell phone? Yep, yes. Dawna, this is Janelle Cabrera. I am calling on behalf of Jos Hawk. I'm his caretaker and we would like to get a message today to if possible or first thing on Monday to doctor or work. OK, so we need doctor or work to call Doctor Brandon Ferguson. He recently did a referral for a trial management of Baclofen to Doctor Mirza. But Doctor Mirza is the Chief Medical Officer and Doctor Abdulaziz is. I can't. He handles management of spasticity and other pain management, things like that. I can't think of the professional terminology, but in any case, Mirza referred Tony Arguello Kenn to [facility]. So we would like Doctor Noel to speak to Mary Jaime because Tony has been on the Baclofen in the past but it was for a different reason and was not on a trial. He tried, it made him feel loopy and he went off right away. It was presented to him as just a simple muscle relaxer before he had all these complications. So the situation surrounding the issue is completely different now. And so we had some communication recently. We scheduled for Monday at 1:30 to see Mingo and we think it is imperative that Doctor Noel call and give him his detailed analysis and why he wants the Tony to go on this trial. If that can happen, we would be very grateful and if you could please call Tony on his cell phone to just verify you got this message and that Doctor Noel will be calling him before our 1:30 appointment. Tony's cell phone number is [phone]. Again [phone]. If he does not answer 'cause he does do rehab all day, please leave a detailed message. Thank you so much, we appreciate your time. Bye bye.      After reviewing chart noted Dr. Noel has already made contact with Dr. Blue and discussed his thoughts. Contacted Tony back to advise. Left a detailed message stating above. Encouraged a call back with  questions or concerns.

## 2025-04-23 ENCOUNTER — OFFICE VISIT (OUTPATIENT)
Dept: NEUROLOGY | Facility: CLINIC | Age: 65
End: 2025-04-23
Payer: MEDICARE

## 2025-04-23 VITALS
TEMPERATURE: 97.7 F | SYSTOLIC BLOOD PRESSURE: 122 MMHG | HEIGHT: 73 IN | OXYGEN SATURATION: 95 % | DIASTOLIC BLOOD PRESSURE: 78 MMHG | RESPIRATION RATE: 14 BRPM | HEART RATE: 74 BPM | WEIGHT: 235 LBS | BODY MASS INDEX: 31.14 KG/M2

## 2025-04-23 DIAGNOSIS — R29.898 LEG WEAKNESS, BILATERAL: Primary | ICD-10-CM

## 2025-04-23 DIAGNOSIS — G62.9 NEUROPATHY: ICD-10-CM

## 2025-04-23 PROCEDURE — 99215 OFFICE O/P EST HI 40 MIN: CPT | Performed by: PSYCHIATRY & NEUROLOGY

## 2025-04-23 NOTE — ASSESSMENT & PLAN NOTE
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  He was seen at [facility] who evaluated him and sent him a message.  We discussed potential of a CSF examination to determine any types of etiologies of the neuropathy and/or a myelopathy such as HTLV 1.  A CSF examination should be performed only after f other medical problems are controlled in order to prevent additional infections.  The CSF examination will be ordered by [facility]  physician and subsequently performed at the [facility] site.  I have informed him that this is performed under fluoroscopy.      Today prior to visit I did review Dr. Solares's note and the [facility] records.

## 2025-04-23 NOTE — ASSESSMENT & PLAN NOTE
Tony is a 64-year-old patient with spinal disease who was stable until the last week.  He had been on baclofen but the dose had been increased to 20 mg 3 times a day last Thursday.  He was able to ambulate 20 feet with assistance however after that he noted more weakness of the right leg, increase in stiffness and on Tuesday he was unable to stand.  He was seen by physiatry at [facility] yesterday who then has placed him on a baclofen taper.  He was felt that baclofen excessive doses may have may have contributed to his symptomatology.  He also reports increasing weakness of the left upper extremity.  We discussed that if symptoms do not improve with tapering these doses he may require imaging studies in the form of MRI of the cervical, thoracic and lumbar spines.    We discussed the spasticity may increase since he will be tapering off of baclofen and he may be able to utilize Zanaflex as lower doses during the day higher doses are relatively contraindicated due to episodes of hypotension.  He does have Zanaflex available.  However he should contact physiatry for their recommendations.

## (undated) DEVICE — PREP PAD BNS: Brand: CONVERTORS

## (undated) DEVICE — INTENDED FOR TISSUE SEPARATION, AND OTHER PROCEDURES THAT REQUIRE A SHARP SURGICAL BLADE TO PUNCTURE OR CUT.: Brand: BARD-PARKER SAFETY BLADES SIZE 15, STERILE

## (undated) DEVICE — PLUMEPEN PRO 10FT

## (undated) DEVICE — PADDING CAST 4 IN  COTTON STRL

## (undated) DEVICE — TUBING SUCTION 5MM X 12 FT

## (undated) DEVICE — SPONGE LAP 18 X 18 IN STRL RFD

## (undated) DEVICE — POOLE SUCTION HANDLE: Brand: CARDINAL HEALTH

## (undated) DEVICE — ASTOUND STANDARD SURGICAL GOWN, XL: Brand: CONVERTORS

## (undated) DEVICE — COBAN 6 IN STERILE

## (undated) DEVICE — GLOVE SRG BIOGEL 8

## (undated) DEVICE — GLOVE INDICATOR PI UNDERGLOVE SZ 8.5 BLUE

## (undated) DEVICE — COBAN 4 IN STERILE

## (undated) DEVICE — U-DRAPE: Brand: CONVERTORS

## (undated) DEVICE — DRESSING GUAZE ADH BORDER 4 X 4 IN

## (undated) DEVICE — STRL PENROSE DRAIN 18" X 1/2": Brand: CARDINAL HEALTH

## (undated) DEVICE — STERILE BETHLEHEM PLASTIC HAND: Brand: CARDINAL HEALTH

## (undated) DEVICE — ELECTRODE NEEDLE MOD E-Z CLEAN 2.75IN 7CM -0013M

## (undated) DEVICE — ACE WRAP 6 IN UNSTERILE

## (undated) DEVICE — DRAPE EQUIPMENT RF WAND

## (undated) DEVICE — SUT MONOCRYL 3-0 PS-2 27 IN Y427H

## (undated) DEVICE — GAUZE SPONGES,16 PLY: Brand: CURITY

## (undated) DEVICE — ACE WRAP 4 IN UNSTERILE

## (undated) DEVICE — INTENDED FOR TISSUE SEPARATION, AND OTHER PROCEDURES THAT REQUIRE A SHARP SURGICAL BLADE TO PUNCTURE OR CUT.: Brand: BARD-PARKER ® CARBON RIB-BACK BLADES

## (undated) DEVICE — CHLORAPREP HI-LITE 26ML ORANGE

## (undated) DEVICE — CAST PADDING 4 IN SYNTHETIC NON-STRL

## (undated) DEVICE — OCCLUSIVE GAUZE STRIP,3% BISMUTH TRIBROMOPHENATE IN PETROLATUM BLEND: Brand: XEROFORM

## (undated) DEVICE — CYSTO TUBING TUR Y IRRIGATION

## (undated) DEVICE — CURITY NON-ADHERENT STRIPS: Brand: CURITY

## (undated) DEVICE — SUT ETHILON 3-0 PS-1 18 IN 1663G

## (undated) DEVICE — SCD SEQUENTIAL COMPRESSION COMFORT SLEEVE MEDIUM KNEE LENGTH: Brand: KENDALL SCD

## (undated) DEVICE — WEBRIL 6 IN UNSTERILE

## (undated) DEVICE — SPONGE STICK WITH PVP-I: Brand: KENDALL

## (undated) DEVICE — GLOVE INDICATOR PI UNDERGLOVE SZ 7 BLUE

## (undated) DEVICE — PADDING CAST 6IN COTTON STRL

## (undated) DEVICE — BETHLEHEM UNIVERSAL  MIONR EXT: Brand: CARDINAL HEALTH

## (undated) DEVICE — SKIN MARKER DUAL TIP WITH RULER CAP, FLEXIBLE RULER AND LABELS: Brand: DEVON

## (undated) DEVICE — SCROTAL SUPPORT LGE

## (undated) DEVICE — 3M™ STERI-DRAPE™ U-DRAPE 1015: Brand: STERI-DRAPE™

## (undated) DEVICE — 3000CC GUARDIAN II: Brand: GUARDIAN

## (undated) DEVICE — 10FR FRAZIER SUCTION HANDLE: Brand: CARDINAL HEALTH

## (undated) DEVICE — SUT VICRYL 3-0 SH 27 IN J416H

## (undated) DEVICE — GLOVE SRG BIOGEL 7.5

## (undated) DEVICE — SUPER SPONGES,MEDIUM: Brand: KERLIX

## (undated) DEVICE — PENCIL ELECTROSURG E-Z CLEAN -0035H

## (undated) DEVICE — VIAL DECANTER

## (undated) DEVICE — BETHLEHEM UNIVERSAL MINOR GEN: Brand: CARDINAL HEALTH

## (undated) DEVICE — GLOVE SRG BIOGEL 6.5

## (undated) DEVICE — ABDOMINAL PAD: Brand: DERMACEA

## (undated) DEVICE — GLOVE SRG BIOGEL 7

## (undated) DEVICE — IMPERVIOUS STOCKINETTE: Brand: DEROYAL

## (undated) DEVICE — SUT MONOCRYL 3-0 SH 27 IN Y416H